# Patient Record
Sex: FEMALE | Race: WHITE | NOT HISPANIC OR LATINO | Employment: UNEMPLOYED | URBAN - METROPOLITAN AREA
[De-identification: names, ages, dates, MRNs, and addresses within clinical notes are randomized per-mention and may not be internally consistent; named-entity substitution may affect disease eponyms.]

---

## 2017-01-10 ENCOUNTER — TELEPHONE (OUTPATIENT)
Dept: INTERNAL MEDICINE | Facility: CLINIC | Age: 57
End: 2017-01-10

## 2017-01-10 NOTE — TELEPHONE ENCOUNTER
Spoke with pt who called in and stated she has been experiencing D X 5 times yesterday, and some slight N. Denies V. Denies F.   Advised likely mild gastro. [place] diet, plenty of fluids. If it gets worse or continues for more than 5 days to call us back.

## 2017-01-17 ENCOUNTER — OFFICE VISIT (OUTPATIENT)
Dept: NUTRITION | Facility: HOSPITAL | Age: 57
End: 2017-01-17

## 2017-01-17 VITALS — WEIGHT: 246 LBS | BODY MASS INDEX: 39.53 KG/M2 | HEIGHT: 66 IN

## 2017-01-17 PROCEDURE — 97803 MED NUTRITION INDIV SUBSEQ: CPT | Performed by: DIETITIAN, REGISTERED

## 2017-01-17 NOTE — MR AVS SNAPSHOT
Sandra WINSTON Molina   1/17/2017 2:00 PM   Office Visit    Dept Phone:  [phone]   Encounter #:  [id number]    Provider:  Emilee Canada RD   Department:  [facility]                Your Full Care Plan              Your Updated Medication List          This list is accurate as of: 1/17/17  2:44 PM.  Always use your most recent med list.                albuterol 108 (90 BASE) MCG/ACT inhaler   Commonly known as:  PROVENTIL HFA;VENTOLIN HFA       ALPRAZolam 0.25 MG tablet   Commonly known as:  XANAX   Take 1 tablet by mouth daily as needed for anxiety.       buPROPion  MG 24 hr tablet   Commonly known as:  WELLBUTRIN XL   Take 1 tablet by mouth Every Morning.       FLUoxetine 20 MG capsule   Commonly known as:  PROzac   Take 1 capsule by mouth Daily.       naproxen 500 MG tablet   Commonly known as:  NAPROSYN       NASAL DECONGESTANT 30 MG tablet   Generic drug:  pseudoephedrine       ondansetron 8 MG tablet   Commonly known as:  ZOFRAN       phentermine 30 MG capsule   Take 1 capsule by mouth Every Morning.       PRILOSEC OTC 20 MG EC tablet   Generic drug:  omeprazole OTC       tiZANidine 4 MG tablet   Commonly known as:  ZANAFLEX       topiramate 25 MG tablet   Commonly known as:  TOPAMAX   Take 0.5 tablets by mouth Every Night.               Instructions     None    Patient Instructions History      Upcoming Appointments     Visit Type Date Time Department    FOLLOW UP RD VISIT, 30 MIN 1/17/2017  2:00 PM [facility]    FOLLOW UP 1/19/2017  2:30 PM [facility]    FOLLOW UP 1/20/2017  8:30 AM [facility] SLEEP MEDICINE [facility]    NEW GYNECOLOGICAL 1/23/2017  2:30 PM [facility]OMENS CRE [facility]    FOLLOW UP RD VISIT, 30 MIN 2/15/2017  2:00 PM [facility]      [facility] Signup     Our records indicate that you have an active [facility] [employer]OTRONIX account.    You can view your After Visit Summary by going to [email or web address] and logging in with your ENDOTRONIX  "username and password.  If you don't have a Biztag username and password but a parent or guardian has access to your record, the parent or guardian should login with their own Biztag username and password and access your record to view the After Visit Summary.    If you have questions, you can email [email or web address] or call [phone] to talk to our Biztag staff.  Remember, Biztag is NOT to be used for urgent needs.  For medical emergencies, dial 911.               Other Info from Your Visit           Your Appointments     Jan 19, 2017  2:30 PM EST   Follow Up with Pedro Anaya MD   [facility] INTERNAL MEDICINE AND ENDOCRINOLOGY [place] (--)    [address]  [facility] [id number] [phone]           Arrive 15 minutes prior to appointment.            Jan 20, 2017  8:30 AM EST   Follow Up with Ivan Kirby MD   [facility] SLEEP MEDICINE (--)    [address]  [facility] [id number] [phone]           Please bring completed new patient packets that were mailed to you prior to follow up as well as bringing a copy of insurnace card and photo ID to visit. Please bring downloadable chips/cards out of machines if you are on PAP therapy.            Jan 23, 2017  2:30 PM EST   New Gynecological with Víctor Edwards MD   [facility] WOMEN'S CARE [place] (--)    [address]  [place] [place] [id number] [phone]            Feb 15, 2017  2:00 PM EST   Follow up RD visit with Emilee Canada RD   [facility] NUTRIT [facility]    [address]  [address]  [place] [id number] [phone]              Allergies     Methylprednisolone Unspecified     Bleeding, cramping      Vital Signs     Height Weight Body Mass Index Smoking Status          166.4 cm (65.5\") 112 kg (246 lb) 40.31 kg/m2 Never Smoker          "

## 2017-01-17 NOTE — PROGRESS NOTES
Adult Outpatient Nutrition  Assessment/PES    Patient Name:  Sandra Auguste  YOB: 1960  MRN: [id number]    Assessment Date:  1/17/2017    Comments: Patient present for ongoing weight loss counseling.  Weight measurements taken today: 246 lbs.  Goal completion as follows:   -Use food diary to track kcalories: 0%  -Lose weight: 100%    Patient states that she was sick a couple of weeks ago and did not eat very much.  She has lost 2 lbs due to being ill.  She also states that she was very busy and did not get to track her food intake as discussed at the last follow up appointment.  Reviewed importance of tracking food intake to continue to lose weight.  Worked with patient on practicing tracking food intake using a food diary.  Gave patient several examples.  Patient able to record everything she ate as well as approximate kcalorie amount. Patient added her kcalories for a total of 2010 kcalories yesterday.  Her goal is 1600 kcalories per day to lose ~1 lb per week.  Patient states that she better understands how to track her intake.  Will bring her food diary with her to the next follow up appointment.  Scheduled a continued follow up appointment for 2- at 2:00 p.m.  RD contact information given.  Thank you again for this referral.      Electronically signed by:  Emilee Canada RD  01/17/17 2:28 PM

## 2017-01-18 ENCOUNTER — APPOINTMENT (OUTPATIENT)
Dept: NUTRITION | Facility: HOSPITAL | Age: 57
End: 2017-01-18

## 2017-01-19 ENCOUNTER — OFFICE VISIT (OUTPATIENT)
Dept: INTERNAL MEDICINE | Facility: CLINIC | Age: 57
End: 2017-01-19

## 2017-01-19 VITALS
HEIGHT: 66 IN | HEART RATE: 85 BPM | WEIGHT: 246 LBS | SYSTOLIC BLOOD PRESSURE: 118 MMHG | DIASTOLIC BLOOD PRESSURE: 82 MMHG | OXYGEN SATURATION: 98 % | BODY MASS INDEX: 39.53 KG/M2

## 2017-01-19 DIAGNOSIS — F41.9 ANXIETY: ICD-10-CM

## 2017-01-19 DIAGNOSIS — M25.512 ACUTE PAIN OF LEFT SHOULDER: Primary | ICD-10-CM

## 2017-01-19 DIAGNOSIS — R14.0 ABDOMINAL DISTENSION (GASEOUS): ICD-10-CM

## 2017-01-19 DIAGNOSIS — E66.9 ADIPOSITY: ICD-10-CM

## 2017-01-19 PROCEDURE — 99213 OFFICE O/P EST LOW 20 MIN: CPT | Performed by: HOSPITALIST

## 2017-01-19 RX ORDER — ALPRAZOLAM 0.25 MG/1
0.25 TABLET ORAL DAILY PRN
Qty: 30 TABLET | Refills: 0 | Status: SHIPPED | OUTPATIENT
Start: 2017-01-19 | End: 2018-09-08

## 2017-01-19 NOTE — PROGRESS NOTES
"Subjective   Sandra Auguste is a 56 y.o. female.   Steatosis of liver; Non morbid obesity, unspecified obesity type; and Acute pain of left shoulder      HPI Comments: She has had shots in her shoulder by Dr. Bettencourt for her pain and it helped. She has some tingling in her right arm. She has had the gastrointestinal virus last week. Her weight is unchanged but she has not had adipex  for a while. She is not exercising much except for exercises for her shoulder. She is following a diet.       The following portions of the patient's history were reviewed and updated as appropriate: allergies, current medications, past family history, past medical history, past social history, past surgical history and problem list.    Review of Systems   Constitutional: Positive for appetite change.   HENT: Negative for congestion.    Gastrointestinal: Positive for abdominal distention and nausea. Negative for diarrhea.   Neurological: Negative for dizziness and facial asymmetry.       Objective  Blood pressure 118/82, pulse 85, height 65.5\" (166.4 cm), weight 246 lb (112 kg), SpO2 98 %.  Body mass index is 40.31 kg/(m^2).      Physical Exam   Constitutional: She is oriented to person, place, and time. She appears well-developed and well-nourished.   HENT:   Head: Normocephalic and atraumatic.   Eyes: Conjunctivae and EOM are normal. Pupils are equal, round, and reactive to light.   Neck: Normal range of motion. Neck supple.   Cardiovascular: Normal rate, regular rhythm and normal heart sounds.    Pulmonary/Chest: Effort normal and breath sounds normal.   Abdominal: Soft. Bowel sounds are normal.   Neurological: She is alert and oriented to person, place, and time.   Skin: Skin is warm and dry.   Psychiatric: She has a normal mood and affect. Her behavior is normal.       Assessment/Plan   Sandra was seen today for steatosis of liver, non morbid obesity, unspecified obesity type and acute pain of left shoulder.    Diagnoses and all orders for " this visit:    Acute pain of left shoulder    Abdominal distension (gaseous)    Anxiety  -     ALPRAZolam (XANAX) 0.25 MG tablet; Take 1 tablet by mouth Daily As Needed for anxiety.    Adiposity    She was advise to take a probiotic

## 2017-01-19 NOTE — MR AVS SNAPSHOT
Sandra Auguste   1/19/2017 2:30 PM   Office Visit    Dept Phone:  [phone]   Encounter #:  [id number]    Provider:  Pedro Anaya MD   Department:  [facility] INTERNAL MEDICINE AND ENDOCRINOLOGY [place]                Your Full Care Plan              Where to Get Your Medications      You can get these medications from any pharmacy     Bring a paper prescription for each of these medications     ALPRAZolam 0.25 MG tablet            Your Updated Medication List          This list is accurate as of: 1/19/17  4:20 PM.  Always use your most recent med list.                albuterol 108 (90 BASE) MCG/ACT inhaler   Commonly known as:  PROVENTIL HFA;VENTOLIN HFA       ALPRAZolam 0.25 MG tablet   Commonly known as:  XANAX   Take 1 tablet by mouth Daily As Needed for anxiety.       buPROPion  MG 24 hr tablet   Commonly known as:  WELLBUTRIN XL   Take 1 tablet by mouth Every Morning.       FLUoxetine 20 MG capsule   Commonly known as:  PROzac   Take 1 capsule by mouth Daily.       naproxen 500 MG tablet   Commonly known as:  NAPROSYN       NASAL DECONGESTANT 30 MG tablet   Generic drug:  pseudoephedrine       ondansetron 8 MG tablet   Commonly known as:  ZOFRAN       phentermine 30 MG capsule   Take 1 capsule by mouth Every Morning.       PRILOSEC OTC 20 MG EC tablet   Generic drug:  omeprazole OTC       tiZANidine 4 MG tablet   Commonly known as:  ZANAFLEX       topiramate 25 MG tablet   Commonly known as:  TOPAMAX   Take 0.5 tablets by mouth Every Night.               You Were Diagnosed With        Codes Comments    Acute pain of left shoulder    -  Primary ICD-10-CM: M25.512  ICD-9-CM: 719.41     Abdominal distension (gaseous)     ICD-10-CM: R14.0  ICD-9-CM: 787.3     Anxiety     ICD-10-CM: F41.9  ICD-9-CM: 300.00     Adiposity     ICD-10-CM: E66.9  ICD-9-CM: 278.02       Instructions     None    Patient Instructions History      Upcoming Appointments     Visit Type Date Time Department    FOLLOW UP 1/19/2017  2:30 PM [facility]    FOLLOW UP 1/20/2017  8:30 AM [facility] SLEEP MEDICINE [facility]    NEW GYNECOLOGICAL 1/23/2017  2:30 PM [facility] WOMENS CRE CTR [facility]    FOLLOW UP 2/9/2017 10:30 AM [facility]    FOLLOW UP RD VISIT, 30 MIN 2/15/2017  2:00 PM [facility]      MyChart Signup     Our records indicate that you have an active [employer] account.    You can view your After Visit Summary by going to [employer] and logging in with your MobileMD username and password.  If you don't have a MobileMD username and password but a parent or guardian has access to your record, the parent or guardian should login with their own MobileMD username and password and access your record to view the After Visit Summary.    If you have questions, you can email [email or web address] or call [phone] to talk to our MobileMD staff.  Remember, MobileMD is NOT to be used for urgent needs.  For medical emergencies, dial 911.               Other Info from Your Visit           Your Appointments     Jan 20, 2017  8:30 AM EST   Follow Up with Ivan Kirby MD   [facility] SLEEP MEDICINE (--)    [address]  [facility] [address] [phone]           Please bring completed new patient packets that were mailed to you prior to follow up as well as bringing a copy of insurnace card and photo ID to visit. Please bring downloadable chips/cards out of machines if you are on PAP therapy.            Jan 23, 2017  2:30 PM EST   New Gynecological with Víctor Edwards MD   [facility] WOMEN'S CARE [place] (--)    [address]  [facility] [id number] [phone]            Feb 09, 2017 10:30 AM EST   Follow Up with Pedro Anaya MD   [facility] INTERNAL MEDICINE AND ENDOCRINOLOGY [place] (--)    [address]  [facility] [address] [phone]           Arrive 15 minutes prior to appointment.          "   Feb 15, 2017  2:00 PM EST   Follow up RD visit with Emilee Canada RD   [facility]    [address]  [address]  [place] [id number]   [phone]              Allergies     Methylprednisolone Unspecified     Bleeding, cramping      Reason for Visit     Steatosis of liver     Non morbid obesity, unspecified obesity type     Acute pain of left shoulder           Vital Signs     Blood Pressure Pulse Height Weight Oxygen Saturation Body Mass Index    118/82 85 65.5\" (166.4 cm) 246 lb (112 kg) 98% 40.31 kg/m2    Smoking Status                   Never Smoker           Problems and Diagnoses Noted     Abdominal distension (gaseous)    Acute pain of left shoulder    Adiposity    Anxiety problem        "

## 2017-01-20 ENCOUNTER — OFFICE VISIT (OUTPATIENT)
Dept: SLEEP MEDICINE | Facility: HOSPITAL | Age: 57
End: 2017-01-20

## 2017-01-20 VITALS
DIASTOLIC BLOOD PRESSURE: 86 MMHG | WEIGHT: 248 LBS | HEART RATE: 86 BPM | BODY MASS INDEX: 39.86 KG/M2 | SYSTOLIC BLOOD PRESSURE: 138 MMHG | OXYGEN SATURATION: 98 % | HEIGHT: 66 IN

## 2017-01-20 DIAGNOSIS — G47.33 OBSTRUCTIVE SLEEP APNEA, ADULT: Primary | ICD-10-CM

## 2017-01-20 DIAGNOSIS — E66.9 ADIPOSITY: ICD-10-CM

## 2017-01-20 PROCEDURE — 99213 OFFICE O/P EST LOW 20 MIN: CPT | Performed by: INTERNAL MEDICINE

## 2017-01-20 NOTE — PROGRESS NOTES
"Subjective   Sandra Auguste is a 56 y.o. female is here today for follow-up.  She is followed here with obstructive sleep apnea.  Her primary care physician is Dr. Anaya    History of Present Illness  Patient was last seen May 8.  She has moderate obstructive sleep apnea had an AHI of 22.6.  She also has a history of obesity and anxiety.  She says she's been doing fairly well with her machine she's been having a slight problem adjusting to the mask but is gotten a new mask and is doing better with that.  She says she does sleep better when she uses machine.  She has occasional pain in her shoulders.  She is been working with the dietitian.  The following portions of the patient's history were reviewed and updated as appropriate: allergies, current medications and problem list.    Review of Systems   Constitutional: Positive for diaphoresis.   HENT: Positive for postnasal drip and rhinorrhea.    Eyes: Positive for visual disturbance.   Respiratory: Positive for cough.    Cardiovascular: Positive for palpitations and leg swelling.   Gastrointestinal: Positive for diarrhea.   Endocrine: Negative.    Genitourinary: Negative.    Musculoskeletal: Positive for arthralgias and joint swelling.   Skin: Negative.    Allergic/Immunologic: Positive for environmental allergies.   Neurological: Positive for headaches.   Hematological: Negative.    Psychiatric/Behavioral: Positive for dysphoric mood. The patient is nervous/anxious.        Objective    Visit Vitals   • /86   • Pulse 86   • Ht 65.5\" (166.4 cm)   • Wt 248 lb (112 kg)   • SpO2 98%   • BMI 40.64 kg/m2       Physical Exam   Constitutional: She is oriented to person, place, and time. She appears well-developed and well-nourished.   She is morbidly obese.   HENT:   Head: Normocephalic and atraumatic.   She has nasal airway narrowing and Mallampati class IV anatomy   Eyes: EOM are normal. Pupils are equal, round, and reactive to light.   Neck: Normal range of motion. " Neck supple.   Cardiovascular: Normal rate, regular rhythm and normal heart sounds.    Pulmonary/Chest: Effort normal and breath sounds normal.   Abdominal: Soft. Bowel sounds are normal.   Musculoskeletal: Normal range of motion. She exhibits no edema.   Neurological: She is alert and oriented to person, place, and time.   Skin: Skin is warm and dry.   Psychiatric: She has a normal mood and affect. Her behavior is normal.     Download from her machine for the past 6 months shows she's used it 94% of the days.  She is using it for hours 58 minutes per day.  Her AHI is only 2.2.  Her 90% pressure is 12.2.    Assessment/Plan   Sandra was seen today for follow-up.    Diagnoses and all orders for this visit:    Obstructive sleep apnea, adult  -     CPAP Therapy    Adiposity     patient seems be doing very well with her machine.  She is using it regularly.  She has good control of her respiratory events on her current pressures.  We will continue on her current settings.  We will renew her supplies.  She is encouraged to lose weight.  She is encouraged to avoid alcohol and sedatives close to bedtime.  She is encouraged practice lateral position sleep.  We will see her back in 1 year.  She is contact us earlier symptoms worsen.    Ivan Kirby MD [facility]  Sleep Medicine  Pulmonary and Critical Care Medicine

## 2017-01-20 NOTE — PATIENT INSTRUCTIONS
Sleep Apnea   Sleep apnea is a sleep disorder characterized by abnormal pauses in breathing while you sleep. When your breathing pauses, the level of oxygen in your blood decreases. This causes you to move out of deep sleep and into light sleep. As a result, your quality of sleep is poor, and the system that carries your blood throughout your body (cardiovascular system) experiences stress. If sleep apnea remains untreated, the following conditions can develop:  · High blood pressure (hypertension).  · Coronary artery disease.  · Inability to achieve or maintain an erection (impotence).  · Impairment of your thought process (cognitive dysfunction).  There are three types of sleep apnea:  1. Obstructive sleep apnea--Pauses in breathing during sleep because of a blocked airway.  2. Central sleep apnea--Pauses in breathing during sleep because the area of the brain that controls your breathing does not send the correct signals to the muscles that control breathing.  3. Mixed sleep apnea--A combination of both obstructive and central sleep apnea.  RISK FACTORS  The following risk factors can increase your risk of developing sleep apnea:  · Being overweight.  · Smoking.  · Having narrow passages in your nose and throat.  · Being of older age.  · Being male.  · Alcohol use.  · Sedative and tranquilizer use.  · Ethnicity. Among individuals younger than 35 years,  Americans are at increased risk of sleep apnea.  SYMPTOMS   · Difficulty staying asleep.  · Daytime sleepiness and fatigue.  · Loss of energy.  · Irritability.  · Loud, heavy snoring.  · Morning headaches.  · Trouble concentrating.  · Forgetfulness.  · Decreased interest in sex.  · Unexplained sleepiness.  DIAGNOSIS   In order to diagnose sleep apnea, your caregiver will perform a physical examination. A sleep study done in the comfort of your own home may be appropriate if you are otherwise healthy. Your caregiver may also recommend that you spend the  "night in a sleep lab. In the sleep lab, several monitors record information about your heart, lungs, and brain while you sleep. Your leg and arm movements and blood oxygen level are also recorded.  TREATMENT  The following actions may help to resolve mild sleep apnea:  · Sleeping on your side.    · Using a decongestant if you have nasal congestion.    · Avoiding the use of depressants, including alcohol, sedatives, and narcotics.    · Losing weight and modifying your diet if you are overweight.  There also are devices and treatments to help open your airway:  · Oral appliances. These are custom-made mouthpieces that shift your lower jaw forward and slightly open your bite. This opens your airway.  · Devices that create positive airway pressure. This positive pressure \"splints\" your airway open to help you breathe better during sleep. The following devices create positive airway pressure:    Continuous positive airway pressure (CPAP) device. The CPAP device creates a continuous level of air pressure with an air pump. The air is delivered to your airway through a mask while you sleep. This continuous pressure keeps your airway open.    Nasal expiratory positive airway pressure (EPAP) device. The EPAP device creates positive air pressure as you exhale. The device consists of single-use valves, which are inserted into each nostril and held in place by adhesive. The valves create very little resistance when you inhale but create much more resistance when you exhale. That increased resistance creates the positive airway pressure. This positive pressure while you exhale keeps your airway open, making it easier to breath when you inhale again.    Bilevel positive airway pressure (BPAP) device. The BPAP device is used mainly in patients with central sleep apnea. This device is similar to the CPAP device because it also uses an air pump to deliver continuous air pressure through a mask. However, with the BPAP machine, the " pressure is set at two different levels. The pressure when you exhale is lower than the pressure when you inhale.  · Surgery. Typically, surgery is only done if you cannot comply with less invasive treatments or if the less invasive treatments do not improve your condition. Surgery involves removing excess tissue in your airway to create a wider passage way.     This information is not intended to replace advice given to you by your health care provider. Make sure you discuss any questions you have with your health care provider.     Document Released: 12/08/2003 Document Revised: 01/08/2016 Document Reviewed: 04/25/2013  [employer] Interactive Patient Education ©2016 [employer]

## 2017-01-20 NOTE — LETTER
January 22, 2017     Pedro Anaya MD  [address]  [facility] [address]    Patient: Sandra Auguste   YOB: 1960   Date of Visit: 1/20/2017       Dear Dr. Jaclyn MD:    Thank you for referring Sandra Auguste to me for evaluation. Below are the relevant portions of my assessment and plan of care.      Sandra was seen today for follow-up.    Diagnoses and all orders for this visit:    Obstructive sleep apnea, adult  -     CPAP Therapy    Adiposity     patient seems be doing very well with her machine.  She is using it regularly.  She has good control of her respiratory events on her current pressures.  We will continue on her current settings.  We will renew her supplies.  She is encouraged to lose weight.  She is encouraged to avoid alcohol and sedatives close to bedtime.  She is encouraged practice lateral position sleep.  We will see her back in 1 year.  She is contact us earlier symptoms worsen.    Ivan Kirby MD [facility]  Sleep Medicine  Pulmonary and Critical Care Medicine                      If you have questions, please do not hesitate to call me. I look forward to following Sandra along with you.         Sincerely,        Ivan Kirby MD        CC: No Recipients

## 2017-01-20 NOTE — MR AVS SNAPSHOT
Sandra M Molina   1/20/2017 8:30 AM   Office Visit    Dept Phone:  [phone]   Encounter #:  [id number]    Provider:  Ivan Kirby MD   Department:  [facility] SLEEP MEDICINE                Your Full Care Plan              Your Updated Medication List          This list is accurate as of: 1/20/17  9:39 AM.  Always use your most recent med list.                albuterol 108 (90 BASE) MCG/ACT inhaler   Commonly known as:  PROVENTIL HFA;VENTOLIN HFA       ALPRAZolam 0.25 MG tablet   Commonly known as:  XANAX   Take 1 tablet by mouth Daily As Needed for anxiety.       buPROPion  MG 24 hr tablet   Commonly known as:  WELLBUTRIN XL   Take 1 tablet by mouth Every Morning.       FLUoxetine 20 MG capsule   Commonly known as:  PROzac   Take 1 capsule by mouth Daily.       naproxen 500 MG tablet   Commonly known as:  NAPROSYN       NASAL DECONGESTANT 30 MG tablet   Generic drug:  pseudoephedrine       ondansetron 8 MG tablet   Commonly known as:  ZOFRAN       phentermine 30 MG capsule   Take 1 capsule by mouth Every Morning.       PRILOSEC OTC 20 MG EC tablet   Generic drug:  omeprazole OTC       tiZANidine 4 MG tablet   Commonly known as:  ZANAFLEX       topiramate 25 MG tablet   Commonly known as:  TOPAMAX   Take 0.5 tablets by mouth Every Night.               We Performed the Following     CPAP Therapy       You Were Diagnosed With        Codes Comments    Obstructive sleep apnea, adult    -  Primary ICD-10-CM: G47.33  ICD-9-CM: 327.23     Adiposity     ICD-10-CM: E66.9  ICD-9-CM: 278.02       Instructions    Sleep Apnea   Sleep apnea is a sleep disorder characterized by abnormal pauses in breathing while you sleep. When your breathing pauses, the level of oxygen in your blood decreases. This causes you to move out of deep sleep and into light sleep. As a result, your quality of sleep is poor, and the system that carries your blood throughout your body (cardiovascular system)  experiences stress. If sleep apnea remains untreated, the following conditions can develop:  · High blood pressure (hypertension).  · Coronary artery disease.  · Inability to achieve or maintain an erection (impotence).  · Impairment of your thought process (cognitive dysfunction).  There are three types of sleep apnea:  1. Obstructive sleep apnea--Pauses in breathing during sleep because of a blocked airway.  2. Central sleep apnea--Pauses in breathing during sleep because the area of the brain that controls your breathing does not send the correct signals to the muscles that control breathing.  3. Mixed sleep apnea--A combination of both obstructive and central sleep apnea.  RISK FACTORS  The following risk factors can increase your risk of developing sleep apnea:  · Being overweight.  · Smoking.  · Having narrow passages in your nose and throat.  · Being of older age.  · Being male.  · Alcohol use.  · Sedative and tranquilizer use.  · Ethnicity. Among individuals younger than 35 years,  Americans are at increased risk of sleep apnea.  SYMPTOMS   · Difficulty staying asleep.  · Daytime sleepiness and fatigue.  · Loss of energy.  · Irritability.  · Loud, heavy snoring.  · Morning headaches.  · Trouble concentrating.  · Forgetfulness.  · Decreased interest in sex.  · Unexplained sleepiness.  DIAGNOSIS   In order to diagnose sleep apnea, your caregiver will perform a physical examination. A sleep study done in the comfort of your own home may be appropriate if you are otherwise healthy. Your caregiver may also recommend that you spend the night in a sleep lab. In the sleep lab, several monitors record information about your heart, lungs, and brain while you sleep. Your leg and arm movements and blood oxygen level are also recorded.  TREATMENT  The following actions may help to resolve mild sleep apnea:  · Sleeping on your side.    · Using a decongestant if you have nasal congestion.    · Avoiding the use of  "depressants, including alcohol, sedatives, and narcotics.    · Losing weight and modifying your diet if you are overweight.  There also are devices and treatments to help open your airway:  · Oral appliances. These are custom-made mouthpieces that shift your lower jaw forward and slightly open your bite. This opens your airway.  · Devices that create positive airway pressure. This positive pressure \"splints\" your airway open to help you breathe better during sleep. The following devices create positive airway pressure:    Continuous positive airway pressure (CPAP) device. The CPAP device creates a continuous level of air pressure with an air pump. The air is delivered to your airway through a mask while you sleep. This continuous pressure keeps your airway open.    Nasal expiratory positive airway pressure (EPAP) device. The EPAP device creates positive air pressure as you exhale. The device consists of single-use valves, which are inserted into each nostril and held in place by adhesive. The valves create very little resistance when you inhale but create much more resistance when you exhale. That increased resistance creates the positive airway pressure. This positive pressure while you exhale keeps your airway open, making it easier to breath when you inhale again.    Bilevel positive airway pressure (BPAP) device. The BPAP device is used mainly in patients with central sleep apnea. This device is similar to the CPAP device because it also uses an air pump to deliver continuous air pressure through a mask. However, with the BPAP machine, the pressure is set at two different levels. The pressure when you exhale is lower than the pressure when you inhale.  · Surgery. Typically, surgery is only done if you cannot comply with less invasive treatments or if the less invasive treatments do not improve your condition. Surgery involves removing excess tissue in your airway to create a wider passage way.     This " information is not intended to replace advice given to you by your health care provider. Make sure you discuss any questions you have with your health care provider.     Document Released: 12/08/2003 Document Revised: 01/08/2016 Document Reviewed: 04/25/2013  Elsevier Interactive Patient Education ©2016 [employer]       Patient Instructions History      Upcoming Appointments     Visit Type Date Time Department    FOLLOW UP 1/20/2017  8:30 AM [facility]    NEW GYNECOLOGICAL 1/23/2017  2:30 PM [facility]    FOLLOW UP 2/9/2017 10:30 AM [facility]    FOLLOW UP RD VISIT, 30 MIN 2/15/2017  2:00 PM [facility]      MyChart Signup     Our records indicate that you have an active [facility] account.    You can view your After Visit Summary by going to [employer] and logging in with your [employer] username and password.  If you don't have a [employer] username and password but a parent or guardian has access to your record, the parent or guardian should login with their own [employer] username and password and access your record to view the After Visit Summary.    If you have questions, you can email [email or web address] or call [phone] to talk to our [employer] staff.  Remember, [employer] is NOT to be used for urgent needs.  For medical emergencies, dial 911.               Other Info from Your Visit           Your Appointments     Jan 23, 2017  2:30 PM EST   New Gynecological with Víctor Edwards MD   [facility] [place] (--)    [address]  [place] [place] [facility] [address]   [phone]            Feb 09, 2017 10:30 AM EST   Follow Up with Pedro Anaya MD   [facility] INTERNAL MEDICINE AND ENDOCRINOLOGY [place] (--)    [address]  [place] [place] [facility] [id number] [phone]           Arrive 15 minutes prior to appointment.            Feb 15, 2017  2:00 PM EST   Follow up RD visit with Emilee Canada,  "ZEHRA   [facility]    [address]  [address]  [place] [address] [phone]              Allergies     Methylprednisolone Unspecified     Bleeding, cramping      Reason for Visit     Follow-up           Vital Signs     Blood Pressure Pulse Height Weight Oxygen Saturation Body Mass Index    138/86 86 65.5\" (166.4 cm) 248 lb (112 kg) 98% 40.64 kg/m2    Smoking Status                   Never Smoker           Problems and Diagnoses Noted     Adiposity    Obstructive sleep apnea, adult    -  Primary        "

## 2017-01-23 ENCOUNTER — OFFICE VISIT (OUTPATIENT)
Dept: INTERNAL MEDICINE | Facility: CLINIC | Age: 57
End: 2017-01-23

## 2017-01-23 VITALS
SYSTOLIC BLOOD PRESSURE: 140 MMHG | BODY MASS INDEX: 39.86 KG/M2 | HEIGHT: 66 IN | DIASTOLIC BLOOD PRESSURE: 84 MMHG | OXYGEN SATURATION: 99 % | HEART RATE: 94 BPM | WEIGHT: 248 LBS

## 2017-01-23 DIAGNOSIS — H92.03 EAR PAIN, BILATERAL: Primary | ICD-10-CM

## 2017-01-23 DIAGNOSIS — J02.9 SORE THROAT: ICD-10-CM

## 2017-01-23 LAB
EXPIRATION DATE: NORMAL
INTERNAL CONTROL: NORMAL
Lab: 4309
S PYO AG THROAT QL: NEGATIVE

## 2017-01-23 PROCEDURE — 87880 STREP A ASSAY W/OPTIC: CPT | Performed by: NURSE PRACTITIONER

## 2017-01-23 PROCEDURE — 99213 OFFICE O/P EST LOW 20 MIN: CPT | Performed by: NURSE PRACTITIONER

## 2017-01-23 NOTE — PROGRESS NOTES
"Subjective  Facial Swelling (right side of neck swollen and sore ) and Earache (both ears )      Sandra Auguste is a 56 y.o. female.   Allergies   Allergen Reactions   • Methylprednisolone      Bleeding, cramping     History of Present Illness      Swollen gland on right neck x 5 days, has been using ice and has went down a little, did gargle warm salt water too, temp 98.9, some chills and sweats , took tylenol with some relief , renan ear pain intermittent x 5 days   The following portions of the patient's history were reviewed and updated as appropriate: allergies, current medications, past family history, past medical history, past social history, past surgical history and problem list.    Review of Systems   Constitutional: Positive for chills and fever.   HENT: Positive for ear pain and sore throat.    Hematological: Positive for adenopathy.   All other systems reviewed and are negative.      Objective   Physical Exam   Constitutional: She is oriented to person, place, and time. She appears well-developed and well-nourished.   HENT:   Head: Normocephalic and atraumatic.   Right Ear: External ear normal.   Left Ear: External ear normal.   Mouth/Throat: Oropharynx is clear and moist.   Eyes: Conjunctivae are normal.   Cardiovascular: Normal rate.    Pulmonary/Chest: Effort normal and breath sounds normal.   Lymphadenopathy:     She has cervical adenopathy.   Neurological: She is alert and oriented to person, place, and time.   Skin: Skin is warm and dry.   Psychiatric: She has a normal mood and affect. Her behavior is normal. Judgment and thought content normal.   Nursing note and vitals reviewed.    Visit Vitals   • /84   • Pulse 94   • Ht 65.5\" (166.4 cm)   • Wt 248 lb (112 kg)   • SpO2 99%   • BMI 40.64 kg/m2       Assessment/Plan     Problem List Items Addressed This Visit     None      Visit Diagnoses     Ear pain, bilateral    -  Primary    advil cold and sinus or nasal spray prn    Sore throat        " viral, continue warm compresses to lymph nose, increase fluids     Relevant Orders    POC Rapid Strep A (Completed)          Results for orders placed or performed in visit on 01/23/17   POC Rapid Strep A   Result Value Ref Range    Rapid Strep A Screen Negative Negative, VALID, INVALID, Not Performed    Internal Control Passed Passed    Lot Number [id number]     Expiration Date 07/01/2020

## 2017-01-23 NOTE — MR AVS SNAPSHOT
Sandra Auguste   1/23/2017 12:45 PM   Office Visit    Dept Phone:  [phone]   Encounter #:  [id number]    Provider:  REBECA Johnson   Department:  [facility] INTERNAL MEDICINE AND ENDOCRINOLOGY [place]                Your Full Care Plan              Your Updated Medication List          This list is accurate as of: 1/23/17  1:23 PM.  Always use your most recent med list.                albuterol 108 (90 BASE) MCG/ACT inhaler   Commonly known as:  PROVENTIL HFA;VENTOLIN HFA       ALPRAZolam 0.25 MG tablet   Commonly known as:  XANAX   Take 1 tablet by mouth Daily As Needed for anxiety.       buPROPion  MG 24 hr tablet   Commonly known as:  WELLBUTRIN XL   Take 1 tablet by mouth Every Morning.       FLUoxetine 20 MG capsule   Commonly known as:  PROzac   Take 1 capsule by mouth Daily.       naproxen 500 MG tablet   Commonly known as:  NAPROSYN       NASAL DECONGESTANT 30 MG tablet   Generic drug:  pseudoephedrine       ondansetron 8 MG tablet   Commonly known as:  ZOFRAN       phentermine 30 MG capsule   Take 1 capsule by mouth Every Morning.       PRILOSEC OTC 20 MG EC tablet   Generic drug:  omeprazole OTC       tiZANidine 4 MG tablet   Commonly known as:  ZANAFLEX       topiramate 25 MG tablet   Commonly known as:  TOPAMAX   Take 0.5 tablets by mouth Every Night.               You Were Diagnosed With        Codes Comments    Ear pain, bilateral    -  Primary ICD-10-CM: H92.03  ICD-9-CM: 388.70 advil cold and sinus or nasal spray prn    Sore throat     ICD-10-CM: J02.9  ICD-9-CM: 462 viral, continue warm compresses to lymph nose, increase fluids       Instructions     None    Patient Instructions History      Upcoming Appointments     Visit Type Date Time Department    SAME DAY 1/23/2017 12:45 PM [facility]    NEW GYNECOLOGICAL 2/1/2017  9:30 AM [facility]    FOLLOW UP 2/9/2017 10:30 AM [facility]    FOLLOW UP RD VISIT, 30 MIN 2/15/2017  2:00 PM [facility]  [facility]      MyChart Signup     Our records indicate that you have an active [facility] account.    You can view your After Visit Summary by going to [email or web address] and logging in with your [employer] username and password.  If you don't have a [facility] username and password but a parent or guardian has access to your record, the parent or guardian should login with their own [facility] username and password and access your record to view the After Visit Summary.    If you have questions, you can email [email or web address] or call [phone] to talk to our [employer] staff.  Remember, [employer] is NOT to be used for urgent needs.  For medical emergencies, dial 911.               Other Info from Your Visit           Your Appointments     Feb 01, 2017  9:30 AM EST   New Gynecological with Víctor Edwards MD   [facility] [place] (--)    [address]  [facility] [address]   [phone]            Feb 09, 2017 10:30 AM EST   Follow Up with Pedro Anaya MD   [facility] INTERNAL MEDICINE AND ENDOCRINOLOGY [place] (--)    [address]  [facility] [address] [phone]           Arrive 15 minutes prior to appointment.            Feb 15, 2017  2:00 PM EST   Follow up RD visit with Emilee Canada RD   [facility]    [address]  [address]  [place] [id number]   [phone]              Allergies     Methylprednisolone Unspecified     Bleeding, cramping      Reason for Visit     Facial Swelling right side of neck swollen and sore     Earache both ears       Vital Signs     Blood Pressure Pulse Height Weight Oxygen Saturation Body Mass Index    140/84 94 65.5\" (166.4 cm) 248 lb (112 kg) 99% 40.64 kg/m2    Smoking Status                   Never Smoker           Problems and Diagnoses Noted     Ear pain    -  Primary    Sore throat            "

## 2017-02-01 ENCOUNTER — OFFICE VISIT (OUTPATIENT)
Dept: OBSTETRICS AND GYNECOLOGY | Facility: CLINIC | Age: 57
End: 2017-02-01

## 2017-02-01 VITALS
SYSTOLIC BLOOD PRESSURE: 130 MMHG | DIASTOLIC BLOOD PRESSURE: 88 MMHG | HEIGHT: 66 IN | WEIGHT: 250.6 LBS | BODY MASS INDEX: 40.27 KG/M2

## 2017-02-01 DIAGNOSIS — N95.0 POSTMENOPAUSAL BLEEDING: Primary | ICD-10-CM

## 2017-02-01 DIAGNOSIS — E66.01 MORBID OBESITY DUE TO EXCESS CALORIES (HCC): ICD-10-CM

## 2017-02-01 DIAGNOSIS — N92.0 MENORRHAGIA WITH REGULAR CYCLE: Primary | ICD-10-CM

## 2017-02-01 PROCEDURE — 99204 OFFICE O/P NEW MOD 45 MIN: CPT | Performed by: OBSTETRICS & GYNECOLOGY

## 2017-02-01 NOTE — PROGRESS NOTES
"   Chief Complaint   Patient presents with   • Gynecologic Exam     post-menopausal bleeding after treatment with steroids       Sandra Auguste is a 56 y.o. year old  presenting to be seen in consultation from Dr. Jeancarlos Anaya with a diagnosis of postmenopausal bleeding.  This patient has PTSD, anxiety, and depression.  He is treated with Wellbutrin XL, sertraline, and Xanax.  She has had 2 episodes of light, postmenopausal bleeding which immediately followed steroid injections into her shoulder.  She had a pelvic ultrasound which revealed intramural and subserous leiomyomata.  He states that she has previously had a cervical polypectomy.  The endometrial stripe was 7.4 mm.  She desires evaluation of her bleeding.    History   Sexual Activity   • Sexual activity: No     She would not like to be screened for STD's at today's exam.       SCREENING TESTS    Year 2012   Age                         PAP                         HPV high risk                         Mammogram                         KELLY score                         Breast MRI                         Lipids                         Vitamin D                         Colonoscopy                         DEXA  Frax (hip/any)                         Ovarian Screen                           Enter the month test was performed.  If month not known, enter \"X'  · Black numbers = normal results  · Red numbers = abnormal results  · Black X = patient reported normal  · Red X - patient reported abnormal      Referred by:    Profession:    Other info:        No Additional Complaints Reported    The following portions of the patient's history were reviewed and updated as appropriate:vital signs and   She  does not have any pertinent problems on file.  She  has a past surgical history that includes Cervical polypectomy.  Her family history includes Cancer in her " "maternal aunt, mother, and paternal aunt.  She  reports that she has never smoked. She has never used smokeless tobacco. Alcohol use questions deferred to the physician. She reports that she does not use illicit drugs.  Current Outpatient Prescriptions   Medication Sig Dispense Refill   • albuterol (PROVENTIL HFA;VENTOLIN HFA) 108 (90 BASE) MCG/ACT inhaler INHALE TWO PUFFS BY MOUTH EVERY 6 HOURS AS NEEDED     • ALPRAZolam (XANAX) 0.25 MG tablet Take 1 tablet by mouth Daily As Needed for anxiety. 30 tablet 0   • buPROPion XL (WELLBUTRIN XL) 300 MG 24 hr tablet Take 1 tablet by mouth Every Morning. 30 tablet 2   • FLUoxetine (PROzac) 20 MG capsule Take 1 capsule by mouth Daily. 30 capsule 2   • naproxen (NAPROSYN) 500 MG tablet Take 1 tablet by mouth every 12 (twelve) hours as needed. With food     • omeprazole OTC (PRILOSEC OTC) 20 MG EC tablet Take  by mouth.     • ondansetron (ZOFRAN) 8 MG tablet Take  by mouth.     • phentermine 30 MG capsule Take 1 capsule by mouth Every Morning. 30 capsule 2   • pseudoephedrine (NASAL DECONGESTANT) 30 MG tablet Take  by mouth.     • tiZANidine (ZANAFLEX) 4 MG tablet Take 1 tablet by mouth 3 (three) times a day as needed.     • topiramate (TOPAMAX) 25 MG tablet Take 0.5 tablets by mouth Every Night. 15 tablet 2     No current facility-administered medications for this visit.      She is allergic to methylprednisolone..        Review of Systems  A comprehensive review of systems was negative.  Constitutional: negative for fever, chills, activity change, appetite change, fatigue and unexpected weight change.  Respiratory: positive for cough  Cardiovascular: negative  Gastrointestinal: negative  Genitourinary:negative  Musculoskeletal:positive for right shoulder pain  Behavioral/Psych: positive for anxiety, depression and PTSD            Visit Vitals   • /88   • Ht 65.5\" (166.4 cm)   • Wt 250 lb 9.6 oz (114 kg)   • LMP  (LMP Unknown)   • BMI 41.07 kg/m2       Physical " Exam    General:  alert; cooperative; well developed; well nourished  obese - Body mass index is 41.07 kg/(m^2).   Skin:  No suspicious lesions seen   Thyroid: normal to inspection and palpation   Lungs:  breathing is unlabored  clear to auscultation bilaterally   Heart:  regular rate and rhythm, S1, S2 normal, no murmur, click, rub or gallop   Breasts:  Examined in supine position  Symmetric without masses or skin dimpling  Nipples normal without inversion, lesions or discharge  There are no palpable axillary nodes   Abdomen: soft, non-tender; no masses  no umbilical or inginual hernias are present  no hepato-splenomegaly  obese   Pelvis: Clinical staff was present for exam  External genitalia:  normal appearance of the external genitalia including Bartholin's and [place]'s glands.  Vaginal:  normal pink mucosa without prolapse or lesions.  Cervix:  normal appearance. high in the vault  Uterus:  asymmetrically enlarged, consistent with 10 weeks size;  Adnexa:  non palpable bilaterally.  Rectal:  anus visually normal appearing. recto-vaginal exam unremarkable and confirms findings;     Lab Review   CBC results    Imaging   Pelvic ultrasound reviewed revealing uterine leiomyomata.    Counseling was given to patient for the following topics: diagnostic results, instructions for management, prognosis and risks and benefits of treatment options . Total time of the encounter was > 45 minute(s) and > 30 minute(s)  was spent counseling the patient about the need for further evaluation of her endometrium.  I have discussed endometrial biopsy and hysteroscopy/D&C.  She will be scheduled for the latter procedure due to difficulty with cervical visualization in the office.  The surgical risks of uterine perforation, bleeding, infection, and anesthetic risks were explained to the patient.          ASSESSMENT  Problems Addressed this Visit        Digestive    Obesity       Genitourinary    Postmenopausal bleeding - Primary     Relevant Orders    Liquid-based Pap Smear, Screening            PLAN    · Medications prescribed this encounter:  No orders of the defined types were placed in this encounter.  · Pap test done  · Mammograms ordered  · Follow up:  To be scheduled for hysteroscopy, D&C  · Calcium, 600 mg/ Vit. D, 400 IU daily     *Please note that portions of this documentation may have been completed with a voice recognition program.  Efforts were made to edit this dictation, but occasional words may have been mistranscribed.     This note was electronically signed.    COREY Edwards MD  February 1, 2017  10:20 AM

## 2017-02-03 ENCOUNTER — TELEPHONE (OUTPATIENT)
Dept: INTERNAL MEDICINE | Facility: CLINIC | Age: 57
End: 2017-02-03

## 2017-02-03 NOTE — TELEPHONE ENCOUNTER
----- Message from Tosha Peck sent at 2/2/2017 11:35 AM EST -----  Contact: CHUCKIE WITH DR UGALDE'S OFFICE  CHUCKIE FROM DR UGALDE'S OFFICE CALLED WANTING TO KNOW IF THIS OFFICE CAN DO AN EKG AND LABS FOR MS EVANS TO COUNT TOWARD HER PRE-OP CLEARANCE. SHE HAS AN APPT WITH DR FORMAN ON 2/9/2017. SURGERY IS SCHEDULED ON 2/17 AT [facility] [place].    LABS NEEDED ARE CBC WITHOUT DIFF, CMP AND UA AND ANYTHING ELSE DR FORMAN FEELS WOULD BE PERTINENT.    DR UGALDE'S OFFICE [phone] CHUCKIE

## 2017-02-03 NOTE — TELEPHONE ENCOUNTER
Patient was moved from a 10:30 Slot to a 30 minute open slot at 2:30 for a Pre-op Physical for Dr Melton's office patient was notified of the time change

## 2017-02-06 ENCOUNTER — TELEPHONE (OUTPATIENT)
Dept: OBSTETRICS AND GYNECOLOGY | Facility: CLINIC | Age: 57
End: 2017-02-06

## 2017-02-06 NOTE — TELEPHONE ENCOUNTER
Pt calling with questions about upcoming Hysteroscopy D&C scheduled on 2/17/17 at [facility]. She is asking if it might be possible to schedule a hysterectomy instead of having Hysteroscopy D&C first, since she knows she has fibroids seen on pelvic ultrasound in Sept 2016. Per Dr Edwards:  We will need to evaluate endometrium with a bx before proceeding to schedule hysterectomy.  Pt agreeable - we have scheduled her for Endo BX 2/8/17 at 10:45 AM.

## 2017-02-07 ENCOUNTER — TELEPHONE (OUTPATIENT)
Dept: INTERNAL MEDICINE | Facility: CLINIC | Age: 57
End: 2017-02-07

## 2017-02-07 NOTE — TELEPHONE ENCOUNTER
----- Message from Tosha Peck sent at 2/7/2017  2:31 PM EST -----  Contact: PATIENT   PATIENT WOULD LIKE TO KNOW IF SHE NEEDS TO BE FASTING FOR THE PRE-OP SCHEDULED WITH DR FORMAN ON 2/9    CALL BACK #[phone]

## 2017-02-07 NOTE — TELEPHONE ENCOUNTER
Patient states she needs a New Rx sent to the pharmacy for her Omeprazole 40 mg said she talked to the pharmacy and they said it would need to have a PA done on it with her insurance but we need to sent a new rx so they can send us a PA Back

## 2017-02-08 ENCOUNTER — OFFICE VISIT (OUTPATIENT)
Dept: OBSTETRICS AND GYNECOLOGY | Facility: CLINIC | Age: 57
End: 2017-02-08

## 2017-02-08 VITALS
HEIGHT: 66 IN | WEIGHT: 250 LBS | SYSTOLIC BLOOD PRESSURE: 128 MMHG | DIASTOLIC BLOOD PRESSURE: 80 MMHG | BODY MASS INDEX: 40.18 KG/M2

## 2017-02-08 DIAGNOSIS — N95.0 POSTMENOPAUSAL BLEEDING: Primary | ICD-10-CM

## 2017-02-08 DIAGNOSIS — D25.1 INTRAMURAL LEIOMYOMA OF UTERUS: ICD-10-CM

## 2017-02-08 DIAGNOSIS — D25.2 SUBSEROUS LEIOMYOMA OF UTERUS: ICD-10-CM

## 2017-02-08 PROCEDURE — 58100 BIOPSY OF UTERUS LINING: CPT | Performed by: OBSTETRICS & GYNECOLOGY

## 2017-02-08 RX ORDER — OMEPRAZOLE 20 MG/1
20 TABLET, DELAYED RELEASE ORAL DAILY
Qty: 30 TABLET | Refills: 5 | Status: SHIPPED | OUTPATIENT
Start: 2017-02-08 | End: 2017-09-14

## 2017-02-08 NOTE — PROGRESS NOTES
PROCEDURE DATE: 2017  Chief Complaint   Patient presents with   • Procedure     endometrial biopsy       Sandra Auguste is a 56 y.o. year old  presenting to be seen for an attempt at an endometrial biopsy.  This 56-year-old female has had postmenopausal bleeding.  She was referred by Dr. Jeancarlos Anaya.  She had a pelvic ultrasound 2016 revealing both an intramural, and a pedunculated, subserosal uterine leiomyoma.  Her endometrial stripe was 7.2 mm.  She had a 2.7 cm hemorrhagic cyst of the right ovary.  She has continued to have pelvic pressure and postmenopausal spotting.  She is here for an endometrial biopsy to evaluate her endometrium for pathology.  The procedure has been explained in detail to the patient.  The risks of uterine perforation, increased bleeding and infection were explained to the patient.  She voiced understanding of these risks.  Informed consent was signed.      Risks and benefits discussed? yes  All questions answered? yes  Consents given by the patient  Written consent obtained? yes    Local anesthesia used:  no    Procedure documentation: In the exam room the patient was placed in lithotomy position.  She was examined and her uterus is irregular and 10 weeks size.  A speculum was introduced and the cervix was visualized high and anterior.  A tenaculum was placed on the anterior lip of the cervix.  An endometrial Pipelle was introduced to a depth of 10 cm and endometrial tissue was obtained.  The Pipelle was withdrawn.  The specimen will be sent to pathology.  There were no complications.  A saline vaginal wet mount was negative.    Findings: 1) Postmenopausal bleeding (N95.0),  2) Subserosal uterine leiomyoma (D25.2),  3) Intramural uterine leiomyoma (D25.1)    Plan: Endometrial biopsy  I have had a 20 minute face-to-face discussion with this patient about her desire for removal of her uterus tubes and ovaries.  I have explained that if the endometrial biopsy is benign she would  be a candidate for a robotically-assisted T LH/BSO/VCS.  She desires information about this procedure and will watch a video tape today that explains the surgical indications and risks.  *Please note that portions of this documentation may have been completed with a voice recognition program.  Efforts were made to edit this dictation, but occasional words may have been mistranscribed.  This note was electronically signed.    COREY Edwards MD  February 8, 2017  11:49 AM

## 2017-02-09 ENCOUNTER — OFFICE VISIT (OUTPATIENT)
Dept: INTERNAL MEDICINE | Facility: CLINIC | Age: 57
End: 2017-02-09

## 2017-02-09 VITALS
BODY MASS INDEX: 39.86 KG/M2 | HEART RATE: 93 BPM | WEIGHT: 248 LBS | HEIGHT: 66 IN | OXYGEN SATURATION: 99 % | DIASTOLIC BLOOD PRESSURE: 76 MMHG | SYSTOLIC BLOOD PRESSURE: 130 MMHG

## 2017-02-09 DIAGNOSIS — D50.0 IRON DEFICIENCY ANEMIA DUE TO CHRONIC BLOOD LOSS: ICD-10-CM

## 2017-02-09 DIAGNOSIS — E66.01 MORBID OBESITY DUE TO EXCESS CALORIES (HCC): ICD-10-CM

## 2017-02-09 DIAGNOSIS — Z01.818 PREOP EXAMINATION: ICD-10-CM

## 2017-02-09 DIAGNOSIS — G47.33 OBSTRUCTIVE SLEEP APNEA SYNDROME: Primary | ICD-10-CM

## 2017-02-09 LAB
ALBUMIN SERPL-MCNC: 4.4 G/DL (ref 3.2–4.8)
ALBUMIN/GLOB SERPL: 1.6 G/DL (ref 1.5–2.5)
ALP SERPL-CCNC: 122 U/L (ref 25–100)
ALT SERPL W P-5'-P-CCNC: 24 U/L (ref 7–40)
ANION GAP SERPL CALCULATED.3IONS-SCNC: 3 MMOL/L (ref 3–11)
APTT PPP: 33.6 SECONDS (ref 24–31)
AST SERPL-CCNC: 18 U/L (ref 0–33)
BASOPHILS # BLD AUTO: 0.03 10*3/MM3 (ref 0–0.2)
BASOPHILS NFR BLD AUTO: 0.3 % (ref 0–1)
BILIRUB SERPL-MCNC: 0.2 MG/DL (ref 0.3–1.2)
BUN BLD-MCNC: 12 MG/DL (ref 9–23)
BUN/CREAT SERPL: 15 (ref 7–25)
CALCIUM SPEC-SCNC: 9.9 MG/DL (ref 8.7–10.4)
CHLORIDE SERPL-SCNC: 106 MMOL/L (ref 99–109)
CO2 SERPL-SCNC: 32 MMOL/L (ref 20–31)
CREAT BLD-MCNC: 0.8 MG/DL (ref 0.6–1.3)
DEPRECATED RDW RBC AUTO: 47.2 FL (ref 37–54)
EOSINOPHIL # BLD AUTO: 0.33 10*3/MM3 (ref 0.1–0.3)
EOSINOPHIL NFR BLD AUTO: 3.3 % (ref 0–3)
ERYTHROCYTE [DISTWIDTH] IN BLOOD BY AUTOMATED COUNT: 14.6 % (ref 11.3–14.5)
GFR SERPL CREATININE-BSD FRML MDRD: 75 ML/MIN/1.73
GLOBULIN UR ELPH-MCNC: 2.8 GM/DL
GLUCOSE BLD-MCNC: 93 MG/DL (ref 70–100)
HCT VFR BLD AUTO: 41.6 % (ref 34.5–44)
HGB BLD-MCNC: 13.3 G/DL (ref 11.5–15.5)
IMM GRANULOCYTES # BLD: 0.02 10*3/MM3 (ref 0–0.03)
IMM GRANULOCYTES NFR BLD: 0.2 % (ref 0–0.6)
LYMPHOCYTES # BLD AUTO: 1.93 10*3/MM3 (ref 0.6–4.8)
LYMPHOCYTES NFR BLD AUTO: 19.2 % (ref 24–44)
MCH RBC QN AUTO: 28.1 PG (ref 27–31)
MCHC RBC AUTO-ENTMCNC: 32 G/DL (ref 32–36)
MCV RBC AUTO: 87.9 FL (ref 80–99)
MONOCYTES # BLD AUTO: 0.73 10*3/MM3 (ref 0–1)
MONOCYTES NFR BLD AUTO: 7.3 % (ref 0–12)
NEUTROPHILS # BLD AUTO: 7.01 10*3/MM3 (ref 1.5–8.3)
NEUTROPHILS NFR BLD AUTO: 69.7 % (ref 41–71)
PLATELET # BLD AUTO: 384 10*3/MM3 (ref 150–450)
PMV BLD AUTO: 9.7 FL (ref 6–12)
POTASSIUM BLD-SCNC: 4.5 MMOL/L (ref 3.5–5.5)
PROT SERPL-MCNC: 7.2 G/DL (ref 5.7–8.2)
RBC # BLD AUTO: 4.73 10*6/MM3 (ref 3.89–5.14)
SODIUM BLD-SCNC: 141 MMOL/L (ref 132–146)
WBC NRBC COR # BLD: 10.05 10*3/MM3 (ref 3.5–10.8)

## 2017-02-09 PROCEDURE — 85025 COMPLETE CBC W/AUTO DIFF WBC: CPT | Performed by: HOSPITALIST

## 2017-02-09 PROCEDURE — 85730 THROMBOPLASTIN TIME PARTIAL: CPT | Performed by: HOSPITALIST

## 2017-02-09 PROCEDURE — 99213 OFFICE O/P EST LOW 20 MIN: CPT | Performed by: HOSPITALIST

## 2017-02-09 PROCEDURE — 93000 ELECTROCARDIOGRAM COMPLETE: CPT | Performed by: HOSPITALIST

## 2017-02-09 PROCEDURE — 80053 COMPREHEN METABOLIC PANEL: CPT | Performed by: HOSPITALIST

## 2017-02-09 NOTE — PROGRESS NOTES
"Goran Auguste is a 56 y.o. female who presents to the office today for a preoperative consultation at the request of surgeon Dr. Edwards who plans on performing a robotically assisted total hysterectomy and BSO on February 20. This consultation is requested for the specific conditions prompting preoperative evaluation (i.e. because of potential affect on operative risk):KELLY, stress induced hypertension. Planned anesthesia: general. The patient has the following known anesthesia issues: none. Patients bleeding risk: no recent abnormal bleeding. Patient does not have objections to receiving blood products if needed.    The following portions of the patient's history were reviewed and updated as appropriate: allergies, current medications, past medical history, past social history, past surgical history and problem list.    Review of Systems  Respiratory: positive for cough  Genitourinary:positive for frequency    Objective   Visit Vitals   • /76   • Pulse 93   • Ht 65.5\" (166.4 cm)   • Wt 248 lb (112 kg)   • LMP  (LMP Unknown)   • SpO2 99%   • BMI 40.64 kg/m2       General Appearance:    Alert, cooperative, no distress, appears stated age   Head:    Normocephalic, without obvious abnormality, atraumatic   Eyes:    PERRL, conjunctiva/corneas clear, EOM's intact, fundi     benign, both eyes   Ears:    Normal TM's and external ear canals, both ears   Nose:   Nares normal, septum midline, mucosa normal, no drainage    or sinus tenderness   Throat:   Lips, mucosa, and tongue normal; teeth and gums normal   Neck:   Supple, symmetrical, trachea midline, no adenopathy;     thyroid:  no enlargement/tenderness/nodules; no carotid    bruit or JVD   Back:     Symmetric, no curvature, ROM normal, no CVA tenderness   Lungs:     Clear to auscultation bilaterally, respirations unlabored   Chest Wall:    No tenderness or deformity    Heart:    Regular rate and rhythm, S1 and S2 normal, no murmur, rub   or gallop   Breast " Exam:    No tenderness, masses, or nipple abnormality   Abdomen:     Soft, non-tender, bowel sounds active all four quadrants,     no masses, no organomegaly   Genitalia:    deferred   Rectal:    deferred   Extremities:   Extremities normal, atraumatic, no cyanosis or edema   Pulses:   2+ and symmetric all extremities   Skin:   Skin color, texture, turgor normal, no rashes or lesions   Lymph nodes:   Cervical, supraclavicular, and axillary nodes normal   Neurologic:   CNII-XII intact, normal strength, sensation and reflexes     throughout       ECG 12 Lead  Date/Time: 2/9/2017 4:01 PM  Performed by: MAT FORMAN  Authorized by: MAT FORMAN   Rhythm: sinus rhythm  Rate: normal  BPM: 82  Conduction: conduction normal  ST Segments: ST segments normal  T Waves: T waves normal  QRS axis: normal  Other: no other findings  Clinical impression: normal ECG              Cardiographics  ECG: normal sinus rhythm, no blocks or conduction defects, no ischemic changes    Imaging  Chest x-ray: not done     Lab Review   not applicable    Assessment/Plan   56 y.o. female with planned surgery as above.    Known risk factors for perioperative complications: KELLY,       Current medications which may produce withdrawal symptoms if withheld perioperatively:     1. Preoperative workup as follows none.  2. Change in medication regimen before surgery: can hold am meds until after surgery.  3. Prophylaxis for cardiac events with perioperative beta-blockers: not indicated.  4. Invasive hemodynamic monitoring perioperatively: not indicated.  5. Deep vein thrombosis prophylaxis postoperatively:regimen to be chosen by surgical team.  6. Surveillance for postoperative MI with ECG immediately postoperatively and on postoperative days 1 and 2 AND troponin levels 24 hours postoperatively and on day 4 or hospital discharge (whichever comes first): not indicated.  7. Other measures: Postoperative incentive spirometry to prevent  pneumonia.  Apply CPAP until fully awake.

## 2017-02-15 ENCOUNTER — TELEPHONE (OUTPATIENT)
Dept: INTERNAL MEDICINE | Facility: CLINIC | Age: 57
End: 2017-02-15

## 2017-02-15 ENCOUNTER — APPOINTMENT (OUTPATIENT)
Dept: NUTRITION | Facility: HOSPITAL | Age: 57
End: 2017-02-15

## 2017-02-15 ENCOUNTER — APPOINTMENT (OUTPATIENT)
Dept: PREADMISSION TESTING | Facility: HOSPITAL | Age: 57
End: 2017-02-15

## 2017-02-15 NOTE — TELEPHONE ENCOUNTER
----- Message from Elaine Molina sent at 2/15/2017  9:35 AM EST -----  THE PATIENT WOULD LIKE FOR DR FORMAN TO GIVE HER A CALL BACK WHEN ASKED WHAT THE CALL WAS IN REGARDS TO SHE STATED THAT IT WAS PERSONAL AND URGENT AND SHE WOULD LIKE FOR HER TO GIVE HER A CALL BACK TODAY. PT'S CALL BACK NUMBER [phone]

## 2017-02-16 ENCOUNTER — TELEPHONE (OUTPATIENT)
Dept: INTERNAL MEDICINE | Facility: CLINIC | Age: 57
End: 2017-02-16

## 2017-02-16 NOTE — TELEPHONE ENCOUNTER
----- Message from Elaine Molina sent at 2/16/2017 10:49 AM EST -----  PATIENT STATES THAT SHE THINKS SHE MAY HAVE A BLADDER INFECTION AND WOULD LIKE FOR DR FORMAN TO CALL HER SOMETHING IN TO HER PHARMACY. THE PATIENT ALSO MADE A APPOINTMENT TO SEE DR FORMAN FOR FEB 22 2017. IF THERE IS ANY QUESTIONS OR CONCERNS THE PATIENT CAN BE REACHED [phone]

## 2017-03-07 ENCOUNTER — OFFICE VISIT (OUTPATIENT)
Dept: INTERNAL MEDICINE | Facility: CLINIC | Age: 57
End: 2017-03-07

## 2017-03-07 VITALS
BODY MASS INDEX: 39.98 KG/M2 | WEIGHT: 248.8 LBS | HEIGHT: 66 IN | OXYGEN SATURATION: 99 % | DIASTOLIC BLOOD PRESSURE: 78 MMHG | RESPIRATION RATE: 18 BRPM | SYSTOLIC BLOOD PRESSURE: 134 MMHG

## 2017-03-07 DIAGNOSIS — N32.89 BLADDER SPASM: ICD-10-CM

## 2017-03-07 DIAGNOSIS — N30.00 ACUTE CYSTITIS WITHOUT HEMATURIA: Primary | ICD-10-CM

## 2017-03-07 DIAGNOSIS — N89.8 VAGINAL DISCHARGE: ICD-10-CM

## 2017-03-07 LAB
BILIRUB BLD-MCNC: NEGATIVE MG/DL
CLARITY, POC: CLEAR
COLOR UR: YELLOW
GLUCOSE UR STRIP-MCNC: NEGATIVE MG/DL
KETONES UR QL: NEGATIVE
LEUKOCYTE EST, POC: NEGATIVE
NITRITE UR-MCNC: NEGATIVE MG/ML
PH UR: 6 [PH] (ref 5–8)
PROT UR STRIP-MCNC: NEGATIVE MG/DL
RBC # UR STRIP: NEGATIVE /UL
SP GR UR: 1.02 (ref 1–1.03)
UROBILINOGEN UR QL: NORMAL

## 2017-03-07 PROCEDURE — 87661 TRICHOMONAS VAGINALIS AMPLIF: CPT | Performed by: HOSPITALIST

## 2017-03-07 PROCEDURE — 87798 DETECT AGENT NOS DNA AMP: CPT | Performed by: HOSPITALIST

## 2017-03-07 PROCEDURE — 87481 CANDIDA DNA AMP PROBE: CPT | Performed by: HOSPITALIST

## 2017-03-07 PROCEDURE — 81003 URINALYSIS AUTO W/O SCOPE: CPT | Performed by: HOSPITALIST

## 2017-03-07 PROCEDURE — 99213 OFFICE O/P EST LOW 20 MIN: CPT | Performed by: HOSPITALIST

## 2017-03-07 RX ORDER — FLAVOXATE HYDROCHLORIDE 100 MG/1
100 TABLET ORAL 3 TIMES DAILY PRN
Qty: 30 TABLET | Refills: 3 | Status: SHIPPED | OUTPATIENT
Start: 2017-03-07 | End: 2017-05-23 | Stop reason: SDUPTHER
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
## 2017-03-13 LAB
A VAGINAE DNA VAG QL NAA+PROBE: NORMAL SCORE
BVAB2 DNA VAG QL NAA+PROBE: NORMAL SCORE
C ALBICANS DNA VAG QL NAA+PROBE: NEGATIVE
C GLABRATA DNA VAG QL NAA+PROBE: NEGATIVE
MEGASPHAERA 1: NORMAL SCORE
T VAGINALIS RRNA GENITAL QL PROBE: NEGATIVE

## 2017-03-25 DIAGNOSIS — E66.9 NON MORBID OBESITY, UNSPECIFIED OBESITY TYPE: ICD-10-CM

## 2017-03-27 DIAGNOSIS — E66.9 NON MORBID OBESITY, UNSPECIFIED OBESITY TYPE: ICD-10-CM

## 2017-03-27 RX ORDER — TOPIRAMATE 25 MG/1
TABLET ORAL
Qty: 15 TABLET | Refills: 0 | Status: SHIPPED | OUTPATIENT
Start: 2017-03-27 | End: 2017-05-15 | Stop reason: SDUPTHER

## 2017-03-30 ENCOUNTER — OFFICE VISIT (OUTPATIENT)
Dept: INTERNAL MEDICINE | Facility: CLINIC | Age: 57
End: 2017-03-30

## 2017-03-30 ENCOUNTER — TELEPHONE (OUTPATIENT)
Dept: INTERNAL MEDICINE | Facility: CLINIC | Age: 57
End: 2017-03-30

## 2017-03-30 VITALS
SYSTOLIC BLOOD PRESSURE: 124 MMHG | WEIGHT: 248 LBS | HEIGHT: 66 IN | DIASTOLIC BLOOD PRESSURE: 72 MMHG | BODY MASS INDEX: 39.86 KG/M2

## 2017-03-30 DIAGNOSIS — L29.9 ITCHING: Primary | ICD-10-CM

## 2017-03-30 PROCEDURE — 99213 OFFICE O/P EST LOW 20 MIN: CPT | Performed by: PHYSICIAN ASSISTANT

## 2017-03-30 RX ORDER — BETAMETHASONE DIPROPIONATE 0.5 MG/G
CREAM TOPICAL 2 TIMES DAILY
Qty: 45 G | Refills: 0 | Status: SHIPPED | OUTPATIENT
Start: 2017-03-30 | End: 2017-04-10 | Stop reason: CLARIF

## 2017-03-30 RX ORDER — PHENTERMINE HYDROCHLORIDE 30 MG/1
30 CAPSULE ORAL EVERY MORNING
Qty: 30 CAPSULE | Refills: 2 | Status: SHIPPED | OUTPATIENT
Start: 2017-03-30 | End: 2017-08-10

## 2017-03-30 NOTE — TELEPHONE ENCOUNTER
Received fax from pharmacy requesting PA for betamethasone 0.05% cream, stating that even with the discount card amount to be paid is $60. Submitted PA via phone, reference #[id number], awaiting response.     04/05/17  PA for Betamethasone cream was denied PA denial ppw given to pcp for further review.     04/10/17  Per Michelle changed med to Triamcinolone 0.5% cream apply to affected area bid, #1 tube with no refills, new script sent in to pharmacy, called to inform pt no answer left vm to return my call.     04/11/17  Pt returned call and was informed of the switch in the cream due to insurance formulary, pt voiced verbal understanding.

## 2017-03-30 NOTE — TELEPHONE ENCOUNTER
3/30/17    Can you print a rx refill for this pt?    Last OV was on 3/7/17, next OV is not scheduled yet.    Mark has been checked and printed for review (3/30/17).

## 2017-03-30 NOTE — ASSESSMENT & PLAN NOTE
Possibly related to some underlying nerve dysfunction- pt will try topical steroid, ice, gentle stretching. If no improvement will discuss with ortho. Discussed NCS and pt declined- has had before.

## 2017-03-30 NOTE — TELEPHONE ENCOUNTER
Rx was printed and signed by Dr. Anaya and given to pt at her office visit today with Michelle Mandel.
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
## 2017-03-31 NOTE — TELEPHONE ENCOUNTER
----- Message from Lidia Manuel sent at 3/31/2017 12:33 PM EDT -----  Contact: MED NOT COVERE  PATIENT CALLED BECAUSE HER CREAM THAT WAS SENT IN YESTERDAY IS NOT COVERED BY INSURANCE. PLEASE PROCESS A PA OR SEND IN A DIFFERENT MEDICATION

## 2017-03-31 NOTE — TELEPHONE ENCOUNTER
Called and informed pt that PA was submitted still awaiting response, informed pt that I can send Michelle a message if she can send in a different med for her to use or she can wait for outcome of PA, pt stated she will wait for PA outcome.

## 2017-04-10 RX ORDER — TRIAMCINOLONE ACETONIDE 5 MG/G
CREAM TOPICAL
Qty: 15 G | Refills: 0 | Status: SHIPPED | OUTPATIENT
Start: 2017-04-10 | End: 2018-03-29 | Stop reason: HOSPADM

## 2017-04-13 ENCOUNTER — OFFICE VISIT (OUTPATIENT)
Dept: NEUROLOGY | Facility: CLINIC | Age: 57
End: 2017-04-13

## 2017-04-13 ENCOUNTER — APPOINTMENT (OUTPATIENT)
Dept: LAB | Facility: HOSPITAL | Age: 57
End: 2017-04-13

## 2017-04-13 VITALS
OXYGEN SATURATION: 99 % | HEIGHT: 66 IN | BODY MASS INDEX: 39.86 KG/M2 | HEART RATE: 85 BPM | DIASTOLIC BLOOD PRESSURE: 78 MMHG | SYSTOLIC BLOOD PRESSURE: 124 MMHG | WEIGHT: 248 LBS

## 2017-04-13 DIAGNOSIS — G44.85 STABBING HEADACHE: Primary | ICD-10-CM

## 2017-04-13 DIAGNOSIS — R29.898 TMJ CLICK: ICD-10-CM

## 2017-04-13 DIAGNOSIS — R51.9 TEMPLE TENDERNESS: ICD-10-CM

## 2017-04-13 LAB
CRP SERPL-MCNC: 2.97 MG/DL (ref 0–1)
ERYTHROCYTE [SEDIMENTATION RATE] IN BLOOD: 35 MM/HR (ref 0–30)

## 2017-04-13 PROCEDURE — 99215 OFFICE O/P EST HI 40 MIN: CPT | Performed by: NURSE PRACTITIONER

## 2017-04-13 PROCEDURE — 86235 NUCLEAR ANTIGEN ANTIBODY: CPT | Performed by: NURSE PRACTITIONER

## 2017-04-13 PROCEDURE — 85652 RBC SED RATE AUTOMATED: CPT | Performed by: NURSE PRACTITIONER

## 2017-04-13 PROCEDURE — 86140 C-REACTIVE PROTEIN: CPT | Performed by: NURSE PRACTITIONER

## 2017-04-13 PROCEDURE — 86038 ANTINUCLEAR ANTIBODIES: CPT | Performed by: NURSE PRACTITIONER

## 2017-04-13 PROCEDURE — 86225 DNA ANTIBODY NATIVE: CPT | Performed by: NURSE PRACTITIONER

## 2017-04-13 PROCEDURE — 36415 COLL VENOUS BLD VENIPUNCTURE: CPT | Performed by: NURSE PRACTITIONER

## 2017-04-14 ENCOUNTER — TELEPHONE (OUTPATIENT)
Dept: NEUROLOGY | Facility: CLINIC | Age: 57
End: 2017-04-14

## 2017-04-14 DIAGNOSIS — R76.8 POSITIVE ANA (ANTINUCLEAR ANTIBODY): ICD-10-CM

## 2017-04-14 DIAGNOSIS — R76.8 POSITIVE SM/RNP ANTIBODY: Primary | ICD-10-CM

## 2017-04-14 LAB
ANA SER QL: POSITIVE
CHROMATIN AB SERPL-ACNC: <0.2 AI (ref 0–0.9)
DSDNA AB SER-ACNC: <1 IU/ML (ref 0–9)
ENA RNP AB SER-ACNC: 1.5 AI (ref 0–0.9)
ENA SM AB SER-ACNC: <0.2 AI (ref 0–0.9)
Lab: ABNORMAL
RIBOSOMAL P AB SER-ACNC: <0.2 AI (ref 0–0.9)

## 2017-04-14 NOTE — PROGRESS NOTES
We have placed referral to Rheumatology to further evaluate your abnormal labs which are out of our scope of practice. My office will call to discuss details of referral and appointment. Thanks, REBECA Jones

## 2017-04-14 NOTE — TELEPHONE ENCOUNTER
Patient now has her specialty lab back and this is positive for CALLI which suggest she may have an autoimmune disorder. We do not treat this and would like to refer her to Rheumatology for further evaluation. I will place referral-please complete. thanks

## 2017-04-14 NOTE — TELEPHONE ENCOUNTER
Please let patient know labs I drew for inflammation are mildly elevated which could be normal for her age. Is she able to take prednisone? Would consider giving her a 6 day coarse to see if this helps with her headaches and see if her numbers go down and normalize. It says she has an allergy to methylprednisolone-so I want to see if she can take plain prednisone. Let me know. thanks

## 2017-04-14 NOTE — PROGRESS NOTES
Notified patient my office staff of labs-nonspecific and only mild elevation. Offered 6 day prednisone taper. Awaiting patient returned call to discuss since she has reported allergy to methylprednisolone. Awaiting CALLI results which are more specific. MARCY Park, APRN

## 2017-04-19 NOTE — TELEPHONE ENCOUNTER
Called patient, spoke with the patient, patient is aware of the results. Pateint stated that she agrees for the referral to Rheumatology. I will fax over the referral over to [facility].     Patient is not sure if she can take regular prednisone. Patient was a little afraid to try it.

## 2017-04-24 ENCOUNTER — TELEPHONE (OUTPATIENT)
Dept: NEUROLOGY | Facility: CLINIC | Age: 57
End: 2017-04-24
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
## 2017-04-25 ENCOUNTER — HOSPITAL ENCOUNTER (OUTPATIENT)
Dept: NUTRITION | Facility: HOSPITAL | Age: 57
Setting detail: RECURRING SERIES
Discharge: HOME | End: 2017-04-25

## 2017-04-25 VITALS — BODY MASS INDEX: 39.37 KG/M2 | HEIGHT: 66 IN | WEIGHT: 245 LBS

## 2017-04-25 PROCEDURE — 97803 MED NUTRITION INDIV SUBSEQ: CPT | Performed by: DIETITIAN, REGISTERED

## 2017-04-27 ENCOUNTER — APPOINTMENT (OUTPATIENT)
Dept: MRI IMAGING | Facility: HOSPITAL | Age: 57
End: 2017-04-27

## 2017-04-27 ENCOUNTER — HOSPITAL ENCOUNTER (OUTPATIENT)
Dept: MRI IMAGING | Facility: HOSPITAL | Age: 57
End: 2017-04-27

## 2017-05-01 ENCOUNTER — HOSPITAL ENCOUNTER (OUTPATIENT)
Dept: MRI IMAGING | Facility: HOSPITAL | Age: 57
Discharge: HOME | End: 2017-05-01
Admitting: NURSE PRACTITIONER

## 2017-05-01 ENCOUNTER — HOSPITAL ENCOUNTER (OUTPATIENT)
Dept: MRI IMAGING | Facility: HOSPITAL | Age: 57
Discharge: HOME | End: 2017-05-01

## 2017-05-01 PROCEDURE — 70544 MR ANGIOGRAPHY HEAD W/O DYE: CPT

## 2017-05-01 PROCEDURE — 70551 MRI BRAIN STEM W/O DYE: CPT

## 2017-05-12 ENCOUNTER — TELEPHONE (OUTPATIENT)
Dept: INTERNAL MEDICINE | Facility: CLINIC | Age: 57
End: 2017-05-12

## 2017-05-15 ENCOUNTER — OFFICE VISIT (OUTPATIENT)
Dept: INTERNAL MEDICINE | Facility: CLINIC | Age: 57
End: 2017-05-15

## 2017-05-15 VITALS
HEART RATE: 71 BPM | WEIGHT: 246 LBS | HEIGHT: 67 IN | BODY MASS INDEX: 38.61 KG/M2 | SYSTOLIC BLOOD PRESSURE: 126 MMHG | OXYGEN SATURATION: 98 % | DIASTOLIC BLOOD PRESSURE: 76 MMHG | TEMPERATURE: 98.7 F

## 2017-05-15 DIAGNOSIS — B34.9 VIRAL ILLNESS: Primary | ICD-10-CM

## 2017-05-15 DIAGNOSIS — E66.9 NON MORBID OBESITY, UNSPECIFIED OBESITY TYPE: ICD-10-CM

## 2017-05-15 PROCEDURE — 99214 OFFICE O/P EST MOD 30 MIN: CPT | Performed by: HOSPITALIST

## 2017-05-15 RX ORDER — TOPIRAMATE 25 MG/1
25 TABLET ORAL DAILY
Qty: 30 TABLET | Refills: 2 | Status: SHIPPED | OUTPATIENT
Start: 2017-05-15 | End: 2017-06-15

## 2017-05-17 ENCOUNTER — TELEPHONE (OUTPATIENT)
Dept: SLEEP MEDICINE | Facility: HOSPITAL | Age: 57
End: 2017-05-17

## 2017-05-23 DIAGNOSIS — E66.9 NON MORBID OBESITY, UNSPECIFIED OBESITY TYPE: ICD-10-CM

## 2017-05-23 DIAGNOSIS — N32.89 BLADDER SPASM: ICD-10-CM

## 2017-05-23 RX ORDER — TOPIRAMATE 25 MG/1
TABLET ORAL
Qty: 15 TABLET | Refills: 2 | Status: SHIPPED | OUTPATIENT
Start: 2017-05-23 | End: 2018-07-26

## 2017-05-23 RX ORDER — FLAVOXATE HYDROCHLORIDE 100 MG/1
TABLET ORAL
Qty: 30 TABLET | Refills: 2 | Status: SHIPPED | OUTPATIENT
Start: 2017-05-23 | End: 2017-08-25 | Stop reason: SDUPTHER

## 2017-05-24 DIAGNOSIS — E66.09 OBESITY DUE TO EXCESS CALORIES, UNSPECIFIED OBESITY SEVERITY: Primary | ICD-10-CM

## 2017-05-31 ENCOUNTER — HOSPITAL ENCOUNTER (OUTPATIENT)
Dept: NUTRITION | Facility: HOSPITAL | Age: 57
Setting detail: RECURRING SERIES
Discharge: HOME | End: 2017-05-31

## 2017-05-31 VITALS — BODY MASS INDEX: 39.37 KG/M2 | WEIGHT: 245 LBS | HEIGHT: 66 IN

## 2017-05-31 PROCEDURE — 97803 MED NUTRITION INDIV SUBSEQ: CPT | Performed by: DIETITIAN, REGISTERED

## 2017-06-15 ENCOUNTER — OFFICE VISIT (OUTPATIENT)
Dept: INTERNAL MEDICINE | Facility: CLINIC | Age: 57
End: 2017-06-15

## 2017-06-15 VITALS
TEMPERATURE: 98.1 F | SYSTOLIC BLOOD PRESSURE: 122 MMHG | DIASTOLIC BLOOD PRESSURE: 76 MMHG | BODY MASS INDEX: 39.21 KG/M2 | HEART RATE: 85 BPM | WEIGHT: 244 LBS | HEIGHT: 66 IN | OXYGEN SATURATION: 100 %

## 2017-06-15 DIAGNOSIS — K76.0 STEATOSIS OF LIVER: ICD-10-CM

## 2017-06-15 DIAGNOSIS — F41.9 ANXIETY: ICD-10-CM

## 2017-06-15 DIAGNOSIS — F43.10 POSTTRAUMATIC STRESS DISORDER: ICD-10-CM

## 2017-06-15 DIAGNOSIS — I10 ESSENTIAL HYPERTENSION: ICD-10-CM

## 2017-06-15 DIAGNOSIS — G47.33 OBSTRUCTIVE SLEEP APNEA SYNDROME: ICD-10-CM

## 2017-06-15 DIAGNOSIS — R00.2 PALPITATIONS: Primary | ICD-10-CM

## 2017-06-15 DIAGNOSIS — R35.0 FREQUENCY OF MICTURITION: ICD-10-CM

## 2017-06-15 PROCEDURE — 81003 URINALYSIS AUTO W/O SCOPE: CPT | Performed by: HOSPITALIST

## 2017-06-15 PROCEDURE — 99214 OFFICE O/P EST MOD 30 MIN: CPT | Performed by: HOSPITALIST
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
## 2017-06-29 ENCOUNTER — HOSPITAL ENCOUNTER (OUTPATIENT)
Dept: NUTRITION | Facility: HOSPITAL | Age: 57
Setting detail: RECURRING SERIES
Discharge: HOME | End: 2017-06-29

## 2017-06-29 VITALS — WEIGHT: 242 LBS | HEIGHT: 66 IN | BODY MASS INDEX: 38.89 KG/M2

## 2017-06-29 PROCEDURE — 97803 MED NUTRITION INDIV SUBSEQ: CPT | Performed by: DIETITIAN, REGISTERED

## 2017-07-13 ENCOUNTER — OFFICE VISIT (OUTPATIENT)
Dept: INTERNAL MEDICINE | Facility: CLINIC | Age: 57
End: 2017-07-13

## 2017-07-13 VITALS
WEIGHT: 248.5 LBS | BODY MASS INDEX: 39.94 KG/M2 | HEIGHT: 66 IN | DIASTOLIC BLOOD PRESSURE: 74 MMHG | SYSTOLIC BLOOD PRESSURE: 126 MMHG | HEART RATE: 75 BPM | OXYGEN SATURATION: 97 %

## 2017-07-13 DIAGNOSIS — J30.89 SEASONAL ALLERGIC RHINITIS DUE TO OTHER ALLERGIC TRIGGER: Primary | ICD-10-CM

## 2017-07-13 DIAGNOSIS — S16.1XXD STRAIN OF NECK MUSCLE, SUBSEQUENT ENCOUNTER: ICD-10-CM

## 2017-07-13 DIAGNOSIS — E66.01 MORBID OBESITY DUE TO EXCESS CALORIES (HCC): ICD-10-CM

## 2017-07-13 PROCEDURE — 99213 OFFICE O/P EST LOW 20 MIN: CPT | Performed by: NURSE PRACTITIONER

## 2017-07-13 RX ORDER — CETIRIZINE HYDROCHLORIDE 10 MG/1
10 TABLET ORAL DAILY
Qty: 30 TABLET | Refills: 6 | Status: SHIPPED | OUTPATIENT
Start: 2017-07-13 | End: 2020-04-30 | Stop reason: SDUPTHER

## 2017-07-13 RX ORDER — FLUTICASONE PROPIONATE 50 MCG
1 SPRAY, SUSPENSION (ML) NASAL 2 TIMES DAILY
Qty: 1 EACH | Refills: 0 | Status: SHIPPED | OUTPATIENT
Start: 2017-07-13 | End: 2017-07-20 | Stop reason: SDUPTHER

## 2017-07-13 RX ORDER — TIZANIDINE 4 MG/1
TABLET ORAL
Qty: 30 TABLET | Refills: 1 | Status: SHIPPED | OUTPATIENT
Start: 2017-07-13 | End: 2017-09-12 | Stop reason: SDUPTHER

## 2017-07-13 NOTE — PROGRESS NOTES
"Chief Complaint   Patient presents with   • Allergies     trouble breathing, runny nose, sneezing, headache, trouble sleeping, pressure under eyes.    • Med Refill     seen Dr. coats for Tizanidine and phentermine, would also like to see if she could get voltaren gel for shoulder.        HPI  Sandra Auguste is a 56 y.o. female 56 y.o. presents with complaint of allergy symptoms intermittently for 2 weeks; nasal congestion/runny nose; ears are itchy; pain in face; headaches; mild cough; sneezing, PND; denies sore throat, fever, dental pain    Needs refills for Xanaflex and Phentermine.        [facility]  The following portions of the patient's history were reviewed and updated as appropriate: allergies, current medications, past family history, past medical history, past social history, past surgical history and problem list.    Past Medical History:   Diagnosis Date   • Anxiety    • Arthritis    • Depression    • Diverticulosis    • Hypertension    • Mental disorder     PTSD   • Migraine    • Obesity    • Seasonal allergies    • Seizures     \"convulsions\" at age 5   • Sleep apnea with use of continuous positive airway pressure (CPAP)    • SOB (shortness of breath) on exertion      Past Surgical History:   Procedure Laterality Date   • CERVICAL POLYPECTOMY           Review of Systems   Constitutional: Positive for appetite change and fatigue. Negative for fever.   HENT: Positive for congestion, postnasal drip, sinus pressure and sneezing. Negative for ear pain.    Respiratory: Positive for cough. Negative for shortness of breath and wheezing.    Neurological: Positive for headaches. Negative for dizziness and light-headedness.   All other systems reviewed and are negative.      Objective   Vitals:    07/13/17 0931   BP: 126/74   BP Location: Left arm   Patient Position: Sitting   Cuff Size: Adult   Pulse: 75   SpO2: 97%   Weight: 248 lb 8 oz (113 kg)   Height: 65.5\" (166.4 cm)         Physical Exam   Constitutional: She is " oriented to person, place, and time. She appears well-developed and well-nourished.   HENT:   Head: Normocephalic and atraumatic.   Right Ear: Tympanic membrane is not erythematous and not bulging. A middle ear effusion is present.   Left Ear: Tympanic membrane is not erythematous and not bulging. A middle ear effusion (bilateral, thick opaque, diminished light reflex) is present.   Nose: Mucosal edema present. Right sinus exhibits no maxillary sinus tenderness and no frontal sinus tenderness. Left sinus exhibits no maxillary sinus tenderness and no frontal sinus tenderness.   Mouth/Throat: Uvula is midline and oropharynx is clear and moist.   Eyes: Conjunctivae are normal.   Cardiovascular: Normal rate, regular rhythm and normal heart sounds.    Pulmonary/Chest: Effort normal and breath sounds normal.   Musculoskeletal:        Right shoulder: She exhibits tenderness and spasm.   Lymphadenopathy:     She has cervical adenopathy.        Right cervical: Superficial cervical adenopathy present.        Left cervical: Superficial cervical (mild tenderness) adenopathy present.   Neurological: She is alert and oriented to person, place, and time.   Skin: Skin is warm, dry and intact.   Vitals reviewed.            ASSESSMENT/PLAN  1. Seasonal allergic rhinitis due to other allergic trigger  Start the below medications  - cetirizine (zyrTEC) 10 MG tablet; Take 1 tablet by mouth Daily.  Dispense: 30 tablet; Refill: 6  - fluticasone (FLONASE) 50 MCG/ACT nasal spray; 1 spray into each nostril 2 (Two) Times a Day for 30 days.  Dispense: 1 each; Refill: 0    Sinus rinses (nery pot)    RTC if symptoms do not improve or worsen    2. Strain of neck muscle, subsequent encounter  Start the below medications  - tiZANidine (ZANAFLEX) 4 MG tablet; Take once at bedtime  Dispense: 30 tablet; Refill: 1  - diclofenac (VOLTAREN) 1 % gel gel; Apply 4 g topically 4 (Four) Times a Day As Needed (for pain in shoulders).  Dispense: 100 g; Refill:  1    May need referral to physical therapy if no relief.    3.  Morbid Obesity  -Discussed healthy diet   -Will discuss with REBECA Duncan regarding phentermine refill.    -RTC at next schedule appointment on 8/10/17.    FOLLOW-UP    Plan of care reviewed with patient at the conclusion of today's visit. Education was provided in regards to diagnosis, symptom management and any prescribed or recommended OTC medications.  Patient verbalizes Understanding of and agreement with management plan.    Electronically signed by:  REBECA Joshi  07/13/2017

## 2017-07-17 ENCOUNTER — APPOINTMENT (OUTPATIENT)
Dept: NUTRITION | Facility: HOSPITAL | Age: 57
End: 2017-07-17

## 2017-07-20 DIAGNOSIS — J30.89 SEASONAL ALLERGIC RHINITIS DUE TO OTHER ALLERGIC TRIGGER: ICD-10-CM

## 2017-07-20 RX ORDER — FLUTICASONE PROPIONATE 50 MCG
1 SPRAY, SUSPENSION (ML) NASAL 2 TIMES DAILY
Qty: 16 G | Refills: 0 | Status: SHIPPED | OUTPATIENT
Start: 2017-07-20 | End: 2017-08-19

## 2017-07-20 NOTE — TELEPHONE ENCOUNTER
Had to re-send this rx because the dispense qty only showed 1 bottle and it needed to show the size of the bottle.    Did a prior auth for this medication. Waiting on response from [employer].    Your prior authorization has been faxed to the plan. The plan will receive your PA as a paper copy.    The plan will respond with the PA determination directly to your office, usually via fax.    Plan response time varies, but one to five business days is typical. If you haven't heard from the plan after that timeframe, or if you have any questions about your submission, please contact the plan directly at the number listed on the top of the PA request.

## 2017-08-10 ENCOUNTER — TRANSCRIBE ORDERS (OUTPATIENT)
Dept: LAB | Facility: HOSPITAL | Age: 57
End: 2017-08-10

## 2017-08-10 ENCOUNTER — LAB (OUTPATIENT)
Dept: LAB | Facility: HOSPITAL | Age: 57
End: 2017-08-10

## 2017-08-10 ENCOUNTER — HOSPITAL ENCOUNTER (OUTPATIENT)
Dept: NUTRITION | Facility: HOSPITAL | Age: 57
Setting detail: RECURRING SERIES
Discharge: HOME | End: 2017-08-10

## 2017-08-10 ENCOUNTER — OFFICE VISIT (OUTPATIENT)
Dept: INTERNAL MEDICINE | Facility: CLINIC | Age: 57
End: 2017-08-10

## 2017-08-10 VITALS
HEIGHT: 66 IN | WEIGHT: 248 LBS | RESPIRATION RATE: 16 BRPM | BODY MASS INDEX: 39.86 KG/M2 | HEART RATE: 88 BPM | OXYGEN SATURATION: 99 % | SYSTOLIC BLOOD PRESSURE: 120 MMHG | DIASTOLIC BLOOD PRESSURE: 70 MMHG

## 2017-08-10 VITALS — HEIGHT: 66 IN | WEIGHT: 248 LBS | BODY MASS INDEX: 39.86 KG/M2

## 2017-08-10 DIAGNOSIS — R76.8 FALSE POSITIVE SEROLOGICAL TEST FOR SYPHILIS: ICD-10-CM

## 2017-08-10 DIAGNOSIS — E66.01 MORBID OBESITY DUE TO EXCESS CALORIES (HCC): Primary | ICD-10-CM

## 2017-08-10 DIAGNOSIS — R76.8 FALSE POSITIVE SEROLOGICAL TEST FOR SYPHILIS: Primary | ICD-10-CM

## 2017-08-10 DIAGNOSIS — D25.9 UTERINE LEIOMYOMA, UNSPECIFIED LOCATION: ICD-10-CM

## 2017-08-10 LAB
25(OH)D3 SERPL-MCNC: 12.7 NG/ML
ALBUMIN SERPL-MCNC: 4.2 G/DL (ref 3.2–4.8)
ALBUMIN/GLOB SERPL: 1.5 G/DL (ref 1.5–2.5)
ALP SERPL-CCNC: 126 U/L (ref 25–100)
ALT SERPL W P-5'-P-CCNC: 32 U/L (ref 7–40)
ANION GAP SERPL CALCULATED.3IONS-SCNC: 7 MMOL/L (ref 3–11)
AST SERPL-CCNC: 23 U/L (ref 0–33)
BACTERIA UR QL AUTO: ABNORMAL /HPF
BASOPHILS # BLD AUTO: 0.04 10*3/MM3 (ref 0–0.2)
BASOPHILS NFR BLD AUTO: 0.5 % (ref 0–1)
BILIRUB SERPL-MCNC: 0.2 MG/DL (ref 0.3–1.2)
BILIRUB UR QL STRIP: NEGATIVE
BUN BLD-MCNC: 9 MG/DL (ref 9–23)
BUN/CREAT SERPL: 11.3 (ref 7–25)
CALCIUM SPEC-SCNC: 9.7 MG/DL (ref 8.7–10.4)
CHLORIDE SERPL-SCNC: 105 MMOL/L (ref 99–109)
CK SERPL-CCNC: 328 U/L (ref 26–174)
CLARITY UR: CLEAR
CO2 SERPL-SCNC: 28 MMOL/L (ref 20–31)
COLOR UR: YELLOW
CREAT BLD-MCNC: 0.8 MG/DL (ref 0.6–1.3)
CREAT UR-MCNC: 75.8 MG/DL
CRP SERPL-MCNC: 3.37 MG/DL (ref 0–1)
DEPRECATED RDW RBC AUTO: 46.4 FL (ref 37–54)
EOSINOPHIL # BLD AUTO: 0.28 10*3/MM3 (ref 0–0.3)
EOSINOPHIL NFR BLD AUTO: 3.2 % (ref 0–3)
ERYTHROCYTE [DISTWIDTH] IN BLOOD BY AUTOMATED COUNT: 14.6 % (ref 11.3–14.5)
ERYTHROCYTE [SEDIMENTATION RATE] IN BLOOD: 29 MM/HR (ref 0–30)
GFR SERPL CREATININE-BSD FRML MDRD: 74 ML/MIN/1.73
GLOBULIN UR ELPH-MCNC: 2.8 GM/DL
GLUCOSE BLD-MCNC: 123 MG/DL (ref 70–100)
GLUCOSE UR STRIP-MCNC: NEGATIVE MG/DL
HBV CORE IGM SERPL QL IA: NORMAL
HBV SURFACE AG SERPL QL IA: NORMAL
HCT VFR BLD AUTO: 41.1 % (ref 34.5–44)
HCV AB SER DONR QL: NORMAL
HGB BLD-MCNC: 13.4 G/DL (ref 11.5–15.5)
HGB UR QL STRIP.AUTO: NEGATIVE
HYALINE CASTS UR QL AUTO: ABNORMAL /LPF
IMM GRANULOCYTES # BLD: 0.01 10*3/MM3 (ref 0–0.03)
IMM GRANULOCYTES NFR BLD: 0.1 % (ref 0–0.6)
KETONES UR QL STRIP: NEGATIVE
LEUKOCYTE ESTERASE UR QL STRIP.AUTO: NEGATIVE
LYMPHOCYTES # BLD AUTO: 1.88 10*3/MM3 (ref 0.6–4.8)
LYMPHOCYTES NFR BLD AUTO: 21.8 % (ref 24–44)
MCH RBC QN AUTO: 28 PG (ref 27–31)
MCHC RBC AUTO-ENTMCNC: 32.6 G/DL (ref 32–36)
MCV RBC AUTO: 85.8 FL (ref 80–99)
MONOCYTES # BLD AUTO: 0.44 10*3/MM3 (ref 0–1)
MONOCYTES NFR BLD AUTO: 5.1 % (ref 0–12)
NEUTROPHILS # BLD AUTO: 5.99 10*3/MM3 (ref 1.5–8.3)
NEUTROPHILS NFR BLD AUTO: 69.3 % (ref 41–71)
NITRITE UR QL STRIP: NEGATIVE
PH UR STRIP.AUTO: 6 [PH] (ref 5–8)
PLATELET # BLD AUTO: 349 10*3/MM3 (ref 150–450)
PMV BLD AUTO: 9.8 FL (ref 6–12)
POTASSIUM BLD-SCNC: 4.2 MMOL/L (ref 3.5–5.5)
PROT SERPL-MCNC: 7 G/DL (ref 5.7–8.2)
PROT UR QL STRIP: NEGATIVE
PROT UR-MCNC: 3 MG/DL (ref 1–14)
RBC # BLD AUTO: 4.79 10*6/MM3 (ref 3.89–5.14)
RBC # UR: ABNORMAL /HPF
REF LAB TEST METHOD: ABNORMAL
SODIUM BLD-SCNC: 140 MMOL/L (ref 132–146)
SP GR UR STRIP: 1.01 (ref 1–1.03)
SQUAMOUS #/AREA URNS HPF: ABNORMAL /HPF
TSH SERPL DL<=0.05 MIU/L-ACNC: 1.75 MIU/ML (ref 0.35–5.35)
UROBILINOGEN UR QL STRIP: NORMAL
WBC NRBC COR # BLD: 8.64 10*3/MM3 (ref 3.5–10.8)
WBC UR QL AUTO: ABNORMAL /HPF

## 2017-08-10 PROCEDURE — 86235 NUCLEAR ANTIGEN ANTIBODY: CPT

## 2017-08-10 PROCEDURE — 86235 NUCLEAR ANTIGEN ANTIBODY: CPT | Performed by: NURSE PRACTITIONER

## 2017-08-10 PROCEDURE — 86431 RHEUMATOID FACTOR QUANT: CPT | Performed by: NURSE PRACTITIONER

## 2017-08-10 PROCEDURE — 82306 VITAMIN D 25 HYDROXY: CPT | Performed by: NURSE PRACTITIONER

## 2017-08-10 PROCEDURE — 84156 ASSAY OF PROTEIN URINE: CPT | Performed by: NURSE PRACTITIONER

## 2017-08-10 PROCEDURE — 82570 ASSAY OF URINE CREATININE: CPT | Performed by: NURSE PRACTITIONER

## 2017-08-10 PROCEDURE — 36415 COLL VENOUS BLD VENIPUNCTURE: CPT | Performed by: NURSE PRACTITIONER

## 2017-08-10 PROCEDURE — 86705 HEP B CORE ANTIBODY IGM: CPT | Performed by: NURSE PRACTITIONER

## 2017-08-10 PROCEDURE — 86225 DNA ANTIBODY NATIVE: CPT | Performed by: NURSE PRACTITIONER

## 2017-08-10 PROCEDURE — 86160 COMPLEMENT ANTIGEN: CPT | Performed by: NURSE PRACTITIONER

## 2017-08-10 PROCEDURE — 85652 RBC SED RATE AUTOMATED: CPT | Performed by: NURSE PRACTITIONER

## 2017-08-10 PROCEDURE — 36415 COLL VENOUS BLD VENIPUNCTURE: CPT

## 2017-08-10 PROCEDURE — 82550 ASSAY OF CK (CPK): CPT | Performed by: NURSE PRACTITIONER

## 2017-08-10 PROCEDURE — 86140 C-REACTIVE PROTEIN: CPT | Performed by: NURSE PRACTITIONER

## 2017-08-10 PROCEDURE — 86803 HEPATITIS C AB TEST: CPT | Performed by: NURSE PRACTITIONER

## 2017-08-10 PROCEDURE — 81003 URINALYSIS AUTO W/O SCOPE: CPT | Performed by: NURSE PRACTITIONER

## 2017-08-10 PROCEDURE — 86038 ANTINUCLEAR ANTIBODIES: CPT | Performed by: NURSE PRACTITIONER

## 2017-08-10 PROCEDURE — 82085 ASSAY OF ALDOLASE: CPT | Performed by: NURSE PRACTITIONER

## 2017-08-10 PROCEDURE — 85025 COMPLETE CBC W/AUTO DIFF WBC: CPT | Performed by: NURSE PRACTITIONER

## 2017-08-10 PROCEDURE — 87340 HEPATITIS B SURFACE AG IA: CPT | Performed by: NURSE PRACTITIONER

## 2017-08-10 PROCEDURE — 83516 IMMUNOASSAY NONANTIBODY: CPT | Performed by: NURSE PRACTITIONER

## 2017-08-10 PROCEDURE — 81001 URINALYSIS AUTO W/SCOPE: CPT | Performed by: NURSE PRACTITIONER

## 2017-08-10 PROCEDURE — 84443 ASSAY THYROID STIM HORMONE: CPT | Performed by: NURSE PRACTITIONER

## 2017-08-10 PROCEDURE — 86200 CCP ANTIBODY: CPT | Performed by: NURSE PRACTITIONER

## 2017-08-10 PROCEDURE — 97803 MED NUTRITION INDIV SUBSEQ: CPT | Performed by: DIETITIAN, REGISTERED

## 2017-08-10 PROCEDURE — 99213 OFFICE O/P EST LOW 20 MIN: CPT | Performed by: NURSE PRACTITIONER

## 2017-08-10 PROCEDURE — 80053 COMPREHEN METABOLIC PANEL: CPT | Performed by: NURSE PRACTITIONER

## 2017-08-10 PROCEDURE — 86225 DNA ANTIBODY NATIVE: CPT

## 2017-08-10 NOTE — PROGRESS NOTES
"Subjective  Depression      Sandra Auguste is a 57 y.o. female.   Allergies   Allergen Reactions   • Methylprednisolone      Bleeding, cramping   • Mold Extract [Trichophyton]    • Pollen Extract Other (See Comments)     History of Present Illness      Went of phentermine because not working, would like to try something different, is exercising, is walking , has been eating more ice cream due to depression   Needs referral for gyn for hysterectomy  The following portions of the patient's history were reviewed and updated as appropriate: allergies, current medications, past family history, past medical history, past social history, past surgical history and problem list.    Review of Systems   Constitutional:        Morbid obesity   Psychiatric/Behavioral: Positive for dysphoric mood.   All other systems reviewed and are negative.      Objective   Physical Exam   Constitutional: She is oriented to person, place, and time. She appears well-developed.   HENT:   Head: Normocephalic.   Eyes: Conjunctivae are normal.   Cardiovascular: Normal rate.    Pulmonary/Chest: Effort normal.   Neurological: She is alert and oriented to person, place, and time.   Skin: Skin is warm and dry.   Psychiatric: She has a normal mood and affect. Her behavior is normal. Judgment and thought content normal.     /70  Pulse 88  Resp 16  Ht 65.5\" (166.4 cm)  Wt 248 lb (112 kg)  LMP  (LMP Unknown)  SpO2 99%  BMI 40.64 kg/m2    Assessment/Plan     Problem List Items Addressed This Visit        Digestive    Obesity - Primary      Other Visit Diagnoses     Uterine leiomyoma, unspecified location        Relevant Orders    Ambulatory Referral to Gynecology               "

## 2017-08-11 LAB — RHEUMATOID FACT SERPL-ACNC: NEGATIVE [IU]/ML

## 2017-08-14 LAB
ACTIN IGG SERPL-ACNC: 23 UNITS (ref 0–19)
ALDOLASE SERPL-CCNC: 8 U/L (ref 3.3–10.3)

## 2017-08-15 LAB
ANA SER QL: POSITIVE
C3 SERPL-MCNC: 170 MG/DL (ref 82–167)
C4 SERPL-MCNC: 31 MG/DL (ref 14–44)
CCP IGA+IGG SERPL IA-ACNC: 4 UNITS (ref 0–19)
CHROMATIN AB SERPL-ACNC: <0.2 AI (ref 0–0.9)
DSDNA AB SER-ACNC: 1 IU/ML (ref 0–9)
DSDNA AB SER-ACNC: 1 IU/ML (ref 0–9)
ENA RNP AB SER-ACNC: 2.1 AI (ref 0–0.9)
ENA RNP AB SER-ACNC: 2.5 AI (ref 0–0.9)
ENA SM AB SER-ACNC: <0.2 AI (ref 0–0.9)
ENA SS-A AB SER-ACNC: <0.2 AI (ref 0–0.9)
ENA SS-B AB SER-ACNC: <0.2 AI (ref 0–0.9)
Lab: ABNORMAL
RIBOSOMAL P AB SER-ACNC: <0.2 AI (ref 0–0.9)

## 2017-08-16 LAB — SPECIMEN STATUS: NORMAL

## 2017-08-17 LAB
ANA SER QL IA: NEGATIVE
SPECIMEN STATUS: NORMAL

## 2017-08-25 DIAGNOSIS — N32.89 BLADDER SPASM: ICD-10-CM

## 2017-08-25 RX ORDER — FLAVOXATE HYDROCHLORIDE 100 MG/1
100 TABLET ORAL 3 TIMES DAILY PRN
Qty: 30 TABLET | Refills: 2 | Status: SHIPPED | OUTPATIENT
Start: 2017-08-25 | End: 2017-09-12 | Stop reason: SDUPTHER

## 2017-09-11 ENCOUNTER — HOSPITAL ENCOUNTER (OUTPATIENT)
Dept: NUTRITION | Facility: HOSPITAL | Age: 57
Setting detail: RECURRING SERIES
End: 2017-09-11

## 2017-09-11 ENCOUNTER — APPOINTMENT (OUTPATIENT)
Dept: NUTRITION | Facility: HOSPITAL | Age: 57
End: 2017-09-11

## 2017-09-12 DIAGNOSIS — S16.1XXD STRAIN OF NECK MUSCLE, SUBSEQUENT ENCOUNTER: ICD-10-CM

## 2017-09-12 DIAGNOSIS — N32.89 BLADDER SPASM: ICD-10-CM

## 2017-09-12 RX ORDER — TIZANIDINE 4 MG/1
TABLET ORAL
Qty: 30 TABLET | Refills: 1 | Status: SHIPPED | OUTPATIENT
Start: 2017-09-12 | End: 2017-11-14 | Stop reason: SDUPTHER

## 2017-09-12 RX ORDER — FLAVOXATE HYDROCHLORIDE 100 MG/1
100 TABLET ORAL 3 TIMES DAILY PRN
Qty: 30 TABLET | Refills: 2 | Status: SHIPPED | OUTPATIENT
Start: 2017-09-12 | End: 2018-03-29 | Stop reason: HOSPADM

## 2017-09-12 RX ORDER — TIZANIDINE 4 MG/1
TABLET ORAL
Qty: 30 TABLET | Refills: 1 | Status: CANCELLED | OUTPATIENT
Start: 2017-09-12

## 2017-09-12 RX ORDER — FLAVOXATE HYDROCHLORIDE 100 MG/1
100 TABLET ORAL 3 TIMES DAILY PRN
Qty: 30 TABLET | Refills: 2 | Status: CANCELLED | OUTPATIENT
Start: 2017-09-12

## 2017-09-14 ENCOUNTER — APPOINTMENT (OUTPATIENT)
Dept: PREADMISSION TESTING | Facility: HOSPITAL | Age: 57
End: 2017-09-14

## 2017-09-14 ENCOUNTER — HOSPITAL ENCOUNTER (OUTPATIENT)
Dept: GENERAL RADIOLOGY | Facility: HOSPITAL | Age: 57
Discharge: HOME | End: 2017-09-14
Admitting: OBSTETRICS & GYNECOLOGY

## 2017-09-14 VITALS — BODY MASS INDEX: 40.78 KG/M2 | WEIGHT: 253.75 LBS | HEIGHT: 66 IN

## 2017-09-14 LAB
B-HCG UR QL: NEGATIVE
BILIRUB UR QL STRIP: NEGATIVE
CLARITY UR: CLEAR
COLOR UR: YELLOW
DEPRECATED RDW RBC AUTO: 47.2 FL (ref 37–54)
ERYTHROCYTE [DISTWIDTH] IN BLOOD BY AUTOMATED COUNT: 14.5 % (ref 11.3–14.5)
GLUCOSE UR STRIP-MCNC: NEGATIVE MG/DL
HCT VFR BLD AUTO: 41.4 % (ref 34.5–44)
HGB BLD-MCNC: 13.3 G/DL (ref 11.5–15.5)
HGB UR QL STRIP.AUTO: NEGATIVE
KETONES UR QL STRIP: NEGATIVE
LEUKOCYTE ESTERASE UR QL STRIP.AUTO: NEGATIVE
MCH RBC QN AUTO: 28.2 PG (ref 27–31)
MCHC RBC AUTO-ENTMCNC: 32.1 G/DL (ref 32–36)
MCV RBC AUTO: 87.7 FL (ref 80–99)
NITRITE UR QL STRIP: NEGATIVE
PH UR STRIP.AUTO: 6.5 [PH] (ref 5–8)
PLATELET # BLD AUTO: 346 10*3/MM3 (ref 150–450)
PMV BLD AUTO: 9.8 FL (ref 6–12)
PROT UR QL STRIP: NEGATIVE
RBC # BLD AUTO: 4.72 10*6/MM3 (ref 3.89–5.14)
SP GR UR STRIP: 1.01 (ref 1–1.03)
UROBILINOGEN UR QL STRIP: NORMAL
WBC NRBC COR # BLD: 9.79 10*3/MM3 (ref 3.5–10.8)

## 2017-09-14 PROCEDURE — 81025 URINE PREGNANCY TEST: CPT | Performed by: OBSTETRICS & GYNECOLOGY

## 2017-09-14 PROCEDURE — 85027 COMPLETE CBC AUTOMATED: CPT | Performed by: OBSTETRICS & GYNECOLOGY

## 2017-09-14 PROCEDURE — 81003 URINALYSIS AUTO W/O SCOPE: CPT | Performed by: OBSTETRICS & GYNECOLOGY

## 2017-09-14 PROCEDURE — 36415 COLL VENOUS BLD VENIPUNCTURE: CPT

## 2017-09-14 PROCEDURE — 71020 HC CHEST PA AND LATERAL: CPT

## 2017-09-14 RX ORDER — CHOLECALCIFEROL (VITAMIN D3) 125 MCG
10 CAPSULE ORAL NIGHTLY
COMMUNITY
End: 2018-07-06 | Stop reason: DRUGHIGH

## 2017-09-14 RX ORDER — EPINEPHRINE 0.3 MG/.3ML
INJECTION SUBCUTANEOUS AS NEEDED
COMMUNITY
End: 2020-10-16 | Stop reason: SDUPTHER

## 2017-09-14 RX ORDER — ACETAMINOPHEN 500 MG
500 TABLET ORAL EVERY 6 HOURS PRN
COMMUNITY

## 2017-09-18 ENCOUNTER — ANESTHESIA EVENT (OUTPATIENT)
Dept: PERIOP | Facility: HOSPITAL | Age: 57
End: 2017-09-18

## 2017-09-18 ENCOUNTER — ANESTHESIA (OUTPATIENT)
Dept: PERIOP | Facility: HOSPITAL | Age: 57
End: 2017-09-18

## 2017-09-18 ENCOUNTER — HOSPITAL ENCOUNTER (OUTPATIENT)
Facility: HOSPITAL | Age: 57
Setting detail: OBSERVATION
Discharge: HOME | End: 2017-09-19
Attending: OBSTETRICS & GYNECOLOGY | Admitting: OBSTETRICS & GYNECOLOGY

## 2017-09-18 DIAGNOSIS — D21.9 FIBROIDS: ICD-10-CM

## 2017-09-18 DIAGNOSIS — N95.0 POST-MENOPAUSE BLEEDING: ICD-10-CM

## 2017-09-18 LAB
B-HCG UR QL: NEGATIVE
INTERNAL NEGATIVE CONTROL: NORMAL
INTERNAL POSITIVE CONTROL: REACTIVE
Lab: NORMAL

## 2017-09-18 PROCEDURE — 25010000002 PROPOFOL 10 MG/ML EMULSION: Performed by: NURSE ANESTHETIST, CERTIFIED REGISTERED

## 2017-09-18 PROCEDURE — 25010000003 CEFAZOLIN IN DEXTROSE 2-4 GM/100ML-% SOLUTION: Performed by: OBSTETRICS & GYNECOLOGY

## 2017-09-18 PROCEDURE — 25010000002 DEXAMETHASONE PER 1 MG: Performed by: NURSE ANESTHETIST, CERTIFIED REGISTERED

## 2017-09-18 PROCEDURE — 25010000002 HEPARIN (PORCINE) PER 1000 UNITS: Performed by: OBSTETRICS & GYNECOLOGY

## 2017-09-18 PROCEDURE — G0378 HOSPITAL OBSERVATION PER HR: HCPCS

## 2017-09-18 PROCEDURE — 25010000002 NEOSTIGMINE 10 MG/10ML SOLUTION: Performed by: NURSE ANESTHETIST, CERTIFIED REGISTERED

## 2017-09-18 PROCEDURE — 25010000002 HYDROMORPHONE PER 4 MG: Performed by: OBSTETRICS & GYNECOLOGY

## 2017-09-18 PROCEDURE — 25010000002 FENTANYL CITRATE (PF) 100 MCG/2ML SOLUTION: Performed by: NURSE ANESTHETIST, CERTIFIED REGISTERED

## 2017-09-18 PROCEDURE — 25010000002 KETOROLAC TROMETHAMINE PER 15 MG: Performed by: NURSE ANESTHETIST, CERTIFIED REGISTERED

## 2017-09-18 PROCEDURE — 94660 CPAP INITIATION&MGMT: CPT

## 2017-09-18 PROCEDURE — 94799 UNLISTED PULMONARY SVC/PX: CPT

## 2017-09-18 PROCEDURE — 88307 TISSUE EXAM BY PATHOLOGIST: CPT | Performed by: OBSTETRICS & GYNECOLOGY

## 2017-09-18 PROCEDURE — 25010000002 ONDANSETRON PER 1 MG: Performed by: OBSTETRICS & GYNECOLOGY

## 2017-09-18 PROCEDURE — 25010000002 KETOROLAC TROMETHAMINE PER 15 MG: Performed by: OBSTETRICS & GYNECOLOGY

## 2017-09-18 PROCEDURE — 25010000002 ONDANSETRON PER 1 MG: Performed by: NURSE ANESTHETIST, CERTIFIED REGISTERED

## 2017-09-18 RX ORDER — PROPOFOL 10 MG/ML
VIAL (ML) INTRAVENOUS AS NEEDED
Status: DISCONTINUED | OUTPATIENT
Start: 2017-09-18 | End: 2017-09-18 | Stop reason: SURG

## 2017-09-18 RX ORDER — PROMETHAZINE HYDROCHLORIDE 25 MG/1
25 SUPPOSITORY RECTAL ONCE AS NEEDED
Status: DISCONTINUED | OUTPATIENT
Start: 2017-09-18 | End: 2017-09-18 | Stop reason: HOSPADM

## 2017-09-18 RX ORDER — GLYCOPYRROLATE 0.2 MG/ML
INJECTION INTRAMUSCULAR; INTRAVENOUS AS NEEDED
Status: DISCONTINUED | OUTPATIENT
Start: 2017-09-18 | End: 2017-09-18 | Stop reason: SURG

## 2017-09-18 RX ORDER — KETOROLAC TROMETHAMINE 30 MG/ML
30 INJECTION, SOLUTION INTRAMUSCULAR; INTRAVENOUS EVERY 6 HOURS PRN
Status: DISPENSED | OUTPATIENT
Start: 2017-09-18 | End: 2017-09-18

## 2017-09-18 RX ORDER — LIDOCAINE HYDROCHLORIDE 10 MG/ML
0.5 INJECTION, SOLUTION EPIDURAL; INFILTRATION; INTRACAUDAL; PERINEURAL ONCE AS NEEDED
Status: COMPLETED | OUTPATIENT
Start: 2017-09-18 | End: 2017-09-18

## 2017-09-18 RX ORDER — NALOXONE HCL 0.4 MG/ML
0.1 VIAL (ML) INJECTION
Status: DISCONTINUED | OUTPATIENT
Start: 2017-09-18 | End: 2017-09-19 | Stop reason: HOSPADM

## 2017-09-18 RX ORDER — PROMETHAZINE HYDROCHLORIDE 25 MG/ML
6.25 INJECTION, SOLUTION INTRAMUSCULAR; INTRAVENOUS ONCE AS NEEDED
Status: DISCONTINUED | OUTPATIENT
Start: 2017-09-18 | End: 2017-09-18 | Stop reason: HOSPADM

## 2017-09-18 RX ORDER — MEPERIDINE HYDROCHLORIDE 25 MG/ML
12.5 INJECTION INTRAMUSCULAR; INTRAVENOUS; SUBCUTANEOUS
Status: DISCONTINUED | OUTPATIENT
Start: 2017-09-18 | End: 2017-09-18 | Stop reason: HOSPADM

## 2017-09-18 RX ORDER — SODIUM CHLORIDE 0.9 % (FLUSH) 0.9 %
1-10 SYRINGE (ML) INJECTION AS NEEDED
Status: DISCONTINUED | OUTPATIENT
Start: 2017-09-18 | End: 2017-09-18 | Stop reason: HOSPADM

## 2017-09-18 RX ORDER — FAMOTIDINE 20 MG/1
20 TABLET, FILM COATED ORAL ONCE
Status: COMPLETED | OUTPATIENT
Start: 2017-09-18 | End: 2017-09-18

## 2017-09-18 RX ORDER — FENTANYL CITRATE 50 UG/ML
INJECTION, SOLUTION INTRAMUSCULAR; INTRAVENOUS AS NEEDED
Status: DISCONTINUED | OUTPATIENT
Start: 2017-09-18 | End: 2017-09-18 | Stop reason: SURG

## 2017-09-18 RX ORDER — ACETAMINOPHEN 325 MG/1
650 TABLET ORAL EVERY 4 HOURS PRN
Status: DISCONTINUED | OUTPATIENT
Start: 2017-09-18 | End: 2017-09-19 | Stop reason: HOSPADM

## 2017-09-18 RX ORDER — ONDANSETRON 4 MG/1
4 TABLET, FILM COATED ORAL EVERY 6 HOURS PRN
Status: DISCONTINUED | OUTPATIENT
Start: 2017-09-18 | End: 2017-09-19 | Stop reason: HOSPADM

## 2017-09-18 RX ORDER — SCOLOPAMINE TRANSDERMAL SYSTEM 1 MG/1
1 PATCH, EXTENDED RELEASE TRANSDERMAL ONCE
Status: DISCONTINUED | OUTPATIENT
Start: 2017-09-18 | End: 2017-09-18

## 2017-09-18 RX ORDER — FAMOTIDINE 10 MG/ML
20 INJECTION, SOLUTION INTRAVENOUS ONCE
Status: CANCELLED | OUTPATIENT
Start: 2017-09-18 | End: 2017-09-18

## 2017-09-18 RX ORDER — BUPIVACAINE HYDROCHLORIDE AND EPINEPHRINE 5; 5 MG/ML; UG/ML
INJECTION, SOLUTION PERINEURAL AS NEEDED
Status: DISCONTINUED | OUTPATIENT
Start: 2017-09-18 | End: 2017-09-18 | Stop reason: HOSPADM

## 2017-09-18 RX ORDER — PROMETHAZINE HYDROCHLORIDE 25 MG/1
25 TABLET ORAL ONCE AS NEEDED
Status: DISCONTINUED | OUTPATIENT
Start: 2017-09-18 | End: 2017-09-18 | Stop reason: HOSPADM

## 2017-09-18 RX ORDER — CEFAZOLIN SODIUM 2 G/100ML
2 INJECTION, SOLUTION INTRAVENOUS ONCE
Status: DISCONTINUED | OUTPATIENT
Start: 2017-09-18 | End: 2017-09-18

## 2017-09-18 RX ORDER — HEPARIN SODIUM 5000 [USP'U]/ML
5000 INJECTION, SOLUTION INTRAVENOUS; SUBCUTANEOUS ONCE
Status: DISCONTINUED | OUTPATIENT
Start: 2017-09-18 | End: 2017-09-18 | Stop reason: HOSPADM

## 2017-09-18 RX ORDER — ALUMINA, MAGNESIA, AND SIMETHICONE 2400; 2400; 240 MG/30ML; MG/30ML; MG/30ML
15 SUSPENSION ORAL EVERY 6 HOURS PRN
Status: DISCONTINUED | OUTPATIENT
Start: 2017-09-18 | End: 2017-09-19 | Stop reason: HOSPADM

## 2017-09-18 RX ORDER — HYDROMORPHONE HYDROCHLORIDE 1 MG/ML
0.5 INJECTION, SOLUTION INTRAMUSCULAR; INTRAVENOUS; SUBCUTANEOUS
Status: DISCONTINUED | OUTPATIENT
Start: 2017-09-18 | End: 2017-09-19 | Stop reason: HOSPADM

## 2017-09-18 RX ORDER — ONDANSETRON 2 MG/ML
4 INJECTION INTRAMUSCULAR; INTRAVENOUS EVERY 6 HOURS PRN
Status: DISCONTINUED | OUTPATIENT
Start: 2017-09-18 | End: 2017-09-19 | Stop reason: HOSPADM

## 2017-09-18 RX ORDER — PROPOFOL 10 MG/ML
VIAL (ML) INTRAVENOUS CONTINUOUS PRN
Status: DISCONTINUED | OUTPATIENT
Start: 2017-09-18 | End: 2017-09-18 | Stop reason: SURG

## 2017-09-18 RX ORDER — SODIUM CHLORIDE 9 MG/ML
INJECTION, SOLUTION INTRAVENOUS AS NEEDED
Status: DISCONTINUED | OUTPATIENT
Start: 2017-09-18 | End: 2017-09-18 | Stop reason: HOSPADM

## 2017-09-18 RX ORDER — SODIUM CHLORIDE, SODIUM LACTATE, POTASSIUM CHLORIDE, CALCIUM CHLORIDE 600; 310; 30; 20 MG/100ML; MG/100ML; MG/100ML; MG/100ML
9 INJECTION, SOLUTION INTRAVENOUS CONTINUOUS
Status: DISCONTINUED | OUTPATIENT
Start: 2017-09-18 | End: 2017-09-19 | Stop reason: HOSPADM

## 2017-09-18 RX ORDER — IBUPROFEN 400 MG/1
400 TABLET ORAL EVERY 6 HOURS PRN
Status: DISCONTINUED | OUTPATIENT
Start: 2017-09-18 | End: 2017-09-19 | Stop reason: HOSPADM

## 2017-09-18 RX ORDER — MAGNESIUM HYDROXIDE 1200 MG/15ML
LIQUID ORAL AS NEEDED
Status: DISCONTINUED | OUTPATIENT
Start: 2017-09-18 | End: 2017-09-18 | Stop reason: HOSPADM

## 2017-09-18 RX ORDER — ONDANSETRON 2 MG/ML
INJECTION INTRAMUSCULAR; INTRAVENOUS AS NEEDED
Status: DISCONTINUED | OUTPATIENT
Start: 2017-09-18 | End: 2017-09-18 | Stop reason: SURG

## 2017-09-18 RX ORDER — ATRACURIUM BESYLATE 10 MG/ML
INJECTION, SOLUTION INTRAVENOUS AS NEEDED
Status: DISCONTINUED | OUTPATIENT
Start: 2017-09-18 | End: 2017-09-18 | Stop reason: SURG

## 2017-09-18 RX ORDER — HYDROMORPHONE HYDROCHLORIDE 1 MG/ML
0.5 INJECTION, SOLUTION INTRAMUSCULAR; INTRAVENOUS; SUBCUTANEOUS
Status: DISCONTINUED | OUTPATIENT
Start: 2017-09-18 | End: 2017-09-18 | Stop reason: HOSPADM

## 2017-09-18 RX ORDER — HEPARIN SODIUM 5000 [USP'U]/ML
INJECTION, SOLUTION INTRAVENOUS; SUBCUTANEOUS AS NEEDED
Status: DISCONTINUED | OUTPATIENT
Start: 2017-09-18 | End: 2017-09-18 | Stop reason: HOSPADM

## 2017-09-18 RX ORDER — KETOROLAC TROMETHAMINE 30 MG/ML
INJECTION, SOLUTION INTRAMUSCULAR; INTRAVENOUS AS NEEDED
Status: DISCONTINUED | OUTPATIENT
Start: 2017-09-18 | End: 2017-09-18 | Stop reason: SURG

## 2017-09-18 RX ORDER — DEXAMETHASONE SODIUM PHOSPHATE 4 MG/ML
INJECTION, SOLUTION INTRA-ARTICULAR; INTRALESIONAL; INTRAMUSCULAR; INTRAVENOUS; SOFT TISSUE AS NEEDED
Status: DISCONTINUED | OUTPATIENT
Start: 2017-09-18 | End: 2017-09-18 | Stop reason: SURG

## 2017-09-18 RX ORDER — FENTANYL CITRATE 50 UG/ML
50 INJECTION, SOLUTION INTRAMUSCULAR; INTRAVENOUS
Status: DISCONTINUED | OUTPATIENT
Start: 2017-09-18 | End: 2017-09-18 | Stop reason: HOSPADM

## 2017-09-18 RX ORDER — HEPARIN SODIUM 5000 [USP'U]/ML
5000 INJECTION, SOLUTION INTRAVENOUS; SUBCUTANEOUS ONCE
Status: DISCONTINUED | OUTPATIENT
Start: 2017-09-18 | End: 2017-09-18

## 2017-09-18 RX ORDER — HYDROCODONE BITARTRATE AND ACETAMINOPHEN 5; 325 MG/1; MG/1
1 TABLET ORAL EVERY 4 HOURS PRN
Status: DISCONTINUED | OUTPATIENT
Start: 2017-09-18 | End: 2017-09-18

## 2017-09-18 RX ORDER — LIDOCAINE HYDROCHLORIDE 10 MG/ML
INJECTION, SOLUTION EPIDURAL; INFILTRATION; INTRACAUDAL; PERINEURAL AS NEEDED
Status: DISCONTINUED | OUTPATIENT
Start: 2017-09-18 | End: 2017-09-18 | Stop reason: SURG

## 2017-09-18 RX ORDER — SIMETHICONE 80 MG
80 TABLET,CHEWABLE ORAL 4 TIMES DAILY PRN
Status: DISCONTINUED | OUTPATIENT
Start: 2017-09-18 | End: 2017-09-19 | Stop reason: HOSPADM

## 2017-09-18 RX ORDER — CEFAZOLIN SODIUM 2 G/100ML
2 INJECTION, SOLUTION INTRAVENOUS ONCE
Status: COMPLETED | OUTPATIENT
Start: 2017-09-18 | End: 2017-09-18

## 2017-09-18 RX ORDER — NEOSTIGMINE METHYLSULFATE 1 MG/ML
INJECTION, SOLUTION INTRAVENOUS AS NEEDED
Status: DISCONTINUED | OUTPATIENT
Start: 2017-09-18 | End: 2017-09-18 | Stop reason: SURG

## 2017-09-18 RX ORDER — ONDANSETRON 2 MG/ML
4 INJECTION INTRAMUSCULAR; INTRAVENOUS ONCE AS NEEDED
Status: DISCONTINUED | OUTPATIENT
Start: 2017-09-18 | End: 2017-09-18 | Stop reason: HOSPADM

## 2017-09-18 RX ADMIN — SCOPALAMINE 1 PATCH: 1 PATCH, EXTENDED RELEASE TRANSDERMAL at 08:23

## 2017-09-18 RX ADMIN — ATRACURIUM BESYLATE 10 MG: 10 INJECTION, SOLUTION INTRAVENOUS at 11:16

## 2017-09-18 RX ADMIN — LIDOCAINE HYDROCHLORIDE 50 MG: 10 INJECTION, SOLUTION EPIDURAL; INFILTRATION; INTRACAUDAL; PERINEURAL at 10:16

## 2017-09-18 RX ADMIN — LIDOCAINE HYDROCHLORIDE 0.5 ML: 10 INJECTION, SOLUTION EPIDURAL; INFILTRATION; INTRACAUDAL; PERINEURAL at 08:24

## 2017-09-18 RX ADMIN — NEOSTIGMINE METHYLSULFATE 2.5 MG: 1 INJECTION, SOLUTION INTRAVENOUS at 12:30

## 2017-09-18 RX ADMIN — KETOROLAC TROMETHAMINE 30 MG: 30 INJECTION, SOLUTION INTRAMUSCULAR at 12:31

## 2017-09-18 RX ADMIN — ONDANSETRON 4 MG: 2 INJECTION INTRAMUSCULAR; INTRAVENOUS at 19:35

## 2017-09-18 RX ADMIN — DEXAMETHASONE SODIUM PHOSPHATE 8 MG: 4 INJECTION, SOLUTION INTRAMUSCULAR; INTRAVENOUS at 10:21

## 2017-09-18 RX ADMIN — SODIUM CHLORIDE, POTASSIUM CHLORIDE, SODIUM LACTATE AND CALCIUM CHLORIDE 9 ML/HR: 600; 310; 30; 20 INJECTION, SOLUTION INTRAVENOUS at 08:24

## 2017-09-18 RX ADMIN — PROPOFOL 200 MG: 10 INJECTION, EMULSION INTRAVENOUS at 10:16

## 2017-09-18 RX ADMIN — HYDROMORPHONE HYDROCHLORIDE 0.5 MG: 1 INJECTION, SOLUTION INTRAMUSCULAR; INTRAVENOUS; SUBCUTANEOUS at 15:34

## 2017-09-18 RX ADMIN — ATRACURIUM BESYLATE 10 MG: 10 INJECTION, SOLUTION INTRAVENOUS at 10:41

## 2017-09-18 RX ADMIN — PROPOFOL 25 MCG/KG/MIN: 10 INJECTION, EMULSION INTRAVENOUS at 10:16

## 2017-09-18 RX ADMIN — KETOROLAC TROMETHAMINE 30 MG: 30 INJECTION, SOLUTION INTRAMUSCULAR at 19:35

## 2017-09-18 RX ADMIN — FAMOTIDINE 20 MG: 20 TABLET ORAL at 08:24

## 2017-09-18 RX ADMIN — ATRACURIUM BESYLATE 50 MG: 10 INJECTION, SOLUTION INTRAVENOUS at 10:16

## 2017-09-18 RX ADMIN — CEFAZOLIN SODIUM 2 G: 2 INJECTION, SOLUTION INTRAVENOUS at 10:12

## 2017-09-18 RX ADMIN — FENTANYL CITRATE 50 MCG: 50 INJECTION, SOLUTION INTRAMUSCULAR; INTRAVENOUS at 11:38

## 2017-09-18 RX ADMIN — FENTANYL CITRATE 50 MCG: 50 INJECTION, SOLUTION INTRAMUSCULAR; INTRAVENOUS at 13:40

## 2017-09-18 RX ADMIN — GLYCOPYRROLATE 0.4 MG: 0.2 INJECTION, SOLUTION INTRAMUSCULAR; INTRAVENOUS at 12:30

## 2017-09-18 RX ADMIN — FENTANYL CITRATE 50 MCG: 50 INJECTION, SOLUTION INTRAMUSCULAR; INTRAVENOUS at 12:13

## 2017-09-18 RX ADMIN — ONDANSETRON 4 MG: 2 INJECTION INTRAMUSCULAR; INTRAVENOUS at 12:30

## 2017-09-18 RX ADMIN — SODIUM CHLORIDE, POTASSIUM CHLORIDE, SODIUM LACTATE AND CALCIUM CHLORIDE 1000 ML: 600; 310; 30; 20 INJECTION, SOLUTION INTRAVENOUS at 15:22

## 2017-09-18 RX ADMIN — ACETAMINOPHEN 650 MG: 325 TABLET, FILM COATED ORAL at 21:59

## 2017-09-18 RX ADMIN — ATRACURIUM BESYLATE 10 MG: 10 INJECTION, SOLUTION INTRAVENOUS at 11:37

## 2017-09-18 RX ADMIN — IBUPROFEN 400 MG: 400 TABLET ORAL at 21:59

## 2017-09-18 RX ADMIN — FENTANYL CITRATE 50 MCG: 50 INJECTION, SOLUTION INTRAMUSCULAR; INTRAVENOUS at 13:50

## 2017-09-18 RX ADMIN — FENTANYL CITRATE 100 MCG: 50 INJECTION, SOLUTION INTRAMUSCULAR; INTRAVENOUS at 10:16

## 2017-09-18 NOTE — H&P
Pre-Op H&P  Sandra Auguste  [id number]  1960    Chief complaint: Fibroid, PMB    HPI:    Patient is a 57 y.o.female presents with Uterine fibroids and post menopausal bleeding and here today for total robotic hysterectomy with bilateral salpingo oophorectomy.      Review of Systems:  General ROS: negative for chills, fever or skin lesions;  No changes since last office visit  Cardiovascular ROS: no chest pain or dyspnea on exertion  Respiratory ROS: no cough,+baseline shortness of breath, or wheezing    Allergies:   Allergies   Allergen Reactions   • Mold Extract [Trichophyton] Shortness Of Breath     SOA   • Lortab [Hydrocodone-Acetaminophen] Nausea And Vomiting     SEVERE NAUSEA AND SLIGHT VOMITING   • Methylprednisolone Other (See Comments)     Bleeding, cramping   • Pollen Extract Other (See Comments)     SNEEZING AND CONGESTION        Home Meds:    Prescriptions Prior to Admission   Medication Sig Dispense Refill Last Dose   • ALPRAZolam (XANAX) 0.25 MG tablet Take 1 tablet by mouth Daily As Needed for anxiety. 30 tablet 0 9/17/2017 at Unknown time   • buPROPion XL (WELLBUTRIN XL) 300 MG 24 hr tablet Take 1 tablet by mouth Every Morning. 30 tablet 2 9/17/2017 at Unknown time   • cetirizine (zyrTEC) 10 MG tablet Take 1 tablet by mouth Daily. 30 tablet 6 9/17/2017 at Unknown time   • diclofenac (VOLTAREN) 1 % gel gel Apply 4 g topically 4 (Four) Times a Day As Needed (for pain in shoulders). 100 g 1 Past Week at Unknown time   • flavoxATE (URISPAS) 100 MG tablet Take 1 tablet by mouth 3 (Three) Times a Day As Needed for bladder spasms. 30 tablet 2 9/17/2017 at Unknown time   • FLUoxetine (PROzac) 20 MG capsule Take 1 capsule by mouth Daily. (Patient taking differently: Take 40 mg by mouth Daily.) 30 capsule 2 9/17/2017 at Unknown time   • Fluticasone Furoate (ARNUITY ELLIPTA) 100 MCG/ACT aerosol powder  Inhale 1 puff As Needed. JUST RECEIVED FROM ALLERGIST FOR SEASONAL ALLERGIES AS NEEDED   9/17/2017 at Unknown  "time   • melatonin 5 MG tablet tablet Take 10 mg by mouth Every Night.   9/17/2017 at Unknown time   • tiZANidine (ZANAFLEX) 4 MG tablet Take once at bedtime (Patient taking differently: Take 4 mg by mouth Every Night. Take once at bedtime) 30 tablet 1 9/17/2017 at Unknown time   • topiramate (TOPAMAX) 25 MG tablet TAKE ONE-HALF TABLET BY MOUTH IN THE EVENING 15 tablet 2 9/17/2017 at Unknown time   • triamcinolone (KENALOG) 0.5 % cream Apply to affected area twice daily. (Patient taking differently: Apply 1 application topically 2 (Two) Times a Day. Apply to affected area twice daily.) 15 g 0 Past Week at Unknown time   • acetaminophen (TYLENOL) 500 MG tablet Take 500 mg by mouth Every 6 (Six) Hours As Needed for Mild Pain .   9/16/2017   • albuterol (PROVENTIL HFA;VENTOLIN HFA) 108 (90 BASE) MCG/ACT inhaler Inhale 1 puff Every 6 (Six) Hours As Needed. INHALE TWO PUFFS BY MOUTH EVERY 6 HOURS AS NEEDED   9/15/2017   • EPINEPHrine (EPIPEN) 0.3 MG/0.3ML solution auto-injector injection As Needed (FOR SEVERE ALLERGIC REACTION).          PMH:   Past Medical History:   Diagnosis Date   • Anesthesia complication     HAS TROUBLE GETTING \"NUMB\"    • Anxiety    • Arthritis    • Bladder spasms    • Depression    • Diverticulosis    • Memory loss     FROM SKULL FRACTURE AND FALL-SHORT TERM MEMORY LOSS   • Mental disorder     PTSD   • Migraine    • Obesity    • Seasonal allergies    • Seizures     \"convulsions\" at age 5   • Skull fracture 2012   • Sleep apnea with use of continuous positive airway pressure (CPAP)     INSTRUCTED TO BRING OWN MASK AND TUBING    • SOB (shortness of breath) on exertion    • Wears contact lenses    • Wears partial dentures     TOP ONLY    • Wears prescription eyeglasses    • Wears prescription eyeglasses      PSH:    Past Surgical History:   Procedure Laterality Date   • CERVICAL POLYPECTOMY     • WISDOM TOOTH EXTRACTION         Immunization History:  Influenza: no  Pneumococcal: no  Tetanus: " "unknown    Social History:   Tobacco:   History   Smoking Status   • Never Smoker   Smokeless Tobacco   • Never Used      Alcohol:     History   Alcohol Use No     Comment: ONCE A YEAR ON XMAS, IF THAT       Vitals:           /75 (BP Location: Right arm, Patient Position: Lying)  Pulse 81  Temp 97.4 °F (36.3 °C) (Tympanic)   Resp 16  Ht 65\" (165.1 cm)  Wt 253 lb (115 kg)  LMP  (LMP Unknown)  SpO2 95%  BMI 42.1 kg/m2    Physical Exam:  General Appearance:    Alert, cooperative, no distress, appears stated age   Head:    Normocephalic, without obvious abnormality, atraumatic   Lungs:     Clear to auscultation bilaterally, respirations unlabored    Heart:   Regular rate and rhythm, S1 and S2 normal, no murmur, rub    or gallop    Abdomen:    Soft, non-tender.  +bowel sounds   Breast Exam:    deferred   Genitalia:    deferred   Extremities:   Extremities normal, atraumatic, no cyanosis or edema   Skin:   Skin color, texture, turgor normal, no rashes or lesions   Neurologic:   Grossly intact   Results Review  I reviewed the patient's new clinical results.    Cancer Staging (if applicable)  Cancer Patient: __ yes _x_no __unknown; If yes, clinical stage T:__ N:__M:__, stage group or __N/A    Impression/Plan:  Uterine fibroids and post menopausal bleeding - Total robotic hysterectomy with bilateral salpingo oophorectomy.      Maureen Leyva, APRN   9/18/2017   8:40 AM  "

## 2017-09-18 NOTE — ANESTHESIA POSTPROCEDURE EVALUATION
Patient: Sandra Auguste    Procedure Summary     Date Anesthesia Start Anesthesia Stop Room / Location    09/18/17 1012   [facility] / [facility]       Procedure Diagnosis Surgeon Provider    TOTAL LAPAROSCOPIC HYSTERECTOMY, BILATERAL SALPINGO OOPHORECTOMY WITH DAVINCI ROBOT  (N/A Abdomen) No diagnosis on file. DO Trace Jewell MD          Anesthesia Type: general  Last vitals  /81       Temp     98.5   Pulse    92   Resp 20       SpO2     96%     Post Anesthesia Care and Evaluation    Patient location during evaluation: PACU  Patient participation: complete - patient participated  Level of consciousness: responsive to verbal stimuli  Pain score: 0  Pain management: adequate  Airway patency: patent  Anesthetic complications: No anesthetic complications  PONV Status: none  Cardiovascular status: hemodynamically stable and acceptable  Respiratory status: nonlabored ventilation, acceptable and nasal cannula  Hydration status: acceptable

## 2017-09-18 NOTE — ANESTHESIA PREPROCEDURE EVALUATION
Anesthesia Evaluation     NPO Solid Status: > 8 hours  NPO Liquid Status: > 8 hours     Airway   Mallampati: II  TM distance: >3 FB  Neck ROM: full  possible difficult intubation  Dental    (+) poor dentition        Pulmonary    (+) decreased breath sounds,   (-) not a smoker  Cardiovascular     Rate: normal    (+) hypertension,   (-) pacemaker, angina, murmur, cardiac stents      Neuro/Psych  GI/Hepatic/Renal/Endo    (+) obesity,    Liver disease: non alcoholic fatty liver.    Musculoskeletal     Abdominal    Substance History      OB/GYN          Other                                    Anesthesia Plan    ASA 3     general     intravenous induction     Plan discussed with CRNA.

## 2017-09-18 NOTE — ANESTHESIA PROCEDURE NOTES
Airway  Urgency: elective    Date/Time: 9/18/2017 10:16 AM  End Time:9/18/2017 10:19 AM  Airway not difficult    General Information and Staff    Patient location during procedure: OR  Anesthesiologist: JEFFRY TOURE  CRNA: DREW SEARS    Indications and Patient Condition  Indications for airway management: airway protection    Preoxygenated: yes  MILS not maintained throughout  Mask difficulty assessment: 1 - vent by mask    Final Airway Details  Final airway type: endotracheal airway      Successful airway: ETT  Cuffed: yes   Successful intubation technique: direct laryngoscopy  Endotracheal tube insertion site: oral  Blade: Olga  Blade size: #3  ETT size: 7.0 mm  Cormack-Lehane Classification: grade I - full view of glottis  Placement verified by: chest auscultation and capnometry   Inital cuff pressure (cm H2O): 22  Cuff volume (mL): 6  Measured from: lips  ETT to lips (cm): 20  Number of attempts at approach: 1    Additional Comments  Negative epigastric sounds, Breath sound equal bilaterally with symmetric chest rise and fall NOTE TEETH REMAIN UNCHANGED

## 2017-09-18 NOTE — OP NOTE
Total [facility] Operative Dictation Note    Date of Service:  09/18/17 12:50 PM    Pre-operative diagnosis(es): Post-Menopausal Bleeding  Fibroid Uterus         Post-operative diagnosis(es): Same plus  Endometriosis with a large chocolate cyst       Procedure(s): Total Robotic Assisted Vaginal Hysterectomy     Surgeon:  Assistant: Dr. Re Cheema     Anesthesia: General     IV Fluid: 1100 mL       Output: Est. Blood Loss 100mL  Urine Output 150 mL         Specimens removed:   ID Type Source Tests Collected by Time Destination   A :  Tissue Uterus with Cervix, Bilateral Tubes and Ovaries TISSUE EXAM Shannon A DO Re 9/18/2017 1150         Complications: None       Intraoperative consult(s):  None    Condition: stable       Disposition: To PACU and then to home       Operative Findings: Very large fibroid uterus with large ovarian cyst on the left.  Uterus was bivalved in half to remove from pelvis. Large ovary was placed in an endo-GI bag and removed intact.  Ureters were seen and visualized throughout procedure.     Description of Procedure:    After informed consent was obtained, the patient was placed in the dorsal lithotomy position and prepared and draped in the normal  sterile fashion.  Bimanual exam revealed a enlarged, c/w 15 week size, with left adnexal fullness appreciated.  A weighted speculum was placed in the patient's vagina. The cervix was grasped with a single-tooth tenaculum. The manipulator was then inserted into the cervix.  The tenaculum and the weighted vaginal speculum were than removed.    Attention was then turned to the abdomen where the skin under the umbilicus was anesthetized with 0.25% marcaine.  This was approximately 1 cm below the umbilicus. A 12 mm incision was than made with the knife.  Blunt dissection was carried to the fascia with the hemostat.  The Veress needle was then inserted into the abdomen. Placement was confirmed with the water drop and air drawback tests. The abdomen was than  insufflated with CO2 gas. Upon achievement of adequate pneumoperitoneum, the Versus needle was withdrawn and a 12 mm disposal and sheath were then advanced in the abdomen.  The trocar was removed and the camera was advanced down the sheath. Two more trocars were placed on the right hand side and one more trocar was placed on the left hand side. These were all 8 mm ports. These were all placed under direct visualization.    The patient was placed in steep Trendelenburg. The robot was docked.  I then sat at the console.  There was a large cyst or fibroid attached to the anterior side of uterus. This was removed with cautery and bipolar. Once that was removed visualization was much better.  Both ovaries were stuck underneath the uterus with filmy adhesions and a ruptured chocolate cyst. Once the ovaries were free from adhesions and the old blood suctioned we than proceeded with the hysterectomy.  Using a [place] bipolar, I cauterized the infundibulopelvic ligament on the right-hand side. It was then incised. I continued down the round ligament on that side with the cautery. The broad ligament was then opened up.  The anterior leaf was formed and a bladder flap was created on that side to the midline. The posterior leaf was then incised down to the uterosacral ligament on that side. The uterine artery was seen, grasped and was cauterized in multiple places and dissected off the uterus.    Attention was then turned to the left-hand side where the same technique was utilized. The infundibulopelvic and round ligaments were cauterized and incised. The broad ligament was then opened.  The anterior leaf was then brought to meet the previous incisions. The posterior leaf was dissected to the uterosacral ligament.  The uterine artery was than grasped on that side, cauterized in multiple places and dissected off the uterus.  The bladder was then dissected further off the cervix.  Monopolar twyla were then used to enter the  posterior aspect of the vagina.  This was brought around laterally and superiorly.  We than bivalved the uterus and Using a tenaculum, the uterus, cervix, tubes and ovaries were removed.  Fibroids and large cyst removed.    The vaginal cuff was then closed with two figure of eight stitches of 2-0 PDS on each side of the vaginal cuff. The remainder of the cuff was closed with a V-Lock 2-0 Polygluconate  Suture.  There was no bleeding noted from the vaginal cuff. Floseal was placed across the vaginal cuff. Ureters were seen with good urine flow. The robot was then undocked. The trocars were removed. The skin incisions were closed with 4-0 vicryl.    All instruments were then removed from the patients abdomen and vagina, Sponge, needle and lap counts were correct X 2.  The patient was taken to recovery room in stable condition. The patient tolerated the procedure well.              Shannon Grimaldo DO  09/18/17  12:49 PM

## 2017-09-19 VITALS
RESPIRATION RATE: 18 BRPM | BODY MASS INDEX: 42.15 KG/M2 | HEIGHT: 65 IN | DIASTOLIC BLOOD PRESSURE: 75 MMHG | SYSTOLIC BLOOD PRESSURE: 106 MMHG | OXYGEN SATURATION: 98 % | HEART RATE: 89 BPM | TEMPERATURE: 97.3 F | WEIGHT: 253 LBS

## 2017-09-19 PROBLEM — D25.1 INTRAMURAL LEIOMYOMA OF UTERUS: Status: RESOLVED | Noted: 2017-02-08 | Resolved: 2017-09-19

## 2017-09-19 PROBLEM — D25.2 SUBSEROUS LEIOMYOMA OF UTERUS: Status: RESOLVED | Noted: 2017-02-08 | Resolved: 2017-09-19

## 2017-09-19 PROBLEM — D21.9 FIBROIDS: Status: RESOLVED | Noted: 2017-09-18 | Resolved: 2017-09-19

## 2017-09-19 PROCEDURE — G0378 HOSPITAL OBSERVATION PER HR: HCPCS

## 2017-09-19 PROCEDURE — 25010000002 HYDROMORPHONE PER 4 MG: Performed by: OBSTETRICS & GYNECOLOGY

## 2017-09-19 RX ORDER — IBUPROFEN 800 MG/1
800 TABLET ORAL EVERY 6 HOURS PRN
Qty: 30 TABLET | Refills: 1 | Status: SHIPPED | OUTPATIENT
Start: 2017-09-19 | End: 2017-10-19

## 2017-09-19 RX ORDER — CIPROFLOXACIN 500 MG/1
500 TABLET, FILM COATED ORAL 2 TIMES DAILY
Qty: 10 TABLET | Refills: 0 | Status: SHIPPED | OUTPATIENT
Start: 2017-09-19 | End: 2017-09-24

## 2017-09-19 RX ORDER — ONDANSETRON 4 MG/1
4 TABLET, FILM COATED ORAL EVERY 6 HOURS PRN
Qty: 20 TABLET | Refills: 1 | Status: SHIPPED | OUTPATIENT
Start: 2017-09-19 | End: 2018-03-29 | Stop reason: HOSPADM

## 2017-09-19 RX ORDER — HYDROCODONE BITARTRATE AND ACETAMINOPHEN 5; 325 MG/1; MG/1
1 TABLET ORAL EVERY 6 HOURS PRN
Qty: 30 TABLET | Refills: 0 | Status: SHIPPED | OUTPATIENT
Start: 2017-09-19 | End: 2017-09-29

## 2017-09-19 RX ADMIN — IBUPROFEN 400 MG: 400 TABLET ORAL at 10:43

## 2017-09-19 RX ADMIN — HYDROMORPHONE HYDROCHLORIDE 0.5 MG: 1 INJECTION, SOLUTION INTRAMUSCULAR; INTRAVENOUS; SUBCUTANEOUS at 02:59

## 2017-09-19 NOTE — DISCHARGE SUMMARY
[facility]  Post Operative Discharge Summary      Patient: Sandra Auguste      MR#:[id number]  Admission  Diagnosis: Post-Menopausal Bleeding                                          Fibroid Uterus     Discharge Diagnosis:   Patient Active Problem List   Diagnosis   • Abnormal computed tomography scan   • Anemia   • Posttraumatic stress disorder   • Anxiety   • Strain of neck muscle   • Cough   • Diarrhea   • Diverticulitis of colon   • Dog bite   • Fatigue   • Steatosis of liver   • Lateral epicondylitis   • Knee pain   • Spasm   • Adiposity   • Obstructive sleep apnea syndrome   • Osteoarthritis   • Palpitations   • Postmenopausal bleeding   • Nausea   • Shortness of breath   • Skin tag   • Candidiasis of vagina   • Viral upper respiratory tract infection   • Gastroesophageal reflux disease   • Acute pain of left shoulder   • Abdominal distension (gaseous)   • SOB (shortness of breath) on exertion   • Sleep apnea with use of continuous positive airway pressure (CPAP)   • Seasonal allergies   • Obesity   • Migraine   • Mental disorder   • Diverticulosis   • Depression   • Arthritis   • Hypertension   • Subserous leiomyoma of uterus   • Intramural leiomyoma of uterus   • Itching   • Stabbing headache   • Temple tenderness   • TMJ click   • Fibroids       Date of Admission: 9/18/2017  Date of Discharge:  9/19/2017    Procedures:               Service:  Gynecology    Hospital Course:  Patient underwent  and remained in the hospital for 0 days.  During that time she remained afebrile and hemodynamically stable.  On the day of discharge, she was eating, ambulating and voiding without difficulty.      Labs:    Lab Results (last 24 hours)     Procedure Component Value Units Date/Time    Tissue Pathology Exam [[id number]] Collected:  09/18/17 1150    Specimen:  Tissue from Uterus with Cervix, Bilateral Tubes and Ovaries Updated:  09/18/17 1311          Discharge Medications   Sandra Auguste   Home Medication Instructions  LOREE:[id number]    Printed on:09/19/17 0577   Medication Information                      acetaminophen (TYLENOL) 500 MG tablet  Take 500 mg by mouth Every 6 (Six) Hours As Needed for Mild Pain .             albuterol (PROVENTIL HFA;VENTOLIN HFA) 108 (90 BASE) MCG/ACT inhaler  Inhale 1 puff Every 6 (Six) Hours As Needed. INHALE TWO PUFFS BY MOUTH EVERY 6 HOURS AS NEEDED             ALPRAZolam (XANAX) 0.25 MG tablet  Take 1 tablet by mouth Daily As Needed for anxiety.             buPROPion XL (WELLBUTRIN XL) 300 MG 24 hr tablet  Take 1 tablet by mouth Every Morning.             cetirizine (zyrTEC) 10 MG tablet  Take 1 tablet by mouth Daily.             diclofenac (VOLTAREN) 1 % gel gel  Apply 4 g topically 4 (Four) Times a Day As Needed (for pain in shoulders).             EPINEPHrine (EPIPEN) 0.3 MG/0.3ML solution auto-injector injection  As Needed (FOR SEVERE ALLERGIC REACTION).             flavoxATE (URISPAS) 100 MG tablet  Take 1 tablet by mouth 3 (Three) Times a Day As Needed for bladder spasms.             FLUoxetine (PROzac) 20 MG capsule  Take 1 capsule by mouth Daily.             Fluticasone Furoate (ARNUITY ELLIPTA) 100 MCG/ACT aerosol powder   Inhale 1 puff As Needed. JUST RECEIVED FROM ALLERGIST FOR SEASONAL ALLERGIES AS NEEDED             melatonin 5 MG tablet tablet  Take 10 mg by mouth Every Night.             tiZANidine (ZANAFLEX) 4 MG tablet  Take once at bedtime             topiramate (TOPAMAX) 25 MG tablet  TAKE ONE-HALF TABLET BY MOUTH IN THE EVENING             triamcinolone (KENALOG) 0.5 % cream  Apply to affected area twice daily.                 Discharge Disposition:  To Home    Discharge Condition:  Stable    Discharge Diet: regular    Activity at Discharge: pelvic rest    Follow-up Appointments  2 Weeks Post-op incision check with Dr. Re Grimaldo DO  09/19/17  8:23 AM

## 2017-09-19 NOTE — PROGRESS NOTES
Discharge Planning Assessment  [facility]     Patient Name: Sandra Auguste  MRN: [id number]  Today's Date: 9/19/2017    Admit Date: 9/18/2017          Discharge Needs Assessment       09/19/17 1028    Living Environment    Lives With parent(s)   lives with her mother    Living Arrangements house   3 level home    Home Accessibility no concerns;stairs to enter home;bed and bath are not on the first floor    Number of Stairs to Enter Home 8    Stair Railings at Home inside, present at both sides    Type of Financial/Environmental Concern none    Transportation Available car   Brother to drive    Living Environment    Provides Primary Care For parent(s)    Primary Care Provided By --   Tells me she provides care for her mother but does not have to lift or tug on her.     Quality Of Family Relationships supportive    Able to Return to Prior Living Arrangements yes    Discharge Needs Assessment    Concerns To Be Addressed no discharge needs identified;basic needs concerns    Readmission Within The Last 30 Days no previous admission in last 30 days    Anticipated Changes Related to Illness other (see comments)   Post surgical recuperation    Equipment Currently Used at Home bipap/ cpap   CPAP through [facility]    Equipment Needed After Discharge none    Discharge Disposition home or self-care    Discharge Contact Information if Applicable [phone]            Discharge Plan       09/19/17 1034    Case Management/Social Work Plan    Plan Home    Patient/Family In Agreement With Plan yes    Additional Comments Anticipates discharge to home with no discharge planning needs identified.         Discharge Placement     No information found        Expected Discharge Date and Time     Expected Discharge Date Expected Discharge Time    Sep 19, 2017               Demographic Summary       09/19/17 1023    Referral Information    Admission Type observation    Referral Source admission list    Record Reviewed medical record    Contact  Information    Permission Granted to Share Information With     Primary Care Physician Information    Name Giana Rodriguez            Functional Status       09/19/17 1023    Functional Status Current    Ambulation 0-->independent    Transferring 0-->independent    Toileting 0-->independent    Bathing 0-->independent    Dressing 0-->independent    Eating 0-->independent    Communication 0-->understands/communicates without difficulty    Change in Functional Status Since Onset of Current Illness/Injury no    Functional Status Prior    Ambulation 0-->independent    Transferring 0-->independent    Toileting 0-->independent    Bathing 0-->independent    Dressing 0-->independent    Eating 0-->independent    Communication 0-->understands/communicates without difficulty    IADL    Medications independent    Meal Preparation independent    Housekeeping independent    Laundry independent    Shopping independent    Oral Care independent    Activity Tolerance    Current Activity Limitations other (see comments)   Post surgical restrictions    Usual Activity Tolerance good    Current Activity Tolerance good    Employment/Financial    Employment/Finance Comments Has [place] [employer]. Has no concerns with obtaining medications            Psychosocial     None            Abuse/Neglect     None            Legal     None            Substance Abuse     None            Patient Forms     None          Michelle Le RN

## 2017-09-19 NOTE — PROGRESS NOTES
"Hysterectomy Progress Note      Status post  hysterectomy: Doing well postoperatively.     Continue current care.    Postpartum Day 1: RAVH-BSO    The patient feels well. . Pain is well controlled with current medications.   The patient is ambulating well. The patient is tolerating a normal diet. Patient denies  flatus.    Labs:  Lab Results (last 24 hours)     Procedure Component Value Units Date/Time    POC Pregnancy, Urine [[id number]]  (Normal) Collected:  09/18/17 0822    Specimen:  Urine Updated:  09/18/17 0823     HCG, Urine, QL Negative      03/19        Lot Number [id number]     Internal Positive Control Reactive     Internal Negative Control Test not performed    Tissue Pathology Exam [[id number]] Collected:  09/18/17 1150    Specimen:  Tissue from Uterus with Cervix, Bilateral Tubes and Ovaries Updated:  09/18/17 1311          Vital signs in last 24 hours:  Blood pressure 117/71, pulse 84, temperature 98.4 °F (36.9 °C), temperature source Oral, resp. rate 18, height 65\" (165.1 cm), weight 253 lb (115 kg), SpO2 93 %.      General:    alert, appears stated age and cooperative   Bowel Sounds:  active         7}   Incision:  healing well   DVT Evaluation:  No evidence of DVT seen on physical exam.     Assessment: POD #1 RAVH-BSO    Plan:  Discharge to home      Shannon Grimaldo DO  09/19/17  8:21 AM    "

## 2017-09-19 NOTE — PLAN OF CARE
Problem: Hysterectomy (Adult)  Goal: Signs and Symptoms of Listed Potential Problems Will be Absent or Manageable (Hysterectomy)  Outcome: Ongoing (interventions implemented as appropriate)    09/19/17 0515   Hysterectomy   Problems Assessed (Hysterectomy) all   Problems Present (Hysterectomy) none

## 2017-09-21 LAB
CYTO UR: NORMAL
LAB AP CASE REPORT: NORMAL
LAB AP CLINICAL INFORMATION: NORMAL
LAB AP DIAGNOSIS COMMENT: NORMAL
Lab: NORMAL
PATH REPORT.FINAL DX SPEC: NORMAL
PATH REPORT.GROSS SPEC: NORMAL

## 2017-10-12 ENCOUNTER — TELEPHONE (OUTPATIENT)
Dept: INTERNAL MEDICINE | Facility: CLINIC | Age: 57
End: 2017-10-12

## 2017-11-14 DIAGNOSIS — S16.1XXD STRAIN OF NECK MUSCLE, SUBSEQUENT ENCOUNTER: ICD-10-CM

## 2017-11-14 RX ORDER — TIZANIDINE 4 MG/1
TABLET ORAL
Qty: 30 TABLET | Refills: 1 | Status: SHIPPED | OUTPATIENT
Start: 2017-11-14 | End: 2018-04-09 | Stop reason: SDUPTHER

## 2017-11-29 RX ORDER — BUPROPION HYDROCHLORIDE 300 MG/1
300 TABLET ORAL EVERY MORNING
Qty: 30 TABLET | Refills: 2 | Status: SHIPPED | OUTPATIENT
Start: 2017-11-29 | End: 2018-06-14 | Stop reason: DRUGHIGH

## 2017-11-30 ENCOUNTER — OFFICE VISIT (OUTPATIENT)
Dept: INTERNAL MEDICINE | Facility: CLINIC | Age: 57
End: 2017-11-30

## 2017-11-30 VITALS
DIASTOLIC BLOOD PRESSURE: 78 MMHG | BODY MASS INDEX: 42.22 KG/M2 | HEIGHT: 65 IN | HEART RATE: 75 BPM | SYSTOLIC BLOOD PRESSURE: 102 MMHG | WEIGHT: 253.4 LBS | OXYGEN SATURATION: 99 %

## 2017-11-30 DIAGNOSIS — J40 BRONCHITIS: Primary | ICD-10-CM

## 2017-11-30 DIAGNOSIS — J01.00 ACUTE NON-RECURRENT MAXILLARY SINUSITIS: ICD-10-CM

## 2017-11-30 PROCEDURE — 99213 OFFICE O/P EST LOW 20 MIN: CPT | Performed by: NURSE PRACTITIONER

## 2017-11-30 RX ORDER — CEFDINIR 300 MG/1
300 CAPSULE ORAL 2 TIMES DAILY
Qty: 20 CAPSULE | Refills: 0 | Status: SHIPPED | OUTPATIENT
Start: 2017-11-30 | End: 2017-12-20

## 2017-11-30 RX ORDER — FLUCONAZOLE 150 MG/1
150 TABLET ORAL ONCE
Qty: 1 TABLET | Refills: 0 | Status: SHIPPED | OUTPATIENT
Start: 2017-11-30 | End: 2017-11-30

## 2017-11-30 NOTE — PROGRESS NOTES
Subjective  Nasal Congestion and Cough      Sandra Auguste is a 57 y.o. female.   Allergies   Allergen Reactions   • Mold Extract [Trichophyton] Shortness Of Breath     SOA   • Lortab [Hydrocodone-Acetaminophen] Nausea And Vomiting     SEVERE NAUSEA AND SLIGHT VOMITING   • Methylprednisolone Other (See Comments)     Bleeding, cramping   • Pollen Extract Other (See Comments)     SNEEZING AND CONGESTION      History of Present Illness      Cough, can't sleep, trying mask but coughing too hard , getting h/a she coughs so hard, using cough drops with minimal relief and mario seltzer cold w/o relief , has had cough x 3 weeks , has reported chills , has not taken temp, renan ear pain , right cheek is tender to touch   The following portions of the patient's history were reviewed and updated as appropriate: current medications, past medical history and problem list.    Review of Systems   Constitutional: Positive for chills.   HENT: Positive for ear pain, postnasal drip, rhinorrhea and sore throat.    Respiratory: Positive for cough and wheezing.    Neurological: Positive for headaches.   All other systems reviewed and are negative.      Objective   Physical Exam   Constitutional: She is oriented to person, place, and time. She appears well-developed and well-nourished.  Non-toxic appearance. No distress.   HENT:   Head: Normocephalic and atraumatic. Hair is normal.   Right Ear: External ear normal. No drainage, swelling or tenderness. Tympanic membrane is retracted.   Left Ear: External ear normal. No drainage, swelling or tenderness. Tympanic membrane is retracted.   Nose: Mucosal edema present. No epistaxis. Right sinus exhibits maxillary sinus tenderness. Left sinus exhibits maxillary sinus tenderness.   Mouth/Throat: Uvula is midline and mucous membranes are normal. No oral lesions. No uvula swelling. Posterior oropharyngeal erythema present. No oropharyngeal exudate.   Eyes: Conjunctivae are normal. Right eye exhibits no  "discharge. Left eye exhibits no discharge. No scleral icterus.   Neck: Normal range of motion. Neck supple.   Cardiovascular: Normal rate, regular rhythm and normal heart sounds.  Exam reveals no gallop.    No murmur heard.  Pulmonary/Chest: No stridor. No respiratory distress. She has no wheezes. She has rhonchi in the right lower field and the left lower field. She has no rales. She exhibits no tenderness.   Abdominal: Soft. There is no tenderness.   Lymphadenopathy:     She has cervical adenopathy.   Neurological: She is alert and oriented to person, place, and time. She exhibits normal muscle tone.   Skin: Skin is warm and dry. No rash noted. She is not diaphoretic.   Psychiatric: She has a normal mood and affect. Her behavior is normal. Judgment and thought content normal.   Nursing note and vitals reviewed.    /78  Pulse 75  Ht 65\" (165.1 cm)  Wt 253 lb 6.4 oz (115 kg)  LMP  (LMP Unknown)  SpO2 99%  BMI 42.17 kg/m2    Assessment/Plan     Problem List Items Addressed This Visit     None      Visit Diagnoses     Bronchitis    -  Primary    Relevant Medications    cefdinir (OMNICEF) 300 MG capsule    Acute non-recurrent maxillary sinusitis        Relevant Medications    cefdinir (OMNICEF) 300 MG capsule               "

## 2017-12-08 ENCOUNTER — TELEPHONE (OUTPATIENT)
Dept: INTERNAL MEDICINE | Facility: CLINIC | Age: 57
End: 2017-12-08

## 2017-12-08 NOTE — TELEPHONE ENCOUNTER
YOSSI GAVE PATIENT A SCRIPT FOR ZEFVINIR. PATIENT LOST 3 OF THE PILLS. PATIENT WAS TAKING 2 A DAY. SHE IS BE SHORT ON PILLS. PATIENT NEEDS TO KNOW IF YOSSI WANTS TO CALL MORE ANTIBIOTIC IN AS SHE IS BEING TREATED FOR BRONCHITIS AND SINUS INFECTION. YOU CAN REACH PATIENT BACK [phone]

## 2017-12-13 NOTE — TELEPHONE ENCOUNTER
PT WOULD LIKE FOR YOU TO GIVE HER A CALL... SHE DOESN'T HAVE AN APPETITE AND IS VERY TIRED ALL THE TIME    PLEASE GIVE PT A CALL.     THANKS.

## 2017-12-14 NOTE — TELEPHONE ENCOUNTER
"SPOKE WITH PT. SAYS SHE 'FEELS FATIGUED\". SAYS STARTED SINCE THE BRONCHITIS. TOLD PT TO GET REST AND DRINK PLENTY OF FLUIDS, MAY TAKE A LITTLE WHILE TO FEEL BETTER. PT VERBALIZED UNDERSTANDING.  "

## 2017-12-20 ENCOUNTER — OFFICE VISIT (OUTPATIENT)
Dept: INTERNAL MEDICINE | Facility: CLINIC | Age: 57
End: 2017-12-20

## 2017-12-20 VITALS
HEIGHT: 65 IN | WEIGHT: 250 LBS | TEMPERATURE: 99 F | DIASTOLIC BLOOD PRESSURE: 78 MMHG | HEART RATE: 99 BPM | SYSTOLIC BLOOD PRESSURE: 138 MMHG | OXYGEN SATURATION: 90 % | BODY MASS INDEX: 41.65 KG/M2

## 2017-12-20 DIAGNOSIS — J40 BRONCHITIS: Primary | ICD-10-CM

## 2017-12-20 PROCEDURE — 99213 OFFICE O/P EST LOW 20 MIN: CPT | Performed by: NURSE PRACTITIONER

## 2017-12-20 RX ORDER — MOXIFLOXACIN HYDROCHLORIDE 400 MG/1
400 TABLET ORAL DAILY
Qty: 7 TABLET | Refills: 0 | Status: SHIPPED | OUTPATIENT
Start: 2017-12-20 | End: 2017-12-21

## 2017-12-20 RX ORDER — DEXTROMETHORPHAN HYDROBROMIDE AND PROMETHAZINE HYDROCHLORIDE 15; 6.25 MG/5ML; MG/5ML
5 SYRUP ORAL 4 TIMES DAILY PRN
Qty: 120 ML | Refills: 0 | Status: SHIPPED | OUTPATIENT
Start: 2017-12-20 | End: 2018-03-29 | Stop reason: HOSPADM

## 2017-12-20 NOTE — PROGRESS NOTES
"  Chief Complaint   Patient presents with   • Cough     Pt states has cough, fever and chills and sweats states she was in here 2 weeks ago and just has not gotten any better        HPI  Sandra Auguste is a 57 y.o. female who presents has been sick for 6 weeks. Was seen 3 weeks ago & treated with omnicef. Got a little better with taking it, but never cleared up & got worse again. Now trying mario seltzer plu.  Answers for HPI/ROS submitted by the patient on 12/20/2017   Cough  Chronicity: recurrent  Onset: 1 to 4 weeks ago  Progression since onset: gradually worsening  Frequency: every few minutes  Cough characteristics: productive of sputum  ear congestion: Yes  nasal congestion: Yes  sweats: Yes  Aggravated by: cold air, lying down  Risk factors for lung disease: animal exposure, occupational exposure, smoking/tobacco exposure      [facility]  The following portions of the patient's history were reviewed and updated as appropriate: allergies, current medications, past family history, past medical history, past social history, past surgical history and problem list.    Past Medical History:   Diagnosis Date   • Anesthesia complication     HAS TROUBLE GETTING \"NUMB\"    • Anxiety    • Arthritis    • Bladder spasms    • Depression    • Diverticulosis    • Memory loss     FROM SKULL FRACTURE AND FALL-SHORT TERM MEMORY LOSS   • Mental disorder     PTSD   • Migraine    • Obesity    • Seasonal allergies    • Seizures     \"convulsions\" at age 5   • Skull fracture 2012   • Sleep apnea with use of continuous positive airway pressure (CPAP)     INSTRUCTED TO BRING OWN MASK AND TUBING    • SOB (shortness of breath) on exertion    • Wears contact lenses    • Wears partial dentures     TOP ONLY    • Wears prescription eyeglasses    • Wears prescription eyeglasses      Past Surgical History:   Procedure Laterality Date   • CERVICAL POLYPECTOMY     • TOTAL LAPAROSCOPIC HYSTERECTOMY N/A 9/18/2017    Procedure: TOTAL LAPAROSCOPIC " HYSTERECTOMY, BILATERAL SALPINGO OOPHORECTOMY WITH DAVINCI ROBOT ;  Surgeon: Shannon Grimaldo DO;  Location: [facility] OR;  Service:    • WISDOM TOOTH EXTRACTION       Patient Active Problem List    Diagnosis   • Stabbing headache [G44.85]   • Temple tenderness [R51]   • TMJ click [R29.898]   • Itching [L29.9]   • Hypertension [I10]   • SOB (shortness of breath) on exertion [R06.02]   • Sleep apnea with use of continuous positive airway pressure (CPAP) [G47.30]   • Seasonal allergies [J30.2]   • Obesity [E66.9]   • Migraine [G43.909]   • Mental disorder [F99]     Overview Note:     PTSD     • Diverticulosis [K57.90]   • Depression [F32.9]   • Arthritis [M19.90]   • Abdominal distension (gaseous) [R14.0]   • Acute pain of left shoulder [M25.512]   • Abnormal computed tomography scan [R93.8]   • Anemia [D64.9]   • Posttraumatic stress disorder [F43.10]   • Anxiety [F41.9]   • Strain of neck muscle [S16.1XXA]   • Cough [R05]   • Diarrhea [R19.7]   • Diverticulitis of colon [K57.32]   • Dog bite [W54.0XXA]     Overview Note:     Impression: 05/28/2015 - Increase augmentin to BID and continue bactroban ointment until completely resolved.  Impression: 05/18/2015 - Take augmentin as directed. Keep wound clean and dry. Apply bactroban as directed. F/u on Thursday or Friday for recheck, sooner if worsening.;      • Fatigue [R53.83]   • Steatosis of liver [K76.0]   • Lateral epicondylitis [M77.10]   • Knee pain [M25.569]     Overview Note:     Impression: 07/08/2015 - Take naproxen as directed to help with intermittent swelling and pain. Gave pt order for PT, she will call to schedule eval and treatment. Check xray. Pt will let us know if symptoms are not improving with PT over the next 4-6 weeks.;      • Spasm [R25.2]   • Adiposity [E66.9]   • Obstructive sleep apnea syndrome [G47.33]   • Osteoarthritis [M19.90]   • Palpitations [R00.2]   • Nausea [R11.0]   • Shortness of breath [R06.02]   • Skin tag [L91.8]   • Candidiasis of  vagina [B37.3]     Overview Note:     Impression: 05/28/2015 - Take diflucan as directed.;      • Viral upper respiratory tract infection [J06.9, B97.89]   • Gastroesophageal reflux disease [K21.9]     Social History   Substance Use Topics   • Smoking status: Never Smoker   • Smokeless tobacco: Never Used   • Alcohol use No      Comment: ONCE A YEAR ON XMAS, IF THAT     Current Outpatient Prescriptions on File Prior to Visit   Medication Sig Dispense Refill   • acetaminophen (TYLENOL) 500 MG tablet Take 500 mg by mouth Every 6 (Six) Hours As Needed for Mild Pain .     • albuterol (PROVENTIL HFA;VENTOLIN HFA) 108 (90 BASE) MCG/ACT inhaler Inhale 1 puff Every 6 (Six) Hours As Needed. INHALE TWO PUFFS BY MOUTH EVERY 6 HOURS AS NEEDED     • ALPRAZolam (XANAX) 0.25 MG tablet Take 1 tablet by mouth Daily As Needed for anxiety. 30 tablet 0   • buPROPion XL (WELLBUTRIN XL) 300 MG 24 hr tablet Take 1 tablet by mouth Every Morning. 30 tablet 2   • cetirizine (zyrTEC) 10 MG tablet Take 1 tablet by mouth Daily. 30 tablet 6   • diclofenac (VOLTAREN) 1 % gel gel Apply 4 g topically 4 (Four) Times a Day As Needed (for pain in shoulders). 100 g 1   • EPINEPHrine (EPIPEN) 0.3 MG/0.3ML solution auto-injector injection As Needed (FOR SEVERE ALLERGIC REACTION).     • flavoxATE (URISPAS) 100 MG tablet Take 1 tablet by mouth 3 (Three) Times a Day As Needed for bladder spasms. 30 tablet 2   • FLUoxetine (PROzac) 20 MG capsule Take 1 capsule by mouth Daily. (Patient taking differently: Take 40 mg by mouth Daily.) 30 capsule 2   • Fluticasone Furoate (ARNUITY ELLIPTA) 100 MCG/ACT aerosol powder  Inhale 1 puff As Needed. JUST RECEIVED FROM ALLERGIST FOR SEASONAL ALLERGIES AS NEEDED     • melatonin 5 MG tablet tablet Take 10 mg by mouth Every Night.     • ondansetron (ZOFRAN) 4 MG tablet Take 1 tablet by mouth Every 6 (Six) Hours As Needed for Nausea or Vomiting. 20 tablet 1   • tiZANidine (ZANAFLEX) 4 MG tablet TAKE ONE TABLET BY MOUTH AT  "BEDTIME 30 tablet 1   • topiramate (TOPAMAX) 25 MG tablet TAKE ONE-HALF TABLET BY MOUTH IN THE EVENING 15 tablet 2   • triamcinolone (KENALOG) 0.5 % cream Apply to affected area twice daily. (Patient taking differently: Apply 1 application topically 2 (Two) Times a Day. Apply to affected area twice daily.) 15 g 0     No current facility-administered medications on file prior to visit.          Review of Systems   Constitutional: Positive for chills, fatigue and fever.   HENT: Positive for congestion, ear pain, facial swelling, postnasal drip, rhinorrhea, sinus pressure and sore throat.    Respiratory: Positive for cough and wheezing. Negative for shortness of breath.    Cardiovascular: Negative.    Gastrointestinal: Negative.    Skin: Negative.    Neurological: Positive for headaches.   All other systems reviewed and are negative.          Objective   Blood pressure 138/78, pulse 99, temperature 99 °F (37.2 °C), height 165.1 cm (65\"), weight 113 kg (250 lb), SpO2 90 %.  Body mass index is 41.6 kg/(m^2).      Physical Exam   Constitutional: She is oriented to person, place, and time. She appears well-developed and well-nourished. No distress.   HENT:   Right Ear: Tympanic membrane and ear canal normal.   Left Ear: Tympanic membrane and ear canal normal.   Mouth/Throat: Oropharynx is clear and moist.   Cardiovascular: Normal rate, regular rhythm and normal heart sounds.    No murmur heard.  Pulmonary/Chest: No respiratory distress. She has no decreased breath sounds. She has wheezes in the right upper field, the right middle field and the left upper field. She has rhonchi in the right upper field and the left upper field.   Musculoskeletal: She exhibits no edema.   Neurological: She is alert and oriented to person, place, and time.   Skin: Skin is warm and dry.   Psychiatric: She has a normal mood and affect. Her behavior is normal.   Vitals reviewed.            ASSESSMENT/PLAN  1. Bronchitis      - " promethazine-dextromethorphan (PROMETHAZINE-DM) 6.25-15 MG/5ML syrup; Take 5 mL by mouth 4 (Four) Times a Day As Needed for Cough.  Dispense: 120 mL; Refill: 0  - levoFLOXacin (LEVAQUIN) 500 MG tablet; Take 1 tablet by mouth Daily for 7 days.  Dispense: 7 tablet; Refill: 0        Plan of care reviewed with patient at the conclusion of today's visit. Education was provided in regards to diagnosis, management and any prescribed or recommended OTC medications.  Patient verbalizes Understanding of and agreement with management plan.      FOLLOW-UP  Return if symptoms worsen or fail to improve.      No future appointments.      Electronically signed by:  REBECA Church  12/20/2017Answers for HPI/ROS submitted by the patient on 12/20/2017

## 2017-12-21 ENCOUNTER — TELEPHONE (OUTPATIENT)
Dept: INTERNAL MEDICINE | Facility: CLINIC | Age: 57
End: 2017-12-21

## 2017-12-21 RX ORDER — LEVOFLOXACIN 500 MG/1
500 TABLET, FILM COATED ORAL DAILY
Qty: 7 TABLET | Refills: 0 | Status: SHIPPED | OUTPATIENT
Start: 2017-12-21 | End: 2017-12-28

## 2018-01-18 ENCOUNTER — TELEPHONE (OUTPATIENT)
Dept: INTERNAL MEDICINE | Facility: CLINIC | Age: 58
End: 2018-01-18

## 2018-01-18 NOTE — TELEPHONE ENCOUNTER
PATIENT WANTS TO START ON NUTRASYSTEM. SHE WANTS TO KNOW WHAT OUR OPINION ABOUT NUTRASYSTEM. SHE WANTS TO BE ON A DIET WHERE SHE CAN EAT GRAPE FRUIT AND WANTS TO MAKE SURE GRAPE FRUIT AND NUTRASYSTEM WILL NOT AFFECT ANY MEDICATIONS SHE IS TAKING. YOU CAN REACH HER BACK -[phone]

## 2018-01-22 NOTE — TELEPHONE ENCOUNTER
I do not know specifically what is in the nutrisystem products so I cannot say whether or not it is okay. She should make sure the packaged food does not have a lot of sodium- could increase her blood pressure.    Also, pharmacist could better determine if grapefruit will interact with any of her medications.

## 2018-03-27 DIAGNOSIS — F41.9 ANXIETY: ICD-10-CM

## 2018-03-27 NOTE — TELEPHONE ENCOUNTER
PT WOULD LIKE HER XANAX FILLED. SHE HAS HAD A DEATH IN THE FAMILY AND SAYS SHE DOES NOT HANDLE DEATH VERY WELL. I DID HOWEVER INFORM HER THAT [facility] MAY WANT THERE TO COME IN SINCE SHE HASN'T SEEN PT SINCE 11/2017

## 2018-03-27 NOTE — TELEPHONE ENCOUNTER
PATIENT NEEDS A LETTER TO BE SENT TO  SO THAT THEY WILL NOT SHUT OFF HER LIGHTS. THE PATIENT STATES THAT DR FORMAN HAS WROTE A LETTER FOR HER IN THE PASS. THE PATIENT'S ACCOUNT NUMBER FOR HER  IS #[phone]     PHONE NUMBER [phone] IF THERE IS ANY QUESTIONS OR CONCERNS THE PATIENT CAN BE REACHED [phone]

## 2018-03-28 RX ORDER — ALPRAZOLAM 0.25 MG/1
0.25 TABLET ORAL DAILY PRN
Qty: 30 TABLET | Refills: 0 | OUTPATIENT
Start: 2018-03-28

## 2018-03-29 ENCOUNTER — OFFICE VISIT (OUTPATIENT)
Dept: INTERNAL MEDICINE | Facility: CLINIC | Age: 58
End: 2018-03-29

## 2018-03-29 VITALS
HEIGHT: 66 IN | BODY MASS INDEX: 40.02 KG/M2 | OXYGEN SATURATION: 99 % | HEART RATE: 83 BPM | DIASTOLIC BLOOD PRESSURE: 76 MMHG | SYSTOLIC BLOOD PRESSURE: 128 MMHG | WEIGHT: 249 LBS

## 2018-03-29 DIAGNOSIS — F41.9 ANXIETY: ICD-10-CM

## 2018-03-29 DIAGNOSIS — Z79.899 HIGH RISK MEDICATION USE: Primary | ICD-10-CM

## 2018-03-29 PROCEDURE — 99213 OFFICE O/P EST LOW 20 MIN: CPT | Performed by: NURSE PRACTITIONER

## 2018-03-29 RX ORDER — PHENOL 1.4 %
AEROSOL, SPRAY (ML) MUCOUS MEMBRANE
COMMUNITY
Start: 2017-07-01 | End: 2020-07-20

## 2018-03-29 NOTE — PROGRESS NOTES
"Subjective  Anxiety (Follow up)      Sandra Auguste is a 57 y.o. female.   Allergies   Allergen Reactions   • Mold Extract [Trichophyton] Shortness Of Breath, Anxiety, Irritability, Other (See Comments) and Palpitations     SOA   • Lortab [Hydrocodone-Acetaminophen] Nausea And Vomiting     SEVERE NAUSEA AND SLIGHT VOMITING   • Methylprednisolone Other (See Comments)     Bleeding, cramping   • Pollen Extract Other (See Comments)     SNEEZING AND CONGESTION      History of Present Illness      Close family friend and family member dies this past week , is very stressed, everything hectic , does not use xanax a lot but it is gone now, ( has 3 pills left) knows she is going to need something during this time  The following portions of the patient's history were reviewed and updated as appropriate: allergies, current medications, past surgical history and problem list.    Review of Systems   Psychiatric/Behavioral: The patient is nervous/anxious.    All other systems reviewed and are negative.      Objective   Physical Exam   Constitutional: She is oriented to person, place, and time. She appears well-developed and well-nourished.   HENT:   Head: Normocephalic and atraumatic.   Eyes: Conjunctivae are normal.   Cardiovascular: Normal rate and regular rhythm.    Pulmonary/Chest: Effort normal and breath sounds normal.   Neurological: She is alert and oriented to person, place, and time.   Skin: Skin is warm and dry.   Psychiatric: She has a normal mood and affect. Her behavior is normal. Judgment and thought content normal.   Nursing note and vitals reviewed.    /76   Pulse 83   Ht 167 cm (65.75\")   Wt 113 kg (249 lb)   LMP  (LMP Unknown)   SpO2 99%   BMI 40.50 kg/m²     Assessment/Plan     Problem List Items Addressed This Visit        Other    Anxiety     Will get uds today  The patient has read and signed the [facility] Controlled Substance Contract.  I will continue to see patient for regular follow up " appointments.  They are well controlled on their medication.  SHANTEL has been reviewed by me and is updated every 3 months. The patient is aware of the potential for addiction and dependence.  , paper rx given, printer broken  Xanax 0.25 mg one po qd pen #30, nr           Other Visit Diagnoses     High risk medication use    -  Primary    Relevant Orders    Urine Drug Screen - Urine, Clean Catch

## 2018-03-29 NOTE — ASSESSMENT & PLAN NOTE
Will get uds today  The patient has read and signed the [facility] Controlled Substance Contract.  I will continue to see patient for regular follow up appointments.  They are well controlled on their medication.  SHANTEL has been reviewed by me and is updated every 3 months. The patient is aware of the potential for addiction and dependence.  , paper rx given, printer broken  Xanax 0.25 mg one po qd pen #30, nr

## 2018-04-05 ENCOUNTER — TELEPHONE (OUTPATIENT)
Dept: INTERNAL MEDICINE | Facility: CLINIC | Age: 58
End: 2018-04-05

## 2018-04-05 NOTE — TELEPHONE ENCOUNTER
PT WOULD LIKE TO KNOW IF SHE CAN EITHER HAVE HER MEDICATION ADJUSTED OR IF SHE CAN HAVE SOMETHING TO HELP WITH MAJOR ANXIETY ATTACKS.   SHE HAS RECENTLY HAD AN  INCREASE IN THESE ATTACKS . IT HAS BEEN TAKING HER ABOUT A WEEK TO GET OVER THEM.  LAST June SHE WAS DIAGNOSED WITH CHEST WALL PAIN.

## 2018-04-05 NOTE — TELEPHONE ENCOUNTER
Spoke with pt and advised we did have UDS. She asked if everything was ok. I advised her of results and asked if she stopped taking her medication. She verbalized that yes it doesn't really help. She stated she only takes it when she has small anxiety attacks, when she gets the attack that hurts her chest wall, the xanax doesn't do any good. I advised her we needed to get her in to see Michelle tomorrow per Giana and she verbalized understanding. Appt was created for 1:15pm 4/6/18 so pt can discuss what's going on. UDS was scanned into chart and is now in the media file. She thanked our office and we ended the call.

## 2018-04-05 NOTE — TELEPHONE ENCOUNTER
Pt states she left a sample on 3/29/18. Pt also wanted me to let you know the medication she is requesting is for the chest wall pain she is having because of the attacks. I will check with lab and see if sample was sent off, please advise regarding medication?

## 2018-04-06 ENCOUNTER — OFFICE VISIT (OUTPATIENT)
Dept: INTERNAL MEDICINE | Facility: CLINIC | Age: 58
End: 2018-04-06

## 2018-04-06 VITALS
SYSTOLIC BLOOD PRESSURE: 112 MMHG | WEIGHT: 248 LBS | DIASTOLIC BLOOD PRESSURE: 76 MMHG | OXYGEN SATURATION: 99 % | HEART RATE: 89 BPM | BODY MASS INDEX: 40.33 KG/M2

## 2018-04-06 DIAGNOSIS — R07.89 CHEST WALL PAIN: Primary | ICD-10-CM

## 2018-04-06 DIAGNOSIS — F41.9 ANXIETY: ICD-10-CM

## 2018-04-06 PROCEDURE — 99213 OFFICE O/P EST LOW 20 MIN: CPT | Performed by: PHYSICIAN ASSISTANT

## 2018-04-06 RX ORDER — NAPROXEN 500 MG/1
500 TABLET ORAL 2 TIMES DAILY WITH MEALS
Qty: 30 TABLET | Refills: 1 | Status: SHIPPED | OUTPATIENT
Start: 2018-04-06 | End: 2018-06-14 | Stop reason: SDUPTHER

## 2018-04-06 NOTE — PROGRESS NOTES
Chief Complaint   Patient presents with   • Anxiety, chest wall pain episode twice in past year       Subjective   Sandra Auguste is a 57 y.o. female.       History of Present Illness     Pt has anxiety and PTSD. Had a severe anxiety attack that caused chest wall pain last year. Was very scary for her and resulted in a visit to [facility] with negative cardiac work up. She was treated with 4 aspirin.    She had a similar experience this past weekend with anxiety attack, shortness of breath and chest wall pain. Spoke with 24 hr nurse on the phone and she suggested pt return to clinic to request a stronger dose of something for severe anxiety attacks. Feels like it takes her almost a week to get over one of her severe attacks. She has had some recent stress that is likely the cause of her increased anxiety. Had to go to a  the day prior and has stress with family members. She did have a good counselor who was helping with her PTSD but counselor has left.    She continues to have some residual chest wall pain and is not sure what she can take to help it resolve. Has muscle relaxer      Current Outpatient Prescriptions:   •  acetaminophen (TYLENOL) 500 MG tablet, Take 500 mg by mouth Every 6 (Six) Hours As Needed for Mild Pain ., Disp: , Rfl:   •  albuterol (PROVENTIL HFA;VENTOLIN HFA) 108 (90 BASE) MCG/ACT inhaler, Inhale 1 puff Every 6 (Six) Hours As Needed. INHALE TWO PUFFS BY MOUTH EVERY 6 HOURS AS NEEDED, Disp: , Rfl:   •  ALPRAZolam (XANAX) 0.25 MG tablet, Take 1 tablet by mouth Daily As Needed for anxiety., Disp: 30 tablet, Rfl: 0  •  AZELASTINE-FLUTICASONE NA, , Disp: , Rfl:   •  buPROPion XL (WELLBUTRIN XL) 300 MG 24 hr tablet, Take 1 tablet by mouth Every Morning., Disp: 30 tablet, Rfl: 2  •  cetirizine (zyrTEC) 10 MG tablet, Take 1 tablet by mouth Daily., Disp: 30 tablet, Rfl: 6  •  diclofenac (VOLTAREN) 1 % gel gel, Apply 4 g topically 4 (Four) Times a Day As Needed (for pain in shoulders)., Disp: 100  g, Rfl: 1  •  EPINEPHrine (EPIPEN) 0.3 MG/0.3ML solution auto-injector injection, As Needed (FOR SEVERE ALLERGIC REACTION)., Disp: , Rfl:   •  FLUoxetine (PROzac) 20 MG capsule, Take 1 capsule by mouth Daily. (Patient taking differently: Take 40 mg by mouth Daily.), Disp: 30 capsule, Rfl: 2  •  Fluticasone Furoate (ARNUITY ELLIPTA) 100 MCG/ACT aerosol powder , Inhale 1 puff As Needed. JUST RECEIVED FROM ALLERGIST FOR SEASONAL ALLERGIES AS NEEDED, Disp: , Rfl:   •  Melatonin 10 MG tablet, , Disp: , Rfl:   •  melatonin 5 MG tablet tablet, Take 10 mg by mouth Every Night., Disp: , Rfl:   •  Multiple Vitamins-Calcium (ONE-A-DAY WOMENS PO), , Disp: , Rfl:   •  tiZANidine (ZANAFLEX) 4 MG tablet, TAKE ONE TABLET BY MOUTH AT BEDTIME, Disp: 30 tablet, Rfl: 1  •  topiramate (TOPAMAX) 25 MG tablet, TAKE ONE-HALF TABLET BY MOUTH IN THE EVENING, Disp: 15 tablet, Rfl: 2  •  naproxen (NAPROSYN) 500 MG tablet, Take 1 tablet by mouth 2 (Two) Times a Day With Meals., Disp: 30 tablet, Rfl: 1     PMFSH  The following portions of the patient's history were reviewed and updated as appropriate: allergies, current medications, past family history, past medical history, past social history, past surgical history and problem list.    Review of Systems   Constitutional: Negative for chills, fever and unexpected weight change.   HENT: Negative.    Eyes: Negative for pain and visual disturbance.   Respiratory: Negative for chest tightness and shortness of breath.    Cardiovascular: Negative for chest pain.   Gastrointestinal: Negative for abdominal pain and blood in stool.   Endocrine: Negative.    Genitourinary: Negative.    Musculoskeletal: Negative for joint swelling.   Skin: Negative for color change, rash and wound.   Allergic/Immunologic: Negative.    Neurological: Negative for syncope and speech difficulty.   Hematological: Negative for adenopathy.   Psychiatric/Behavioral: Positive for decreased concentration. Negative for confusion,  hallucinations and suicidal ideas. The patient is nervous/anxious.        Objective   /76   Pulse 89   Wt 112 kg (248 lb)   LMP  (LMP Unknown)   SpO2 99%   BMI 40.33 kg/m²     Physical Exam   Constitutional: She is oriented to person, place, and time. She appears well-developed and well-nourished. No distress.   HENT:   Head: Normocephalic.   Eyes: Conjunctivae and EOM are normal. Pupils are equal, round, and reactive to light.   Neck: Normal range of motion. Neck supple.   Cardiovascular: Normal rate, regular rhythm and normal heart sounds.    No murmur heard.  Pulmonary/Chest: Effort normal and breath sounds normal.   Musculoskeletal: Normal range of motion.   Neurological: She is alert and oriented to person, place, and time.   Skin: Skin is warm and dry. No rash noted. She is not diaphoretic.   Psychiatric: Her behavior is normal. Judgment and thought content normal. Her affect is not inappropriate. She is not actively hallucinating. Cognition and memory are normal.   Nursing note and vitals reviewed.      Results for orders placed or performed during the hospital encounter of 02/14/18   POCT Rapid Strep A   Result Value Ref Range    Rapid Strep A Screen Negative Negative, VALID, INVALID, Not Performed    Internal Control Passed Passed    Lot Number [id number]     Expiration Date 2019-05-31         ASSESSMENT/PLAN    Problem List Items Addressed This Visit        Other    Anxiety     Follow up with therapist and psychiatrist. Discussed that she could use 2 of 0.25 mg xanax or rare occasions that she has one of her severe panic attacks.            Other Visit Diagnoses     Chest wall pain    -  Primary    Start naproxen BID for residual chest wall pain.    Relevant Medications    naproxen (NAPROSYN) 500 MG tablet               Return for Next scheduled follow up.

## 2018-04-09 DIAGNOSIS — S16.1XXD STRAIN OF NECK MUSCLE, SUBSEQUENT ENCOUNTER: ICD-10-CM

## 2018-04-09 NOTE — ASSESSMENT & PLAN NOTE
Follow up with therapist and psychiatrist. Discussed that she could use 2 of 0.25 mg xanax or rare occasions that she has one of her severe panic attacks.

## 2018-04-10 RX ORDER — TIZANIDINE 4 MG/1
TABLET ORAL
Qty: 30 TABLET | Refills: 1 | Status: SHIPPED | OUTPATIENT
Start: 2018-04-10 | End: 2018-07-26 | Stop reason: SDUPTHER

## 2018-05-15 ENCOUNTER — TELEPHONE (OUTPATIENT)
Dept: INTERNAL MEDICINE | Facility: CLINIC | Age: 58
End: 2018-05-15

## 2018-05-15 NOTE — TELEPHONE ENCOUNTER
PT CALLING TO LEAVE MESSAGE FOR AVAILABLE MA OF [facility]. THE PT RECENTLY HAD CT SCANS OF THE CHEST & ECHOCARDIOGRAM PERFORMED. THE PT WAS WANTING TO DISCUSS THE RESULTS AND HAD A FEW QUESTIONS. BEST CALL BACK # [phone]

## 2018-05-21 NOTE — TELEPHONE ENCOUNTER
Pt was given results of CT but she said on her echo she noticed that it said that she had an irregular heartbeat. Pt wants to know if she should be concerned and if there are any restrictions in her daily activities?

## 2018-06-14 ENCOUNTER — OFFICE VISIT (OUTPATIENT)
Dept: INTERNAL MEDICINE | Facility: CLINIC | Age: 58
End: 2018-06-14

## 2018-06-14 VITALS
WEIGHT: 250 LBS | OXYGEN SATURATION: 98 % | BODY MASS INDEX: 40.66 KG/M2 | DIASTOLIC BLOOD PRESSURE: 76 MMHG | SYSTOLIC BLOOD PRESSURE: 120 MMHG | HEART RATE: 94 BPM

## 2018-06-14 DIAGNOSIS — S16.1XXD STRAIN OF NECK MUSCLE, SUBSEQUENT ENCOUNTER: ICD-10-CM

## 2018-06-14 DIAGNOSIS — F41.9 ANXIETY: Primary | ICD-10-CM

## 2018-06-14 DIAGNOSIS — R07.89 CHEST WALL PAIN: ICD-10-CM

## 2018-06-14 PROCEDURE — 99214 OFFICE O/P EST MOD 30 MIN: CPT | Performed by: NURSE PRACTITIONER

## 2018-06-14 RX ORDER — BUPROPION HYDROCHLORIDE 150 MG/1
150 TABLET ORAL DAILY
Qty: 30 TABLET | Refills: 1 | Status: SHIPPED | OUTPATIENT
Start: 2018-06-14 | End: 2018-08-23

## 2018-06-14 RX ORDER — NAPROXEN 500 MG/1
500 TABLET ORAL 2 TIMES DAILY WITH MEALS
Qty: 30 TABLET | Refills: 1 | Status: SHIPPED | OUTPATIENT
Start: 2018-06-14 | End: 2020-07-31

## 2018-06-14 NOTE — PROGRESS NOTES
"CHIEF COMPLAINT  Anxiety (Follow Up) and Chest Pain      HPI  Sandra Auguste is a 57 y.o. female is here today for follow-up regarding anxiety, neck pain, and tenderness in chest;     Anxiety  c/o been feeling foggy-brained; she thinks it may be wellbutrin; also states she does not feel it is working very well for her PTSD; she would like to wean off this medication; she also concurrently takes Prozac 40 mg daily which works well.    Pain in muscles of chest  C/o pain in muscles of chest; hurts to take deep breath; has had similar sx in the past and was treated with NSAIDS; REBECA Duncan ordered CT of chest in May--result was normal w/o abnormality    Neck pain  C/o pain in back of neck; has strained her neck muscles before; denies decreased ROM, but feels tight and sore to touch; denies injury; no h/a, or related sx        Past Medical History:   Diagnosis Date   • Anesthesia complication     HAS TROUBLE GETTING \"NUMB\"    • Anxiety    • Arthritis    • Bladder spasms    • Depression    • Diverticulosis    • Memory loss     FROM SKULL FRACTURE AND FALL-SHORT TERM MEMORY LOSS   • Mental disorder     PTSD   • Migraine    • Obesity    • Seasonal allergies    • Seizures (CMS/HCC)     \"convulsions\" at age 5   • Skull fracture (CMS/HCC) 2012   • Sleep apnea with use of continuous positive airway pressure (CPAP)     INSTRUCTED TO BRING OWN MASK AND TUBING    • SOB (shortness of breath) on exertion    • Wears contact lenses    • Wears partial dentures     TOP ONLY    • Wears prescription eyeglasses    • Wears prescription eyeglasses        Past Surgical History:   Procedure Laterality Date   • CERVICAL POLYPECTOMY     • TOTAL LAPAROSCOPIC HYSTERECTOMY N/A 9/18/2017    Procedure: TOTAL LAPAROSCOPIC HYSTERECTOMY, BILATERAL SALPINGO OOPHORECTOMY WITH DAVINCI ROBOT ;  Surgeon: Shannon Grimaldo DO;  Location: [facility] OR;  Service:    • WISDOM TOOTH EXTRACTION         Family History   Problem Relation Age of Onset   • Cancer Mother "    • Cancer Maternal Aunt    • Cancer Paternal Aunt        Social History     Social History   • Marital status: Single     Spouse name: N/A   • Number of children: N/A   • Years of education: N/A     Occupational History   • Not on file.     Social History Main Topics   • Smoking status: Never Smoker   • Smokeless tobacco: Never Used   • Alcohol use No      Comment: ONCE A YEAR ON XMAS, IF THAT   • Drug use: No   • Sexual activity: No     Other Topics Concern   • Not on file     Social History Narrative           The following portions of the patient's history were reviewed and updated as appropriate: allergies, current medications, past family history, past medical history, past social history, past surgical history and problem list.    ROS  Review of Systems   Constitutional: Negative for activity change, appetite change, fatigue and fever.   Respiratory: Negative for chest tightness, shortness of breath and wheezing.    Cardiovascular: Negative for chest pain, palpitations and leg swelling.   Endocrine: Negative for cold intolerance, heat intolerance, polydipsia, polyphagia and polyuria.   Musculoskeletal: Positive for myalgias and neck pain.   Neurological: Negative for dizziness and headaches.   Psychiatric/Behavioral: The patient is nervous/anxious.    All other systems reviewed and are negative.      /76   Pulse 94   Wt 113 kg (250 lb)   LMP  (LMP Unknown)   SpO2 98%   BMI 40.66 kg/m²     PHYSICAL EXAM  Physical Exam   Constitutional: She is oriented to person, place, and time. She appears well-developed and well-nourished.   HENT:   Head: Normocephalic and atraumatic.   Eyes: Conjunctivae are normal.   Neck: Trachea normal and normal range of motion. Neck supple. No JVD present. No thyromegaly present.   Cardiovascular: Normal rate, regular rhythm and normal heart sounds.    No murmur heard.  No swelling in BLE   Pulmonary/Chest: Effort normal and breath sounds normal. She exhibits  tenderness. She exhibits no mass and no swelling.   Musculoskeletal:        Cervical back: She exhibits spasm. She exhibits normal range of motion, no tenderness, no swelling and no pain.   Neurological: She is alert and oriented to person, place, and time.   Skin: Skin is warm, dry and intact.   Vitals reviewed.        ASSESSMENT/PLAN    1. Strain of neck muscle, subsequent encounter  -use gel for muscle spasm  - diclofenac (VOLTAREN) 1 % gel gel; Apply 4 g topically 4 (Four) Times a Day As Needed (for pain in shoulders).  Dispense: 100 g; Refill: 1  -may also continue tizanidine for spasm    2. Anxiety  -decrease dose from 300 mg daily to 150 mg daily x 1 month, then discontinue  - buPROPion XL (WELLBUTRIN XL) 150 MG 24 hr tablet; Take 1 tablet by mouth Daily.  Dispense: 30 tablet; Refill: 1  -if withdrawal sx occur, notify office    3. Chest wall pain  -NSAIDS for tenderness in chest muscles  - naproxen (NAPROSYN) 500 MG tablet; Take 1 tablet by mouth 2 (Two) Times a Day With Meals.  Dispense: 30 tablet; Refill: 1  -if no improvement f/u sooner than 3 months      Plan of care reviewed with patient at the conclusion of today's visit. Education was provided in regards to diagnosis, management and any prescribed or recommended OTC medications.  Patient verbalizes Understanding of and agreement with management plan.    FOLLOW-UP  3 month(s) annual physical    RTC as needed      Barb Diaz, REBECA  06/14/2018

## 2018-06-25 ENCOUNTER — TELEPHONE (OUTPATIENT)
Dept: INTERNAL MEDICINE | Facility: CLINIC | Age: 58
End: 2018-06-25

## 2018-06-25 NOTE — TELEPHONE ENCOUNTER
Pt is having issues paying her bill to [employer].  [employer] sent us a waiver for the bill by fax. She needs this done as quickly as possible.   Please call [phone]

## 2018-07-05 ENCOUNTER — PRIOR AUTHORIZATION (OUTPATIENT)
Dept: INTERNAL MEDICINE | Facility: CLINIC | Age: 58
End: 2018-07-05

## 2018-07-06 ENCOUNTER — OFFICE VISIT (OUTPATIENT)
Dept: INTERNAL MEDICINE | Facility: CLINIC | Age: 58
End: 2018-07-06

## 2018-07-06 VITALS
WEIGHT: 251 LBS | OXYGEN SATURATION: 97 % | DIASTOLIC BLOOD PRESSURE: 74 MMHG | HEART RATE: 84 BPM | HEIGHT: 66 IN | SYSTOLIC BLOOD PRESSURE: 116 MMHG | BODY MASS INDEX: 40.34 KG/M2

## 2018-07-06 DIAGNOSIS — F33.41 RECURRENT MAJOR DEPRESSIVE DISORDER, IN PARTIAL REMISSION (HCC): ICD-10-CM

## 2018-07-06 DIAGNOSIS — G43.909 MIGRAINE WITHOUT STATUS MIGRAINOSUS, NOT INTRACTABLE, UNSPECIFIED MIGRAINE TYPE: Primary | ICD-10-CM

## 2018-07-06 PROCEDURE — 99213 OFFICE O/P EST LOW 20 MIN: CPT | Performed by: NURSE PRACTITIONER

## 2018-07-06 RX ORDER — AMITRIPTYLINE HYDROCHLORIDE 25 MG/1
25 TABLET, FILM COATED ORAL NIGHTLY
Qty: 30 TABLET | Refills: 0 | Status: SHIPPED | OUTPATIENT
Start: 2018-07-06 | End: 2018-07-26 | Stop reason: DRUGHIGH

## 2018-07-06 NOTE — PROGRESS NOTES
"CHIEF COMPLAINT  Anxiety (Follow Up, Pt needs refill on Prozac)      HPI  Sandra Auguste is a 57 y.o. female is here today for follow-up for feeling foggy headed; did decrease fluoxetine from 40 mg to 20 mg; did see her MHNP,       Past Medical History:   Diagnosis Date   • Anesthesia complication     HAS TROUBLE GETTING \"NUMB\"    • Anxiety    • Arthritis    • Bladder spasms    • Depression    • Diverticulosis    • Memory loss     FROM SKULL FRACTURE AND FALL-SHORT TERM MEMORY LOSS   • Mental disorder     PTSD   • Migraine    • Obesity    • Seasonal allergies    • Seizures (CMS/HCC)     \"convulsions\" at age 5   • Skull fracture (CMS/HCC) 2012   • Sleep apnea with use of continuous positive airway pressure (CPAP)     INSTRUCTED TO BRING OWN MASK AND TUBING    • SOB (shortness of breath) on exertion    • Wears contact lenses    • Wears partial dentures     TOP ONLY    • Wears prescription eyeglasses    • Wears prescription eyeglasses        Past Surgical History:   Procedure Laterality Date   • CERVICAL POLYPECTOMY     • TOTAL LAPAROSCOPIC HYSTERECTOMY N/A 9/18/2017    Procedure: TOTAL LAPAROSCOPIC HYSTERECTOMY, BILATERAL SALPINGO OOPHORECTOMY WITH DAVINCI ROBOT ;  Surgeon: Shannon Grimaldo DO;  Location: [facility] OR;  Service:    • WISDOM TOOTH EXTRACTION         Family History   Problem Relation Age of Onset   • Cancer Mother    • Cancer Maternal Aunt    • Cancer Paternal Aunt        Social History     Social History   • Marital status: Single     Spouse name: N/A   • Number of children: N/A   • Years of education: N/A     Occupational History   • Not on file.     Social History Main Topics   • Smoking status: Never Smoker   • Smokeless tobacco: Never Used   • Alcohol use No      Comment: ONCE A YEAR ON XMAS, IF THAT   • Drug use: No   • Sexual activity: No     Other Topics Concern   • Not on file     Social History Narrative           The following portions of the patient's history were reviewed and updated as " "appropriate: allergies, current medications, past family history, past medical history, past social history, past surgical history and problem list.    ROS  Review of Systems   Constitutional: Positive for fatigue.   Psychiatric/Behavioral: The patient is nervous/anxious.    All other systems reviewed and are negative.      /74   Pulse 84   Ht 167 cm (65.75\")   Wt 114 kg (251 lb)   LMP  (LMP Unknown)   SpO2 97%   Breastfeeding? No   BMI 40.82 kg/m²     PHYSICAL EXAM  Physical Exam   Constitutional: She appears well-developed and well-nourished.   HENT:   Head: Normocephalic and atraumatic.   Psychiatric: Her speech is normal and behavior is normal. Her mood appears anxious. She exhibits a depressed mood.   Vitals reviewed.      No results found for: HGBA1C    Lab Results   Component Value Date    TSH 1.748 08/10/2017       Results for orders placed or performed during the hospital encounter of 02/14/18   POCT Rapid Strep A   Result Value Ref Range    Rapid Strep A Screen Negative Negative, VALID, INVALID, Not Performed    Internal Control Passed Passed    Lot Number [id number]     Expiration Date 2019-05-31        ASSESSMENT/PLAN    1. Recurrent major depressive disorder, in partial remission (CMS/HCC)      2. Migraine without status migrainosus, not intractable, unspecified migraine type    - amitriptyline (ELAVIL) 25 MG tablet; Take 1 tablet by mouth Every Night.  Dispense: 30 tablet; Refill: 0      Plan of care reviewed with patient at the conclusion of today's visit. Education was provided in regards to diagnosis, management and any prescribed or recommended OTC medications.  Patient verbalizes Understanding of and agreement with management plan.    FOLLOW-UP  2 week(s)    RTC as needed      REBECA Joshi  07/06/2018      "

## 2018-07-18 ENCOUNTER — LAB (OUTPATIENT)
Dept: LAB | Facility: HOSPITAL | Age: 58
End: 2018-07-18

## 2018-07-18 ENCOUNTER — TRANSCRIBE ORDERS (OUTPATIENT)
Dept: LAB | Facility: HOSPITAL | Age: 58
End: 2018-07-18

## 2018-07-18 DIAGNOSIS — J30.89 NON-SEASONAL ALLERGIC RHINITIS, UNSPECIFIED CHRONICITY, UNSPECIFIED TRIGGER: ICD-10-CM

## 2018-07-18 DIAGNOSIS — J45.30 MILD PERSISTENT ASTHMA, UNSPECIFIED WHETHER COMPLICATED: ICD-10-CM

## 2018-07-18 DIAGNOSIS — J45.30 MILD PERSISTENT ASTHMA, UNSPECIFIED WHETHER COMPLICATED: Primary | ICD-10-CM

## 2018-07-18 LAB
BASOPHILS # BLD AUTO: 0.02 10*3/MM3 (ref 0–0.2)
BASOPHILS NFR BLD AUTO: 0.2 % (ref 0–1)
DEPRECATED RDW RBC AUTO: 48.3 FL (ref 37–54)
EOSINOPHIL # BLD AUTO: 0.28 10*3/MM3 (ref 0–0.3)
EOSINOPHIL NFR BLD AUTO: 2.9 % (ref 0–3)
ERYTHROCYTE [DISTWIDTH] IN BLOOD BY AUTOMATED COUNT: 14.3 % (ref 11.3–14.5)
HCT VFR BLD AUTO: 42.6 % (ref 34.5–44)
HGB BLD-MCNC: 13.4 G/DL (ref 11.5–15.5)
IMM GRANULOCYTES # BLD: 0.04 10*3/MM3 (ref 0–0.03)
IMM GRANULOCYTES NFR BLD: 0.4 % (ref 0–0.6)
LYMPHOCYTES # BLD AUTO: 1.86 10*3/MM3 (ref 0.6–4.8)
LYMPHOCYTES NFR BLD AUTO: 19.4 % (ref 24–44)
MCH RBC QN AUTO: 28.9 PG (ref 27–31)
MCHC RBC AUTO-ENTMCNC: 31.5 G/DL (ref 32–36)
MCV RBC AUTO: 91.8 FL (ref 80–99)
MONOCYTES # BLD AUTO: 0.67 10*3/MM3 (ref 0–1)
MONOCYTES NFR BLD AUTO: 7 % (ref 0–12)
NEUTROPHILS # BLD AUTO: 6.71 10*3/MM3 (ref 1.5–8.3)
NEUTROPHILS NFR BLD AUTO: 70.1 % (ref 41–71)
PLATELET # BLD AUTO: 352 10*3/MM3 (ref 150–450)
PMV BLD AUTO: 10 FL (ref 6–12)
RBC # BLD AUTO: 4.64 10*6/MM3 (ref 3.89–5.14)
WBC NRBC COR # BLD: 9.58 10*3/MM3 (ref 3.5–10.8)

## 2018-07-18 PROCEDURE — 86003 ALLG SPEC IGE CRUDE XTRC EA: CPT

## 2018-07-18 PROCEDURE — 82785 ASSAY OF IGE: CPT

## 2018-07-18 PROCEDURE — 86008 ALLG SPEC IGE RECOMB EA: CPT

## 2018-07-18 PROCEDURE — 36415 COLL VENOUS BLD VENIPUNCTURE: CPT

## 2018-07-18 PROCEDURE — 85025 COMPLETE CBC W/AUTO DIFF WBC: CPT

## 2018-07-21 LAB
A-LACTALB IGE QN: <0.1 KU/L
CASEIN IGE QN: 0.13 KU/L
CHEDDAR IGE QN: <0.1 KU/L
CHEESE MOLD IGE QN: <0.1 KU/L
CONV CLASS DESCRIPTION: ABNORMAL
COW MILK IGE QN: 0.33 KU/L
LACTOGLOB IGE QN: 0.16 KU/L
TOTAL IGE SMQN RAST: 35 IU/ML (ref 0–100)
WHEAT IGE QN: <0.1 KU/L

## 2018-07-26 ENCOUNTER — OFFICE VISIT (OUTPATIENT)
Dept: INTERNAL MEDICINE | Facility: CLINIC | Age: 58
End: 2018-07-26

## 2018-07-26 VITALS
HEIGHT: 68 IN | SYSTOLIC BLOOD PRESSURE: 118 MMHG | DIASTOLIC BLOOD PRESSURE: 76 MMHG | WEIGHT: 253 LBS | HEART RATE: 98 BPM | OXYGEN SATURATION: 97 % | BODY MASS INDEX: 38.34 KG/M2

## 2018-07-26 DIAGNOSIS — F41.9 ANXIETY: Primary | ICD-10-CM

## 2018-07-26 DIAGNOSIS — G43.009 MIGRAINE WITHOUT AURA AND WITHOUT STATUS MIGRAINOSUS, NOT INTRACTABLE: ICD-10-CM

## 2018-07-26 DIAGNOSIS — S16.1XXD STRAIN OF NECK MUSCLE, SUBSEQUENT ENCOUNTER: ICD-10-CM

## 2018-07-26 PROCEDURE — 99214 OFFICE O/P EST MOD 30 MIN: CPT | Performed by: NURSE PRACTITIONER

## 2018-07-26 RX ORDER — TIZANIDINE 4 MG/1
4 TABLET ORAL
Qty: 30 TABLET | Refills: 1 | Status: SHIPPED | OUTPATIENT
Start: 2018-07-26 | End: 2018-10-18 | Stop reason: SDUPTHER

## 2018-07-26 RX ORDER — AMITRIPTYLINE HYDROCHLORIDE 75 MG/1
75 TABLET, FILM COATED ORAL NIGHTLY
Qty: 30 TABLET | Refills: 5 | Status: SHIPPED | OUTPATIENT
Start: 2018-07-26 | End: 2019-02-07 | Stop reason: SDUPTHER

## 2018-08-06 DIAGNOSIS — G43.909 MIGRAINE WITHOUT STATUS MIGRAINOSUS, NOT INTRACTABLE, UNSPECIFIED MIGRAINE TYPE: ICD-10-CM

## 2018-08-06 RX ORDER — AMITRIPTYLINE HYDROCHLORIDE 25 MG/1
TABLET, FILM COATED ORAL
Qty: 30 TABLET | Refills: 0 | OUTPATIENT
Start: 2018-08-06

## 2018-08-23 ENCOUNTER — OFFICE VISIT (OUTPATIENT)
Dept: INTERNAL MEDICINE | Facility: CLINIC | Age: 58
End: 2018-08-23

## 2018-08-23 VITALS
SYSTOLIC BLOOD PRESSURE: 118 MMHG | WEIGHT: 258 LBS | BODY MASS INDEX: 39.1 KG/M2 | OXYGEN SATURATION: 97 % | DIASTOLIC BLOOD PRESSURE: 72 MMHG | HEART RATE: 92 BPM | HEIGHT: 68 IN

## 2018-08-23 DIAGNOSIS — F41.9 ANXIETY: ICD-10-CM

## 2018-08-23 DIAGNOSIS — F32.A DEPRESSION, UNSPECIFIED DEPRESSION TYPE: Primary | ICD-10-CM

## 2018-08-23 PROCEDURE — 99213 OFFICE O/P EST LOW 20 MIN: CPT | Performed by: NURSE PRACTITIONER

## 2018-08-23 RX ORDER — FLUOXETINE 20 MG/1
20 TABLET, FILM COATED ORAL DAILY
Qty: 30 TABLET | Refills: 2 | Status: SHIPPED | OUTPATIENT
Start: 2018-08-23 | End: 2018-11-09 | Stop reason: SDUPTHER

## 2018-08-23 RX ORDER — BUPROPION HYDROCHLORIDE 300 MG/1
300 TABLET ORAL DAILY
Qty: 30 TABLET | Refills: 2 | Status: SHIPPED | OUTPATIENT
Start: 2018-08-23 | End: 2019-03-15

## 2018-09-08 PROBLEM — J45.20 MILD INTERMITTENT ASTHMA WITHOUT COMPLICATION: Status: ACTIVE | Noted: 2018-01-01

## 2018-10-09 ENCOUNTER — OFFICE VISIT (OUTPATIENT)
Dept: INTERNAL MEDICINE | Facility: CLINIC | Age: 58
End: 2018-10-09

## 2018-10-09 VITALS
WEIGHT: 261 LBS | OXYGEN SATURATION: 97 % | HEART RATE: 94 BPM | SYSTOLIC BLOOD PRESSURE: 116 MMHG | DIASTOLIC BLOOD PRESSURE: 78 MMHG | HEIGHT: 68 IN | BODY MASS INDEX: 39.56 KG/M2 | TEMPERATURE: 98.6 F | RESPIRATION RATE: 18 BRPM

## 2018-10-09 DIAGNOSIS — Z83.42 FAMILY HISTORY OF HIGH CHOLESTEROL: ICD-10-CM

## 2018-10-09 DIAGNOSIS — K76.0 HEPATIC STEATOSIS: ICD-10-CM

## 2018-10-09 DIAGNOSIS — Z83.3 FAMILY HISTORY OF DIABETES MELLITUS (DM): ICD-10-CM

## 2018-10-09 DIAGNOSIS — J06.9 UPPER RESPIRATORY TRACT INFECTION, UNSPECIFIED TYPE: ICD-10-CM

## 2018-10-09 DIAGNOSIS — K21.9 GASTROESOPHAGEAL REFLUX DISEASE WITHOUT ESOPHAGITIS: ICD-10-CM

## 2018-10-09 DIAGNOSIS — G43.909 MIGRAINE WITHOUT STATUS MIGRAINOSUS, NOT INTRACTABLE, UNSPECIFIED MIGRAINE TYPE: ICD-10-CM

## 2018-10-09 DIAGNOSIS — I10 ESSENTIAL HYPERTENSION: Primary | ICD-10-CM

## 2018-10-09 DIAGNOSIS — E55.9 VITAMIN D DEFICIENCY: ICD-10-CM

## 2018-10-09 LAB
25(OH)D3 SERPL-MCNC: 19.5 NG/ML
ALBUMIN SERPL-MCNC: 4.4 G/DL (ref 3.2–4.8)
ALBUMIN/GLOB SERPL: 2.1 G/DL (ref 1.5–2.5)
ALP SERPL-CCNC: 113 U/L (ref 25–100)
ALT SERPL W P-5'-P-CCNC: 27 U/L (ref 7–40)
ANION GAP SERPL CALCULATED.3IONS-SCNC: 7 MMOL/L (ref 3–11)
ARTICHOKE IGE QN: 122 MG/DL (ref 0–130)
AST SERPL-CCNC: 18 U/L (ref 0–33)
BASOPHILS # BLD AUTO: 0.03 10*3/MM3 (ref 0–0.2)
BASOPHILS NFR BLD AUTO: 0.3 % (ref 0–1)
BILIRUB SERPL-MCNC: 0.2 MG/DL (ref 0.3–1.2)
BUN BLD-MCNC: 11 MG/DL (ref 9–23)
BUN/CREAT SERPL: 15.3 (ref 7–25)
CALCIUM SPEC-SCNC: 9.6 MG/DL (ref 8.7–10.4)
CHLORIDE SERPL-SCNC: 103 MMOL/L (ref 99–109)
CHOLEST SERPL-MCNC: 170 MG/DL (ref 0–200)
CO2 SERPL-SCNC: 29 MMOL/L (ref 20–31)
CREAT BLD-MCNC: 0.72 MG/DL (ref 0.6–1.3)
DEPRECATED RDW RBC AUTO: 44.8 FL (ref 37–54)
EOSINOPHIL # BLD AUTO: 0.45 10*3/MM3 (ref 0–0.3)
EOSINOPHIL NFR BLD AUTO: 4.2 % (ref 0–3)
ERYTHROCYTE [DISTWIDTH] IN BLOOD BY AUTOMATED COUNT: 13.6 % (ref 11.3–14.5)
GFR SERPL CREATININE-BSD FRML MDRD: 84 ML/MIN/1.73
GLOBULIN UR ELPH-MCNC: 2.1 GM/DL
GLUCOSE BLD-MCNC: 69 MG/DL (ref 70–100)
HBA1C MFR BLD: 6.6 % (ref 4.8–5.6)
HCT VFR BLD AUTO: 42.6 % (ref 34.5–44)
HDLC SERPL-MCNC: 45 MG/DL (ref 40–60)
HGB BLD-MCNC: 13.5 G/DL (ref 11.5–15.5)
IMM GRANULOCYTES # BLD: 0.03 10*3/MM3 (ref 0–0.03)
IMM GRANULOCYTES NFR BLD: 0.3 % (ref 0–0.6)
LYMPHOCYTES # BLD AUTO: 1.95 10*3/MM3 (ref 0.6–4.8)
LYMPHOCYTES NFR BLD AUTO: 18.2 % (ref 24–44)
MCH RBC QN AUTO: 28.4 PG (ref 27–31)
MCHC RBC AUTO-ENTMCNC: 31.7 G/DL (ref 32–36)
MCV RBC AUTO: 89.7 FL (ref 80–99)
MONOCYTES # BLD AUTO: 0.9 10*3/MM3 (ref 0–1)
MONOCYTES NFR BLD AUTO: 8.4 % (ref 0–12)
NEUTROPHILS # BLD AUTO: 7.39 10*3/MM3 (ref 1.5–8.3)
NEUTROPHILS NFR BLD AUTO: 68.9 % (ref 41–71)
PLATELET # BLD AUTO: 342 10*3/MM3 (ref 150–450)
PMV BLD AUTO: 10.4 FL (ref 6–12)
POTASSIUM BLD-SCNC: 5 MMOL/L (ref 3.5–5.5)
PROT SERPL-MCNC: 6.5 G/DL (ref 5.7–8.2)
RBC # BLD AUTO: 4.75 10*6/MM3 (ref 3.89–5.14)
SODIUM BLD-SCNC: 139 MMOL/L (ref 132–146)
TRIGL SERPL-MCNC: 158 MG/DL (ref 0–150)
TSH SERPL DL<=0.05 MIU/L-ACNC: 1.63 MIU/ML (ref 0.35–5.35)
WBC NRBC COR # BLD: 10.72 10*3/MM3 (ref 3.5–10.8)

## 2018-10-09 PROCEDURE — 80061 LIPID PANEL: CPT | Performed by: NURSE PRACTITIONER

## 2018-10-09 PROCEDURE — 83036 HEMOGLOBIN GLYCOSYLATED A1C: CPT | Performed by: NURSE PRACTITIONER

## 2018-10-09 PROCEDURE — 80053 COMPREHEN METABOLIC PANEL: CPT | Performed by: NURSE PRACTITIONER

## 2018-10-09 PROCEDURE — 82306 VITAMIN D 25 HYDROXY: CPT | Performed by: NURSE PRACTITIONER

## 2018-10-09 PROCEDURE — 99396 PREV VISIT EST AGE 40-64: CPT | Performed by: NURSE PRACTITIONER

## 2018-10-09 PROCEDURE — 85025 COMPLETE CBC W/AUTO DIFF WBC: CPT | Performed by: NURSE PRACTITIONER

## 2018-10-09 PROCEDURE — 84443 ASSAY THYROID STIM HORMONE: CPT | Performed by: NURSE PRACTITIONER

## 2018-10-09 RX ORDER — AZITHROMYCIN 250 MG/1
TABLET, FILM COATED ORAL
Qty: 6 TABLET | Refills: 0 | Status: SHIPPED | OUTPATIENT
Start: 2018-10-09 | End: 2019-01-01

## 2018-10-09 NOTE — PROGRESS NOTES
"Chief complaint: annual exam, allergy symptoms, stuttering      HPI  Sandra Auguste is a 58 y.o. female who presents for updated physical examination.    Has had h/a for past 2-3 days, feels like it could be allergy related--missed an appt for allergy; also missed a couple of days worth of prozac, amitriptyline; has difficulty remembering to take meds; also c/o electric shock type/numbness feeling in tongue, occurring several times daily, makes her stutter    Review of Systems   Denies visual changes, CP, palpitations, SOB, cough, abd pain, n/v/d, difficulty with urination, vaginal discharge, abnl vaginal bleeding, numbness/tingling, falls, mood changes, lightheadedness, rashes, joint sxs, hearing changes, loss of consciousness    Pertinent items are noted in HPI      Vital Signs  /78   Pulse 94   Temp 98.6 °F (37 °C)   Resp 18   Ht 172.1 cm (67.75\")   Wt 118 kg (261 lb)   LMP  (LMP Unknown)   SpO2 97%   Breastfeeding? No   BMI 39.98 kg/m²     Physical Exam:    General Appearance:    Alert, NAD   Head:    Normocephalic, without obvious abnormality, atraumatic   Eyes:    PERRL, EOMI   Ears:    bilat TMs are flat, cloudy, dull and external ear canals bilaterally   Nose:   Nares normal, septum midline, mucosa is erythematous and swollen, no drainage, or sinus tenderness   Throat:   Lips, mucosa, and tongue normal; oropharynx slightly erythematous with large amt of PND   Neck:   Supple, symmetrical FROM, no cervical lymphadenopathy   Thyroid:     No masses palpated, no thyromegaly, nontender   Lungs:     Clear to auscultation bilaterally, no respiratory distress        Heart:    Regular rate and rhythm, S1 and S2 normal, no murmurs, rubs, or gallops   Breast Exam:  Breast/pelvic exams deferred to GYN   Abdomen:     Soft, bowel sounds present, non-tender, nondistended,  no masses, no hepatospenlomegaly   Genitalia:  Breast/pelvic exams deferred to GYN   Extremities: Extremities normal, no LE edema, no clubbing " or cyanosis   Musculoskeletal:  All 4 extremities FROM; strength and sensation grossly intact   Gait:  Normal   Skin:   Skin color, texture, turgor normal, no rashes or lesions   Neurologic:   CNII-XII intact, normal strength, sensation and reflexes     Throughout; no evidence of fasciculation of tongue   Psychological: Mood stable         Assessment/Plan     Problem List Items Addressed This Visit        Cardiovascular and Mediastinum    Migraine    Hypertension - Primary       Digestive    Gastroesophageal reflux disease    Relevant Orders    CBC & Differential (Completed)    CBC Auto Differential (Completed)      Other Visit Diagnoses     Family history of high cholesterol        Relevant Orders    Lipid Panel (Completed)    TSH (Completed)    Family history of diabetes mellitus (DM)        Relevant Orders    Hemoglobin A1c (Completed)    Hepatic steatosis        Relevant Orders    Comprehensive metabolic panel (Completed)    Vitamin D deficiency        Relevant Orders    Vitamin D 25 Hydroxy (Completed)    Upper respiratory tract infection, unspecified type        Relevant Medications    azithromycin (ZITHROMAX) 250 MG tablet          Return in about 6 months (around 4/9/2019) for Recheck.    Barb Diaz, REBECA  10/09/2018

## 2018-10-11 DIAGNOSIS — E11.9 TYPE 2 DIABETES MELLITUS WITHOUT COMPLICATION, WITHOUT LONG-TERM CURRENT USE OF INSULIN (HCC): Primary | ICD-10-CM

## 2018-10-12 ENCOUNTER — TELEPHONE (OUTPATIENT)
Dept: INTERNAL MEDICINE | Facility: CLINIC | Age: 58
End: 2018-10-12

## 2018-10-16 ENCOUNTER — TELEPHONE (OUTPATIENT)
Dept: INTERNAL MEDICINE | Facility: CLINIC | Age: 58
End: 2018-10-16

## 2018-10-17 ENCOUNTER — TELEPHONE (OUTPATIENT)
Dept: INTERNAL MEDICINE | Facility: CLINIC | Age: 58
End: 2018-10-17

## 2018-10-18 DIAGNOSIS — S16.1XXD STRAIN OF NECK MUSCLE, SUBSEQUENT ENCOUNTER: ICD-10-CM

## 2018-10-18 RX ORDER — TIZANIDINE 4 MG/1
4 TABLET ORAL
Qty: 30 TABLET | Refills: 1 | Status: SHIPPED | OUTPATIENT
Start: 2018-10-18 | End: 2020-07-20

## 2018-10-18 RX ORDER — TIZANIDINE 4 MG/1
TABLET ORAL
Qty: 30 TABLET | Refills: 1 | OUTPATIENT
Start: 2018-10-18

## 2018-10-18 NOTE — TELEPHONE ENCOUNTER
She wanted to discuss A1C result. I let her know of results and she needs to start taking Metformin and labs would be checked at next visit.

## 2018-10-22 ENCOUNTER — TELEPHONE (OUTPATIENT)
Dept: INTERNAL MEDICINE | Facility: CLINIC | Age: 58
End: 2018-10-22

## 2018-10-22 NOTE — TELEPHONE ENCOUNTER
PATIENT FELL OVER THE WEEKEND AND WENT TO [facility]; THEY TOLD HER TO TAKE NAPROXEN FOR PAIN; PT ALSO TAKES FLUOXETINE AND SHE HAD HEARD THAT YOU SHOULDN'T TAKE NAPROXEN AND FLUOXETINE AT THE SAME TIME; PLEASE ADVISE PT ON WHAT TO DO; CALL HER ON HER CELL [phone]

## 2018-10-23 NOTE — TELEPHONE ENCOUNTER
You can take them together, you just have to monitor for increased bleeding--but there is nothing that states they cannot be taken at the same time.  If she notices increased bruising, or anything that could be a sign of bleeding, be careful not to fall again or accidentally cut herself, she should be all right with this for a limited time.

## 2018-11-09 DIAGNOSIS — F41.9 ANXIETY: ICD-10-CM

## 2018-11-09 RX ORDER — FLUOXETINE 20 MG/1
20 TABLET, FILM COATED ORAL DAILY
Qty: 30 TABLET | Refills: 2 | Status: SHIPPED | OUTPATIENT
Start: 2018-11-09 | End: 2019-01-09

## 2018-11-30 ENCOUNTER — TELEPHONE (OUTPATIENT)
Dept: INTERNAL MEDICINE | Facility: CLINIC | Age: 58
End: 2018-11-30

## 2018-11-30 NOTE — TELEPHONE ENCOUNTER
PATIENT CALLED AND SAID SHE WAS OFF OF METFORMIN, AND SHES BACK ON IT NOW.  SHE SAYS SHE FEELS OFF, IF SHE SITS DOWN SHE GOES TO SLEEP. SHE FEELS LIKE SOMETHING IS WRONG.  SHES NOT SURE IF ITS THE MEDICATION.  SHE DOESN'T KNOW IF ITS A REACTION SINCE SHES BEEN OFF IT FOR 4 DAYS.  SHES REQUESTING A CALL [phone]

## 2018-11-30 NOTE — TELEPHONE ENCOUNTER
She needs to be seen in the event she is having a reaction or other side effect; most likely needs to have blood drawn to check glucose and electrolytes; see if she can come in to see Karen this weekend--call her and find out specifics on her symptoms and make her an appt.

## 2018-12-17 ENCOUNTER — TELEPHONE (OUTPATIENT)
Dept: INTERNAL MEDICINE | Facility: CLINIC | Age: 58
End: 2018-12-17

## 2018-12-17 NOTE — TELEPHONE ENCOUNTER
PATIENT IS TAKING FLUOXETINE 20 MG BUT SHE DOES NOT FEEL THAT IT IS HELPING. SHE IS WANTING TO UP THE DOSAGE TO 40 MG. PT SAID SHE HAS DISCUSSED MED CHANGE WITH KANDY IN PREVIOUS VISITS. PLEASE ADVISE.

## 2018-12-18 NOTE — TELEPHONE ENCOUNTER
Yes, this is fine to increase Prozac to 40 mg daily--please send another rx if she needs it so she has enough medication

## 2018-12-31 ENCOUNTER — TELEPHONE (OUTPATIENT)
Dept: INTERNAL MEDICINE | Facility: CLINIC | Age: 58
End: 2018-12-31

## 2018-12-31 NOTE — TELEPHONE ENCOUNTER
On call note - pt called at 6:30pm 12/30 with complaints of nasal congetion making it hard to use CPAP at night. She is on zyrtec and Flonase. No h/o HTN. I told her she could add a decongestant like sudafed and/or use afrin short-term. She feels like she may be starting a sinus infection, and I advised her to be seen n necxt few days if no better

## 2019-01-01 ENCOUNTER — OFFICE VISIT (OUTPATIENT)
Dept: INTERNAL MEDICINE | Facility: CLINIC | Age: 59
End: 2019-01-01

## 2019-01-01 VITALS
HEIGHT: 68 IN | BODY MASS INDEX: 38.8 KG/M2 | DIASTOLIC BLOOD PRESSURE: 80 MMHG | TEMPERATURE: 97.9 F | HEART RATE: 102 BPM | OXYGEN SATURATION: 96 % | WEIGHT: 256 LBS | SYSTOLIC BLOOD PRESSURE: 118 MMHG

## 2019-01-01 DIAGNOSIS — J01.00 ACUTE MAXILLARY SINUSITIS, RECURRENCE NOT SPECIFIED: ICD-10-CM

## 2019-01-01 DIAGNOSIS — F41.9 ANXIETY: ICD-10-CM

## 2019-01-01 DIAGNOSIS — H66.002 ACUTE SUPPURATIVE OTITIS MEDIA OF LEFT EAR WITHOUT SPONTANEOUS RUPTURE OF TYMPANIC MEMBRANE, RECURRENCE NOT SPECIFIED: Primary | ICD-10-CM

## 2019-01-01 DIAGNOSIS — Z79.899 HIGH RISK MEDICATION USE: ICD-10-CM

## 2019-01-01 LAB
AMPHET+METHAMPHET UR QL: NEGATIVE
AMPHETAMINES UR QL: NEGATIVE
BARBITURATES UR QL SCN: NEGATIVE
BENZODIAZ UR QL SCN: NEGATIVE
BUPRENORPHINE SERPL-MCNC: NEGATIVE NG/ML
CANNABINOIDS SERPL QL: NEGATIVE
COCAINE UR QL: NEGATIVE
METHADONE UR QL SCN: NEGATIVE
OPIATES UR QL: NEGATIVE
OXYCODONE UR QL SCN: NEGATIVE
PCP UR QL SCN: NEGATIVE
PROPOXYPH UR QL: NEGATIVE
TRICYCLICS UR QL SCN: POSITIVE

## 2019-01-01 PROCEDURE — 99214 OFFICE O/P EST MOD 30 MIN: CPT | Performed by: NURSE PRACTITIONER

## 2019-01-01 PROCEDURE — 80306 DRUG TEST PRSMV INSTRMNT: CPT | Performed by: NURSE PRACTITIONER

## 2019-01-01 RX ORDER — FLUTICASONE PROPIONATE 50 MCG
2 SPRAY, SUSPENSION (ML) NASAL DAILY
Qty: 1 BOTTLE | Refills: 2 | Status: SHIPPED | OUTPATIENT
Start: 2019-01-01 | End: 2019-03-15

## 2019-01-01 RX ORDER — FLUTICASONE PROPIONATE 50 MCG
2 SPRAY, SUSPENSION (ML) NASAL DAILY
COMMUNITY
End: 2019-01-01

## 2019-01-01 RX ORDER — ALPRAZOLAM 0.25 MG/1
0.25 TABLET ORAL 2 TIMES DAILY PRN
Qty: 30 TABLET | Refills: 0 | Status: SHIPPED | OUTPATIENT
Start: 2019-01-01 | End: 2019-12-20 | Stop reason: SDUPTHER

## 2019-01-01 RX ORDER — ALPRAZOLAM 0.25 MG/1
0.25 TABLET ORAL 2 TIMES DAILY PRN
COMMUNITY
End: 2019-01-01 | Stop reason: SDUPTHER

## 2019-01-01 RX ORDER — FLUCONAZOLE 150 MG/1
TABLET ORAL
Qty: 2 TABLET | Refills: 0 | Status: SHIPPED | OUTPATIENT
Start: 2019-01-01 | End: 2019-01-09

## 2019-01-01 RX ORDER — AMOXICILLIN AND CLAVULANATE POTASSIUM 875; 125 MG/1; MG/1
1 TABLET, FILM COATED ORAL EVERY 12 HOURS SCHEDULED
Qty: 20 TABLET | Refills: 0 | Status: SHIPPED | OUTPATIENT
Start: 2019-01-01 | End: 2019-01-11

## 2019-01-01 NOTE — PROGRESS NOTES
"CHIEF COMPLAINT  Chief Complaint   Patient presents with   • Sinusitis     x5 days       HPI  Sandra Auguste is a 58 y.o. female  presents with complaint of URI sx    C/o 5 day history of severe headache, pressure/pain in face, nasal congestion with clear d/c, dry cough, she has been unable to use her CPAP at night because of her congestion, fatigue, ear pressure/pain, sore throat, PND; she has been taking Jen Hubbard plus for her symptoms with little relief    She suffers from severe situational anxiety and PTSD; she will occasionally take 0.25 mg of alprazolam BID prn; she rarely take this medication and only if she is unable to control her anxiety; her last prescription was for 30 tablets on 7/12/2018 by REBECA Duncan; she states she will usually get an rx for this twice a year    Review of Systems   Denies fever, chills, decreased appetite, chest tightness, wheezing, SOA, body  aches, n/v/d, abdominal pain, urinary symptoms  Pertinent ROS noted in HPI    Past Medical History:   Diagnosis Date   • Anesthesia complication     HAS TROUBLE GETTING \"NUMB\"    • Anxiety    • Arthritis    • Bladder spasms    • Depression    • Diverticulosis    • Memory loss     FROM SKULL FRACTURE AND FALL-SHORT TERM MEMORY LOSS   • Mental disorder     PTSD   • Migraine    • Obesity    • Seasonal allergies    • Seizures (CMS/[facility])     \"convulsions\" at age 5   • Skull fracture (CMS/[facility]) 2012   • Sleep apnea with use of continuous positive airway pressure (CPAP)     INSTRUCTED TO BRING OWN MASK AND TUBING    • SOB (shortness of breath) on exertion    • Type 2 diabetes mellitus without complication, without long-term current use of insulin (CMS/[facility]) 10/11/2018   • Wears contact lenses    • Wears partial dentures     TOP ONLY    • Wears prescription eyeglasses    • Wears prescription eyeglasses        Family History   Problem Relation Age of Onset   • Cancer Mother    • Cancer Maternal Aunt    • Cancer Paternal Aunt        Social History " "    Socioeconomic History   • Marital status: Single     Spouse name: Not on file   • Number of children: Not on file   • Years of education: Not on file   • Highest education level: Not on file   Social Needs   • Financial resource strain: Not on file   • Food insecurity - worry: Not on file   • Food insecurity - inability: Not on file   • Transportation needs - medical: Not on file   • Transportation needs - non-medical: Not on file   Occupational History   • Not on file   Tobacco Use   • Smoking status: Never Smoker   • Smokeless tobacco: Never Used   Substance and Sexual Activity   • Alcohol use: No     Comment: ONCE A YEAR ON XMAS, IF THAT   • Drug use: No   • Sexual activity: No     Birth control/protection: Post-menopausal   Other Topics Concern   • Not on file   Social History Narrative             /80   Pulse 102   Temp 97.9 °F (36.6 °C)   Ht 172.1 cm (67.75\")   Wt 116 kg (256 lb)   LMP  (LMP Unknown)   SpO2 96%   BMI 39.21 kg/m²     PHYSICAL EXAM  Physical Exam   Constitutional: She is oriented to person, place, and time. She appears well-developed and well-nourished.   HENT:   Head: Normocephalic and atraumatic.   -right TM cloudy, effusion; left TM large purulent effusion, erythema  -nasal mucosa is erythematous and swollen  -left maxillary and ethmoidal sinus tenderness   -oropharynx is erythematous with PND, cobblestoning; no exudate     Eyes: Conjunctivae are normal.   Neck: Trachea normal and normal range of motion. Neck supple. No JVD present. No thyromegaly present.   Cardiovascular: Normal rate, regular rhythm and normal heart sounds.   No murmur heard.  No swelling in BLE   Pulmonary/Chest:   NAD, CTA in all lung fields, good air movement w/o rales, rhonchi, wheezes     Lymphadenopathy:   Bilateral ant cervical adenopathy with tenderness   Neurological: She is alert and oriented to person, place, and time.   Skin: Skin is warm, dry and intact.   Psychiatric: She has a normal " mood and affect. Her speech is normal and behavior is normal.   Vitals reviewed.      Results for orders placed or performed in visit on 10/09/18   Comprehensive metabolic panel   Result Value Ref Range    Glucose 69 (L) 70 - 100 mg/dL    BUN 11 9 - 23 mg/dL    Creatinine 0.72 0.60 - 1.30 mg/dL    Sodium 139 132 - 146 mmol/L    Potassium 5.0 3.5 - 5.5 mmol/L    Chloride 103 99 - 109 mmol/L    CO2 29.0 20.0 - 31.0 mmol/L    Calcium 9.6 8.7 - 10.4 mg/dL    Total Protein 6.5 5.7 - 8.2 g/dL    Albumin 4.40 3.20 - 4.80 g/dL    ALT (SGPT) 27 7 - 40 U/L    AST (SGOT) 18 0 - 33 U/L    Alkaline Phosphatase 113 (H) 25 - 100 U/L    Total Bilirubin 0.2 (L) 0.3 - 1.2 mg/dL    eGFR Non African Amer 84 >60 mL/min/1.73    Globulin 2.1 gm/dL    A/G Ratio 2.1 1.5 - 2.5 g/dL    BUN/Creatinine Ratio 15.3 7.0 - 25.0    Anion Gap 7.0 3.0 - 11.0 mmol/L   Lipid Panel   Result Value Ref Range    Total Cholesterol 170 0 - 200 mg/dL    Triglycerides 158 (H) 0 - 150 mg/dL    HDL Cholesterol 45 40 - 60 mg/dL    LDL Cholesterol  122 0 - 130 mg/dL   TSH   Result Value Ref Range    TSH 1.632 0.350 - 5.350 mIU/mL   Vitamin D 25 Hydroxy   Result Value Ref Range    25 Hydroxy, Vitamin D 19.5 ng/ml   Hemoglobin A1c   Result Value Ref Range    Hemoglobin A1C 6.60 (H) 4.80 - 5.60 %   CBC Auto Differential   Result Value Ref Range    WBC 10.72 3.50 - 10.80 10*3/mm3    RBC 4.75 3.89 - 5.14 10*6/mm3    Hemoglobin 13.5 11.5 - 15.5 g/dL    Hematocrit 42.6 34.5 - 44.0 %    MCV 89.7 80.0 - 99.0 fL    MCH 28.4 27.0 - 31.0 pg    MCHC 31.7 (L) 32.0 - 36.0 g/dL    RDW 13.6 11.3 - 14.5 %    RDW-SD 44.8 37.0 - 54.0 fl    MPV 10.4 6.0 - 12.0 fL    Platelets 342 150 - 450 10*3/mm3    Neutrophil % 68.9 41.0 - 71.0 %    Lymphocyte % 18.2 (L) 24.0 - 44.0 %    Monocyte % 8.4 0.0 - 12.0 %    Eosinophil % 4.2 (H) 0.0 - 3.0 %    Basophil % 0.3 0.0 - 1.0 %    Immature Grans % 0.3 0.0 - 0.6 %    Neutrophils, Absolute 7.39 1.50 - 8.30 10*3/mm3    Lymphocytes, Absolute 1.95 0.60  - 4.80 10*3/mm3    Monocytes, Absolute 0.90 0.00 - 1.00 10*3/mm3    Eosinophils, Absolute 0.45 (H) 0.00 - 0.30 10*3/mm3    Basophils, Absolute 0.03 0.00 - 0.20 10*3/mm3    Immature Grans, Absolute 0.03 0.00 - 0.03 10*3/mm3       ASSESSMENT/PLAN  1. Acute suppurative otitis media of left ear without spontaneous rupture of tympanic membrane, recurrence not specified  - amoxicillin-clavulanate (AUGMENTIN) 875-125 MG per tablet; Take 1 tablet by mouth Every 12 (Twelve) Hours for 10 days.  Dispense: 20 tablet; Refill: 0  - fluticasone (FLONASE) 50 MCG/ACT nasal spray; 2 sprays into the nostril(s) as directed by provider Daily.  Dispense: 1 bottle; Refill: 2    2. Acute maxillary sinusitis, recurrence not specified  - amoxicillin-clavulanate (AUGMENTIN) 875-125 MG per tablet; Take 1 tablet by mouth Every 12 (Twelve) Hours for 10 days.  Dispense: 20 tablet; Refill: 0  - fluconazole (DIFLUCAN) 150 MG tablet; Take 1 tablet now, 1 tablet in 5 days  Dispense: 2 tablet; Refill: 0    3. Anxiety  - ALPRAZolam (XANAX) 0.25 MG tablet; Take 1 tablet by mouth 2 (Two) Times a Day As Needed for Anxiety.  Dispense: 30 tablet; Refill: 0    4. High risk medication use  - Urine Drug Screen - Urine, Clean Catch; Future  - Urine Drug Screen - Urine, Clean Catch    Judicious and appropriate use of alprazolam 0.25 mg tabs, 1 tab BID prn anxiety, #30, 0 refills.  SHANTEL was reviewed and appropriate-last rx on 7/12/18.  The patient has read and signed the [facility] Controlled Substance Contract today, 1/1/2019. Patient has been counseled and is aware of side effects, risks, potential for addiction/tolerance, interactions, and how to take medication correctly.  Urine drug screen obtained today  Patient verbalizes understanding of contract and the importance of adhering to the contract signed today.    Plan: start Augmentin for ear infection and sinusitis; flonase daily; may continue mario seltzer plus for symptoms relief, increase water  intake to maintain hydration, rest prn; f/u if no improvement in 5-7 days; will give rx for limited quantity of alprazolam, discussed the importance of the CSC she has signed today, UDS collected as well; also discussed this would not be a monthly ongoing prescription, she did verbalize understanding and is in agreement with these parameters      FOLLOW-UP  Next scheduled appt on 1/9/18 for recheck of diabetes; sooner as needed if no improvement or worsening of symptoms    Patient verbalizes understanding of medication dosage, comfort measures, instructions for treatment and follow-up.    Barb Diaz, REBECA  01/01/2019  11:47 AM

## 2019-01-09 ENCOUNTER — OFFICE VISIT (OUTPATIENT)
Dept: INTERNAL MEDICINE | Facility: CLINIC | Age: 59
End: 2019-01-09

## 2019-01-09 VITALS
BODY MASS INDEX: 38.43 KG/M2 | HEIGHT: 68 IN | HEART RATE: 95 BPM | OXYGEN SATURATION: 99 % | SYSTOLIC BLOOD PRESSURE: 134 MMHG | WEIGHT: 253.6 LBS | DIASTOLIC BLOOD PRESSURE: 86 MMHG | TEMPERATURE: 98.1 F

## 2019-01-09 DIAGNOSIS — E66.09 OBESITY DUE TO EXCESS CALORIES WITHOUT SERIOUS COMORBIDITY, UNSPECIFIED CLASSIFICATION: Primary | ICD-10-CM

## 2019-01-09 LAB — HBA1C MFR BLD: 6.1 %

## 2019-01-09 PROCEDURE — 83036 HEMOGLOBIN GLYCOSYLATED A1C: CPT | Performed by: NURSE PRACTITIONER

## 2019-01-09 PROCEDURE — 99213 OFFICE O/P EST LOW 20 MIN: CPT | Performed by: NURSE PRACTITIONER

## 2019-01-09 RX ORDER — BUPROPION HYDROCHLORIDE 150 MG/1
TABLET ORAL
COMMUNITY
Start: 2019-01-05 | End: 2019-12-20

## 2019-01-09 RX ORDER — FLUOXETINE HYDROCHLORIDE 20 MG/1
40 CAPSULE ORAL DAILY
COMMUNITY
End: 2019-12-20 | Stop reason: SDUPTHER

## 2019-01-09 NOTE — PROGRESS NOTES
Subjective   Sandra Auguste is a 58 y.o. female.   Chief Complaint   Patient presents with   • Follow-up      History of Present Illness FU acute sinusitis and ear infection.  Cont to have left ear pain.   Denies headache, fever chills, sore throat, shortness of air, chest pain, abdominal pain, nausea vomiting or diarrhea.  Has been on Augmentin.        The following portions of the patient's history were reviewed and updated as appropriate: allergies, current medications, past family history, past medical history, past social history, past surgical history and problem list.    Current Outpatient Medications:   •  acetaminophen (TYLENOL) 500 MG tablet, Take 500 mg by mouth Every 6 (Six) Hours As Needed for Mild Pain ., Disp: , Rfl:   •  albuterol (PROVENTIL HFA;VENTOLIN HFA) 108 (90 BASE) MCG/ACT inhaler, Inhale 1 puff Every 6 (Six) Hours As Needed. INHALE TWO PUFFS BY MOUTH EVERY 6 HOURS AS NEEDED, Disp: , Rfl:   •  ALPRAZolam (XANAX) 0.25 MG tablet, Take 1 tablet by mouth 2 (Two) Times a Day As Needed for Anxiety., Disp: 30 tablet, Rfl: 0  •  amitriptyline (ELAVIL) 75 MG tablet, Take 1 tablet by mouth Every Night., Disp: 30 tablet, Rfl: 5  •  amoxicillin-clavulanate (AUGMENTIN) 875-125 MG per tablet, Take 1 tablet by mouth Every 12 (Twelve) Hours for 10 days., Disp: 20 tablet, Rfl: 0  •  buPROPion XL (WELLBUTRIN XL) 150 MG 24 hr tablet, , Disp: , Rfl:   •  buPROPion XL (WELLBUTRIN XL) 300 MG 24 hr tablet, Take 1 tablet by mouth Daily., Disp: 30 tablet, Rfl: 2  •  cetirizine (zyrTEC) 10 MG tablet, Take 1 tablet by mouth Daily., Disp: 30 tablet, Rfl: 6  •  diclofenac (VOLTAREN) 1 % gel gel, Apply 4 g topically 4 (Four) Times a Day As Needed (for pain in shoulders)., Disp: 100 g, Rfl: 1  •  EPINEPHrine (EPIPEN) 0.3 MG/0.3ML solution auto-injector injection, As Needed (FOR SEVERE ALLERGIC REACTION)., Disp: , Rfl:   •  FLUoxetine (PROzac) 20 MG capsule, Take 40 mg by mouth Daily., Disp: , Rfl:   •  fluticasone (FLONASE) 50  "MCG/ACT nasal spray, 2 sprays into the nostril(s) as directed by provider Daily., Disp: 1 bottle, Rfl: 2  •  Melatonin 10 MG tablet, , Disp: , Rfl:   •  metFORMIN (GLUCOPHAGE) 500 MG tablet, Take 1 tablet by mouth 2 (Two) Times a Day With Meals., Disp: 60 tablet, Rfl: 3  •  Multiple Vitamins-Calcium (ONE-A-DAY WOMENS PO), , Disp: , Rfl:   •  naproxen (NAPROSYN) 500 MG tablet, Take 1 tablet by mouth 2 (Two) Times a Day With Meals., Disp: 30 tablet, Rfl: 1  •  tiZANidine (ZANAFLEX) 4 MG tablet, Take 1 tablet by mouth every night at bedtime., Disp: 30 tablet, Rfl: 1    Review of Systems Consitutional, HEENT, Respiratory, CV, GI, , Skin, Musculoskeletal, Neuro-mental, Endocrinological, Hematological were reviewed.  Positives were discussed in the HPI, otherwise ROS was negative   /86   Pulse 95   Temp 98.1 °F (36.7 °C)   Ht 172.7 cm (68\")   Wt 115 kg (253 lb 9.6 oz)   LMP  (LMP Unknown)   SpO2 99%   BMI 38.56 kg/m²     Objective   Allergies   Allergen Reactions   • Mold Extract [Trichophyton] Shortness Of Breath, Anxiety, Irritability, Other (See Comments) and Palpitations     SOA   • Lortab [Hydrocodone-Acetaminophen] Nausea And Vomiting     SEVERE NAUSEA AND SLIGHT VOMITING   • Methylprednisolone Other (See Comments)     Bleeding, cramping   • Pollen Extract Other (See Comments)     SNEEZING AND CONGESTION        Physical Exam   Constitutional: She is oriented to person, place, and time. She appears well-developed and well-nourished. No distress.   HENT:   Head: Normocephalic.   Right Ear: External ear normal.   Left Ear: External ear normal.   Mouth/Throat: Oropharynx is clear and moist.   TMs are dull bilaterally.  Nontender over sinuses.  Throat with PND   Eyes: Right eye exhibits no discharge. Left eye exhibits no discharge. No scleral icterus.   Neck: Neck supple.   Cardiovascular: Normal rate, regular rhythm, normal heart sounds and intact distal pulses. Exam reveals no gallop and no friction rub. "   No murmur heard.  Pulmonary/Chest: Effort normal and breath sounds normal. No stridor. No respiratory distress. She has no wheezes. She has no rales.   Lymphadenopathy:     She has no cervical adenopathy.   Neurological: She is alert and oriented to person, place, and time.   Skin: Skin is warm and dry. Capillary refill takes less than 2 seconds.   Is pink, no rash    Nursing note and vitals reviewed.      Procedures    LABS  Results for orders placed or performed in visit on 01/09/19   POC Glycosylated Hemoglobin (Hb A1C)   Result Value Ref Range    Hemoglobin A1C 6.1 %       Assessment/Plan   Sandra was seen today for follow-up.    Diagnoses and all orders for this visit:    Obesity due to excess calories without serious comorbidity, unspecified classification  -     POC Glycosylated Hemoglobin (Hb A1C)          Patient Instructions   Meds per MAR.  Discussed hemoglobin A1c with patient.  Avoid concentrated sweets.  Use Sudafed PE and antihistamine of choice.  Return to the clinic in 3 months sooner if needed.Pt verbalizes understanding and agreement with plan of care.       REBECA Shane

## 2019-01-10 NOTE — PATIENT INSTRUCTIONS
Meds per MAR.  Discussed hemoglobin A1c with patient.  Avoid concentrated sweets.  Use Sudafed PE and antihistamine of choice.  Return to the clinic in 3 months sooner if needed.Pt verbalizes understanding and agreement with plan of care.

## 2019-01-28 ENCOUNTER — OFFICE VISIT (OUTPATIENT)
Dept: INTERNAL MEDICINE | Facility: CLINIC | Age: 59
End: 2019-01-28

## 2019-01-28 VITALS
TEMPERATURE: 98.6 F | HEART RATE: 96 BPM | DIASTOLIC BLOOD PRESSURE: 80 MMHG | WEIGHT: 255 LBS | BODY MASS INDEX: 38.65 KG/M2 | HEIGHT: 68 IN | SYSTOLIC BLOOD PRESSURE: 140 MMHG | OXYGEN SATURATION: 98 %

## 2019-01-28 DIAGNOSIS — J45.909 BRONCHITIS WITH ASTHMA, ACUTE: Primary | ICD-10-CM

## 2019-01-28 DIAGNOSIS — M25.561 ACUTE PAIN OF RIGHT KNEE: ICD-10-CM

## 2019-01-28 DIAGNOSIS — J20.9 BRONCHITIS WITH ASTHMA, ACUTE: Primary | ICD-10-CM

## 2019-01-28 DIAGNOSIS — J06.9 UPPER RESPIRATORY TRACT INFECTION, UNSPECIFIED TYPE: ICD-10-CM

## 2019-01-28 LAB
EXPIRATION DATE: NORMAL
FLUAV AG NPH QL: NEGATIVE
FLUBV AG NPH QL: NEGATIVE
INTERNAL CONTROL: NORMAL
Lab: NORMAL

## 2019-01-28 PROCEDURE — 87804 INFLUENZA ASSAY W/OPTIC: CPT | Performed by: NURSE PRACTITIONER

## 2019-01-28 PROCEDURE — 96372 THER/PROPH/DIAG INJ SC/IM: CPT | Performed by: NURSE PRACTITIONER

## 2019-01-28 PROCEDURE — 99214 OFFICE O/P EST MOD 30 MIN: CPT | Performed by: NURSE PRACTITIONER

## 2019-01-28 RX ORDER — AMOXICILLIN AND CLAVULANATE POTASSIUM 875; 125 MG/1; MG/1
1 TABLET, FILM COATED ORAL 2 TIMES DAILY
Qty: 20 TABLET | Refills: 0 | Status: SHIPPED | OUTPATIENT
Start: 2019-01-28 | End: 2019-03-15

## 2019-01-28 RX ORDER — DEXTROMETHORPHAN HYDROBROMIDE AND PROMETHAZINE HYDROCHLORIDE 15; 6.25 MG/5ML; MG/5ML
5 SYRUP ORAL 4 TIMES DAILY PRN
Qty: 240 ML | Refills: 0 | Status: SHIPPED | OUTPATIENT
Start: 2019-01-28 | End: 2019-03-15

## 2019-01-28 RX ORDER — DEXAMETHASONE SODIUM PHOSPHATE 4 MG/ML
8 INJECTION, SOLUTION INTRA-ARTICULAR; INTRALESIONAL; INTRAMUSCULAR; INTRAVENOUS; SOFT TISSUE ONCE
Status: COMPLETED | OUTPATIENT
Start: 2019-01-28 | End: 2019-01-28

## 2019-01-28 RX ADMIN — DEXAMETHASONE SODIUM PHOSPHATE 8 MG: 4 INJECTION, SOLUTION INTRA-ARTICULAR; INTRALESIONAL; INTRAMUSCULAR; INTRAVENOUS; SOFT TISSUE at 17:30
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
## 2019-02-03 ENCOUNTER — OFFICE VISIT (OUTPATIENT)
Dept: INTERNAL MEDICINE | Facility: CLINIC | Age: 59
End: 2019-02-03

## 2019-02-03 VITALS
DIASTOLIC BLOOD PRESSURE: 82 MMHG | SYSTOLIC BLOOD PRESSURE: 128 MMHG | BODY MASS INDEX: 38.65 KG/M2 | HEART RATE: 90 BPM | OXYGEN SATURATION: 95 % | HEIGHT: 68 IN | WEIGHT: 255 LBS

## 2019-02-03 DIAGNOSIS — J45.31 MILD PERSISTENT ASTHMATIC BRONCHITIS WITH ACUTE EXACERBATION: Primary | ICD-10-CM

## 2019-02-03 DIAGNOSIS — E11.9 TYPE 2 DIABETES MELLITUS WITHOUT COMPLICATION, WITHOUT LONG-TERM CURRENT USE OF INSULIN (HCC): ICD-10-CM

## 2019-02-03 LAB — GLUCOSE BLDC GLUCOMTR-MCNC: 81 MG/DL (ref 70–130)

## 2019-02-03 PROCEDURE — 99213 OFFICE O/P EST LOW 20 MIN: CPT | Performed by: NURSE PRACTITIONER

## 2019-02-03 PROCEDURE — 82962 GLUCOSE BLOOD TEST: CPT | Performed by: NURSE PRACTITIONER

## 2019-02-03 RX ORDER — METHYLPREDNISOLONE 4 MG/1
TABLET ORAL
Qty: 21 EACH | Refills: 0 | Status: SHIPPED | OUTPATIENT
Start: 2019-02-03 | End: 2019-03-15

## 2019-02-03 NOTE — PROGRESS NOTES
Subjective   Sandra Auguste is a 58 y.o. female.     History of Present Illness Ms Auguste was seen on 1/29/19 and diagnosed with acute bronchitis with asthma. Treated with Augmentin, and IM decadron. Her Flu test was negative. She was also recently diagnosed with diabetes in October and is on Metformin. She is not checking her glucose at home.  Today she is worried because she has felt more lethargic and she worried about her breathing and blood sugar. Has sleep apnea and wears a c-pap at night, however did not wear it for 5 days while sick with bronchitis. She has been able to wear it the last 2 nights.  She continues to have wheezing. No fever or chest pain. Using Breo but not using Albuterol.  States she is not allergic to prednisone. In the past she had vaginal bleeding with prednisone, but since then she has had a hysterectomy and had steroids without any adverse effects.  Denies fever.  She is trying to lose weight but admits she is not following any sort of diet and is not exercising.    The following portions of the patient's history were reviewed and updated as appropriate: allergies, current medications, past family history, past medical history, past social history, past surgical history and problem list.    Review of Systems   Constitutional: Positive for fatigue. Negative for appetite change, fever, unexpected weight gain and unexpected weight loss.   HENT: Negative for congestion, nosebleeds, sore throat and trouble swallowing.    Eyes: Negative for visual disturbance.   Respiratory: Positive for wheezing. Negative for cough and shortness of breath.    Cardiovascular: Negative for chest pain, palpitations and leg swelling.   Gastrointestinal: Negative for abdominal pain, blood in stool, constipation, diarrhea, nausea and vomiting.   Endocrine: Negative for polydipsia, polyphagia and polyuria.   Genitourinary: Negative for dysuria, frequency and hematuria.   Musculoskeletal: Negative for arthralgias, joint  swelling and myalgias.   Skin: Negative for rash.   Neurological: Negative for dizziness, seizures, syncope and numbness.   Hematological: Negative for adenopathy. Does not bruise/bleed easily.   Psychiatric/Behavioral: Negative for behavioral problems, sleep disturbance and depressed mood. The patient is not nervous/anxious.        Objective   Physical Exam   Constitutional: She is oriented to person, place, and time. She appears well-developed and well-nourished. No distress.   HENT:   Head: Normocephalic and atraumatic.   Right Ear: Tympanic membrane and external ear normal.   Left Ear: Tympanic membrane and external ear normal.   Nose: Nose normal.   Mouth/Throat: Oropharynx is clear and moist. No oropharyngeal exudate.   Eyes: Conjunctivae are normal. Pupils are equal, round, and reactive to light. Right eye exhibits no discharge. Left eye exhibits no discharge. No scleral icterus.   Neck: Neck supple. No tracheal deviation present. No thyromegaly present.   Cardiovascular: Normal rate, regular rhythm and normal heart sounds. Exam reveals no gallop and no friction rub.   No murmur heard.  Pulmonary/Chest: Effort normal. No respiratory distress. She has wheezes.   Abdominal: Soft. Bowel sounds are normal. She exhibits no distension and no mass. There is no tenderness.   Musculoskeletal: She exhibits no edema or deformity.   Lymphadenopathy:     She has no cervical adenopathy.   Neurological: She is alert and oriented to person, place, and time. Coordination normal.   Skin: Skin is warm and dry. Capillary refill takes less than 2 seconds. No rash noted. No erythema.   Psychiatric: She has a normal mood and affect. Her speech is normal and behavior is normal. Judgment and thought content normal.   Nursing note and vitals reviewed.        Assessment/Plan   Sandra was seen today for fatigue and headache.    Diagnoses and all orders for this visit:    Mild persistent asthmatic bronchitis with acute exacerbation  -      POCT Glucose  -     MethylPREDNISolone (MEDROL, DADA,) 4 MG tablet; Take as directed on package instructions.  -     XR Chest PA & Lateral; Future    Type 2 diabetes mellitus without complication, without long-term current use of insulin (CMS/[facility])    Her glucose in the clinic today is 81.   Her pulse ox is 95% which is slightly lower than her last visit of 98%. She is not in resp distress. Given her history will obtain cxr.  Will have her use her albuterol inhaler 2 puffs qid for the next week. Demonstrated proper use of inhaler.  Instructed patient to go to ER for any increase in sob, wheezing, chest pain, dizziness. Patient verbalized understanding.  Discussed that she will want to follow up with her PCP to discuss monitoring her glucose at home, ADA diet and management of diabetes with A1c q 3 months.

## 2019-02-07 DIAGNOSIS — F41.9 ANXIETY: ICD-10-CM

## 2019-02-07 DIAGNOSIS — G43.009 MIGRAINE WITHOUT AURA AND WITHOUT STATUS MIGRAINOSUS, NOT INTRACTABLE: ICD-10-CM

## 2019-02-07 RX ORDER — AMITRIPTYLINE HYDROCHLORIDE 75 MG/1
TABLET, FILM COATED ORAL
Qty: 30 TABLET | Refills: 4 | Status: SHIPPED | OUTPATIENT
Start: 2019-02-07 | End: 2019-07-09 | Stop reason: SDUPTHER

## 2019-02-23 DIAGNOSIS — E11.9 TYPE 2 DIABETES MELLITUS WITHOUT COMPLICATION, WITHOUT LONG-TERM CURRENT USE OF INSULIN (HCC): ICD-10-CM

## 2019-03-07 ENCOUNTER — HOSPITAL ENCOUNTER (OUTPATIENT)
Dept: GENERAL RADIOLOGY | Facility: HOSPITAL | Age: 59
Discharge: HOME | End: 2019-03-07
Admitting: ALLERGY & IMMUNOLOGY

## 2019-03-07 ENCOUNTER — APPOINTMENT (OUTPATIENT)
Dept: LAB | Facility: HOSPITAL | Age: 59
End: 2019-03-07

## 2019-03-07 ENCOUNTER — TRANSCRIBE ORDERS (OUTPATIENT)
Dept: LAB | Facility: HOSPITAL | Age: 59
End: 2019-03-07

## 2019-03-07 DIAGNOSIS — J30.89 NON-SEASONAL ALLERGIC RHINITIS, UNSPECIFIED TRIGGER: ICD-10-CM

## 2019-03-07 DIAGNOSIS — J30.1 ALLERGIC RHINITIS DUE TO POLLEN, UNSPECIFIED SEASONALITY: ICD-10-CM

## 2019-03-07 DIAGNOSIS — J45.40 MODERATE PERSISTENT ASTHMA, UNSPECIFIED WHETHER COMPLICATED: Primary | ICD-10-CM

## 2019-03-07 DIAGNOSIS — J98.8 UPPER RESPIRATORY TRACT OBSTRUCTION: ICD-10-CM

## 2019-03-07 LAB
BASOPHILS # BLD AUTO: 0.05 10*3/MM3 (ref 0–0.2)
BASOPHILS NFR BLD AUTO: 0.5 % (ref 0–1)
DEPRECATED RDW RBC AUTO: 48 FL (ref 37–54)
EOSINOPHIL # BLD AUTO: 0.39 10*3/MM3 (ref 0–0.3)
EOSINOPHIL NFR BLD AUTO: 3.7 % (ref 0–3)
ERYTHROCYTE [DISTWIDTH] IN BLOOD BY AUTOMATED COUNT: 14.7 % (ref 11.3–14.5)
HCT VFR BLD AUTO: 41.1 % (ref 34.5–44)
HGB BLD-MCNC: 13.3 G/DL (ref 11.5–15.5)
IMM GRANULOCYTES # BLD AUTO: 0.02 10*3/MM3 (ref 0–0.05)
IMM GRANULOCYTES NFR BLD AUTO: 0.2 % (ref 0–0.6)
LYMPHOCYTES # BLD AUTO: 2.28 10*3/MM3 (ref 0.6–4.8)
LYMPHOCYTES NFR BLD AUTO: 21.4 % (ref 24–44)
MCH RBC QN AUTO: 29.1 PG (ref 27–31)
MCHC RBC AUTO-ENTMCNC: 32.4 G/DL (ref 32–36)
MCV RBC AUTO: 89.9 FL (ref 80–99)
MONOCYTES # BLD AUTO: 0.82 10*3/MM3 (ref 0–1)
MONOCYTES NFR BLD AUTO: 7.7 % (ref 0–12)
NEUTROPHILS # BLD AUTO: 7.13 10*3/MM3 (ref 1.5–8.3)
NEUTROPHILS NFR BLD AUTO: 66.7 % (ref 41–71)
PLATELET # BLD AUTO: 363 10*3/MM3 (ref 150–450)
PMV BLD AUTO: 10.4 FL (ref 6–12)
RBC # BLD AUTO: 4.57 10*6/MM3 (ref 3.89–5.14)
WBC NRBC COR # BLD: 10.67 10*3/MM3 (ref 3.5–10.8)

## 2019-03-07 PROCEDURE — 82787 IGG 1 2 3 OR 4 EACH: CPT | Performed by: ALLERGY & IMMUNOLOGY

## 2019-03-07 PROCEDURE — 82784 ASSAY IGA/IGD/IGG/IGM EACH: CPT | Performed by: ALLERGY & IMMUNOLOGY

## 2019-03-07 PROCEDURE — 36415 COLL VENOUS BLD VENIPUNCTURE: CPT | Performed by: ALLERGY & IMMUNOLOGY

## 2019-03-07 PROCEDURE — 82785 ASSAY OF IGE: CPT | Performed by: ALLERGY & IMMUNOLOGY

## 2019-03-07 PROCEDURE — 71046 X-RAY EXAM CHEST 2 VIEWS: CPT

## 2019-03-07 PROCEDURE — 85025 COMPLETE CBC W/AUTO DIFF WBC: CPT | Performed by: ALLERGY & IMMUNOLOGY

## 2019-03-09 LAB
IGA SERPL-MCNC: 219 MG/DL (ref 87–352)
IGM SERPL-MCNC: 29 MG/DL (ref 26–217)

## 2019-03-10 LAB
IGG SERPL-MCNC: 1033 MG/DL (ref 700–1600)
IGG1 SER-MCNC: 697 MG/DL (ref 248–810)
IGG2 SER-MCNC: 320 MG/DL (ref 130–555)
IGG3 SER-MCNC: 17 MG/DL (ref 15–102)
IGG4 SER-MCNC: 62 MG/DL (ref 2–96)

## 2019-03-11 ENCOUNTER — HOSPITAL ENCOUNTER (OUTPATIENT)
Dept: GENERAL RADIOLOGY | Facility: HOSPITAL | Age: 59
Discharge: HOME | End: 2019-03-11

## 2019-03-11 DIAGNOSIS — J45.31 MILD PERSISTENT ASTHMATIC BRONCHITIS WITH ACUTE EXACERBATION: ICD-10-CM

## 2019-03-11 LAB — TOTAL IGE SMQN RAST: 25 IU/ML (ref 6–495)

## 2019-03-15 ENCOUNTER — OFFICE VISIT (OUTPATIENT)
Dept: INTERNAL MEDICINE | Facility: CLINIC | Age: 59
End: 2019-03-15

## 2019-03-15 VITALS
SYSTOLIC BLOOD PRESSURE: 126 MMHG | DIASTOLIC BLOOD PRESSURE: 80 MMHG | BODY MASS INDEX: 38.34 KG/M2 | OXYGEN SATURATION: 98 % | WEIGHT: 253 LBS | HEART RATE: 99 BPM | HEIGHT: 68 IN

## 2019-03-15 DIAGNOSIS — K21.9 GASTROESOPHAGEAL REFLUX DISEASE WITHOUT ESOPHAGITIS: Primary | ICD-10-CM

## 2019-03-15 DIAGNOSIS — F41.9 ANXIETY: ICD-10-CM

## 2019-03-15 PROCEDURE — 99213 OFFICE O/P EST LOW 20 MIN: CPT | Performed by: PHYSICIAN ASSISTANT

## 2019-03-15 RX ORDER — RANITIDINE 300 MG/1
300 TABLET ORAL NIGHTLY
Qty: 30 TABLET | Refills: 3 | Status: SHIPPED | OUTPATIENT
Start: 2019-03-15 | End: 2019-05-23 | Stop reason: SDUPTHER

## 2019-03-15 NOTE — PROGRESS NOTES
Chief Complaint   Patient presents with   • Discuss medication       Subjective   Sandra Auguste is a 58 y.o. female.       History of Present Illness     Pt is feeling like she is struggling to lose weight because of her medications- concerned about amitriptyline and prozac in particular. She takes the meds for PTSD. She has been seen by [facility] but they did not prescribe her xanax so it was prescribed at our clinic. Gets panic attacks due situational stress with her family. Had a head injury in 2012 and since then she has had migraines and nightmares. Takes amitriptyline to help with these things.    She is concerned about risk of taking nexium daily. Has tried stopping it before without success.        Current Outpatient Medications:   •  acetaminophen (TYLENOL) 500 MG tablet, Take 500 mg by mouth Every 6 (Six) Hours As Needed for Mild Pain ., Disp: , Rfl:   •  albuterol (PROVENTIL HFA;VENTOLIN HFA) 108 (90 BASE) MCG/ACT inhaler, Inhale 1 puff Every 6 (Six) Hours As Needed. INHALE TWO PUFFS BY MOUTH EVERY 6 HOURS AS NEEDED, Disp: , Rfl:   •  ALPRAZolam (XANAX) 0.25 MG tablet, Take 1 tablet by mouth 2 (Two) Times a Day As Needed for Anxiety., Disp: 30 tablet, Rfl: 0  •  amitriptyline (ELAVIL) 75 MG tablet, TAKE ONE TABLET BY MOUTH EVERY NIGHT, Disp: 30 tablet, Rfl: 4  •  BREO ELLIPTA 100-25 MCG/INH inhaler, , Disp: , Rfl:   •  buPROPion XL (WELLBUTRIN XL) 150 MG 24 hr tablet, , Disp: , Rfl:   •  cetirizine (zyrTEC) 10 MG tablet, Take 1 tablet by mouth Daily., Disp: 30 tablet, Rfl: 6  •  diclofenac (VOLTAREN) 1 % gel gel, Apply 4 g topically 4 (Four) Times a Day As Needed (for pain in shoulders)., Disp: 100 g, Rfl: 1  •  EPINEPHrine (EPIPEN) 0.3 MG/0.3ML solution auto-injector injection, As Needed (FOR SEVERE ALLERGIC REACTION)., Disp: , Rfl:   •  FLUoxetine (PROzac) 20 MG capsule, Take 40 mg by mouth Daily., Disp: , Rfl:   •  Melatonin 10 MG tablet, , Disp: , Rfl:   •  metFORMIN (GLUCOPHAGE) 500 MG  "tablet, TAKE ONE TABLET BY MOUTH TWICE A DAY WITH MEALS, Disp: 60 tablet, Rfl: 2  •  Multiple Vitamins-Calcium (ONE-A-DAY WOMENS PO), , Disp: , Rfl:   •  naproxen (NAPROSYN) 500 MG tablet, Take 1 tablet by mouth 2 (Two) Times a Day With Meals., Disp: 30 tablet, Rfl: 1  •  tiZANidine (ZANAFLEX) 4 MG tablet, Take 1 tablet by mouth every night at bedtime., Disp: 30 tablet, Rfl: 1  •  raNITIdine (ZANTAC) 300 MG tablet, Take 1 tablet by mouth Every Night., Disp: 30 tablet, Rfl: 3     PMFSH  The following portions of the patient's history were reviewed and updated as appropriate: allergies, current medications, past family history, past medical history, past social history, past surgical history and problem list.    Review of Systems   Constitutional: Negative for chills, fever and unexpected weight change.   HENT: Negative.    Eyes: Negative for pain and visual disturbance.   Respiratory: Negative for chest tightness and shortness of breath.    Cardiovascular: Negative for chest pain.   Gastrointestinal: Negative for abdominal pain and blood in stool.   Endocrine: Negative.    Genitourinary: Negative.    Musculoskeletal: Negative for joint swelling.   Skin: Negative for color change, rash and wound.   Allergic/Immunologic: Negative.    Neurological: Negative for syncope and speech difficulty.   Hematological: Negative for adenopathy.   Psychiatric/Behavioral: Positive for decreased concentration. Negative for confusion, hallucinations and suicidal ideas. The patient is nervous/anxious.        Objective   /80   Pulse 99   Ht 172.7 cm (68\")   Wt 115 kg (253 lb)   LMP  (LMP Unknown)   SpO2 98%   BMI 38.47 kg/m²     Physical Exam   Constitutional: She appears well-developed and well-nourished.   HENT:   Head: Normocephalic.   Right Ear: Hearing, tympanic membrane, external ear and ear canal normal.   Left Ear: Hearing, tympanic membrane, external ear and ear canal normal.   Nose: Nose normal.   Mouth/Throat: " Oropharynx is clear and moist.   Eyes: Conjunctivae are normal. Pupils are equal, round, and reactive to light.   Neck: Normal range of motion.   Cardiovascular: Normal rate, regular rhythm and normal heart sounds.   Pulmonary/Chest: Effort normal and breath sounds normal. She has no decreased breath sounds. She has no wheezes. She has no rhonchi. She has no rales.   Musculoskeletal: Normal range of motion.   Neurological: She is alert.   Skin: Skin is warm and dry.   Psychiatric: She has a normal mood and affect. Her behavior is normal.   Nursing note and vitals reviewed.      ASSESSMENT/PLAN    Problem List Items Addressed This Visit        Digestive    Gastroesophageal reflux disease - Primary     Trial of switching from nexium to ranitidine.         Relevant Medications    raNITIdine (ZANTAC) 300 MG tablet       Other    Anxiety     Pt will continues counseling with new therapist- will find out if they have prescribers who can adjust her medications.                    Return in about 4 weeks (around 4/12/2019) for Recheck.

## 2019-03-17 NOTE — ASSESSMENT & PLAN NOTE
Pt will continues counseling with new therapist- will find out if they have prescribers who can adjust her medications.

## 2019-04-04 ENCOUNTER — OFFICE VISIT (OUTPATIENT)
Dept: INTERNAL MEDICINE | Facility: CLINIC | Age: 59
End: 2019-04-04

## 2019-04-04 VITALS
WEIGHT: 251.4 LBS | DIASTOLIC BLOOD PRESSURE: 70 MMHG | HEART RATE: 89 BPM | HEIGHT: 68 IN | OXYGEN SATURATION: 97 % | BODY MASS INDEX: 38.1 KG/M2 | SYSTOLIC BLOOD PRESSURE: 142 MMHG

## 2019-04-04 DIAGNOSIS — S16.1XXD STRAIN OF NECK MUSCLE, SUBSEQUENT ENCOUNTER: ICD-10-CM

## 2019-04-04 DIAGNOSIS — E11.9 TYPE 2 DIABETES MELLITUS WITHOUT COMPLICATION, WITHOUT LONG-TERM CURRENT USE OF INSULIN (HCC): Primary | ICD-10-CM

## 2019-04-04 DIAGNOSIS — F43.10 POSTTRAUMATIC STRESS DISORDER: ICD-10-CM

## 2019-04-04 LAB
GLUCOSE BLDC GLUCOMTR-MCNC: 177 MG/DL (ref 70–130)
HBA1C MFR BLD: 5.8 %

## 2019-04-04 PROCEDURE — 83036 HEMOGLOBIN GLYCOSYLATED A1C: CPT | Performed by: PHYSICIAN ASSISTANT

## 2019-04-04 PROCEDURE — 99214 OFFICE O/P EST MOD 30 MIN: CPT | Performed by: PHYSICIAN ASSISTANT

## 2019-04-04 PROCEDURE — 82962 GLUCOSE BLOOD TEST: CPT | Performed by: PHYSICIAN ASSISTANT

## 2019-04-04 NOTE — PROGRESS NOTES
Chief Complaint   Patient presents with   • Diabetes       Subjective   Sandra Auguste is a 58 y.o. female.       History of Present Illness     Pt has been taking metformin as directed and is working on weight loss with adjusting her diet.    Notes that yesterday she had a day of feeling fatigued with some diarrhea. Having diarrhea 2-3 times a day. Has episodes like this when she gets stressed. BP is up today and feels like it is related to stress. Yesterday was stressful. She ended up taking a xanax and felt better. Does not think it was related to anything she ate. She has not discussed with her therapist yet, had not put it together. She feels somewhat better today after sleeping and resting.          Current Outpatient Medications:   •  acetaminophen (TYLENOL) 500 MG tablet, Take 500 mg by mouth Every 6 (Six) Hours As Needed for Mild Pain ., Disp: , Rfl:   •  albuterol (PROVENTIL HFA;VENTOLIN HFA) 108 (90 BASE) MCG/ACT inhaler, Inhale 1 puff Every 6 (Six) Hours As Needed. INHALE TWO PUFFS BY MOUTH EVERY 6 HOURS AS NEEDED, Disp: , Rfl:   •  ALPRAZolam (XANAX) 0.25 MG tablet, Take 1 tablet by mouth 2 (Two) Times a Day As Needed for Anxiety., Disp: 30 tablet, Rfl: 0  •  amitriptyline (ELAVIL) 75 MG tablet, TAKE ONE TABLET BY MOUTH EVERY NIGHT, Disp: 30 tablet, Rfl: 4  •  BREO ELLIPTA 100-25 MCG/INH inhaler, , Disp: , Rfl:   •  buPROPion XL (WELLBUTRIN XL) 150 MG 24 hr tablet, , Disp: , Rfl:   •  cetirizine (zyrTEC) 10 MG tablet, Take 1 tablet by mouth Daily., Disp: 30 tablet, Rfl: 6  •  EPINEPHrine (EPIPEN) 0.3 MG/0.3ML solution auto-injector injection, As Needed (FOR SEVERE ALLERGIC REACTION)., Disp: , Rfl:   •  FLUoxetine (PROzac) 20 MG capsule, Take 40 mg by mouth Daily., Disp: , Rfl:   •  Melatonin 10 MG tablet, , Disp: , Rfl:   •  metFORMIN (GLUCOPHAGE) 500 MG tablet, TAKE ONE TABLET BY MOUTH TWICE A DAY WITH MEALS, Disp: 60 tablet, Rfl: 2  •  Multiple Vitamins-Calcium (ONE-A-DAY WOMENS PO), , Disp: , Rfl:   •   "naproxen (NAPROSYN) 500 MG tablet, Take 1 tablet by mouth 2 (Two) Times a Day With Meals., Disp: 30 tablet, Rfl: 1  •  raNITIdine (ZANTAC) 300 MG tablet, Take 1 tablet by mouth Every Night., Disp: 30 tablet, Rfl: 3  •  tiZANidine (ZANAFLEX) 4 MG tablet, Take 1 tablet by mouth every night at bedtime., Disp: 30 tablet, Rfl: 1  •  diclofenac (VOLTAREN) 1 % gel gel, Apply 4 g topically to the appropriate area as directed 4 (Four) Times a Day As Needed (for pain in shoulders)., Disp: 100 g, Rfl: 1     PMFSH  The following portions of the patient's history were reviewed and updated as appropriate: allergies, current medications, past family history, past medical history, past social history, past surgical history and problem list.    Review of Systems   Constitutional: Negative for chills, fever and unexpected weight change.   HENT: Negative.    Eyes: Negative for pain and visual disturbance.   Respiratory: Negative for chest tightness and shortness of breath.    Cardiovascular: Negative for chest pain.   Gastrointestinal: Negative for abdominal pain and blood in stool.   Endocrine: Negative.    Genitourinary: Negative.    Musculoskeletal: Negative for joint swelling.   Skin: Negative for color change, rash and wound.   Allergic/Immunologic: Negative.    Neurological: Negative for syncope and speech difficulty.   Hematological: Negative for adenopathy.   Psychiatric/Behavioral: Positive for decreased concentration. Negative for confusion, hallucinations and suicidal ideas. The patient is nervous/anxious.        Objective   /70   Pulse 89   Ht 172.7 cm (68\")   Wt 114 kg (251 lb 6.4 oz)   LMP  (LMP Unknown)   SpO2 97% Comment: ra  BMI 38.23 kg/m²     Physical Exam   Constitutional: She appears well-developed and well-nourished.   HENT:   Head: Normocephalic.   Right Ear: Hearing, tympanic membrane, external ear and ear canal normal.   Left Ear: Hearing, tympanic membrane, external ear and ear canal normal. "   Nose: Nose normal.   Mouth/Throat: Oropharynx is clear and moist.   Eyes: Conjunctivae are normal. Pupils are equal, round, and reactive to light.   Neck: Normal range of motion.   Cardiovascular: Normal rate, regular rhythm and normal heart sounds.   Pulmonary/Chest: Effort normal and breath sounds normal. She has no decreased breath sounds. She has no wheezes. She has no rhonchi. She has no rales.   Musculoskeletal: Normal range of motion.   Neurological: She is alert.   Skin: Skin is warm and dry.   Psychiatric: She has a normal mood and affect. Her behavior is normal.   Nursing note and vitals reviewed.      Results for orders placed or performed in visit on 04/04/19   POCT Glucose   Result Value Ref Range    Glucose 177 (A) 70 - 130 mg/dL   POC Glycosylated Hemoglobin (Hb A1C)   Result Value Ref Range    Hemoglobin A1C 5.8 %        ASSESSMENT/PLAN    Problem List Items Addressed This Visit        Endocrine    Type 2 diabetes mellitus without complication, without long-term current use of insulin (CMS/[facility], located [facility]) - Primary     Diabetes is improving with treatment.   Continue current treatment regimen.  Reminded to bring in blood sugar diary at next visit.  Dietary recommendations for ADA diet.  Regular aerobic exercise.  Diabetes will be reassessed in 3 months.         Relevant Orders    POCT Glucose (Completed)    POC Glycosylated Hemoglobin (Hb A1C) (Completed)       Musculoskeletal and Integument    Strain of neck muscle    Relevant Medications    diclofenac (VOLTAREN) 1 % gel gel       Other    Posttraumatic stress disorder     Psychological condition is unchanged.  Continue current treatment regimen.  Regular aerobic exercise. Gave pt contact info for therapist who does ART.  Psychological condition  will be reassessed at the next regular appointment.                    Return in about 3 months (around 7/4/2019) for Recheck.

## 2019-04-06 NOTE — ASSESSMENT & PLAN NOTE
Psychological condition is unchanged.  Continue current treatment regimen.  Regular aerobic exercise. Gave pt contact info for therapist who does ART.  Psychological condition  will be reassessed at the next regular appointment.

## 2019-05-02 ENCOUNTER — OFFICE VISIT (OUTPATIENT)
Dept: SLEEP MEDICINE | Facility: HOSPITAL | Age: 59
End: 2019-05-02

## 2019-05-02 VITALS
HEART RATE: 84 BPM | OXYGEN SATURATION: 95 % | WEIGHT: 255 LBS | HEIGHT: 66 IN | SYSTOLIC BLOOD PRESSURE: 127 MMHG | DIASTOLIC BLOOD PRESSURE: 66 MMHG | BODY MASS INDEX: 40.98 KG/M2

## 2019-05-02 DIAGNOSIS — G47.33 OSA (OBSTRUCTIVE SLEEP APNEA): Primary | ICD-10-CM

## 2019-05-02 PROCEDURE — 99213 OFFICE O/P EST LOW 20 MIN: CPT | Performed by: NURSE PRACTITIONER

## 2019-05-02 RX ORDER — PROPRANOLOL HYDROCHLORIDE 10 MG/1
TABLET ORAL
COMMUNITY
Start: 2019-04-10 | End: 2020-07-20

## 2019-05-02 NOTE — PROGRESS NOTES
Subjective: Follow-up        Chief Complaint:   Chief Complaint   Patient presents with   • Follow-up       HPI:    Sandra Auguste is a 58 y.o. female here for follow-up of moderate obstructive sleep apnea.  Patient was last seen 1/20/2017.  Patient is here today needing supply refill.  Her current mask is leaking but she feels this is due to her mask being anywhere from 6 to 8 months old patient did feel she was doing well with this which she was able to get supplies on a regular basis.  Patient sleeping 6 hours nightly and has an [place] score of 12/24.  Patient does not have good use of CPAP but states this is due to 4 teeth being extracted in February when she was unable to use due to pain.  Patient did do okay with current settings and wishes to continue CPAP therapy.      Current medications are:   Current Outpatient Medications:   •  acetaminophen (TYLENOL) 500 MG tablet, Take 500 mg by mouth Every 6 (Six) Hours As Needed for Mild Pain ., Disp: , Rfl:   •  albuterol (PROVENTIL HFA;VENTOLIN HFA) 108 (90 BASE) MCG/ACT inhaler, Inhale 1 puff Every 6 (Six) Hours As Needed. INHALE TWO PUFFS BY MOUTH EVERY 6 HOURS AS NEEDED, Disp: , Rfl:   •  ALPRAZolam (XANAX) 0.25 MG tablet, Take 1 tablet by mouth 2 (Two) Times a Day As Needed for Anxiety., Disp: 30 tablet, Rfl: 0  •  amitriptyline (ELAVIL) 75 MG tablet, TAKE ONE TABLET BY MOUTH EVERY NIGHT, Disp: 30 tablet, Rfl: 4  •  BREO ELLIPTA 100-25 MCG/INH inhaler, , Disp: , Rfl:   •  buPROPion XL (WELLBUTRIN XL) 150 MG 24 hr tablet, , Disp: , Rfl:   •  cetirizine (zyrTEC) 10 MG tablet, Take 1 tablet by mouth Daily., Disp: 30 tablet, Rfl: 6  •  diclofenac (VOLTAREN) 1 % gel gel, Apply 4 g topically to the appropriate area as directed 4 (Four) Times a Day As Needed (for pain in shoulders)., Disp: 100 g, Rfl: 1  •  EPINEPHrine (EPIPEN) 0.3 MG/0.3ML solution auto-injector injection, As Needed (FOR SEVERE ALLERGIC REACTION)., Disp: , Rfl:   •  FLUoxetine (PROzac) 20 MG capsule,  Take 40 mg by mouth Daily., Disp: , Rfl:   •  Melatonin 10 MG tablet, , Disp: , Rfl:   •  metFORMIN (GLUCOPHAGE) 500 MG tablet, TAKE ONE TABLET BY MOUTH TWICE A DAY WITH MEALS, Disp: 60 tablet, Rfl: 2  •  Multiple Vitamins-Calcium (ONE-A-DAY WOMENS PO), , Disp: , Rfl:   •  naproxen (NAPROSYN) 500 MG tablet, Take 1 tablet by mouth 2 (Two) Times a Day With Meals., Disp: 30 tablet, Rfl: 1  •  NEXIUM 24HR 20 MG capsule, , Disp: , Rfl:   •  raNITIdine (ZANTAC) 300 MG tablet, Take 1 tablet by mouth Every Night., Disp: 30 tablet, Rfl: 3  •  tiZANidine (ZANAFLEX) 4 MG tablet, Take 1 tablet by mouth every night at bedtime., Disp: 30 tablet, Rfl: 1.      The patient's relevant past medical, surgical, family and social history were reviewed and updated in Epic as appropriate.       Review of Systems   Constitutional: Positive for diaphoresis.   Eyes: Positive for visual disturbance.   Respiratory: Positive for apnea and cough.    Cardiovascular: Positive for palpitations and leg swelling.   Musculoskeletal: Positive for arthralgias and joint swelling.   Allergic/Immunologic: Positive for environmental allergies.   Psychiatric/Behavioral: Positive for dysphoric mood and sleep disturbance. The patient is nervous/anxious.    All other systems reviewed and are negative.        Objective:    Physical Exam   Constitutional: She is oriented to person, place, and time. She appears well-developed and well-nourished.   HENT:   Head: Normocephalic and atraumatic.   Mouth/Throat: Oropharynx is clear and moist.   Mallampati 4 anatomy   Eyes: Conjunctivae are normal.   Neck: Neck supple. No thyromegaly present.   Cardiovascular: Normal rate and regular rhythm.   Pulmonary/Chest: Effort normal and breath sounds normal.   Lymphadenopathy:     She has no cervical adenopathy.   Neurological: She is alert and oriented to person, place, and time.   Skin: Skin is warm and dry.   Psychiatric: She has a normal mood and affect. Her behavior is  normal. Judgment and thought content normal.   Nursing note and vitals reviewed.    67/100 90 days of use.  Greater than 4-hour use 51.1%.  90% pressure 11.3.  AHI 1.4.  Download reviewed with patient.    ASSESSMENT/PLAN    Sandra was seen today for follow-up.    Diagnoses and all orders for this visit:    KELLY (obstructive sleep apnea)  -     CPAP Therapy            1. Counseled patient regarding multimodal approach with healthy nutrition, healthy sleep, regular physical activity, social activities, counseling, and medications. Encouraged to practice lateral sleep position. Avoid alcohol and sedatives close to bedtime.  2. Refill supplies x1 year.  Patient does understand she needs to increase use of CPAP to meet her compliance.  Patient may follow-up in 1 year or sooner if symptoms warrant.  I have reviewed the results of my evaluation and impression and discussed my recommendations in detail with the patient.      Signed by  Giana Rodriguez, REBECA    May 2, 2019      CC: Michelle Mandel PA          No ref. provider found

## 2019-05-23 ENCOUNTER — OFFICE VISIT (OUTPATIENT)
Dept: INTERNAL MEDICINE | Facility: CLINIC | Age: 59
End: 2019-05-23

## 2019-05-23 ENCOUNTER — TELEPHONE (OUTPATIENT)
Dept: INTERNAL MEDICINE | Facility: CLINIC | Age: 59
End: 2019-05-23

## 2019-05-23 VITALS
HEIGHT: 66 IN | SYSTOLIC BLOOD PRESSURE: 130 MMHG | DIASTOLIC BLOOD PRESSURE: 60 MMHG | WEIGHT: 255 LBS | TEMPERATURE: 99.4 F | HEART RATE: 107 BPM | BODY MASS INDEX: 40.98 KG/M2 | OXYGEN SATURATION: 99 %

## 2019-05-23 DIAGNOSIS — R82.90 FOUL SMELLING URINE: ICD-10-CM

## 2019-05-23 DIAGNOSIS — K21.9 GASTROESOPHAGEAL REFLUX DISEASE WITHOUT ESOPHAGITIS: ICD-10-CM

## 2019-05-23 DIAGNOSIS — R10.10 UPPER ABDOMINAL PAIN: Primary | ICD-10-CM

## 2019-05-23 DIAGNOSIS — K59.00 CONSTIPATION, UNSPECIFIED CONSTIPATION TYPE: ICD-10-CM

## 2019-05-23 LAB
ALBUMIN SERPL-MCNC: 4.1 G/DL (ref 3.5–5.2)
ALBUMIN/GLOB SERPL: 1.4 G/DL
ALP SERPL-CCNC: 135 U/L (ref 39–117)
ALT SERPL W P-5'-P-CCNC: 24 U/L (ref 1–33)
ANION GAP SERPL CALCULATED.3IONS-SCNC: 13.8 MMOL/L
AST SERPL-CCNC: 16 U/L (ref 1–32)
BASOPHILS # BLD AUTO: 0.04 10*3/MM3 (ref 0–0.2)
BASOPHILS NFR BLD AUTO: 0.4 % (ref 0–1.5)
BILIRUB BLD-MCNC: NEGATIVE MG/DL
BILIRUB SERPL-MCNC: <0.2 MG/DL (ref 0.2–1.2)
BUN BLD-MCNC: 9 MG/DL (ref 6–20)
BUN/CREAT SERPL: 10.2 (ref 7–25)
CALCIUM SPEC-SCNC: 9.8 MG/DL (ref 8.6–10.5)
CHLORIDE SERPL-SCNC: 101 MMOL/L (ref 98–107)
CLARITY, POC: CLEAR
CO2 SERPL-SCNC: 26.2 MMOL/L (ref 22–29)
COLOR UR: YELLOW
CREAT BLD-MCNC: 0.88 MG/DL (ref 0.57–1)
DEPRECATED RDW RBC AUTO: 45.9 FL (ref 37–54)
EOSINOPHIL # BLD AUTO: 0.36 10*3/MM3 (ref 0–0.4)
EOSINOPHIL NFR BLD AUTO: 4 % (ref 0.3–6.2)
ERYTHROCYTE [DISTWIDTH] IN BLOOD BY AUTOMATED COUNT: 13.6 % (ref 12.3–15.4)
GFR SERPL CREATININE-BSD FRML MDRD: 66 ML/MIN/1.73
GLOBULIN UR ELPH-MCNC: 2.9 GM/DL
GLUCOSE BLD-MCNC: 122 MG/DL (ref 65–99)
GLUCOSE UR STRIP-MCNC: NEGATIVE MG/DL
HCT VFR BLD AUTO: 45.8 % (ref 34–46.6)
HGB BLD-MCNC: 14 G/DL (ref 12–15.9)
IMM GRANULOCYTES # BLD AUTO: 0.03 10*3/MM3 (ref 0–0.05)
IMM GRANULOCYTES NFR BLD AUTO: 0.3 % (ref 0–0.5)
KETONES UR QL: NEGATIVE
LEUKOCYTE EST, POC: NEGATIVE
LIPASE SERPL-CCNC: 21 U/L (ref 13–60)
LYMPHOCYTES # BLD AUTO: 1.53 10*3/MM3 (ref 0.7–3.1)
LYMPHOCYTES NFR BLD AUTO: 17 % (ref 19.6–45.3)
MCH RBC QN AUTO: 28.5 PG (ref 26.6–33)
MCHC RBC AUTO-ENTMCNC: 30.6 G/DL (ref 31.5–35.7)
MCV RBC AUTO: 93.3 FL (ref 79–97)
MONOCYTES # BLD AUTO: 0.51 10*3/MM3 (ref 0.1–0.9)
MONOCYTES NFR BLD AUTO: 5.7 % (ref 5–12)
NEUTROPHILS # BLD AUTO: 6.55 10*3/MM3 (ref 1.7–7)
NEUTROPHILS NFR BLD AUTO: 72.6 % (ref 42.7–76)
NITRITE UR-MCNC: NEGATIVE MG/ML
NRBC BLD AUTO-RTO: 0 /100 WBC (ref 0–0.2)
PH UR: 7 [PH] (ref 5–8)
PLATELET # BLD AUTO: 388 10*3/MM3 (ref 140–450)
PMV BLD AUTO: 10.7 FL (ref 6–12)
POTASSIUM BLD-SCNC: 4.3 MMOL/L (ref 3.5–5.2)
PROT SERPL-MCNC: 7 G/DL (ref 6–8.5)
PROT UR STRIP-MCNC: NEGATIVE MG/DL
RBC # BLD AUTO: 4.91 10*6/MM3 (ref 3.77–5.28)
RBC # UR STRIP: NEGATIVE /UL
SODIUM BLD-SCNC: 141 MMOL/L (ref 136–145)
SP GR UR: 1 (ref 1–1.03)
UROBILINOGEN UR QL: NORMAL
WBC NRBC COR # BLD: 9.02 10*3/MM3 (ref 3.4–10.8)

## 2019-05-23 PROCEDURE — 85025 COMPLETE CBC W/AUTO DIFF WBC: CPT | Performed by: NURSE PRACTITIONER

## 2019-05-23 PROCEDURE — 83690 ASSAY OF LIPASE: CPT | Performed by: NURSE PRACTITIONER

## 2019-05-23 PROCEDURE — 87086 URINE CULTURE/COLONY COUNT: CPT | Performed by: NURSE PRACTITIONER

## 2019-05-23 PROCEDURE — 99214 OFFICE O/P EST MOD 30 MIN: CPT | Performed by: NURSE PRACTITIONER

## 2019-05-23 PROCEDURE — 80053 COMPREHEN METABOLIC PANEL: CPT | Performed by: NURSE PRACTITIONER

## 2019-05-23 RX ORDER — RANITIDINE 300 MG/1
300 TABLET ORAL NIGHTLY
Qty: 30 TABLET | Refills: 3 | Status: SHIPPED | OUTPATIENT
Start: 2019-05-23 | End: 2019-12-20 | Stop reason: SDUPTHER

## 2019-05-23 NOTE — PROGRESS NOTES
"Chief Complaint   Patient presents with   • Abdominal Pain   • Difficulty Urinating       History of Present Illness  58 y.o.female presents for upper abd pain and dysuria.  C/o bilateral upper abd and epigastric pain upset stomach cramping onset few days intermittent, mid nausea no vomiting. Not much appetite. Some constipation takes fiber supplement but doesn't help much.  Some gerd belching burping.  No melena, hematochezia or hematemesis.  Urine has been green and cloudy foul smell; no dysuria just doesn't seem normal.  No fever or chills.  No dizziness.    Review of Systems   Constitutional: Negative for appetite change, chills, fatigue, fever, unexpected weight gain and unexpected weight loss.   Respiratory: Negative for shortness of breath.    Cardiovascular: Negative for chest pain.   Gastrointestinal: Positive for abdominal pain, constipation, nausea, GERD and indigestion. Negative for blood in stool, diarrhea and vomiting.   Genitourinary: Positive for difficulty urinating. Negative for dysuria, flank pain, frequency, hematuria and urgency.        Green urine; foul smelling   Musculoskeletal: Negative for myalgias.   Neurological: Negative for dizziness and light-headedness.         [facility]  The following portions of the patient's history were reviewed and updated as appropriate: allergies, current medications, past family history, past medical history, past social history, past surgical history and problem list.       Past Medical History:   Diagnosis Date   • Anesthesia complication     HAS TROUBLE GETTING \"NUMB\"    • Anxiety    • Arthritis    • Bladder spasms    • Depression    • Diverticulosis    • GERD (gastroesophageal reflux disease)    • Memory loss     FROM SKULL FRACTURE AND FALL-SHORT TERM MEMORY LOSS   • Mental disorder     PTSD   • Migraine    • Obesity    • Seasonal allergies    • Seizures (CMS/[facility])     \"convulsions\" at age 5   • Skull fracture (CMS/[facility]) 2012   • Sleep apnea with use of " continuous positive airway pressure (CPAP)     INSTRUCTED TO BRING OWN MASK AND TUBING    • SOB (shortness of breath) on exertion    • Type 2 diabetes mellitus without complication, without long-term current use of insulin (CMS/[facility]) 10/11/2018   • Wears contact lenses    • Wears partial dentures     TOP ONLY    • Wears prescription eyeglasses    • Wears prescription eyeglasses       Past Surgical History:   Procedure Laterality Date   • CERVICAL POLYPECTOMY     • TOTAL LAPAROSCOPIC HYSTERECTOMY N/A 9/18/2017    Procedure: TOTAL LAPAROSCOPIC HYSTERECTOMY, BILATERAL SALPINGO OOPHORECTOMY WITH DAVINCI ROBOT ;  Surgeon: Shannon Grimaldo DO;  Location: [facility];  Service:    • WISDOM TOOTH EXTRACTION        Allergies   Allergen Reactions   • Mold Extract [Trichophyton] Shortness Of Breath, Anxiety, Irritability, Other (See Comments) and Palpitations     SOA   • Lortab [Hydrocodone-Acetaminophen] Nausea And Vomiting     SEVERE NAUSEA AND SLIGHT VOMITING   • Methylprednisolone Other (See Comments)     Bleeding, cramping   • Pollen Extract Other (See Comments)     SNEEZING AND CONGESTION       Family History   Problem Relation Age of Onset   • Cancer Mother    • Cancer Maternal Aunt    • Cancer Paternal Aunt             Current Outpatient Medications:   •  acetaminophen (TYLENOL) 500 MG tablet, Take 500 mg by mouth Every 6 (Six) Hours As Needed for Mild Pain ., Disp: , Rfl:   •  albuterol (PROVENTIL HFA;VENTOLIN HFA) 108 (90 BASE) MCG/ACT inhaler, Inhale 1 puff Every 6 (Six) Hours As Needed. INHALE TWO PUFFS BY MOUTH EVERY 6 HOURS AS NEEDED, Disp: , Rfl:   •  ALPRAZolam (XANAX) 0.25 MG tablet, Take 1 tablet by mouth 2 (Two) Times a Day As Needed for Anxiety., Disp: 30 tablet, Rfl: 0  •  amitriptyline (ELAVIL) 75 MG tablet, TAKE ONE TABLET BY MOUTH EVERY NIGHT, Disp: 30 tablet, Rfl: 4  •  BREO ELLIPTA 100-25 MCG/INH inhaler, , Disp: , Rfl:   •  buPROPion XL (WELLBUTRIN XL) 150 MG 24 hr tablet, , Disp: , Rfl:   •  cetirizine  "(zyrTEC) 10 MG tablet, Take 1 tablet by mouth Daily., Disp: 30 tablet, Rfl: 6  •  diclofenac (VOLTAREN) 1 % gel gel, Apply 4 g topically to the appropriate area as directed 4 (Four) Times a Day As Needed (for pain in shoulders)., Disp: 100 g, Rfl: 1  •  EPINEPHrine (EPIPEN) 0.3 MG/0.3ML solution auto-injector injection, As Needed (FOR SEVERE ALLERGIC REACTION)., Disp: , Rfl:   •  FLUoxetine (PROzac) 20 MG capsule, Take 40 mg by mouth Daily., Disp: , Rfl:   •  Melatonin 10 MG tablet, , Disp: , Rfl:   •  metFORMIN (GLUCOPHAGE) 500 MG tablet, TAKE ONE TABLET BY MOUTH TWICE A DAY WITH MEALS, Disp: 60 tablet, Rfl: 2  •  Multiple Vitamins-Calcium (ONE-A-DAY WOMENS PO), , Disp: , Rfl:   •  naproxen (NAPROSYN) 500 MG tablet, Take 1 tablet by mouth 2 (Two) Times a Day With Meals., Disp: 30 tablet, Rfl: 1  •  NEXIUM 24HR 20 MG capsule, , Disp: , Rfl:   •  raNITIdine (ZANTAC) 300 MG tablet, Take 1 tablet by mouth Every Night., Disp: 30 tablet, Rfl: 3  •  tiZANidine (ZANAFLEX) 4 MG tablet, Take 1 tablet by mouth every night at bedtime., Disp: 30 tablet, Rfl: 1    VITALS:  /60   Pulse 107   Temp 99.4 °F (37.4 °C)   Ht 167.6 cm (66\")   Wt 116 kg (255 lb)   LMP  (LMP Unknown)   SpO2 99%   BMI 41.16 kg/m²     Physical Exam   Constitutional: She is oriented to person, place, and time. She appears well-developed and well-nourished. No distress.   HENT:   Head: Normocephalic.   Mouth/Throat: Oropharynx is clear and moist.   Eyes: Conjunctivae are normal. No scleral icterus.   Neck: Normal range of motion. Neck supple.   Cardiovascular: Normal rate, regular rhythm and normal heart sounds.   Pulmonary/Chest: Effort normal and breath sounds normal.   Abdominal: Soft. Normal appearance and bowel sounds are normal. She exhibits no distension and no mass. There is no hepatosplenomegaly. There is tenderness in the right upper quadrant, epigastric area and left upper quadrant. There is no rigidity, no rebound, no guarding, no CVA " tenderness, no tenderness at McBurney's point and negative Borden's sign. No hernia.   Neurological: She is alert and oriented to person, place, and time.   Skin: Skin is warm and dry. No rash noted. She is not diaphoretic. No erythema. No pallor.   Nursing note and vitals reviewed.      LABS  Results for orders placed or performed in visit on 05/23/19   Urine Culture - Urine, Urine, Clean Catch   Result Value Ref Range    Urine Culture Culture in progress    Comprehensive Metabolic Panel   Result Value Ref Range    Glucose 122 (H) 65 - 99 mg/dL    BUN 9 6 - 20 mg/dL    Creatinine 0.88 0.57 - 1.00 mg/dL    Sodium 141 136 - 145 mmol/L    Potassium 4.3 3.5 - 5.2 mmol/L    Chloride 101 98 - 107 mmol/L    CO2 26.2 22.0 - 29.0 mmol/L    Calcium 9.8 8.6 - 10.5 mg/dL    Total Protein 7.0 6.0 - 8.5 g/dL    Albumin 4.10 3.50 - 5.20 g/dL    ALT (SGPT) 24 1 - 33 U/L    AST (SGOT) 16 1 - 32 U/L    Alkaline Phosphatase 135 (H) 39 - 117 U/L    Total Bilirubin <0.2 (L) 0.2 - 1.2 mg/dL    eGFR Non African Amer 66 >60 mL/min/1.73    Globulin 2.9 gm/dL    A/G Ratio 1.4 g/dL    BUN/Creatinine Ratio 10.2 7.0 - 25.0    Anion Gap 13.8 mmol/L   Lipase   Result Value Ref Range    Lipase 21 13 - 60 U/L   CBC Auto Differential   Result Value Ref Range    WBC 9.02 3.40 - 10.80 10*3/mm3    RBC 4.91 3.77 - 5.28 10*6/mm3    Hemoglobin 14.0 12.0 - 15.9 g/dL    Hematocrit 45.8 34.0 - 46.6 %    MCV 93.3 79.0 - 97.0 fL    MCH 28.5 26.6 - 33.0 pg    MCHC 30.6 (L) 31.5 - 35.7 g/dL    RDW 13.6 12.3 - 15.4 %    RDW-SD 45.9 37.0 - 54.0 fl    MPV 10.7 6.0 - 12.0 fL    Platelets 388 140 - 450 10*3/mm3    Neutrophil % 72.6 42.7 - 76.0 %    Lymphocyte % 17.0 (L) 19.6 - 45.3 %    Monocyte % 5.7 5.0 - 12.0 %    Eosinophil % 4.0 0.3 - 6.2 %    Basophil % 0.4 0.0 - 1.5 %    Immature Grans % 0.3 0.0 - 0.5 %    Neutrophils, Absolute 6.55 1.70 - 7.00 10*3/mm3    Lymphocytes, Absolute 1.53 0.70 - 3.10 10*3/mm3    Monocytes, Absolute 0.51 0.10 - 0.90 10*3/mm3     Eosinophils, Absolute 0.36 0.00 - 0.40 10*3/mm3    Basophils, Absolute 0.04 0.00 - 0.20 10*3/mm3    Immature Grans, Absolute 0.03 0.00 - 0.05 10*3/mm3    nRBC 0.0 0.0 - 0.2 /100 WBC   POCT urinalysis dipstick, automated   Result Value Ref Range    Color Yellow Yellow, Straw, Dark Yellow, Agnes    Clarity, UA Clear Clear    Specific Gravity  1.005 1.005 - 1.030    pH, Urine 7.0 5.0 - 8.0    Leukocytes Negative Negative    Nitrite, UA Negative Negative    Protein, POC Negative Negative mg/dL    Glucose, UA Negative Negative, 1000 mg/dL (3+) mg/dL    Ketones, UA Negative Negative    Urobilinogen, UA Normal Normal    Bilirubin Negative Negative    Blood, UA Negative Negative       ASSESSMENT/PLAN  Sandra was seen today for abdominal pain and difficulty urinating.    Diagnoses and all orders for this visit:    Upper abdominal pain  Comments:  provided her with sample of nexium to try; need bland diet avoid spicy fatty foods.  Orders:  -     Comprehensive Metabolic Panel  -     CBC & Differential  -     Lipase  -     raNITIdine (ZANTAC) 300 MG tablet; Take 1 tablet by mouth Every Night.  -     CBC Auto Differential    Gastroesophageal reflux disease without esophagitis  Comments:  try nexium; take zantac at night. diet changes.   Orders:  -     raNITIdine (ZANTAC) 300 MG tablet; Take 1 tablet by mouth Every Night.    Constipation, unspecified constipation type  Comments:  miralax sample provided; 1 packet 17g per day mix with water. increase fiber in diet    Foul smelling urine  Comments:  ua ok check culture; could be diet related; drink more water.  Orders:  -     POCT urinalysis dipstick, automated  -     Urine Culture - Urine, Urine, Clean Catch    pt instructions:  Miralax: mix one pack with 8 oz water or other drink; disolve.  Drink a full glass of water afterwards.  Do this once daily as needed for constipation.    Take ranitidine or zantac once nightly every night  You can also use nexium once daily in the mornings;  take on an empty stomach and eat in 30 minutes.    I discussed the patients findings and my recommendations with patient.  Patient was encouraged to keep me informed of any acute changes, lack of improvement, or any new concerning symptoms.    Patient voiced understanding of all instructions and denied further questions.      FOLLOW-UP  Return if symptoms worsen or fail to improve.    Electronically signed by:    REBECA Greco  05/23/2019

## 2019-05-23 NOTE — PATIENT INSTRUCTIONS
Miralax: mix one pack with 8 oz water or other drink; disolve.  Drink a full glass of water afterwards.  Do this once daily as needed for constipation.    Take ranitidine or zantac once nightly every night  You can also use nexium once daily in the mornings; take on an empty stomach and eat in 30 minutes.

## 2019-05-24 LAB — BACTERIA SPEC AEROBE CULT: NO GROWTH

## 2019-05-26 ENCOUNTER — TELEPHONE (OUTPATIENT)
Dept: INTERNAL MEDICINE | Facility: CLINIC | Age: 59
End: 2019-05-26

## 2019-05-26 NOTE — TELEPHONE ENCOUNTER
M for pt to return call, office number given      ----- Message from REBECA Coates sent at 5/26/2019 11:13 AM EDT -----  Call pt with results. Liver function ok; electrolytes ok. Pancrease lab ok. No anemia.

## 2019-06-10 DIAGNOSIS — E11.9 TYPE 2 DIABETES MELLITUS WITHOUT COMPLICATION, WITHOUT LONG-TERM CURRENT USE OF INSULIN (HCC): ICD-10-CM

## 2019-07-09 DIAGNOSIS — F41.9 ANXIETY: ICD-10-CM

## 2019-07-09 DIAGNOSIS — G43.009 MIGRAINE WITHOUT AURA AND WITHOUT STATUS MIGRAINOSUS, NOT INTRACTABLE: ICD-10-CM

## 2019-07-09 RX ORDER — AMITRIPTYLINE HYDROCHLORIDE 75 MG/1
TABLET, FILM COATED ORAL
Qty: 30 TABLET | Refills: 3 | Status: SHIPPED | OUTPATIENT
Start: 2019-07-09 | End: 2019-11-08 | Stop reason: SDUPTHER

## 2019-11-08 DIAGNOSIS — G43.009 MIGRAINE WITHOUT AURA AND WITHOUT STATUS MIGRAINOSUS, NOT INTRACTABLE: ICD-10-CM

## 2019-11-08 DIAGNOSIS — F41.9 ANXIETY: ICD-10-CM

## 2019-11-08 RX ORDER — AMITRIPTYLINE HYDROCHLORIDE 75 MG/1
75 TABLET, FILM COATED ORAL NIGHTLY
Qty: 30 TABLET | Refills: 3 | Status: SHIPPED | OUTPATIENT
Start: 2019-11-08 | End: 2020-02-18 | Stop reason: SDUPTHER

## 2019-11-10 RX ORDER — ALPRAZOLAM 0.25 MG/1
0.25 TABLET ORAL 2 TIMES DAILY PRN
Qty: 30 TABLET | Refills: 0 | OUTPATIENT
Start: 2019-11-10

## 2019-11-11 NOTE — TELEPHONE ENCOUNTER
I have not prescribed this for her. She was going to establish with a psychiatrist to get further refills.

## 2019-12-04 ENCOUNTER — APPOINTMENT (OUTPATIENT)
Dept: GENERAL RADIOLOGY | Facility: HOSPITAL | Age: 59
End: 2019-12-04

## 2019-12-04 ENCOUNTER — HOSPITAL ENCOUNTER (EMERGENCY)
Facility: HOSPITAL | Age: 59
Discharge: HOME | End: 2019-12-04
Attending: EMERGENCY MEDICINE | Admitting: EMERGENCY MEDICINE

## 2019-12-04 VITALS
WEIGHT: 240 LBS | RESPIRATION RATE: 16 BRPM | BODY MASS INDEX: 38.57 KG/M2 | OXYGEN SATURATION: 98 % | DIASTOLIC BLOOD PRESSURE: 80 MMHG | TEMPERATURE: 98.8 F | HEIGHT: 66 IN | HEART RATE: 79 BPM | SYSTOLIC BLOOD PRESSURE: 142 MMHG

## 2019-12-04 DIAGNOSIS — H10.31 ACUTE CONJUNCTIVITIS OF RIGHT EYE, UNSPECIFIED ACUTE CONJUNCTIVITIS TYPE: ICD-10-CM

## 2019-12-04 DIAGNOSIS — R53.1 WEAKNESS GENERALIZED: Primary | ICD-10-CM

## 2019-12-04 DIAGNOSIS — R09.82 POST-NASAL DRIP: ICD-10-CM

## 2019-12-04 DIAGNOSIS — R09.81 NASAL CONGESTION WITH RHINORRHEA: ICD-10-CM

## 2019-12-04 DIAGNOSIS — J30.89 ALLERGIC RHINITIS DUE TO OTHER ALLERGIC TRIGGER, UNSPECIFIED SEASONALITY: ICD-10-CM

## 2019-12-04 DIAGNOSIS — J34.89 NASAL CONGESTION WITH RHINORRHEA: ICD-10-CM

## 2019-12-04 LAB
ALBUMIN SERPL-MCNC: 4 G/DL (ref 3.5–5.2)
ALBUMIN/GLOB SERPL: 1.5 G/DL
ALP SERPL-CCNC: 102 U/L (ref 39–117)
ALT SERPL W P-5'-P-CCNC: 20 U/L (ref 1–33)
ANION GAP SERPL CALCULATED.3IONS-SCNC: 8 MMOL/L (ref 5–15)
AST SERPL-CCNC: 18 U/L (ref 1–32)
BASOPHILS # BLD AUTO: 0.05 10*3/MM3 (ref 0–0.2)
BASOPHILS NFR BLD AUTO: 0.7 % (ref 0–1.5)
BILIRUB SERPL-MCNC: 0.2 MG/DL (ref 0.2–1.2)
BILIRUB UR QL STRIP: NEGATIVE
BUN BLD-MCNC: 12 MG/DL (ref 6–20)
BUN/CREAT SERPL: 17.4 (ref 7–25)
CALCIUM SPEC-SCNC: 9.6 MG/DL (ref 8.6–10.5)
CHLORIDE SERPL-SCNC: 106 MMOL/L (ref 98–107)
CLARITY UR: CLEAR
CO2 SERPL-SCNC: 26 MMOL/L (ref 22–29)
COLOR UR: YELLOW
CREAT BLD-MCNC: 0.69 MG/DL (ref 0.57–1)
DEPRECATED RDW RBC AUTO: 43.1 FL (ref 37–54)
EOSINOPHIL # BLD AUTO: 0.42 10*3/MM3 (ref 0–0.4)
EOSINOPHIL NFR BLD AUTO: 5.8 % (ref 0.3–6.2)
ERYTHROCYTE [DISTWIDTH] IN BLOOD BY AUTOMATED COUNT: 13 % (ref 12.3–15.4)
GFR SERPL CREATININE-BSD FRML MDRD: 88 ML/MIN/1.73
GLOBULIN UR ELPH-MCNC: 2.7 GM/DL
GLUCOSE BLD-MCNC: 112 MG/DL (ref 65–99)
GLUCOSE UR STRIP-MCNC: NEGATIVE MG/DL
HCT VFR BLD AUTO: 43.1 % (ref 34–46.6)
HGB BLD-MCNC: 13.8 G/DL (ref 12–15.9)
HGB UR QL STRIP.AUTO: NEGATIVE
HOLD SPECIMEN: NORMAL
HOLD SPECIMEN: NORMAL
IMM GRANULOCYTES # BLD AUTO: 0.04 10*3/MM3 (ref 0–0.05)
IMM GRANULOCYTES NFR BLD AUTO: 0.5 % (ref 0–0.5)
KETONES UR QL STRIP: NEGATIVE
LEUKOCYTE ESTERASE UR QL STRIP.AUTO: NEGATIVE
LYMPHOCYTES # BLD AUTO: 1.87 10*3/MM3 (ref 0.7–3.1)
LYMPHOCYTES NFR BLD AUTO: 25.6 % (ref 19.6–45.3)
MAGNESIUM SERPL-MCNC: 2.1 MG/DL (ref 1.6–2.6)
MCH RBC QN AUTO: 28.9 PG (ref 26.6–33)
MCHC RBC AUTO-ENTMCNC: 32 G/DL (ref 31.5–35.7)
MCV RBC AUTO: 90.4 FL (ref 79–97)
MONOCYTES # BLD AUTO: 0.49 10*3/MM3 (ref 0.1–0.9)
MONOCYTES NFR BLD AUTO: 6.7 % (ref 5–12)
NEUTROPHILS # BLD AUTO: 4.43 10*3/MM3 (ref 1.7–7)
NEUTROPHILS NFR BLD AUTO: 60.7 % (ref 42.7–76)
NITRITE UR QL STRIP: NEGATIVE
NRBC BLD AUTO-RTO: 0 /100 WBC (ref 0–0.2)
PH UR STRIP.AUTO: 7.5 [PH] (ref 5–8)
PLATELET # BLD AUTO: 272 10*3/MM3 (ref 140–450)
PMV BLD AUTO: 10.1 FL (ref 6–12)
POTASSIUM BLD-SCNC: 4.4 MMOL/L (ref 3.5–5.2)
PROT SERPL-MCNC: 6.7 G/DL (ref 6–8.5)
PROT UR QL STRIP: NEGATIVE
RBC # BLD AUTO: 4.77 10*6/MM3 (ref 3.77–5.28)
SODIUM BLD-SCNC: 140 MMOL/L (ref 136–145)
SP GR UR STRIP: 1.01 (ref 1–1.03)
TROPONIN T SERPL-MCNC: <0.01 NG/ML (ref 0–0.03)
UROBILINOGEN UR QL STRIP: NORMAL
WBC NRBC COR # BLD: 7.3 10*3/MM3 (ref 3.4–10.8)
WHOLE BLOOD HOLD SPECIMEN: NORMAL
WHOLE BLOOD HOLD SPECIMEN: NORMAL

## 2019-12-04 PROCEDURE — 85025 COMPLETE CBC W/AUTO DIFF WBC: CPT

## 2019-12-04 PROCEDURE — 80053 COMPREHEN METABOLIC PANEL: CPT

## 2019-12-04 PROCEDURE — 99283 EMERGENCY DEPT VISIT LOW MDM: CPT

## 2019-12-04 PROCEDURE — 84484 ASSAY OF TROPONIN QUANT: CPT

## 2019-12-04 PROCEDURE — 93005 ELECTROCARDIOGRAM TRACING: CPT

## 2019-12-04 PROCEDURE — 83735 ASSAY OF MAGNESIUM: CPT

## 2019-12-04 PROCEDURE — 93005 ELECTROCARDIOGRAM TRACING: CPT | Performed by: EMERGENCY MEDICINE

## 2019-12-04 PROCEDURE — 81003 URINALYSIS AUTO W/O SCOPE: CPT | Performed by: EMERGENCY MEDICINE

## 2019-12-04 PROCEDURE — 71046 X-RAY EXAM CHEST 2 VIEWS: CPT

## 2019-12-04 RX ORDER — ERYTHROMYCIN 5 MG/G
OINTMENT OPHTHALMIC
Qty: 1 G | Refills: 0 | Status: SHIPPED | OUTPATIENT
Start: 2019-12-04 | End: 2019-12-11

## 2019-12-04 RX ORDER — SODIUM CHLORIDE 0.9 % (FLUSH) 0.9 %
10 SYRINGE (ML) INJECTION AS NEEDED
Status: DISCONTINUED | OUTPATIENT
Start: 2019-12-04 | End: 2019-12-04 | Stop reason: HOSPADM

## 2019-12-04 RX ORDER — FLUTICASONE PROPIONATE 50 MCG
2 SPRAY, SUSPENSION (ML) NASAL DAILY
Qty: 1 BOTTLE | Refills: 0 | Status: SHIPPED | OUTPATIENT
Start: 2019-12-04 | End: 2019-12-20 | Stop reason: SDUPTHER

## 2019-12-04 NOTE — ED PROVIDER NOTES
Goran Auguste is a 59 year old female complaining of generalized weakness. The weakness has been occurring for the past ten days. The patient notes that she occasionally experiences dizziness as if she might lose consciousness. She also describes an incident at the grocery store a few days ago where she experienced a tingling sensation in her nose. She is also experiencing a cough, congestion, fever, shortness of breath, post-nasal drip, fatigue, ear ache, fluctuations in her appetite, and mild sore throat. She is also experiencing increased urinary frequency, though she notes that she has been drinking more fluids than her baseline. She denies constipation, diarrhea, or swelling. The patient has a medical history of type II diabetes mellitus, sleep apnea, and seasonal allergies. She has a surgical history of cervical polypectomy and total laparoscopic hysterectomy. She is currently taking Amitriptyline as well as various over-the-counter medications for her cold-like symptoms. She usually takes medication for her allergies, though she has recently stopped due to concerns over interactions with her cold medications. She has also been unable to use her CPAP machine at night due to her congestion and has had trouble sleeping as a result. There are no other acute complaints at this time.         History provided by:  Patient  Weakness - Generalized   Severity:  Moderate  Onset quality:  Sudden  Duration:  10 days  Chronicity:  New  Context: decreased sleep    Associated symptoms: cough, dizziness, fever, frequency, near-syncope and shortness of breath    Associated symptoms: no diarrhea    Risk factors: diabetes        Review of Systems   Constitutional: Positive for appetite change (Patient notes that her appetite fluctuates. She tends to eat too much at night and too little at other times.), fatigue and fever.   HENT: Positive for congestion, ear pain, postnasal drip and sore throat.    Respiratory: Positive  Problem: Adult Inpatient Plan of Care  Goal: Plan of Care Review  Outcome: Ongoing (interventions implemented as appropriate)  - Pressure dressing applied to old thoracentesis insertion site.  No oozing noted from site, [id number] hematoma right upper back  - 2 units PRBC infused with improvement noted in H/H, holding home Lovenox  - Family at bedside assisting with care  - VS stable, holding home dose Lopressor & Norvasc  - CBG ACHS, SSI if indicated       "for cough and shortness of breath.    Cardiovascular: Positive for near-syncope. Negative for leg swelling.   Gastrointestinal: Negative for constipation and diarrhea.   Genitourinary: Positive for frequency.   Neurological: Positive for dizziness and weakness.   All other systems reviewed and are negative.      Past Medical History:   Diagnosis Date   • Anesthesia complication     HAS TROUBLE GETTING \"NUMB\"    • Anxiety    • Arthritis    • Bladder spasms    • Depression    • Diverticulosis    • GERD (gastroesophageal reflux disease)    • Memory loss     FROM SKULL FRACTURE AND FALL-SHORT TERM MEMORY LOSS   • Mental disorder     PTSD   • Migraine    • Obesity    • Seasonal allergies    • Seizures (CMS/[facility])     \"convulsions\" at age 5   • Skull fracture (CMS/[facility]) 2012   • Sleep apnea with use of continuous positive airway pressure (CPAP)     INSTRUCTED TO BRING OWN MASK AND TUBING    • SOB (shortness of breath) on exertion    • Type 2 diabetes mellitus without complication, without long-term current use of insulin (CMS/[facility]) 10/11/2018   • Wears contact lenses    • Wears partial dentures     TOP ONLY    • Wears prescription eyeglasses    • Wears prescription eyeglasses        Allergies   Allergen Reactions   • Mold Extract [Trichophyton] Shortness Of Breath, Anxiety, Irritability, Other (See Comments) and Palpitations     SOA   • Lortab [Hydrocodone-Acetaminophen] Nausea And Vomiting     SEVERE NAUSEA AND SLIGHT VOMITING   • Methylprednisolone Other (See Comments)     Bleeding, cramping   • Pollen Extract Other (See Comments)     SNEEZING AND CONGESTION        Past Surgical History:   Procedure Laterality Date   • CERVICAL POLYPECTOMY     • TOTAL LAPAROSCOPIC HYSTERECTOMY N/A 9/18/2017    Procedure: TOTAL LAPAROSCOPIC HYSTERECTOMY, BILATERAL SALPINGO OOPHORECTOMY WITH DAVINCI ROBOT ;  Surgeon: Shannon Grimaldo DO;  Location: [facility] include [facility] beds at the [facility];  Service:    • WISDOM TOOTH EXTRACTION         Family History   Problem Relation Age " of Onset   • Cancer Mother    • Cancer Maternal Aunt    • Cancer Paternal Aunt        Social History     Socioeconomic History   • Marital status: Single     Spouse name: Not on file   • Number of children: Not on file   • Years of education: Not on file   • Highest education level: Not on file   Tobacco Use   • Smoking status: Never Smoker   • Smokeless tobacco: Never Used   Substance and Sexual Activity   • Alcohol use: No     Comment: ONCE A YEAR ON XMAS, IF THAT   • Drug use: No   • Sexual activity: No     Birth control/protection: Post-menopausal   Social History Narrative             Objective   Physical Exam   Constitutional: She is oriented to person, place, and time. She appears well-developed and well-nourished.   HENT:   Head: Normocephalic and atraumatic.   Right Ear: Tympanic membrane normal.   Left Ear: Tympanic membrane normal.   Nose: Nose normal.   Tympanic membranes clear. Nares with mild nasal congestion.   Eyes: EOM are normal. No scleral icterus.   Mild right conjunctivitis.   Neck: Normal range of motion. Neck supple.   Cardiovascular: Normal rate and regular rhythm.   No murmur heard.  Pulmonary/Chest: Effort normal and breath sounds normal. No respiratory distress.   Lungs clear to auscultation.   Abdominal: Soft. There is no tenderness.   Musculoskeletal: Normal range of motion. She exhibits no edema.   Neurological: She is alert and oriented to person, place, and time.   Skin: Skin is warm and dry.   Psychiatric: She has a normal mood and affect. Her behavior is normal.   Nursing note and vitals reviewed.      Procedures         ED Course  ED Course as of Dec 04 1943   Wed Dec 04, 2019   1729 ECG 12 Lead [RF]   1816 Patient presents to ED with complaints of fatigue. Reports inability to wear her CPAP at night secondary to increased nasal congestion. Symptoms and presentation consistent with allergic rhinitis and patient reports having discontinued her allergy medication. Patient is  afebrile, nontoxic appearing, vital signs stable and maintains O2 sats of 97% on room air. Patient will be discharged home and instructed to start taking her antihistamine again and prescribed additional Rx for symptomatic care. Patient is agreeable to plan of care of outpatient follow up, provided clear return precautions and demonstrated understanding.   [JG]      ED Course User Index  [JG] Hollis Michaels PA  [RF] Jocelin Kaufman       Recent Results (from the past 24 hour(s))   Comprehensive Metabolic Panel    Collection Time: 12/04/19  1:56 PM   Result Value Ref Range    Glucose 112 (H) 65 - 99 mg/dL    BUN 12 6 - 20 mg/dL    Creatinine 0.69 0.57 - 1.00 mg/dL    Sodium 140 136 - 145 mmol/L    Potassium 4.4 3.5 - 5.2 mmol/L    Chloride 106 98 - 107 mmol/L    CO2 26.0 22.0 - 29.0 mmol/L    Calcium 9.6 8.6 - 10.5 mg/dL    Total Protein 6.7 6.0 - 8.5 g/dL    Albumin 4.00 3.50 - 5.20 g/dL    ALT (SGPT) 20 1 - 33 U/L    AST (SGOT) 18 1 - 32 U/L    Alkaline Phosphatase 102 39 - 117 U/L    Total Bilirubin 0.2 0.2 - 1.2 mg/dL    eGFR Non African Amer 88 >60 mL/min/1.73    Globulin 2.7 gm/dL    A/G Ratio 1.5 g/dL    BUN/Creatinine Ratio 17.4 7.0 - 25.0    Anion Gap 8.0 5.0 - 15.0 mmol/L   Troponin    Collection Time: 12/04/19  1:56 PM   Result Value Ref Range    Troponin T <0.010 0.000 - 0.030 ng/mL   Magnesium    Collection Time: 12/04/19  1:56 PM   Result Value Ref Range    Magnesium 2.1 1.6 - 2.6 mg/dL   Light Blue Top    Collection Time: 12/04/19  1:56 PM   Result Value Ref Range    Extra Tube hold for add-on    Green Top (Gel)    Collection Time: 12/04/19  1:56 PM   Result Value Ref Range    Extra Tube Hold for add-ons.    Lavender Top    Collection Time: 12/04/19  1:56 PM   Result Value Ref Range    Extra Tube hold for add-on    Gold Top - SST    Collection Time: 12/04/19  1:56 PM   Result Value Ref Range    Extra Tube Hold for add-ons.    CBC Auto Differential    Collection Time: 12/04/19  1:56 PM   Result Value  Ref Range    WBC 7.30 3.40 - 10.80 10*3/mm3    RBC 4.77 3.77 - 5.28 10*6/mm3    Hemoglobin 13.8 12.0 - 15.9 g/dL    Hematocrit 43.1 34.0 - 46.6 %    MCV 90.4 79.0 - 97.0 fL    MCH 28.9 26.6 - 33.0 pg    MCHC 32.0 31.5 - 35.7 g/dL    RDW 13.0 12.3 - 15.4 %    RDW-SD 43.1 37.0 - 54.0 fl    MPV 10.1 6.0 - 12.0 fL    Platelets 272 140 - 450 10*3/mm3    Neutrophil % 60.7 42.7 - 76.0 %    Lymphocyte % 25.6 19.6 - 45.3 %    Monocyte % 6.7 5.0 - 12.0 %    Eosinophil % 5.8 0.3 - 6.2 %    Basophil % 0.7 0.0 - 1.5 %    Immature Grans % 0.5 0.0 - 0.5 %    Neutrophils, Absolute 4.43 1.70 - 7.00 10*3/mm3    Lymphocytes, Absolute 1.87 0.70 - 3.10 10*3/mm3    Monocytes, Absolute 0.49 0.10 - 0.90 10*3/mm3    Eosinophils, Absolute 0.42 (H) 0.00 - 0.40 10*3/mm3    Basophils, Absolute 0.05 0.00 - 0.20 10*3/mm3    Immature Grans, Absolute 0.04 0.00 - 0.05 10*3/mm3    nRBC 0.0 0.0 - 0.2 /100 WBC   Urinalysis With Microscopic If Indicated (No Culture) - Urine, Clean Catch    Collection Time: 12/04/19  5:01 PM   Result Value Ref Range    Color, UA Yellow Yellow, Straw    Appearance, UA Clear Clear    pH, UA 7.5 5.0 - 8.0    Specific Gravity, UA 1.007 1.001 - 1.030    Glucose, UA Negative Negative    Ketones, UA Negative Negative    Bilirubin, UA Negative Negative    Blood, UA Negative Negative    Protein, UA Negative Negative    Leuk Esterase, UA Negative Negative    Nitrite, UA Negative Negative    Urobilinogen, UA 0.2 E.U./dL 0.2 - 1.0 E.U./dL     Note: In addition to lab results from this visit, the labs listed above may include labs taken at another facility or during a different encounter within the last 24 hours. Please correlate lab times with ED admission and discharge times for further clarification of the services performed during this visit.    XR Chest PA & Lateral   Preliminary Result   No acute cardiopulmonary process.       D:  12/04/2019   E:  12/04/2019                Vitals:    12/04/19 1351 12/04/19 1828   BP: 143/82  "142/80   BP Location: Right arm Right arm   Patient Position: Sitting Sitting   Pulse: 80 79   Resp: 16 16   Temp: 99 °F (37.2 °C) 98.8 °F (37.1 °C)   TempSrc: Oral Oral   SpO2: 97% 98%   Weight: 109 kg (240 lb)    Height: 167.6 cm (66\")      Medications   sodium chloride 0.9 % flush 10 mL (not administered)     ECG/EMG Results (last 24 hours)     Procedure Component Value Units Date/Time    ECG 12 Lead [[id number]] Collected:  12/04/19 1355     Updated:  12/04/19 1705    Narrative:       Test Reason : SOB  Blood Pressure : **/** mmHG  Vent. Rate : 077 BPM     Atrial Rate : 077 BPM     P-R Int : 144 ms          QRS Dur : 100 ms      QT Int : 386 ms       P-R-T Axes : 024 -40 019 degrees     QTc Int : 436 ms    Sinus rhythm with premature atrial complexes  Left axis deviation  Low voltage QRS  Cannot rule out Anterior infarct , age undetermined  Abnormal ECG  No previous ECGs available  Confirmed by HIMANSHU FELICIANO MD ([id number]) on 12/4/2019 5:04:58 PM    Referred By: MD ER           Confirmed By:HIMANSHU FELICIANO MD        ECG 12 Lead   Final Result   Test Reason : SOB   Blood Pressure : **/** mmHG   Vent. Rate : 077 BPM     Atrial Rate : 077 BPM      P-R Int : 144 ms          QRS Dur : 100 ms       QT Int : 386 ms       P-R-T Axes : 024 -40 019 degrees      QTc Int : 436 ms      Sinus rhythm with premature atrial complexes   Left axis deviation   Low voltage QRS   Cannot rule out Anterior infarct , age undetermined   Abnormal ECG   No previous ECGs available   Confirmed by HIMANSHU FELICIANO MD ([id number]) on 12/4/2019 5:04:58 PM      Referred By: MD ER           Confirmed By:HIMANSHU FELICIANO MD                      MDM  Number of Diagnoses or Management Options     Amount and/or Complexity of Data Reviewed  Clinical lab tests: reviewed  Tests in the radiology section of CPT®: reviewed  Tests in the medicine section of CPT®: reviewed  Discussion of test results with the performing providers: yes  Discuss the patient with other providers: " yes    Risk of Complications, Morbidity, and/or Mortality  Presenting problems: moderate        Final diagnoses:   Weakness generalized   Nasal congestion with rhinorrhea   Post-nasal drip   Acute conjunctivitis of right eye, unspecified acute conjunctivitis type   Allergic rhinitis due to other allergic trigger, unspecified seasonality       Documentation assistance provided by ana Kaufman.  Information recorded by the scribe was done at my direction and has been verified and validated by me.     Jocelin Kaufman  12/04/19 1714       Hollis Michaels PA  12/04/19 1823       Hollis Michaels PA  12/04/19 1942       Hollis Michaels PA  12/04/19 1943       Hollis Michaels PA  12/04/19 1947       Hollis Michaels PA  12/04/19 2021

## 2019-12-18 ENCOUNTER — TELEPHONE (OUTPATIENT)
Dept: INTERNAL MEDICINE | Facility: CLINIC | Age: 59
End: 2019-12-18

## 2019-12-18 DIAGNOSIS — F41.9 ANXIETY: ICD-10-CM

## 2019-12-18 RX ORDER — FLUOXETINE HYDROCHLORIDE 20 MG/1
40 CAPSULE ORAL DAILY
Status: CANCELLED | OUTPATIENT
Start: 2019-12-18

## 2019-12-18 RX ORDER — ALPRAZOLAM 0.25 MG/1
0.25 TABLET ORAL 2 TIMES DAILY PRN
Qty: 30 TABLET | Refills: 0 | Status: CANCELLED | OUTPATIENT
Start: 2019-12-18

## 2019-12-18 NOTE — TELEPHONE ENCOUNTER
Patient called to request refills for the FLUoxetine (PROzac) 20 MG capsule, the the ALPRAZolam (XANAX) 0.25 MG tablet, and the BREO ELLIPTA 100-25 MCG/INH inhaler. The patient said that she is out all of these medications. The patient has scheduled Michelle Mandel's next available office visit on Monday (12/23/19) at 11:30 AM. I confirmed the correct pharmacy with the patient to be [facility]. If there are any questions or concerns please call the patient back at [phone] or the pharmacy at [facility] at [phone].

## 2019-12-20 ENCOUNTER — OFFICE VISIT (OUTPATIENT)
Dept: INTERNAL MEDICINE | Facility: CLINIC | Age: 59
End: 2019-12-20

## 2019-12-20 VITALS
SYSTOLIC BLOOD PRESSURE: 130 MMHG | DIASTOLIC BLOOD PRESSURE: 80 MMHG | WEIGHT: 248 LBS | OXYGEN SATURATION: 98 % | BODY MASS INDEX: 39.86 KG/M2 | HEART RATE: 85 BPM | HEIGHT: 66 IN

## 2019-12-20 DIAGNOSIS — Z23 NEED FOR VACCINATION: ICD-10-CM

## 2019-12-20 DIAGNOSIS — F41.9 ANXIETY: ICD-10-CM

## 2019-12-20 DIAGNOSIS — K21.9 GASTROESOPHAGEAL REFLUX DISEASE WITHOUT ESOPHAGITIS: ICD-10-CM

## 2019-12-20 DIAGNOSIS — R10.10 UPPER ABDOMINAL PAIN: ICD-10-CM

## 2019-12-20 DIAGNOSIS — J30.2 SEASONAL ALLERGIES: ICD-10-CM

## 2019-12-20 DIAGNOSIS — R06.02 SHORTNESS OF BREATH: Primary | ICD-10-CM

## 2019-12-20 DIAGNOSIS — E11.9 TYPE 2 DIABETES MELLITUS WITHOUT COMPLICATION, WITHOUT LONG-TERM CURRENT USE OF INSULIN (HCC): ICD-10-CM

## 2019-12-20 LAB — HBA1C MFR BLD: 5.9 %

## 2019-12-20 PROCEDURE — 83036 HEMOGLOBIN GLYCOSYLATED A1C: CPT | Performed by: NURSE PRACTITIONER

## 2019-12-20 PROCEDURE — 90674 CCIIV4 VAC NO PRSV 0.5 ML IM: CPT | Performed by: NURSE PRACTITIONER

## 2019-12-20 PROCEDURE — 99214 OFFICE O/P EST MOD 30 MIN: CPT | Performed by: NURSE PRACTITIONER

## 2019-12-20 PROCEDURE — 90471 IMMUNIZATION ADMIN: CPT | Performed by: NURSE PRACTITIONER

## 2019-12-20 RX ORDER — ALPRAZOLAM 0.25 MG/1
0.25 TABLET ORAL 2 TIMES DAILY PRN
Qty: 30 TABLET | Refills: 0 | Status: SHIPPED | OUTPATIENT
Start: 2019-12-20 | End: 2020-08-13 | Stop reason: SDUPTHER

## 2019-12-20 RX ORDER — ALBUTEROL SULFATE 90 UG/1
1 AEROSOL, METERED RESPIRATORY (INHALATION) EVERY 6 HOURS PRN
Qty: 1 INHALER | Refills: 2 | Status: SHIPPED | OUTPATIENT
Start: 2019-12-20 | End: 2022-12-08

## 2019-12-20 RX ORDER — FLUOXETINE HYDROCHLORIDE 20 MG/1
40 CAPSULE ORAL DAILY
Qty: 30 CAPSULE | Refills: 2 | Status: SHIPPED | OUTPATIENT
Start: 2019-12-20 | End: 2020-01-30

## 2019-12-20 RX ORDER — FLUTICASONE PROPIONATE 50 MCG
2 SPRAY, SUSPENSION (ML) NASAL DAILY
Qty: 1 BOTTLE | Refills: 0 | Status: SHIPPED | OUTPATIENT
Start: 2019-12-20 | End: 2020-07-27 | Stop reason: SDUPTHER

## 2019-12-20 RX ORDER — RANITIDINE 300 MG/1
300 TABLET ORAL NIGHTLY
Qty: 30 TABLET | Refills: 3 | Status: SHIPPED | OUTPATIENT
Start: 2019-12-20 | End: 2020-07-20

## 2019-12-20 NOTE — PATIENT INSTRUCTIONS
Food Choices for Gastroesophageal Reflux Disease, Adult  When you have gastroesophageal reflux disease (GERD), the foods you eat and your eating habits are very important. Choosing the right foods can help ease your discomfort. Think about working with a nutrition specialist (dietitian) to help you make good choices.  What are tips for following this plan?    Meals  · Choose healthy foods that are low in fat, such as fruits, vegetables, whole grains, low-fat dairy products, and lean meat, fish, and poultry.  · Eat small meals often instead of 3 large meals a day. Eat your meals slowly, and in a place where you are relaxed. Avoid bending over or lying down until 2-3 hours after eating.  · Avoid eating meals 2-3 hours before bed.  · Avoid drinking a lot of liquid with meals.  · Cook foods using methods other than frying. Bake, grill, or broil food instead.  · Avoid or limit:  ? Chocolate.  ? Peppermint or spearmint.  ? Alcohol.  ? Pepper.  ? Black and decaffeinated coffee.  ? Black and decaffeinated tea.  ? Bubbly (carbonated) soft drinks.  ? Caffeinated energy drinks and soft drinks.  · Limit high-fat foods such as:  ? Fatty meat or fried foods.  ? Whole milk, cream, butter, or ice cream.  ? Nuts and nut butters.  ? Pastries, donuts, and sweets made with butter or shortening.  · Avoid foods that cause symptoms. These foods may be different for everyone. Common foods that cause symptoms include:  ? Tomatoes.  ? Oranges, fabrice, and limes.  ? Peppers.  ? Spicy food.  ? Onions and garlic.  ? Vinegar.  Lifestyle  · Maintain a healthy weight. Ask your doctor what weight is healthy for you. If you need to lose weight, work with your doctor to do so safely.  · Exercise for at least 30 minutes for 5 or more days each week, or as told by your doctor.  · Wear loose-fitting clothes.  · Do not smoke. If you need help quitting, ask your doctor.  · Sleep with the head of your bed higher than your feet. Use a wedge under the  mattress or blocks under the bed frame to raise the head of the bed.  Summary  · When you have gastroesophageal reflux disease (GERD), food and lifestyle choices are very important in easing your symptoms.  · Eat small meals often instead of 3 large meals a day. Eat your meals slowly, and in a place where you are relaxed.  · Limit high-fat foods such as fatty meat or fried foods.  · Avoid bending over or lying down until 2-3 hours after eating.  · Avoid peppermint and spearmint, caffeine, alcohol, and chocolate.  This information is not intended to replace advice given to you by your health care provider. Make sure you discuss any questions you have with your health care provider.  Document Released: 06/18/2013 Document Revised: 01/23/2018 Document Reviewed: 01/23/2018  [employer] Interactive Patient Education © 2019 [employer]    Type 2 Diabetes Mellitus, Diagnosis, Adult  Type 2 diabetes (type 2 diabetes mellitus) is a long-term (chronic) disease. It may be caused by one or both of these problems:  · Your pancreas does not make enough of a hormone called insulin.  · Your body does not react in a normal way to insulin that it makes.  Insulin lets sugars (glucose) go into cells in your body. This gives you energy. If you have type 2 diabetes, sugars cannot get into cells. This causes high blood sugar (hyperglycemia).  Your doctor will set treatment goals for you. Generally, you should have these blood sugar levels:  · Before meals (preprandial):  mg/dL (4.4-7.2 mmol/L).  · After meals (postprandial): below 180 mg/dL (10 mmol/L).  · A1c (hemoglobin A1c) level: less than 7%.  Follow these instructions at home:  Questions to ask your doctor  · You may want to ask these questions:  ? Do I need to meet with a diabetes educator?  ? Where can I find a support group for people with diabetes?  ? What equipment will I need to care for myself at home?  ? What diabetes medicines do I need? When should I take them?  ? How  often do I need to check my blood sugar?  ? What number can I call if I have questions?  ? When is my next doctor's visit?  General instructions  · Take over-the-counter and prescription medicines only as told by your doctor.  · Keep all follow-up visits as told by your doctor. This is important.  Contact a doctor if:  · Your blood sugar is at or above 240 mg/dL (13.3 mmol/L) for 2 days in a row.  · You have been sick for 2 days or more, and you are not getting better.  · You have had a fever for 2 days or more, and you are not getting better.  · You have any of these problems for more than 6 hours:  ? You cannot eat or drink.  ? You feel sick to your stomach (nauseous).  ? You throw up (vomit).  ? You have watery poop (diarrhea).  Get help right away if:  · Your blood sugar is lower than 54 mg/dL (3 mmol/L).  · You get confused.  · You have trouble:  ? Thinking clearly.  ? Breathing.  · You have moderate or large ketone levels in your pee (urine).  Summary  · Type 2 diabetes is a long-term (chronic) disease. Your pancreas may not make enough of a hormone called insulin, or your body may not react normally to insulin that it makes.  · Take over-the-counter and prescription medicines only as told by your doctor.  · Keep all follow-up visits as told by your doctor. This is important.  This information is not intended to replace advice given to you by your health care provider. Make sure you discuss any questions you have with your health care provider.  Document Released: 09/26/2009 Document Revised: 07/19/2018 Document Reviewed: 01/20/2017  [employer] Interactive Patient Education © 2019 [employer]

## 2019-12-20 NOTE — PROGRESS NOTES
Subjective     Sandra Auguste is a 59 y.o. female here today for Medication refills (Pt in today for medication refills)        I have reviewed the following portions of the patient's history and confirmed they are accurate: allergies, current medications, past family history, past medical history, past social history, past surgical history and problem list    I have personally completed the patient's review of systems.  Anxiety   Presents for follow-up visit. Symptoms include excessive worry, nervous/anxious behavior and shortness of breath. Patient reports no chest pain, dizziness, hyperventilation, nausea, palpitations, panic or suicidal ideas. Symptoms occur most days. The severity of symptoms is moderate. The quality of sleep is fair. Nighttime awakenings: occasional.     Compliance with medications is 26-50%. Side effects of treatment include GI discomfort.   Diabetes   She presents for her follow-up diabetic visit. She has type 2 diabetes mellitus. No MedicAlert identification noted. Her disease course has been stable. Hypoglycemia symptoms include nervousness/anxiousness. Pertinent negatives for hypoglycemia include no dizziness. There are no diabetic associated symptoms. Pertinent negatives for diabetes include no blurred vision, no chest pain, no fatigue, no polydipsia, no polyphagia, no polyuria, no weakness and no weight loss. There are no hypoglycemic complications. Symptoms are stable. Risk factors for coronary artery disease include diabetes mellitus, sedentary lifestyle and obesity. Current diabetic treatment includes oral agent (monotherapy). She is compliant with treatment some of the time. She is following a generally unhealthy diet. When asked about meal planning, she reported none. She never participates in exercise.   GI Problem   The primary symptoms include abdominal pain. Primary symptoms do not include fever, weight loss, fatigue, nausea, vomiting, diarrhea, dysuria, myalgias or rash. The  illness began more than 7 days ago. The onset was gradual. The problem has not changed since onset.  The illness does not include chills, bloating, constipation, back pain or itching. Significant associated medical issues include GERD.       Review of Systems   Constitutional: Negative for chills, fatigue, fever, unexpected weight gain and unexpected weight loss.   HENT: Negative for sore throat and swollen glands.    Eyes: Negative for blurred vision and visual disturbance.   Respiratory: Positive for shortness of breath and wheezing. Negative for cough.    Cardiovascular: Negative for chest pain and palpitations.   Gastrointestinal: Positive for abdominal pain and GERD. Negative for bloating, constipation, diarrhea, nausea and vomiting.   Endocrine: Negative for polydipsia, polyphagia and polyuria.   Genitourinary: Negative for dysuria.   Musculoskeletal: Negative for back pain and myalgias.   Skin: Negative for itching and rash.   Allergic/Immunologic: Negative for environmental allergies and immunocompromised state.   Neurological: Negative for dizziness, weakness and numbness.   Hematological: Negative for adenopathy.   Psychiatric/Behavioral: Positive for stress. Negative for behavioral problems and suicidal ideas. The patient is nervous/anxious.        Current Outpatient Medications on File Prior to Visit   Medication Sig   • acetaminophen (TYLENOL) 500 MG tablet Take 500 mg by mouth Every 6 (Six) Hours As Needed for Mild Pain .   • amitriptyline (ELAVIL) 75 MG tablet Take 1 tablet by mouth Every Night.   • cetirizine (zyrTEC) 10 MG tablet Take 1 tablet by mouth Daily.   • diclofenac (VOLTAREN) 1 % gel gel Apply 4 g topically to the appropriate area as directed 4 (Four) Times a Day As Needed (for pain in shoulders).   • EPINEPHrine (EPIPEN) 0.3 MG/0.3ML solution auto-injector injection As Needed (FOR SEVERE ALLERGIC REACTION).   • Melatonin 10 MG tablet    • Multiple Vitamins-Calcium (ONE-A-DAY WOMENS PO)   "  • naproxen (NAPROSYN) 500 MG tablet Take 1 tablet by mouth 2 (Two) Times a Day With Meals.   • NEXIUM 24HR 20 MG capsule    • tiZANidine (ZANAFLEX) 4 MG tablet Take 1 tablet by mouth every night at bedtime.   • [DISCONTINUED] albuterol (PROVENTIL HFA;VENTOLIN HFA) 108 (90 BASE) MCG/ACT inhaler Inhale 1 puff Every 6 (Six) Hours As Needed. INHALE TWO PUFFS BY MOUTH EVERY 6 HOURS AS NEEDED   • [DISCONTINUED] ALPRAZolam (XANAX) 0.25 MG tablet Take 1 tablet by mouth 2 (Two) Times a Day As Needed for Anxiety.   • [DISCONTINUED] BREO ELLIPTA 100-25 MCG/INH inhaler    • [DISCONTINUED] buPROPion XL (WELLBUTRIN XL) 150 MG 24 hr tablet    • [DISCONTINUED] FLUoxetine (PROzac) 20 MG capsule Take 40 mg by mouth Daily.   • [DISCONTINUED] fluticasone (FLONASE) 50 MCG/ACT nasal spray 2 sprays into the nostril(s) as directed by provider Daily for 30 days.   • [DISCONTINUED] metFORMIN (GLUCOPHAGE) 500 MG tablet TAKE ONE TABLET BY MOUTH TWICE A DAY WITH MEALS   • [DISCONTINUED] raNITIdine (ZANTAC) 300 MG tablet Take 1 tablet by mouth Every Night.     No current facility-administered medications on file prior to visit.        Objective   Vitals:    12/20/19 1355   BP: 130/80   Pulse: 85   SpO2: 98%   Weight: 112 kg (248 lb)   Height: 167.6 cm (66\")   PainSc: 0-No pain     Body mass index is 40.03 kg/m².    Physical Exam   Constitutional: She is oriented to person, place, and time. She appears well-developed and well-nourished. No distress.   HENT:   Head: Normocephalic and atraumatic.   Eyes: Pupils are equal, round, and reactive to light. EOM are normal.   Neck: Normal range of motion. Neck supple.   Cardiovascular: Normal rate, regular rhythm and normal heart sounds. Exam reveals no gallop and no friction rub.   No murmur heard.  Pulmonary/Chest: Effort normal. She has wheezes.   cough   Abdominal: Normal appearance.   Musculoskeletal: Normal range of motion. She exhibits no tenderness.   Lymphadenopathy:     She has no cervical " adenopathy.   Neurological: She is alert and oriented to person, place, and time.   Skin: Skin is warm and dry. Capillary refill takes less than 2 seconds. Turgor is normal. No rash noted. She is not diaphoretic.   Psychiatric: She has a normal mood and affect. Her behavior is normal. Judgment and thought content normal.   Nursing note and vitals reviewed.      Assessment/Plan   Sandra was seen today for medication refills.    Diagnoses and all orders for this visit:    Shortness of breath  Comments:  Start using inhalers as prescribed to prevent breathing problems.  Orders:  -     albuterol sulfate  (90 Base) MCG/ACT inhaler; Inhale 1 puff Every 6 (Six) Hours As Needed for Shortness of Air. INHALE TWO PUFFS BY MOUTH EVERY 6 HOURS AS NEEDED  -     BREO ELLIPTA 100-25 MCG/INH inhaler; Inhale 1 puff Daily.    Anxiety  Comments:  Patient is going to look back into starting therapy. She had a lapse in insurance for 6 months.    Orders:  -     ALPRAZolam (XANAX) 0.25 MG tablet; Take 1 tablet by mouth 2 (Two) Times a Day As Needed for Anxiety.  -     FLUoxetine (PROzac) 20 MG capsule; Take 2 capsules by mouth Daily.    Gastroesophageal reflux disease without esophagitis  Comments:  try nexium; take zantac at night. diet changes.   Orders:  -     raNITIdine (ZANTAC) 300 MG tablet; Take 1 tablet by mouth Every Night.    Upper abdominal pain  Comments:  provided her with sample of nexium to try; need bland diet avoid spicy fatty foods.  Orders:  -     raNITIdine (ZANTAC) 300 MG tablet; Take 1 tablet by mouth Every Night.    Type 2 diabetes mellitus without complication, without long-term current use of insulin (CMS/[facility])  Comments:  Continue to monitor diet and increase physical activity.  Orders:  -     POC Glycosylated Hemoglobin (Hb A1C)    Seasonal allergies  Comments:  Continue on current regimen.   Orders:  -     fluticasone (FLONASE) 50 MCG/ACT nasal spray; 2 sprays into the nostril(s) as directed by provider  Daily for 30 days.    Need for vaccination  Comments:  Flu vaccine given today.    Other orders  -     Flucelvax Quad=>4Years (3806-8131)           Current Outpatient Medications:   •  acetaminophen (TYLENOL) 500 MG tablet, Take 500 mg by mouth Every 6 (Six) Hours As Needed for Mild Pain ., Disp: , Rfl:   •  albuterol sulfate  (90 Base) MCG/ACT inhaler, Inhale 1 puff Every 6 (Six) Hours As Needed for Shortness of Air. INHALE TWO PUFFS BY MOUTH EVERY 6 HOURS AS NEEDED, Disp: 1 inhaler, Rfl: 2  •  ALPRAZolam (XANAX) 0.25 MG tablet, Take 1 tablet by mouth 2 (Two) Times a Day As Needed for Anxiety., Disp: 30 tablet, Rfl: 0  •  amitriptyline (ELAVIL) 75 MG tablet, Take 1 tablet by mouth Every Night., Disp: 30 tablet, Rfl: 3  •  BREO ELLIPTA 100-25 MCG/INH inhaler, Inhale 1 puff Daily., Disp: 1 inhaler, Rfl: 2  •  cetirizine (zyrTEC) 10 MG tablet, Take 1 tablet by mouth Daily., Disp: 30 tablet, Rfl: 6  •  diclofenac (VOLTAREN) 1 % gel gel, Apply 4 g topically to the appropriate area as directed 4 (Four) Times a Day As Needed (for pain in shoulders)., Disp: 100 g, Rfl: 1  •  EPINEPHrine (EPIPEN) 0.3 MG/0.3ML solution auto-injector injection, As Needed (FOR SEVERE ALLERGIC REACTION)., Disp: , Rfl:   •  FLUoxetine (PROzac) 20 MG capsule, Take 2 capsules by mouth Daily., Disp: 30 capsule, Rfl: 2  •  fluticasone (FLONASE) 50 MCG/ACT nasal spray, 2 sprays into the nostril(s) as directed by provider Daily for 30 days., Disp: 1 bottle, Rfl: 0  •  Melatonin 10 MG tablet, , Disp: , Rfl:   •  Multiple Vitamins-Calcium (ONE-A-DAY WOMENS PO), , Disp: , Rfl:   •  naproxen (NAPROSYN) 500 MG tablet, Take 1 tablet by mouth 2 (Two) Times a Day With Meals., Disp: 30 tablet, Rfl: 1  •  NEXIUM 24HR 20 MG capsule, , Disp: , Rfl:   •  raNITIdine (ZANTAC) 300 MG tablet, Take 1 tablet by mouth Every Night., Disp: 30 tablet, Rfl: 3  •  tiZANidine (ZANAFLEX) 4 MG tablet, Take 1 tablet by mouth every night at bedtime., Disp: 30 tablet, Rfl:  1       Plan of care reviewed with the patient at the conclusion of today's visit.  Education was provided regarding diagnosis, management, and any prescribed or recommended OTC medications.  Patient verbalized understanding of and agreement with management plan.     Return for Next scheduled follow up.      REBECA Pedrzoa

## 2020-01-24 ENCOUNTER — TELEPHONE (OUTPATIENT)
Dept: INTERNAL MEDICINE | Facility: CLINIC | Age: 60
End: 2020-01-24

## 2020-01-24 NOTE — TELEPHONE ENCOUNTER
Pt called and stated that she thinks that she might have gastritis again. Pt stated that she can her a gurgling sound coming from her stomach, pt has had diarrhea for a couple of days now. Pt wanted a clinical person to call her at [phone].

## 2020-01-25 ENCOUNTER — OFFICE VISIT (OUTPATIENT)
Dept: INTERNAL MEDICINE | Facility: CLINIC | Age: 60
End: 2020-01-25

## 2020-01-25 VITALS
OXYGEN SATURATION: 99 % | HEART RATE: 63 BPM | BODY MASS INDEX: 39.86 KG/M2 | HEIGHT: 66 IN | SYSTOLIC BLOOD PRESSURE: 128 MMHG | WEIGHT: 248 LBS | DIASTOLIC BLOOD PRESSURE: 80 MMHG | TEMPERATURE: 98.4 F

## 2020-01-25 DIAGNOSIS — R10.84 GENERALIZED ABDOMINAL PAIN: ICD-10-CM

## 2020-01-25 DIAGNOSIS — R19.7 DIARRHEA, UNSPECIFIED TYPE: Primary | ICD-10-CM

## 2020-01-25 PROCEDURE — 99213 OFFICE O/P EST LOW 20 MIN: CPT | Performed by: NURSE PRACTITIONER

## 2020-01-25 RX ORDER — DICYCLOMINE HYDROCHLORIDE 10 MG/1
10 CAPSULE ORAL
Qty: 30 CAPSULE | Refills: 0 | Status: SHIPPED | OUTPATIENT
Start: 2020-01-25 | End: 2020-01-28

## 2020-01-25 RX ORDER — ONDANSETRON 4 MG/1
4 TABLET, FILM COATED ORAL EVERY 8 HOURS PRN
Qty: 30 TABLET | Refills: 0 | Status: SHIPPED | OUTPATIENT
Start: 2020-01-25 | End: 2020-07-20

## 2020-01-25 NOTE — PROGRESS NOTES
"Chief Complaint   Patient presents with   • Abdominal Pain   • Diarrhea       History of Present Illness  59 y.o.female presents for abd pain and diarrhea  Onset sx abour 5 days. Loose stools all week anytime after eating. [place]. No blood or mucus in the stool. Past two days worse. No travel. No antibiotic use. No new food. Some nausea. Sore abdomen generalized pain nonradiating. No vomiting. Stomach rumbling. Trying not to eat heavy meals. Not a lot of water intake. hasnt tried anything OTC. No fever. + Fatigued. Daughter with similar symptoms. Both ate at [employer] last week      Review of Systems   Constitutional: Positive for diaphoresis and fatigue. Negative for chills, fever and unexpected weight loss.   Gastrointestinal: Positive for abdominal pain, diarrhea, nausea and indigestion. Negative for blood in stool, constipation and vomiting.   Genitourinary: Negative for difficulty urinating.         [facility]  The following portions of the patient's history were reviewed and updated as appropriate: allergies, current medications, past family history, past medical history, past social history, past surgical history and problem list.     Past Medical History:   Diagnosis Date   • Anesthesia complication     HAS TROUBLE GETTING \"NUMB\"    • Anxiety    • Arthritis    • Bladder spasms    • Depression    • Diverticulosis    • GERD (gastroesophageal reflux disease)    • Memory loss     FROM SKULL FRACTURE AND FALL-SHORT TERM MEMORY LOSS   • Mental disorder     PTSD   • Migraine    • Obesity    • Seasonal allergies    • Seizures (CMS/[facility])     \"convulsions\" at age 5   • Skull fracture (CMS/[facility]) 2012   • Sleep apnea with use of continuous positive airway pressure (CPAP)     INSTRUCTED TO BRING OWN MASK AND TUBING    • SOB (shortness of breath) on exertion    • Type 2 diabetes mellitus without complication, without long-term current use of insulin (CMS/[facility]) 10/11/2018   • Wears contact lenses    • Wears partial dentures     " TOP ONLY    • Wears prescription eyeglasses    • Wears prescription eyeglasses       Past Surgical History:   Procedure Laterality Date   • CERVICAL POLYPECTOMY     • TOTAL LAPAROSCOPIC HYSTERECTOMY N/A 9/18/2017    Procedure: TOTAL LAPAROSCOPIC HYSTERECTOMY, BILATERAL SALPINGO OOPHORECTOMY WITH DAVINCI ROBOT ;  Surgeon: Shannon Grimaldo DO;  Location[facility];  Service:    • WISDOM TOOTH EXTRACTION        Allergies   Allergen Reactions   • Mold Extract [Trichophyton] Shortness Of Breath, Anxiety, Irritability, Other (See Comments) and Palpitations     SOA   • Lortab [Hydrocodone-Acetaminophen] Nausea And Vomiting     SEVERE NAUSEA AND SLIGHT VOMITING   • Methylprednisolone Other (See Comments)     Bleeding, cramping   • Pollen Extract Other (See Comments)     SNEEZING AND CONGESTION       Social History     Socioeconomic History   • Marital status: Single     Spouse name: Not on file   • Number of children: Not on file   • Years of education: Not on file   • Highest education level: Not on file   Tobacco Use   • Smoking status: Never Smoker   • Smokeless tobacco: Never Used   Substance and Sexual Activity   • Alcohol use: No     Comment: ONCE A YEAR ON XMAS, IF THAT   • Drug use: No   • Sexual activity: Never     Birth control/protection: Post-menopausal   Social History Narrative         Family History   Problem Relation Age of Onset   • Cancer Mother    • Cancer Maternal Aunt    • Cancer Paternal Aunt             Current Outpatient Medications:   •  acetaminophen (TYLENOL) 500 MG tablet, Take 500 mg by mouth Every 6 (Six) Hours As Needed for Mild Pain ., Disp: , Rfl:   •  albuterol sulfate  (90 Base) MCG/ACT inhaler, Inhale 1 puff Every 6 (Six) Hours As Needed for Shortness of Air. INHALE TWO PUFFS BY MOUTH EVERY 6 HOURS AS NEEDED, Disp: 1 inhaler, Rfl: 2  •  ALPRAZolam (XANAX) 0.25 MG tablet, Take 1 tablet by mouth 2 (Two) Times a Day As Needed for Anxiety., Disp: 30 tablet, Rfl: 0  •   "amitriptyline (ELAVIL) 75 MG tablet, Take 1 tablet by mouth Every Night., Disp: 30 tablet, Rfl: 3  •  BREO ELLIPTA 100-25 MCG/INH inhaler, Inhale 1 puff Daily., Disp: 1 inhaler, Rfl: 2  •  cetirizine (zyrTEC) 10 MG tablet, Take 1 tablet by mouth Daily., Disp: 30 tablet, Rfl: 6  •  diclofenac (VOLTAREN) 1 % gel gel, Apply 4 g topically to the appropriate area as directed 4 (Four) Times a Day As Needed (for pain in shoulders)., Disp: 100 g, Rfl: 1  •  EPINEPHrine (EPIPEN) 0.3 MG/0.3ML solution auto-injector injection, As Needed (FOR SEVERE ALLERGIC REACTION)., Disp: , Rfl:   •  FLUoxetine (PROzac) 20 MG capsule, Take 2 capsules by mouth Daily., Disp: 30 capsule, Rfl: 2  •  Melatonin 10 MG tablet, , Disp: , Rfl:   •  Multiple Vitamins-Calcium (ONE-A-DAY WOMENS PO), , Disp: , Rfl:   •  naproxen (NAPROSYN) 500 MG tablet, Take 1 tablet by mouth 2 (Two) Times a Day With Meals., Disp: 30 tablet, Rfl: 1  •  NEXIUM 24HR 20 MG capsule, , Disp: , Rfl:   •  raNITIdine (ZANTAC) 300 MG tablet, Take 1 tablet by mouth Every Night., Disp: 30 tablet, Rfl: 3  •  tiZANidine (ZANAFLEX) 4 MG tablet, Take 1 tablet by mouth every night at bedtime., Disp: 30 tablet, Rfl: 1    VITALS:  /80   Pulse 63   Temp 98.4 °F (36.9 °C)   Ht 167.6 cm (66\")   Wt 112 kg (248 lb)   LMP  (LMP Unknown)   SpO2 99%   BMI 40.03 kg/m²     Physical Exam   Constitutional: She is oriented to person, place, and time. She appears well-developed and well-nourished. No distress.   HENT:   Head: Normocephalic.   Mouth/Throat: Oropharynx is clear and moist.   Eyes: Pupils are equal, round, and reactive to light. Conjunctivae and EOM are normal.   Neck: Normal range of motion. Neck supple.   Cardiovascular: Normal rate, regular rhythm, normal heart sounds and intact distal pulses.   No edema   Pulmonary/Chest: Effort normal and breath sounds normal. No respiratory distress.   Abdominal: Soft. Bowel sounds are normal. She exhibits no distension and no mass. " There is no tenderness. There is no rebound and no guarding.   Lymphadenopathy:     She has no cervical adenopathy.   Neurological: She is alert and oriented to person, place, and time.   Skin: Skin is warm and dry. Capillary refill takes less than 2 seconds. No rash noted.   Psychiatric: She has a normal mood and affect. Her behavior is normal.   Vitals reviewed.      LABS  Results for orders placed or performed in visit on 12/20/19   POC Glycosylated Hemoglobin (Hb A1C)   Result Value Ref Range    Hemoglobin A1C 5.9 %       ASSESSMENT/PLAN  Sandra was seen today for abdominal pain and diarrhea.    Diagnoses and all orders for this visit:    Diarrhea, unspecified type  -     CBC & Differential; Future  -     Comprehensive Metabolic Panel; Future  -     Clostridium Difficile Toxin - Stool, Per Rectum; Future  -     Fecal Leukocytes - Stool, Per Rectum; Future  -     Ova & Parasite Examination - Stool, Per Rectum; Future  -     Stool Culture (Reference Lab) - Stool, Per Rectum; Future  -     Sedimentation Rate; Future    Generalized abdominal pain  -     dicyclomine (BENTYL) 10 MG capsule; Take 1 capsule by mouth 4 (Four) Times a Day Before Meals & at Bedtime.  -     ondansetron (ZOFRAN) 4 MG tablet; Take 1 tablet by mouth Every 8 (Eight) Hours As Needed for Nausea or Vomiting.    reviewed food choices to help with sx. Encourage increase water intake.  Bentyl and zofran for sx control.  Will try these changes over the weekend; if no improvement in next 2-3 days she will get labs and stool studies.   If fever > 101 or worsening return for earlier appt.    I discussed the patients findings and my recommendations with patient.  Patient was encouraged to keep me informed of any acute changes, lack of improvement, or any new concerning symptoms.  Patient voiced understanding of all instructions and denied further questions.      FOLLOW-UP  Return if symptoms worsen or fail to improve.    Electronically signed by:     Yecenia Rico, APRN  01/25/2020    EMR Dragon/Transcription Disclaimer:  Much of this encounter note is an electronic transcription/translation of spoken language to printed text.  The electronic translation of spoken language may permit erroneous, or at times, nonsensical words or phrases to be inadvertently transcribed.  Although I have reviewed the note for such errors, some may still exist

## 2020-01-27 ENCOUNTER — LAB (OUTPATIENT)
Dept: LAB | Facility: HOSPITAL | Age: 60
End: 2020-01-27

## 2020-01-27 DIAGNOSIS — R19.7 DIARRHEA, UNSPECIFIED TYPE: ICD-10-CM

## 2020-01-27 PROCEDURE — 85025 COMPLETE CBC W/AUTO DIFF WBC: CPT | Performed by: NURSE PRACTITIONER

## 2020-01-27 PROCEDURE — 85652 RBC SED RATE AUTOMATED: CPT | Performed by: NURSE PRACTITIONER

## 2020-01-27 PROCEDURE — 80053 COMPREHEN METABOLIC PANEL: CPT | Performed by: NURSE PRACTITIONER

## 2020-01-28 DIAGNOSIS — R10.84 GENERALIZED ABDOMINAL PAIN: ICD-10-CM

## 2020-01-28 LAB
ALBUMIN SERPL-MCNC: 4 G/DL (ref 3.5–5.2)
ALBUMIN/GLOB SERPL: 1.3 G/DL
ALP SERPL-CCNC: 107 U/L (ref 39–117)
ALT SERPL W P-5'-P-CCNC: 24 U/L (ref 1–33)
ANION GAP SERPL CALCULATED.3IONS-SCNC: 12.5 MMOL/L (ref 5–15)
AST SERPL-CCNC: 18 U/L (ref 1–32)
BASOPHILS # BLD AUTO: 0.07 10*3/MM3 (ref 0–0.2)
BASOPHILS NFR BLD AUTO: 0.7 % (ref 0–1.5)
BILIRUB SERPL-MCNC: <0.2 MG/DL (ref 0.2–1.2)
BUN BLD-MCNC: 7 MG/DL (ref 6–20)
BUN/CREAT SERPL: 9.3 (ref 7–25)
CALCIUM SPEC-SCNC: 9.4 MG/DL (ref 8.6–10.5)
CHLORIDE SERPL-SCNC: 103 MMOL/L (ref 98–107)
CO2 SERPL-SCNC: 26.5 MMOL/L (ref 22–29)
CREAT BLD-MCNC: 0.75 MG/DL (ref 0.57–1)
DEPRECATED RDW RBC AUTO: 40.4 FL (ref 37–54)
EOSINOPHIL # BLD AUTO: 0.39 10*3/MM3 (ref 0–0.4)
EOSINOPHIL NFR BLD AUTO: 4.2 % (ref 0.3–6.2)
ERYTHROCYTE [DISTWIDTH] IN BLOOD BY AUTOMATED COUNT: 12.9 % (ref 12.3–15.4)
ERYTHROCYTE [SEDIMENTATION RATE] IN BLOOD: 20 MM/HR (ref 0–30)
GFR SERPL CREATININE-BSD FRML MDRD: 80 ML/MIN/1.73
GLOBULIN UR ELPH-MCNC: 3.2 GM/DL
GLUCOSE BLD-MCNC: 70 MG/DL (ref 65–99)
HCT VFR BLD AUTO: 41.7 % (ref 34–46.6)
HGB BLD-MCNC: 14.1 G/DL (ref 12–15.9)
IMM GRANULOCYTES # BLD AUTO: 0.04 10*3/MM3 (ref 0–0.05)
IMM GRANULOCYTES NFR BLD AUTO: 0.4 % (ref 0–0.5)
LYMPHOCYTES # BLD AUTO: 2.02 10*3/MM3 (ref 0.7–3.1)
LYMPHOCYTES NFR BLD AUTO: 21.6 % (ref 19.6–45.3)
MCH RBC QN AUTO: 29.4 PG (ref 26.6–33)
MCHC RBC AUTO-ENTMCNC: 33.8 G/DL (ref 31.5–35.7)
MCV RBC AUTO: 87.1 FL (ref 79–97)
MONOCYTES # BLD AUTO: 0.83 10*3/MM3 (ref 0.1–0.9)
MONOCYTES NFR BLD AUTO: 8.9 % (ref 5–12)
NEUTROPHILS # BLD AUTO: 6 10*3/MM3 (ref 1.7–7)
NEUTROPHILS NFR BLD AUTO: 64.2 % (ref 42.7–76)
NRBC BLD AUTO-RTO: 0 /100 WBC (ref 0–0.2)
PLATELET # BLD AUTO: 352 10*3/MM3 (ref 140–450)
PMV BLD AUTO: 10.4 FL (ref 6–12)
POTASSIUM BLD-SCNC: 4.5 MMOL/L (ref 3.5–5.2)
PROT SERPL-MCNC: 7.2 G/DL (ref 6–8.5)
RBC # BLD AUTO: 4.79 10*6/MM3 (ref 3.77–5.28)
SODIUM BLD-SCNC: 142 MMOL/L (ref 136–145)
WBC NRBC COR # BLD: 9.35 10*3/MM3 (ref 3.4–10.8)

## 2020-01-28 RX ORDER — DICYCLOMINE HYDROCHLORIDE 10 MG/1
CAPSULE ORAL
Qty: 30 CAPSULE | Refills: 0 | Status: SHIPPED | OUTPATIENT
Start: 2020-01-28 | End: 2020-07-20

## 2020-01-29 PROCEDURE — 87045 FECES CULTURE AEROBIC BACT: CPT | Performed by: NURSE PRACTITIONER

## 2020-01-29 PROCEDURE — 87046 STOOL CULTR AEROBIC BACT EA: CPT | Performed by: NURSE PRACTITIONER

## 2020-01-29 PROCEDURE — 87205 SMEAR GRAM STAIN: CPT | Performed by: NURSE PRACTITIONER

## 2020-01-29 PROCEDURE — 87209 SMEAR COMPLEX STAIN: CPT | Performed by: NURSE PRACTITIONER

## 2020-01-29 PROCEDURE — 87427 SHIGA-LIKE TOXIN AG IA: CPT | Performed by: NURSE PRACTITIONER

## 2020-01-29 PROCEDURE — 87177 OVA AND PARASITES SMEARS: CPT | Performed by: NURSE PRACTITIONER

## 2020-01-30 ENCOUNTER — LAB REQUISITION (OUTPATIENT)
Dept: LAB | Facility: HOSPITAL | Age: 60
End: 2020-01-30

## 2020-01-30 ENCOUNTER — OFFICE VISIT (OUTPATIENT)
Dept: INTERNAL MEDICINE | Facility: CLINIC | Age: 60
End: 2020-01-30

## 2020-01-30 VITALS
WEIGHT: 251.6 LBS | HEART RATE: 101 BPM | BODY MASS INDEX: 40.61 KG/M2 | SYSTOLIC BLOOD PRESSURE: 122 MMHG | DIASTOLIC BLOOD PRESSURE: 80 MMHG | OXYGEN SATURATION: 97 %

## 2020-01-30 DIAGNOSIS — F32.A DEPRESSION, UNSPECIFIED DEPRESSION TYPE: Primary | ICD-10-CM

## 2020-01-30 DIAGNOSIS — R19.7 DIARRHEA, UNSPECIFIED: ICD-10-CM

## 2020-01-30 LAB — WBC STL QL MICRO: NORMAL

## 2020-01-30 PROCEDURE — 99213 OFFICE O/P EST LOW 20 MIN: CPT | Performed by: PHYSICIAN ASSISTANT

## 2020-01-30 RX ORDER — FLUOXETINE 10 MG/1
10 CAPSULE ORAL DAILY
Qty: 30 CAPSULE | Refills: 0 | Status: SHIPPED | OUTPATIENT
Start: 2020-01-30 | End: 2020-07-20

## 2020-01-30 RX ORDER — DESVENLAFAXINE SUCCINATE 50 MG/1
50 TABLET, EXTENDED RELEASE ORAL DAILY
Qty: 30 TABLET | Refills: 2 | Status: SHIPPED | OUTPATIENT
Start: 2020-01-30 | End: 2020-05-12

## 2020-01-31 ENCOUNTER — TELEPHONE (OUTPATIENT)
Dept: INTERNAL MEDICINE | Facility: CLINIC | Age: 60
End: 2020-01-31

## 2020-01-31 NOTE — TELEPHONE ENCOUNTER
PT called, states she knows she'll need prior authorizations for the following medications:   -fluticasone (FLONASE) 50 MCG/ACT nasal spray [31238] (Order [id number]).    -desvenlafaxine (PRISTIQ) 50 MG 24 hr tablet [33221] (Order [id number]).    PT states she needs the Flonase because she gets stopped up and cannot use her CPAP mask for sleep apnea without the Flonase. PT states her insurance company never wants to pick it up/cover it.     Please call Sandra back: [phone]    Confirmed Pharmacy: [facility] - [phone] [facility] [phone] FX

## 2020-02-02 NOTE — ASSESSMENT & PLAN NOTE
Psychological condition is worsening.  Medication changes per orders.  Referral to psychological counseling. Decrease prozac to 10 mg daily, start pristiq 50 mg daily. After a week, stop prozac and continue pristiq.  Psychological condition  will be reassessed in 4 weeks.

## 2020-02-03 ENCOUNTER — TELEPHONE (OUTPATIENT)
Dept: INTERNAL MEDICINE | Facility: CLINIC | Age: 60
End: 2020-02-03

## 2020-02-03 DIAGNOSIS — F32.A DEPRESSION, UNSPECIFIED DEPRESSION TYPE: ICD-10-CM

## 2020-02-03 RX ORDER — DESVENLAFAXINE SUCCINATE 50 MG/1
50 TABLET, EXTENDED RELEASE ORAL DAILY
Qty: 30 TABLET | Refills: 2 | OUTPATIENT
Start: 2020-02-03

## 2020-02-03 NOTE — TELEPHONE ENCOUNTER
PT CALLED REQUESTING PA ON desvenlafaxine (PRISTIQ) 50 MG 24 hr tablet, [facility].     PT CALL BACK [phone]

## 2020-02-04 LAB
CAMPYLOBACTER STL CULT: NORMAL
E COLI SXT STL QL IA: NEGATIVE
Lab: NORMAL
Lab: NORMAL
O+P SPEC MICRO: NORMAL
O+P STL TRI STN: NORMAL
SALM + SHIG STL CULT: NORMAL

## 2020-02-05 DIAGNOSIS — F41.9 ANXIETY: ICD-10-CM

## 2020-02-05 RX ORDER — FLUOXETINE HYDROCHLORIDE 20 MG/1
CAPSULE ORAL
Qty: 30 CAPSULE | Refills: 1 | OUTPATIENT
Start: 2020-02-05

## 2020-02-17 ENCOUNTER — TELEPHONE (OUTPATIENT)
Dept: INTERNAL MEDICINE | Facility: CLINIC | Age: 60
End: 2020-02-17

## 2020-02-17 DIAGNOSIS — G43.009 MIGRAINE WITHOUT AURA AND WITHOUT STATUS MIGRAINOSUS, NOT INTRACTABLE: ICD-10-CM

## 2020-02-17 DIAGNOSIS — F41.9 ANXIETY: ICD-10-CM

## 2020-02-17 NOTE — TELEPHONE ENCOUNTER
Pt called she is taking a new medication Pristiq, wondering if she need to cut back on medication amitriptyline (ELAVIL) 75 MG tablet. Please advise    Pt can be reached at [phone]

## 2020-02-17 NOTE — TELEPHONE ENCOUNTER
She can continue the elavil and monitor for signs of too much serotonin- flushing, palpitations, etc.

## 2020-02-18 RX ORDER — AMITRIPTYLINE HYDROCHLORIDE 25 MG/1
25 TABLET, FILM COATED ORAL NIGHTLY
Qty: 30 TABLET | Refills: 5 | Status: SHIPPED | OUTPATIENT
Start: 2020-02-18 | End: 2020-08-25

## 2020-02-18 NOTE — TELEPHONE ENCOUNTER
After one dose pt said her forehead started itching, states the pharmacist was recommending that she should take 25 mg

## 2020-02-24 ENCOUNTER — RESULTS ENCOUNTER (OUTPATIENT)
Dept: INTERNAL MEDICINE | Facility: CLINIC | Age: 60
End: 2020-02-24

## 2020-02-24 ENCOUNTER — OFFICE VISIT (OUTPATIENT)
Dept: INTERNAL MEDICINE | Facility: CLINIC | Age: 60
End: 2020-02-24

## 2020-02-24 VITALS
HEART RATE: 88 BPM | SYSTOLIC BLOOD PRESSURE: 110 MMHG | DIASTOLIC BLOOD PRESSURE: 78 MMHG | OXYGEN SATURATION: 96 % | BODY MASS INDEX: 40.87 KG/M2 | WEIGHT: 253.2 LBS

## 2020-02-24 DIAGNOSIS — E11.9 TYPE 2 DIABETES MELLITUS WITHOUT COMPLICATION, WITHOUT LONG-TERM CURRENT USE OF INSULIN (HCC): ICD-10-CM

## 2020-02-24 DIAGNOSIS — Z23 NEED FOR 23-POLYVALENT PNEUMOCOCCAL POLYSACCHARIDE VACCINE: ICD-10-CM

## 2020-02-24 DIAGNOSIS — Z12.11 COLON CANCER SCREENING: ICD-10-CM

## 2020-02-24 DIAGNOSIS — Z12.39 BREAST CANCER SCREENING: ICD-10-CM

## 2020-02-24 DIAGNOSIS — Z00.00 HEALTH CARE MAINTENANCE: Primary | ICD-10-CM

## 2020-02-24 LAB
POC CREATININE URINE: 50
POC MICROALBUMIN URINE: 10

## 2020-02-24 PROCEDURE — 82044 UR ALBUMIN SEMIQUANTITATIVE: CPT | Performed by: PHYSICIAN ASSISTANT

## 2020-02-24 PROCEDURE — 99396 PREV VISIT EST AGE 40-64: CPT | Performed by: PHYSICIAN ASSISTANT

## 2020-02-24 PROCEDURE — 90732 PPSV23 VACC 2 YRS+ SUBQ/IM: CPT | Performed by: PHYSICIAN ASSISTANT

## 2020-02-24 PROCEDURE — 90471 IMMUNIZATION ADMIN: CPT | Performed by: PHYSICIAN ASSISTANT

## 2020-02-24 NOTE — PROGRESS NOTES
"Chief Complaint   Patient presents with   • Annual Exam       Subjective   Sandra Auguste is a 59 y.o. female.       History of Present Illness     The patient is being seen for a health maintenance evaluation.  The last health maintenance was 2 year(s) ago.    Social History  Sandra  does not smoke cigarettes.   She drinks rare alcohol.  She does not use illicit drugs.    General History  Sandra  does not have regular dental visits.  She does complain of vision problems. Wears glasses. Last eye exam was 2018. She will schedule exam.  Immunizations are not up to date. The patient needs the following immunizations: shingrix recommended; pneumovax needed    Lifestyle  Sandra  consumes in general, a \"healthy\" diet  .  She exercises never.    Reproductive Health  Sandra  is postmenopausal.  She reports periods are nonexistent since hysterectomy in 2017 by Dr Grimaldo.  She is not sexually active. Her contraceptive plan is no method.    Screening  Last pap was 2017 by Dr Grimaldo. History of abnormal pap smear or family history of gyn cancer: none  Last mammogram was  2018. Personal or family history of abnormal mammograms or breast cancer: paternal aunt  Last colonoscopy was never, had cologuard in 2016. Family history of colon cancer: none  Last DEXA was none.       Pt is doing better with switching from prozac to pristiq. She has not had any side effects.     Pt has chronic back and shoulder pain related to her large breasts. Have gotten bigger since having children- never went back down to previous size. She            Current Outpatient Medications:   •  acetaminophen (TYLENOL) 500 MG tablet, Take 500 mg by mouth Every 6 (Six) Hours As Needed for Mild Pain ., Disp: , Rfl:   •  albuterol sulfate  (90 Base) MCG/ACT inhaler, Inhale 1 puff Every 6 (Six) Hours As Needed for Shortness of Air. INHALE TWO PUFFS BY MOUTH EVERY 6 HOURS AS NEEDED, Disp: 1 inhaler, Rfl: 2  •  ALPRAZolam (XANAX) 0.25 MG tablet, Take 1 tablet by mouth 2 " (Two) Times a Day As Needed for Anxiety., Disp: 30 tablet, Rfl: 0  •  amitriptyline (ELAVIL) 25 MG tablet, Take 1 tablet by mouth Every Night., Disp: 30 tablet, Rfl: 5  •  BREO ELLIPTA 100-25 MCG/INH inhaler, Inhale 1 puff Daily., Disp: 1 inhaler, Rfl: 2  •  cetirizine (zyrTEC) 10 MG tablet, Take 1 tablet by mouth Daily., Disp: 30 tablet, Rfl: 6  •  desvenlafaxine (PRISTIQ) 50 MG 24 hr tablet, Take 1 tablet by mouth Daily., Disp: 30 tablet, Rfl: 2  •  diclofenac (VOLTAREN) 1 % gel gel, Apply 4 g topically to the appropriate area as directed 4 (Four) Times a Day As Needed (for pain in shoulders)., Disp: 100 g, Rfl: 1  •  dicyclomine (BENTYL) 10 MG capsule, TAKE ONE CAPSULE BY MOUTH FOUR TIMES A DAY BEFORE MEALS AND AT BEDTIME, Disp: 30 capsule, Rfl: 0  •  EPINEPHrine (EPIPEN) 0.3 MG/0.3ML solution auto-injector injection, As Needed (FOR SEVERE ALLERGIC REACTION)., Disp: , Rfl:   •  FLUoxetine (PROZAC) 10 MG capsule, Take 1 capsule by mouth Daily., Disp: 30 capsule, Rfl: 0  •  Melatonin 10 MG tablet, , Disp: , Rfl:   •  Multiple Vitamins-Calcium (ONE-A-DAY WOMENS PO), , Disp: , Rfl:   •  naproxen (NAPROSYN) 500 MG tablet, Take 1 tablet by mouth 2 (Two) Times a Day With Meals., Disp: 30 tablet, Rfl: 1  •  NEXIUM 24HR 20 MG capsule, , Disp: , Rfl:   •  ondansetron (ZOFRAN) 4 MG tablet, Take 1 tablet by mouth Every 8 (Eight) Hours As Needed for Nausea or Vomiting., Disp: 30 tablet, Rfl: 0  •  raNITIdine (ZANTAC) 300 MG tablet, Take 1 tablet by mouth Every Night., Disp: 30 tablet, Rfl: 3  •  tiZANidine (ZANAFLEX) 4 MG tablet, Take 1 tablet by mouth every night at bedtime., Disp: 30 tablet, Rfl: 1     PMF  The following portions of the patient's history were reviewed and updated as appropriate: allergies, current medications, past family history, past medical history, past social history, past surgical history and problem list.    Review of Systems   Constitutional: Negative for appetite change, fever and unexpected  weight change.   HENT: Negative for ear pain, facial swelling and sore throat.    Eyes: Negative for pain and visual disturbance.   Respiratory: Negative for chest tightness, shortness of breath and wheezing.    Cardiovascular: Negative for chest pain and palpitations.   Gastrointestinal: Negative for abdominal pain and blood in stool.   Endocrine: Negative.    Genitourinary: Negative for difficulty urinating and hematuria.   Musculoskeletal: Negative for joint swelling.   Neurological: Negative for tremors, seizures and syncope.   Hematological: Negative for adenopathy.   Psychiatric/Behavioral: Negative.        Objective   /78   Pulse 88   Wt 115 kg (253 lb 3.2 oz)   LMP  (LMP Unknown)   SpO2 96%   BMI 40.87 kg/m²     Physical Exam   Constitutional: She is oriented to person, place, and time. She appears well-developed and well-nourished. No distress.   HENT:   Head: Normocephalic and atraumatic. Hair is normal.   Right Ear: Hearing, tympanic membrane, external ear and ear canal normal. No drainage. No decreased hearing is noted.   Left Ear: Hearing, tympanic membrane, external ear and ear canal normal. No decreased hearing is noted.   Nose: No nasal deformity.   Mouth/Throat: Oropharynx is clear and moist.   Eyes: Pupils are equal, round, and reactive to light. Conjunctivae, EOM and lids are normal. Lids are everted and swept, no foreign bodies found. Right eye exhibits no discharge. Left eye exhibits no discharge.   Fundoscopic exam:       The right eye shows red reflex.        The left eye shows red reflex.   Neck: Normal range of motion. Neck supple. No JVD present. No tracheal deviation present. No thyromegaly present.   Cardiovascular: Normal rate, regular rhythm, normal heart sounds, intact distal pulses and normal pulses. Exam reveals no gallop and no friction rub.   No murmur heard.  Pulmonary/Chest: Effort normal and breath sounds normal. No respiratory distress. She has no wheezes. She has no  rales. She exhibits no tenderness.   Abdominal: Soft. Bowel sounds are normal. She exhibits no distension and no mass. There is no tenderness. There is no rebound and no guarding. No hernia.   Musculoskeletal: Normal range of motion. She exhibits no edema, tenderness or deformity.   Lymphadenopathy:     She has no cervical adenopathy.        Right: No inguinal adenopathy present.        Left: No inguinal adenopathy present.   Neurological: She is alert and oriented to person, place, and time. She has normal reflexes. She displays normal reflexes. No cranial nerve deficit. She exhibits normal muscle tone. Coordination normal.   Skin: Skin is warm and dry. No rash noted. She is not diaphoretic. No erythema.   Psychiatric: She has a normal mood and affect. Her behavior is normal. Judgment and thought content normal.   Nursing note and vitals reviewed.           ASSESSMENT/PLAN    Problem List Items Addressed This Visit        Endocrine    Type 2 diabetes mellitus without complication, without long-term current use of insulin (CMS/[facility])    Relevant Orders    POCT microalbumin (Completed)       Other    Health care maintenance - Primary     Immunizations: pneumovax done today; shingrix recommended  Eye exam: done 2018, pt will schedule  Pap Smear: done 2017 per Dr Grimaldo; pt will schedule f/u  Mammogram: ordered  Dexa: due age 62  Colonoscopy: cologuard ordered  Labs: fasting labs ordered    Counseled patient regarding multimodal approach with healthy nutrition, healthy sleep, regular physical activity, social activities, counseling, safety measures and medications.          Relevant Orders    Lipid Panel    TSH      Other Visit Diagnoses     Breast cancer screening        Relevant Orders    Mammo Screening Digital Tomosynthesis Bilateral With CAD    Colon cancer screening        Relevant Orders    Cologuard - Stool, Per Rectum    Need for 23-polyvalent pneumococcal polysaccharide vaccine        Relevant Orders     Pneumococcal Polysaccharide Vaccine 23-Valent (PPSV23) Greater Than or Equal To 1yo Subcutaneous / IM (Completed)               Return in about 3 months (around 5/24/2020) for Follow up, Annual with fasting labs in 1 year.

## 2020-02-25 PROBLEM — Z00.00 HEALTH CARE MAINTENANCE: Status: ACTIVE | Noted: 2020-02-25

## 2020-02-25 NOTE — ASSESSMENT & PLAN NOTE
Immunizations: pneumovax done today; shingrix recommended  Eye exam: done 2018, pt will schedule  Pap Smear: done 2017 per Dr Grimaldo; pt will schedule f/u  Mammogram: ordered  Dexa: due age 62  Colonoscopy: cologuard ordered  Labs: fasting labs ordered    Counseled patient regarding multimodal approach with healthy nutrition, healthy sleep, regular physical activity, social activities, counseling, safety measures and medications.

## 2020-02-26 ENCOUNTER — LAB (OUTPATIENT)
Dept: LAB | Facility: HOSPITAL | Age: 60
End: 2020-02-26

## 2020-02-26 DIAGNOSIS — Z00.00 HEALTH CARE MAINTENANCE: ICD-10-CM

## 2020-02-26 LAB
CHOLEST SERPL-MCNC: 174 MG/DL (ref 0–200)
HDLC SERPL-MCNC: 47 MG/DL (ref 40–60)
LDLC SERPL CALC-MCNC: 106 MG/DL (ref 0–100)
LDLC/HDLC SERPL: 2.26 {RATIO}
TRIGL SERPL-MCNC: 104 MG/DL (ref 0–150)
TSH SERPL DL<=0.05 MIU/L-ACNC: 3.52 UIU/ML (ref 0.27–4.2)
VLDLC SERPL-MCNC: 20.8 MG/DL (ref 5–40)

## 2020-02-26 PROCEDURE — 80061 LIPID PANEL: CPT | Performed by: PHYSICIAN ASSISTANT

## 2020-02-26 PROCEDURE — 84443 ASSAY THYROID STIM HORMONE: CPT | Performed by: PHYSICIAN ASSISTANT

## 2020-03-16 RX ORDER — FLUOXETINE 10 MG/1
CAPSULE ORAL
Qty: 30 CAPSULE | Refills: 0 | OUTPATIENT
Start: 2020-03-16

## 2020-03-31 DIAGNOSIS — R06.02 SHORTNESS OF BREATH: ICD-10-CM

## 2020-04-01 ENCOUNTER — TELEPHONE (OUTPATIENT)
Dept: INTERNAL MEDICINE | Facility: CLINIC | Age: 60
End: 2020-04-01

## 2020-04-01 NOTE — TELEPHONE ENCOUNTER
PT CALLED STATING SHE IS CURRENTLY TAKING     PRISTIQ     AND WAS WONDERING IF SHE CAN TAKE COLD MEDICINE WITH THE PRESCRIBED MEDICATION      PT STATES THAT SHE HAS ASTHMA AND IS CONCERNED ABOUT COVID-19  PT STATES SHE DOES HAVE SHORTNESS OFF BREATH BUT NOT MORE THAN USUAL WITH ASTHMA    PLEASE CALL AND ADVISE    PT CALL BACK  [phone]

## 2020-04-02 ENCOUNTER — TELEPHONE (OUTPATIENT)
Dept: INTERNAL MEDICINE | Facility: CLINIC | Age: 60
End: 2020-04-02

## 2020-04-02 NOTE — TELEPHONE ENCOUNTER
Yes, she can take OTC mucinex and can use cold meds that include pseudoephedrine infrequently to avoid raising her blood pressure.

## 2020-04-02 NOTE — TELEPHONE ENCOUNTER
PATIENT CALLED AND STATED SHE HAD A QUESTION REGARDING MUSINEX. SHE STATES SHE WAS TOLD SHE COULD TAKE SOME BUT SHE WASN'T SURE WHICH ONE. HER BOTHER PICKED UP A GENERIC VERSION, DEANNE NASCIMENTO. SHE IS NOT SURE IF THIS IS THE RIGHT KIND. SHE WOULD LIKE TO KNOW WHAT SHE CAN TAKE TO HELP HER SYMPTOMS. SHE WOULD RATHER BE SURE THE MEDICATION SHE TAKES DRIES UP HER SYMPTOMS RATHER THAN BREAK LOOSE ANY CONGESTION. PLEASE ADVISE.     PATIENT CALL BACK [phone]

## 2020-04-03 NOTE — TELEPHONE ENCOUNTER
Let pt know she can use store brand of musinex dm, or the tussin dm at [facility]. Verbalized understanding

## 2020-04-12 DIAGNOSIS — R06.02 SHORTNESS OF BREATH: ICD-10-CM

## 2020-04-30 ENCOUNTER — OFFICE VISIT (OUTPATIENT)
Dept: INTERNAL MEDICINE | Facility: CLINIC | Age: 60
End: 2020-04-30

## 2020-04-30 VITALS
BODY MASS INDEX: 41.45 KG/M2 | WEIGHT: 256.8 LBS | HEART RATE: 82 BPM | OXYGEN SATURATION: 98 % | SYSTOLIC BLOOD PRESSURE: 112 MMHG | DIASTOLIC BLOOD PRESSURE: 82 MMHG

## 2020-04-30 DIAGNOSIS — J30.89 SEASONAL ALLERGIC RHINITIS DUE TO OTHER ALLERGIC TRIGGER: ICD-10-CM

## 2020-04-30 DIAGNOSIS — H69.83 DYSFUNCTION OF BOTH EUSTACHIAN TUBES: Primary | ICD-10-CM

## 2020-04-30 DIAGNOSIS — F41.9 ANXIETY: ICD-10-CM

## 2020-04-30 PROCEDURE — 99213 OFFICE O/P EST LOW 20 MIN: CPT | Performed by: PHYSICIAN ASSISTANT

## 2020-04-30 RX ORDER — CETIRIZINE HYDROCHLORIDE 10 MG/1
10 TABLET ORAL DAILY
Qty: 30 TABLET | Refills: 6 | Status: SHIPPED | OUTPATIENT
Start: 2020-04-30 | End: 2020-07-24

## 2020-04-30 RX ORDER — METHYLPREDNISOLONE 4 MG/1
TABLET ORAL
Qty: 21 TABLET | Refills: 0 | Status: SHIPPED | OUTPATIENT
Start: 2020-04-30 | End: 2020-07-20

## 2020-05-02 NOTE — ASSESSMENT & PLAN NOTE
Encouraged pt to contact her therapist to talk about coping strategies to manage anxiety exacerbation.

## 2020-05-07 ENCOUNTER — HOSPITAL ENCOUNTER (OUTPATIENT)
Dept: MAMMOGRAPHY | Facility: HOSPITAL | Age: 60
End: 2020-05-07

## 2020-05-12 DIAGNOSIS — F32.A DEPRESSION, UNSPECIFIED DEPRESSION TYPE: ICD-10-CM

## 2020-05-12 RX ORDER — DESVENLAFAXINE 50 MG/1
TABLET, EXTENDED RELEASE ORAL
Qty: 30 TABLET | Refills: 1 | Status: SHIPPED | OUTPATIENT
Start: 2020-05-12 | End: 2020-07-15

## 2020-05-14 ENCOUNTER — APPOINTMENT (OUTPATIENT)
Dept: OTHER | Facility: HOSPITAL | Age: 60
End: 2020-05-14

## 2020-05-14 ENCOUNTER — HOSPITAL ENCOUNTER (OUTPATIENT)
Dept: MAMMOGRAPHY | Facility: HOSPITAL | Age: 60
Discharge: HOME | End: 2020-05-14
Admitting: PHYSICIAN ASSISTANT

## 2020-05-14 DIAGNOSIS — Z12.39 BREAST CANCER SCREENING: ICD-10-CM

## 2020-05-14 DIAGNOSIS — Z92.89 H/O MAMMOGRAM: ICD-10-CM

## 2020-05-14 PROCEDURE — 77067 SCR MAMMO BI INCL CAD: CPT | Performed by: RADIOLOGY

## 2020-05-14 PROCEDURE — 77063 BREAST TOMOSYNTHESIS BI: CPT

## 2020-05-14 PROCEDURE — 77063 BREAST TOMOSYNTHESIS BI: CPT | Performed by: RADIOLOGY

## 2020-05-14 PROCEDURE — 77067 SCR MAMMO BI INCL CAD: CPT

## 2020-06-12 ENCOUNTER — OFFICE VISIT (OUTPATIENT)
Dept: ORTHOPEDIC SURGERY | Facility: CLINIC | Age: 60
End: 2020-06-12

## 2020-06-12 VITALS — BODY MASS INDEX: 40.75 KG/M2 | HEART RATE: 97 BPM | OXYGEN SATURATION: 98 % | HEIGHT: 66 IN | WEIGHT: 253.53 LBS

## 2020-06-12 DIAGNOSIS — M79.604 RIGHT LEG PAIN: Primary | ICD-10-CM

## 2020-06-12 DIAGNOSIS — M76.61 TENDONITIS, ACHILLES, RIGHT: ICD-10-CM

## 2020-06-12 DIAGNOSIS — S90.112A CONTUSION OF LEFT GREAT TOE WITHOUT DAMAGE TO NAIL, INITIAL ENCOUNTER: ICD-10-CM

## 2020-06-12 DIAGNOSIS — S80.11XA CONTUSION OF RIGHT TIBIA: ICD-10-CM

## 2020-06-12 DIAGNOSIS — I87.8 VENOUS STASIS OF BOTH LOWER EXTREMITIES: ICD-10-CM

## 2020-06-12 DIAGNOSIS — S93.401A SPRAIN OF RIGHT ANKLE, UNSPECIFIED LIGAMENT, INITIAL ENCOUNTER: ICD-10-CM

## 2020-06-12 PROCEDURE — 99213 OFFICE O/P EST LOW 20 MIN: CPT | Performed by: PHYSICIAN ASSISTANT

## 2020-06-12 NOTE — PROGRESS NOTES
[facility] [place]    Subjective     Patient: Sandra Auguste  : 1960    Primary Care Provider: Michelle Mandel PA    Requesting Provider: As above    Pain of the Right Ankle      History    Chief Complaint: Right ankle and tibia pain    History of Present Illness: This is a very pleasant 59-year-old female presenting today to discuss her right ankle pain as well as anterior tibia pain since a fall on 2020.  She complains of ankle pain generalized as well as pain up the back of her leg.  She is also complaining of anterior tibia pain.  As well as a left MTP first MTP joint pain.  She was seen at urgent treatment on 2020 with x-rays of the ankle that showed no acute bony findings.  She reports she has not had any improvement of her pain.  She is using an Aircast and crutch for ambulation.  She reports pain to be moderate and sharp and aching.  She reports it swells as the day progresses.  She has also used Tylenol and ice and keeping the foot elevated without any improve pain.  She is here for further evaluation and treatment recommendations.    Current Outpatient Medications on File Prior to Visit   Medication Sig Dispense Refill   • acetaminophen (TYLENOL) 500 MG tablet Take 500 mg by mouth Every 6 (Six) Hours As Needed for Mild Pain .     • albuterol sulfate  (90 Base) MCG/ACT inhaler Inhale 1 puff Every 6 (Six) Hours As Needed for Shortness of Air. INHALE TWO PUFFS BY MOUTH EVERY 6 HOURS AS NEEDED 1 inhaler 2   • ALPRAZolam (XANAX) 0.25 MG tablet Take 1 tablet by mouth 2 (Two) Times a Day As Needed for Anxiety. 30 tablet 0   • amitriptyline (ELAVIL) 25 MG tablet Take 1 tablet by mouth Every Night. 30 tablet 5   • BREO ELLIPTA 100-25 MCG/INH inhaler INHALE 1 PUFF BY MOUTH DAILY 60 each 1   • cetirizine (zyrTEC) 10 MG tablet Take 1 tablet by mouth Daily. 30 tablet 6   • desvenlafaxine ER (KHEDEZLA) 50 MG tablet sustained-release 24 hour 24 hr tablet TAKE ONE TABLET BY  "MOUTH DAILY 30 tablet 1   • diclofenac (VOLTAREN) 1 % gel gel Apply 4 g topically to the appropriate area as directed 4 (Four) Times a Day As Needed (for pain in shoulders). 100 g 1   • dicyclomine (BENTYL) 10 MG capsule TAKE ONE CAPSULE BY MOUTH FOUR TIMES A DAY BEFORE MEALS AND AT BEDTIME 30 capsule 0   • EPINEPHrine (EPIPEN) 0.3 MG/0.3ML solution auto-injector injection As Needed (FOR SEVERE ALLERGIC REACTION).     • FLUoxetine (PROZAC) 10 MG capsule Take 1 capsule by mouth Daily. 30 capsule 0   • Melatonin 10 MG tablet      • methylPREDNISolone (Medrol) 4 MG tablet follow package directions 21 tablet 0   • Multiple Vitamins-Calcium (ONE-A-DAY WOMENS PO)      • naproxen (NAPROSYN) 500 MG tablet Take 1 tablet by mouth 2 (Two) Times a Day With Meals. 30 tablet 1   • NEXIUM 24HR 20 MG capsule      • ondansetron (ZOFRAN) 4 MG tablet Take 1 tablet by mouth Every 8 (Eight) Hours As Needed for Nausea or Vomiting. 30 tablet 0   • raNITIdine (ZANTAC) 300 MG tablet Take 1 tablet by mouth Every Night. 30 tablet 3   • tiZANidine (ZANAFLEX) 4 MG tablet Take 1 tablet by mouth every night at bedtime. 30 tablet 1     No current facility-administered medications on file prior to visit.       Allergies   Allergen Reactions   • Mold Extract [Trichophyton] Shortness Of Breath, Anxiety, Irritability, Other (See Comments) and Palpitations     SOA   • Lortab [Hydrocodone-Acetaminophen] Nausea And Vomiting     SEVERE NAUSEA AND SLIGHT VOMITING   • Methylprednisolone Other (See Comments)     Bleeding, cramping   • Pollen Extract Other (See Comments)     SNEEZING AND CONGESTION       Past Medical History:   Diagnosis Date   • Anesthesia complication     HAS TROUBLE GETTING \"NUMB\"    • Anxiety    • Arthritis    • Bladder spasms    • Depression    • Diverticulosis    • GERD (gastroesophageal reflux disease)    • Memory loss     FROM SKULL FRACTURE AND FALL-SHORT TERM MEMORY LOSS   • Mental disorder     PTSD   • Migraine    • Obesity    • " "Seasonal allergies    • Seizures (CMS/[facility])     \"convulsions\" at age 5   • Skull fracture (CMS/[facility]) 2012   • Sleep apnea with use of continuous positive airway pressure (CPAP)     INSTRUCTED TO BRING OWN MASK AND TUBING    • SOB (shortness of breath) on exertion    • Type 2 diabetes mellitus without complication, without long-term current use of insulin (CMS/[facility]) 10/11/2018   • Wears contact lenses    • Wears partial dentures     TOP ONLY    • Wears prescription eyeglasses    • Wears prescription eyeglasses      Past Surgical History:   Procedure Laterality Date   • CERVICAL POLYPECTOMY     • TOTAL LAPAROSCOPIC HYSTERECTOMY N/A 9/18/2017    Procedure: TOTAL LAPAROSCOPIC HYSTERECTOMY, BILATERAL SALPINGO OOPHORECTOMY WITH DAVINCI ROBOT ;  Surgeon: Shannon Grimaldo DO;  Location: [facility];  Service:    • WISDOM TOOTH EXTRACTION       Family History   Problem Relation Age of Onset   • Cancer Mother    • Cancer Maternal Aunt    • Cancer Paternal Aunt    • Breast cancer Paternal Aunt 70      Social History     Socioeconomic History   • Marital status: Single     Spouse name: Not on file   • Number of children: Not on file   • Years of education: Not on file   • Highest education level: Not on file   Tobacco Use   • Smoking status: Never Smoker   • Smokeless tobacco: Never Used   Substance and Sexual Activity   • Alcohol use: No     Comment: ONCE A YEAR ON XMAS, IF THAT   • Drug use: No   • Sexual activity: Never     Birth control/protection: Post-menopausal   Social History Narrative            Review of Systems   Constitutional: Positive for fatigue.   HENT: Positive for dental problem, ear discharge, ear pain, postnasal drip, sinus pressure and tinnitus.    Eyes: Positive for itching.   Respiratory: Positive for apnea and shortness of breath.    Cardiovascular: Negative.    Gastrointestinal: Negative.    Endocrine: Positive for cold intolerance and heat intolerance.   Genitourinary: Negative.    Musculoskeletal: " "Positive for arthralgias and joint swelling.   Skin: Negative.    Allergic/Immunologic: Positive for environmental allergies.   Neurological: Positive for dizziness, weakness, light-headedness, numbness and headaches.   Hematological: Negative.    Psychiatric/Behavioral: Positive for decreased concentration. The patient is nervous/anxious.        The following portions of the patient's history were reviewed and updated as appropriate: allergies, current medications, past family history, past medical history, past social history, past surgical history and problem list.      Objective      Physical Exam  Pulse 97   Ht 167.6 cm (65.98\")   Wt 115 kg (253 lb 8.5 oz)   LMP  (LMP Unknown)   SpO2 98%   BMI 40.94 kg/m²     Body mass index is 40.94 kg/m².    GENERAL: Body habitus: morbidly obese    Lower extremity edema: Left: 1+ pitting; Right: 1+ pitting    Varicose veins:  Left: mild; Right: mild    Gait: antalgic and using crutches     Mental Status:  awake and alert; oriented to person, place, and time    Voice:  clear  SKIN:  Normal    Hair Growth:  Right:normal; Left:  normal  HEENT: Head: Normocephalic, atraumatic,  without obvious abnormality.  eye: normal external eye, no icterus   PULM:  Repiratory effort normal    Ortho Exam  V:  Dorsalis Pedis:  Right: 2+; Left:2+    Posterior Tibial: Right:2+; Left:2+    Capillary Refill:  Brisk  MSK:      Tibia:  Right:  tender over subcutaneous border; Left:  tender over subcutaneous border      Ankle:  Right: tender over the ATFL with swelling, no ecchymosis.  Tender over the Achilles tendon., ROM  normal and motor function  normal; Left:  non tender, ROM  normal and motor function  normal      Foot:  Right:  non tender; Left:  tender at the 1st MTPJ with resolving ecchymosis      NEURO: Heel Walking:  Right:  unable to test; Left:  unable to test    Toe Walking:  Right:  unable to test; Left:  unable to test     [place] 5.07 monofilament test: not " evaluated    Lower extremity sensation: intact     Calf Atrophy:none    Motor Function: all 5/5          Medical Decision Making    Data Review:   ordered and reviewed x-rays today and reviewed radiology images    Assessment:  1. Right leg pain    2. Sprain of right ankle, unspecified ligament, initial encounter    3. Tendonitis, Achilles, right    4. Contusion of right tibia    5. Venous stasis of both lower extremities    6. Contusion of left great toe without damage to nail, initial encounter        Plan:  1. Right ankle sprain with tibia contusion.  I reviewed x-rays from today and 5/25/2020 and clinical findings with the patient.  X-rays today of the tib/fib show no evidence of acute or chronic bony findings.  I think her tibia pain is a contusion.  Ankle x-rays from 5/25 show asymmetric soft tissue edema about the lateral ankle with well-corticated osseous density adjacent to the lateral malleolus may represent prior healed injury without acute displaced fracture and ankle mortise remaining grossly symmetric despite these findings.  I think her pain is a sprain.  I explained her the importance after an ankle sprain is physical therapy.  She will also do therapy for the Achilles.  I given her prescription.  We will get her a boot today for comfort.  She may wean herself out of the boot.  We will see her back in 6 to 8 weeks or sooner if needed.    2. I explained edema and venous stasis to the patient, and the often hereditary nature of the problem, and the contribution of weight, trauma, etc. I explained how they cause edema (due to gravity, etc) I explained how they can lead to ulceration.  I explained how they can cause pain, stiffness, aching, cramping, etc. I explained that it is a very very common problem, so common that even [employer] compression stockings.  I recommend wearing support stockings (compression stockings) daily, we discussed what type and where to find them    3.  She has resolving  ecchymosis at the first MTP joint.  I suspect this is just a contusion.  I reassured her there is no fracture and no significant arthritis on x-ray.  This should resolve.    Patient history, diagnosis and treatment plan discussed with Dr. Russo.            2.  Wendy Chen PA-C  06/16/20  09:38

## 2020-07-15 DIAGNOSIS — F32.A DEPRESSION, UNSPECIFIED DEPRESSION TYPE: ICD-10-CM

## 2020-07-15 RX ORDER — DESVENLAFAXINE 50 MG/1
TABLET, EXTENDED RELEASE ORAL
Qty: 30 TABLET | Refills: 0 | Status: SHIPPED | OUTPATIENT
Start: 2020-07-15 | End: 2020-08-18 | Stop reason: SDUPTHER

## 2020-07-16 ENCOUNTER — E-VISIT (OUTPATIENT)
Dept: INTERNAL MEDICINE | Facility: CLINIC | Age: 60
End: 2020-07-16

## 2020-07-16 ENCOUNTER — TREATMENT (OUTPATIENT)
Dept: PHYSICAL THERAPY | Facility: CLINIC | Age: 60
End: 2020-07-16

## 2020-07-16 DIAGNOSIS — M25.571 ACUTE RIGHT ANKLE PAIN: Primary | ICD-10-CM

## 2020-07-16 DIAGNOSIS — J01.91 ACUTE RECURRENT SINUSITIS, UNSPECIFIED LOCATION: Primary | ICD-10-CM

## 2020-07-16 DIAGNOSIS — M79.672 LEFT FOOT PAIN: ICD-10-CM

## 2020-07-16 PROCEDURE — 97162 PT EVAL MOD COMPLEX 30 MIN: CPT | Performed by: PHYSICAL THERAPIST

## 2020-07-16 PROCEDURE — 97140 MANUAL THERAPY 1/> REGIONS: CPT | Performed by: PHYSICAL THERAPIST

## 2020-07-16 PROCEDURE — 99421 OL DIG E/M SVC 5-10 MIN: CPT | Performed by: NURSE PRACTITIONER

## 2020-07-16 PROCEDURE — 97110 THERAPEUTIC EXERCISES: CPT | Performed by: PHYSICAL THERAPIST

## 2020-07-16 NOTE — PROGRESS NOTES
Physical Therapy Initial Evaluation and Plan of Care  Subjective Evaluation    History of Present Illness  Mechanism of injury: Pt sprained her right ankle on  walking down stairs of her front porch. She hit a raised rock and fell to the ground. She also hit part of her left foot in the process. She had immediate swelling. She was put in a boot. She really only wears it when she is on the tile in her kitchen for long periods because that causes her pain. She has a lot of difficulty on the right with stairs, squatting, and getting out of the car. She reports she has some numbness in her right ankle and in her left foot. She states that walking is the most bothersome activity for her left foot.     Quality of life: excellent    Pain  Current pain ratin  At worst pain ratin  Quality: cramping  Relieving factors: relaxation and ice  Aggravating factors: stairs, standing, squatting and ambulation  Progression: improved    Social Support  Lives in: multiple-level home    Treatments  Previous treatment: immobilization  Patient Goals  Patient goals for therapy: decreased pain, decreased edema, improved balance, increased motion, increased strength and independence with ADLs/IADLs             Objective          Palpation     Additional Palpation Details  Pt is tender on the right at ATFL, achilles, gastroc, and soleus.  Pt is tender on the top and bottom on the 1st metatarsal.     Active Range of Motion   Left Ankle/Foot   Dorsiflexion (ke): 4 degrees with pain  Plantar flexion: 38 degrees   Inversion: 28 degrees   Eversion: 16 degrees with pain    Right Ankle/Foot   Dorsiflexion (ke): 6 degrees   Plantar flexion: 46 degrees   Inversion: 30 degrees   Eversion: 14 degrees with pain    Strength/Myotome Testing     Left Ankle/Foot   Dorsiflexion: 5  Plantar flexion: 3+  Inversion: 3+  Eversion: 4+    Right Ankle/Foot   Dorsiflexion: 5  Plantar flexion: 5  Inversion: 5  Eversion: 5    Tests   Left Ankle/Foot    Negative for anterior drawer.     Right Ankle/Foot   Negative for anterior drawer.     Swelling   Left Ankle/Foot   Figure 8: 50.1 cm    Right Ankle/Foot   Figure 8: 52.3 cm    Ambulation     Comments   Pt ambulates with antalgic gait, decreased stance time on the left, and decreased plantarflexion on the right.          Assessment & Plan     Assessment  Impairments: abnormal gait, abnormal muscle tone, abnormal or restricted ROM, activity intolerance, impaired physical strength, lacks appropriate home exercise program, pain with function and weight-bearing intolerance  Assessment details: Pt is a 59 year old female presenting to the clinic with right ankle pain and left foot pain. She has decreased dorsiflexion bilaterally, poor plantarflexion and inversion strength on the left, and swelling on the right. Her impairments are limiting her ability to walk, go up and down stairs, and get in and out of the car. Pt would benefit from skilled PT services to address these impairments in order to reach her long term goals.  Prognosis: good  Functional Limitations: walking, uncomfortable because of pain, standing and unable to perform repetitive tasks  Goals  Plan Goals: SHORT TERM GOALS:  2 weeks       1. Pt independent with HEP  2. Pt to demonstrate renan ankle inversion strength 4/5 or greater to improve stability with ambulation  3. Pt to report being able to walk for 10 minutes without increasing pain in the bilateral ankles    LONG TERM GOALS:   6 weeks  1. Pt to demonstrate ability to perform full functional squat with good form and without increasing pain  2. Pt to demonstrate renan ankle strength to 5/5 or greater to improve safety with ambulation on uneven surfaces  4. Pt to demonstrate ability to perform step up/down 8 inch step x10 safely and without pain in the bilateral ankles     Plan  Therapy options: will be seen for skilled physical therapy services  Planned modality interventions: electrical  stimulation/Panamanian stimulation, TENS, high voltage pulsed current (pain management) and cryotherapy  Planned therapy interventions: balance/weight-bearing training, body mechanics training, fine motor coordination training, flexibility, functional ROM exercises, home exercise program, joint mobilization, manual therapy, neuromuscular re-education, soft tissue mobilization, strengthening, stretching and therapeutic activities  Duration in visits: 2  Duration in weeks: 6  Treatment plan discussed with: patient        Manual Therapy:    15     mins  94516;  Therapeutic Exercise:    10     mins  76908;     Neuromuscular Juaquin:        mins  02914;    Therapeutic Activity:          mins  01867;     Gait Training:           mins  00259;     Ultrasound:          mins  64820;    Electrical Stimulation:         mins  18840 ( );  Dry Needling          mins self-pay    Timed Treatment:   20   mins   Total Treatment:     60   mins    PT SIGNATURE: Kateryna Thornton PT   DATE TREATMENT INITIATED: 7/16/2020    Initial Certification  Certification Period: 10/14/2020  I certify that the therapy services are furnished while this patient is under my care.  The services outlined above are required by this patient, and will be reviewed every 90 days.     PHYSICIAN: Wendy Chen PA-C      DATE:     Please sign and return via fax to [phone].. Thank you, [facility].

## 2020-07-20 ENCOUNTER — TREATMENT (OUTPATIENT)
Dept: PHYSICAL THERAPY | Facility: CLINIC | Age: 60
End: 2020-07-20

## 2020-07-20 DIAGNOSIS — M25.571 ACUTE RIGHT ANKLE PAIN: Primary | ICD-10-CM

## 2020-07-20 DIAGNOSIS — M79.672 LEFT FOOT PAIN: ICD-10-CM

## 2020-07-20 PROCEDURE — 97110 THERAPEUTIC EXERCISES: CPT | Performed by: PHYSICAL THERAPIST

## 2020-07-20 PROCEDURE — 97140 MANUAL THERAPY 1/> REGIONS: CPT | Performed by: PHYSICAL THERAPIST

## 2020-07-20 RX ORDER — AMOXICILLIN AND CLAVULANATE POTASSIUM 875; 125 MG/1; MG/1
1 TABLET, FILM COATED ORAL 2 TIMES DAILY
Qty: 20 TABLET | Refills: 0 | Status: SHIPPED | OUTPATIENT
Start: 2020-07-20 | End: 2020-07-27

## 2020-07-20 NOTE — PROGRESS NOTES
Sandra WINSTON Molina    1960  [id number]    I have reviewed the e-Visit questionnaire and patient's answers, my assessment and plan are as follows:    HPI    59-year-old female presents via email visit with complaints of shortness of breath and nasal congestion.  She says the symptoms are similar to her yearly allergic rhinitis and she has been using her nasal spray and Claritin with no relief.    Review of Systems - History obtained from chart review      Diagnoses and all orders for this visit:    Acute recurrent sinusitis, unspecified location  -     amoxicillin-clavulanate (Augmentin) 875-125 MG per tablet; Take 1 tablet by mouth 2 (Two) Times a Day for 10 days.        Any medications prescribed have been sent electronically to   [facility] [address] [place] [address] - [phone]  - [phone]   [address] [place] [address]  Phone: [phone] Fax: [phone]    [facility] [address] [place] [address] [address] - [phone]  - [phone]  [address] [facility]. [place] [id number]  Phone: [phone] Fax: [phone]    Time spent 5 min.    Christi Talbot, REBECA  07/20/20  1:25 PM

## 2020-07-20 NOTE — PROGRESS NOTES
Physical Therapy Daily Progress Note    Subjective   Sandra Auguste reports: that she got really sore in her feet from doing the towel scrunches. She did not do any other exercises on her HEP.      Objective   See Exercise, Manual, and Modality Logs for complete treatment.       Assessment/Plan  Pt tolerated treatment well. She got fatigued easily and had pain with inversion bilaterally. Pt was instructed on the importance of completing her HEP.    Progress per Plan of Care           Manual Therapy:    13     mins  02507;  Therapeutic Exercise:    15     mins  10400;     Neuromuscular Juaquin:        mins  78002;    Therapeutic Activity:          mins  27168;     Gait Training:           mins  09198;     Ultrasound:          mins  16792;    Electrical Stimulation:         mins  42287 ( );  E-Stim Attended:         mins  52429  Iontophoresis          mins 18773   Traction          mins  15057  Fluidotherapy          mins  29290  Dry Needling          mins self-pay - No Charge  Paraffin          mins  85440    Timed Treatment:   28   mins   Total Treatment:     55   mins    Kateryna Thornton, PT, DPT  Physical Therapist

## 2020-07-20 NOTE — PATIENT INSTRUCTIONS
**if no improvement in 5 days, will need to follow-up with PCP    **if symptoms worsen and you need immediate care, please follow-up sooner at urgent care or emergency department

## 2020-07-24 ENCOUNTER — TELEMEDICINE (OUTPATIENT)
Dept: SLEEP MEDICINE | Facility: HOSPITAL | Age: 60
End: 2020-07-24

## 2020-07-24 VITALS — HEIGHT: 66 IN | WEIGHT: 254 LBS | BODY MASS INDEX: 40.82 KG/M2

## 2020-07-24 DIAGNOSIS — G47.33 OBSTRUCTIVE SLEEP APNEA, ADULT: Primary | ICD-10-CM

## 2020-07-24 PROCEDURE — 99212 OFFICE O/P EST SF 10 MIN: CPT | Performed by: NURSE PRACTITIONER

## 2020-07-24 NOTE — PROGRESS NOTES
Chief Complaint:   Chief Complaint   Patient presents with   • Follow-up       HPI:    Sandra Auguste is a 59 y.o. female here for follow-up of sleep apnea.  Patient was last seen 5/2/2019.  Patient states she is doing well with CPAP therapy.  Patient is sleeping 7 to 8 hours nightly and does feel refreshed upon awakening.  Will take patient less than 30 minutes to go to sleep and she will get up x1 in the night to use the restroom.  Patient has an [place] score of 5/24.  Patient states she does better when she has new supplies and has been out for a while due to COVID.  We will get those refilled for her today.  Patient has no other concerns or complaints and wishes to continue with therapy.        Current medications are:   Current Outpatient Medications:   •  acetaminophen (TYLENOL) 500 MG tablet, Take 500 mg by mouth Every 6 (Six) Hours As Needed for Mild Pain ., Disp: , Rfl:   •  albuterol sulfate  (90 Base) MCG/ACT inhaler, Inhale 1 puff Every 6 (Six) Hours As Needed for Shortness of Air. INHALE TWO PUFFS BY MOUTH EVERY 6 HOURS AS NEEDED, Disp: 1 inhaler, Rfl: 2  •  ALPRAZolam (XANAX) 0.25 MG tablet, Take 1 tablet by mouth 2 (Two) Times a Day As Needed for Anxiety., Disp: 30 tablet, Rfl: 0  •  amitriptyline (ELAVIL) 25 MG tablet, Take 1 tablet by mouth Every Night., Disp: 30 tablet, Rfl: 5  •  amoxicillin-clavulanate (Augmentin) 875-125 MG per tablet, Take 1 tablet by mouth 2 (Two) Times a Day for 10 days., Disp: 20 tablet, Rfl: 0  •  BREO ELLIPTA 100-25 MCG/INH inhaler, INHALE 1 PUFF BY MOUTH DAILY, Disp: 60 each, Rfl: 1  •  desvenlafaxine ER (KHEDEZLA) 50 MG tablet sustained-release 24 hour 24 hr tablet, TAKE ONE TABLET BY MOUTH DAILY, Disp: 30 tablet, Rfl: 0  •  diclofenac (VOLTAREN) 1 % gel gel, Apply 4 g topically to the appropriate area as directed 4 (Four) Times a Day As Needed (for pain in shoulders)., Disp: 100 g, Rfl: 1  •  EPINEPHrine (EPIPEN) 0.3 MG/0.3ML solution auto-injector injection, As  Needed (FOR SEVERE ALLERGIC REACTION)., Disp: , Rfl:   •  naproxen (NAPROSYN) 500 MG tablet, Take 1 tablet by mouth 2 (Two) Times a Day With Meals., Disp: 30 tablet, Rfl: 1.      The patient's relevant past medical, surgical, family and social history were reviewed and updated in Epic as appropriate.       Review of Systems   Eyes: Positive for visual disturbance.   Respiratory: Positive for apnea.    Cardiovascular: Positive for palpitations and leg swelling.   Musculoskeletal: Positive for arthralgias and joint swelling.   Allergic/Immunologic: Positive for environmental allergies.   Psychiatric/Behavioral: Positive for dysphoric mood and sleep disturbance. The patient is nervous/anxious.    All other systems reviewed and are negative.        Objective:    Physical Exam   Constitutional: She is oriented to person, place, and time. She appears well-developed and well-nourished.   HENT:   Head: Normocephalic and atraumatic.   Class 4 airway   Neck: No tracheal deviation present.   Pulmonary/Chest: Effort normal.   Neurological: She is alert and oriented to person, place, and time.   Skin: No erythema. No pallor.   Psychiatric: She has a normal mood and affect. Her behavior is normal. Judgment and thought content normal.   79/90 days of use, > 4 use 61.1 90% pressure 11.0 AHI of 2.2 download reviewed with patient.      ASSESSMENT/PLAN    Sandra was seen today for follow-up.    Diagnoses and all orders for this visit:    Obstructive sleep apnea, adult  -     CPAP Therapy            1. Counseled patient regarding multimodal approach with healthy nutrition, healthy sleep, regular physical activity, social activities, counseling, and medications. Encouraged to practice lateral  sleep position. Avoid alcohol and sedatives close to bedtime.  2.   Refill supplies x1 year.  Return to clinic 1 year or sooner symptoms warrant.  Patient gave verbal consent for video visit.  I have reviewed the results of my evaluation and  impression and discussed my recommendations in detail with the patient.      Signed by  Giana Rodriguez, APRN    July 24, 2020      CC: Michelle Mandel PA          No ref. provider found

## 2020-07-27 ENCOUNTER — OFFICE VISIT (OUTPATIENT)
Dept: INTERNAL MEDICINE | Facility: CLINIC | Age: 60
End: 2020-07-27

## 2020-07-27 ENCOUNTER — TREATMENT (OUTPATIENT)
Dept: PHYSICAL THERAPY | Facility: CLINIC | Age: 60
End: 2020-07-27

## 2020-07-27 VITALS
HEIGHT: 66 IN | RESPIRATION RATE: 16 BRPM | OXYGEN SATURATION: 97 % | WEIGHT: 257.6 LBS | TEMPERATURE: 96.9 F | SYSTOLIC BLOOD PRESSURE: 146 MMHG | BODY MASS INDEX: 41.4 KG/M2 | DIASTOLIC BLOOD PRESSURE: 84 MMHG | HEART RATE: 95 BPM

## 2020-07-27 DIAGNOSIS — M79.672 LEFT FOOT PAIN: ICD-10-CM

## 2020-07-27 DIAGNOSIS — J30.2 SEASONAL ALLERGIES: ICD-10-CM

## 2020-07-27 DIAGNOSIS — E66.01 CLASS 3 SEVERE OBESITY DUE TO EXCESS CALORIES WITHOUT SERIOUS COMORBIDITY WITH BODY MASS INDEX (BMI) OF 40.0 TO 44.9 IN ADULT (HCC): ICD-10-CM

## 2020-07-27 DIAGNOSIS — M25.571 ACUTE RIGHT ANKLE PAIN: Primary | ICD-10-CM

## 2020-07-27 DIAGNOSIS — E11.9 TYPE 2 DIABETES MELLITUS WITHOUT COMPLICATION, WITHOUT LONG-TERM CURRENT USE OF INSULIN (HCC): Primary | ICD-10-CM

## 2020-07-27 LAB — HBA1C MFR BLD: 6.3 %

## 2020-07-27 PROCEDURE — 97112 NEUROMUSCULAR REEDUCATION: CPT | Performed by: PHYSICAL THERAPIST

## 2020-07-27 PROCEDURE — 83036 HEMOGLOBIN GLYCOSYLATED A1C: CPT | Performed by: PHYSICIAN ASSISTANT

## 2020-07-27 PROCEDURE — 97110 THERAPEUTIC EXERCISES: CPT | Performed by: PHYSICAL THERAPIST

## 2020-07-27 PROCEDURE — 97140 MANUAL THERAPY 1/> REGIONS: CPT | Performed by: PHYSICAL THERAPIST

## 2020-07-27 PROCEDURE — 99214 OFFICE O/P EST MOD 30 MIN: CPT | Performed by: PHYSICIAN ASSISTANT

## 2020-07-27 RX ORDER — FLUTICASONE PROPIONATE 50 MCG
2 SPRAY, SUSPENSION (ML) NASAL DAILY
Qty: 1 BOTTLE | Refills: 5 | Status: SHIPPED | OUTPATIENT
Start: 2020-07-27 | End: 2020-08-13 | Stop reason: SDUPTHER

## 2020-07-27 RX ORDER — LORATADINE 10 MG/1
10 TABLET ORAL DAILY
Qty: 90 TABLET | Refills: 1 | Status: SHIPPED | OUTPATIENT
Start: 2020-07-27 | End: 2021-02-04 | Stop reason: SDUPTHER

## 2020-07-27 NOTE — PROGRESS NOTES
Physical Therapy Daily Progress Note    Subjective   Sandra Auguste reports: that she is feeling pretty sore. Her left foot still really hurts.      Objective   See Exercise, Manual, and Modality Logs for complete treatment.       Assessment/Plan  Pt tolerated treatment well. She got fatigued towards the end of the visit. PT performed IASTM today with minimal complaints of pain from the pt. Pt would benefit from continued skilled PT.    Progress per Plan of Care           Manual Therapy:    23     mins  87683;  Therapeutic Exercise:    18     mins  75336;     Neuromuscular Juaquin:    15    mins  78083;    Therapeutic Activity:          mins  23849;     Gait Training:           mins  32604;     Ultrasound:          mins  08908;    Electrical Stimulation:         mins  88879 ( );  E-Stim Attended:         mins  54758  Iontophoresis          mins 56124   Traction          mins  73007  Fluidotherapy          mins  28129  Dry Needling          mins self-pay - No Charge  Paraffin          mins  39934    Timed Treatment:   56   mins   Total Treatment:     56   mins    Kateryna Thornton, PT, DPT  Physical Therapist

## 2020-07-27 NOTE — PROGRESS NOTES
Chief Complaint   Patient presents with   • Follow-up       Subjective   Sandra Auguste is a 60 y.o. female.       History of Present Illness     Pt has been having allergy symptoms. She ran out of her nasal spray. She previously has taken claritin without any problem but when she switched to zyrtec, developed low back pain. She remembered that she has had low back pain with allegra.     Pt has been struggling to lose weight. She has tried several methods of losing weight, saw nutritionist for a little while. Has not been able to keep weight off. Would like to look into bariatric surgery.      Current Outpatient Medications:   •  acetaminophen (TYLENOL) 500 MG tablet, Take 500 mg by mouth Every 6 (Six) Hours As Needed for Mild Pain ., Disp: , Rfl:   •  albuterol sulfate  (90 Base) MCG/ACT inhaler, Inhale 1 puff Every 6 (Six) Hours As Needed for Shortness of Air. INHALE TWO PUFFS BY MOUTH EVERY 6 HOURS AS NEEDED, Disp: 1 inhaler, Rfl: 2  •  ALPRAZolam (XANAX) 0.25 MG tablet, Take 1 tablet by mouth 2 (Two) Times a Day As Needed for Anxiety., Disp: 30 tablet, Rfl: 0  •  amitriptyline (ELAVIL) 25 MG tablet, Take 1 tablet by mouth Every Night., Disp: 30 tablet, Rfl: 5  •  BREO ELLIPTA 100-25 MCG/INH inhaler, INHALE 1 PUFF BY MOUTH DAILY, Disp: 60 each, Rfl: 1  •  desvenlafaxine ER (KHEDEZLA) 50 MG tablet sustained-release 24 hour 24 hr tablet, TAKE ONE TABLET BY MOUTH DAILY, Disp: 30 tablet, Rfl: 0  •  diclofenac (VOLTAREN) 1 % gel gel, Apply 4 g topically to the appropriate area as directed 4 (Four) Times a Day As Needed (for pain in shoulders)., Disp: 100 g, Rfl: 1  •  EPINEPHrine (EPIPEN) 0.3 MG/0.3ML solution auto-injector injection, As Needed (FOR SEVERE ALLERGIC REACTION)., Disp: , Rfl:   •  naproxen (NAPROSYN) 500 MG tablet, Take 1 tablet by mouth 2 (Two) Times a Day With Meals., Disp: 30 tablet, Rfl: 1  •  fluticasone (FLONASE) 50 MCG/ACT nasal spray, 2 sprays into the nostril(s) as directed by provider Daily  "for 30 days., Disp: 1 bottle, Rfl: 5  •  loratadine (CLARITIN) 10 MG tablet, Take 1 tablet by mouth Daily., Disp: 90 tablet, Rfl: 1     PMFSH  The following portions of the patient's history were reviewed and updated as appropriate: allergies, current medications, past family history, past medical history, past social history, past surgical history and problem list.    Review of Systems   Constitutional: Positive for fatigue. Negative for activity change and unexpected weight change.   HENT: Positive for congestion, postnasal drip and sore throat. Negative for ear pain.    Eyes: Negative for pain and discharge.   Respiratory: Negative for cough, chest tightness, shortness of breath and wheezing.    Cardiovascular: Negative for chest pain and palpitations.   Gastrointestinal: Negative for abdominal pain, diarrhea and vomiting.   Endocrine: Negative.    Genitourinary: Negative.    Musculoskeletal: Negative for joint swelling.   Skin: Negative for color change, rash and wound.   Allergic/Immunologic: Negative.    Neurological: Negative for seizures and syncope.   Psychiatric/Behavioral: Negative.        Objective   /84   Pulse 95   Temp 96.9 °F (36.1 °C)   Resp 16   Ht 167.6 cm (65.98\")   Wt 117 kg (257 lb 9.6 oz)   LMP  (LMP Unknown)   SpO2 97%   BMI 41.60 kg/m²     Physical Exam   Constitutional: She appears well-developed and well-nourished.   HENT:   Head: Normocephalic.   Right Ear: Hearing, tympanic membrane, external ear and ear canal normal.   Left Ear: Hearing, tympanic membrane, external ear and ear canal normal.   Nose: Nose normal.   Mouth/Throat: Oropharynx is clear and moist.   Eyes: Pupils are equal, round, and reactive to light. Conjunctivae are normal.   Neck: Normal range of motion.   Cardiovascular: Normal rate, regular rhythm and normal heart sounds.   Pulmonary/Chest: Effort normal and breath sounds normal. She has no decreased breath sounds. She has no wheezes. She has no rhonchi. " She has no rales.   Musculoskeletal: Normal range of motion.   Neurological: She is alert.   Skin: Skin is warm and dry.   Psychiatric: She has a normal mood and affect. Her behavior is normal.   Nursing note and vitals reviewed.      Results for orders placed or performed in visit on 07/27/20   POC Glycosylated Hemoglobin (Hb A1C)   Result Value Ref Range    Hemoglobin A1C 6.3 %        ASSESSMENT/PLAN    Problem List Items Addressed This Visit        Digestive    Obesity     Obesity is worsening.  Discussed the patient's BMI.  The BMI is above average; BMI management plan is completed.  General weight loss/lifestyle modification strategies discussed (elicit support from others; identify saboteurs; non-food rewards, etc).  Diet interventions: low calorie (1000 kCal/d) deficit diet.  Informal exercise measures discussed, e.g. taking stairs instead of elevator.  Regular aerobic exercise program discussed.  Refer to Bariatric Clinic for eval         Relevant Orders    Ambulatory Referral to Bariatric Surgery       Endocrine    Type 2 diabetes mellitus without complication, without long-term current use of insulin (CMS/[facility]) - Primary     Diabetes is worsening.   Continue current treatment regimen.  Reminded to bring in blood sugar diary at next visit.  Dietary recommendations for ADA diet.  Regular aerobic exercise.  Diabetes will be reassessed in 3 months.         Relevant Orders    POC Glycosylated Hemoglobin (Hb A1C) (Completed)       Other    Seasonal allergies     Restart flonase and claritin as ordered.         Relevant Medications    fluticasone (FLONASE) 50 MCG/ACT nasal spray    loratadine (CLARITIN) 10 MG tablet               Return in about 3 months (around 10/27/2020) for Follow up.

## 2020-07-28 ENCOUNTER — TELEPHONE (OUTPATIENT)
Dept: INTERNAL MEDICINE | Facility: CLINIC | Age: 60
End: 2020-07-28

## 2020-07-28 DIAGNOSIS — F41.9 ANXIETY: ICD-10-CM

## 2020-07-28 NOTE — TELEPHONE ENCOUNTER
Caller: Sandra Auguste    Relationship: Self    Best call back number: [phone]   Medication needed:   Requested Prescriptions     Pending Prescriptions Disp Refills   • ALPRAZolam (XANAX) 0.25 MG tablet 30 tablet 0     Sig: Take 1 tablet by mouth 2 (Two) Times a Day As Needed for Anxiety.       When do you need the refill by: 07/28/2020  What details did the patient provide when requesting the medication:   FORGOT TO TELL ALESIA MONTERO IN THE APPOINTMENT YESTERDAY THAT SHE WAS OUT OF THIS MEDICATION  Does the patient have less than a 3 day supply:  [x] Yes  [] No    What is the patient's preferred pharmacy: [address] [phone] [facility] [phone] FX       PATIENT SAW ALESIA MONTERO YESTERDAY, 7/27/2020, AND HER BLOOD PRESSURE WAS SLIGHTLY ELEVATED.  TODAY THE BLOOD PRESSURE READING WAS   131/98 12:00PM 7/28/2020  125/85  1:50PM  7/28/2020 (AFTER TAKING LAST ANXIETY PILL IT WENT BACK DOWN)    PLEASE CALL IF ANY QUESTIONS OR CONCERNS [phone]

## 2020-07-29 NOTE — ASSESSMENT & PLAN NOTE
Obesity is worsening.  Discussed the patient's BMI.  The BMI is above average; BMI management plan is completed.  General weight loss/lifestyle modification strategies discussed (elicit support from others; identify saboteurs; non-food rewards, etc).  Diet interventions: low calorie (1000 kCal/d) deficit diet.  Informal exercise measures discussed, e.g. taking stairs instead of elevator.  Regular aerobic exercise program discussed.  Refer to Bariatric Clinic for eval

## 2020-07-30 ENCOUNTER — TREATMENT (OUTPATIENT)
Dept: PHYSICAL THERAPY | Facility: CLINIC | Age: 60
End: 2020-07-30

## 2020-07-30 DIAGNOSIS — M25.571 ACUTE RIGHT ANKLE PAIN: Primary | ICD-10-CM

## 2020-07-30 DIAGNOSIS — M79.672 LEFT FOOT PAIN: ICD-10-CM

## 2020-07-30 PROCEDURE — 97140 MANUAL THERAPY 1/> REGIONS: CPT | Performed by: PHYSICAL THERAPIST

## 2020-07-30 PROCEDURE — 97110 THERAPEUTIC EXERCISES: CPT | Performed by: PHYSICAL THERAPIST

## 2020-07-30 PROCEDURE — 97112 NEUROMUSCULAR REEDUCATION: CPT | Performed by: PHYSICAL THERAPIST

## 2020-07-30 RX ORDER — ALPRAZOLAM 0.25 MG/1
0.25 TABLET ORAL 2 TIMES DAILY PRN
Qty: 30 TABLET | Refills: 0 | OUTPATIENT
Start: 2020-07-30

## 2020-07-30 NOTE — TELEPHONE ENCOUNTER
We will need to discuss this at an appointment- can be a video visit. I have never prescribed for her, the last prescription was from [facility] when she could not get in to see me.

## 2020-07-30 NOTE — PROGRESS NOTES
Physical Therapy Daily Progress Note    Subjective   Sandra Auguste reports: that her BP went up a lot after her last visit, but she isn't sure why. She was in a lot of pain after the instrument assisted STM.      Objective   BP: 115/72 mmHg  See Exercise, Manual, and Modality Logs for complete treatment.       Assessment/Plan  Pt tolerated treatment with minimal complaints of pain. She cannot tolerate a lot of exercises in standing. Pt would benefit from continued skilled PT.    Progress per Plan of Care           Manual Therapy:    23     mins  59373;  Therapeutic Exercise:    15     mins  21541;     Neuromuscular Juaquin:    15    mins  26888;    Therapeutic Activity:          mins  42137;     Gait Training:           mins  75346;     Ultrasound:          mins  63619;    Electrical Stimulation:         mins  67727 ( );  E-Stim Attended:         mins  99806  Iontophoresis          mins 33095   Traction          mins  70423  Fluidotherapy          mins  87857  Dry Needling          mins self-pay - No Charge  Paraffin          mins  31169    Timed Treatment:   53   mins   Total Treatment:     53   mins    Kateryna Thornton, PT, DPT  Physical Therapist

## 2020-07-31 ENCOUNTER — OFFICE VISIT (OUTPATIENT)
Dept: ORTHOPEDIC SURGERY | Facility: CLINIC | Age: 60
End: 2020-07-31

## 2020-07-31 VITALS — OXYGEN SATURATION: 98 % | HEART RATE: 104 BPM | HEIGHT: 66 IN | WEIGHT: 257.94 LBS | BODY MASS INDEX: 41.45 KG/M2

## 2020-07-31 DIAGNOSIS — M76.61 RIGHT ACHILLES TENDINITIS: ICD-10-CM

## 2020-07-31 DIAGNOSIS — M79.672 LEFT FOOT PAIN: Primary | ICD-10-CM

## 2020-07-31 DIAGNOSIS — S99.922D FOOT INJURY, LEFT, SUBSEQUENT ENCOUNTER: ICD-10-CM

## 2020-07-31 PROCEDURE — 99212 OFFICE O/P EST SF 10 MIN: CPT | Performed by: PHYSICIAN ASSISTANT

## 2020-07-31 NOTE — PROGRESS NOTES
[facility] [place] [facility]    Subjective     Patient: Sandra Auguste  : 1960    Primary Care Provider: Michelle Mandel PA    Requesting Provider: As above    Follow-up (7 week follow up - Right leg pain )      History    Chief Complaint: Follow-up right Achilles and left great toe    History of Present Illness: Patient returns today for follow-up of her right ankle injury with Achilles tendinitis and ankle sprain and left great toe pain.  She reports she is been doing physical therapy with improvement of the Achilles and ankle pain.  She has been treated with PT, compression stockings and has weaned herself out of the walking boot.  She reports that ankle and Achilles are more painful with stairs.  The great toe is bothering her more with pain 5-9/10.  She has not been night splinting.    Current Outpatient Medications on File Prior to Visit   Medication Sig Dispense Refill   • acetaminophen (TYLENOL) 500 MG tablet Take 500 mg by mouth Every 6 (Six) Hours As Needed for Mild Pain .     • albuterol sulfate  (90 Base) MCG/ACT inhaler Inhale 1 puff Every 6 (Six) Hours As Needed for Shortness of Air. INHALE TWO PUFFS BY MOUTH EVERY 6 HOURS AS NEEDED 1 inhaler 2   • ALPRAZolam (XANAX) 0.25 MG tablet Take 1 tablet by mouth 2 (Two) Times a Day As Needed for Anxiety. 30 tablet 0   • amitriptyline (ELAVIL) 25 MG tablet Take 1 tablet by mouth Every Night. 30 tablet 5   • BREO ELLIPTA 100-25 MCG/INH inhaler INHALE 1 PUFF BY MOUTH DAILY 60 each 1   • desvenlafaxine ER (KHEDEZLA) 50 MG tablet sustained-release 24 hour 24 hr tablet TAKE ONE TABLET BY MOUTH DAILY 30 tablet 0   • diclofenac (VOLTAREN) 1 % gel gel Apply 4 g topically to the appropriate area as directed 4 (Four) Times a Day As Needed (for pain in shoulders). 100 g 1   • EPINEPHrine (EPIPEN) 0.3 MG/0.3ML solution auto-injector injection As Needed (FOR SEVERE ALLERGIC REACTION).     • fluticasone (FLONASE) 50 MCG/ACT nasal spray 2 sprays  "into the nostril(s) as directed by provider Daily for 30 days. 1 bottle 5   • loratadine (CLARITIN) 10 MG tablet Take 1 tablet by mouth Daily. 90 tablet 1   • [DISCONTINUED] naproxen (NAPROSYN) 500 MG tablet Take 1 tablet by mouth 2 (Two) Times a Day With Meals. 30 tablet 1     No current facility-administered medications on file prior to visit.       Allergies   Allergen Reactions   • Mold Extract [Trichophyton] Shortness Of Breath, Anxiety, Irritability, Other (See Comments) and Palpitations     SOA   • Lortab [Hydrocodone-Acetaminophen] Nausea And Vomiting     SEVERE NAUSEA AND SLIGHT VOMITING   • Methylprednisolone Other (See Comments)     Bleeding, cramping   • Pollen Extract Other (See Comments)     SNEEZING AND CONGESTION       Past Medical History:   Diagnosis Date   • Anesthesia complication     HAS TROUBLE GETTING \"NUMB\"    • Anxiety    • Arthritis    • Bladder spasms    • Depression    • Diverticulosis    • GERD (gastroesophageal reflux disease)    • Memory loss     FROM SKULL FRACTURE AND FALL-SHORT TERM MEMORY LOSS   • Mental disorder     PTSD   • Migraine    • Obesity    • Seasonal allergies    • Seizures (CMS/[facility])     \"convulsions\" at age 5   • Skull fracture (CMS/[facility]) 2012   • Sleep apnea with use of continuous positive airway pressure (CPAP)     INSTRUCTED TO BRING OWN MASK AND TUBING    • SOB (shortness of breath) on exertion    • Type 2 diabetes mellitus without complication, without long-term current use of insulin (CMS/[facility]) 10/11/2018   • Wears contact lenses    • Wears partial dentures     TOP ONLY    • Wears prescription eyeglasses    • Wears prescription eyeglasses      Past Surgical History:   Procedure Laterality Date   • CERVICAL POLYPECTOMY     • TOTAL LAPAROSCOPIC HYSTERECTOMY N/A 9/18/2017    Procedure: TOTAL LAPAROSCOPIC HYSTERECTOMY, BILATERAL SALPINGO OOPHORECTOMY WITH DAVINCI ROBOT ;  Surgeon: Shannon Grimaldo DO;  Location: [facility];  Service:    • WISDOM TOOTH EXTRACTION   " "    Family History   Problem Relation Age of Onset   • Cancer Mother    • Cancer Maternal Aunt    • Cancer Paternal Aunt    • Breast cancer Paternal Aunt 70      Social History     Socioeconomic History   • Marital status: Single     Spouse name: Not on file   • Number of children: Not on file   • Years of education: Not on file   • Highest education level: Not on file   Tobacco Use   • Smoking status: Never Smoker   • Smokeless tobacco: Never Used   Substance and Sexual Activity   • Alcohol use: No     Comment: ONCE A YEAR ON XMAS, IF THAT   • Drug use: No   • Sexual activity: Never     Birth control/protection: Post-menopausal   Social History Narrative            Review of Systems   Constitutional: Negative.    HENT: Negative.    Eyes: Negative.    Respiratory: Negative.    Cardiovascular: Negative.    Gastrointestinal: Negative.    Endocrine: Negative.    Genitourinary: Negative.    Musculoskeletal: Positive for arthralgias and joint swelling.   Skin: Negative.    Allergic/Immunologic: Negative.    Neurological: Negative.    Hematological: Negative.    Psychiatric/Behavioral: Negative.        The following portions of the patient's history were reviewed and updated as appropriate: allergies, current medications, past family history, past medical history, past social history, past surgical history and problem list.      Objective      Physical Exam  Pulse 104   Ht 167.6 cm (65.98\")   Wt 117 kg (257 lb 15 oz)   LMP  (LMP Unknown)   SpO2 98%   BMI 41.65 kg/m²     Body mass index is 41.65 kg/m².    Patient is well developed, well nourished and in no acute distress.  Alert and oriented x 3.    Ortho Exam  Right ankle exam: Tender palpation over the Achilles tendon.  5/5 gastrocsoleus, posterior tib, peroneal, anterior tib strength.  Neurovascular intact distally with pulses 2+.    Left foot exam: Very tender to palpation at the first MTP joint with mild swelling.  Patient has pain with passive range of " motion and grind test of that joint.  Remainder of the foot is nontender to palpation.  No swelling.  Neurovascular intact pulses 2+.    Medical Decision Making    Data Review:   ordered and reviewed x-rays today    Assessment:  1. Left foot pain    2. Foot injury, left, subsequent encounter    3. Right Achilles tendinitis        Plan:  1.  Left foot injury.  Patient is tender on exam at the first MTP joint.  She has had no improvement of her pain since injury on 5/25/2020.  X-rays show no evidence of acute or chronic bony findings.  Recommendation today is MRI of the left foot for further evaluation.  I explained to her that I suspect that she has a turf toe injury.  I explained turf toe and that in the long run she may benefit from some custom orthotics with a turf toe plate.  I will see her back following the MRI for further treatment recommendations.    2.  Right Achilles tendinitis.  She has had improved pain with physical therapy.  She has been doing stretching on her own as well.  Plan today she continue PT I will get her night splint to sleep in each night.  She will return to see us as needed for the Achilles tendinitis.    Patient history, diagnosis and treatment plan discussed with Dr. Russo.        Wendy Chen PA-C  07/31/20  13:51

## 2020-08-06 ENCOUNTER — TREATMENT (OUTPATIENT)
Dept: PHYSICAL THERAPY | Facility: CLINIC | Age: 60
End: 2020-08-06

## 2020-08-06 DIAGNOSIS — M25.571 ACUTE RIGHT ANKLE PAIN: Primary | ICD-10-CM

## 2020-08-06 DIAGNOSIS — M79.672 LEFT FOOT PAIN: ICD-10-CM

## 2020-08-06 PROCEDURE — 97140 MANUAL THERAPY 1/> REGIONS: CPT | Performed by: PHYSICAL THERAPIST

## 2020-08-06 PROCEDURE — 97112 NEUROMUSCULAR REEDUCATION: CPT | Performed by: PHYSICAL THERAPIST

## 2020-08-06 PROCEDURE — 97110 THERAPEUTIC EXERCISES: CPT | Performed by: PHYSICAL THERAPIST

## 2020-08-06 NOTE — PROGRESS NOTES
Physical Therapy Daily Progress Note    Subjective   Sandra Auguste reports: that she went back to her ortho. She suspects Sandra has turf toe on the left. She is scheduled to get and MRI. Her right ankle is starting to feel better.      Objective   See Exercise, Manual, and Modality Logs for complete treatment.       Assessment/Plan  Pt tolerated treatment well. She was instructed on an updated HEP to include foot intrinsic exercises for her left foot. Pt was encouraged to passively move her left great toe at home. Pt would benefit from continued skilled PT.    Progress per Plan of Care           Manual Therapy:    23     mins  75250;  Therapeutic Exercise:    10     mins  38999;     Neuromuscular Juaquin:    20    mins  42120;    Therapeutic Activity:          mins  35856;     Gait Training:           mins  86770;     Ultrasound:          mins  39216;    Electrical Stimulation:         mins  98535 ( );  E-Stim Attended:         mins  78381  Iontophoresis          mins 19690   Traction          mins  43981  Fluidotherapy          mins  90176  Dry Needling          mins self-pay - No Charge  Paraffin          mins  80990    Timed Treatment:   53   mins   Total Treatment:     53   mins    Kateryna Thornton PT, DPT  Physical Therapist

## 2020-08-11 RX ORDER — DESVENLAFAXINE SUCCINATE 50 MG/1
TABLET, EXTENDED RELEASE ORAL
Qty: 30 TABLET | Refills: 0 | OUTPATIENT
Start: 2020-08-11

## 2020-08-13 ENCOUNTER — OFFICE VISIT (OUTPATIENT)
Dept: INTERNAL MEDICINE | Facility: CLINIC | Age: 60
End: 2020-08-13

## 2020-08-13 VITALS
DIASTOLIC BLOOD PRESSURE: 88 MMHG | SYSTOLIC BLOOD PRESSURE: 126 MMHG | HEART RATE: 92 BPM | OXYGEN SATURATION: 98 % | WEIGHT: 259.2 LBS | BODY MASS INDEX: 41.86 KG/M2

## 2020-08-13 DIAGNOSIS — J30.2 SEASONAL ALLERGIES: ICD-10-CM

## 2020-08-13 DIAGNOSIS — F41.9 ANXIETY: ICD-10-CM

## 2020-08-13 DIAGNOSIS — Z79.899 HIGH RISK MEDICATION USE: Primary | ICD-10-CM

## 2020-08-13 PROCEDURE — 99213 OFFICE O/P EST LOW 20 MIN: CPT | Performed by: PHYSICIAN ASSISTANT

## 2020-08-13 RX ORDER — ALPRAZOLAM 0.25 MG/1
0.25 TABLET ORAL 2 TIMES DAILY PRN
Qty: 30 TABLET | Refills: 0 | Status: SHIPPED | OUTPATIENT
Start: 2020-08-13 | End: 2021-02-26 | Stop reason: SDUPTHER

## 2020-08-13 RX ORDER — FLUTICASONE PROPIONATE 50 MCG
2 SPRAY, SUSPENSION (ML) NASAL DAILY
Qty: 1 BOTTLE | Refills: 5 | Status: SHIPPED | OUTPATIENT
Start: 2020-08-13 | End: 2021-02-04 | Stop reason: SDUPTHER

## 2020-08-13 NOTE — PROGRESS NOTES
Chief Complaint   Patient presents with   • Follow-up     Medication Management    • Diabetes     Weight Management        Subjective     Sandra Auguste is a 60 y.o. female.        History of Present Illness     Pt has been prescribed alprazolam to take prn for panic attacks. She takes desvenlafaxine ER daily to control her anxiety and rarely uses the alprazolam. She was give #30 of 0.25 mg tablets 12/2019 and that amount lasted until last week. Normally she takes 1 if she feels a panic attack starting or if she is going into an extremely stressful situation with her family. She has PTSD and has seen a therapist in the past, has called to get back in to see that person. She was having some financial issues with their office.      Current Outpatient Medications:   •  acetaminophen (TYLENOL) 500 MG tablet, Take 500 mg by mouth Every 6 (Six) Hours As Needed for Mild Pain ., Disp: , Rfl:   •  albuterol sulfate  (90 Base) MCG/ACT inhaler, Inhale 1 puff Every 6 (Six) Hours As Needed for Shortness of Air. INHALE TWO PUFFS BY MOUTH EVERY 6 HOURS AS NEEDED, Disp: 1 inhaler, Rfl: 2  •  ALPRAZolam (XANAX) 0.25 MG tablet, Take 1 tablet by mouth 2 (Two) Times a Day As Needed for Anxiety., Disp: 30 tablet, Rfl: 0  •  diclofenac (VOLTAREN) 1 % gel gel, Apply 4 g topically to the appropriate area as directed 4 (Four) Times a Day As Needed (for pain in shoulders)., Disp: 100 g, Rfl: 1  •  EPINEPHrine (EPIPEN) 0.3 MG/0.3ML solution auto-injector injection, As Needed (FOR SEVERE ALLERGIC REACTION)., Disp: , Rfl:   •  fluticasone (FLONASE) 50 MCG/ACT nasal spray, 2 sprays into the nostril(s) as directed by provider Daily for 30 days., Disp: 1 bottle, Rfl: 5  •  loratadine (CLARITIN) 10 MG tablet, Take 1 tablet by mouth Daily., Disp: 90 tablet, Rfl: 1  •  amitriptyline (ELAVIL) 25 MG tablet, TAKE ONE TABLET BY MOUTH EVERY NIGHT AT BEDTIME, Disp: 30 tablet, Rfl: 4  •  Breo Ellipta 100-25 MCG/INH inhaler, INHALE ONE DOSE BY MOUTH  DAILY, Disp: 60 each, Rfl: 4  •  desvenlafaxine (PRISTIQ) 50 MG 24 hr tablet, , Disp: , Rfl:   •  desvenlafaxine ER (KHEDEZLA) 50 MG tablet sustained-release 24 hour 24 hr tablet, Take 1 tablet by mouth Daily., Disp: 30 tablet, Rfl: 1     PMFSH  The following portions of the patient's history were reviewed and updated as appropriate: allergies, current medications, past family history, past medical history, past social history, past surgical history and problem list.    Review of Systems   Constitutional: Negative for chills, fever and unexpected weight change.   HENT: Negative.    Eyes: Negative for pain and visual disturbance.   Respiratory: Negative for chest tightness and shortness of breath.    Cardiovascular: Negative for chest pain.   Gastrointestinal: Negative for abdominal pain and blood in stool.   Endocrine: Negative.    Genitourinary: Negative.    Musculoskeletal: Negative for joint swelling.   Skin: Negative for color change, rash and wound.   Allergic/Immunologic: Negative.    Neurological: Negative for syncope and speech difficulty.   Hematological: Negative for adenopathy.   Psychiatric/Behavioral: Positive for decreased concentration. Negative for confusion, hallucinations and suicidal ideas. The patient is nervous/anxious.        Objective   /88   Pulse 92   Wt 118 kg (259 lb 3.2 oz)   LMP  (LMP Unknown)   SpO2 98%   Breastfeeding No   BMI 41.86 kg/m²     Physical Exam   Constitutional: She appears well-developed and well-nourished.   HENT:   Head: Normocephalic and atraumatic.   Right Ear: External ear normal.   Left Ear: External ear normal.   Eyes: Conjunctivae are normal.   Neck: Normal range of motion.   Cardiovascular: Normal rate and regular rhythm.   Pulmonary/Chest: Effort normal and breath sounds normal.   Musculoskeletal: Normal range of motion.   Skin: Skin is warm and dry.   Psychiatric: She has a normal mood and affect. Her behavior is normal.   Nursing note and vitals  reviewed.      Results for orders placed or performed in visit on 07/27/20   POC Glycosylated Hemoglobin (Hb A1C)    Specimen: Blood   Result Value Ref Range    Hemoglobin A1C 6.3 %        ASSESSMENT/PLAN    Problem List Items Addressed This Visit        Other    Anxiety     Continue desvenlafaxine ER, restart therapy. Refilled alprazolam to use prn. Prescribed by on-call provider #30, 0RF.    The patient has read and signed the [facility] Controlled Substance Contract.  I will continue to see patient for regular follow up appointments.  They are well controlled on their medication.  SHANTEL is updated every 3 months. The patient is aware of the potential for addiction and dependence.         Relevant Medications    ALPRAZolam (XANAX) 0.25 MG tablet    Seasonal allergies      Other Visit Diagnoses     High risk medication use    -  Primary    Relevant Orders    Urine Drug Screen - Urine, Clean Catch               Return for Next scheduled follow up.

## 2020-08-14 DIAGNOSIS — R06.02 SHORTNESS OF BREATH: ICD-10-CM

## 2020-08-14 NOTE — ASSESSMENT & PLAN NOTE
Continue desvenlafaxine ER, restart therapy. Refilled alprazolam to use prn. Prescribed by on-call provider #30, 0RF.    The patient has read and signed the [facility] Controlled Substance Contract.  I will continue to see patient for regular follow up appointments.  They are well controlled on their medication.  SHANTEL is updated every 3 months. The patient is aware of the potential for addiction and dependence.

## 2020-08-18 DIAGNOSIS — F32.A DEPRESSION, UNSPECIFIED DEPRESSION TYPE: ICD-10-CM

## 2020-08-18 RX ORDER — DESVENLAFAXINE 50 MG/1
50 TABLET, EXTENDED RELEASE ORAL DAILY
Qty: 30 TABLET | Refills: 1 | Status: SHIPPED | OUTPATIENT
Start: 2020-08-18 | End: 2020-10-16

## 2020-08-21 DIAGNOSIS — Z79.899 HIGH RISK MEDICATION USE: ICD-10-CM

## 2020-08-24 ENCOUNTER — HOSPITAL ENCOUNTER (OUTPATIENT)
Dept: MRI IMAGING | Facility: HOSPITAL | Age: 60
Discharge: HOME | End: 2020-08-24
Admitting: PHYSICIAN ASSISTANT

## 2020-08-24 DIAGNOSIS — M79.672 LEFT FOOT PAIN: ICD-10-CM

## 2020-08-24 DIAGNOSIS — S99.922D FOOT INJURY, LEFT, SUBSEQUENT ENCOUNTER: ICD-10-CM

## 2020-08-24 PROCEDURE — 73718 MRI LOWER EXTREMITY W/O DYE: CPT

## 2020-08-25 DIAGNOSIS — G43.009 MIGRAINE WITHOUT AURA AND WITHOUT STATUS MIGRAINOSUS, NOT INTRACTABLE: ICD-10-CM

## 2020-08-25 DIAGNOSIS — F41.9 ANXIETY: ICD-10-CM

## 2020-08-25 RX ORDER — AMITRIPTYLINE HYDROCHLORIDE 25 MG/1
TABLET, FILM COATED ORAL
Qty: 30 TABLET | Refills: 4 | Status: SHIPPED | OUTPATIENT
Start: 2020-08-25 | End: 2020-10-16

## 2020-08-28 ENCOUNTER — TELEPHONE (OUTPATIENT)
Dept: ORTHOPEDIC SURGERY | Facility: CLINIC | Age: 60
End: 2020-08-28

## 2020-08-28 NOTE — TELEPHONE ENCOUNTER
Called pt to discuss previous message; She is complaining of pain at the bottom of her calf after having the MRI; States she feels like it is bruised; Denies any swelling, hard knot, or pain in the top of the calf; She is wondering if it could be a burn; I explained that I don't think that would be the cause of her pain, rather maybe the positioning in which they had her leg for the MRI caused the muscle soreness. I advised pt use OTC tylenol or ibuprofen for discomfort and heat/ice whichever works better for pt; Also advised pt that if she noticed increased pain or swelling in that leg to present to ER for evaluation of DVT. Pt verbalized understanding and will call back with any further questions.    Deepak

## 2020-08-31 ENCOUNTER — APPOINTMENT (OUTPATIENT)
Dept: MRI IMAGING | Facility: HOSPITAL | Age: 60
End: 2020-08-31

## 2020-09-02 ENCOUNTER — OFFICE VISIT (OUTPATIENT)
Dept: ORTHOPEDIC SURGERY | Facility: CLINIC | Age: 60
End: 2020-09-02

## 2020-09-02 VITALS — HEART RATE: 89 BPM | BODY MASS INDEX: 40.75 KG/M2 | WEIGHT: 253.53 LBS | HEIGHT: 66 IN | OXYGEN SATURATION: 98 %

## 2020-09-02 DIAGNOSIS — S99.922D FOOT INJURY, LEFT, SUBSEQUENT ENCOUNTER: Primary | ICD-10-CM

## 2020-09-02 PROCEDURE — 99213 OFFICE O/P EST LOW 20 MIN: CPT | Performed by: PHYSICIAN ASSISTANT

## 2020-09-02 RX ORDER — DESVENLAFAXINE SUCCINATE 50 MG/1
TABLET, EXTENDED RELEASE ORAL
COMMUNITY
Start: 2020-08-18 | End: 2020-10-16

## 2020-09-02 NOTE — PROGRESS NOTES
[facility] Note    Subjective     Patient: Sandra Auguste  : 1960    Primary Care Provider: Michelle Mandel PA    Requesting Provider: As above    Follow-up (5 week MRI f/u)      History    Chief Complaint: left foot MRI f/up    History of Present Illness: Patient returns for f/up of her left foot MRI.  She reports persisting pain at the 1st MTPJ and the forefoot.  No new injury.    Current Outpatient Medications on File Prior to Visit   Medication Sig Dispense Refill   • acetaminophen (TYLENOL) 500 MG tablet Take 500 mg by mouth Every 6 (Six) Hours As Needed for Mild Pain .     • albuterol sulfate  (90 Base) MCG/ACT inhaler Inhale 1 puff Every 6 (Six) Hours As Needed for Shortness of Air. INHALE TWO PUFFS BY MOUTH EVERY 6 HOURS AS NEEDED 1 inhaler 2   • ALPRAZolam (XANAX) 0.25 MG tablet Take 1 tablet by mouth 2 (Two) Times a Day As Needed for Anxiety. 30 tablet 0   • amitriptyline (ELAVIL) 25 MG tablet TAKE ONE TABLET BY MOUTH EVERY NIGHT AT BEDTIME 30 tablet 4   • BREO ELLIPTA 100-25 MCG/INH inhaler INHALE 1 PUFF BY MOUTH DAILY 60 each 0   • desvenlafaxine (PRISTIQ) 50 MG 24 hr tablet      • desvenlafaxine ER (KHEDEZLA) 50 MG tablet sustained-release 24 hour 24 hr tablet Take 1 tablet by mouth Daily. 30 tablet 1   • diclofenac (VOLTAREN) 1 % gel gel Apply 4 g topically to the appropriate area as directed 4 (Four) Times a Day As Needed (for pain in shoulders). 100 g 1   • EPINEPHrine (EPIPEN) 0.3 MG/0.3ML solution auto-injector injection As Needed (FOR SEVERE ALLERGIC REACTION).     • fluticasone (FLONASE) 50 MCG/ACT nasal spray 2 sprays into the nostril(s) as directed by provider Daily for 30 days. 1 bottle 5   • loratadine (CLARITIN) 10 MG tablet Take 1 tablet by mouth Daily. 90 tablet 1     No current facility-administered medications on file prior to visit.       Allergies   Allergen Reactions   • Mold Extract [Trichophyton] Shortness Of Breath, Anxiety, Irritability,  "Other (See Comments) and Palpitations     SOA   • Lortab [Hydrocodone-Acetaminophen] Nausea And Vomiting     SEVERE NAUSEA AND SLIGHT VOMITING   • Methylprednisolone Other (See Comments)     Bleeding, cramping   • Pollen Extract Other (See Comments)     SNEEZING AND CONGESTION       Past Medical History:   Diagnosis Date   • Anesthesia complication     HAS TROUBLE GETTING \"NUMB\"    • Anxiety    • Arthritis    • Bladder spasms    • Depression    • Diverticulosis    • GERD (gastroesophageal reflux disease)    • Memory loss     FROM SKULL FRACTURE AND FALL-SHORT TERM MEMORY LOSS   • Mental disorder     PTSD   • Migraine    • Obesity    • Seasonal allergies    • Seizures (CMS/[facility])     \"convulsions\" at age 5   • Skull fracture (CMS/[facility]) 2012   • Sleep apnea with use of continuous positive airway pressure (CPAP)     INSTRUCTED TO BRING OWN MASK AND TUBING    • SOB (shortness of breath) on exertion    • Type 2 diabetes mellitus without complication, without long-term current use of insulin (CMS/[facility]) 10/11/2018   • Wears contact lenses    • Wears partial dentures     TOP ONLY    • Wears prescription eyeglasses    • Wears prescription eyeglasses      Past Surgical History:   Procedure Laterality Date   • CERVICAL POLYPECTOMY     • TOTAL LAPAROSCOPIC HYSTERECTOMY N/A 9/18/2017    Procedure: TOTAL LAPAROSCOPIC HYSTERECTOMY, BILATERAL SALPINGO OOPHORECTOMY WITH DAVINCI ROBOT ;  Surgeon: Shannon Grimaldo DO;  Location: [facility];  Service:    • WISDOM TOOTH EXTRACTION       Family History   Problem Relation Age of Onset   • Cancer Mother    • Cancer Maternal Aunt    • Cancer Paternal Aunt    • Breast cancer Paternal Aunt 70      Social History     Socioeconomic History   • Marital status: Single     Spouse name: Not on file   • Number of children: Not on file   • Years of education: Not on file   • Highest education level: Not on file   Tobacco Use   • Smoking status: Never Smoker   • Smokeless tobacco: Never Used   Substance and " "Sexual Activity   • Alcohol use: No     Comment: ONCE A YEAR ON XMAS, IF THAT   • Drug use: No   • Sexual activity: Never     Birth control/protection: Post-menopausal   Social History Narrative            Review of Systems   Constitutional: Positive for activity change and fatigue.   HENT: Positive for ear discharge, sinus pressure, sneezing and tinnitus.    Eyes: Positive for discharge and itching.   Respiratory: Positive for apnea and shortness of breath.    Cardiovascular: Negative.    Gastrointestinal: Negative.    Endocrine: Negative.    Genitourinary: Negative.    Musculoskeletal: Positive for back pain, gait problem and neck stiffness.   Skin: Negative.    Allergic/Immunologic: Positive for environmental allergies.   Neurological: Positive for dizziness, weakness, light-headedness and headaches.   Hematological: Negative.    Psychiatric/Behavioral: Positive for agitation. The patient is nervous/anxious.        The following portions of the patient's history were reviewed and updated as appropriate: allergies, current medications, past family history, past medical history, past social history, past surgical history and problem list.      Objective      Physical Exam  Pulse 89   Ht 167 cm (65.75\")   Wt 115 kg (253 lb 8.5 oz)   LMP  (LMP Unknown)   SpO2 98%   BMI 41.23 kg/m²     Body mass index is 41.23 kg/m².    Patient is well developed, well nourished and in no acute distress.  Alert and oriented x 3.    Ortho Exam  Left foot exam: tender at the 1st MTPJ and under the 2nd MT head.  NVI with pulses 2+    Medical Decision Making    Data Review:   reviewed radiology images    Assessment:  1. Foot injury, left, subsequent encounter        Plan:  Left foot injury.  MRI from 8/24/2020 show DJD in the midfoot with no evidence of anything worrisome at the 1st MTPJ.  I again discussed with her turf toe injury.  I reminded her that ankle/foot injuries swell for up to a year or longer.  Recommendation " today is she get custom orthotics with turf toe plate.  We will see her as needed.    Patient history, diagnosis and treatment plan discussed with Dr. Russo.        Wendy Chen PA-C  09/02/20  13:04

## 2020-09-03 ENCOUNTER — TELEPHONE (OUTPATIENT)
Dept: ORTHOPEDIC SURGERY | Facility: CLINIC | Age: 60
End: 2020-09-03

## 2020-09-03 NOTE — TELEPHONE ENCOUNTER
----- Message from Sandra Auguste sent at 9/2/2020  8:24 PM EDT -----  Regarding: Test Results Question  Contact: [phone]  Hi, can someone please translate this report into layman's terms? or tell me how I can get it done?   thank you !   Sandra Auguste  Impression  Moderate DJD at the TMT joints third and fourth rays  greatest involvement with minimal osteophyte and subchondral cyst  formations, however no aggressive bone marrow signal findings of acute  fracture or reactive edema to suggest stress injury or aggressive  osseous findings otherwise noted. Interphalangeal fluid second and third  and third and fourth interspace levels as detailed above along with mild  adjacent dorsal edema without focal fluid collection.

## 2020-09-03 NOTE — TELEPHONE ENCOUNTER
Arthritis in the midfoot with degenerative changes in the bone secondary to the arthritis and mild arthritis in the toes.

## 2020-09-17 DIAGNOSIS — R06.02 SHORTNESS OF BREATH: ICD-10-CM

## 2020-09-19 ENCOUNTER — TELEMEDICINE (OUTPATIENT)
Dept: INTERNAL MEDICINE | Facility: CLINIC | Age: 60
End: 2020-09-19

## 2020-09-19 DIAGNOSIS — R50.9 FEBRILE ILLNESS: Primary | ICD-10-CM

## 2020-09-19 DIAGNOSIS — R05.9 COUGH: ICD-10-CM

## 2020-09-19 PROCEDURE — 99442 PR PHYS/QHP TELEPHONE EVALUATION 11-20 MIN: CPT | Performed by: NURSE PRACTITIONER

## 2020-09-19 PROCEDURE — U0003 INFECTIOUS AGENT DETECTION BY NUCLEIC ACID (DNA OR RNA); SEVERE ACUTE RESPIRATORY SYNDROME CORONAVIRUS 2 (SARS-COV-2) (CORONAVIRUS DISEASE [COVID-19]), AMPLIFIED PROBE TECHNIQUE, MAKING USE OF HIGH THROUGHPUT TECHNOLOGIES AS DESCRIBED BY CMS-2020-01-R: HCPCS | Performed by: NURSE PRACTITIONER

## 2020-09-19 NOTE — PROGRESS NOTES
"Chief Complaint   Patient presents with   • Cough     fever shortness of air       History of Present Illness  60 y.o.female presents for cough, fever concern for covid infection.  You have chosen to receive care through a telephone visit. Do you consent to use a telephone visit for your medical care today? Yes    Onset of sx 3 days. Low grade fever 99.4, fatigue, tightness in chest, cough nonproductive, decreased appetite, loss of taste, a little short of air.  Tried tylenol and albuterol. No sick contacts that she is aware.    Review of Systems   Constitutional: Positive for appetite change, chills, fatigue and fever.   HENT:        Loss of taste   Respiratory: Positive for cough, chest tightness and shortness of breath.    Cardiovascular: Negative for chest pain.         [facility]  The following portions of the patient's history were reviewed and updated as appropriate: allergies, current medications, past family history, past medical history, past social history, past surgical history and problem list.     Past Medical History:   Diagnosis Date   • Anesthesia complication     HAS TROUBLE GETTING \"NUMB\"    • Anxiety    • Arthritis    • Bladder spasms    • Depression    • Diverticulosis    • GERD (gastroesophageal reflux disease)    • Memory loss     FROM SKULL FRACTURE AND FALL-SHORT TERM MEMORY LOSS   • Mental disorder     PTSD   • Migraine    • Obesity    • Seasonal allergies    • Seizures (CMS/[facility])     \"convulsions\" at age 5   • Skull fracture (CMS/[facility]) 2012   • Sleep apnea with use of continuous positive airway pressure (CPAP)     INSTRUCTED TO BRING OWN MASK AND TUBING    • SOB (shortness of breath) on exertion    • Type 2 diabetes mellitus without complication, without long-term current use of insulin (CMS/[facility]) 10/11/2018   • Wears contact lenses    • Wears partial dentures     TOP ONLY    • Wears prescription eyeglasses    • Wears prescription eyeglasses       Past Surgical History:   Procedure Laterality Date "   • CERVICAL POLYPECTOMY     • TOTAL LAPAROSCOPIC HYSTERECTOMY N/A 9/18/2017    Procedure: TOTAL LAPAROSCOPIC HYSTERECTOMY, BILATERAL SALPINGO OOPHORECTOMY WITH DAVINCI ROBOT ;  Surgeon: Shannon Grimaldo DO;  Location: [facility] OR;  Service:    • WISDOM TOOTH EXTRACTION        Allergies   Allergen Reactions   • Mold Extract [Trichophyton] Shortness Of Breath, Anxiety, Irritability, Other (See Comments) and Palpitations     SOA   • Lortab [Hydrocodone-Acetaminophen] Nausea And Vomiting     SEVERE NAUSEA AND SLIGHT VOMITING   • Methylprednisolone Other (See Comments)     Bleeding, cramping   • Pollen Extract Other (See Comments)     SNEEZING AND CONGESTION       Social History     Socioeconomic History   • Marital status: Single   Tobacco Use   • Smoking status: Never Smoker   • Smokeless tobacco: Never Used   Substance and Sexual Activity   • Alcohol use: No     Comment: ONCE A YEAR ON XMAS, IF THAT   • Drug use: No   • Sexual activity: Never     Birth control/protection: Post-menopausal   Social History Narrative         Family History   Problem Relation Age of Onset   • Cancer Mother    • Cancer Maternal Aunt    • Cancer Paternal Aunt    • Breast cancer Paternal Aunt 70            Current Outpatient Medications:   •  acetaminophen (TYLENOL) 500 MG tablet, Take 500 mg by mouth Every 6 (Six) Hours As Needed for Mild Pain ., Disp: , Rfl:   •  albuterol sulfate  (90 Base) MCG/ACT inhaler, Inhale 1 puff Every 6 (Six) Hours As Needed for Shortness of Air. INHALE TWO PUFFS BY MOUTH EVERY 6 HOURS AS NEEDED, Disp: 1 inhaler, Rfl: 2  •  ALPRAZolam (XANAX) 0.25 MG tablet, Take 1 tablet by mouth 2 (Two) Times a Day As Needed for Anxiety., Disp: 30 tablet, Rfl: 0  •  amitriptyline (ELAVIL) 25 MG tablet, TAKE ONE TABLET BY MOUTH EVERY NIGHT AT BEDTIME, Disp: 30 tablet, Rfl: 4  •  Breo Ellipta 100-25 MCG/INH inhaler, INHALE ONE DOSE BY MOUTH DAILY, Disp: 60 each, Rfl: 4  •  desvenlafaxine (PRISTIQ) 50 MG 24 hr tablet,  , Disp: , Rfl:   •  desvenlafaxine ER (KHEDEZLA) 50 MG tablet sustained-release 24 hour 24 hr tablet, Take 1 tablet by mouth Daily., Disp: 30 tablet, Rfl: 1  •  diclofenac (VOLTAREN) 1 % gel gel, Apply 4 g topically to the appropriate area as directed 4 (Four) Times a Day As Needed (for pain in shoulders)., Disp: 100 g, Rfl: 1  •  EPINEPHrine (EPIPEN) 0.3 MG/0.3ML solution auto-injector injection, As Needed (FOR SEVERE ALLERGIC REACTION)., Disp: , Rfl:   •  fluticasone (FLONASE) 50 MCG/ACT nasal spray, 2 sprays into the nostril(s) as directed by provider Daily for 30 days., Disp: 1 bottle, Rfl: 5  •  loratadine (CLARITIN) 10 MG tablet, Take 1 tablet by mouth Daily., Disp: 90 tablet, Rfl: 1    VITALS:  Physical Exam  telehealth    LABS  pending    ASSESSMENT/PLAN  Sandra was seen today for cough.    Diagnoses and all orders for this visit:    Febrile illness  -     COVID-19,[facility] ROUTINE, NP/OP SWAB IN TRANSPORT MEDIA OR ESWAB 72 HR TAT - Swab, Oropharynx; Future    Cough  -     COVID-19,[facility] ROUTINE, NP/OP SWAB IN TRANSPORT MEDIA OR ESWAB 72 HR TAT - Swab, Oropharynx; Future    concern for covid infection.  advised pt to go to [facility] to have test completed. Order is only active for today. If goes different day would need to be seen again.   selfquarentine at home.  Stay hydrated.  Cont allergy med, nasal steroid spray, tylenol, breo, albuterol. Can add plain mucinex.  If worsening of symptoms or no improvement in symptoms or fever > 101.5 patient should contact our office for further evaluation treatment or seek urgent care.    I discussed the patients findings and my recommendations with patient.  Patient was encouraged to keep me informed of any acute changes, lack of improvement, or any new concerning symptoms.  Patient voiced understanding of all instructions and denied further questions.    This visit has been rescheduled as a phone visit to comply with patient safety concerns in  accordance with CDC recommendations. Total time of discussion was 12 minutes.        FOLLOW-UP  Return if symptoms worsen or fail to improve.    Electronically signed by:    REBECA Greco  09/19/2020    EMR Dragon/Transcription Disclaimer:  Much of this encounter note is an electronic transcription/translation of spoken language to printed text.  The electronic translation of spoken language may permit erroneous, or at times, nonsensical words or phrases to be inadvertently transcribed.  Although I have reviewed the note for such errors, some may still exist

## 2020-09-21 ENCOUNTER — TELEPHONE (OUTPATIENT)
Dept: URGENT CARE | Facility: CLINIC | Age: 60
End: 2020-09-21

## 2020-09-21 NOTE — TELEPHONE ENCOUNTER
Result reviewed with Bruce Devi MD. Pt called and given negative covid result. Pt states she is feeling about the same. Advised pt to f/u with PCP. Also advised pt that CDC recommends a 10 day home quarantine from onset of symptoms. ZAY

## 2020-09-28 ENCOUNTER — TELEMEDICINE (OUTPATIENT)
Dept: INTERNAL MEDICINE | Facility: CLINIC | Age: 60
End: 2020-09-28

## 2020-09-28 DIAGNOSIS — R11.0 NAUSEA: Primary | ICD-10-CM

## 2020-09-28 DIAGNOSIS — G44.52 NEW DAILY PERSISTENT HEADACHE: ICD-10-CM

## 2020-09-28 DIAGNOSIS — F32.A DEPRESSION, UNSPECIFIED DEPRESSION TYPE: ICD-10-CM

## 2020-09-28 PROCEDURE — 99213 OFFICE O/P EST LOW 20 MIN: CPT | Performed by: PHYSICIAN ASSISTANT

## 2020-09-28 NOTE — PROGRESS NOTES
Chief Complaint   Patient presents with   • Nausea   • Headache       Subjective   Sandrachris Auguste is a 60 y.o. female.       History of Present Illness     This was an audio and video enabled telemedicine encounter.    Pt is having problems with recurrent headaches and nausea. She thinks she has a vagus nerve problem based on her research. She found a product that is similar to a TENs unit except it is strapped to her head- meant to treat depression related to vagus nerve. She has found a place to order it but the company needs a signature from a medical provider. The company is [employer]. Plans to schedule with psychiatrist. Has pending appt with Neuro to discuss headaches.    Pt notes that she has a constant nauseated stomach. Wakes up with it at times. Does not seem to be related to the food she eats.       Current Outpatient Medications:   •  acetaminophen (TYLENOL) 500 MG tablet, Take 500 mg by mouth Every 6 (Six) Hours As Needed for Mild Pain ., Disp: , Rfl:   •  albuterol sulfate  (90 Base) MCG/ACT inhaler, Inhale 1 puff Every 6 (Six) Hours As Needed for Shortness of Air. INHALE TWO PUFFS BY MOUTH EVERY 6 HOURS AS NEEDED, Disp: 1 inhaler, Rfl: 2  •  ALPRAZolam (XANAX) 0.25 MG tablet, Take 1 tablet by mouth 2 (Two) Times a Day As Needed for Anxiety., Disp: 30 tablet, Rfl: 0  •  amitriptyline (ELAVIL) 25 MG tablet, TAKE ONE TABLET BY MOUTH EVERY NIGHT AT BEDTIME, Disp: 30 tablet, Rfl: 4  •  Breo Ellipta 100-25 MCG/INH inhaler, INHALE ONE DOSE BY MOUTH DAILY, Disp: 60 each, Rfl: 4  •  desvenlafaxine (PRISTIQ) 50 MG 24 hr tablet, , Disp: , Rfl:   •  desvenlafaxine ER (KHEDEZLA) 50 MG tablet sustained-release 24 hour 24 hr tablet, Take 1 tablet by mouth Daily., Disp: 30 tablet, Rfl: 1  •  diclofenac (VOLTAREN) 1 % gel gel, Apply 4 g topically to the appropriate area as directed 4 (Four) Times a Day As Needed (for pain in shoulders)., Disp: 100 g, Rfl: 1  •  EPINEPHrine (EPIPEN) 0.3 MG/0.3ML solution  auto-injector injection, As Needed (FOR SEVERE ALLERGIC REACTION)., Disp: , Rfl:   •  famotidine (PEPCID) 40 MG tablet, Take 1 tablet by mouth Daily., Disp: 30 tablet, Rfl: 5  •  fluticasone (FLONASE) 50 MCG/ACT nasal spray, 2 sprays into the nostril(s) as directed by provider Daily for 30 days., Disp: 1 bottle, Rfl: 5  •  loratadine (CLARITIN) 10 MG tablet, Take 1 tablet by mouth Daily., Disp: 90 tablet, Rfl: 1     PMFSH  The following portions of the patient's history were reviewed and updated as appropriate: allergies, current medications, past family history, past medical history, past social history, past surgical history and problem list.    Review of Systems   Constitutional: Negative for activity change, appetite change and fatigue.   HENT: Negative for congestion and rhinorrhea.    Respiratory: Negative for chest tightness and shortness of breath.    Cardiovascular: Negative for chest pain and palpitations.   Gastrointestinal: Positive for nausea. Negative for abdominal pain and vomiting.   Genitourinary: Negative for dysuria.   Musculoskeletal: Negative for arthralgias and myalgias.   Neurological: Positive for headaches. Negative for dizziness, weakness and light-headedness.   Hematological: Negative for adenopathy.   Psychiatric/Behavioral: Negative for dysphoric mood. The patient is not nervous/anxious.        Objective   LMP  (LMP Unknown)     Physical Exam  Constitutional:       Appearance: She is well-developed.   HENT:      Head: Normocephalic and atraumatic.      Right Ear: External ear normal.      Left Ear: External ear normal.      Nose: Nose normal.   Eyes:      Conjunctiva/sclera: Conjunctivae normal.   Neck:      Musculoskeletal: Normal range of motion.   Cardiovascular:      Rate and Rhythm: Normal rate.   Pulmonary:      Effort: Pulmonary effort is normal.   Musculoskeletal: Normal range of motion.   Neurological:      Mental Status: She is alert and oriented to person, place, and time.    Psychiatric:         Behavior: Behavior normal.         Thought Content: Thought content normal.         Judgment: Judgment normal.              ASSESSMENT/PLAN    Problem List Items Addressed This Visit        Digestive    Nausea - Primary     Start famotidine in place of ranitidine that pt was previously taking. Refer for EGD due to persistent nausea.         Relevant Medications    famotidine (PEPCID) 40 MG tablet    Other Relevant Orders    Ambulatory referral for Screening EGD       Other    Depression     Recommended pt discuss vagus nerve stimulator with psychiatrist since it is not a product I am familiar with.           Other Visit Diagnoses     New daily persistent headache        Pending appointment with Neurology to discuss headache.               Return for Next scheduled follow up.   rash

## 2020-09-30 RX ORDER — FAMOTIDINE 40 MG/1
40 TABLET, FILM COATED ORAL DAILY
Qty: 30 TABLET | Refills: 5 | Status: SHIPPED | OUTPATIENT
Start: 2020-09-30 | End: 2021-06-01 | Stop reason: SDUPTHER

## 2020-10-11 ENCOUNTER — APPOINTMENT (OUTPATIENT)
Dept: PREADMISSION TESTING | Facility: HOSPITAL | Age: 60
End: 2020-10-11

## 2020-10-11 PROCEDURE — C9803 HOPD COVID-19 SPEC COLLECT: HCPCS

## 2020-10-11 PROCEDURE — U0004 COV-19 TEST NON-CDC HGH THRU: HCPCS

## 2020-10-12 LAB — SARS-COV-2 RNA RESP QL NAA+PROBE: NOT DETECTED

## 2020-10-14 ENCOUNTER — OUTSIDE FACILITY SERVICE (OUTPATIENT)
Dept: GASTROENTEROLOGY | Facility: CLINIC | Age: 60
End: 2020-10-14

## 2020-10-14 PROCEDURE — 43239 EGD BIOPSY SINGLE/MULTIPLE: CPT | Performed by: INTERNAL MEDICINE

## 2020-10-14 PROCEDURE — 88305 TISSUE EXAM BY PATHOLOGIST: CPT | Performed by: INTERNAL MEDICINE

## 2020-10-15 ENCOUNTER — LAB REQUISITION (OUTPATIENT)
Dept: LAB | Facility: HOSPITAL | Age: 60
End: 2020-10-15

## 2020-10-15 DIAGNOSIS — R11.0 NAUSEA: ICD-10-CM

## 2020-10-15 DIAGNOSIS — K21.9 GASTRO-ESOPHAGEAL REFLUX DISEASE WITHOUT ESOPHAGITIS: ICD-10-CM

## 2020-10-16 ENCOUNTER — OFFICE VISIT (OUTPATIENT)
Dept: INTERNAL MEDICINE | Facility: CLINIC | Age: 60
End: 2020-10-16

## 2020-10-16 VITALS
WEIGHT: 265.2 LBS | HEART RATE: 93 BPM | SYSTOLIC BLOOD PRESSURE: 122 MMHG | BODY MASS INDEX: 43.13 KG/M2 | DIASTOLIC BLOOD PRESSURE: 90 MMHG | OXYGEN SATURATION: 98 %

## 2020-10-16 DIAGNOSIS — J01.00 ACUTE NON-RECURRENT MAXILLARY SINUSITIS: Primary | ICD-10-CM

## 2020-10-16 DIAGNOSIS — K11.20 PAROTITIS: ICD-10-CM

## 2020-10-16 LAB
CYTO UR: NORMAL
LAB AP CASE REPORT: NORMAL
LAB AP CLINICAL INFORMATION: NORMAL
PATH REPORT.FINAL DX SPEC: NORMAL
PATH REPORT.GROSS SPEC: NORMAL

## 2020-10-16 PROCEDURE — 99214 OFFICE O/P EST MOD 30 MIN: CPT | Performed by: PHYSICIAN ASSISTANT

## 2020-10-16 RX ORDER — BENZONATATE 200 MG/1
200 CAPSULE ORAL 3 TIMES DAILY PRN
Qty: 30 CAPSULE | Refills: 0 | Status: SHIPPED | OUTPATIENT
Start: 2020-10-16 | End: 2020-10-26

## 2020-10-16 RX ORDER — EPINEPHRINE 0.3 MG/.3ML
0.3 INJECTION SUBCUTANEOUS AS NEEDED
Qty: 1 EACH | Refills: 1 | Status: SHIPPED | OUTPATIENT
Start: 2020-10-16 | End: 2022-02-10 | Stop reason: SDUPTHER

## 2020-10-16 RX ORDER — METHYLPREDNISOLONE 4 MG/1
TABLET ORAL
Qty: 21 TABLET | Refills: 0 | Status: SHIPPED | OUTPATIENT
Start: 2020-10-16 | End: 2020-11-30

## 2020-10-16 RX ORDER — AMOXICILLIN AND CLAVULANATE POTASSIUM 875; 125 MG/1; MG/1
1 TABLET, FILM COATED ORAL 2 TIMES DAILY
Qty: 20 TABLET | Refills: 0 | Status: SHIPPED | OUTPATIENT
Start: 2020-10-16 | End: 2020-10-23

## 2020-10-16 NOTE — PROGRESS NOTES
Chief Complaint   Patient presents with   • Knot under ear running along jawline     Acute        Subjective     Sandra CATRACHITO Auguste is a 60 y.o. female.        History of Present Illness     Pt noticed a bump under her right chin yesterday, had her EGD the day prior. Had never noticed it before. Has had ongoing congestion and mild cough. Has had negative covid testing twice. Has felt tender to touch.     Stopped her Pristiq because she felt like it was causing SE. Felt like she could think clearly. Feels better since she stopped it. Also found a rash on her right side and wondered if it was medication related.        Current Outpatient Medications:   •  acetaminophen (TYLENOL) 500 MG tablet, Take 500 mg by mouth Every 6 (Six) Hours As Needed for Mild Pain ., Disp: , Rfl:   •  albuterol sulfate  (90 Base) MCG/ACT inhaler, Inhale 1 puff Every 6 (Six) Hours As Needed for Shortness of Air. INHALE TWO PUFFS BY MOUTH EVERY 6 HOURS AS NEEDED, Disp: 1 inhaler, Rfl: 2  •  ALPRAZolam (XANAX) 0.25 MG tablet, Take 1 tablet by mouth 2 (Two) Times a Day As Needed for Anxiety., Disp: 30 tablet, Rfl: 0  •  Breo Ellipta 100-25 MCG/INH inhaler, INHALE ONE DOSE BY MOUTH DAILY, Disp: 60 each, Rfl: 4  •  diclofenac (VOLTAREN) 1 % gel gel, Apply 4 g topically to the appropriate area as directed 4 (Four) Times a Day As Needed (for pain in shoulders)., Disp: 100 g, Rfl: 1  •  famotidine (PEPCID) 40 MG tablet, Take 1 tablet by mouth Daily., Disp: 30 tablet, Rfl: 5  •  loratadine (CLARITIN) 10 MG tablet, Take 1 tablet by mouth Daily., Disp: 90 tablet, Rfl: 1  •  amoxicillin-clavulanate (Augmentin) 875-125 MG per tablet, Take 1 tablet by mouth 2 (Two) Times a Day., Disp: 20 tablet, Rfl: 0  •  benzonatate (TESSALON) 200 MG capsule, Take 1 capsule by mouth 3 (Three) Times a Day As Needed for Cough for up to 10 days., Disp: 30 capsule, Rfl: 0  •  EPINEPHrine (EPIPEN) 0.3 MG/0.3ML solution auto-injector injection, Inject 0.3 mL into the  appropriate muscle as directed by prescriber As Needed (FOR SEVERE ALLERGIC REACTION)., Disp: 1 each, Rfl: 1  •  fluticasone (FLONASE) 50 MCG/ACT nasal spray, 2 sprays into the nostril(s) as directed by provider Daily for 30 days., Disp: 1 bottle, Rfl: 5  •  methylPREDNISolone (Medrol) 4 MG dose pack, follow package directions, Disp: 21 tablet, Rfl: 0     PMFSH  The following portions of the patient's history were reviewed and updated as appropriate: allergies, current medications, past family history, past medical history, past social history, past surgical history and problem list.    Review of Systems   Constitutional: Positive for fatigue. Negative for activity change and unexpected weight change.   HENT: Positive for congestion, facial swelling, postnasal drip and sore throat. Negative for ear pain.    Eyes: Negative for pain and discharge.   Respiratory: Positive for cough. Negative for chest tightness, shortness of breath and wheezing.    Cardiovascular: Negative for chest pain and palpitations.   Gastrointestinal: Negative for abdominal pain, diarrhea and vomiting.   Endocrine: Negative.    Genitourinary: Negative.    Musculoskeletal: Negative for joint swelling.   Skin: Negative for color change, rash and wound.   Allergic/Immunologic: Negative.    Neurological: Negative for seizures and syncope.   Psychiatric/Behavioral: Negative.        Objective   /90   Pulse 93   Wt 120 kg (265 lb 3.2 oz)   LMP  (LMP Unknown)   SpO2 98%   BMI 43.13 kg/m²     Physical Exam  Vitals signs and nursing note reviewed.   Constitutional:       Appearance: She is well-developed.   HENT:      Head: Normocephalic.      Jaw: Tenderness and swelling (right lateral jaw) present.      Salivary Glands: Right salivary gland is diffusely enlarged (parotid) and tender.      Right Ear: Hearing, tympanic membrane, ear canal and external ear normal.      Left Ear: Hearing, tympanic membrane, ear canal and external ear normal.       Nose: Nose normal.   Eyes:      Conjunctiva/sclera: Conjunctivae normal.      Pupils: Pupils are equal, round, and reactive to light.   Neck:      Musculoskeletal: Normal range of motion.   Cardiovascular:      Rate and Rhythm: Normal rate and regular rhythm.      Heart sounds: Normal heart sounds.   Pulmonary:      Effort: Pulmonary effort is normal.      Breath sounds: Normal breath sounds. No decreased breath sounds, wheezing, rhonchi or rales.   Musculoskeletal: Normal range of motion.   Skin:     General: Skin is warm and dry.   Neurological:      Mental Status: She is alert.   Psychiatric:         Behavior: Behavior normal.         ASSESSMENT/PLAN    Diagnoses and all orders for this visit:    1. Acute non-recurrent maxillary sinusitis (Primary)  Comments:  Start augmentin as directed. Use otc symptomatic care.  Orders:  -     methylPREDNISolone (Medrol) 4 MG dose pack; follow package directions  Dispense: 21 tablet; Refill: 0  -     amoxicillin-clavulanate (Augmentin) 875-125 MG per tablet; Take 1 tablet by mouth 2 (Two) Times a Day.  Dispense: 20 tablet; Refill: 0  -     benzonatate (TESSALON) 200 MG capsule; Take 1 capsule by mouth 3 (Three) Times a Day As Needed for Cough for up to 10 days.  Dispense: 30 capsule; Refill: 0    2. Parotitis  Comments:  Start Augmentin as directed for any bacterial component and accompanying sinusitis. If continued swelling and tenderness, start medrol dose pack.  Orders:  -     methylPREDNISolone (Medrol) 4 MG dose pack; follow package directions  Dispense: 21 tablet; Refill: 0  -     amoxicillin-clavulanate (Augmentin) 875-125 MG per tablet; Take 1 tablet by mouth 2 (Two) Times a Day.  Dispense: 20 tablet; Refill: 0    Other orders  -     EPINEPHrine (EPIPEN) 0.3 MG/0.3ML solution auto-injector injection; Inject 0.3 mL into the appropriate muscle as directed by prescriber As Needed (FOR SEVERE ALLERGIC REACTION).  Dispense: 1 each; Refill: 1           Return if symptoms  worsen or fail to improve, for Next scheduled follow up.

## 2020-10-18 DIAGNOSIS — F32.A DEPRESSION, UNSPECIFIED DEPRESSION TYPE: ICD-10-CM

## 2020-10-19 RX ORDER — DESVENLAFAXINE SUCCINATE 50 MG/1
TABLET, EXTENDED RELEASE ORAL
Qty: 30 TABLET | Refills: 0 | OUTPATIENT
Start: 2020-10-19

## 2020-10-20 ENCOUNTER — PATIENT MESSAGE (OUTPATIENT)
Dept: INTERNAL MEDICINE | Facility: CLINIC | Age: 60
End: 2020-10-20

## 2020-10-22 ENCOUNTER — TELEPHONE (OUTPATIENT)
Dept: INTERNAL MEDICINE | Facility: CLINIC | Age: 60
End: 2020-10-22

## 2020-10-22 NOTE — TELEPHONE ENCOUNTER
PATIENT STATES SHE IS ON amoxicillin-clavulanate (Augmentin) 875-125 MG per tablet FOR SWOLLEN GLANDS UNDER HER EARS. PATIENT STATES SHE THINKS THIS IS TOO STRONG AND UPSETTING HER STOMACH. PATIENT IS REQUESTING A NEW ANTIBIOTIC THAT WILL NOT UPSET HER STOMACH.     PHARMACY: [facility] [address] [place] [address] [id number] [facility] - [phone] [facility] [phone]   [phone]    PATIENT CALL BACK [phone]

## 2020-10-23 RX ORDER — DOXYCYCLINE 100 MG/1
100 CAPSULE ORAL 2 TIMES DAILY
Qty: 20 CAPSULE | Refills: 0 | Status: SHIPPED | OUTPATIENT
Start: 2020-10-23 | End: 2020-11-30

## 2020-10-29 ENCOUNTER — LAB (OUTPATIENT)
Dept: LAB | Facility: HOSPITAL | Age: 60
End: 2020-10-29

## 2020-10-29 ENCOUNTER — OFFICE VISIT (OUTPATIENT)
Dept: INTERNAL MEDICINE | Facility: CLINIC | Age: 60
End: 2020-10-29

## 2020-10-29 ENCOUNTER — TELEPHONE (OUTPATIENT)
Dept: INTERNAL MEDICINE | Facility: CLINIC | Age: 60
End: 2020-10-29

## 2020-10-29 VITALS
WEIGHT: 261.6 LBS | DIASTOLIC BLOOD PRESSURE: 80 MMHG | OXYGEN SATURATION: 97 % | HEART RATE: 89 BPM | SYSTOLIC BLOOD PRESSURE: 118 MMHG | BODY MASS INDEX: 42.55 KG/M2

## 2020-10-29 DIAGNOSIS — K21.9 GASTROESOPHAGEAL REFLUX DISEASE WITHOUT ESOPHAGITIS: ICD-10-CM

## 2020-10-29 DIAGNOSIS — R80.9 PROTEINURIA, UNSPECIFIED TYPE: ICD-10-CM

## 2020-10-29 DIAGNOSIS — E11.9 TYPE 2 DIABETES MELLITUS WITHOUT COMPLICATION, WITHOUT LONG-TERM CURRENT USE OF INSULIN (HCC): ICD-10-CM

## 2020-10-29 DIAGNOSIS — E11.9 TYPE 2 DIABETES MELLITUS WITHOUT COMPLICATION, WITHOUT LONG-TERM CURRENT USE OF INSULIN (HCC): Primary | ICD-10-CM

## 2020-10-29 DIAGNOSIS — R39.89 URINE DISCOLORATION: ICD-10-CM

## 2020-10-29 LAB
BILIRUB BLD-MCNC: NEGATIVE MG/DL
CLARITY, POC: CLEAR
COLOR UR: YELLOW
GLUCOSE BLDC GLUCOMTR-MCNC: 107 MG/DL (ref 70–130)
GLUCOSE UR STRIP-MCNC: NEGATIVE MG/DL
HBA1C MFR BLD: 6.6 %
KETONES UR QL: ABNORMAL
LEUKOCYTE EST, POC: NEGATIVE
NITRITE UR-MCNC: NEGATIVE MG/ML
PH UR: 6.5 [PH] (ref 5–8)
PROT UR STRIP-MCNC: NEGATIVE MG/DL
RBC # UR STRIP: NEGATIVE /UL
SP GR UR: 1.01 (ref 1–1.03)
UROBILINOGEN UR QL: NORMAL

## 2020-10-29 PROCEDURE — 82947 ASSAY GLUCOSE BLOOD QUANT: CPT | Performed by: PHYSICIAN ASSISTANT

## 2020-10-29 PROCEDURE — 82043 UR ALBUMIN QUANTITATIVE: CPT | Performed by: PHYSICIAN ASSISTANT

## 2020-10-29 PROCEDURE — 99213 OFFICE O/P EST LOW 20 MIN: CPT | Performed by: PHYSICIAN ASSISTANT

## 2020-10-29 PROCEDURE — 82570 ASSAY OF URINE CREATININE: CPT | Performed by: PHYSICIAN ASSISTANT

## 2020-10-29 PROCEDURE — 83036 HEMOGLOBIN GLYCOSYLATED A1C: CPT | Performed by: PHYSICIAN ASSISTANT

## 2020-10-29 RX ORDER — BLOOD-GLUCOSE METER
KIT MISCELLANEOUS
Qty: 1 EACH | Refills: 0 | Status: SHIPPED | OUTPATIENT
Start: 2020-10-29

## 2020-10-29 RX ORDER — LANCETS
EACH MISCELLANEOUS
Qty: 100 EACH | Refills: 12 | Status: SHIPPED | OUTPATIENT
Start: 2020-10-29 | End: 2020-10-29

## 2020-10-29 RX ORDER — LANCETS
EACH MISCELLANEOUS
Qty: 100 EACH | Refills: 12 | Status: SHIPPED | OUTPATIENT
Start: 2020-10-29 | End: 2021-12-09 | Stop reason: ALTCHOICE

## 2020-10-29 NOTE — TELEPHONE ENCOUNTER
Patient states that she went to  the prescription that was just called in for her by Michelle Mandel and patient was told that One Touch is not covered under her insurance. Patient was advised to ask for something that was covered under her insurance's formulary and the pharmacy was not sure what that is.     [phone]

## 2020-10-29 NOTE — TELEPHONE ENCOUNTER
I sent in a generic glucometer so they could choose the one that is covered by her insurance. Looks like the [employer] lancets are covered. (?) Sent those in. Can they help us with the brand of meter and strips that are covered?

## 2020-10-29 NOTE — PROGRESS NOTES
Chief Complaint   Patient presents with   • Diabetes     LAST A1C 6.3 FOLLOW UP       Subjective     Sandra Auguste is a 60 y.o. female.        History of Present Illness     Pt has been eating hard candy recently due to swollen salivary gland- probably has made her blood sugars go up. She has not been watching her diet as she should.     Notes that she was told recently that she needs to adjust several things in her diet to help with reflux and gastritis. She has been told that she should not drink carbonated beverages. She is not sure what she is supposed to be eating.         Current Outpatient Medications:   •  acetaminophen (TYLENOL) 500 MG tablet, Take 500 mg by mouth Every 6 (Six) Hours As Needed for Mild Pain ., Disp: , Rfl:   •  albuterol sulfate  (90 Base) MCG/ACT inhaler, Inhale 1 puff Every 6 (Six) Hours As Needed for Shortness of Air. INHALE TWO PUFFS BY MOUTH EVERY 6 HOURS AS NEEDED, Disp: 1 inhaler, Rfl: 2  •  ALPRAZolam (XANAX) 0.25 MG tablet, Take 1 tablet by mouth 2 (Two) Times a Day As Needed for Anxiety., Disp: 30 tablet, Rfl: 0  •  Breo Ellipta 100-25 MCG/INH inhaler, INHALE ONE DOSE BY MOUTH DAILY, Disp: 60 each, Rfl: 4  •  diclofenac (VOLTAREN) 1 % gel gel, Apply 4 g topically to the appropriate area as directed 4 (Four) Times a Day As Needed (for pain in shoulders)., Disp: 100 g, Rfl: 1  •  doxycycline (MONODOX) 100 MG capsule, Take 1 capsule by mouth 2 (Two) Times a Day., Disp: 20 capsule, Rfl: 0  •  EPINEPHrine (EPIPEN) 0.3 MG/0.3ML solution auto-injector injection, Inject 0.3 mL into the appropriate muscle as directed by prescriber As Needed (FOR SEVERE ALLERGIC REACTION)., Disp: 1 each, Rfl: 1  •  famotidine (PEPCID) 40 MG tablet, Take 1 tablet by mouth Daily., Disp: 30 tablet, Rfl: 5  •  loratadine (CLARITIN) 10 MG tablet, Take 1 tablet by mouth Daily., Disp: 90 tablet, Rfl: 1  •  methylPREDNISolone (Medrol) 4 MG dose pack, follow package directions, Disp: 21 tablet, Rfl: 0  •   fluticasone (FLONASE) 50 MCG/ACT nasal spray, 2 sprays into the nostril(s) as directed by provider Daily for 30 days., Disp: 1 bottle, Rfl: 5  •  glucose blood test strip, Use as instructed to check blood glucose once daily., Disp: 100 each, Rfl: 12  •  glucose monitor monitoring kit, Use as directed to check blood glucose once daily., Disp: 1 each, Rfl: 0  •  [employer] Lancets UltraThin 30G misc, Use as instructed check blood glucose once daily., Disp: 100 each, Rfl: 12     PMFSH  The following portions of the patient's history were reviewed and updated as appropriate: allergies, current medications, past family history, past medical history, past social history, past surgical history and problem list.    Review of Systems   Constitutional: Negative for activity change, appetite change and fatigue.   HENT: Negative for congestion and rhinorrhea.    Respiratory: Negative for chest tightness and shortness of breath.    Cardiovascular: Negative for chest pain and palpitations.   Gastrointestinal: Negative for abdominal pain.   Endocrine: Positive for polyuria.   Genitourinary: Negative for dysuria.   Musculoskeletal: Negative for arthralgias and myalgias.   Neurological: Positive for dizziness and headaches. Negative for weakness and light-headedness.   Psychiatric/Behavioral: Negative for dysphoric mood. The patient is not nervous/anxious.        Objective   /80   Pulse 89   Wt 119 kg (261 lb 9.6 oz)   LMP  (LMP Unknown)   SpO2 97%   BMI 42.55 kg/m²     Physical Exam  Vitals signs and nursing note reviewed.   Constitutional:       Appearance: She is well-developed.   HENT:      Head: Normocephalic.      Right Ear: Hearing, tympanic membrane, ear canal and external ear normal.      Left Ear: Hearing, tympanic membrane, ear canal and external ear normal.      Nose: Nose normal.   Eyes:      Conjunctiva/sclera: Conjunctivae normal.      Pupils: Pupils are equal, round, and reactive to light.   Neck:       Musculoskeletal: Normal range of motion.   Cardiovascular:      Rate and Rhythm: Normal rate and regular rhythm.      Heart sounds: Normal heart sounds.   Pulmonary:      Effort: Pulmonary effort is normal.      Breath sounds: Normal breath sounds. No decreased breath sounds, wheezing, rhonchi or rales.   Musculoskeletal: Normal range of motion.   Skin:     General: Skin is warm and dry.   Neurological:      Mental Status: She is alert.   Psychiatric:         Behavior: Behavior normal.         Results for orders placed or performed in visit on 10/29/20   Microalbumin / Creatinine Urine Ratio - Urine, Clean Catch    Specimen: Urine, Clean Catch   Result Value Ref Range    Microalbumin/Creatinine Ratio      Creatinine, Urine 48.6 mg/dL    Microalbumin, Urine <1.2 mg/dL   POC Glucose    Specimen: Blood   Result Value Ref Range    Glucose 107 70 - 130 mg/dL   POC Glycosylated Hemoglobin (Hb A1C)    Specimen: Blood   Result Value Ref Range    Hemoglobin A1C 6.6 %   POC Urinalysis Dipstick, Automated    Specimen: Urine   Result Value Ref Range    Color Yellow Yellow, Straw, Dark Yellow, Agnes    Clarity, UA Clear Clear    Specific Gravity  1.015 1.005 - 1.030    pH, Urine 6.5 5.0 - 8.0    Leukocytes Negative Negative    Nitrite, UA Negative Negative    Protein, POC Negative Negative mg/dL    Glucose, UA Negative Negative, 1000 mg/dL (3+) mg/dL    Ketones, UA 1+ (A) Negative    Urobilinogen, UA Normal Normal    Bilirubin Negative Negative    Blood, UA Negative Negative        ASSESSMENT/PLAN    Diagnoses and all orders for this visit:    1. Type 2 diabetes mellitus without complication, without long-term current use of insulin (CMS/[facility]) (Primary)  Assessment & Plan:  Diabetes is worsening.   Continue current treatment regimen.  Reminded to bring in blood sugar diary at next visit.  Dietary recommendations for ADA diet.  Regular aerobic exercise.  Diabetes educator referral.  Nutritionist referral.  Diabetes will be  reassessed in 3 months.    Orders:  -     POC Glucose  -     POC Glycosylated Hemoglobin (Hb A1C)  -     Ambulatory Referral to Diabetic Education  -     Ambulatory Referral to Nutrition Services  -     Discontinue: glucose blood test strip; Use as instructed  Dispense: 100 each; Refill: 12  -     glucose monitor monitoring kit; Use as directed to check blood glucose once daily.  Dispense: 1 each; Refill: 0  -     Discontinue: Lancets (onetouch ultrasoft) lancets; Use as directed to check blood glucose once daily  Dispense: 100 each; Refill: 12    2. Gastroesophageal reflux disease without esophagitis  -     Ambulatory Referral to Nutrition Services    3. Urine discoloration  -     POC Urinalysis Dipstick, Automated    4. Proteinuria, unspecified type  -     Microalbumin / Creatinine Urine Ratio - Urine, Clean Catch           Return in about 3 months (around 1/29/2021) for Follow up.  Answers for HPI/ROS submitted by the patient on 10/27/2020   Diabetes problem  What is the primary reason for your visit?: Diabetes

## 2020-10-29 NOTE — TELEPHONE ENCOUNTER
Spoke with pharmacist and he said they are still trying to figure out what her insurance will cover, he will call back if they need anything

## 2020-10-29 NOTE — TELEPHONE ENCOUNTER
PHARMACY CALLED AND WAS ASKING ABOUT GLUCOSE STRIPS AND NEEDING SPECIFIC DIRECTIONS..    LANCETS WILL NEED A PRIOR AUTHORIZATION    SHE SAID ALL OF DIABETIC MEDS WERE GOING TO NEED A PA. AS THEY ARE NOT ON HER FORMULARY    SHRUTI : [facility] [phone]

## 2020-10-30 LAB
ALBUMIN UR-MCNC: <1.2 MG/DL
CREAT UR-MCNC: 48.6 MG/DL
MICROALBUMIN/CREAT UR: NORMAL MG/G{CREAT}

## 2020-10-31 NOTE — ASSESSMENT & PLAN NOTE
Diabetes is worsening.   Continue current treatment regimen.  Reminded to bring in blood sugar diary at next visit.  Dietary recommendations for ADA diet.  Regular aerobic exercise.  Diabetes educator referral.  Nutritionist referral.  Diabetes will be reassessed in 3 months.

## 2020-11-16 ENCOUNTER — PRIOR AUTHORIZATION (OUTPATIENT)
Dept: INTERNAL MEDICINE | Facility: CLINIC | Age: 60
End: 2020-11-16

## 2020-11-18 ENCOUNTER — HOSPITAL ENCOUNTER (OUTPATIENT)
Dept: DIABETES SERVICES | Facility: HOSPITAL | Age: 60
Setting detail: RECURRING SERIES
Discharge: HOME | End: 2020-11-18

## 2020-11-18 NOTE — CONSULTS
Diabetes Education    Patient Name:  Sandra Auguste  YOB: 1960  MRN: [id number]  Admit Date:  11/18/2020        DIABETES EDUCATION CONSULT, 90 minutes-Ms. Auguste completed initial diabetes education visit via Zoom telehealth video visit. This medical referred consult was provided as a telephone call, tele-health or e-visit, as patient is unable to attend an in-office appointment due to the COVID-19 crisis. Consent for treatment was given verbally.Please see media tab for assessment and notes if you use EPIC. If you are not an EPIC user a copy of patient's assessment and notes will be sent per routine. Thank you.       Electronically signed by:  Mally Shaikh RN  11/18/20 14:10 EST

## 2020-11-30 ENCOUNTER — OFFICE VISIT (OUTPATIENT)
Dept: INTERNAL MEDICINE | Facility: CLINIC | Age: 60
End: 2020-11-30

## 2020-11-30 ENCOUNTER — LAB (OUTPATIENT)
Dept: LAB | Facility: HOSPITAL | Age: 60
End: 2020-11-30

## 2020-11-30 VITALS
DIASTOLIC BLOOD PRESSURE: 80 MMHG | HEART RATE: 85 BPM | WEIGHT: 255.8 LBS | SYSTOLIC BLOOD PRESSURE: 110 MMHG | OXYGEN SATURATION: 97 % | BODY MASS INDEX: 41.6 KG/M2

## 2020-11-30 DIAGNOSIS — R10.11 RIGHT UPPER QUADRANT PAIN: Primary | ICD-10-CM

## 2020-11-30 DIAGNOSIS — R10.30 LOWER ABDOMINAL PAIN: ICD-10-CM

## 2020-11-30 DIAGNOSIS — R10.11 RIGHT UPPER QUADRANT PAIN: ICD-10-CM

## 2020-11-30 DIAGNOSIS — R10.13 EPIGASTRIC PAIN: ICD-10-CM

## 2020-11-30 LAB
BASOPHILS # BLD AUTO: 0.03 10*3/MM3 (ref 0–0.2)
BASOPHILS NFR BLD AUTO: 0.3 % (ref 0–1.5)
BILIRUB BLD-MCNC: NEGATIVE MG/DL
CLARITY, POC: CLEAR
COLOR UR: YELLOW
DEPRECATED RDW RBC AUTO: 40.4 FL (ref 37–54)
EOSINOPHIL # BLD AUTO: 0.47 10*3/MM3 (ref 0–0.4)
EOSINOPHIL NFR BLD AUTO: 4.8 % (ref 0.3–6.2)
ERYTHROCYTE [DISTWIDTH] IN BLOOD BY AUTOMATED COUNT: 12.8 % (ref 12.3–15.4)
GLUCOSE UR STRIP-MCNC: NEGATIVE MG/DL
HCT VFR BLD AUTO: 40.7 % (ref 34–46.6)
HGB BLD-MCNC: 13.5 G/DL (ref 12–15.9)
IMM GRANULOCYTES # BLD AUTO: 0.02 10*3/MM3 (ref 0–0.05)
IMM GRANULOCYTES NFR BLD AUTO: 0.2 % (ref 0–0.5)
KETONES UR QL: NEGATIVE
LEUKOCYTE EST, POC: NEGATIVE
LYMPHOCYTES # BLD AUTO: 1.87 10*3/MM3 (ref 0.7–3.1)
LYMPHOCYTES NFR BLD AUTO: 19.3 % (ref 19.6–45.3)
MCH RBC QN AUTO: 29 PG (ref 26.6–33)
MCHC RBC AUTO-ENTMCNC: 33.2 G/DL (ref 31.5–35.7)
MCV RBC AUTO: 87.3 FL (ref 79–97)
MONOCYTES # BLD AUTO: 0.71 10*3/MM3 (ref 0.1–0.9)
MONOCYTES NFR BLD AUTO: 7.3 % (ref 5–12)
NEUTROPHILS NFR BLD AUTO: 6.6 10*3/MM3 (ref 1.7–7)
NEUTROPHILS NFR BLD AUTO: 68.1 % (ref 42.7–76)
NITRITE UR-MCNC: NEGATIVE MG/ML
NRBC BLD AUTO-RTO: 0 /100 WBC (ref 0–0.2)
PH UR: 7 [PH] (ref 5–8)
PLATELET # BLD AUTO: 372 10*3/MM3 (ref 140–450)
PMV BLD AUTO: 10.9 FL (ref 6–12)
PROT UR STRIP-MCNC: NEGATIVE MG/DL
RBC # BLD AUTO: 4.66 10*6/MM3 (ref 3.77–5.28)
RBC # UR STRIP: NEGATIVE /UL
SP GR UR: 1.01 (ref 1–1.03)
UROBILINOGEN UR QL: NORMAL
WBC # BLD AUTO: 9.7 10*3/MM3 (ref 3.4–10.8)

## 2020-11-30 PROCEDURE — 87086 URINE CULTURE/COLONY COUNT: CPT

## 2020-11-30 PROCEDURE — 83690 ASSAY OF LIPASE: CPT

## 2020-11-30 PROCEDURE — 80053 COMPREHEN METABOLIC PANEL: CPT

## 2020-11-30 PROCEDURE — 82150 ASSAY OF AMYLASE: CPT

## 2020-11-30 PROCEDURE — 99214 OFFICE O/P EST MOD 30 MIN: CPT | Performed by: PHYSICIAN ASSISTANT

## 2020-11-30 PROCEDURE — 85025 COMPLETE CBC W/AUTO DIFF WBC: CPT

## 2020-11-30 RX ORDER — OMEPRAZOLE 20 MG/1
20 CAPSULE, DELAYED RELEASE ORAL DAILY
Qty: 30 CAPSULE | Refills: 3 | Status: SHIPPED | OUTPATIENT
Start: 2020-11-30 | End: 2021-06-01

## 2020-12-01 ENCOUNTER — PRIOR AUTHORIZATION (OUTPATIENT)
Dept: INTERNAL MEDICINE | Facility: CLINIC | Age: 60
End: 2020-12-01

## 2020-12-01 LAB
ALBUMIN SERPL-MCNC: 4.2 G/DL (ref 3.5–5.2)
ALBUMIN/GLOB SERPL: 1.3 G/DL
ALP SERPL-CCNC: 99 U/L (ref 39–117)
ALT SERPL W P-5'-P-CCNC: 21 U/L (ref 1–33)
AMYLASE SERPL-CCNC: 21 U/L (ref 28–100)
ANION GAP SERPL CALCULATED.3IONS-SCNC: 8.1 MMOL/L (ref 5–15)
AST SERPL-CCNC: 19 U/L (ref 1–32)
BACTERIA SPEC AEROBE CULT: NO GROWTH
BILIRUB SERPL-MCNC: 0.3 MG/DL (ref 0–1.2)
BUN SERPL-MCNC: 11 MG/DL (ref 6–20)
BUN/CREAT SERPL: 15.3 (ref 7–25)
CALCIUM SPEC-SCNC: 10.1 MG/DL (ref 8.6–10.5)
CHLORIDE SERPL-SCNC: 103 MMOL/L (ref 98–107)
CO2 SERPL-SCNC: 30.9 MMOL/L (ref 22–29)
CREAT SERPL-MCNC: 0.72 MG/DL (ref 0.57–1)
GFR SERPL CREATININE-BSD FRML MDRD: 83 ML/MIN/1.73
GLOBULIN UR ELPH-MCNC: 3.3 GM/DL
GLUCOSE SERPL-MCNC: 95 MG/DL (ref 65–99)
LIPASE SERPL-CCNC: 19 U/L (ref 13–60)
POTASSIUM SERPL-SCNC: 4.9 MMOL/L (ref 3.5–5.2)
PROT SERPL-MCNC: 7.5 G/DL (ref 6–8.5)
SODIUM SERPL-SCNC: 142 MMOL/L (ref 136–145)

## 2020-12-01 NOTE — PROGRESS NOTES
Overall, your labs look fine! The preliminary report of your urine shows no indication of bacteria growth.

## 2020-12-09 ENCOUNTER — HOSPITAL ENCOUNTER (OUTPATIENT)
Dept: ULTRASOUND IMAGING | Facility: HOSPITAL | Age: 60
Discharge: HOME | End: 2020-12-09
Admitting: PHYSICIAN ASSISTANT

## 2020-12-09 ENCOUNTER — HOSPITAL ENCOUNTER (OUTPATIENT)
Dept: DIABETES SERVICES | Facility: HOSPITAL | Age: 60
Setting detail: RECURRING SERIES
Discharge: HOME | End: 2020-12-09

## 2020-12-09 DIAGNOSIS — R10.11 RIGHT UPPER QUADRANT PAIN: ICD-10-CM

## 2020-12-09 PROCEDURE — 76705 ECHO EXAM OF ABDOMEN: CPT

## 2020-12-09 NOTE — CONSULTS
DIABETES EDUCATION CONSULT, 60 minute follow up visit This medical referred consult was provided as a telephone call, tele-health or e-visit, as patient is unable to attend an in-office appointment due to the COVID-19 crisis. Consent for treatment was given verbally.Please see media tab for assessment and notes if you use EPIC. If you are not an EPIC user a copy of patient's assessment and notes will be sent per routine. Thank you.

## 2020-12-14 NOTE — PROGRESS NOTES
Please let her know that her ultrasound showed that she does have some fatty liver but otherwise everything was normal. How is she feeling? If still having discomfort, will go ahead with the second test of gallbladder- the HIDA scan. If feeling better, she should continue her current meds.

## 2021-02-04 ENCOUNTER — TELEPHONE (OUTPATIENT)
Dept: INTERNAL MEDICINE | Facility: CLINIC | Age: 61
End: 2021-02-04

## 2021-02-04 ENCOUNTER — OFFICE VISIT (OUTPATIENT)
Dept: INTERNAL MEDICINE | Facility: CLINIC | Age: 61
End: 2021-02-04

## 2021-02-04 VITALS
HEART RATE: 66 BPM | SYSTOLIC BLOOD PRESSURE: 126 MMHG | OXYGEN SATURATION: 97 % | WEIGHT: 253 LBS | DIASTOLIC BLOOD PRESSURE: 80 MMHG | BODY MASS INDEX: 41.15 KG/M2 | TEMPERATURE: 96.6 F

## 2021-02-04 DIAGNOSIS — R00.2 PALPITATIONS: ICD-10-CM

## 2021-02-04 DIAGNOSIS — F41.9 ANXIETY: ICD-10-CM

## 2021-02-04 DIAGNOSIS — J30.2 SEASONAL ALLERGIES: ICD-10-CM

## 2021-02-04 DIAGNOSIS — E11.9 TYPE 2 DIABETES MELLITUS WITHOUT COMPLICATION, WITHOUT LONG-TERM CURRENT USE OF INSULIN (HCC): Primary | ICD-10-CM

## 2021-02-04 LAB
GLUCOSE BLDC GLUCOMTR-MCNC: 102 MG/DL (ref 70–130)
HBA1C MFR BLD: 6.1 %

## 2021-02-04 PROCEDURE — 99214 OFFICE O/P EST MOD 30 MIN: CPT | Performed by: PHYSICIAN ASSISTANT

## 2021-02-04 PROCEDURE — 82962 GLUCOSE BLOOD TEST: CPT | Performed by: PHYSICIAN ASSISTANT

## 2021-02-04 PROCEDURE — 83036 HEMOGLOBIN GLYCOSYLATED A1C: CPT | Performed by: PHYSICIAN ASSISTANT

## 2021-02-04 RX ORDER — FLUTICASONE PROPIONATE 50 MCG
2 SPRAY, SUSPENSION (ML) NASAL DAILY
Qty: 16 G | Refills: 5 | Status: SHIPPED | OUTPATIENT
Start: 2021-02-04 | End: 2021-07-15 | Stop reason: SDUPTHER

## 2021-02-04 RX ORDER — LORATADINE 10 MG/1
10 TABLET ORAL DAILY
Qty: 90 TABLET | Refills: 1 | Status: SHIPPED | OUTPATIENT
Start: 2021-02-04 | End: 2021-12-22

## 2021-02-04 NOTE — TELEPHONE ENCOUNTER
Patient requested a call back. Patient is following up to make sure that VERENA Mallorym received her medical records from [facility]'s Maria M Santillan.    Please call and advise. Patient call back [phone]

## 2021-02-04 NOTE — PROGRESS NOTES
Chief Complaint   Patient presents with   • Diabetes     LAST A1C 6.6   • Heartburn   • Allergies     FOLLOW UP       Subjective     Sandra Auguste is a 60 y.o. female.        History of Present Illness     Pt has been to see the nutritionist and has continued to work on making diet changes. She has lost a few more pounds.    She has been checking her blood sugar twice a week, down from checking it daily.    She has run out of claritin and nasal spray, has not been taking her BREO inhaler regularly.    Notes some feelings of her heart racing when she lays down to go to sleep. Continues to have stress with her family situation. She has filled out paperwork with her counselor at [facility]. She was previously on prozac, stopped it because she felt like it impacted her motivation. Has tried wellbutrin but did not feel like it worked for her. Took Pristiq. Had DNA test and identified 3 possible treatments.         Current Outpatient Medications:   •  acetaminophen (TYLENOL) 500 MG tablet, Take 500 mg by mouth Every 6 (Six) Hours As Needed for Mild Pain ., Disp: , Rfl:   •  albuterol sulfate  (90 Base) MCG/ACT inhaler, Inhale 1 puff Every 6 (Six) Hours As Needed for Shortness of Air. INHALE TWO PUFFS BY MOUTH EVERY 6 HOURS AS NEEDED, Disp: 1 inhaler, Rfl: 2  •  ALPRAZolam (XANAX) 0.25 MG tablet, Take 1 tablet by mouth 2 (Two) Times a Day As Needed for Anxiety., Disp: 30 tablet, Rfl: 0  •  Breo Ellipta 100-25 MCG/INH inhaler, INHALE ONE DOSE BY MOUTH DAILY, Disp: 60 each, Rfl: 4  •  diclofenac (VOLTAREN) 1 % gel gel, Apply 4 g topically to the appropriate area as directed 4 (Four) Times a Day As Needed (for pain in shoulders)., Disp: 100 g, Rfl: 1  •  EPINEPHrine (EPIPEN) 0.3 MG/0.3ML solution auto-injector injection, Inject 0.3 mL into the appropriate muscle as directed by prescriber As Needed (FOR SEVERE ALLERGIC REACTION)., Disp: 1 each, Rfl: 1  •  famotidine (PEPCID) 40 MG tablet, Take 1 tablet by mouth  Daily., Disp: 30 tablet, Rfl: 5  •  glucose blood test strip, Use as instructed to check blood glucose once daily., Disp: 100 each, Rfl: 12  •  glucose monitor monitoring kit, Use as directed to check blood glucose once daily., Disp: 1 each, Rfl: 0  •  [employer] Lancets UltraThin 30G misc, Use as instructed check blood glucose once daily., Disp: 100 each, Rfl: 12  •  loratadine (CLARITIN) 10 MG tablet, Take 1 tablet by mouth Daily., Disp: 90 tablet, Rfl: 1  •  omeprazole (priLOSEC) 20 MG capsule, Take 1 capsule by mouth Daily., Disp: 30 capsule, Rfl: 3  •  fluticasone (FLONASE) 50 MCG/ACT nasal spray, 2 sprays into the nostril(s) as directed by provider Daily for 30 days., Disp: 16 g, Rfl: 5     PMFSH  The following portions of the patient's history were reviewed and updated as appropriate: allergies, current medications, past family history, past medical history, past social history, past surgical history and problem list.    Review of Systems   Constitutional: Negative for activity change, appetite change and fatigue.   HENT: Negative for congestion and rhinorrhea.    Respiratory: Negative for chest tightness and shortness of breath.    Cardiovascular: Positive for palpitations. Negative for chest pain.   Gastrointestinal: Negative for abdominal pain.   Genitourinary: Negative for dysuria.   Musculoskeletal: Negative for arthralgias and myalgias.   Neurological: Negative for dizziness, weakness, light-headedness and headaches.   Psychiatric/Behavioral: Negative for dysphoric mood. The patient is nervous/anxious.        Objective   /80   Pulse 66   Temp 96.6 °F (35.9 °C)   Wt 115 kg (253 lb)   LMP  (LMP Unknown)   SpO2 97%   BMI 41.15 kg/m²     Physical Exam  Vitals signs and nursing note reviewed.   Constitutional:       Appearance: She is well-developed.   HENT:      Head: Normocephalic.      Right Ear: Hearing, tympanic membrane, ear canal and external ear normal.      Left Ear: Hearing, tympanic  membrane, ear canal and external ear normal.      Nose: Nose normal.   Eyes:      Conjunctiva/sclera: Conjunctivae normal.      Pupils: Pupils are equal, round, and reactive to light.   Neck:      Musculoskeletal: Normal range of motion.   Cardiovascular:      Rate and Rhythm: Normal rate and regular rhythm.      Heart sounds: Normal heart sounds.   Pulmonary:      Effort: Pulmonary effort is normal.      Breath sounds: Normal breath sounds. No decreased breath sounds, wheezing, rhonchi or rales.   Musculoskeletal: Normal range of motion.   Skin:     General: Skin is warm and dry.   Neurological:      Mental Status: She is alert.   Psychiatric:         Behavior: Behavior normal.         Results for orders placed or performed in visit on 02/04/21   POC Glucose    Specimen: Blood   Result Value Ref Range    Glucose 102 70 - 130 mg/dL   POC Glycosylated Hemoglobin (Hb A1C)    Specimen: Blood   Result Value Ref Range    Hemoglobin A1C 6.1 %        ASSESSMENT/PLAN    Diagnoses and all orders for this visit:    1. Type 2 diabetes mellitus without complication, without long-term current use of insulin (CMS/[facility]) (Primary)  Assessment & Plan:  Diabetes is improving with lifestyle modifications.   Continue current treatment regimen.  Reminded to bring in blood sugar diary at next visit.  Dietary recommendations for ADA diet.  Regular aerobic exercise.  Diabetes will be reassessed in 3 months.    Orders:  -     POC Glucose  -     POC Glycosylated Hemoglobin (Hb A1C)    2. Seasonal allergies  Comments:  Continue on current regimen.   Orders:  -     fluticasone (FLONASE) 50 MCG/ACT nasal spray; 2 sprays into the nostril(s) as directed by provider Daily for 30 days.  Dispense: 16 g; Refill: 5  -     loratadine (CLARITIN) 10 MG tablet; Take 1 tablet by mouth Daily.  Dispense: 90 tablet; Refill: 1    3. Anxiety  Assessment & Plan:  Pt is struggling to find a therapist. Ordered referral to [facility].    Orders:  -      Ambulatory Referral to Behavioral Health    4. Palpitations  Assessment & Plan:  Refer for holter monitor. Further recommendations based on results.      Orders:  -     Cancel: Holter Monitor - 72 Hour Up To 15 Days; Future  -     Holter monitor - 48 hour; Future           Return in about 3 months (around 5/4/2021) for Follow up.  Answers for HPI/ROS submitted by the patient on 2/4/2021   What is the primary reason for your visit?: Other  Please describe your symptoms.: Follow up, meds, and referral.  Have you had these symptoms before?: Yes  How long have you been having these symptoms?: Greater than 2 weeks

## 2021-02-05 NOTE — ASSESSMENT & PLAN NOTE
Diabetes is improving with lifestyle modifications.   Continue current treatment regimen.  Reminded to bring in blood sugar diary at next visit.  Dietary recommendations for ADA diet.  Regular aerobic exercise.  Diabetes will be reassessed in 3 months.

## 2021-02-25 DIAGNOSIS — F41.9 ANXIETY: ICD-10-CM

## 2021-02-25 RX ORDER — ALPRAZOLAM 0.25 MG/1
0.25 TABLET ORAL 2 TIMES DAILY PRN
Qty: 30 TABLET | Refills: 0 | OUTPATIENT
Start: 2021-02-25

## 2021-02-25 NOTE — TELEPHONE ENCOUNTER
PT CALLED REQUESTING A REFILL FOR:  ALPRAZolam (XANAX) 0.25 MG tablet    SHRUTI [address] [facility] [address] [phone] [facility] [phone] FX

## 2021-02-26 ENCOUNTER — TELEMEDICINE (OUTPATIENT)
Dept: INTERNAL MEDICINE | Facility: CLINIC | Age: 61
End: 2021-02-26

## 2021-02-26 DIAGNOSIS — F41.9 ANXIETY: Primary | ICD-10-CM

## 2021-02-26 DIAGNOSIS — F41.9 ANXIETY: ICD-10-CM

## 2021-02-26 PROCEDURE — 99213 OFFICE O/P EST LOW 20 MIN: CPT | Performed by: PHYSICIAN ASSISTANT

## 2021-02-26 RX ORDER — ALPRAZOLAM 0.25 MG/1
0.25 TABLET ORAL DAILY PRN
Qty: 30 TABLET | Refills: 0 | Status: SHIPPED | OUTPATIENT
Start: 2021-02-26 | End: 2021-07-09 | Stop reason: SDUPTHER

## 2021-02-26 NOTE — PROGRESS NOTES
Chief Complaint   Patient presents with   • Anxiety       Subjective   Sandra Auguste is a 60 y.o. female.       History of Present Illness     This was an audio and video enabled telemedicine encounter.    Pt is needing a refill of alprazolam that she uses prn. She uses sparingly but sometimes progresses to panic attack if she does not. She has 2 pills left from a prescription written in August. She tries to use calming strategies to avoid using the medication.     She had appointment scheduled with counselor through [facility] but it was cancelled due to weather. She has been trying to get rescheduled. She has started a book about codependency that is good but causes her anxiety as she works through it. Stopped pristiq because she thought she would need to feel emotions as she works through her anxiety. Did DNA testing and there were 2 other meds recommended, wonders about trying one of them.      Current Outpatient Medications:   •  acetaminophen (TYLENOL) 500 MG tablet, Take 500 mg by mouth Every 6 (Six) Hours As Needed for Mild Pain ., Disp: , Rfl:   •  albuterol sulfate  (90 Base) MCG/ACT inhaler, Inhale 1 puff Every 6 (Six) Hours As Needed for Shortness of Air. INHALE TWO PUFFS BY MOUTH EVERY 6 HOURS AS NEEDED, Disp: 1 inhaler, Rfl: 2  •  ALPRAZolam (XANAX) 0.25 MG tablet, Take 1 tablet by mouth Daily As Needed for Anxiety., Disp: 30 tablet, Rfl: 0  •  Breo Ellipta 100-25 MCG/INH inhaler, INHALE ONE DOSE BY MOUTH DAILY, Disp: 60 each, Rfl: 4  •  diclofenac (VOLTAREN) 1 % gel gel, Apply 4 g topically to the appropriate area as directed 4 (Four) Times a Day As Needed (for pain in shoulders)., Disp: 100 g, Rfl: 1  •  EPINEPHrine (EPIPEN) 0.3 MG/0.3ML solution auto-injector injection, Inject 0.3 mL into the appropriate muscle as directed by prescriber As Needed (FOR SEVERE ALLERGIC REACTION)., Disp: 1 each, Rfl: 1  •  famotidine (PEPCID) 40 MG tablet, Take 1 tablet by mouth Daily., Disp: 30 tablet, Rfl: 5  •   fluticasone (FLONASE) 50 MCG/ACT nasal spray, 2 sprays into the nostril(s) as directed by provider Daily for 30 days., Disp: 16 g, Rfl: 5  •  glucose blood test strip, Use as instructed to check blood glucose once daily., Disp: 100 each, Rfl: 12  •  glucose monitor monitoring kit, Use as directed to check blood glucose once daily., Disp: 1 each, Rfl: 0  •  [employer] Lancets UltraThin 30G misc, Use as instructed check blood glucose once daily., Disp: 100 each, Rfl: 12  •  loratadine (CLARITIN) 10 MG tablet, Take 1 tablet by mouth Daily., Disp: 90 tablet, Rfl: 1  •  omeprazole (priLOSEC) 20 MG capsule, Take 1 capsule by mouth Daily., Disp: 30 capsule, Rfl: 3     PMFSH  The following portions of the patient's history were reviewed and updated as appropriate: allergies, current medications, past family history, past medical history, past social history, past surgical history and problem list.    Review of Systems   Constitutional: Negative for chills, fever and unexpected weight change.   HENT: Negative.    Eyes: Negative for pain and visual disturbance.   Respiratory: Negative for chest tightness and shortness of breath.    Cardiovascular: Negative for chest pain.   Gastrointestinal: Negative for abdominal pain and blood in stool.   Endocrine: Negative.    Genitourinary: Negative.    Musculoskeletal: Negative for joint swelling.   Skin: Negative for color change, rash and wound.   Allergic/Immunologic: Negative.    Neurological: Negative for syncope and speech difficulty.   Hematological: Negative for adenopathy.   Psychiatric/Behavioral: Positive for decreased concentration. Negative for confusion, hallucinations and suicidal ideas. The patient is nervous/anxious.        Objective   LMP  (LMP Unknown)     Physical Exam  Constitutional:       Appearance: She is well-developed.   HENT:      Head: Normocephalic and atraumatic.      Right Ear: External ear normal.      Left Ear: External ear normal.      Nose: Nose normal.    Eyes:      Conjunctiva/sclera: Conjunctivae normal.   Neck:      Musculoskeletal: Normal range of motion.   Cardiovascular:      Rate and Rhythm: Normal rate.   Pulmonary:      Effort: Pulmonary effort is normal.   Musculoskeletal: Normal range of motion.   Neurological:      Mental Status: She is alert and oriented to person, place, and time.   Psychiatric:         Behavior: Behavior normal.         Thought Content: Thought content normal.         Judgment: Judgment normal.              ASSESSMENT/PLAN    Diagnoses and all orders for this visit:    1. Anxiety (Primary)  Assessment & Plan:  Discussed restarting daily med, she will let me know which ones are on the DNA test she did. Refilled alprazolam to use sparingly prn. Prescribed by on-call provider #30, 0RF.    The patient has read and signed the [facility] Controlled Substance Contract.  I will continue to see patient for regular follow up appointments.  They are well controlled on their medication.  SHANTEL is updated every 3 months. The patient is aware of the potential for addiction and dependence.             Return for Next scheduled follow up.

## 2021-02-26 NOTE — TELEPHONE ENCOUNTER
Sandra Auguste was seen for follow up today and is due for routine refill of alprazolam 0.25 mg 1 po daily prn. She takes it sparingly, last rx was 8/13/2020 for #30.  Mark, HEAVEN and CS agreement are up to date. If you agree, please send in the attached prescription as ordered. Thank you

## 2021-02-28 ENCOUNTER — PATIENT MESSAGE (OUTPATIENT)
Dept: INTERNAL MEDICINE | Facility: CLINIC | Age: 61
End: 2021-02-28

## 2021-03-01 ENCOUNTER — LAB (OUTPATIENT)
Dept: LAB | Facility: HOSPITAL | Age: 61
End: 2021-03-01

## 2021-03-01 ENCOUNTER — OFFICE VISIT (OUTPATIENT)
Dept: NEUROLOGY | Facility: CLINIC | Age: 61
End: 2021-03-01

## 2021-03-01 VITALS
BODY MASS INDEX: 40.66 KG/M2 | HEART RATE: 73 BPM | SYSTOLIC BLOOD PRESSURE: 130 MMHG | HEIGHT: 66 IN | TEMPERATURE: 96.9 F | DIASTOLIC BLOOD PRESSURE: 80 MMHG | OXYGEN SATURATION: 98 % | WEIGHT: 253 LBS

## 2021-03-01 DIAGNOSIS — R41.3 MEMORY LOSS: Primary | ICD-10-CM

## 2021-03-01 LAB
ALBUMIN SERPL-MCNC: 4 G/DL (ref 3.5–5.2)
ALBUMIN/GLOB SERPL: 1.4 G/DL
ALP SERPL-CCNC: 107 U/L (ref 39–117)
ALT SERPL W P-5'-P-CCNC: 29 U/L (ref 1–33)
AMMONIA BLD-SCNC: 30 UMOL/L (ref 11–51)
ANION GAP SERPL CALCULATED.3IONS-SCNC: 9.3 MMOL/L (ref 5–15)
AST SERPL-CCNC: 20 U/L (ref 1–32)
BASOPHILS # BLD AUTO: 0.05 10*3/MM3 (ref 0–0.2)
BASOPHILS NFR BLD AUTO: 0.6 % (ref 0–1.5)
BILIRUB SERPL-MCNC: 0.2 MG/DL (ref 0–1.2)
BUN SERPL-MCNC: 10 MG/DL (ref 6–20)
BUN/CREAT SERPL: 14.9 (ref 7–25)
CALCIUM SPEC-SCNC: 9.6 MG/DL (ref 8.6–10.5)
CHLORIDE SERPL-SCNC: 103 MMOL/L (ref 98–107)
CO2 SERPL-SCNC: 27.7 MMOL/L (ref 22–29)
CREAT SERPL-MCNC: 0.67 MG/DL (ref 0.57–1)
DEPRECATED RDW RBC AUTO: 40.4 FL (ref 37–54)
EOSINOPHIL # BLD AUTO: 0.31 10*3/MM3 (ref 0–0.4)
EOSINOPHIL NFR BLD AUTO: 3.5 % (ref 0.3–6.2)
ERYTHROCYTE [DISTWIDTH] IN BLOOD BY AUTOMATED COUNT: 12.5 % (ref 12.3–15.4)
GFR SERPL CREATININE-BSD FRML MDRD: 90 ML/MIN/1.73
GLOBULIN UR ELPH-MCNC: 2.8 GM/DL
GLUCOSE SERPL-MCNC: 164 MG/DL (ref 65–99)
HCT VFR BLD AUTO: 43.3 % (ref 34–46.6)
HGB BLD-MCNC: 14.2 G/DL (ref 12–15.9)
IMM GRANULOCYTES # BLD AUTO: 0.04 10*3/MM3 (ref 0–0.05)
IMM GRANULOCYTES NFR BLD AUTO: 0.5 % (ref 0–0.5)
LYMPHOCYTES # BLD AUTO: 1.99 10*3/MM3 (ref 0.7–3.1)
LYMPHOCYTES NFR BLD AUTO: 22.5 % (ref 19.6–45.3)
MCH RBC QN AUTO: 29.1 PG (ref 26.6–33)
MCHC RBC AUTO-ENTMCNC: 32.8 G/DL (ref 31.5–35.7)
MCV RBC AUTO: 88.7 FL (ref 79–97)
MONOCYTES # BLD AUTO: 0.43 10*3/MM3 (ref 0.1–0.9)
MONOCYTES NFR BLD AUTO: 4.9 % (ref 5–12)
NEUTROPHILS NFR BLD AUTO: 6.02 10*3/MM3 (ref 1.7–7)
NEUTROPHILS NFR BLD AUTO: 68 % (ref 42.7–76)
NRBC BLD AUTO-RTO: 0 /100 WBC (ref 0–0.2)
PLATELET # BLD AUTO: 338 10*3/MM3 (ref 140–450)
PMV BLD AUTO: 10.7 FL (ref 6–12)
POTASSIUM SERPL-SCNC: 4 MMOL/L (ref 3.5–5.2)
PROT SERPL-MCNC: 6.8 G/DL (ref 6–8.5)
RBC # BLD AUTO: 4.88 10*6/MM3 (ref 3.77–5.28)
SODIUM SERPL-SCNC: 140 MMOL/L (ref 136–145)
WBC # BLD AUTO: 8.84 10*3/MM3 (ref 3.4–10.8)

## 2021-03-01 PROCEDURE — 99214 OFFICE O/P EST MOD 30 MIN: CPT | Performed by: PHYSICIAN ASSISTANT

## 2021-03-01 PROCEDURE — 84443 ASSAY THYROID STIM HORMONE: CPT | Performed by: PHYSICIAN ASSISTANT

## 2021-03-01 PROCEDURE — 86592 SYPHILIS TEST NON-TREP QUAL: CPT | Performed by: PHYSICIAN ASSISTANT

## 2021-03-01 PROCEDURE — 85025 COMPLETE CBC W/AUTO DIFF WBC: CPT | Performed by: PHYSICIAN ASSISTANT

## 2021-03-01 PROCEDURE — 82746 ASSAY OF FOLIC ACID SERUM: CPT | Performed by: PHYSICIAN ASSISTANT

## 2021-03-01 PROCEDURE — 82140 ASSAY OF AMMONIA: CPT | Performed by: PHYSICIAN ASSISTANT

## 2021-03-01 PROCEDURE — 82607 VITAMIN B-12: CPT | Performed by: PHYSICIAN ASSISTANT

## 2021-03-01 PROCEDURE — 80053 COMPREHEN METABOLIC PANEL: CPT | Performed by: PHYSICIAN ASSISTANT

## 2021-03-01 PROCEDURE — 87798 DETECT AGENT NOS DNA AMP: CPT | Performed by: PHYSICIAN ASSISTANT

## 2021-03-01 PROCEDURE — 36415 COLL VENOUS BLD VENIPUNCTURE: CPT | Performed by: PHYSICIAN ASSISTANT

## 2021-03-01 RX ORDER — VILAZODONE HYDROCHLORIDE 20 MG/1
20 TABLET ORAL DAILY
Qty: 30 TABLET | Refills: 2 | Status: SHIPPED | OUTPATIENT
Start: 2021-03-01 | End: 2021-03-15

## 2021-03-01 RX ORDER — VILAZODONE HYDROCHLORIDE 10 MG-20MG
20 KIT ORAL DAILY
Qty: 30 EACH | Refills: 0 | Status: SHIPPED | OUTPATIENT
Start: 2021-03-01 | End: 2021-03-15

## 2021-03-01 NOTE — PROGRESS NOTES
"Subjective       Chief Complaint: memory loss      History of Present Illness   Sandra Auguste is a 60 y.o. female who comes to clinic today for evaluation of memory loss . She has noted symptoms since at least 2012 following a concussion, marked initially by forgetfulness, repetitiveness, and word-finding difficulties. This has remained static over time. Additional symptoms have included impairments in both short and long term memory as well as executive function. There have been associated symptoms of significant anxiety and depression. She denies impairments in ADL's. She manages her medications and finances, though is having more difficulty doing so. She continues to drive.  She is currently residing with her mother in [place], for whom she is the primary caregiver.     She has also noted headaches for several years, though the history is a bit unclear. She describes intermittent sharp shooting unilateral headachs with associated nausea as well as light and sound sensitivity. These typically last for several seconds. She also notes a history of long migraines. These headaches vary in location and character and typically occur 1-2 times a month, lasting up to several days. Her headaches are worse with increased stress.     Prior evaluation has included an MRI of the brain in 2017 which was unremarkable .        I have reviewed and confirmed the past family, social and medical history as accurate on 3/1/20.     Review of Systems   Constitutional: Negative.    HENT: Negative.    Eyes: Negative.    Respiratory: Negative.    Cardiovascular: Negative.    Gastrointestinal: Negative.    Endocrine: Negative.    Genitourinary: Negative.    Musculoskeletal: Negative.    Skin: Negative.    Allergic/Immunologic: Negative.    Neurological:        Memory loss    Hematological: Negative.        Objective     /80   Pulse 73   Temp 96.9 °F (36.1 °C)   Ht 167.6 cm (66\")   Wt 115 kg (253 lb)   LMP  (LMP Unknown)   SpO2 98%   " BMI 40.84 kg/m²     General appearance today is normal.   Peripheral pulses were present and symmetric.  The ophthalmoscopic exam today is unremarkable. The discs and posterior elements are unremarkable.      Physical Exam  Neurological:      Mental Status: She is oriented to person, place, and time.      Coordination: Finger-Nose-Finger Test and Heel to Shin Test normal.      Gait: Gait is intact.      Deep Tendon Reflexes: Strength normal.      Reflex Scores:       Bicep reflexes are 2+ on the right side and 2+ on the left side.       Brachioradialis reflexes are 2+ on the right side and 2+ on the left side.       Patellar reflexes are 2+ on the right side and 2+ on the left side.  Psychiatric:         Speech: Speech normal.          Neurologic Exam     Mental Status   Oriented to person, place, and time.   Registration: recalls 3 of 3 objects. Recall at 5 minutes: recalls 2 of 3 objects. Follows 3 step commands.   Attention: normal.   Speech: speech is normal   Level of consciousness: alert  Able to perform simple calculations.   Able to name object. Able to read. Able to repeat. Able to write. Normal comprehension.     Cranial Nerves   Cranial nerves II through XII intact.     Motor Exam   Muscle bulk: normal  Overall muscle tone: normal    Strength   Strength 5/5 throughout.     Sensory Exam   Light touch normal.     Gait, Coordination, and Reflexes     Gait  Gait: normal    Coordination   Finger to nose coordination: normal  Heel to shin coordination: normal    Tremor   Resting tremor: absent    Reflexes   Right brachioradialis: 2+  Left brachioradialis: 2+  Right biceps: 2+  Left biceps: 2+  Right patellar: 2+  Left patellar: 2+        Results  MMSE= 29      Assessment/Plan   Diagnoses and all orders for this visit:    1. Memory loss (Primary)  -     CBC & Differential  -     Comprehensive Metabolic Panel  -     Folate  -     MRI Brain Without Contrast  -     TSH  -     Vitamin B12  -     Ammonia  -      Tropheryma Whipplei PCR  -     RPR  -     Ambulatory Referral to Speech Therapy  -     Ambulatory Referral to Neuropsychology  -     CBC Auto Differential          Discussion/Summary   Sandra Auguste comes to clinic today for evaluation of memory loss . Her evaluation today, including cognitive bedside testing is reassuring. While Mild Cognitive Impairment is possible, I am concerned that some of her medications (primarily Xanax) and her history of anxiety and depression as well as caregiver stress are negatively impacting her cognition. This was discussed at length. It was elected to obtain screening blood work  and an MRI of the brain as well as neuropsychological testing. I have also made a referral to SLP for cognitive rehabilitation. She will then follow up in 6 months , or sooner if needed.   I spent 60 minutes face to face with the patient with 45 minutes spent on discussing diagnosis, prognosis, diagnostic testing, evaluation, current status, treatment options and management as discussed above.       As part of this visit I reviewed radiology results and reviewed radiology images.      Zeny Kuhn PA-C

## 2021-03-02 LAB
FOLATE SERPL-MCNC: 14.2 NG/ML (ref 4.78–24.2)
RPR SER QL: NORMAL
TSH SERPL DL<=0.05 MIU/L-ACNC: 1.9 UIU/ML (ref 0.27–4.2)
VIT B12 BLD-MCNC: 516 PG/ML (ref 211–946)

## 2021-03-03 ENCOUNTER — PRIOR AUTHORIZATION (OUTPATIENT)
Dept: INTERNAL MEDICINE | Facility: CLINIC | Age: 61
End: 2021-03-03

## 2021-03-03 ENCOUNTER — TELEPHONE (OUTPATIENT)
Dept: INTERNAL MEDICINE | Facility: CLINIC | Age: 61
End: 2021-03-03

## 2021-03-05 ENCOUNTER — PATIENT MESSAGE (OUTPATIENT)
Dept: INTERNAL MEDICINE | Facility: CLINIC | Age: 61
End: 2021-03-05

## 2021-03-07 LAB
T WHIPPLEI BY PCR, SOURCE: NORMAL
TROPHERYMA WHIPPLEI PCR: NOT DETECTED

## 2021-03-09 ENCOUNTER — TELEPHONE (OUTPATIENT)
Dept: INTERNAL MEDICINE | Facility: CLINIC | Age: 61
End: 2021-03-09

## 2021-03-09 ENCOUNTER — HOSPITAL ENCOUNTER (OUTPATIENT)
Dept: SPEECH THERAPY | Facility: HOSPITAL | Age: 61
Setting detail: THERAPIES SERIES
Discharge: HOME | End: 2021-03-09

## 2021-03-09 DIAGNOSIS — G44.85 STABBING HEADACHE: ICD-10-CM

## 2021-03-09 DIAGNOSIS — R41.3 MEMORY LOSS: Primary | ICD-10-CM

## 2021-03-09 PROCEDURE — 92523 SPEECH SOUND LANG COMPREHEN: CPT

## 2021-03-09 NOTE — TELEPHONE ENCOUNTER
PATIENT IS ASKING IF SHE IS HEALTHY ENOUGH TO GET THE COVID VACCINE AND IF SO, SHE NEEDS PROOF STATING THAT SHE IS IN THE 1C CATEGORY. PATIENT IS ALSO ASKING IF SHE SHOULD WAIT SO SHE CAN SCHEDULE THE J & J VACCINE OR TRY TO GET THE OTHER ONE. PLEASE RETURN CALL TO  [phone] TO ADVISE.

## 2021-03-09 NOTE — TELEPHONE ENCOUNTER
She is healthy enough to get the vaccine and should qualify under the 1C phase due to her Diabetes diagnosis. She should not need proof of that- no other patients have needed it. She should go ahead and get whichever vaccine is available. Sounds like  has had the most availability.

## 2021-03-09 NOTE — THERAPY EVALUATION
"Outpatient Speech Language Pathology   Adult Speech Language Cognitive Initial Evaluation  [facility]     Patient Name: Sandra Auguste  : 1960  MRN: [id number]  Today's Date: 3/9/2021        Visit Date: 2021   Patient Active Problem List   Diagnosis   • Abnormal computed tomography scan   • Anemia   • Posttraumatic stress disorder   • Anxiety   • Strain of neck muscle   • Cough   • Diverticulitis of colon   • Fatigue   • Steatosis of liver   • Lateral epicondylitis   • Knee pain   • Spasm   • Adiposity   • Obstructive sleep apnea syndrome   • Osteoarthritis   • Palpitations   • Nausea   • Shortness of breath   • Skin tag   • Candidiasis of vagina   • Viral upper respiratory tract infection   • Gastroesophageal reflux disease   • Acute pain of left shoulder   • Abdominal distension (gaseous)   • SOB (shortness of breath) on exertion   • Sleep apnea with use of continuous positive airway pressure (CPAP)   • Seasonal allergies   • Obesity   • Migraine   • Mental disorder   • Diverticulosis   • Depression   • Arthritis   • Hypertension   • Itching   • Stabbing headache   • Temple tenderness   • TMJ click   • Mild intermittent asthma without complication   • Skull fracture (CMS/[facility])   • Type 2 diabetes mellitus without complication, without long-term current use of insulin (CMS/[facility])   • Health care maintenance        Past Medical History:   Diagnosis Date   • Anesthesia complication     HAS TROUBLE GETTING \"NUMB\"    • Anxiety    • Arthritis    • Bladder spasms    • Depression    • Diverticulosis    • GERD (gastroesophageal reflux disease)    • Memory loss     FROM SKULL FRACTURE AND FALL-SHORT TERM MEMORY LOSS   • Mental disorder     PTSD   • Migraine    • Obesity    • Seasonal allergies    • Seizures (CMS/[facility])     \"convulsions\" at age 5   • Skull fracture (CMS/[facility])    • Sleep apnea with use of continuous positive airway pressure (CPAP)     INSTRUCTED TO BRING OWN MASK AND TUBING    • SOB (shortness of " breath) on exertion    • Type 2 diabetes mellitus without complication, without long-term current use of insulin (CMS/[facility]) 10/11/2018   • Wears contact lenses    • Wears partial dentures     TOP ONLY    • Wears prescription eyeglasses    • Wears prescription eyeglasses         Past Surgical History:   Procedure Laterality Date   • CERVICAL POLYPECTOMY     • TOTAL LAPAROSCOPIC HYSTERECTOMY N/A 9/18/2017    Procedure: TOTAL LAPAROSCOPIC HYSTERECTOMY, BILATERAL SALPINGO OOPHORECTOMY WITH DAVINCI ROBOT ;  Surgeon: Shannon Grimaldo DO;  Location: [facility] OR;  Service:    • WISDOM TOOTH EXTRACTION           Visit Dx:    ICD-10-CM ICD-9-CM   1. Memory loss  R41.3 780.93   2. Stabbing headache  G44.85 339.85           SLP [facility] Evaluation - 03/09/21 1300        Communication Assessment/Intervention    Document Type  evaluation   -    Subjective Information  no complaints   -HG    Patient Observations  alert;cooperative;agree to therapy   -HG    Patient/Family/Caregiver Comments/Observations  No family present. Pt aware of her memory deficits.   -HG    Care Plan Review  evaluation/treatment results reviewed   -HG    Patient Effort  excellent   -HG    Symptoms Noted During/After Treatment  none   -HG       General Information    Patient Profile Reviewed  yes   -HG    Pertinent History Of Current Problem  Pt is a 60 year old female with hx of falls and concussions- reported in 1997 and in 2012. Hx of headaches.   -HG    Precautions/Limitations, Vision  WFL with corrective lenses   -HG    Precautions/Limitations, Hearing  WFL;for purposes of eval   -HG    Prior Level of Function-Communication  WFL   -HG    Barriers to Rehab  none identified   -HG    Patient's Goals for Discharge  return to all previous roles/activities;functional cognition   -HG    Standardized Assessment Used  RBANS   -       Auditory Comprehension Assessment/Intervention    Auditory Comprehension (Communication)  WFL   -HG       Verbal Expression  Assessment/Intervention    Verbal Expression  WFL   -HG       Oral Motor Structure and Function    Oral Motor Structure and Function  WFL   -HG       Oral Musculature and Cranial Nerve Assessment    Oral Motor General Assessment  WFL   -HG       Motor Speech Assessment/Intervention    Motor Speech Function  WFL   -HG       Cursory Voice Assessment/Intervention    Quality and Resonance (Voice)  WFL   -HG       Cognitive Assessment Intervention- SLP    Cognitive Function (Cognition)  WFL;mild impairment   -HG    Orientation Status (Cognition)  WFL   -HG    Memory (Cognitive)  simple;immediate;delayed;WFL;short-term;mild impairment   -HG    Attention (Cognitive)  selective;sustained;mild impairment   -HG    Thought Organization (Cognitive)  concrete divergent;WFL;mild impairment   -HG    Cognition, Comment  Cog dx to follow for reasoning, problem solving.    -HG       RBANS- Repeatable Battery for the Assessment of Neuropsychological Status    Immediate Memory Index Score  94   -HG    Immediate Memory Percentile  34 %   -HG    Immediate Memory Qualitative Description  average   -HG    Visuospatial Index Score  116   -HG    Visuospatial Percentile  86 %   -HG    Visuospatial Qualitative Description  high average   -HG    Language Index Score  87   -HG    Language Percentile  19 %   -HG    Language Qualitative Description  low average   -HG    Attention Index Score  79   -HG    Attention Percentile  8 %   -HG    Attention Qualitative Description  borderline   -HG    Delayed Memory Index Score  111   -HG    Delayed Memory Percentile  77 %   -HG    Delayed Memory Qualitative Description  high average   -HG    Total Index Score  487   -HG    Total Percentile  37 %   -HG    Total Qualitative Description  average   -HG      User Key  (r) = Recorded By, (t) = Taken By, (c) = Cosigned By    Initials Name Provider Type    Sarita Bliss MS CCC-SLP Speech and Language Pathologist                         OP SLP Education      Row Name 03/09/21 1300       Education    Barriers to Learning  No barriers identified  -    Education Provided  Described results of evaluation;Patient expressed understanding of evaluation;Patient participated in establishing goals and treatment plan;Patient demonstrated recommended strategies;Patient requires further education on strategies, risks  -    Assessed  Learning needs;Learning motivation;Learning preferences;Learning readiness  -    Learning Motivation  Strong  -    Learning Method  Explanation;Demonstration;Teach back;Written materials  -    Teaching Response  Verbalized understanding;Demonstrated understanding;Reinforcement needed  -    Education Comments  Education pt on internal and external memory strategies- provided a handout and a journal. Provided handout on Attention Strategies as well as for Remembering What you Read.   -      User Key  (r) = Recorded By, (t) = Taken By, (c) = Cosigned By    Initials Name Effective Dates    HG Sarita Preston MS [facility]-SLP 08/09/20 -           SLP OP Goals     Row Name 03/09/21 1300          Goal Type Needed    Goal Type Needed  Memory;Attention/Orientation;Other Adult Goals  -        Subjective Comments    Subjective Comments  Pt alert, cooperative, eager for strategies to assist with memory as she is responsible for the care of her mother.   -HG        Memory Goals    Patient will be able to remember information needed to return to work and function on work-related tasks  100%:;with cues  -HG     Status: Patient will be able to remember information needed to return to work and function on work-related tasks  New  -HG     Patient will demonstrate improved ability to recall information by immediately recalling a series of words  90%:;unrelated;after delay  -HG     Status: Patient will demonstrate improved ability to recall information by immediately recalling a series of words  New  -     Patient’s memory skills will be enhanced as  reported by patient by utilizing internal memory strategies to recall up to 3 pieces of information after a 5- minute delay  90%:;with cues  -HG     Status: Patient’s memory skills will be enhanced as reported by patient by utilizing internal memory strategies to recall up to 3 pieces of information after a 5- minute delay  New  -HG     Patient’s memory skills will be enhanced as reported by patient by using external memory aides  90%:;with cues  -HG     Status: Patient’s memory skills will be enhanced as reported by patient by using external memory aides  New  -HG        Attention/Orientation Goals    Patient will be able to execute all activities necessary to manage a home  Independently  -HG     Status: Patient will be able to execute all activities necessary to manage a home  New  -HG     Patient will improve attention skills by sustaining consistent behavioral response during continuous and repetitive activity (e.g., listening for target words, auditory/reading comprehension tasks)  without cues;10 minute task  -HG     Status: Patient will improve attention skills by sustaining consistent behavioral response during continuous and repetitive activity (e.g., listening for target words, auditory/reading comprehension tasks)  New  -HG     Patient will improve attention skills by sustaining focus in order to actively hold and manipulate information provided (e.g., sequencing auditorily presented number series in ascending or descending order)  90%:;with cues  -HG     Status: Patient will improve attention skills by sustaining focus in order to actively hold and manipulate information provided (e.g., sequencing auditorily presented number series in ascending or descending order)  New  -HG     Patient will improve attention skills by focusing to selective target/task when presented with competing stimuli or in a distracting environment in order to complete task  90%:;with cues  -HG     Status: Patient will improve  attention skills by focusing to selective target/task when presented with competing stimuli or in a distracting environment in order to complete task  New  -HG     Patient will improve attention skills by alternating or shifting focus between two different tasks in order to complete both tasks  90%:;with cues  -HG     Status: Patient will improve attention skills by alternating or shifting focus between two different tasks in order to complete both tasks  New  -HG        Other Goals    Other Adult Goal- 1  Pt will complete thought organization tasks with at least 90% accurate with cues.  -HG     Status: Other Adult Goal- 1  New  -HG     Other Adult Goal- 2  Pt will participate in further reasoning and problem solving evaluation with goals to follow as indicated.  -HG     Status: Other Adult Goal- 2  New  -HG     Other Adult Goal- 3  Pt will improve RBANS Score to at least a 111 placing pt in the high average range.   -        SLP Time Calculation    SLP Goal Re-Cert Due Date  04/08/21  -       User Key  (r) = Recorded By, (t) = Taken By, (c) = Cosigned By    Initials Name Provider Type     Sarita Preston MS CCC-SLP Speech and Language Pathologist          OP SLP Assessment/Plan - 03/09/21 1300        SLP Assessment    Functional Problems  Speech Language- Adult/Cognition   -HG    Impact on Function: Adult Speech Language/Cognition  Trouble learning or remembering new information;Poor attention to task   -HG    Clinical Impression: Speech Language-Adult/Congnition  Mild:;Cognitive Communication Impairment   -HG    Functional Problems Comment  Pt reports difficulty with short term memory and word finding.   -HG    Clinical Impression Comments  RBANS Total score of 95 places pt in the Average range however pt has sub-average scores in Attention and Language.   -HG    Please refer to paper survey for additional self-reported information  Yes   -HG    Please refer to items scanned into chart for additional  diagnostic informaiton and handouts as provided by clinician  Yes   -HG    SLP Diagnosis  Mild Cog-comm deficits.   -HG    Prognosis  Excellent (comment)   -HG    Patient/caregiver participated in establishment of treatment plan and goals  Yes   -HG    Patient would benefit from skilled therapy intervention  Yes   -HG       SLP Plan    Frequency  1-2x/week   -HG    Duration  12 weeks   -HG    Planned CPT's?  SLP SPEECH & LANGUAGE EVAL: 66071;SLP INDIVIDUAL SPEECH THERAPY: 05176   -HG    Expected Duration of Therapy Session (SLP Eval)  60   -HG    Plan Comments  Initiate Cog tx.    -HG      User Key  (r) = Recorded By, (t) = Taken By, (c) = Cosigned By    Initials Name Provider Type     Sarita Preston, MS CCC-SLP Speech and Language Pathologist                 Time Calculation:   SLP Start Time: 1300    Therapy Charges for Today     Code Description Service Date Service Provider Modifiers Qty    [id number] [facility] EVAL SPEECH AND PROD W LANG  6 3/9/2021 Sarita Preston, MS CCC-SLP GN 1                   Sarita Preston MS CCC-SLP  3/9/2021

## 2021-03-11 ENCOUNTER — TELEMEDICINE (OUTPATIENT)
Dept: PSYCHIATRY | Facility: CLINIC | Age: 61
End: 2021-03-11

## 2021-03-11 DIAGNOSIS — F43.10 POST TRAUMATIC STRESS DISORDER (PTSD): Primary | ICD-10-CM

## 2021-03-11 PROCEDURE — 90791 PSYCH DIAGNOSTIC EVALUATION: CPT | Performed by: COUNSELOR
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
## 2021-03-12 ENCOUNTER — HOSPITAL ENCOUNTER (OUTPATIENT)
Dept: SPEECH THERAPY | Facility: HOSPITAL | Age: 61
Setting detail: THERAPIES SERIES
Discharge: HOME | End: 2021-03-12

## 2021-03-12 DIAGNOSIS — R41.3 MEMORY LOSS: Primary | ICD-10-CM

## 2021-03-12 DIAGNOSIS — G44.85 STABBING HEADACHE: ICD-10-CM

## 2021-03-12 PROBLEM — F33.1 MAJOR DEPRESSIVE DISORDER, RECURRENT EPISODE, MODERATE: Status: ACTIVE | Noted: 2021-03-12

## 2021-03-12 PROCEDURE — 92507 TX SP LANG VOICE COMM INDIV: CPT

## 2021-03-12 NOTE — THERAPY TREATMENT NOTE
Outpatient Speech Language Pathology   Adult Speech Language Cognitive Treatment Note  [facility]     Patient Name: Sandra Auguste  : 1960  MRN: [id number]  Today's Date: 3/12/2021         Visit Date: 2021   Patient Active Problem List   Diagnosis   • Abnormal computed tomography scan   • Anemia   • Post traumatic stress disorder (PTSD)   • Anxiety   • Strain of neck muscle   • Cough   • Diverticulitis of colon   • Fatigue   • Steatosis of liver   • Lateral epicondylitis   • Knee pain   • Spasm   • Adiposity   • Obstructive sleep apnea syndrome   • Osteoarthritis   • Palpitations   • Nausea   • Shortness of breath   • Skin tag   • Candidiasis of vagina   • Viral upper respiratory tract infection   • Gastroesophageal reflux disease   • Acute pain of left shoulder   • Abdominal distension (gaseous)   • SOB (shortness of breath) on exertion   • Sleep apnea with use of continuous positive airway pressure (CPAP)   • Seasonal allergies   • Obesity   • Migraine   • Mental disorder   • Diverticulosis   • Depression   • Arthritis   • Hypertension   • Itching   • Stabbing headache   • Temple tenderness   • TMJ click   • Mild intermittent asthma without complication   • Skull fracture (CMS/HCC)   • Type 2 diabetes mellitus without complication, without long-term current use of insulin (CMS/HCC)   • Health care maintenance   • Major depressive disorder, recurrent episode, moderate (CMS/HCC)          Visit Dx:    ICD-10-CM ICD-9-CM   1. Memory loss  R41.3 780.93   2. Stabbing headache  G44.85 339.85                         SLP OP Goals     Row Name 21 1300          Goal Type Needed    Goal Type Needed  Memory;Attention/Orientation;Other Adult Goals  -HG        Subjective Comments    Subjective Comments  Pt alert, cooperative, returned with folder. Pt concerned about remembering what she has read.   -HG        Memory Goals    Patient will be able to remember information needed to return to work and function on  work-related tasks  100%:;with cues  -HG     Status: Patient will be able to remember information needed to return to work and function on work-related tasks  New  -HG     Patient will demonstrate improved ability to recall information by immediately recalling a series of words  90%:;unrelated;after delay  -HG     Status: Patient will demonstrate improved ability to recall information by immediately recalling a series of words  Progressing as expected  -HG     Comments: Patient will demonstrate improved ability to recall information by immediately recalling a series of words  3/12/21: Word Placement: Inclusion for 4 words and pt was 100% accurate. 5 words: pt was 80% accurate. One Pile Card game: pt was required mod cues in order to recall rules of the game.   -HG     Patient’s memory skills will be enhanced as reported by patient by utilizing internal memory strategies to recall up to 3 pieces of information after a 5- minute delay  90%:;with cues  -HG     Status: Patient’s memory skills will be enhanced as reported by patient by utilizing internal memory strategies to recall up to 3 pieces of information after a 5- minute delay  Progressing as expected  -HG     Comments: Patient’s memory skills will be enhanced as reported by patient by utilizing internal memory strategies to recall up to 3 pieces of information after a 5- minute delay  3/12/21: Delayed recall of 4 un-related words: pt was 4/4 x 3. Delayed recall of 5 un-related words: pt was 5/5 x 3.   -HG     Patient’s memory skills will be enhanced as reported by patient by using external memory aides  90%:;with cues  -HG     Status: Patient’s memory skills will be enhanced as reported by patient by using external memory aides  Progressing as expected  -HG     Comments: Patient’s memory skills will be enhanced as reported by patient by using external memory aides  3/12/21: Pt using daily journal per her report.   -HG        Attention/Orientation Goals     Patient will be able to execute all activities necessary to manage a home  Independently  -HG     Status: Patient will be able to execute all activities necessary to manage a home  New  -HG     Patient will improve attention skills by sustaining consistent behavioral response during continuous and repetitive activity (e.g., listening for target words, auditory/reading comprehension tasks)  without cues;10 minute task  -HG     Status: Patient will improve attention skills by sustaining consistent behavioral response during continuous and repetitive activity (e.g., listening for target words, auditory/reading comprehension tasks)  Progressing as expected  -HG     Comments: Patient will improve attention skills by sustaining consistent behavioral response during continuous and repetitive activity (e.g., listening for target words, auditory/reading comprehension tasks)  3/12/21: Sustained attention for the game of One Pile and pt was 80% accurate.   -HG     Patient will improve attention skills by sustaining focus in order to actively hold and manipulate information provided (e.g., sequencing auditorily presented number series in ascending or descending order)  90%:;with cues  -HG     Status: Patient will improve attention skills by sustaining focus in order to actively hold and manipulate information provided (e.g., sequencing auditorily presented number series in ascending or descending order)  New  -HG     Patient will improve attention skills by focusing to selective target/task when presented with competing stimuli or in a distracting environment in order to complete task  90%:;with cues  -HG     Status: Patient will improve attention skills by focusing to selective target/task when presented with competing stimuli or in a distracting environment in order to complete task  Progressing as expected  -HG     Comments: Patient will improve attention skills by focusing to selective target/task when presented with  competing stimuli or in a distracting environment in order to complete task  3/12/21: Connectin the Dots- Alphabetical and pt was 90% accurate. Alternating attention task and pt was 95% accurate.   -HG     Patient will improve attention skills by alternating or shifting focus between two different tasks in order to complete both tasks  90%:;with cues  -HG     Status: Patient will improve attention skills by alternating or shifting focus between two different tasks in order to complete both tasks  Progressing as expected  -HG     Comments: Patient will improve attention skills by alternating or shifting focus between two different tasks in order to complete both tasks  3/12/21: Divided attention for card sorting by  and by suit and pt was 85% accurate.   -HG        Other Goals    Other Adult Goal- 1  Pt will complete thought organization tasks with at least 90% accurate with cues.  -HG     Status: Other Adult Goal- 1  Progressing as expected  -HG     Comments: Other Adult Goal- 1  3/12/21: Divergent naming: pt was concrete: 15/15, abstract: 15/15 with mod cues.    -HG     Other Adult Goal- 2  Pt will participate in further reasoning and problem solving evaluation with goals to follow as indicated.  -HG     Status: Other Adult Goal- 2  New  -HG     Other Adult Goal- 3  Pt will improve RBANS Score to at least a 111 placing pt in the high average range.   -HG     Status: Other Adult Goal- 3  New  -HG        SLP Time Calculation    SLP Goal Re-Cert Due Date  21  -HG       User Key  (r) = Recorded By, (t) = Taken By, (c) = Cosigned By    Initials Name Provider Type    Sarita Bliss MS CCC-SLP Speech and Language Pathologist          OP SLP Education     Row Name 21 1300       Education    Education Comments  Hmwk for delayed recall of 6 un-related items.   -HG      User Key  (r) = Recorded By, (t) = Taken By, (c) = Cosigned By    Initials Name Effective Dates    Sarita Bliss MS CCC-SLP  08/09/20 -           OP SLP Assessment/Plan - 03/12/21 1300        SLP Plan    Plan Comments  Cont with Cog tx.    -HG      User Key  (r) = Recorded By, (t) = Taken By, (c) = Cosigned By    Initials Name Provider Type    Sarita Bliss MS CCC-SLP Speech and Language Pathologist                 Time Calculation:   SLP Start Time: 1300    Therapy Charges for Today     Code Description Service Date Service Provider Modifiers Qty    [id number]  ST TREATMENT SPEECH 4 3/12/2021 Sarita Preston MS CCC-SLP GN 1                   Sarita Preston MS CCC-SLP  3/12/2021

## 2021-03-15 ENCOUNTER — TELEPHONE (OUTPATIENT)
Dept: INTERNAL MEDICINE | Facility: CLINIC | Age: 61
End: 2021-03-15

## 2021-03-15 RX ORDER — VILAZODONE HYDROCHLORIDE 10 MG/1
10 TABLET ORAL DAILY
Qty: 30 TABLET | Refills: 2 | Status: SHIPPED | OUTPATIENT
Start: 2021-03-15 | End: 2021-06-01

## 2021-03-15 NOTE — TELEPHONE ENCOUNTER
Sent in the 10 mg dose. If she wants to use up the medication she has by cutting it in half, that is fine.

## 2021-03-15 NOTE — TELEPHONE ENCOUNTER
PATIENT SAID THAT WHEN SHE STARTED TAKING vilazodone (Viibryd) 20 MG tablet tablet SHE SAID THAT SHE HAS BEEN HAVING ISSUES. SHE SAID THAT SHE WOULD RATHER GO BACK TO TAKING THE 10 MG. SHE HAS STARTED CUTTING THE PILL IN HALF AND ONLY TAKING HALF A TABLET DAILY AND SHE WANTS TO KNOW IF THIS IS OK.     CONTACT: [phone]

## 2021-03-16 ENCOUNTER — HOSPITAL ENCOUNTER (OUTPATIENT)
Dept: SPEECH THERAPY | Facility: HOSPITAL | Age: 61
Setting detail: THERAPIES SERIES
Discharge: HOME | End: 2021-03-16

## 2021-03-16 DIAGNOSIS — R41.3 MEMORY LOSS: Primary | ICD-10-CM

## 2021-03-16 PROCEDURE — 92507 TX SP LANG VOICE COMM INDIV: CPT

## 2021-03-16 NOTE — THERAPY TREATMENT NOTE
Outpatient Speech Language Pathology   Adult Speech Language Cognitive Treatment Note  [facility]     Patient Name: Sandra Auguste  : 1960  MRN: [id number]  Today's Date: 3/16/2021         Visit Date: 2021   Patient Active Problem List   Diagnosis   • Abnormal computed tomography scan   • Anemia   • Post traumatic stress disorder (PTSD)   • Anxiety   • Strain of neck muscle   • Cough   • Diverticulitis of colon   • Fatigue   • Steatosis of liver   • Lateral epicondylitis   • Knee pain   • Spasm   • Adiposity   • Obstructive sleep apnea syndrome   • Osteoarthritis   • Palpitations   • Nausea   • Shortness of breath   • Skin tag   • Candidiasis of vagina   • Viral upper respiratory tract infection   • Gastroesophageal reflux disease   • Acute pain of left shoulder   • Abdominal distension (gaseous)   • SOB (shortness of breath) on exertion   • Sleep apnea with use of continuous positive airway pressure (CPAP)   • Seasonal allergies   • Obesity   • Migraine   • Mental disorder   • Diverticulosis   • Depression   • Arthritis   • Hypertension   • Itching   • Stabbing headache   • Temple tenderness   • TMJ click   • Mild intermittent asthma without complication   • Skull fracture (CMS/HCC)   • Type 2 diabetes mellitus without complication, without long-term current use of insulin (CMS/HCC)   • Health care maintenance   • Major depressive disorder, recurrent episode, moderate (CMS/HCC)          Visit Dx:    ICD-10-CM ICD-9-CM   1. Memory loss  R41.3 780.93                         SLP OP Goals     Row Name 21 1300          Goal Type Needed    Goal Type Needed  Memory;Attention/Orientation;Other Adult Goals  -HG        Subjective Comments    Subjective Comments  Pt alert, cooperative, returned with homework.   -HG        Memory Goals    Patient will be able to remember information needed to return to work and function on work-related tasks  100%:;with cues  -HG     Status: Patient will be able to remember  information needed to return to work and function on work-related tasks  New  -HG     Patient will demonstrate improved ability to recall information by immediately recalling a series of words  90%:;unrelated;after delay  -HG     Status: Patient will demonstrate improved ability to recall information by immediately recalling a series of words  Progressing as expected  -HG     Comments: Patient will demonstrate improved ability to recall information by immediately recalling a series of words  3/12/21: Word Placement: Inclusion for 4 words and pt was 100% accurate. 5 words: pt was 80% accurate. One Pile Card game: pt was required mod cues in order to recall rules of the game.   -HG     Patient’s memory skills will be enhanced as reported by patient by utilizing internal memory strategies to recall up to 3 pieces of information after a 5- minute delay  90%:;with cues  -HG     Status: Patient’s memory skills will be enhanced as reported by patient by utilizing internal memory strategies to recall up to 3 pieces of information after a 5- minute delay  Progressing as expected  -HG     Comments: Patient’s memory skills will be enhanced as reported by patient by utilizing internal memory strategies to recall up to 3 pieces of information after a 5- minute delay  3/16/21: Delayed recall of 6 un-related words: pt was 6/6; with increased delay, pt was 6/6 x 2.  3/12/21: Delayed recall of 4 un-related words: pt was 4/4 x 3. Delayed recall of 5 un-related words: pt was 5/5 x 3.   -HG     Patient’s memory skills will be enhanced as reported by patient by using external memory aides  90%:;with cues  -HG     Status: Patient’s memory skills will be enhanced as reported by patient by using external memory aides  Progressing as expected  -HG     Comments: Patient’s memory skills will be enhanced as reported by patient by using external memory aides  3/16/21: Pt states that the past few days have been rough and she hasn't kept up with  it very well. 3/12/21: Pt using daily journal per her report.   -HG        Attention/Orientation Goals    Patient will be able to execute all activities necessary to manage a home  Independently  -HG     Status: Patient will be able to execute all activities necessary to manage a home  Progressing as expected  -HG     Comments: Patient will be able to execute all activities necessary to manage a home  3/16/21: Exec function exercise for attention, memory and thought organization and overall, pt was 95% accurate.   -HG     Patient will improve attention skills by sustaining consistent behavioral response during continuous and repetitive activity (e.g., listening for target words, auditory/reading comprehension tasks)  without cues;10 minute task  -HG     Status: Patient will improve attention skills by sustaining consistent behavioral response during continuous and repetitive activity (e.g., listening for target words, auditory/reading comprehension tasks)  Progressing as expected  -HG     Comments: Patient will improve attention skills by sustaining consistent behavioral response during continuous and repetitive activity (e.g., listening for target words, auditory/reading comprehension tasks)  3/12/21: Sustained attention for the game of One Pile and pt was 80% accurate.   -HG     Patient will improve attention skills by sustaining focus in order to actively hold and manipulate information provided (e.g., sequencing auditorily presented number series in ascending or descending order)  90%:;with cues  -HG     Status: Patient will improve attention skills by sustaining focus in order to actively hold and manipulate information provided (e.g., sequencing auditorily presented number series in ascending or descending order)  Progressing as expected  -HG     Comments: Patient will improve attention skills by sustaining focus in order to actively hold and manipulate information provided (e.g., sequencing auditorily  presented number series in ascending or descending order)  3/16/21: Mental Manipulation for 3 words, alphabetical and reversal and pt was 100% accurate.   -HG     Patient will improve attention skills by focusing to selective target/task when presented with competing stimuli or in a distracting environment in order to complete task  90%:;with cues  -HG     Status: Patient will improve attention skills by focusing to selective target/task when presented with competing stimuli or in a distracting environment in order to complete task  Progressing as expected  -HG     Comments: Patient will improve attention skills by focusing to selective target/task when presented with competing stimuli or in a distracting environment in order to complete task  3/12/21: Connecting the Dots- Alphabetical and pt was 90% accurate. Alternating attention task and pt was 95% accurate.   -HG     Patient will improve attention skills by alternating or shifting focus between two different tasks in order to complete both tasks  90%:;with cues  -HG     Status: Patient will improve attention skills by alternating or shifting focus between two different tasks in order to complete both tasks  Progressing as expected  -HG     Comments: Patient will improve attention skills by alternating or shifting focus between two different tasks in order to complete both tasks  3/12/21: Divided attention for card sorting by  and by suit and pt was 85% accurate.   -HG        Other Goals    Other Adult Goal- 1  Pt will complete thought organization tasks with at least 90% accurate with cues.  -HG     Status: Other Adult Goal- 1  Progressing as expected  -HG     Comments: Other Adult Goal- 1  3/12/21: Divergent naming: pt was concrete: 15/15, abstract: 15/15 with mod cues.    -HG     Other Adult Goal- 2  Pt will participate in further reasoning and problem solving evaluation with goals to follow as indicated.  -HG     Status: Other Adult Goal- 2  New  -HG      Other Adult Goal- 3  Pt will improve RBANS Score to at least a 111 placing pt in the high average range.   -HG     Status: Other Adult Goal- 3  New  -HG        SLP Time Calculation    SLP Goal Re-Cert Due Date  04/08/21  -HG       User Key  (r) = Recorded By, (t) = Taken By, (c) = Cosigned By    Initials Name Provider Type    Sarita Bliss MS CCC-SLP Speech and Language Pathologist          OP SLP Education     Row Name 03/16/21 1300       Education    Education Comments  Hmwk for delayed recall of 7 un-related words.   -HG      User Key  (r) = Recorded By, (t) = Taken By, (c) = Cosigned By    Initials Name Effective Dates    Sarita Bliss MS CCC-SLP 08/09/20 -           OP SLP Assessment/Plan - 03/16/21 1300        SLP Plan    Plan Comments  Cont with Cog tx.    -HG      User Key  (r) = Recorded By, (t) = Taken By, (c) = Cosigned By    Initials Name Provider Type    Sarita Bliss MS CCC-SLP Speech and Language Pathologist                 Time Calculation:   SLP Start Time: 1300    Therapy Charges for Today     Code Description Service Date Service Provider Modifiers Qty    [id number]  ST TREATMENT SPEECH 4 3/16/2021 Sarita Preston MS CCC-SLP GN 1                   Sarita Preston MS CCC-SLP  3/16/2021

## 2021-03-17 ENCOUNTER — IMMUNIZATION (OUTPATIENT)
Dept: VACCINE CLINIC | Facility: HOSPITAL | Age: 61
End: 2021-03-17

## 2021-03-17 ENCOUNTER — HOSPITAL ENCOUNTER (OUTPATIENT)
Dept: MRI IMAGING | Facility: HOSPITAL | Age: 61
Discharge: HOME | End: 2021-03-17
Admitting: PHYSICIAN ASSISTANT

## 2021-03-17 PROCEDURE — 91300 HC SARSCOV02 VAC 30MCG/0.3ML IM: CPT | Performed by: INTERNAL MEDICINE

## 2021-03-17 PROCEDURE — 70551 MRI BRAIN STEM W/O DYE: CPT

## 2021-03-17 PROCEDURE — 0001A: CPT | Performed by: INTERNAL MEDICINE

## 2021-03-19 ENCOUNTER — HOSPITAL ENCOUNTER (OUTPATIENT)
Dept: SPEECH THERAPY | Facility: HOSPITAL | Age: 61
Setting detail: THERAPIES SERIES
Discharge: HOME | End: 2021-03-19

## 2021-03-19 DIAGNOSIS — R41.3 MEMORY LOSS: Primary | ICD-10-CM

## 2021-03-19 PROCEDURE — 92507 TX SP LANG VOICE COMM INDIV: CPT

## 2021-03-19 NOTE — THERAPY TREATMENT NOTE
Outpatient Speech Language Pathology   Adult Speech Language Cognitive Treatment Note  [facility]     Patient Name: Sandra Auguste  : 1960  MRN: [id number]  Today's Date: 3/19/2021         Visit Date: 2021   Patient Active Problem List   Diagnosis   • Abnormal computed tomography scan   • Anemia   • Post traumatic stress disorder (PTSD)   • Anxiety   • Strain of neck muscle   • Cough   • Diverticulitis of colon   • Fatigue   • Steatosis of liver   • Lateral epicondylitis   • Knee pain   • Spasm   • Adiposity   • Obstructive sleep apnea syndrome   • Osteoarthritis   • Palpitations   • Nausea   • Shortness of breath   • Skin tag   • Candidiasis of vagina   • Viral upper respiratory tract infection   • Gastroesophageal reflux disease   • Acute pain of left shoulder   • Abdominal distension (gaseous)   • SOB (shortness of breath) on exertion   • Sleep apnea with use of continuous positive airway pressure (CPAP)   • Seasonal allergies   • Obesity   • Migraine   • Mental disorder   • Diverticulosis   • Depression   • Arthritis   • Hypertension   • Itching   • Stabbing headache   • Temple tenderness   • TMJ click   • Mild intermittent asthma without complication   • Skull fracture (CMS/HCC)   • Type 2 diabetes mellitus without complication, without long-term current use of insulin (CMS/HCC)   • Health care maintenance   • Major depressive disorder, recurrent episode, moderate (CMS/HCC)          Visit Dx:    ICD-10-CM ICD-9-CM   1. Memory loss  R41.3 780.93                         SLP OP Goals     Row Name 21 1500          Goal Type Needed    Goal Type Needed  Memory;Attention/Orientation;Other Adult Goals  -HG        Subjective Comments    Subjective Comments  Pt alert, cooperative, returned with folder and tablet and admitted to terrible anxiety for setting up new electronics.   -HG        Memory Goals    Patient will be able to remember information needed to return to work and function on work-related  tasks  100%:;with cues  -HG     Status: Patient will be able to remember information needed to return to work and function on work-related tasks  New  -HG     Patient will demonstrate improved ability to recall information by immediately recalling a series of words  90%:;unrelated;after delay  -HG     Status: Patient will demonstrate improved ability to recall information by immediately recalling a series of words  Progressing as expected  -HG     Comments: Patient will demonstrate improved ability to recall information by immediately recalling a series of words  3/12/21: Word Placement: Inclusion for 4 words and pt was 100% accurate. 5 words: pt was 80% accurate. One Pile Card game: pt was required mod cues in order to recall rules of the game.   -HG     Patient’s memory skills will be enhanced as reported by patient by utilizing internal memory strategies to recall up to 3 pieces of information after a 5- minute delay  90%:;with cues  -HG     Status: Patient’s memory skills will be enhanced as reported by patient by utilizing internal memory strategies to recall up to 3 pieces of information after a 5- minute delay  Progressing as expected  -HG     Comments: Patient’s memory skills will be enhanced as reported by patient by utilizing internal memory strategies to recall up to 3 pieces of information after a 5- minute delay  3/19/21: Delayed recall of 7 un-related words: pt was % accurate. 3/16/21: Delayed recall of 6 un-related words: pt was 6/6; with increased delay, pt was 6/6 x 2.  3/12/21: Delayed recall of 4 un-related words: pt was 4/4 x 3. Delayed recall of 5 un-related words: pt was 5/5 x 3.   -HG     Patient’s memory skills will be enhanced as reported by patient by using external memory aides  90%:;with cues  -HG     Status: Patient’s memory skills will be enhanced as reported by patient by using external memory aides  Progressing as expected  -HG     Comments: Patient’s memory skills will be  enhanced as reported by patient by using external memory aides  3/16/21: Pt states that the past few days have been rough and she hasn't kept up with it very well. 3/12/21: Pt using daily journal per her report.   -HG        Attention/Orientation Goals    Patient will be able to execute all activities necessary to manage a home  Independently  -HG     Status: Patient will be able to execute all activities necessary to manage a home  Progressing as expected  -HG     Comments: Patient will be able to execute all activities necessary to manage a home  3/16/21: Exec function exercise for attention, memory and thought organization and overall, pt was 95% accurate.   -HG     Patient will improve attention skills by sustaining consistent behavioral response during continuous and repetitive activity (e.g., listening for target words, auditory/reading comprehension tasks)  without cues;10 minute task  -HG     Status: Patient will improve attention skills by sustaining consistent behavioral response during continuous and repetitive activity (e.g., listening for target words, auditory/reading comprehension tasks)  Progressing as expected  -HG     Comments: Patient will improve attention skills by sustaining consistent behavioral response during continuous and repetitive activity (e.g., listening for target words, auditory/reading comprehension tasks)  3/19/21: One Pile Card Game: pt was 80% accurate.  3/12/21: Sustained attention for the game of One Pile and pt was 80% accurate.   -HG     Patient will improve attention skills by sustaining focus in order to actively hold and manipulate information provided (e.g., sequencing auditorily presented number series in ascending or descending order)  90%:;with cues  -HG     Status: Patient will improve attention skills by sustaining focus in order to actively hold and manipulate information provided (e.g., sequencing auditorily presented number series in ascending or descending order)   Progressing as expected  -HG     Comments: Patient will improve attention skills by sustaining focus in order to actively hold and manipulate information provided (e.g., sequencing auditorily presented number series in ascending or descending order)  3/16/21: Mental Manipulation for 3 words, alphabetical and reversal and pt was 100% accurate.   -HG     Patient will improve attention skills by focusing to selective target/task when presented with competing stimuli or in a distracting environment in order to complete task  90%:;with cues  -HG     Status: Patient will improve attention skills by focusing to selective target/task when presented with competing stimuli or in a distracting environment in order to complete task  Progressing as expected  -HG     Comments: Patient will improve attention skills by focusing to selective target/task when presented with competing stimuli or in a distracting environment in order to complete task  3/12/21: Connecting the Dots- Alphabetical and pt was 90% accurate. Alternating attention task and pt was 95% accurate.   -HG     Patient will improve attention skills by alternating or shifting focus between two different tasks in order to complete both tasks  90%:;with cues  -HG     Status: Patient will improve attention skills by alternating or shifting focus between two different tasks in order to complete both tasks  Progressing as expected  -HG     Comments: Patient will improve attention skills by alternating or shifting focus between two different tasks in order to complete both tasks  3/19/21: Memory Matches: pt was 80% accurate with min cues. 3/12/21: Divided attention for card sorting by  and by suit and pt was 85% accurate.   -HG        Other Goals    Other Adult Goal- 1  Pt will complete thought organization tasks with at least 90% accurate with cues.  -HG     Status: Other Adult Goal- 1  Progressing as expected  -HG     Comments: Other Adult Goal- 1  3/12/21: Divergent  naming: pt was concrete: 15/15, abstract: 15/15 with mod cues.    -HG     Other Adult Goal- 2  Pt will participate in further reasoning and problem solving evaluation with goals to follow as indicated.  -HG     Status: Other Adult Goal- 2  New  -HG     Other Adult Goal- 3  Pt will improve RBANS Score to at least a 111 placing pt in the high average range.   -HG     Status: Other Adult Goal- 3  New  -HG        SLP Time Calculation    SLP Goal Re-Cert Due Date  04/08/21  -HG       User Key  (r) = Recorded By, (t) = Taken By, (c) = Cosigned By    Initials Name Provider Type    Sarita Bliss MS CCC-SLP Speech and Language Pathologist          OP SLP Education     Row Name 03/19/21 1500       Education    Education Comments  Education for use of brain games on tablet and 8 new un-related words.   -HG      User Key  (r) = Recorded By, (t) = Taken By, (c) = Cosigned By    Initials Name Effective Dates    Sarita Bliss MS CCC-SLP 08/09/20 -           OP SLP Assessment/Plan - 03/19/21 1500        SLP Plan    Plan Comments  Cont with Cog tx.    -HG      User Key  (r) = Recorded By, (t) = Taken By, (c) = Cosigned By    Initials Name Provider Type    Sarita Bliss MS CCC-SLP Speech and Language Pathologist                 Time Calculation:   SLP Start Time: 1500    Therapy Charges for Today     Code Description Service Date Service Provider Modifiers Qty    [id number]  ST TREATMENT SPEECH 4 3/19/2021 Sarita Preston MS CCC-SLP GN 1                   Sarita Preston MS CCC-SLP  3/19/2021

## 2021-03-22 ENCOUNTER — TELEPHONE (OUTPATIENT)
Dept: INTERNAL MEDICINE | Facility: CLINIC | Age: 61
End: 2021-03-22

## 2021-03-22 NOTE — TELEPHONE ENCOUNTER
LVM that I did not need to speak with her now, that I had called the pharmacy and verified she picked up the vibryd and it did not need a pa, cover my meds were calling saying it needed a prior authorization but it was for the 10/20 starter sofia

## 2021-03-24 ENCOUNTER — TELEPHONE (OUTPATIENT)
Dept: NEUROLOGY | Facility: CLINIC | Age: 61
End: 2021-03-24

## 2021-03-24 NOTE — TELEPHONE ENCOUNTER
I spoke with patient. Informed her that there are significant abnormalities. She seen report on [employer] and said the report does not sound normal. She requested an appointment but does not want to wait until your first available in May. She requested I asked if you were be agreeable with my double blocking. Please advise.

## 2021-03-24 NOTE — TELEPHONE ENCOUNTER
Provider: MARIA M PERDOMO    Caller: PT    Relationship to Patient: SELF    Pharmacy: NA    Phone Number: [phone]    Reason for Call: PATIENT IS CALLING TO ASK IF SOMEONE WOULD CALL HER BACK TO GO OVER MRI RESULTS. PLEASE ADVISE.     When was the patient last seen: 3-1-21

## 2021-03-25 NOTE — TELEPHONE ENCOUNTER
I called pt and explained to her, she stated she would still like to be added to the cancellation list to ask more questions.

## 2021-03-25 NOTE — TELEPHONE ENCOUNTER
We can add her to the cancellation list if she would like, but I have written an explanation of the MRI below.     To explain, it showed evidence of chronic small vessel ischemic changes. It looked mild on my review. This is very common as we all age and represents plaque build up in the small vessels of the brain. It is nonspecific and likely not contributing to her symptoms. Otherwise, everything looked good. Hope that helps. Thanks.

## 2021-03-26 ENCOUNTER — HOSPITAL ENCOUNTER (OUTPATIENT)
Dept: SPEECH THERAPY | Facility: HOSPITAL | Age: 61
Setting detail: THERAPIES SERIES
Discharge: HOME | End: 2021-03-26

## 2021-03-26 DIAGNOSIS — R41.3 MEMORY LOSS: Primary | ICD-10-CM

## 2021-03-26 PROCEDURE — 92507 TX SP LANG VOICE COMM INDIV: CPT

## 2021-03-26 NOTE — THERAPY TREATMENT NOTE
Outpatient Speech Language Pathology   Adult Speech Language Cognitive Treatment Note   [facility]     Patient Name: Sandra Auguste  : 1960  MRN: [id number]  Today's Date: 3/26/2021         Visit Date: 2021   Patient Active Problem List   Diagnosis   • Abnormal computed tomography scan   • Anemia   • Post traumatic stress disorder (PTSD)   • Anxiety   • Strain of neck muscle   • Cough   • Diverticulitis of colon   • Fatigue   • Steatosis of liver   • Lateral epicondylitis   • Knee pain   • Spasm   • Adiposity   • Obstructive sleep apnea syndrome   • Osteoarthritis   • Palpitations   • Nausea   • Shortness of breath   • Skin tag   • Candidiasis of vagina   • Viral upper respiratory tract infection   • Gastroesophageal reflux disease   • Acute pain of left shoulder   • Abdominal distension (gaseous)   • SOB (shortness of breath) on exertion   • Sleep apnea with use of continuous positive airway pressure (CPAP)   • Seasonal allergies   • Obesity   • Migraine   • Mental disorder   • Diverticulosis   • Depression   • Arthritis   • Hypertension   • Itching   • Stabbing headache   • Temple tenderness   • TMJ click   • Mild intermittent asthma without complication   • Skull fracture (CMS/HCC)   • Type 2 diabetes mellitus without complication, without long-term current use of insulin (CMS/HCC)   • Health care maintenance   • Major depressive disorder, recurrent episode, moderate (CMS/HCC)          Visit Dx:    ICD-10-CM ICD-9-CM   1. Memory loss  R41.3 780.93                         SLP OP Goals     Row Name 21 1153          Goal Type Needed    Goal Type Needed  Memory;Attention/Orientation;Other Adult Goals  -HG        Subjective Comments    Subjective Comments  Pt alert, cooperative, returned with homework but said she wasn't a good student and hadn't worked on it. Pt did note that can't skim reading material like she used to. Pt due to take rental exam   -HG        Memory Goals    Patient will be able to  remember information needed to return to work and function on work-related tasks  100%:;with cues  -HG     Status: Patient will be able to remember information needed to return to work and function on work-related tasks  New  -HG     Patient will demonstrate improved ability to recall information by immediately recalling a series of words  90%:;unrelated;after delay  -HG     Status: Patient will demonstrate improved ability to recall information by immediately recalling a series of words  Progressing as expected  -HG     Comments: Patient will demonstrate improved ability to recall information by immediately recalling a series of words  3/12/21: Word Placement: Inclusion for 4 words and pt was 100% accurate. 5 words: pt was 80% accurate. One Pile Card game: pt was required mod cues in order to recall rules of the game.   -HG     Patient’s memory skills will be enhanced as reported by patient by utilizing internal memory strategies to recall up to 3 pieces of information after a 5- minute delay  90%:;with cues  -HG     Status: Patient’s memory skills will be enhanced as reported by patient by utilizing internal memory strategies to recall up to 3 pieces of information after a 5- minute delay  Progressing as expected  -HG     Comments: Patient’s memory skills will be enhanced as reported by patient by utilizing internal memory strategies to recall up to 3 pieces of information after a 5- minute delay  3/26/21: Delayed recall of 8 un-related words and pt was 8/8 x 3 with use of chaining. 3/19/21: Delayed recall of 7 un-related words: pt was % accurate. 3/16/21: Delayed recall of 6 un-related words: pt was 6/6; with increased delay, pt was 6/6 x 2.  3/12/21: Delayed recall of 4 un-related words: pt was 4/4 x 3. Delayed recall of 5 un-related words: pt was 5/5 x 3.   -HG     Patient’s memory skills will be enhanced as reported by patient by using external memory aides  90%:;with cues  -HG     Status: Patient’s  memory skills will be enhanced as reported by patient by using external memory aides  Progressing as expected  -HG     Comments: Patient’s memory skills will be enhanced as reported by patient by using external memory aides  3/16/21: Pt states that the past few days have been rough and she hasn't kept up with it very well. 3/12/21: Pt using daily journal per her report.   -HG        Attention/Orientation Goals    Patient will be able to execute all activities necessary to manage a home  Independently  -HG     Status: Patient will be able to execute all activities necessary to manage a home  Progressing as expected  -HG     Comments: Patient will be able to execute all activities necessary to manage a home  3/16/21: Exec function exercise for attention, memory and thought organization and overall, pt was 95% accurate.   -HG     Patient will improve attention skills by sustaining consistent behavioral response during continuous and repetitive activity (e.g., listening for target words, auditory/reading comprehension tasks)  without cues;10 minute task  -HG     Status: Patient will improve attention skills by sustaining consistent behavioral response during continuous and repetitive activity (e.g., listening for target words, auditory/reading comprehension tasks)  Progressing as expected  -HG     Comments: Patient will improve attention skills by sustaining consistent behavioral response during continuous and repetitive activity (e.g., listening for target words, auditory/reading comprehension tasks)  3/19/21: One Pile Card Game: pt was 80% accurate.  3/12/21: Sustained attention for the game of One Pile and pt was 80% accurate.   -HG     Patient will improve attention skills by sustaining focus in order to actively hold and manipulate information provided (e.g., sequencing auditorily presented number series in ascending or descending order)  90%:;with cues  -HG     Status: Patient will improve attention skills by  sustaining focus in order to actively hold and manipulate information provided (e.g., sequencing auditorily presented number series in ascending or descending order)  Progressing as expected  -HG     Comments: Patient will improve attention skills by sustaining focus in order to actively hold and manipulate information provided (e.g., sequencing auditorily presented number series in ascending or descending order)  3/16/21: Mental Manipulation for 3 words, alphabetical and reversal and pt was 100% accurate.   -HG     Patient will improve attention skills by focusing to selective target/task when presented with competing stimuli or in a distracting environment in order to complete task  90%:;with cues  -HG     Status: Patient will improve attention skills by focusing to selective target/task when presented with competing stimuli or in a distracting environment in order to complete task  Progressing as expected  -HG     Comments: Patient will improve attention skills by focusing to selective target/task when presented with competing stimuli or in a distracting environment in order to complete task  3/26/21: The game of Butter[employer]u and pt was 80% accurate. 3/12/21: Connecting the Dots- Alphabetical and pt was 90% accurate. Alternating attention task and pt was 95% accurate.   -HG     Patient will improve attention skills by alternating or shifting focus between two different tasks in order to complete both tasks  90%:;with cues  -HG     Status: Patient will improve attention skills by alternating or shifting focus between two different tasks in order to complete both tasks  Progressing as expected  -HG     Comments: Patient will improve attention skills by alternating or shifting focus between two different tasks in order to complete both tasks  3/26/21: Memory Matches and pt was 70-90% accurate with use of attention strategies. 3/19/21: Memory Matches: pt was 80% accurate with min cues. 3/12/21: Divided attention for card  sorting by  and by suit and pt was 85% accurate.   -HG        Other Goals    Other Adult Goal- 1  Pt will complete thought organization tasks with at least 90% accurate with cues.  -HG     Status: Other Adult Goal- 1  Progressing as expected  -HG     Comments: Other Adult Goal- 1  3/12/21: Divergent naming: pt was concrete: 15/15, abstract: 15/15 with mod cues.    -HG     Other Adult Goal- 2  Pt will participate in further reasoning and problem solving evaluation with goals to follow as indicated.  -HG     Status: Other Adult Goal- 2  Progressing as expected  -HG     Comments: Other Adult Goal- 2  3/26/21: Reasoning puzzle: Pt was 90% accurate with min cues.   -HG     Other Adult Goal- 3  Pt will improve RBANS Score to at least a 111 placing pt in the high average range.   -HG     Status: Other Adult Goal- 3  New  -HG        SLP Time Calculation    SLP Goal Re-Cert Due Date  21  -HG       User Key  (r) = Recorded By, (t) = Taken By, (c) = Cosigned By    Initials Name Provider Type    Sarita Bliss MS CCC-SLP Speech and Language Pathologist          OP SLP Education     Row Name 21 1315       Education    Education Comments  Hmwk for reasoning and 10 paired pictures.   -HG      User Key  (r) = Recorded By, (t) = Taken By, (c) = Cosigned By    Initials Name Effective Dates    Sarita Bliss MS CCC-SLP 20 -           OP SLP Assessment/Plan - 21 1315        SLP Plan    Plan Comments  Cont with Cog tx.    -HG      User Key  (r) = Recorded By, (t) = Taken By, (c) = Cosigned By    Initials Name Provider Type    Sarita Bliss MS CCC-SLP Speech and Language Pathologist                 Time Calculation:   SLP Start Time: 1315    Therapy Charges for Today     Code Description Service Date Service Provider Modifiers Qty    [id number]  ST TREATMENT SPEECH 4 3/26/2021 Sarita Preston MS CCC-SLP GN 1                   Sarita Preston MS CCC-SLP  3/26/2021

## 2021-03-29 ENCOUNTER — TELEMEDICINE (OUTPATIENT)
Dept: PSYCHIATRY | Facility: CLINIC | Age: 61
End: 2021-03-29

## 2021-03-29 DIAGNOSIS — F33.1 MODERATE EPISODE OF RECURRENT MAJOR DEPRESSIVE DISORDER (HCC): Primary | ICD-10-CM

## 2021-03-29 PROCEDURE — 90837 PSYTX W PT 60 MINUTES: CPT | Performed by: COUNSELOR

## 2021-04-02 ENCOUNTER — HOSPITAL ENCOUNTER (OUTPATIENT)
Dept: SPEECH THERAPY | Facility: HOSPITAL | Age: 61
Setting detail: THERAPIES SERIES
Discharge: HOME | End: 2021-04-02

## 2021-04-02 DIAGNOSIS — R41.3 MEMORY LOSS: Primary | ICD-10-CM

## 2021-04-02 PROCEDURE — 92507 TX SP LANG VOICE COMM INDIV: CPT

## 2021-04-02 NOTE — THERAPY TREATMENT NOTE
Outpatient Speech Language Pathology   Adult Speech Language Cognitive Treatment Note  [facility]     Patient Name: Sandra Auguste  : 1960  MRN: [id number]  Today's Date: 2021         Visit Date: 2021   Patient Active Problem List   Diagnosis   • Abnormal computed tomography scan   • Anemia   • Post traumatic stress disorder (PTSD)   • Anxiety   • Strain of neck muscle   • Cough   • Diverticulitis of colon   • Fatigue   • Steatosis of liver   • Lateral epicondylitis   • Knee pain   • Spasm   • Adiposity   • Obstructive sleep apnea syndrome   • Osteoarthritis   • Palpitations   • Nausea   • Shortness of breath   • Skin tag   • Candidiasis of vagina   • Viral upper respiratory tract infection   • Gastroesophageal reflux disease   • Acute pain of left shoulder   • Abdominal distension (gaseous)   • SOB (shortness of breath) on exertion   • Sleep apnea with use of continuous positive airway pressure (CPAP)   • Seasonal allergies   • Obesity   • Migraine   • Mental disorder   • Diverticulosis   • Depression   • Arthritis   • Hypertension   • Itching   • Stabbing headache   • Temple tenderness   • TMJ click   • Mild intermittent asthma without complication   • Skull fracture (CMS/HCC)   • Type 2 diabetes mellitus without complication, without long-term current use of insulin (CMS/HCC)   • Health care maintenance   • Moderate episode of recurrent major depressive disorder (CMS/HCC)          Visit Dx:    ICD-10-CM ICD-9-CM   1. Memory loss  R41.3 780.93                         SLP OP Goals     Row Name 21 1300          Goal Type Needed    Goal Type Needed  Memory;Attention/Orientation;Other Adult Goals  -HG        Subjective Comments    Subjective Comments  Pt alert, cooperative, returned with folder and tablet. 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   -HG        Memory Goals    Patient will be able to remember information needed to return to work and function on work-related  tasks  100%:;with cues  -HG     Status: Patient will be able to remember information needed to return to work and function on work-related tasks  New  -HG     Patient will demonstrate improved ability to recall information by immediately recalling a series of words  90%:;unrelated;after delay  -HG     Status: Patient will demonstrate improved ability to recall information by immediately recalling a series of words  Progressing as expected  -HG     Comments: Patient will demonstrate improved ability to recall information by immediately recalling a series of words  4/2/21: Picture recall for 75% accurate.  3/12/21: Word Placement: Inclusion for 4 words and pt was 100% accurate. 5 words: pt was 80% accurate. One Pile Card game: pt was required mod cues in order to recall rules of the game.   -HG     Patient’s memory skills will be enhanced as reported by patient by utilizing internal memory strategies to recall up to 3 pieces of information after a 5- minute delay  90%:;with cues  -HG     Status: Patient’s memory skills will be enhanced as reported by patient by utilizing internal memory strategies to recall up to 3 pieces of information after a 5- minute delay  Progressing as expected  -HG     Comments: Patient’s memory skills will be enhanced as reported by patient by utilizing internal memory strategies to recall up to 3 pieces of information after a 5- minute delay  4/2/21: Paired pictures: pt was 9/10. 3/26/21: Delayed recall of 8 un-related words and pt was 8/8 x 3 with use of chaining. 3/19/21: Delayed recall of 7 un-related words: pt was % accurate. 3/16/21: Delayed recall of 6 un-related words: pt was 6/6; with increased delay, pt was 6/6 x 2.  3/12/21: Delayed recall of 4 un-related words: pt was 4/4 x 3. Delayed recall of 5 un-related words: pt was 5/5 x 3.   -HG     Patient’s memory skills will be enhanced as reported by patient by using external memory aides  90%:;with cues  -HG     Status: Patient’s  memory skills will be enhanced as reported by patient by using external memory aides  Progressing as expected  -HG     Comments: Patient’s memory skills will be enhanced as reported by patient by using external memory aides  3/16/21: Pt states that the past few days have been rough and she hasn't kept up with it very well. 3/12/21: Pt using daily journal per her report.   -HG        Attention/Orientation Goals    Patient will be able to execute all activities necessary to manage a home  Independently  -HG     Status: Patient will be able to execute all activities necessary to manage a home  Progressing as expected  -HG     Comments: Patient will be able to execute all activities necessary to manage a home  4/2/21: Had pt download the [employer] maría elena for FREE trials at this time. 3/16/21: Exec function exercise for attention, memory and thought organization and overall, pt was 95% accurate.   -HG     Patient will improve attention skills by sustaining consistent behavioral response during continuous and repetitive activity (e.g., listening for target words, auditory/reading comprehension tasks)  without cues;10 minute task  -HG     Status: Patient will improve attention skills by sustaining consistent behavioral response during continuous and repetitive activity (e.g., listening for target words, auditory/reading comprehension tasks)  Progressing as expected  -HG     Comments: Patient will improve attention skills by sustaining consistent behavioral response during continuous and repetitive activity (e.g., listening for target words, auditory/reading comprehension tasks)  3/19/21: One Pile Card Game: pt was 80% accurate.  3/12/21: Sustained attention for the game of One Pile and pt was 80% accurate.   -HG     Patient will improve attention skills by sustaining focus in order to actively hold and manipulate information provided (e.g., sequencing auditorily presented number series in ascending or descending order)   90%:;with cues  -HG     Status: Patient will improve attention skills by sustaining focus in order to actively hold and manipulate information provided (e.g., sequencing auditorily presented number series in ascending or descending order)  Progressing as expected  -HG     Comments: Patient will improve attention skills by sustaining focus in order to actively hold and manipulate information provided (e.g., sequencing auditorily presented number series in ascending or descending order)  3/16/21: Mental Manipulation for 3 words, alphabetical and reversal and pt was 100% accurate.   -HG     Patient will improve attention skills by focusing to selective target/task when presented with competing stimuli or in a distracting environment in order to complete task  90%:;with cues  -HG     Status: Patient will improve attention skills by focusing to selective target/task when presented with competing stimuli or in a distracting environment in order to complete task  Progressing as expected  -HG     Comments: Patient will improve attention skills by focusing to selective target/task when presented with competing stimuli or in a distracting environment in order to complete task  3/26/21: The game of [employer] and pt was 80% accurate. 3/12/21: Connecting the Dots- Alphabetical and pt was 90% accurate. Alternating attention task and pt was 95% accurate.   -HG     Patient will improve attention skills by alternating or shifting focus between two different tasks in order to complete both tasks  90%:;with cues  -HG     Status: Patient will improve attention skills by alternating or shifting focus between two different tasks in order to complete both tasks  Progressing as expected  -HG     Comments: Patient will improve attention skills by alternating or shifting focus between two different tasks in order to complete both tasks  3/26/21: Memory Matches and pt was 70-90% accurate with use of attention strategies. 3/19/21: Memory Matches:  pt was 80% accurate with min cues. 3/12/21: Divided attention for card sorting by  and by suit and pt was 85% accurate.   -HG        Other Goals    Other Adult Goal- 1  Pt will complete thought organization tasks with at least 90% accurate with cues.  -HG     Status: Other Adult Goal- 1  Progressing as expected  -HG     Comments: Other Adult Goal- 1  3/12/21: Divergent naming: pt was concrete: 15/15, abstract: 15/15 with mod cues.    -HG     Other Adult Goal- 2  Pt will participate in further reasoning and problem solving evaluation with goals to follow as indicated.  -HG     Status: Other Adult Goal- 2  Progressing as expected  -HG     Comments: Other Adult Goal- 2  21: Deductive Reasoning puzzle and pt was 100% accurate. 3/26/21: Reasoning puzzle: Pt was 90% accurate with min cues.   -HG     Other Adult Goal- 3  Pt will improve RBANS Score to at least a 111 placing pt in the high average range.   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   -HG      User Key  (r) = Recorded By, (t) = Taken By, (c) = Cosigned By    Initials Name Effective Dates    Sarita Bliss MS CCC-SLP 20 -           OP SLP Assessment/Plan - 21 1300        SLP Plan    Plan Comments  Cont with Cog tx.    -HG      User Key  (r) = Recorded By, (t) = Taken By, (c) = Cosigned By    Initials Name Provider Type    Sarita Bliss MS CCC-SLP Speech and Language Pathologist                 Time Calculation:   SLP Start Time: 1300    Therapy Charges for Today     Code Description Service Date Service Provider Modifiers Qty    [id number]  ST TREATMENT SPEECH 3  4/2/2021 Sarita Preston, MS CCC-SLP GN 1                   Sarita Preston, MS CCC-SLP  4/2/2021

## 2021-04-06 ENCOUNTER — HOSPITAL ENCOUNTER (OUTPATIENT)
Dept: SPEECH THERAPY | Facility: HOSPITAL | Age: 61
Setting detail: THERAPIES SERIES
Discharge: HOME | End: 2021-04-06

## 2021-04-06 DIAGNOSIS — R41.3 MEMORY LOSS: Primary | ICD-10-CM

## 2021-04-06 PROCEDURE — 92507 TX SP LANG VOICE COMM INDIV: CPT

## 2021-04-06 NOTE — THERAPY PROGRESS REPORT/RE-CERT
Outpatient Speech Language Pathology   Adult Speech Language Cognitive Progress Note  [facility]     Patient Name: Sandra Auguste  : 1960  MRN: [id number]  Today's Date: 2021         Visit Date: 2021   Patient Active Problem List   Diagnosis   • Abnormal computed tomography scan   • Anemia   • Post traumatic stress disorder (PTSD)   • Anxiety   • Strain of neck muscle   • Cough   • Diverticulitis of colon   • Fatigue   • Steatosis of liver   • Lateral epicondylitis   • Knee pain   • Spasm   • Adiposity   • Obstructive sleep apnea syndrome   • Osteoarthritis   • Palpitations   • Nausea   • Shortness of breath   • Skin tag   • Candidiasis of vagina   • Viral upper respiratory tract infection   • Gastroesophageal reflux disease   • Acute pain of left shoulder   • Abdominal distension (gaseous)   • SOB (shortness of breath) on exertion   • Sleep apnea with use of continuous positive airway pressure (CPAP)   • Seasonal allergies   • Obesity   • Migraine   • Mental disorder   • Diverticulosis   • Depression   • Arthritis   • Hypertension   • Itching   • Stabbing headache   • Temple tenderness   • TMJ click   • Mild intermittent asthma without complication   • Skull fracture (CMS/HCC)   • Type 2 diabetes mellitus without complication, without long-term current use of insulin (CMS/HCC)   • Health care maintenance   • Moderate episode of recurrent major depressive disorder (CMS/HCC)          Visit Dx:    ICD-10-CM ICD-9-CM   1. Memory loss  R41.3 780.93                         SLP OP Goals     Row Name 21 1300          Goal Type Needed    Goal Type Needed  Memory;Attention/Orientation;Other Adult Goals  -HG        Subjective Comments    Subjective Comments  Pt alert, cooperative, returned with tablet.   -HG        Memory Goals    Patient will be able to remember information needed to return to work and function on work-related tasks  100%:;with cues  -HG     Status: Patient will be able to remember  information needed to return to work and function on work-related tasks  New  -HG     Patient will demonstrate improved ability to recall information by immediately recalling a series of words  90%:;unrelated;after delay  -HG     Status: Patient will demonstrate improved ability to recall information by immediately recalling a series of words  Progressing as expected  -HG     Comments: Patient will demonstrate improved ability to recall information by immediately recalling a series of words  4/6/21: Immediate recall for ABC game pt was 90% accurate. 4/2/21: Picture recall for 75% accurate.  3/12/21: Word Placement: Inclusion for 4 words and pt was 100% accurate. 5 words: pt was 80% accurate. One Pile Card game: pt was required mod cues in order to recall rules of the game.   -HG     Patient’s memory skills will be enhanced as reported by patient by utilizing internal memory strategies to recall up to 3 pieces of information after a 5- minute delay  90%:;with cues  -HG     Status: Patient’s memory skills will be enhanced as reported by patient by utilizing internal memory strategies to recall up to 3 pieces of information after a 5- minute delay  Progressing as expected  -HG     Comments: Patient’s memory skills will be enhanced as reported by patient by utilizing internal memory strategies to recall up to 3 pieces of information after a 5- minute delay  4/2/21: Paired pictures: pt was 9/10. 3/26/21: Delayed recall of 8 un-related words and pt was 8/8 x 3 with use of chaining. 3/19/21: Delayed recall of 7 un-related words: pt was % accurate. 3/16/21: Delayed recall of 6 un-related words: pt was 6/6; with increased delay, pt was 6/6 x 2.  3/12/21: Delayed recall of 4 un-related words: pt was 4/4 x 3. Delayed recall of 5 un-related words: pt was 5/5 x 3.   -HG     Patient’s memory skills will be enhanced as reported by patient by using external memory aides  90%:;with cues  -HG     Status: Patient’s memory skills  will be enhanced as reported by patient by using external memory aides  Progressing as expected  -HG     Comments: Patient’s memory skills will be enhanced as reported by patient by using external memory aides  3/16/21: Pt states that the past few days have been rough and she hasn't kept up with it very well. 3/12/21: Pt using daily journal per her report.   -HG        Attention/Orientation Goals    Patient will be able to execute all activities necessary to manage a home  Independently  -HG     Status: Patient will be able to execute all activities necessary to manage a home  Progressing as expected  -HG     Comments: Patient will be able to execute all activities necessary to manage a home  4/2/21: Had pt download the [email or web address] maría elena for FREE trials at this time. 3/16/21: Exec function exercise for attention, memory and thought organization and overall, pt was 95% accurate.   -HG     Patient will improve attention skills by sustaining consistent behavioral response during continuous and repetitive activity (e.g., listening for target words, auditory/reading comprehension tasks)  without cues;10 minute task  -HG     Status: Patient will improve attention skills by sustaining consistent behavioral response during continuous and repetitive activity (e.g., listening for target words, auditory/reading comprehension tasks)  Progressing as expected  -HG     Comments: Patient will improve attention skills by sustaining consistent behavioral response during continuous and repetitive activity (e.g., listening for target words, auditory/reading comprehension tasks)  3/19/21: One Pile Card Game: pt was 80% accurate.  3/12/21: Sustained attention for the game of One Pile and pt was 80% accurate.   -HG     Patient will improve attention skills by sustaining focus in order to actively hold and manipulate information provided (e.g., sequencing auditorily presented number series in ascending or descending order)  90%:;with cues  -HG      Status: Patient will improve attention skills by sustaining focus in order to actively hold and manipulate information provided (e.g., sequencing auditorily presented number series in ascending or descending order)  Progressing as expected  -HG     Comments: Patient will improve attention skills by sustaining focus in order to actively hold and manipulate information provided (e.g., sequencing auditorily presented number series in ascending or descending order)  4/6/21: sustained attention for new card game and pt was 95% accurate.  3/16/21: Mental Manipulation for 3 words, alphabetical and reversal and pt was 100% accurate.   -HG     Patient will improve attention skills by focusing to selective target/task when presented with competing stimuli or in a distracting environment in order to complete task  90%:;with cues  -HG     Status: Patient will improve attention skills by focusing to selective target/task when presented with competing stimuli or in a distracting environment in order to complete task  Progressing as expected  -HG     Comments: Patient will improve attention skills by focusing to selective target/task when presented with competing stimuli or in a distracting environment in order to complete task  3/26/21: The game of [employer] and pt was 80% accurate. 3/12/21: Connecting the Dots- Alphabetical and pt was 90% accurate. Alternating attention task and pt was 95% accurate.   -HG     Patient will improve attention skills by alternating or shifting focus between two different tasks in order to complete both tasks  90%:;with cues  -HG     Status: Patient will improve attention skills by alternating or shifting focus between two different tasks in order to complete both tasks  Progressing as expected  -HG     Comments: Patient will improve attention skills by alternating or shifting focus between two different tasks in order to complete both tasks  3/26/21: Memory Matches and pt was 70-90% accurate with  use of attention strategies. 3/19/21: Memory Matches: pt was 80% accurate with min cues. 3/12/21: Divided attention for card sorting by  and by suit and pt was 85% accurate.   -HG        Other Goals    Other Adult Goal- 1  Pt will complete thought organization tasks with at least 90% accurate with cues.  -HG     Status: Other Adult Goal- 1  Progressing as expected  -HG     Comments: Other Adult Goal- 1  21: Abstract category naming: pt was 90% accurate.  3/12/21: Divergent naming: pt was concrete: 1515, abstract: 1515 with mod cues.    -HG     Other Adult Goal- 2  Pt will participate in further reasoning and problem solving evaluation with goals to follow as indicated.  -HG     Status: Other Adult Goal- 2  Progressing as expected  -HG     Comments: Other Adult Goal- 2  21: Deductive Reasoning puzzle and pt was 100% accurate. 3/26/21: Reasoning puzzle: Pt was 90% accurate with min cues.   -HG     Other Adult Goal- 3  Pt will improve RBANS Score to at least a 111 placing pt in the high average range.   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   -HG      User Key  (r) = Recorded By, (t) = Taken By, (c) = Cosigned By    Initials Name Effective Dates    Sarita Bliss MS CCC-SLP 20 -           OP SLP Assessment/Plan - 21 1300        SLP Assessment    Functional Problems  Speech Language- Adult/Cognition   -HG    Impact on Function: Adult Speech Language/Cognition  Trouble learning or remembering new information;Poor attention to task   -HG    Clinical Impression: Speech Language-Adult/Congnition  Minimal:;Mild:;Cognitive  Communication Impairment   -HG    Clinical Impression Comments  RBANS re-assessment not given this date.   -HG    Please refer to paper survey for additional self-reported information  No   -HG    Please refer to items scanned into chart for additional diagnostic informaiton and handouts as provided by clinician  No   -HG       SLP Plan    Frequency  1-2x/week   -HG    Duration  8 weeks   -HG    Plan Comments  Cont with Cog tx.    -HG      User Key  (r) = Recorded By, (t) = Taken By, (c) = Cosigned By    Initials Name Provider Type     Sarita Preston MS CCC-SLP Speech and Language Pathologist                 Time Calculation:   SLP Start Time: 1300    Therapy Charges for Today     Code Description Service Date Service Provider Modifiers Qty    [id number] HC ST TREATMENT SPEECH 4 4/6/2021 Sarita Preston MS CCC-SLP GN 1                   Sarita Preston MS CCC-SLP  4/6/2021

## 2021-04-07 ENCOUNTER — IMMUNIZATION (OUTPATIENT)
Dept: VACCINE CLINIC | Facility: HOSPITAL | Age: 61
End: 2021-04-07

## 2021-04-07 PROCEDURE — 0002A: CPT | Performed by: INTERNAL MEDICINE

## 2021-04-07 PROCEDURE — 91300 HC SARSCOV02 VAC 30MCG/0.3ML IM: CPT | Performed by: INTERNAL MEDICINE

## 2021-04-09 ENCOUNTER — APPOINTMENT (OUTPATIENT)
Dept: SPEECH THERAPY | Facility: HOSPITAL | Age: 61
End: 2021-04-09

## 2021-04-13 ENCOUNTER — HOSPITAL ENCOUNTER (OUTPATIENT)
Dept: SPEECH THERAPY | Facility: HOSPITAL | Age: 61
Setting detail: THERAPIES SERIES
Discharge: HOME | End: 2021-04-13

## 2021-04-13 ENCOUNTER — TELEPHONE (OUTPATIENT)
Dept: NEUROLOGY | Facility: CLINIC | Age: 61
End: 2021-04-13

## 2021-04-13 DIAGNOSIS — R41.3 MEMORY LOSS: Primary | ICD-10-CM

## 2021-04-13 PROCEDURE — 92507 TX SP LANG VOICE COMM INDIV: CPT

## 2021-04-13 NOTE — TELEPHONE ENCOUNTER
Patient had speech therapy relay a message to us wanting a call back. Said she wants explanation on her MRI because it doesn't seem normal and would like appt before her august appt. Looks like this discussion was had 3/24 but relaying message for additionall follow up.

## 2021-04-13 NOTE — THERAPY TREATMENT NOTE
Outpatient Speech Language Pathology   Adult Speech Language Cognitive Treatment Note  [facility]     Patient Name: Sandra Auguste  : 1960  MRN: [id number]  Today's Date: 2021         Visit Date: 2021   Patient Active Problem List   Diagnosis   • Abnormal computed tomography scan   • Anemia   • Post traumatic stress disorder (PTSD)   • Anxiety   • Strain of neck muscle   • Cough   • Diverticulitis of colon   • Fatigue   • Steatosis of liver   • Lateral epicondylitis   • Knee pain   • Spasm   • Adiposity   • Obstructive sleep apnea syndrome   • Osteoarthritis   • Palpitations   • Nausea   • Shortness of breath   • Skin tag   • Candidiasis of vagina   • Viral upper respiratory tract infection   • Gastroesophageal reflux disease   • Acute pain of left shoulder   • Abdominal distension (gaseous)   • SOB (shortness of breath) on exertion   • Sleep apnea with use of continuous positive airway pressure (CPAP)   • Seasonal allergies   • Obesity   • Migraine   • Mental disorder   • Diverticulosis   • Depression   • Arthritis   • Hypertension   • Itching   • Stabbing headache   • Temple tenderness   • TMJ click   • Mild intermittent asthma without complication   • Skull fracture (CMS/HCC)   • Type 2 diabetes mellitus without complication, without long-term current use of insulin (CMS/HCC)   • Health care maintenance   • Moderate episode of recurrent major depressive disorder (CMS/HCC)          Visit Dx:    ICD-10-CM ICD-9-CM   1. Memory loss  R41.3 780.93                         SLP OP Goals     Row Name 21 1300          Goal Type Needed    Goal Type Needed  Memory;Attention/Orientation;Other Adult Goals  (Pended)   -LB        Subjective Comments    Subjective Comments  Pt alert and cooperative.  (Pended)   -LB        Memory Goals    Patient will be able to remember information needed to return to work and function on work-related tasks  100%:;with cues  (Pended)   -LB     Status: Patient will be  able to remember information needed to return to work and function on work-related tasks  New  (Pended)   -LB     Patient will demonstrate improved ability to recall information by immediately recalling a series of words  90%:;unrelated;after delay  (Pended)   -LB     Status: Patient will demonstrate improved ability to recall information by immediately recalling a series of words  Progressing as expected  (Pended)   -LB     Comments: Patient will demonstrate improved ability to recall information by immediately recalling a series of words  4/6/21: Immediate recall for ABC game pt was 90% accurate. 4/2/21: Picture recall for 75% accurate.  3/12/21: Word Placement: Inclusion for 4 words and pt was 100% accurate. 5 words: pt was 80% accurate. One Pile Card game: pt was required mod cues in order to recall rules of the game.   (Pended)   -LB     Patient’s memory skills will be enhanced as reported by patient by utilizing internal memory strategies to recall up to 3 pieces of information after a 5- minute delay  90%:;with cues  (Pended)   -LB     Status: Patient’s memory skills will be enhanced as reported by patient by utilizing internal memory strategies to recall up to 3 pieces of information after a 5- minute delay  Progressing as expected  (Pended)   -LB     Comments: Patient’s memory skills will be enhanced as reported by patient by utilizing internal memory strategies to recall up to 3 pieces of information after a 5- minute delay  4/13/21: 4/2/21: Paired pictures: pt was 9/10. 3/26/21: Delayed recall of 8 un-related words and pt was 8/8 x 3 with use of chaining. 3/19/21: Delayed recall of 7 un-related words: pt was % accurate. 3/16/21: Delayed recall of 6 un-related words: pt was 6/6; with increased delay, pt was 6/6 x 2.  3/12/21: Delayed recall of 4 un-related words: pt was 4/4 x 3. Delayed recall of 5 un-related words: pt was 5/5 x 3.   (Pended)   -LB     Patient’s memory skills will be enhanced as  reported by patient by using external memory aides  90%:;with cues  (Pended)   -LB     Status: Patient’s memory skills will be enhanced as reported by patient by using external memory aides  Progressing as expected  (Pended)   -LB     Comments: Patient’s memory skills will be enhanced as reported by patient by using external memory aides  4/13/21: Pt report that journaling has been hard to keep up with due to mothers declining health status. 3/16/21: Pt states that the past few days have been rough and she hasn't kept up with it very well. 3/12/21: Pt using daily journal per her report.   (Pended)   -LB        Attention/Orientation Goals    Patient will be able to execute all activities necessary to manage a home  Independently  (Pended)   -LB     Status: Patient will be able to execute all activities necessary to manage a home  Progressing as expected  (Pended)   -LB     Comments: Patient will be able to execute all activities necessary to manage a home  4/13/21: Pt reports she has been using the Take the Interview maría elena but has slowed down how often she has been doing it. 4/2/21: Had pt download the Take the Interview maría elena for FREE trials at this time. 3/16/21: Exec function exercise for attention, memory and thought organization and overall, pt was 95% accurate.   (Pended)   -LB     Patient will improve attention skills by sustaining consistent behavioral response during continuous and repetitive activity (e.g., listening for target words, auditory/reading comprehension tasks)  without cues;10 minute task  (Pended)   -LB     Status: Patient will improve attention skills by sustaining consistent behavioral response during continuous and repetitive activity (e.g., listening for target words, auditory/reading comprehension tasks)  Progressing as expected  (Pended)   -LB     Comments: Patient will improve attention skills by sustaining consistent behavioral response during continuous and repetitive activity (e.g., listening for target  words, auditory/reading comprehension tasks)  4/13/21: Pt played ERS card game. Pt played cards and recalled rules of the game with 90% accuracy. 3/19/21: One Pile Card Game: pt was 80% accurate.  3/12/21: Sustained attention for the game of One Pile and pt was 80% accurate.   (Pended)   -LB     Patient will improve attention skills by sustaining focus in order to actively hold and manipulate information provided (e.g., sequencing auditorily presented number series in ascending or descending order)  90%:;with cues  (Pended)   -LB     Status: Patient will improve attention skills by sustaining focus in order to actively hold and manipulate information provided (e.g., sequencing auditorily presented number series in ascending or descending order)  Progressing as expected  (Pended)   -LB     Comments: Patient will improve attention skills by sustaining focus in order to actively hold and manipulate information provided (e.g., sequencing auditorily presented number series in ascending or descending order)  4/6/21: sustained attention for new card game and pt was 95% accurate.  3/16/21: Mental Manipulation for 3 words, alphabetical and reversal and pt was 100% accurate.   (Pended)   -LB     Patient will improve attention skills by focusing to selective target/task when presented with competing stimuli or in a distracting environment in order to complete task  90%:;with cues  (Pended)   -LB     Status: Patient will improve attention skills by focusing to selective target/task when presented with competing stimuli or in a distracting environment in order to complete task  Progressing as expected  (Pended)   -LB     Comments: Patient will improve attention skills by focusing to selective target/task when presented with competing stimuli or in a distracting environment in order to complete task  4/13/21: Pt played ERS which required she play cards while changing what she played based on what the SLP played. 3/26/21: The game  of Sudoku and pt was 80% accurate. 3/12/21: Connecting the Dots- Alphabetical and pt was 90% accurate. Alternating attention task and pt was 95% accurate.   (Pended)   -LB     Patient will improve attention skills by alternating or shifting focus between two different tasks in order to complete both tasks  90%:;with cues  (Pended)   -LB     Status: Patient will improve attention skills by alternating or shifting focus between two different tasks in order to complete both tasks  Progressing as expected  (Pended)   -LB     Comments: Patient will improve attention skills by alternating or shifting focus between two different tasks in order to complete both tasks  21: Pt played ERS while also maintaining conversation and answering questions about the game. 3/26/21: Memory Matches and pt was 70-90% accurate with use of attention strategies. 3/19/21: Memory Matches: pt was 80% accurate with min cues. 3/12/21: Divided attention for card sorting by  and by suit and pt was 85% accurate.   (Pended)   -LB        Other Goals    Other Adult Goal- 1  Pt will complete thought organization tasks with at least 90% accurate with cues.  (Pended)   -LB     Status: Other Adult Goal- 1  Progressing as expected  (Pended)   -LB     Comments: Other Adult Goal- 1  21: Abstract category naming: pt was 90% accurate.  3/12/21: Divergent naming: pt was concrete: 15/15, abstract: 15/15 with mod cues.    (Pended)   -LB     Other Adult Goal- 2  Pt will participate in further reasoning and problem solving evaluation with goals to follow as indicated.  (Pended)   -LB     Status: Other Adult Goal- 2  Progressing as expected  (Pended)   -LB     Comments: Other Adult Goal- 2  21: Deductive Reasoning puzzle and pt was 100% accurate. 3/26/21: Reasoning puzzle: Pt was 90% accurate with min cues.   (Pended)   -LB     Other Adult Goal- 3  Pt will improve RBANS Score to at least a 111 placing pt in the high average range.   (Pended)   -LB      Status: Other Adult Goal- 3  New  (Pended)   -LB        SLP Time Calculation    SLP Goal Re-Cert Due Date  05/06/21  (Pended)   -LB       User Key  (r) = Recorded By, (t) = Taken By, (c) = Cosigned By    Initials Name Provider Type    Martin Noland Speech Therapy Student Speech Therapy Student          OP SLP Education     Row Name 04/13/21 1300       Education    Education Comments  Hmwk for cont'd work with [employer] and journaling.  (Pended)   -LB      User Key  (r) = Recorded By, (t) = Taken By, (c) = Cosigned By    Initials Name Effective Dates    Martin Noland Speech Therapy Student 03/09/21 -           OP SLP Assessment/Plan - 04/13/21 1300        SLP Plan    Plan Comments  Cont with cog tx  (Pended)    -LB      User Key  (r) = Recorded By, (t) = Taken By, (c) = Cosigned By    Initials Name Provider Type    Martin Noland Speech Therapy Student Speech Therapy Student                 Time Calculation:   SLP Start Time: (P) 1300    Therapy Charges for Today     Code Description Service Date Service Provider Modifiers Qty    [id number]  ST TREATMENT SPEECH 4 4/13/2021 Martin Alamo, Speech Therapy Student GN 1                   Martin Alamo Speech Therapy Student  4/13/2021

## 2021-04-14 NOTE — TELEPHONE ENCOUNTER
It showed evidence of chronic small vessel ischemic changes. It looked mild on my review. This is very common as we all age and represents plaque build up in the small vessels of the brain. Conditions like diabetes, high blood pressure and high cholesterol can contribute as well. It is nonspecific and likely not contributing to her symptoms. Otherwise, everything looked good. Hope that helps. Thanks.

## 2021-04-15 ENCOUNTER — TELEMEDICINE (OUTPATIENT)
Dept: PSYCHIATRY | Facility: CLINIC | Age: 61
End: 2021-04-15

## 2021-04-15 DIAGNOSIS — F33.1 MODERATE EPISODE OF RECURRENT MAJOR DEPRESSIVE DISORDER (HCC): Primary | ICD-10-CM

## 2021-04-15 PROCEDURE — 90834 PSYTX W PT 45 MINUTES: CPT | Performed by: COUNSELOR

## 2021-04-15 NOTE — PROGRESS NOTES
Date: April 16, 2021  Time In: 4:12 PM  Time Out: 5:05 PM  This provider is located at the [facility] [address], [place] [address] using a secure [employer] Video Visit through [employer]. Patient is being seen remotely via telehealth at home address in [place] and stated they are in a secure environment for this session. The patient's condition being diagnosed/treated is appropriate for telemedicine. The provider identified herself as well as her credentials. The patient, and/or patients guardian, consent to be seen remotely, and when consent is given they understand that the consent allows for patient identifiable information to be sent to a third party as needed. They may refuse to be seen remotely at any time. The electronic data is encrypted and password protected, and the patient and/or guardian has been advised of the potential risks to privacy not withstanding such measures.     You have chosen to receive care through a telehealth visit.  Do you consent to use a video/audio connection for your medical care today? Yes    PROGRESS NOTE  Data:  Sandra Auguste is a 60 y.o. female who presents today for follow up. Patient is feeling increased stress with caring for her mother and is finding it hard to keep her mother calm and safe which is increasing the patient's feelings of anxiety and depression. Patient is having stomach trouble that she believes is caused by her feelings of anxiety. Patient states that she is ignored by her siblings when she tries to tell them that their mother is getting worse. Patient is also concerned about what will happen to her when her mother has to be placed in a long term care facility or passes away as the patient lives with her mother and doesn't have a car of her own. Patient reports having a breakdown in the middle of the road while on a walk with her mother due to her mother not wanting to return home and the  patient not being able to change her mother's mind.     Chief Complaint: Worry about mother, increased anxiety, feelings of panic.    History of Present Illness: patient has struggled with feelings of anxiety and depression since childhood      Clinical Maneuvering/Intervention: CBT to address increased anxiety and feelings of panic in relation to caring for her elderly mother who suffers from dementia.    (Scales based on 0 - 10 with 10 being the worst)  Depression: 8 Anxiety: 10       Assisted patient in processing above session content; acknowledged and normalized patient’s thoughts, feelings, and concerns in regard to her mother's declining health status and not feeling heard by her siblings in regard to the idea that their mother needs more care than the patient can provide. Client is struggling with the thought of what will happen to her as she resides with her mother and doesn't have a home or vehicle of her own. Rationalized patient thought process regarding getting assistance from other family members to help her discuss her mother's care needs with her family.  Discussed triggers associated with patient's anxiety and the physical symptoms of stomach pain that the patient is experiencing.  Also discussed coping skills for patient to implement such as relaxation, deep breathing and scheduling more time for herself if a caregiver can be found for her mother.    Allowed patient to freely discuss issues without interruption or judgment. Provided safe, confidential environment to facilitate the development of positive therapeutic relationship and encourage open, honest communication. Assisted patient in identifying risk factors which would indicate the need for higher level of care including thoughts to harm self or others and/or self-harming behavior and encouraged patient to contact this office, call 911, or present to the nearest emergency room should any of these events occur. Discussed crisis intervention  services and means to access. Patient adamantly and convincingly denies current suicidal or homicidal ideation or perceptual disturbance.    Assessment:   Assessment   Patient verbalizes more feelings of anxiety at this time due to her mother's declining health.  Patient struggles with feelings of anxiety and depression and feeling unheard by her family. Patient is worried about her mother falling and being personally blamed if anything should happen to her mother despite the need for the patient to have more help that she says that she has asked her family for. These issues continue to cause impairment in important areas of functioning.  A result, they can be reasonably expected to continue to benefit from treatment and would likely be at increased risk for decompensation otherwise.    Mental Status Exam:   Hygiene:   good  Cooperation:  Cooperative  Eye Contact:  Good  Psychomotor Behavior:  Appropriate  Affect:  Blunted  Mood: depressed  Speech:  Normal  Thought Process:  Circum  Thought Content:  Mood congruent  Suicidal:  None  Homicidal:  None  Hallucinations:  None  Delusion:  None  Memory:  Deficits  Orientation:  Person, Place, Time and Situation  Reliability:  fair  Insight:  Fair  Judgement:  Fair  Impulse Control:  Fair  Physical/Medical Issues:  Yes stomach aches from anxiety       Patient's Support Network Includes:  children    Functional Status: Moderate impairment     Progress toward goal: Not at goal    Prognosis: Guarded with Ongoing Treatment         Plan:    Patient will continue in individual outpatient therapy with focus on improved functioning and coping skills, maintaining stability, and avoiding decompensation and the need for higher level of care.    Patient will adhere to medication regimen as prescribed and report any side effects. Patient will contact this office, call 911 or present to the nearest emergency room should suicidal or homicidal ideations occur. Provide Cognitive Behavioral  Therapy and Solution Focused Therapy to improve functioning, maintain stability, and avoid decompensation and the need for higher level of care.     Return in about 4 weeks, or earlier if symptoms worsen or fail to improve.           VISIT DIAGNOSIS:     ICD-10-CM ICD-9-CM   1. Moderate episode of recurrent major depressive disorder (CMS/[facility])  F33.1 296.32             This document has been electronically signed by ASIA Rich  April 16, 2021 11:51 EDT      Part of this note may be an electronic transcription/translation of spoken language to printed text using the Dragon Dictation System.

## 2021-04-16 ENCOUNTER — HOSPITAL ENCOUNTER (OUTPATIENT)
Dept: SPEECH THERAPY | Facility: HOSPITAL | Age: 61
Setting detail: THERAPIES SERIES
Discharge: HOME | End: 2021-04-16

## 2021-04-16 DIAGNOSIS — R41.3 MEMORY LOSS: Primary | ICD-10-CM

## 2021-04-16 PROCEDURE — 92507 TX SP LANG VOICE COMM INDIV: CPT

## 2021-04-16 NOTE — THERAPY TREATMENT NOTE
Outpatient Speech Language Pathology   Adult Speech Language Cognitive Treatment Note  [facility]     Patient Name: Sandra Auguste  : 1960  MRN: [id number]  Today's Date: 2021         Visit Date: 2021   Patient Active Problem List   Diagnosis   • Abnormal computed tomography scan   • Anemia   • Post traumatic stress disorder (PTSD)   • Anxiety   • Strain of neck muscle   • Cough   • Diverticulitis of colon   • Fatigue   • Steatosis of liver   • Lateral epicondylitis   • Knee pain   • Spasm   • Adiposity   • Obstructive sleep apnea syndrome   • Osteoarthritis   • Palpitations   • Nausea   • Shortness of breath   • Skin tag   • Candidiasis of vagina   • Viral upper respiratory tract infection   • Gastroesophageal reflux disease   • Acute pain of left shoulder   • Abdominal distension (gaseous)   • SOB (shortness of breath) on exertion   • Sleep apnea with use of continuous positive airway pressure (CPAP)   • Seasonal allergies   • Obesity   • Migraine   • Mental disorder   • Diverticulosis   • Depression   • Arthritis   • Hypertension   • Itching   • Stabbing headache   • Temple tenderness   • TMJ click   • Mild intermittent asthma without complication   • Skull fracture (CMS/HCC)   • Type 2 diabetes mellitus without complication, without long-term current use of insulin (CMS/HCC)   • Health care maintenance   • Moderate episode of recurrent major depressive disorder (CMS/HCC)          Visit Dx:    ICD-10-CM ICD-9-CM   1. Memory loss  R41.3 780.93                         SLP OP Goals     Row Name 21 1300          Goal Type Needed    Goal Type Needed  Memory;Attention/Orientation;Other Adult Goals  (Pended)   -LB        Subjective Comments    Subjective Comments  Pt alert and cooperative. Pt worried about taking care of mother with dementia.  (Pended)   -LB        Memory Goals    Patient will be able to remember information needed to return to work and function on work-related tasks   100%:;with cues  (Pended)   -LB     Status: Patient will be able to remember information needed to return to work and function on work-related tasks  New  (Pended)   -LB     Patient will demonstrate improved ability to recall information by immediately recalling a series of words  90%:;unrelated;after delay  (Pended)   -LB     Status: Patient will demonstrate improved ability to recall information by immediately recalling a series of words  Progressing as expected  (Pended)   -LB     Comments: Patient will demonstrate improved ability to recall information by immediately recalling a series of words  4/16/21: Immediate memory of 3 4/6/21: Immediate recall for ABC game pt was 90% accurate. 4/2/21: Picture recall for 75% accurate.  3/12/21: Word Placement: Inclusion for 4 words and pt was 100% accurate. 5 words: pt was 80% accurate. One Pile Card game: pt was required mod cues in order to recall rules of the game.   (Pended)   -LB     Patient’s memory skills will be enhanced as reported by patient by utilizing internal memory strategies to recall up to 3 pieces of information after a 5- minute delay  90%:;with cues  (Pended)   -LB     Status: Patient’s memory skills will be enhanced as reported by patient by utilizing internal memory strategies to recall up to 3 pieces of information after a 5- minute delay  Progressing as expected  (Pended)   -LB     Comments: Patient’s memory skills will be enhanced as reported by patient by utilizing internal memory strategies to recall up to 3 pieces of information after a 5- minute delay  4/2/21: Paired pictures: pt was 9/10. 3/26/21: Delayed recall of 8 un-related words and pt was 8/8 x 3 with use of chaining. 3/19/21: Delayed recall of 7 un-related words: pt was % accurate. 3/16/21: Delayed recall of 6 un-related words: pt was 6/6; with increased delay, pt was 6/6 x 2.  3/12/21: Delayed recall of 4 un-related words: pt was 4/4 x 3. Delayed recall of 5 un-related words: pt  was 5/5 x 3.   (Pended)   -LB     Patient’s memory skills will be enhanced as reported by patient by using external memory aides  90%:;with cues  (Pended)   -LB     Status: Patient’s memory skills will be enhanced as reported by patient by using external memory aides  Progressing as expected  (Pended)   -LB     Comments: Patient’s memory skills will be enhanced as reported by patient by using external memory aides  4/16/21: Pt noted she is using her phone often to make sure she is not forgetting daily tasks. 3/16/21: Pt states that the past few days have been rough and she hasn't kept up with it very well. 3/12/21: Pt using daily journal per her report.   (Pended)   -LB        Attention/Orientation Goals    Patient will be able to execute all activities necessary to manage a home  Independently  (Pended)   -LB     Status: Patient will be able to execute all activities necessary to manage a home  Progressing as expected  (Pended)   -LB     Comments: Patient will be able to execute all activities necessary to manage a home  4/16/21: Pt has noted she is having difficulties with taking care of mother but is completing ADL's. Pt notes she is not doing as much as she would like to do. 4/2/21: Had pt download the [employer] maría elena for FREE trials at this time. 3/16/21: Exec function exercise for attention, memory and thought organization and overall, pt was 95% accurate.   (Pended)   -LB     Patient will improve attention skills by sustaining consistent behavioral response during continuous and repetitive activity (e.g., listening for target words, auditory/reading comprehension tasks)  without cues;10 minute task  (Pended)   -LB     Status: Patient will improve attention skills by sustaining consistent behavioral response during continuous and repetitive activity (e.g., listening for target words, auditory/reading comprehension tasks)  Progressing as expected  (Pended)   -LB     Comments: Patient will improve attention skills  by sustaining consistent behavioral response during continuous and repetitive activity (e.g., listening for target words, auditory/reading comprehension tasks)  4/16/21: Scoreboard card game and pt was 100% accurate. 3/19/21: One Pile Card Game: pt was 80% accurate.  3/12/21: Sustained attention for the game of One Pile and pt was 80% accurate.   (Pended)   -LB     Patient will improve attention skills by sustaining focus in order to actively hold and manipulate information provided (e.g., sequencing auditorily presented number series in ascending or descending order)  90%:;with cues  (Pended)   -LB     Status: Patient will improve attention skills by sustaining focus in order to actively hold and manipulate information provided (e.g., sequencing auditorily presented number series in ascending or descending order)  Progressing as expected  (Pended)   -LB     Comments: Patient will improve attention skills by sustaining focus in order to actively hold and manipulate information provided (e.g., sequencing auditorily presented number series in ascending or descending order)  4/6/21: sustained attention for new card game and pt was 95% accurate.  3/16/21: Mental Manipulation for 3 words, alphabetical and reversal and pt was 100% accurate.   (Pended)   -LB     Patient will improve attention skills by focusing to selective target/task when presented with competing stimuli or in a distracting environment in order to complete task  90%:;with cues  (Pended)   -LB     Status: Patient will improve attention skills by focusing to selective target/task when presented with competing stimuli or in a distracting environment in order to complete task  Progressing as expected  (Pended)   -LB     Comments: Patient will improve attention skills by focusing to selective target/task when presented with competing stimuli or in a distracting environment in order to complete task  3/26/21: The game of [employer] and pt was 80% accurate.  3/12/21: Connecting the Dots- Alphabetical and pt was 90% accurate. Alternating attention task and pt was 95% accurate.   (Pended)   -LB     Patient will improve attention skills by alternating or shifting focus between two different tasks in order to complete both tasks  90%:;with cues  (Pended)   -LB     Status: Patient will improve attention skills by alternating or shifting focus between two different tasks in order to complete both tasks  Progressing as expected  (Pended)   -LB     Comments: Patient will improve attention skills by alternating or shifting focus between two different tasks in order to complete both tasks  21: Alternating attention card game where pt had to add 2 cards then switch between adding 2 cards and naming the first letter of each numbered card. Pt was 100% accurate. 3/26/21: Memory Matches and pt was 70-90% accurate with use of attention strategies. 3/19/21: Memory Matches: pt was 80% accurate with min cues. 3/12/21: Divided attention for card sorting by  and by suit and pt was 85% accurate.   (Pended)   -LB        Other Goals    Other Adult Goal- 1  Pt will complete thought organization tasks with at least 90% accurate with cues.  (Pended)   -LB     Status: Other Adult Goal- 1  Progressing as expected  (Pended)   -LB     Comments: Other Adult Goal- 1  21: Abstract category naming: pt was 90% accurate.  3/12/21: Divergent naming: pt was concrete: 15/15, abstract: 15/15 with mod cues.    (Pended)   -LB     Other Adult Goal- 2  Pt will participate in further reasoning and problem solving evaluation with goals to follow as indicated.  (Pended)   -LB     Status: Other Adult Goal- 2  Progressing as expected  (Pended)   -LB     Comments: Other Adult Goal- 2  21: Deductive reasoning paragraph and pt was 80% accurate with minimal cues. 21: Deductive Reasoning puzzle and pt was 100% accurate. 3/26/21: Reasoning puzzle: Pt was 90% accurate with min cues.   (Pended)   -LB      Other Adult Goal- 3  Pt will improve RBANS Score to at least a 111 placing pt in the high average range.   (Pended)   -LB     Status: Other Adult Goal- 3  New  (Pended)   -LB        SLP Time Calculation    SLP Goal Re-Cert Due Date  05/06/21  (Pended)   -LB       User Key  (r) = Recorded By, (t) = Taken By, (c) = Cosigned By    Initials Name Provider Type    LB Martin Alamo, Speech Therapy Student Speech Therapy Student                         Time Calculation:   SLP Start Time: (P) 1300    Therapy Charges for Today     Code Description Service Date Service Provider Modifiers Qty    [id number]  ST TREATMENT SPEECH 3 4/16/2021 Martin Alamo, Speech Therapy Student GN 1                   Martin Alamo Speech Therapy Student  4/16/2021

## 2021-04-19 ENCOUNTER — HOSPITAL ENCOUNTER (OUTPATIENT)
Dept: SPEECH THERAPY | Facility: HOSPITAL | Age: 61
Setting detail: THERAPIES SERIES
Discharge: HOME | End: 2021-04-19

## 2021-04-19 DIAGNOSIS — R41.3 MEMORY LOSS: Primary | ICD-10-CM

## 2021-04-19 PROCEDURE — 92507 TX SP LANG VOICE COMM INDIV: CPT

## 2021-04-19 NOTE — THERAPY TREATMENT NOTE
Outpatient Speech Language Pathology   Adult Speech Language Cognitive Treatment Note  [facility]     Patient Name: Sandra Auguste  : 1960  MRN: [id number]  Today's Date: 2021         Visit Date: 2021   Patient Active Problem List   Diagnosis   • Abnormal computed tomography scan   • Anemia   • Post traumatic stress disorder (PTSD)   • Anxiety   • Strain of neck muscle   • Cough   • Diverticulitis of colon   • Fatigue   • Steatosis of liver   • Lateral epicondylitis   • Knee pain   • Spasm   • Adiposity   • Obstructive sleep apnea syndrome   • Osteoarthritis   • Palpitations   • Nausea   • Shortness of breath   • Skin tag   • Candidiasis of vagina   • Viral upper respiratory tract infection   • Gastroesophageal reflux disease   • Acute pain of left shoulder   • Abdominal distension (gaseous)   • SOB (shortness of breath) on exertion   • Sleep apnea with use of continuous positive airway pressure (CPAP)   • Seasonal allergies   • Obesity   • Migraine   • Mental disorder   • Diverticulosis   • Depression   • Arthritis   • Hypertension   • Itching   • Stabbing headache   • Temple tenderness   • TMJ click   • Mild intermittent asthma without complication   • Skull fracture (CMS/HCC)   • Type 2 diabetes mellitus without complication, without long-term current use of insulin (CMS/HCC)   • Health care maintenance   • Moderate episode of recurrent major depressive disorder (CMS/HCC)          Visit Dx:    ICD-10-CM ICD-9-CM   1. Memory loss  R41.3 780.93                         SLP OP Goals     Row Name 21 1300          Goal Type Needed    Goal Type Needed  Memory;Attention/Orientation;Other Adult Goals  (Pended)   -LB        Subjective Comments    Subjective Comments  Pt alert, cooperative, and accompanied by her mother.  (Pended)   -LB        Memory Goals    Patient will be able to remember information needed to return to work and function on work-related tasks  100%:;with cues  (Pended)   -LB   "   Status: Patient will be able to remember information needed to return to work and function on work-related tasks  New  (Pended)   -LB     Comments: Patient will be able to remember information needed to return to work and function on work-related tasks  4/16/21: Pt noted she has started a financial journal with bills, budgeting, etc. She states \"I know I will have to do it weekly.\"  (Pended)   -LB     Patient will demonstrate improved ability to recall information by immediately recalling a series of words  90%:;unrelated;after delay  (Pended)   -LB     Status: Patient will demonstrate improved ability to recall information by immediately recalling a series of words  Progressing as expected  (Pended)   -LB     Comments: Patient will demonstrate improved ability to recall information by immediately recalling a series of words  4/16/21: Immediate memory of 3 4/6/21: Immediate recall for ABC game pt was 90% accurate. 4/2/21: Picture recall for 75% accurate.  3/12/21: Word Placement: Inclusion for 4 words and pt was 100% accurate. 5 words: pt was 80% accurate. One Pile Card game: pt was required mod cues in order to recall rules of the game.   (Pended)   -LB     Patient’s memory skills will be enhanced as reported by patient by utilizing internal memory strategies to recall up to 3 pieces of information after a 5- minute delay  90%:;with cues  (Pended)   -LB     Status: Patient’s memory skills will be enhanced as reported by patient by utilizing internal memory strategies to recall up to 3 pieces of information after a 5- minute delay  Progressing as expected  (Pended)   -LB     Comments: Patient’s memory skills will be enhanced as reported by patient by utilizing internal memory strategies to recall up to 3 pieces of information after a 5- minute delay  4/19/21: Client recalled 13/17 words independently from the ABC game from 4/6/21. 4/2/21: Paired pictures: pt was 9/10. 3/26/21: Delayed recall of 8 un-related words " and pt was 8/8 x 3 with use of chaining. 3/19/21: Delayed recall of 7 un-related words: pt was % accurate. 3/16/21: Delayed recall of 6 un-related words: pt was 6/6; with increased delay, pt was 6/6 x 2.  3/12/21: Delayed recall of 4 un-related words: pt was 4/4 x 3. Delayed recall of 5 un-related words: pt was 5/5 x 3.   (Pended)   -LB     Patient’s memory skills will be enhanced as reported by patient by using external memory aides  90%:;with cues  (Pended)   -LB     Status: Patient’s memory skills will be enhanced as reported by patient by using external memory aides  Progressing as expected  (Pended)   -LB     Comments: Patient’s memory skills will be enhanced as reported by patient by using external memory aides  4/19/21: Reports that she is not journaling very often but has just started a financial journal. 4/16/21: Pt noted she is using her phone often to make sure she is not forgetting daily tasks. 3/16/21: Pt states that the past few days have been rough and she hasn't kept up with it very well. 3/12/21: Pt using daily journal per her report.   (Pended)   -LB        Attention/Orientation Goals    Patient will be able to execute all activities necessary to manage a home  Independently  (Pended)   -LB     Status: Patient will be able to execute all activities necessary to manage a home  Progressing as expected  (Pended)   -LB     Comments: Patient will be able to execute all activities necessary to manage a home  4/16/21: Pt has noted she is having difficulties with taking care of mother but is completing ADL's. Pt notes she is not doing as much as she would like to do. 4/2/21: Had pt download the [employer] maría elena for FREE trials at this time. 3/16/21: Exec function exercise for attention, memory and thought organization and overall, pt was 95% accurate.   (Pended)   -LB     Patient will improve attention skills by sustaining consistent behavioral response during continuous and repetitive activity (e.g.,  listening for target words, auditory/reading comprehension tasks)  without cues;10 minute task  (Pended)   -LB     Status: Patient will improve attention skills by sustaining consistent behavioral response during continuous and repetitive activity (e.g., listening for target words, auditory/reading comprehension tasks)  Progressing as expected  (Pended)   -LB     Comments: Patient will improve attention skills by sustaining consistent behavioral response during continuous and repetitive activity (e.g., listening for target words, auditory/reading comprehension tasks)  4/16/21: Scoreboard card game and pt was 100% accurate. 3/19/21: One Pile Card Game: pt was 80% accurate.  3/12/21: Sustained attention for the game of One Pile and pt was 80% accurate.   (Pended)   -LB     Patient will improve attention skills by sustaining focus in order to actively hold and manipulate information provided (e.g., sequencing auditorily presented number series in ascending or descending order)  90%:;with cues  (Pended)   -LB     Status: Patient will improve attention skills by sustaining focus in order to actively hold and manipulate information provided (e.g., sequencing auditorily presented number series in ascending or descending order)  Progressing as expected  (Pended)   -LB     Comments: Patient will improve attention skills by sustaining focus in order to actively hold and manipulate information provided (e.g., sequencing auditorily presented number series in ascending or descending order)  4/6/21: Sustained attention for new card game and pt was 95% accurate.  3/16/21: Mental Manipulation for 3 words, alphabetical and reversal and pt was 100% accurate.   (Pended)   -LB     Patient will improve attention skills by focusing to selective target/task when presented with competing stimuli or in a distracting environment in order to complete task  90%:;with cues  (Pended)   -LB     Status: Patient will improve attention skills by  focusing to selective target/task when presented with competing stimuli or in a distracting environment in order to complete task  Progressing as expected  (Pended)   -LB     Comments: Patient will improve attention skills by focusing to selective target/task when presented with competing stimuli or in a distracting environment in order to complete task  3/26/21: The game of [employer] and pt was 80% accurate. 3/12/21: Connecting the Dots- Alphabetical and pt was 90% accurate. Alternating attention task and pt was 95% accurate.   (Pended)   -LB     Patient will improve attention skills by alternating or shifting focus between two different tasks in order to complete both tasks  90%:;with cues  (Pended)   -LB     Status: Patient will improve attention skills by alternating or shifting focus between two different tasks in order to complete both tasks  Progressing as expected  (Pended)   -LB     Comments: Patient will improve attention skills by alternating or shifting focus between two different tasks in order to complete both tasks  21: Alternating attention card game where pt had to add 2 cards then switch between adding 2 cards and naming the first letter of each numbered card. Pt was 100% accurate. 3/26/21: Memory Matches and pt was 70-90% accurate with use of attention strategies. 3/19/21: Memory Matches: pt was 80% accurate with min cues. 3/12/21: Divided attention for card sorting by  and by suit and pt was 85% accurate.   (Pended)   -LB        Other Goals    Other Adult Goal- 1  Pt will complete thought organization tasks with at least 90% accurate with cues.  (Pended)   -LB     Status: Other Adult Goal- 1  Progressing as expected  (Pended)   -LB     Comments: Other Adult Goal- 1  21: Abstract category naming: pt was 90% accurate.  3/12/21: Divergent naming: pt was concrete: 1515, abstract: 1515 with mod cues.    (Pended)   -LB     Other Adult Goal- 2  Pt will participate in further reasoning and  problem solving evaluation with goals to follow as indicated.  (Pended)   -LB     Status: Other Adult Goal- 2  Progressing as expected  (Pended)   -LB     Comments: Other Adult Goal- 2  4/16/21: Deductive reasoning paragraph and pt was 80% accurate with minimal cues. 4/2/21: Deductive Reasoning puzzle and pt was 100% accurate. 3/26/21: Reasoning puzzle: Pt was 90% accurate with min cues.   (Pended)   -LB     Other Adult Goal- 3  Pt will improve RBANS Score to at least a 111 placing pt in the high average range.   (Pended)   -LB     Status: Other Adult Goal- 3  New  (Pended)   -LB     Other Adult Goal- 4  Pt will improve reading comprehension with at least 90% accuracy with cues.  (Pended)   -LB     Status: Other Adult Goal- 4  New  (Pended)   -LB        SLP Time Calculation    SLP Goal Re-Cert Due Date  05/06/21  (Pended)   -LB       User Key  (r) = Recorded By, (t) = Taken By, (c) = Cosigned By    Initials Name Provider Type    Martin Noland Speech Therapy Student Speech Therapy Student          OP SLP Education     Row Name 04/19/21 1300       Education    Education Comments  Extensive education regarding reading and reading comprehension provided. Handout given regarding attention and memory due to concerns with attention.  (Pended)   -LB      User Key  (r) = Recorded By, (t) = Taken By, (c) = Cosigned By    Initials Name Effective Dates    Martin Noland Speech Therapy Student 03/09/21 -           OP SLP Assessment/Plan - 04/19/21 1300        SLP Plan    Plan Comments  Cont with cog tx  (Pended)    -LB      User Key  (r) = Recorded By, (t) = Taken By, (c) = Cosigned By    Initials Name Provider Type    Martin Noland, Speech Therapy Student Speech Therapy Student                 Time Calculation:   SLP Start Time: (P) 1300    Therapy Charges for Today     Code Description Service Date Service Provider Modifiers Qty    [id number]  ST TREATMENT SPEECH 4 4/19/2021 Martin Alamo Speech Therapy  Student GN 1                   Martin Alamo, Speech Therapy Student  4/19/2021

## 2021-04-27 ENCOUNTER — HOSPITAL ENCOUNTER (OUTPATIENT)
Dept: SPEECH THERAPY | Facility: HOSPITAL | Age: 61
Setting detail: THERAPIES SERIES
Discharge: HOME | End: 2021-04-27

## 2021-04-27 DIAGNOSIS — R41.3 MEMORY LOSS: Primary | ICD-10-CM

## 2021-04-27 PROCEDURE — 92507 TX SP LANG VOICE COMM INDIV: CPT

## 2021-04-27 NOTE — THERAPY TREATMENT NOTE
Outpatient Speech Language Pathology   Adult Speech Language Cognitive Treatment Note  [facility]     Patient Name: Sandra Auguste  : 1960  MRN: [id number]  Today's Date: 2021         Visit Date: 2021   Patient Active Problem List   Diagnosis   • Abnormal computed tomography scan   • Anemia   • Post traumatic stress disorder (PTSD)   • Anxiety   • Strain of neck muscle   • Cough   • Diverticulitis of colon   • Fatigue   • Steatosis of liver   • Lateral epicondylitis   • Knee pain   • Spasm   • Adiposity   • Obstructive sleep apnea syndrome   • Osteoarthritis   • Palpitations   • Nausea   • Shortness of breath   • Skin tag   • Candidiasis of vagina   • Viral upper respiratory tract infection   • Gastroesophageal reflux disease   • Acute pain of left shoulder   • Abdominal distension (gaseous)   • SOB (shortness of breath) on exertion   • Sleep apnea with use of continuous positive airway pressure (CPAP)   • Seasonal allergies   • Obesity   • Migraine   • Mental disorder   • Diverticulosis   • Depression   • Arthritis   • Hypertension   • Itching   • Stabbing headache   • Temple tenderness   • TMJ click   • Mild intermittent asthma without complication   • Skull fracture (CMS/HCC)   • Type 2 diabetes mellitus without complication, without long-term current use of insulin (CMS/HCC)   • Health care maintenance   • Moderate episode of recurrent major depressive disorder (CMS/HCC)          Visit Dx:    ICD-10-CM ICD-9-CM   1. Memory loss  R41.3 780.93                         SLP OP Goals     Row Name 21 1300          Goal Type Needed    Goal Type Needed  Memory;Attention/Orientation;Other Adult Goals  (Pended)   -LB        Subjective Comments    Subjective Comments  Pt alert and cooperative.  (Pended)   -LB        Memory Goals    Patient will be able to remember information needed to return to work and function on work-related tasks  100%:;with cues  (Pended)   -LB     Status: Patient will be  able to remember information needed to return to work and function on work-related tasks  New  (Pended)   -LB     Patient will demonstrate improved ability to recall information by immediately recalling a series of words  90%:;unrelated;after delay  (Pended)   -LB     Status: Patient will demonstrate improved ability to recall information by immediately recalling a series of words  Progressing as expected  (Pended)   -LB     Comments: Patient will demonstrate improved ability to recall information by immediately recalling a series of words  4/6/21: Immediate recall for ABC game pt was 90% accurate. 4/2/21: Picture recall for 75% accurate.  3/12/21: Word Placement: Inclusion for 4 words and pt was 100% accurate. 5 words: pt was 80% accurate. One Pile Card game: pt was required mod cues in order to recall rules of the game.   (Pended)   -LB     Patient’s memory skills will be enhanced as reported by patient by utilizing internal memory strategies to recall up to 3 pieces of information after a 5- minute delay  90%:;with cues  (Pended)   -LB     Status: Patient’s memory skills will be enhanced as reported by patient by utilizing internal memory strategies to recall up to 3 pieces of information after a 5- minute delay  Progressing as expected  (Pended)   -LB     Comments: Patient’s memory skills will be enhanced as reported by patient by utilizing internal memory strategies to recall up to 3 pieces of information after a 5- minute delay  4/2/21: Paired pictures: pt was 9/10. 3/26/21: Delayed recall of 8 un-related words and pt was 8/8 x 3 with use of chaining. 3/19/21: Delayed recall of 7 un-related words: pt was % accurate. 3/16/21: Delayed recall of 6 un-related words: pt was 6/6; with increased delay, pt was 6/6 x 2.  3/12/21: Delayed recall of 4 un-related words: pt was 4/4 x 3. Delayed recall of 5 un-related words: pt was 5/5 x 3.   (Pended)   -LB     Patient’s memory skills will be enhanced as reported by  patient by using external memory aides  90%:;with cues  (Pended)   -LB     Status: Patient’s memory skills will be enhanced as reported by patient by using external memory aides  Progressing as expected  (Pended)   -LB     Comments: Patient’s memory skills will be enhanced as reported by patient by using external memory aides  4/23/21: Pt states she is having some difficiulties staying on top of tasks. 4/16/21: Pt noted she is using her phone often to make sure she is not forgetting daily tasks. 3/16/21: Pt states that the past few days have been rough and she hasn't kept up with it very well. 3/12/21: Pt using daily journal per her report.   (Pended)   -LB        Attention/Orientation Goals    Patient will be able to execute all activities necessary to manage a home  Independently  (Pended)   -LB     Status: Patient will be able to execute all activities necessary to manage a home  Progressing as expected  (Pended)   -LB     Comments: Patient will be able to execute all activities necessary to manage a home  4/23/21: Pt notes that she is dealing with a lot due to trying to care for her mother while continuing to do ADL's. Pt states she is having to block off times for herself. 4/16/21: Pt has noted she is having difficulties with taking care of mother but is completing ADL's. Pt notes she is not doing as much as she would like to do. 4/2/21: Had pt download the [employer] mar[employer] elena for FREE trials at this time. 3/16/21: Exec function exercise for attention, memory and thought organization and overall, pt was 95% accurate.   (Pended)   -LB     Patient will improve attention skills by sustaining consistent behavioral response during continuous and repetitive activity (e.g., listening for target words, auditory/reading comprehension tasks)  without cues;10 minute task  (Pended)   -LB     Status: Patient will improve attention skills by sustaining consistent behavioral response during continuous and repetitive activity (e.g.,  listening for target words, auditory/reading comprehension tasks)  Progressing as expected  (Pended)   -LB     Comments: Patient will improve attention skills by sustaining consistent behavioral response during continuous and repetitive activity (e.g., listening for target words, auditory/reading comprehension tasks)  4/16/21: Scoreboard card game and pt was 100% accurate. 3/19/21: One Pile Card Game: pt was 80% accurate.  3/12/21: Sustained attention for the game of One Pile and pt was 80% accurate.   (Pended)   -LB     Patient will improve attention skills by sustaining focus in order to actively hold and manipulate information provided (e.g., sequencing auditorily presented number series in ascending or descending order)  90%:;with cues  (Pended)   -LB     Status: Patient will improve attention skills by sustaining focus in order to actively hold and manipulate information provided (e.g., sequencing auditorily presented number series in ascending or descending order)  Progressing as expected  (Pended)   -LB     Comments: Patient will improve attention skills by sustaining focus in order to actively hold and manipulate information provided (e.g., sequencing auditorily presented number series in ascending or descending order)  4/6/21: Sustained attention for new card game and pt was 95% accurate.  3/16/21: Mental Manipulation for 3 words, alphabetical and reversal and pt was 100% accurate.   (Pended)   -LB     Patient will improve attention skills by focusing to selective target/task when presented with competing stimuli or in a distracting environment in order to complete task  90%:;with cues  (Pended)   -LB     Status: Patient will improve attention skills by focusing to selective target/task when presented with competing stimuli or in a distracting environment in order to complete task  Progressing as expected  (Pended)   -LB     Comments: Patient will improve attention skills by focusing to selective  target/task when presented with competing stimuli or in a distracting environment in order to complete task  21: Last letter game and pt selectively attended to cards while being distracted by other players. Pt 21: Step by step card game and pt was 100% accurate independently. 3/26/21: The game of [place] and pt was 80% accurate. 3/12/21: Connecting the Dots- Alphabetical and pt was 90% accurate. Alternating attention task and pt was 95% accurate.   (Pended)   -LB     Patient will improve attention skills by alternating or shifting focus between two different tasks in order to complete both tasks  90%:;with cues  (Pended)   -LB     Status: Patient will improve attention skills by alternating or shifting focus between two different tasks in order to complete both tasks  Progressing as expected  (Pended)   -LB     Comments: Patient will improve attention skills by alternating or shifting focus between two different tasks in order to complete both tasks  21: Alternating attention card game where pt had to add 2 cards then switch between adding 2 cards and naming the first letter of each numbered card. Pt was 100% accurate. 3/26/21: Memory Matches and pt was 70-90% accurate with use of attention strategies. 3/19/21: Memory Matches: pt was 80% accurate with min cues. 3/12/21: Divided attention for card sorting by  and by suit and pt was 85% accurate.   (Pended)   -LB        Other Goals    Other Adult Goal- 1  Pt will complete thought organization tasks with at least 90% accurate with cues.  (Pended)   -LB     Status: Other Adult Goal- 1  Progressing as expected  (Pended)   -LB     Comments: Other Adult Goal- 1  21: Abstract category naming: pt was 90% accurate.  3/12/21: Divergent naming: pt was concrete: 15/15, abstract: 15/15 with mod cues.    (Pended)   -LB     Other Adult Goal- 2  Pt will participate in further reasoning and problem solving evaluation with goals to follow as indicated.  (Pended)    -LB     Status: Other Adult Goal- 2  Progressing as expected  (Pended)   -LB     Comments: Other Adult Goal- 2  4/16/21: Deductive reasoning paragraph and pt was 80% accurate with minimal cues. 4/2/21: Deductive Reasoning puzzle and pt was 100% accurate. 3/26/21: Reasoning puzzle: Pt was 90% accurate with min cues.   (Pended)   -LB     Other Adult Goal- 3  Pt will improve RBANS Score to at least a 111 placing pt in the high average range.   (Pended)   -LB     Status: Other Adult Goal- 3  New  (Pended)   -LB        SLP Time Calculation    SLP Goal Re-Cert Due Date  05/06/21  (Pended)   -LB       User Key  (r) = Recorded By, (t) = Taken By, (c) = Cosigned By    Initials Name Provider Type    Martin Noland Speech Therapy Student Speech Therapy Student          OP SLP Assessment/Plan - 04/27/21 1300        SLP Plan    Plan Comments  Cont with cog tx  (Pended)    -LB      User Key  (r) = Recorded By, (t) = Taken By, (c) = Cosigned By    Initials Name Provider Type    Martin Noland Speech Therapy Student Speech Therapy Student               Time Calculation:   SLP Start Time: (P) 1300  Untimed Charges  81218-OW Treatment/ST Modification Prosth Aug Alter : (P) 45  Total Minutes  Untimed Charges Total Minutes: (P) 45   Total Minutes: (P) 45    Therapy Charges for Today     Code Description Service Date Service Provider Modifiers Qty    [id number] HC ST TREATMENT SPEECH 3 4/27/2021 Martin Alamo Speech Therapy Student GN 1                   Martin Alamo Speech Therapy Student  4/27/2021

## 2021-04-30 ENCOUNTER — HOSPITAL ENCOUNTER (OUTPATIENT)
Dept: SPEECH THERAPY | Facility: HOSPITAL | Age: 61
Setting detail: THERAPIES SERIES
Discharge: HOME | End: 2021-04-30

## 2021-04-30 DIAGNOSIS — R41.3 MEMORY LOSS: Primary | ICD-10-CM

## 2021-04-30 PROCEDURE — 92507 TX SP LANG VOICE COMM INDIV: CPT

## 2021-05-10 ENCOUNTER — HOSPITAL ENCOUNTER (OUTPATIENT)
Dept: SPEECH THERAPY | Facility: HOSPITAL | Age: 61
Setting detail: THERAPIES SERIES
Discharge: HOME | End: 2021-05-10

## 2021-05-10 DIAGNOSIS — R41.3 MEMORY LOSS: Primary | ICD-10-CM

## 2021-05-10 PROCEDURE — 92507 TX SP LANG VOICE COMM INDIV: CPT

## 2021-05-10 NOTE — THERAPY PROGRESS REPORT/RE-CERT
Outpatient Speech Language Pathology   Adult Speech Language Cognitive Treatment Note  [facility]     Patient Name: Sandra Auguste  : 1960  MRN: [id number]  Today's Date: 5/10/2021         Visit Date: 05/10/2021   Patient Active Problem List   Diagnosis   • Abnormal computed tomography scan   • Anemia   • Post traumatic stress disorder (PTSD)   • Anxiety   • Strain of neck muscle   • Cough   • Diverticulitis of colon   • Fatigue   • Steatosis of liver   • Lateral epicondylitis   • Knee pain   • Spasm   • Adiposity   • Obstructive sleep apnea syndrome   • Osteoarthritis   • Palpitations   • Nausea   • Shortness of breath   • Skin tag   • Candidiasis of vagina   • Viral upper respiratory tract infection   • Gastroesophageal reflux disease   • Acute pain of left shoulder   • Abdominal distension (gaseous)   • SOB (shortness of breath) on exertion   • Sleep apnea with use of continuous positive airway pressure (CPAP)   • Seasonal allergies   • Obesity   • Migraine   • Mental disorder   • Diverticulosis   • Depression   • Arthritis   • Hypertension   • Itching   • Stabbing headache   • Temple tenderness   • TMJ click   • Mild intermittent asthma without complication   • Skull fracture (CMS/HCC)   • Type 2 diabetes mellitus without complication, without long-term current use of insulin (CMS/HCC)   • Health care maintenance   • Moderate episode of recurrent major depressive disorder (CMS/HCC)          Visit Dx:    ICD-10-CM ICD-9-CM   1. Memory loss  R41.3 780.93                         SLP OP Goals     Row Name 05/10/21 1300          Goal Type Needed    Goal Type Needed  Memory;Attention/Orientation;Other Adult Goals  (Pended)   -LB        Subjective Comments    Subjective Comments  Pt alert, cooperative, and stated she is continuing to have difficulties with caring for her mother.  (Pended)   -LB        Memory Goals    Patient will be able to remember information needed to return to work and function on  work-related tasks  100%:;with cues  (Pended)   -LB     Status: Patient will be able to remember information needed to return to work and function on work-related tasks  New  (Pended)   -LB     Patient will demonstrate improved ability to recall information by immediately recalling a series of words  90%:;unrelated;after delay  (Pended)   -LB     Status: Patient will demonstrate improved ability to recall information by immediately recalling a series of words  Progressing as expected  (Pended)   -LB     Comments: Patient will demonstrate improved ability to recall information by immediately recalling a series of words  5/10/21: RBANS immediate recall score of 103 places pt in average range. 4/30/21: Pt immediate recall of 5 word arrays was 80% with minimal cues. 4/6/21: Immediate recall for ABC game pt was 90% accurate. 4/2/21: Picture recall for 75% accurate.  3/12/21: Word Placement: Inclusion for 4 words and pt was 100% accurate. 5 words: pt was 80% accurate. One Pile Card game: pt was required mod cues in order to recall rules of the game.   (Pended)   -LB     Patient’s memory skills will be enhanced as reported by patient by utilizing internal memory strategies to recall up to 3 pieces of information after a 5- minute delay  90%:;with cues  (Pended)   -LB     Status: Patient’s memory skills will be enhanced as reported by patient by utilizing internal memory strategies to recall up to 3 pieces of information after a 5- minute delay  Progressing as expected  (Pended)   -LB     Comments: Patient’s memory skills will be enhanced as reported by patient by utilizing internal memory strategies to recall up to 3 pieces of information after a 5- minute delay  5/10/21: RBANS delated memory of 119 places pt in high average range. 4/2/21: Paired pictures: pt was 9/10. 3/26/21: Delayed recall of 8 un-related words and pt was 8/8 x 3 with use of chaining. 3/19/21: Delayed recall of 7 un-related words: pt was %  accurate. 3/16/21: Delayed recall of 6 un-related words: pt was 6/6; with increased delay, pt was 6/6 x 2.  3/12/21: Delayed recall of 4 un-related words: pt was 4/4 x 3. Delayed recall of 5 un-related words: pt was 5/5 x 3.   (Pended)   -LB     Patient’s memory skills will be enhanced as reported by patient by using external memory aides  90%:;with cues  (Pended)   -LB     Status: Patient’s memory skills will be enhanced as reported by patient by using external memory aides  Progressing as expected  (Pended)   -LB     Comments: Patient’s memory skills will be enhanced as reported by patient by using external memory aides  4/23/21: Pt states she is having some difficulties staying on top of tasks. 4/16/21: Pt noted she is using her phone often to make sure she is not forgetting daily tasks. 3/16/21: Pt states that the past few days have been rough and she hasn't kept up with it very well. 3/12/21: Pt using daily journal per her report.   (Pended)   -LB        Attention/Orientation Goals    Patient will be able to execute all activities necessary to manage a home  Independently  (Pended)   -LB     Status: Patient will be able to execute all activities necessary to manage a home  Progressing as expected  (Pended)   -LB     Comments: Patient will be able to execute all activities necessary to manage a home  4/30/21: Pt states she is having difficulties with caring for her mother. Pt states she continuing to perform ADL's but notes it is taking a large toll. 4/23/21: Pt notes that she is dealing with a lot due to trying to care for her mother while continuing to do ADL's. Pt states she is having to block off times for herself. 4/16/21: Pt has noted she is having difficulties with taking care of mother but is completing ADL's. Pt notes she is not doing as much as she would like to do. 4/2/21: Had pt download the [employer] for FREE trials at this time. 3/16/21: Exec function exercise for attention, memory and thought  organization and overall, pt was 95% accurate.   (Pended)   -LB     Patient will improve attention skills by sustaining consistent behavioral response during continuous and repetitive activity (e.g., listening for target words, auditory/reading comprehension tasks)  without cues;10 minute task  (Pended)   -LB     Status: Patient will improve attention skills by sustaining consistent behavioral response during continuous and repetitive activity (e.g., listening for target words, auditory/reading comprehension tasks)  Progressing as expected  (Pended)   -LB     Comments: Patient will improve attention skills by sustaining consistent behavioral response during continuous and repetitive activity (e.g., listening for target words, auditory/reading comprehension tasks)  4/30/21: Pt completed Moving on Up and Middle Management card games together with 100% acccuracy. 4/16/21: Scoreboard card game and pt was 100% accurate. 3/19/21: One Pile Card Game: pt was 80% accurate.  3/12/21: Sustained attention for the game of One Pile and pt was 80% accurate.   (Pended)   -LB     Patient will improve attention skills by sustaining focus in order to actively hold and manipulate information provided (e.g., sequencing auditorily presented number series in ascending or descending order)  90%:;with cues  (Pended)   -LB     Status: Patient will improve attention skills by sustaining focus in order to actively hold and manipulate information provided (e.g., sequencing auditorily presented number series in ascending or descending order)  Progressing as expected  (Pended)   -LB     Comments: Patient will improve attention skills by sustaining focus in order to actively hold and manipulate information provided (e.g., sequencing auditorily presented number series in ascending or descending order)  4/6/21: Sustained attention for new card game and pt was 95% accurate.  3/16/21: Mental Manipulation for 3 words, alphabetical and reversal and pt  was 100% accurate.   (Pended)   -LB     Patient will improve attention skills by focusing to selective target/task when presented with competing stimuli or in a distracting environment in order to complete task  90%:;with cues  (Pended)   -LB     Status: Patient will improve attention skills by focusing to selective target/task when presented with competing stimuli or in a distracting environment in order to complete task  Progressing as expected  (Pended)   -LB     Comments: Patient will improve attention skills by focusing to selective target/task when presented with competing stimuli or in a distracting environment in order to complete task  5/10/21: RBANS attention score of 88 places pt in low average range. 4/30/21: Pt completed deduction puzzle with 100% accuracy independently. 4/27/21: Last letter game and pt selectively attended to cards while being distracted by other players. Pt 4/23/21: Step by step card game and pt was 100% accurate independently. 3/26/21: The game of [employer] and pt was 80% accurate. 3/12/21: Connecting the Dots- Alphabetical and pt was 90% accurate. Alternating attention task and pt was 95% accurate.   (Pended)   -LB     Patient will improve attention skills by alternating or shifting focus between two different tasks in order to complete both tasks  90%:;with cues  (Pended)   -LB     Status: Patient will improve attention skills by alternating or shifting focus between two different tasks in order to complete both tasks  Progressing as expected  (Pended)   -LB     Comments: Patient will improve attention skills by alternating or shifting focus between two different tasks in order to complete both tasks  4/16/21: Alternating attention card game where pt had to add 2 cards then switch between adding 2 cards and naming the first letter of each numbered card. Pt was 100% accurate. 3/26/21: Memory Matches and pt was 70-90% accurate with use of attention strategies. 3/19/21: Memory Matches:  pt was 80% accurate with min cues. 3/12/21: Divided attention for card sorting by  and by suit and pt was 85% accurate.   (Pended)   -LB        Other Goals    Other Adult Goal- 1  Pt will complete thought organization tasks with at least 90% accurate with cues.  (Pended)   -LB     Status: Other Adult Goal- 1  Progressing as expected  (Pended)   -LB     Comments: Other Adult Goal- 1  21: Abstract and concrete category naming was 100%. Pt was 100% for selecting words that do no tbelong from a field of 5. 21: Abstract category naming: pt was 90% accurate.  3/12/21: Divergent naming: pt was concrete: 15/15, abstract: 15/15 with mod cues.    (Pended)   -LB     Other Adult Goal- 2  Pt will participate in further reasoning and problem solving evaluation with goals to follow as indicated.  (Pended)   -LB     Status: Other Adult Goal- 2  Progressing as expected  (Pended)   -LB     Comments: Other Adult Goal- 2  21: Pt completed deductive puzzle with 100% accuracy independently. 21: Deductive reasoning paragraph and pt was 80% accurate with minimal cues. 21: Deductive Reasoning puzzle and pt was 100% accurate. 3/26/21: Reasoning puzzle: Pt was 90% accurate with min cues.   (Pended)   -LB     Other Adult Goal- 3  Pt will improve RBANS Score to at least a 111 placing pt in the high average range.   (Pended)   -LB     Status: Other Adult Goal- 3  New  (Pended)   -LB     Comments: Other Adult Goal- 3  5/10/21: RBANS score of 103 places pt in the average range.   (Pended)   -LB        SLP Time Calculation    SLP Goal Re-Cert Due Date  21  (Pended)   -LB       User Key  (r) = Recorded By, (t) = Taken By, (c) = Cosigned By    Initials Name Provider Type    Martin Noland, Speech Therapy Student Speech Therapy Student        OP SLP Education     Row Name 05/10/21 1300       Education    Education Comments  Pt education on RBANS results. Hmwk for paragraph inferencing and deduction puzzle.   (Pended)    -LB      User Key  (r) = Recorded By, (t) = Taken By, (c) = Cosigned By    Initials Name Effective Dates    Martin Noland Speech Therapy Student 03/09/21 -         OP SLP Assessment/Plan - 05/10/21 1300        SLP Assessment    Functional Problems  Speech Language- Adult/Cognition  (Pended)    -LB    Impact on Function: Adult Speech Language/Cognition  Trouble learning or remembering new information;Poor attention to task  (Pended)    -LB    Clinical Impression: Speech Language-Adult/Congnition  Mild:;Cognitive Communication Impairment  (Pended)    -LB    Functional Problems Comment  Pt reports difficulty with attention.  (Pended)    -LB    Clinical Impression Comments  RBANS total score of 103 places pt in average range but low average score in attention test.  (Pended)    -LB    Please refer to paper survey for additional self-reported information  Yes  (Pended)    -LB    Please refer to items scanned into chart for additional diagnostic informaiton and handouts as provided by clinician  Yes  (Pended)    -LB    SLP Diagnosis  Mild cog-comm deficits.  (Pended)    -LB    Prognosis  Excellent (comment)  (Pended)    -LB    Patient/caregiver participated in establishment of treatment plan and goals  Yes  (Pended)    -LB    Patient would benefit from skilled therapy intervention  Yes  (Pended)    -LB       SLP Plan    Frequency  1-2x/week  (Pended)    -LB    Duration  8 weeks  (Pended)    -LB    Planned CPT's?  SLP INDIVIDUAL SPEECH THERAPY: 46547  (Pended)    -LB    Expected Duration of Therapy Session (SLP Eval)  60  (Pended)    -LB    Plan Comments  Cont cog tx  (Pended)    -LB      User Key  (r) = Recorded By, (t) = Taken By, (c) = Cosigned By    Initials Name Provider Type    Martin Noland Speech Therapy Student Speech Therapy Student               Time Calculation:   SLP Start Time: (P) 1300  Untimed Charges  11241-VF Treatment/ST Modification Prosth Aug Alter : (P) 80  Total Minutes  Untimed Charges  Total Minutes: (P) 80   Total Minutes: (P) 80    Therapy Charges for Today     Code Description Service Date Service Provider Modifiers Qty    [id number]  ST TREATMENT SPEECH 5 5/10/2021 Martin Alamo, Speech Therapy Student GN 1                   Martin Alamo, Speech Therapy Student  5/10/2021

## 2021-05-11 DIAGNOSIS — R06.02 SHORTNESS OF BREATH: ICD-10-CM

## 2021-05-11 NOTE — TELEPHONE ENCOUNTER
Caller: Sandra Auguste    Relationship: Self    Best call back number: [phone]    Medication needed:   Requested Prescriptions     Pending Prescriptions Disp Refills   • Breo Ellipta 100-25 MCG/INH inhaler       Sig: Daily.       When do you need the refill by: 05/11/2021    What additional details did the patient provide when requesting the medication: PATIENT IS COMPLETELY OUT OF MEDICATION    PATIENT STATED THAT SHE WAS INFORMED BY THE PHARMACY THAT THIS MEDICATION NEEDS A PRIOR AUTHORIZATION     Does the patient have less than a 3 day supply:  [x] Yes  [] No    What is the patient[place] [facility] [address] [phone] [facility] [phone] FX

## 2021-05-11 NOTE — TELEPHONE ENCOUNTER
Last Office Visit: 2/26/21 Telemedicine  Next Office Visit: 7/15/21    Labs completed in past 6 months? yes  Labs completed in past year? yes    Last Refill Date: 9/17/20  Quantity: 60 each  Refills: 4    Pharmacy: On File    Please review pended refill request for any changes needed on refills or quantities. Thank you!

## 2021-05-12 ENCOUNTER — TELEMEDICINE (OUTPATIENT)
Dept: PSYCHIATRY | Facility: CLINIC | Age: 61
End: 2021-05-12

## 2021-05-12 DIAGNOSIS — F33.1 MODERATE EPISODE OF RECURRENT MAJOR DEPRESSIVE DISORDER (HCC): Primary | ICD-10-CM

## 2021-05-12 PROCEDURE — 90832 PSYTX W PT 30 MINUTES: CPT | Performed by: COUNSELOR

## 2021-05-13 ENCOUNTER — PRIOR AUTHORIZATION (OUTPATIENT)
Dept: INTERNAL MEDICINE | Facility: CLINIC | Age: 61
End: 2021-05-13

## 2021-05-14 ENCOUNTER — PRIOR AUTHORIZATION (OUTPATIENT)
Dept: INTERNAL MEDICINE | Facility: CLINIC | Age: 61
End: 2021-05-14

## 2021-05-18 ENCOUNTER — APPOINTMENT (OUTPATIENT)
Dept: SPEECH THERAPY | Facility: HOSPITAL | Age: 61
End: 2021-05-18

## 2021-05-21 ENCOUNTER — HOSPITAL ENCOUNTER (OUTPATIENT)
Dept: SPEECH THERAPY | Facility: HOSPITAL | Age: 61
Setting detail: THERAPIES SERIES
Discharge: HOME | End: 2021-05-21

## 2021-05-21 DIAGNOSIS — R41.3 MEMORY LOSS: Primary | ICD-10-CM

## 2021-05-21 PROCEDURE — 92507 TX SP LANG VOICE COMM INDIV: CPT

## 2021-05-24 ENCOUNTER — APPOINTMENT (OUTPATIENT)
Dept: SPEECH THERAPY | Facility: HOSPITAL | Age: 61
End: 2021-05-24

## 2021-05-24 ENCOUNTER — DOCUMENTATION (OUTPATIENT)
Dept: DIABETES SERVICES | Facility: HOSPITAL | Age: 61
End: 2021-05-24

## 2021-05-24 NOTE — PROGRESS NOTES
Spoke with patient for courtesy 6 month follow up via TELEPHONE due to COVID.  Encouraged self care and annual exams, including eye exam.  See note in Media tab.  Sent some snack ideas to patient as requested. KISHAN Bailey, RD, LD, MSEd, BC-ADM, UNA

## 2021-05-27 ENCOUNTER — HOSPITAL ENCOUNTER (OUTPATIENT)
Dept: SPEECH THERAPY | Facility: HOSPITAL | Age: 61
Setting detail: THERAPIES SERIES
Discharge: HOME | End: 2021-05-27

## 2021-05-27 DIAGNOSIS — R41.3 MEMORY LOSS: Primary | ICD-10-CM

## 2021-05-27 PROCEDURE — 92507 TX SP LANG VOICE COMM INDIV: CPT

## 2021-05-27 NOTE — THERAPY TREATMENT NOTE
Outpatient Speech Language Pathology   Adult Speech Language Cognitive Treatment Note   [facility]     Patient Name: Sandra Auguste  : 1960  MRN: [id number]  Today's Date: 2021         Visit Date: 2021   Patient Active Problem List   Diagnosis   • Abnormal computed tomography scan   • Anemia   • Post traumatic stress disorder (PTSD)   • Anxiety   • Strain of neck muscle   • Cough   • Diverticulitis of colon   • Fatigue   • Steatosis of liver   • Lateral epicondylitis   • Knee pain   • Spasm   • Adiposity   • Obstructive sleep apnea syndrome   • Osteoarthritis   • Palpitations   • Nausea   • Shortness of breath   • Skin tag   • Candidiasis of vagina   • Viral upper respiratory tract infection   • Gastroesophageal reflux disease   • Acute pain of left shoulder   • Abdominal distension (gaseous)   • SOB (shortness of breath) on exertion   • Sleep apnea with use of continuous positive airway pressure (CPAP)   • Seasonal allergies   • Obesity   • Migraine   • Mental disorder   • Diverticulosis   • Depression   • Arthritis   • Hypertension   • Itching   • Stabbing headache   • Temple tenderness   • TMJ click   • Mild intermittent asthma without complication   • Skull fracture (CMS/HCC)   • Type 2 diabetes mellitus without complication, without long-term current use of insulin (CMS/HCC)   • Health care maintenance   • Moderate episode of recurrent major depressive disorder (CMS/HCC)          Visit Dx:    ICD-10-CM ICD-9-CM   1. Memory loss  R41.3 780.93                         SLP OP Goals     Row Name 21 1300          Goal Type Needed    Goal Type Needed  Memory;Attention/Orientation;Other Adult Goals  -HG        Subjective Comments    Subjective Comments  Pt alert, cooperative,   -HG        Memory Goals    Patient will be able to remember information needed to return to work and function on work-related tasks  100%:;with cues  -HG     Status: Patient will be able to remember information needed  to return to work and function on work-related tasks  New  -HG     Patient will demonstrate improved ability to recall information by immediately recalling a series of words  90%:;unrelated;after delay  -HG     Status: Patient will demonstrate improved ability to recall information by immediately recalling a series of words  Progressing as expected  -HG     Comments: Patient will demonstrate improved ability to recall information by immediately recalling a series of words  5/21/21: Pt recall of reverse order of up to 4 words was 80% with minimal cues. 5/10/21: RBANS immediate recall score of 103 places pt in average range. 4/30/21: Pt immediate recall of 5 word arrays was 80% with minimal cues. 4/6/21: Immediate recall for ABC game pt was 90% accurate. 4/2/21: Picture recall for 75% accurate.  3/12/21: Word Placement: Inclusion for 4 words and pt was 100% accurate. 5 words: pt was 80% accurate. One Pile Card game: pt was required mod cues in order to recall rules of the game.   -HG     Patient’s memory skills will be enhanced as reported by patient by utilizing internal memory strategies to recall up to 3 pieces of information after a 5- minute delay  90%:;with cues  -HG     Status: Patient’s memory skills will be enhanced as reported by patient by utilizing internal memory strategies to recall up to 3 pieces of information after a 5- minute delay  Progressing as expected  -HG     Comments: Patient’s memory skills will be enhanced as reported by patient by utilizing internal memory strategies to recall up to 3 pieces of information after a 5- minute delay  5/27/21: 8 un-related words: pt was 8/8 with use of chaining.  5/21/21: Pt was 70% accurate with opposites with a delay and min-mod cues. 5/10/21: RBANS delayed memory of 119 places pt in high average range. 4/2/21: Paired pictures: pt was 9/10. 3/26/21: Delayed recall of 8 un-related words and pt was 8/8 x 3 with use of chaining. 3/19/21: Delayed recall of 7  un-related words: pt was % accurate. 3/16/21: Delayed recall of 6 un-related words: pt was 6/6; with increased delay, pt was 6/6 x 2.  3/12/21: Delayed recall of 4 un-related words: pt was 4/4 x 3. Delayed recall of 5 un-related words: pt was 5/5 x 3.   -HG     Patient’s memory skills will be enhanced as reported by patient by using external memory aides  90%:;with cues  -HG     Status: Patient’s memory skills will be enhanced as reported by patient by using external memory aides  Progressing as expected  -HG     Comments: Patient’s memory skills will be enhanced as reported by patient by using external memory aides  5/21/21: Pt utilized tactile cues to recall words in session. 4/23/21: Pt states she is having some difficulties staying on top of tasks. 4/16/21: Pt noted she is using her phone often to make sure she is not forgetting daily tasks. 3/16/21: Pt states that the past few days have been rough and she hasn't kept up with it very well. 3/12/21: Pt using daily journal per her report.   -HG        Attention/Orientation Goals    Patient will be able to execute all activities necessary to manage a home  Independently  -HG     Status: Patient will be able to execute all activities necessary to manage a home  Progressing as expected  -HG     Comments: Patient will be able to execute all activities necessary to manage a home  4/30/21: Pt states she is having difficulties with caring for her mother. Pt states she continuing to perform ADL's but notes it is taking a large toll. 4/23/21: Pt notes that she is dealing with a lot due to trying to care for her mother while continuing to do ADL's. Pt states she is having to block off times for herself. 4/16/21: Pt has noted she is having difficulties with taking care of mother but is completing ADL's. Pt notes she is not doing as much as she would like to do. 4/2/21: Had pt download the [employer] elena for FREE trials at this time. 3/16/21: Exec function exercise for  attention, memory and thought organization and overall, pt was 95% accurate.   -HG     Patient will improve attention skills by sustaining consistent behavioral response during continuous and repetitive activity (e.g., listening for target words, auditory/reading comprehension tasks)  without cues;10 minute task  -HG     Status: Patient will improve attention skills by sustaining consistent behavioral response during continuous and repetitive activity (e.g., listening for target words, auditory/reading comprehension tasks)  Progressing as expected  -HG     Comments: Patient will improve attention skills by sustaining consistent behavioral response during continuous and repetitive activity (e.g., listening for target words, auditory/reading comprehension tasks)  4/30/21: Pt completed Moving on Up and Middle Management card games together with 100% acccuracy. 4/16/21: Scoreboard card game and pt was 100% accurate. 3/19/21: One Pile Card Game: pt was 80% accurate.  3/12/21: Sustained attention for the game of One Pile and pt was 80% accurate.   -HG     Patient will improve attention skills by sustaining focus in order to actively hold and manipulate information provided (e.g., sequencing auditorily presented number series in ascending or descending order)  90%:;with cues  -HG     Status: Patient will improve attention skills by sustaining focus in order to actively hold and manipulate information provided (e.g., sequencing auditorily presented number series in ascending or descending order)  Progressing as expected  -HG     Comments: Patient will improve attention skills by sustaining focus in order to actively hold and manipulate information provided (e.g., sequencing auditorily presented number series in ascending or descending order)  5/21/21: Pt sustained attention for opposites with a delay was 70% accurate with min-mod cues. 4/6/21: Sustained attention for new card game and pt was 95% accurate.  3/16/21: Mental  Manipulation for 3 words, alphabetical and reversal and pt was 100% accurate.   -HG     Patient will improve attention skills by focusing to selective target/task when presented with competing stimuli or in a distracting environment in order to complete task  90%:;with cues  -HG     Status: Patient will improve attention skills by focusing to selective target/task when presented with competing stimuli or in a distracting environment in order to complete task  Progressing as expected  -HG     Comments: Patient will improve attention skills by focusing to selective target/task when presented with competing stimuli or in a distracting environment in order to complete task  5/21/21: Pt selective attention for opposites with a delay was 70% with min-mod cues. 5/10/21: RBANS attention score of 88 places pt in low average range. 4/30/21: Pt completed deduction puzzle with 100% accuracy independently. 4/27/21: Last letter game and pt selectively attended to cards while being distracted by other players. Pt 4/23/21: Step by step card game and pt was 100% accurate independently. 3/26/21: The game of [employer] and pt was 80% accurate. 3/12/21: Connecting the Dots- Alphabetical and pt was 90% accurate. Alternating attention task and pt was 95% accurate.   -HG     Patient will improve attention skills by alternating or shifting focus between two different tasks in order to complete both tasks  90%:;with cues  -HG     Status: Patient will improve attention skills by alternating or shifting focus between two different tasks in order to complete both tasks  Progressing as expected  -HG     Comments: Patient will improve attention skills by alternating or shifting focus between two different tasks in order to complete both tasks  5/27/21: Sorting cards by the letter within a suit and pt was 100% accurate with min cues. 4/16/21: Alternating attention card game where pt had to add 2 cards then switch between adding 2 cards and naming the  first letter of each numbered card. Pt was 100% accurate. 3/26/21: Memory Matches and pt was 70-90% accurate with use of attention strategies. 3/19/21: Memory Matches: pt was 80% accurate with min cues. 3/12/21: Divided attention for card sorting by  and by suit and pt was 85% accurate.   -HG        Other Goals    Other Adult Goal- 1  Pt will complete thought organization tasks with at least 90% accurate with cues.  -HG     Status: Other Adult Goal- 1  Progressing as expected  -HG     Comments: Other Adult Goal- 1  21: Following Written Directions: pt was 90% accurate.  21: Abstract and concrete category naming was 100%. Pt was 100% for selecting words that do no tbelong from a field of 5. 21: Abstract category naming: pt was 90% accurate.  3/12/21: Divergent naming: pt was concrete: 15/15, abstract: 15/15 with mod cues.    -HG     Other Adult Goal- 2  Pt will participate in further reasoning and problem solving evaluation with goals to follow as indicated.  -HG     Status: Other Adult Goal- 2  Progressing as expected  -HG     Comments: Other Adult Goal- 2  21: Pt completed deductive puzzle with 100% accuracy independently. 21: Deductive reasoning paragraph and pt was 80% accurate with minimal cues. 21: Deductive Reasoning puzzle and pt was 100% accurate. 3/26/21: Reasoning puzzle: Pt was 90% accurate with min cues.   -HG     Other Adult Goal- 3  Pt will improve RBANS Score to at least a 111 placing pt in the high average range.   -HG     Status: Other Adult Goal- 3  New  -HG     Comments: Other Adult Goal- 3  5/10/21: RBANS score of 103 places pt in the average range.   -HG        SLP Time Calculation    SLP Goal Re-Cert Due Date  21  -HG       User Key  (r) = Recorded By, (t) = Taken By, (c) = Cosigned By    Initials Name Provider Type    Sarita Bliss MS CCC-SLP Speech and Language Pathologist        OP SLP Education     Row Name 21 1300       Education     Education Comments  Education for attention as a strategy.   -      User Key  (r) = Recorded By, (t) = Taken By, (c) = Cosigned By    Initials Name Effective Dates    Sarita Bliss MS CCC-SLP 08/09/20 -         OP SLP Assessment/Plan - 05/27/21 1300        SLP Plan    Plan Comments  Cont with Cog tx with plan to further assess Attention.    -      User Key  (r) = Recorded By, (t) = Taken By, (c) = Cosigned By    Initials Name Provider Type    Sarita Bliss MS CCC-SLP Speech and Language Pathologist               Time Calculation:   SLP Start Time: 1400  Untimed Charges  77049-PI Treatment/ST Modification Prosth Aug Alter : 60  Total Minutes  Untimed Charges Total Minutes: 60   Total Minutes: 60    Therapy Charges for Today     Code Description Service Date Service Provider Modifiers Qty    [id number]  ST TREATMENT SPEECH 4 5/27/2021 Sarita Preston MS CCC-SLP GN 1                   Sarita Preston MS CCC-YOHANNES  5/27/2021

## 2021-06-01 ENCOUNTER — TELEPHONE (OUTPATIENT)
Dept: INTERNAL MEDICINE | Facility: CLINIC | Age: 61
End: 2021-06-01

## 2021-06-01 ENCOUNTER — OFFICE VISIT (OUTPATIENT)
Dept: INTERNAL MEDICINE | Facility: CLINIC | Age: 61
End: 2021-06-01

## 2021-06-01 VITALS
BODY MASS INDEX: 42.16 KG/M2 | TEMPERATURE: 99.3 F | SYSTOLIC BLOOD PRESSURE: 142 MMHG | OXYGEN SATURATION: 97 % | HEART RATE: 82 BPM | WEIGHT: 261.2 LBS | DIASTOLIC BLOOD PRESSURE: 78 MMHG

## 2021-06-01 DIAGNOSIS — F41.9 ANXIETY: Primary | ICD-10-CM

## 2021-06-01 DIAGNOSIS — R11.0 NAUSEA: ICD-10-CM

## 2021-06-01 DIAGNOSIS — R10.13 EPIGASTRIC PAIN: ICD-10-CM

## 2021-06-01 PROCEDURE — 99214 OFFICE O/P EST MOD 30 MIN: CPT | Performed by: NURSE PRACTITIONER

## 2021-06-01 RX ORDER — ONDANSETRON HYDROCHLORIDE 8 MG/1
8 TABLET, FILM COATED ORAL EVERY 8 HOURS PRN
Qty: 15 TABLET | Refills: 0 | Status: SHIPPED | OUTPATIENT
Start: 2021-06-01 | End: 2021-08-06 | Stop reason: SDUPTHER

## 2021-06-01 RX ORDER — OMEPRAZOLE 20 MG/1
20 CAPSULE, DELAYED RELEASE ORAL DAILY
Qty: 30 CAPSULE | Refills: 3 | Status: CANCELLED | OUTPATIENT
Start: 2021-06-01

## 2021-06-01 RX ORDER — FAMOTIDINE 40 MG/1
40 TABLET, FILM COATED ORAL DAILY
Qty: 30 TABLET | Refills: 5 | Status: SHIPPED | OUTPATIENT
Start: 2021-06-01 | End: 2022-04-19 | Stop reason: SDUPTHER

## 2021-06-01 RX ORDER — VILAZODONE HYDROCHLORIDE 20 MG/1
20 TABLET ORAL DAILY
Qty: 30 TABLET | Refills: 1 | Status: SHIPPED | OUTPATIENT
Start: 2021-06-01 | End: 2021-11-24

## 2021-06-01 NOTE — PROGRESS NOTES
"Sandra Auguste is a 60 y.o. female who presents for stomach pain and insomnia.    Chief Complaint   Patient presents with   • Abdominal Pain   • Nausea       Patient complaints of abdominal pain and nausea for 3 days. She reports having one episode of black stool today. She reports that she hasn't had an appetite. Prior to this BM, she hadn't had a BM in a few days, and she felt like it was harder to pass. She also reports tossing and turning and not being able to sleep with the abdominal pain. Patient also recently got more news regarding her mother's health condition and that has caused increased anxiety. She is interested in increasing her medicine.    Abdominal Pain  This is a new problem. The current episode started in the past 7 days. The onset quality is sudden. The problem occurs constantly. The problem has been unchanged. The pain is located in the generalized abdominal region. The pain is at a severity of 5/10. The pain is moderate. The quality of the pain is sharp and burning. The abdominal pain does not radiate. Associated symptoms include anorexia, constipation, melena and nausea. Pertinent negatives include no belching, diarrhea, dysuria, fever, hematuria, myalgias or vomiting. The pain is aggravated by eating and movement. The pain is relieved by being still. She has tried acetaminophen for the symptoms. The treatment provided mild relief.   Nausea  This is a new problem. The current episode started in the past 7 days. The problem has been unchanged. Associated symptoms include abdominal pain, anorexia, congestion (nasal congestion ) and nausea. Pertinent negatives include no chest pain, chills, coughing, fever, myalgias, rash, sore throat or vomiting. The symptoms are aggravated by eating. She has tried acetaminophen for the symptoms. The treatment provided mild relief.         Past Medical History:   Diagnosis Date   • Anesthesia complication     HAS TROUBLE GETTING \"NUMB\"    • Anxiety    • " "Arthritis    • Bladder spasms    • Depression    • Diverticulosis    • GERD (gastroesophageal reflux disease)    • Memory loss     FROM SKULL FRACTURE AND FALL-SHORT TERM MEMORY LOSS   • Mental disorder     PTSD   • Migraine    • Obesity    • Seasonal allergies    • Seizures (CMS/[facility])     \"convulsions\" at age 5   • Skull fracture (CMS/[facility]) 2012   • Sleep apnea with use of continuous positive airway pressure (CPAP)     INSTRUCTED TO BRING OWN MASK AND TUBING    • SOB (shortness of breath) on exertion    • Type 2 diabetes mellitus without complication, without long-term current use of insulin (CMS/[facility]) 10/11/2018   • Wears contact lenses    • Wears partial dentures     TOP ONLY    • Wears prescription eyeglasses    • Wears prescription eyeglasses        Past Surgical History:   Procedure Laterality Date   • CERVICAL POLYPECTOMY     • TOTAL LAPAROSCOPIC HYSTERECTOMY N/A 9/18/2017    Procedure: TOTAL LAPAROSCOPIC HYSTERECTOMY, BILATERAL SALPINGO OOPHORECTOMY WITH DAVINCI ROBOT ;  Surgeon: Shannon Grimaldo DO;  Location: [facility];  Service:    • WISDOM TOOTH EXTRACTION         Family History   Problem Relation Age of Onset   • Cancer Mother    • Cancer Maternal Aunt    • Cancer Paternal Aunt    • Breast cancer Paternal Aunt 70       Social History     Socioeconomic History   • Marital status: Single     Spouse name: Not on file   • Number of children: Not on file   • Years of education: Not on file   • Highest education level: Not on file   Tobacco Use   • Smoking status: Never Smoker   • Smokeless tobacco: Never Used   Vaping Use   • Vaping Use: Never used   Substance and Sexual Activity   • Alcohol use: No     Comment: ONCE A YEAR ON XMAS, IF THAT   • Drug use: No   • Sexual activity: Never     Birth control/protection: Post-menopausal       Allergies   Allergen Reactions   • Mold Extract [Trichophyton] Shortness Of Breath, Anxiety, Irritability, Other (See Comments) and Palpitations     SOA   • Lortab " [Hydrocodone-Acetaminophen] Nausea And Vomiting     SEVERE NAUSEA AND SLIGHT VOMITING   • Methylprednisolone Other (See Comments)     Bleeding, cramping   • Pollen Extract Other (See Comments)     SNEEZING AND CONGESTION        ROS    Review of Systems   Constitutional: Negative for chills and fever.   HENT: Positive for congestion (nasal congestion ). Negative for rhinorrhea and sore throat.    Eyes: Positive for double vision. Negative for blurred vision and visual disturbance.   Respiratory: Positive for shortness of breath (asthma). Negative for cough and chest tightness.    Cardiovascular: Negative for chest pain, palpitations and leg swelling.   Gastrointestinal: Positive for abdominal pain, anorexia, constipation, melena and nausea. Negative for diarrhea and vomiting.   Endocrine: Negative for cold intolerance and heat intolerance.   Genitourinary: Negative for dysuria and hematuria.   Musculoskeletal: Negative for back pain and myalgias.   Skin: Negative for rash.   Allergic/Immunologic: Negative for immunocompromised state.   Neurological: Positive for dizziness and light-headedness. Negative for headache.   Hematological: Negative for adenopathy.   Psychiatric/Behavioral: Positive for depressed mood. Negative for suicidal ideas. The patient is nervous/anxious (mother's health issues is causing her to become more anxious).        Vitals:    06/01/21 1542 06/01/21 1615   BP: 142/78    Pulse: 84 82   Temp:  99.3 °F (37.4 °C)   SpO2: 98% 97%   Weight: 118 kg (261 lb 3.2 oz)    PainSc:   6          Current Outpatient Medications:   •  acetaminophen (TYLENOL) 500 MG tablet, Take 500 mg by mouth Every 6 (Six) Hours As Needed for Mild Pain ., Disp: , Rfl:   •  albuterol sulfate  (90 Base) MCG/ACT inhaler, Inhale 1 puff Every 6 (Six) Hours As Needed for Shortness of Air. INHALE TWO PUFFS BY MOUTH EVERY 6 HOURS AS NEEDED, Disp: 1 inhaler, Rfl: 2  •  ALPRAZolam (XANAX) 0.25 MG tablet, Take 1 tablet by mouth  Daily As Needed for Anxiety., Disp: 30 tablet, Rfl: 0  •  Breo Ellipta 100-25 MCG/INH inhaler, Inhale 1 puff Daily., Disp: 60 each, Rfl: 5  •  diclofenac (VOLTAREN) 1 % gel gel, Apply 4 g topically to the appropriate area as directed 4 (Four) Times a Day As Needed (for pain in shoulders)., Disp: 100 g, Rfl: 1  •  EPINEPHrine (EPIPEN) 0.3 MG/0.3ML solution auto-injector injection, Inject 0.3 mL into the appropriate muscle as directed by prescriber As Needed (FOR SEVERE ALLERGIC REACTION)., Disp: 1 each, Rfl: 1  •  famotidine (PEPCID) 40 MG tablet, Take 1 tablet by mouth Daily., Disp: 30 tablet, Rfl: 5  •  fluticasone (FLONASE) 50 MCG/ACT nasal spray, 2 sprays into the nostril(s) as directed by provider Daily for 30 days., Disp: 16 g, Rfl: 5  •  glucose blood test strip, Use as instructed to check blood glucose once daily., Disp: 100 each, Rfl: 12  •  glucose monitor monitoring kit, Use as directed to check blood glucose once daily., Disp: 1 each, Rfl: 0  •  [employer] Lancets UltraThin 30G misc, Use as instructed check blood glucose once daily., Disp: 100 each, Rfl: 12  •  loratadine (CLARITIN) 10 MG tablet, Take 1 tablet by mouth Daily., Disp: 90 tablet, Rfl: 1  •  ondansetron (Zofran) 8 MG tablet, Take 1 tablet by mouth Every 8 (Eight) Hours As Needed for Nausea or Vomiting., Disp: 15 tablet, Rfl: 0  •  vilazodone (VIIBRYD) 20 MG tablet tablet, Take 1 tablet by mouth Daily., Disp: 30 tablet, Rfl: 1    PE    Physical Exam  Vitals and nursing note reviewed.   Constitutional:       General: She is awake. She is not in acute distress.     Appearance: Normal appearance. She is well-developed. She is obese. She is not ill-appearing, toxic-appearing or diaphoretic.   HENT:      Head: Normocephalic and atraumatic.      Right Ear: Hearing normal.      Left Ear: Hearing normal.      Nose: Nose normal.      Mouth/Throat:      Mouth: Mucous membranes are moist.   Eyes:      General: Lids are normal.      Conjunctiva/sclera:  Conjunctivae normal.      Pupils: Pupils are equal, round, and reactive to light.   Cardiovascular:      Rate and Rhythm: Normal rate and regular rhythm.      Pulses: Normal pulses.      Heart sounds: Normal heart sounds. No murmur heard.   No friction rub. No gallop.    Pulmonary:      Effort: Pulmonary effort is normal.      Breath sounds: Normal breath sounds.   Abdominal:      General: Abdomen is flat. Bowel sounds are normal.      Palpations: Abdomen is soft.      Tenderness: There is abdominal tenderness in the left upper quadrant and left lower quadrant. There is no right CVA tenderness, left CVA tenderness, guarding or rebound. Negative signs include Borden's sign, Rovsing's sign, McBurney's sign, psoas sign and obturator sign.   Musculoskeletal:         General: Normal range of motion.      Cervical back: Normal range of motion and neck supple.   Lymphadenopathy:      Cervical: No cervical adenopathy.   Skin:     General: Skin is warm.      Capillary Refill: Capillary refill takes less than 2 seconds.      Findings: No rash.   Neurological:      General: No focal deficit present.      Mental Status: She is alert, oriented to person, place, and time and easily aroused. Mental status is at baseline.      GCS: GCS eye subscore is 4. GCS verbal subscore is 5. GCS motor subscore is 6.      Cranial Nerves: Cranial nerves are intact.      Sensory: Sensation is intact.      Motor: Motor function is intact.      Coordination: Coordination is intact.      Gait: Gait is intact.   Psychiatric:         Attention and Perception: Attention and perception normal.         Mood and Affect: Mood and affect normal.         Speech: Speech normal.         Behavior: Behavior normal. Behavior is cooperative.         Thought Content: Thought content normal.         Cognition and Memory: Cognition and memory normal.         Judgment: Judgment normal.          A/P    Problem List Items Addressed This Visit        Gastrointestinal  Abdominal     Nausea    Current Assessment & Plan     Take zofran and pepcid as prescribed.         Relevant Medications    ondansetron (Zofran) 8 MG tablet    famotidine (PEPCID) 40 MG tablet    Other Relevant Orders    NM Gastric Emptying       Mental Health    Anxiety - Primary    Current Assessment & Plan     Continue taking Viibryd and alprazolam as prescribed.         Relevant Medications    ALPRAZolam (XANAX) 0.25 MG tablet    vilazodone (VIIBRYD) 20 MG tablet tablet      Other Visit Diagnoses     Epigastric pain        Start omeprazole as ordered. Gastric Emptying study ordered.    Relevant Orders    NM Gastric Emptying          Assessment      ICD-10-CM ICD-9-CM   1. Anxiety  F41.9 300.00   2. Epigastric pain  R10.13 789.06   3. Nausea  R11.0 787.02            Problems Addressed this Visit        Gastrointestinal Abdominal     Nausea     Take zofran and pepcid as prescribed.         Relevant Medications    ondansetron (Zofran) 8 MG tablet    famotidine (PEPCID) 40 MG tablet    Other Relevant Orders    NM Gastric Emptying       Mental Health    Anxiety - Primary     Continue taking Viibryd and alprazolam as prescribed.         Relevant Medications    vilazodone (VIIBRYD) 20 MG tablet tablet      Other Visit Diagnoses     Epigastric pain        Start omeprazole as ordered. Gastric Emptying study ordered.    Relevant Orders    NM Gastric Emptying      Diagnoses       Codes Comments    Anxiety    -  Primary ICD-10-CM: F41.9  ICD-9-CM: 300.00     Epigastric pain     ICD-10-CM: R10.13  ICD-9-CM: 789.06 Start omeprazole as ordered. Gastric Emptying study ordered.    Nausea     ICD-10-CM: R11.0  ICD-9-CM: 787.02            Plan    Orders Placed This Encounter   Procedures   • NM Gastric Emptying     New Medications Ordered This Visit   Medications   • vilazodone (VIIBRYD) 20 MG tablet tablet     Sig: Take 1 tablet by mouth Daily.     Dispense:  30 tablet     Refill:  1   • ondansetron (Zofran) 8 MG tablet     Sig:  Take 1 tablet by mouth Every 8 (Eight) Hours As Needed for Nausea or Vomiting.     Dispense:  15 tablet     Refill:  0   • famotidine (PEPCID) 40 MG tablet     Sig: Take 1 tablet by mouth Daily.     Dispense:  30 tablet     Refill:  5               Looked back at the EGD she had 6 months ago. They recommended a 4 hour gastric emptying study if symptoms persist.     Plan of care reviewed with patient at the conclusion of today's visit. Education was provided regarding diagnosis, management and any prescribed or recommended OTC medications.  Patient verbalizes understanding of and agreement with management plan.    Return if symptoms worsen or fail to improve.     REBECA Pedroza

## 2021-06-10 RX ORDER — VILAZODONE HYDROCHLORIDE 10 MG/1
TABLET ORAL
Qty: 30 TABLET | Refills: 1 | OUTPATIENT
Start: 2021-06-10

## 2021-06-15 ENCOUNTER — HOSPITAL ENCOUNTER (OUTPATIENT)
Dept: SPEECH THERAPY | Facility: HOSPITAL | Age: 61
Setting detail: THERAPIES SERIES
Discharge: HOME | End: 2021-06-15

## 2021-06-15 DIAGNOSIS — R41.3 MEMORY LOSS: Primary | ICD-10-CM

## 2021-06-15 PROCEDURE — 92507 TX SP LANG VOICE COMM INDIV: CPT

## 2021-06-15 NOTE — THERAPY PROGRESS REPORT/RE-CERT
Outpatient Speech Language Pathology   Adult Speech Language Cognitive Progress Note  [facility]     Patient Name: Sandra Auguste  : 1960  MRN: [id number]  Today's Date: 6/15/2021         Visit Date: 06/15/2021   Patient Active Problem List   Diagnosis   • Abnormal computed tomography scan   • Anemia   • Post traumatic stress disorder (PTSD)   • Anxiety   • Strain of neck muscle   • Cough   • Diverticulitis of colon   • Fatigue   • Steatosis of liver   • Lateral epicondylitis   • Knee pain   • Spasm   • Adiposity   • Obstructive sleep apnea syndrome   • Osteoarthritis   • Palpitations   • Nausea   • Shortness of breath   • Skin tag   • Candidiasis of vagina   • Viral upper respiratory tract infection   • Gastroesophageal reflux disease   • Acute pain of left shoulder   • Abdominal distension (gaseous)   • SOB (shortness of breath) on exertion   • Sleep apnea with use of continuous positive airway pressure (CPAP)   • Seasonal allergies   • Obesity   • Migraine   • Mental disorder   • Diverticulosis   • Depression   • Arthritis   • Hypertension   • Itching   • Stabbing headache   • Temple tenderness   • TMJ click   • Mild intermittent asthma without complication   • Skull fracture (CMS/HCC)   • Type 2 diabetes mellitus without complication, without long-term current use of insulin (CMS/HCC)   • Health care maintenance   • Moderate episode of recurrent major depressive disorder (CMS/HCC)          Visit Dx:    ICD-10-CM ICD-9-CM   1. Memory loss  R41.3 780.93                         SLP OP Goals     Row Name 06/15/21 1415          Goal Type Needed    Goal Type Needed  Memory;Attention/Orientation;Other Adult Goals  -HG        Subjective Comments    Subjective Comments  Pt alert, cooperative, continues to care for her mother.   -HG        Memory Goals    Patient will be able to remember information needed to return to work and function on work-related tasks  100%:;with cues  -HG     Status: Patient will be able  to remember information needed to return to work and function on work-related tasks  New  -HG     Patient will demonstrate improved ability to recall information by immediately recalling a series of words  90%:;unrelated;after delay  -HG     Status: Patient will demonstrate improved ability to recall information by immediately recalling a series of words  Progressing as expected  -HG     Comments: Patient will demonstrate improved ability to recall information by immediately recalling a series of words  6/15/21: Immediate recall of 10 un-related pictures and with no strategy, pt was 6/10. With use of grouping, pt was 9/10 and improved to 10/10. 5/21/21: Pt recall of reverse order of up to 4 words was 80% with minimal cues. 5/10/21: RBANS immediate recall score of 103 places pt in average range. 4/30/21: Pt immediate recall of 5 word arrays was 80% with minimal cues. 4/6/21: Immediate recall for ABC game pt was 90% accurate. 4/2/21: Picture recall for 75% accurate.  3/12/21: Word Placement: Inclusion for 4 words and pt was 100% accurate. 5 words: pt was 80% accurate. One Pile Card game: pt was required mod cues in order to recall rules of the game.   -HG     Patient’s memory skills will be enhanced as reported by patient by utilizing internal memory strategies to recall up to 3 pieces of information after a 5- minute delay  90%:;with cues  -HG     Status: Patient’s memory skills will be enhanced as reported by patient by utilizing internal memory strategies to recall up to 3 pieces of information after a 5- minute delay  Progressing as expected  -HG     Comments: Patient’s memory skills will be enhanced as reported by patient by utilizing internal memory strategies to recall up to 3 pieces of information after a 5- minute delay  6/15/21: Delayed recall of 10 un-related pictures and pt was 10/10.  5/27/21: 8 un-related words: pt was 8/8 with use of chaining.  5/21/21: Pt was 70% accurate with opposites with a delay  and min-mod cues. 5/10/21: RBANS delayed memory of 119 places pt in high average range. 4/2/21: Paired pictures: pt was 9/10. 3/26/21: Delayed recall of 8 un-related words and pt was 8/8 x 3 with use of chaining. 3/19/21: Delayed recall of 7 un-related words: pt was % accurate. 3/16/21: Delayed recall of 6 un-related words: pt was 6/6; with increased delay, pt was 6/6 x 2.  3/12/21: Delayed recall of 4 un-related words: pt was 4/4 x 3. Delayed recall of 5 un-related words: pt was 5/5 x 3.   -HG     Patient’s memory skills will be enhanced as reported by patient by using external memory aides  90%:;with cues  -HG     Status: Patient’s memory skills will be enhanced as reported by patient by using external memory aides  Progressing as expected  -HG     Comments: Patient’s memory skills will be enhanced as reported by patient by using external memory aides  5/21/21: Pt utilized tactile cues to recall words in session. 4/23/21: Pt states she is having some difficulties staying on top of tasks. 4/16/21: Pt noted she is using her phone often to make sure she is not forgetting daily tasks. 3/16/21: Pt states that the past few days have been rough and she hasn't kept up with it very well. 3/12/21: Pt using daily journal per her report.   -HG        Attention/Orientation Goals    Patient will be able to execute all activities necessary to manage a home  Independently  -HG     Status: Patient will be able to execute all activities necessary to manage a home  Progressing as expected  -HG     Comments: Patient will be able to execute all activities necessary to manage a home  4/30/21: Pt states she is having difficulties with caring for her mother. Pt states she continuing to perform ADL's but notes it is taking a large toll. 4/23/21: Pt notes that she is dealing with a lot due to trying to care for her mother while continuing to do ADL's. Pt states she is having to block off times for herself. 4/16/21: Pt has noted she  is having difficulties with taking care of mother but is completing ADL's. Pt notes she is not doing as much as she would like to do. 4/2/21: Had pt download the [employer] mar[employer] elena for FREE trials at this time. 3/16/21: Exec function exercise for attention, memory and thought organization and overall, pt was 95% accurate.   -HG     Patient will improve attention skills by sustaining consistent behavioral response during continuous and repetitive activity (e.g., listening for target words, auditory/reading comprehension tasks)  without cues;10 minute task  -HG     Status: Patient will improve attention skills by sustaining consistent behavioral response during continuous and repetitive activity (e.g., listening for target words, auditory/reading comprehension tasks)  Progressing as expected  -HG     Comments: Patient will improve attention skills by sustaining consistent behavioral response during continuous and repetitive activity (e.g., listening for target words, auditory/reading comprehension tasks)  4/30/21: Pt completed Moving on Up and Middle Management card games together with 100% acccuracy. 4/16/21: Scoreboard card game and pt was 100% accurate. 3/19/21: One Pile Card Game: pt was 80% accurate.  3/12/21: Sustained attention for the game of One Pile and pt was 80% accurate.   -HG     Patient will improve attention skills by sustaining focus in order to actively hold and manipulate information provided (e.g., sequencing auditorily presented number series in ascending or descending order)  90%:;with cues  -HG     Status: Patient will improve attention skills by sustaining focus in order to actively hold and manipulate information provided (e.g., sequencing auditorily presented number series in ascending or descending order)  Progressing as expected  -HG     Comments: Patient will improve attention skills by sustaining focus in order to actively hold and manipulate information provided (e.g., sequencing auditorily  presented number series in ascending or descending order)  5/21/21: Pt sustained attention for opposites with a delay was 70% accurate with min-mod cues. 4/6/21: Sustained attention for new card game and pt was 95% accurate.  3/16/21: Mental Manipulation for 3 words, alphabetical and reversal and pt was 100% accurate.   -HG     Patient will improve attention skills by focusing to selective target/task when presented with competing stimuli or in a distracting environment in order to complete task  90%:;with cues  -HG     Status: Patient will improve attention skills by focusing to selective target/task when presented with competing stimuli or in a distracting environment in order to complete task  Progressing as expected  -HG     Comments: Patient will improve attention skills by focusing to selective target/task when presented with competing stimuli or in a distracting environment in order to complete task  5/21/21: Pt selective attention for opposites with a delay was 70% with min-mod cues. 5/10/21: RBANS attention score of 88 places pt in low average range. 4/30/21: Pt completed deduction puzzle with 100% accuracy independently. 4/27/21: Last letter game and pt selectively attended to cards while being distracted by other players. Pt 4/23/21: Step by step card game and pt was 100% accurate independently. 3/26/21: The game of Laguo and pt was 80% accurate. 3/12/21: Connecting the Dots- Alphabetical and pt was 90% accurate. Alternating attention task and pt was 95% accurate.   -HG     Patient will improve attention skills by alternating or shifting focus between two different tasks in order to complete both tasks  90%:;with cues  -HG     Status: Patient will improve attention skills by alternating or shifting focus between two different tasks in order to complete both tasks  Progressing as expected  -HG     Comments: Patient will improve attention skills by alternating or shifting focus between two different tasks  in order to complete both tasks  21: Sorting cards by the letter within a suit and pt was 100% accurate with min cues. 21: Alternating attention card game where pt had to add 2 cards then switch between adding 2 cards and naming the first letter of each numbered card. Pt was 100% accurate. 3/26/21: Memory Matches and pt was 70-90% accurate with use of attention strategies. 3/19/21: Memory Matches: pt was 80% accurate with min cues. 3/12/21: Divided attention for card sorting by  and by suit and pt was 85% accurate.   -HG        Other Goals    Other Adult Goal- 1  Pt will complete thought organization tasks with at least 90% accurate with cues.  -HG     Status: Other Adult Goal- 1  Progressing as expected  -HG     Comments: Other Adult Goal- 1  21: Following Written Directions: pt was 90% accurate.  21: Abstract and concrete category naming was 100%. Pt was 100% for selecting words that do no tbelong from a field of 5. 21: Abstract category naming: pt was 90% accurate.  3/12/21: Divergent naming: pt was concrete: 15/15, abstract: 15/15 with mod cues.    -HG     Other Adult Goal- 2  Pt will participate in further reasoning and problem solving evaluation with goals to follow as indicated.  -HG     Status: Other Adult Goal- 2  Progressing as expected  -HG     Comments: Other Adult Goal- 2  6/15/21: Connect the dots alphabetical order: pt was 100%  accurate. 21: Pt completed deductive puzzle with 100% accuracy independently. 21: Deductive reasoning paragraph and pt was 80% accurate with minimal cues. 21: Deductive Reasoning puzzle and pt was 100% accurate. 3/26/21: Reasoning puzzle: Pt was 90% accurate with min cues.   -HG     Other Adult Goal- 3  Pt will improve RBANS Score to at least a 111 placing pt in the high average range.   -HG     Status: Other Adult Goal- 3  Progressing as expected  -HG     Comments: Other Adult Goal- 3  6/15/21: APT Scores were within normal range  except for Divided attention which was below average. 5/10/21: RBANS score of 103 places pt in the average range.   -HG        SLP Time Calculation    SLP Goal Re-Cert Due Date  07/15/21  -HG       User Key  (r) = Recorded By, (t) = Taken By, (c) = Cosigned By    Initials Name Provider Type    Sarita Bliss L, MS CCC-SLP Speech and Language Pathologist        OP SLP Education     Row Name 06/15/21 1415       Education    Barriers to Learning  No barriers identified  -HG    Education Provided  Described results of evaluation;Patient expressed understanding of evaluation;Patient participated in establishing goals and treatment plan;Patient demonstrated recommended strategies;Patient requires further education on strategies, risks  -    Assessed  Learning needs;Learning motivation;Learning preferences  -    Learning Motivation  Strong  -    Learning Method  Explanation;Demonstration;Teach back  -    Teaching Response  Verbalized understanding;Demonstrated understanding;Reinforcement needed  -    Education Comments  Hmwk for attention strategies.   -HG      User Key  (r) = Recorded By, (t) = Taken By, (c) = Cosigned By    Initials Name Effective Dates     Sarita Preston MS CCC-SLP 08/09/20 -         OP SLP Assessment/Plan - 06/15/21 1415        SLP Assessment    Functional Problems  Speech Language- Adult/Cognition   -HG    Impact on Function: Adult Speech Language/Cognition  Poor attention to task   -    Clinical Impression: Speech Language-Adult/Congnition  Minimal:;Mild:;Cognitive Communication Impairment   -HG    Functional Problems Comment  Pt aware of attention deficits   -HG    Clinical Impression Comments  APT Scores were WNL other than divided attention   -HG    Please refer to paper survey for additional self-reported information  Yes   -HG    Please refer to items scanned into chart for additional diagnostic informaiton and handouts as provided by clinician  Yes   -HG    SLP Diagnosis   Minimal to Mild Cog/Comm deficits.   -HG    Prognosis  Excellent (comment)   -HG    Patient/caregiver participated in establishment of treatment plan and goals  Yes   -HG    Patient would benefit from skilled therapy intervention  Yes   -HG       SLP Plan    Frequency  1x/week   -HG    Duration  6 weeks   -HG    Planned CPT's?  SLP INDIVIDUAL SPEECH THERAPY: 12031   -HG    Expected Duration of Therapy Session (SLP Eval)  60   -HG    Plan Comments  Cont with Cog tx and focus on attention   -HG      User Key  (r) = Recorded By, (t) = Taken By, (c) = Cosigned By    Initials Name Provider Type     Sarita Preston, MS CCC-SLP Speech and Language Pathologist               Time Calculation:   SLP Start Time: 1415  Untimed Charges  02570-VP Treatment/ST Modification Prosth Aug Alter : 60  Total Minutes  Untimed Charges Total Minutes: 60   Total Minutes: 60    Therapy Charges for Today     Code Description Service Date Service Provider Modifiers Qty    [id number]  ST TREATMENT SPEECH 4 6/15/2021 Sarita Preston MS CCC-SLP GN 1                   Sarita Preston MS CCC-SLP  6/15/2021

## 2021-06-22 ENCOUNTER — HOSPITAL ENCOUNTER (OUTPATIENT)
Dept: SPEECH THERAPY | Facility: HOSPITAL | Age: 61
Setting detail: THERAPIES SERIES
Discharge: HOME | End: 2021-06-22

## 2021-06-22 DIAGNOSIS — R41.3 MEMORY LOSS: Primary | ICD-10-CM

## 2021-06-22 PROCEDURE — 92507 TX SP LANG VOICE COMM INDIV: CPT

## 2021-06-22 NOTE — THERAPY TREATMENT NOTE
Outpatient Speech Language Pathology   Adult Speech Language Cognitive Treatment Note  [facility]     Patient Name: Sandra Auguste  : 1960  MRN: [id number]  Today's Date: 2021         Visit Date: 2021   Patient Active Problem List   Diagnosis   • Abnormal computed tomography scan   • Anemia   • Post traumatic stress disorder (PTSD)   • Anxiety   • Strain of neck muscle   • Cough   • Diverticulitis of colon   • Fatigue   • Steatosis of liver   • Lateral epicondylitis   • Knee pain   • Spasm   • Adiposity   • Obstructive sleep apnea syndrome   • Osteoarthritis   • Palpitations   • Nausea   • Shortness of breath   • Skin tag   • Candidiasis of vagina   • Viral upper respiratory tract infection   • Gastroesophageal reflux disease   • Acute pain of left shoulder   • Abdominal distension (gaseous)   • SOB (shortness of breath) on exertion   • Sleep apnea with use of continuous positive airway pressure (CPAP)   • Seasonal allergies   • Obesity   • Migraine   • Mental disorder   • Diverticulosis   • Depression   • Arthritis   • Hypertension   • Itching   • Stabbing headache   • Temple tenderness   • TMJ click   • Mild intermittent asthma without complication   • Skull fracture (CMS/HCC)   • Type 2 diabetes mellitus without complication, without long-term current use of insulin (CMS/HCC)   • Health care maintenance   • Moderate episode of recurrent major depressive disorder (CMS/HCC)          Visit Dx:    ICD-10-CM ICD-9-CM   1. Memory loss  R41.3 780.93                         SLP OP Goals     Row Name 21 1313          Goal Type Needed    Goal Type Needed  Memory;Attention/Orientation;Other Adult Goals  -HG        Subjective Comments    Subjective Comments  Pt alert, cooperative, thought her appt was at 14:00. Concerned about her mother whom she is the primary caregiver.   -HG        Memory Goals    Patient will be able to remember information needed to return to work and function on work-related  tasks  100%:;with cues  -HG     Status: Patient will be able to remember information needed to return to work and function on work-related tasks  New  -HG     Patient will demonstrate improved ability to recall information by immediately recalling a series of words  90%:;unrelated;after delay  -HG     Status: Patient will demonstrate improved ability to recall information by immediately recalling a series of words  Progressing as expected  -HG     Comments: Patient will demonstrate improved ability to recall information by immediately recalling a series of words  6/22/21: Immediate recall of names and faces: pt was 4/5. 6/15/21: Immediate recall of 10 un-related pictures and with no strategy, pt was 6/10. With use of grouping, pt was 9/10 and improved to 10/10. 5/21/21: Pt recall of reverse order of up to 4 words was 80% with minimal cues. 5/10/21: RBANS immediate recall score of 103 places pt in average range. 4/30/21: Pt immediate recall of 5 word arrays was 80% with minimal cues. 4/6/21: Immediate recall for ABC game pt was 90% accurate. 4/2/21: Picture recall for 75% accurate.  3/12/21: Word Placement: Inclusion for 4 words and pt was 100% accurate. 5 words: pt was 80% accurate. One Pile Card game: pt was required mod cues in order to recall rules of the game.   -HG     Patient’s memory skills will be enhanced as reported by patient by utilizing internal memory strategies to recall up to 3 pieces of information after a 5- minute delay  90%:;with cues  -HG     Status: Patient’s memory skills will be enhanced as reported by patient by utilizing internal memory strategies to recall up to 3 pieces of information after a 5- minute delay  Progressing as expected  -HG     Comments: Patient’s memory skills will be enhanced as reported by patient by utilizing internal memory strategies to recall up to 3 pieces of information after a 5- minute delay  6/22/21: Delayed recall of names and faces: pt was 5/5. 6/15/21: Delayed  recall of 10 un-related pictures and pt was 10/10.  5/27/21: 8 un-related words: pt was 8/8 with use of chaining.  5/21/21: Pt was 70% accurate with opposites with a delay and min-mod cues. 5/10/21: RBANS delayed memory of 119 places pt in high average range. 4/2/21: Paired pictures: pt was 9/10. 3/26/21: Delayed recall of 8 un-related words and pt was 8/8 x 3 with use of chaining. 3/19/21: Delayed recall of 7 un-related words: pt was % accurate. 3/16/21: Delayed recall of 6 un-related words: pt was 6/6; with increased delay, pt was 6/6 x 2.  3/12/21: Delayed recall of 4 un-related words: pt was 4/4 x 3. Delayed recall of 5 un-related words: pt was 5/5 x 3.   -HG     Patient’s memory skills will be enhanced as reported by patient by using external memory aides  90%:;with cues  -HG     Status: Patient’s memory skills will be enhanced as reported by patient by using external memory aides  Progressing as expected  -HG     Comments: Patient’s memory skills will be enhanced as reported by patient by using external memory aides  5/21/21: Pt utilized tactile cues to recall words in session. 4/23/21: Pt states she is having some difficulties staying on top of tasks. 4/16/21: Pt noted she is using her phone often to make sure she is not forgetting daily tasks. 3/16/21: Pt states that the past few days have been rough and she hasn't kept up with it very well. 3/12/21: Pt using daily journal per her report.   -HG        Attention/Orientation Goals    Patient will be able to execute all activities necessary to manage a home  Independently  -HG     Status: Patient will be able to execute all activities necessary to manage a home  Progressing as expected  -HG     Comments: Patient will be able to execute all activities necessary to manage a home  4/30/21: Pt states she is having difficulties with caring for her mother. Pt states she continuing to perform ADL's but notes it is taking a large toll. 4/23/21: Pt notes that she  is dealing with a lot due to trying to care for her mother while continuing to do ADL's. Pt states she is having to block off times for herself. 4/16/21: Pt has noted she is having difficulties with taking care of mother but is completing ADL's. Pt notes she is not doing as much as she would like to do. 4/2/21: Had pt download the [employer] for FREE trials at this time. 3/16/21: Exec function exercise for attention, memory and thought organization and overall, pt was 95% accurate.   -HG     Patient will improve attention skills by sustaining consistent behavioral response during continuous and repetitive activity (e.g., listening for target words, auditory/reading comprehension tasks)  without cues;10 minute task  -HG     Status: Patient will improve attention skills by sustaining consistent behavioral response during continuous and repetitive activity (e.g., listening for target words, auditory/reading comprehension tasks)  Progressing as expected  -HG     Comments: Patient will improve attention skills by sustaining consistent behavioral response during continuous and repetitive activity (e.g., listening for target words, auditory/reading comprehension tasks)  4/30/21: Pt completed Moving on Up and Middle Management card games together with 100% acccuracy. 4/16/21: Scoreboard card game and pt was 100% accurate. 3/19/21: One Pile Card Game: pt was 80% accurate.  3/12/21: Sustained attention for the game of One Pile and pt was 80% accurate.   -HG     Patient will improve attention skills by sustaining focus in order to actively hold and manipulate information provided (e.g., sequencing auditorily presented number series in ascending or descending order)  90%:;with cues  -HG     Status: Patient will improve attention skills by sustaining focus in order to actively hold and manipulate information provided (e.g., sequencing auditorily presented number series in ascending or descending order)  Progressing as expected   -HG     Comments: Patient will improve attention skills by sustaining focus in order to actively hold and manipulate information provided (e.g., sequencing auditorily presented number series in ascending or descending order)  5/21/21: Pt sustained attention for opposites with a delay was 70% accurate with min-mod cues. 4/6/21: Sustained attention for new card game and pt was 95% accurate.  3/16/21: Mental Manipulation for 3 words, alphabetical and reversal and pt was 100% accurate.   -HG     Patient will improve attention skills by focusing to selective target/task when presented with competing stimuli or in a distracting environment in order to complete task  90%:;with cues  -HG     Status: Patient will improve attention skills by focusing to selective target/task when presented with competing stimuli or in a distracting environment in order to complete task  Progressing as expected  -HG     Comments: Patient will improve attention skills by focusing to selective target/task when presented with competing stimuli or in a distracting environment in order to complete task  5/21/21: Pt selective attention for opposites with a delay was 70% with min-mod cues. 5/10/21: RBANS attention score of 88 places pt in low average range. 4/30/21: Pt completed deduction puzzle with 100% accuracy independently. 4/27/21: Last letter game and pt selectively attended to cards while being distracted by other players. Pt 4/23/21: Step by step card game and pt was 100% accurate independently. 3/26/21: The game of [employer] and pt was 80% accurate. 3/12/21: Connecting the Dots- Alphabetical and pt was 90% accurate. Alternating attention task and pt was 95% accurate.   -HG     Patient will improve attention skills by alternating or shifting focus between two different tasks in order to complete both tasks  90%:;with cues  -HG     Status: Patient will improve attention skills by alternating or shifting focus between two different tasks in  order to complete both tasks  Progressing as expected  -HG     Comments: Patient will improve attention skills by alternating or shifting focus between two different tasks in order to complete both tasks  21: Alternating attention for card sorting by odd and evens and colors and pt was 90% accurate. 21: 21: Sorting cards by the letter within a suit and pt was 100% accurate with min cues. 21: Alternating attention card game where pt had to add 2 cards then switch between adding 2 cards and naming the first letter of each numbered card. Pt was 100% accurate. 3/26/21: Memory Matches and pt was 70-90% accurate with use of attention strategies. 3/19/21: Memory Matches: pt was 80% accurate with min cues. 3/12/21: Divided attention for card sorting by  and by suit and pt was 85% accurate.   -HG        Other Goals    Other Adult Goal- 1  Pt will complete thought organization tasks with at least 90% accurate with cues.  -HG     Status: Other Adult Goal- 1  Progressing as expected  -HG     Comments: Other Adult Goal- 1  21: Following Written Directions: pt was 90% accurate.  21: Abstract and concrete category naming was 100%. Pt was 100% for selecting words that do no tbelong from a field of 5. 21: Abstract category naming: pt was 90% accurate.  3/12/21: Divergent naming: pt was concrete: 15/15, abstract: 15/15 with mod cues.    -HG     Other Adult Goal- 2  Pt will participate in further reasoning and problem solving evaluation with goals to follow as indicated.  -HG     Status: Other Adult Goal- 2  Progressing as expected  -HG     Comments: Other Adult Goal- 2  6/15/21: Connect the dots alphabetical order: pt was 100%  accurate. 21: Pt completed deductive puzzle with 100% accuracy independently. 21: Deductive reasoning paragraph and pt was 80% accurate with minimal cues. 21: Deductive Reasoning puzzle and pt was 100% accurate. 3/26/21: Reasoning puzzle: Pt was 90% accurate with  min cues.   -HG     Other Adult Goal- 3  Pt will improve RBANS Score to at least a 111 placing pt in the high average range.   -HG     Status: Other Adult Goal- 3  Progressing as expected  -HG     Comments: Other Adult Goal- 3  6/15/21: APT Scores were within normal range except for Divided attention which was below average. 5/10/21: RBANS score of 103 places pt in the average range.   -HG        SLP Time Calculation    SLP Goal Re-Cert Due Date  07/15/21  -HG       User Key  (r) = Recorded By, (t) = Taken By, (c) = Cosigned By    Initials Name Provider Type    Sarita Bliss MS CCC-SLP Speech and Language Pathologist        OP SLP Education     Row Name 06/22/21 1313       Education    Education Comments  Hmwk for delayed recall.   -HG      User Key  (r) = Recorded By, (t) = Taken By, (c) = Cosigned By    Initials Name Effective Dates    Sarita Bliss MS CCC-SLP 06/16/21 -         OP SLP Assessment/Plan - 06/22/21 1313        SLP Plan    Plan Comments  Cont with Cog tx.    -HG      User Key  (r) = Recorded By, (t) = Taken By, (c) = Cosigned By    Initials Name Provider Type    Sarita Bliss MS CCC-SLP Speech and Language Pathologist               Time Calculation:   SLP Start Time: 1313  Untimed Charges  33510-HW Treatment/ST Modification Prosth Aug Alter : 45  Total Minutes  Untimed Charges Total Minutes: 45   Total Minutes: 45    Therapy Charges for Today     Code Description Service Date Service Provider Modifiers Qty    [id number] HC ST TREATMENT SPEECH 3 6/22/2021 Sarita Preston MS CCC-SLP GN 1                   Sarita Preston MS CCC-SLP  6/22/2021

## 2021-06-25 ENCOUNTER — HOSPITAL ENCOUNTER (OUTPATIENT)
Dept: SPEECH THERAPY | Facility: HOSPITAL | Age: 61
Setting detail: THERAPIES SERIES
Discharge: HOME | End: 2021-06-25

## 2021-06-25 DIAGNOSIS — R41.3 MEMORY LOSS: Primary | ICD-10-CM

## 2021-06-25 PROCEDURE — 92507 TX SP LANG VOICE COMM INDIV: CPT

## 2021-06-25 NOTE — THERAPY TREATMENT NOTE
Outpatient Speech Language Pathology   Adult Speech Language Cognitive Treatment Note  [facility]     Patient Name: Sandra Auguste  : 1960  MRN: [id number]  Today's Date: 2021         Visit Date: 2021   Patient Active Problem List   Diagnosis   • Abnormal computed tomography scan   • Anemia   • Post traumatic stress disorder (PTSD)   • Anxiety   • Strain of neck muscle   • Cough   • Diverticulitis of colon   • Fatigue   • Steatosis of liver   • Lateral epicondylitis   • Knee pain   • Spasm   • Adiposity   • Obstructive sleep apnea syndrome   • Osteoarthritis   • Palpitations   • Nausea   • Shortness of breath   • Skin tag   • Candidiasis of vagina   • Viral upper respiratory tract infection   • Gastroesophageal reflux disease   • Acute pain of left shoulder   • Abdominal distension (gaseous)   • SOB (shortness of breath) on exertion   • Sleep apnea with use of continuous positive airway pressure (CPAP)   • Seasonal allergies   • Obesity   • Migraine   • Mental disorder   • Diverticulosis   • Depression   • Arthritis   • Hypertension   • Itching   • Stabbing headache   • Temple tenderness   • TMJ click   • Mild intermittent asthma without complication   • Skull fracture (CMS/HCC)   • Type 2 diabetes mellitus without complication, without long-term current use of insulin (CMS/HCC)   • Health care maintenance   • Moderate episode of recurrent major depressive disorder (CMS/HCC)          Visit Dx:    ICD-10-CM ICD-9-CM   1. Memory loss  R41.3 780.93                         SLP OP Goals     Row Name 21 1300          Goal Type Needed    Goal Type Needed  Memory;Attention/Orientation;Other Adult Goals  -HG        Subjective Comments    Subjective Comments  Pt alert, cooperative, continuing to care for her mother full time.   -HG        Memory Goals    Patient will be able to remember information needed to return to work and function on work-related tasks  100%:;with cues  -HG     Status: Patient  will be able to remember information needed to return to work and function on work-related tasks  New  -HG     Patient will demonstrate improved ability to recall information by immediately recalling a series of words  90%:;unrelated;after delay  -HG     Status: Patient will demonstrate improved ability to recall information by immediately recalling a series of words  Progressing as expected  -HG     Comments: Patient will demonstrate improved ability to recall information by immediately recalling a series of words  6/25/21: Immediate recall for shapes and figures and pt was 90% accurate. 6/22/21: Immediate recall of names and faces: pt was 4/5. 6/15/21: Immediate recall of 10 un-related pictures and with no strategy, pt was 6/10. With use of grouping, pt was 9/10 and improved to 10/10. 5/21/21: Pt recall of reverse order of up to 4 words was 80% with minimal cues. 5/10/21: RBANS immediate recall score of 103 places pt in average range. 4/30/21: Pt immediate recall of 5 word arrays was 80% with minimal cues. 4/6/21: Immediate recall for ABC game pt was 90% accurate. 4/2/21: Picture recall for 75% accurate.  3/12/21: Word Placement: Inclusion for 4 words and pt was 100% accurate. 5 words: pt was 80% accurate. One Pile Card game: pt was required mod cues in order to recall rules of the game.   -HG     Patient’s memory skills will be enhanced as reported by patient by utilizing internal memory strategies to recall up to 3 pieces of information after a 5- minute delay  90%:;with cues  -HG     Status: Patient’s memory skills will be enhanced as reported by patient by utilizing internal memory strategies to recall up to 3 pieces of information after a 5- minute delay  Progressing as expected  -HG     Comments: Patient’s memory skills will be enhanced as reported by patient by utilizing internal memory strategies to recall up to 3 pieces of information after a 5- minute delay  6/22/21: Delayed recall of names and faces: pt  was 5/5. 6/15/21: Delayed recall of 10 un-related pictures and pt was 10/10.  5/27/21: 8 un-related words: pt was 8/8 with use of chaining.  5/21/21: Pt was 70% accurate with opposites with a delay and min-mod cues. 5/10/21: RBANS delayed memory of 119 places pt in high average range. 4/2/21: Paired pictures: pt was 9/10. 3/26/21: Delayed recall of 8 un-related words and pt was 8/8 x 3 with use of chaining. 3/19/21: Delayed recall of 7 un-related words: pt was % accurate. 3/16/21: Delayed recall of 6 un-related words: pt was 6/6; with increased delay, pt was 6/6 x 2.  3/12/21: Delayed recall of 4 un-related words: pt was 4/4 x 3. Delayed recall of 5 un-related words: pt was 5/5 x 3.   -HG     Patient’s memory skills will be enhanced as reported by patient by using external memory aides  90%:;with cues  -HG     Status: Patient’s memory skills will be enhanced as reported by patient by using external memory aides  Progressing as expected  -HG     Comments: Patient’s memory skills will be enhanced as reported by patient by using external memory aides  5/21/21: Pt utilized tactile cues to recall words in session. 4/23/21: Pt states she is having some difficulties staying on top of tasks. 4/16/21: Pt noted she is using her phone often to make sure she is not forgetting daily tasks. 3/16/21: Pt states that the past few days have been rough and she hasn't kept up with it very well. 3/12/21: Pt using daily journal per her report.   -HG        Attention/Orientation Goals    Patient will be able to execute all activities necessary to manage a home  Independently  -HG     Status: Patient will be able to execute all activities necessary to manage a home  Progressing as expected  -HG     Comments: Patient will be able to execute all activities necessary to manage a home  4/30/21: Pt states she is having difficulties with caring for her mother. Pt states she continuing to perform ADL's but notes it is taking a large toll.  4/23/21: Pt notes that she is dealing with a lot due to trying to care for her mother while continuing to do ADL's. Pt states she is having to block off times for herself. 4/16/21: Pt has noted she is having difficulties with taking care of mother but is completing ADL's. Pt notes she is not doing as much as she would like to do. 4/2/21: Had pt download the [employer] mar[employer] elena for FREE trials at this time. 3/16/21: Exec function exercise for attention, memory and thought organization and overall, pt was 95% accurate.   -HG     Patient will improve attention skills by sustaining consistent behavioral response during continuous and repetitive activity (e.g., listening for target words, auditory/reading comprehension tasks)  without cues;10 minute task  -HG     Status: Patient will improve attention skills by sustaining consistent behavioral response during continuous and repetitive activity (e.g., listening for target words, auditory/reading comprehension tasks)  Progressing as expected  -HG     Comments: Patient will improve attention skills by sustaining consistent behavioral response during continuous and repetitive activity (e.g., listening for target words, auditory/reading comprehension tasks)  4/30/21: Pt completed Moving on Up and Middle Management card games together with 100% acccuracy. 4/16/21: Scoreboard card game and pt was 100% accurate. 3/19/21: One Pile Card Game: pt was 80% accurate.  3/12/21: Sustained attention for the game of One Pile and pt was 80% accurate.   -HG     Patient will improve attention skills by sustaining focus in order to actively hold and manipulate information provided (e.g., sequencing auditorily presented number series in ascending or descending order)  90%:;with cues  -HG     Status: Patient will improve attention skills by sustaining focus in order to actively hold and manipulate information provided (e.g., sequencing auditorily presented number series in ascending or descending  order)  Progressing as expected  -HG     Comments: Patient will improve attention skills by sustaining focus in order to actively hold and manipulate information provided (e.g., sequencing auditorily presented number series in ascending or descending order)  6/25/21: Mental Manipulation with numbers: pt able to achieve 7 digits with chunking. 5/21/21: Pt sustained attention for opposites with a delay was 70% accurate with min-mod cues. 4/6/21: Sustained attention for new card game and pt was 95% accurate.  3/16/21: Mental Manipulation for 3 words, alphabetical and reversal and pt was 100% accurate.   -HG     Patient will improve attention skills by focusing to selective target/task when presented with competing stimuli or in a distracting environment in order to complete task  90%:;with cues  -HG     Status: Patient will improve attention skills by focusing to selective target/task when presented with competing stimuli or in a distracting environment in order to complete task  Progressing as expected  -HG     Comments: Patient will improve attention skills by focusing to selective target/task when presented with competing stimuli or in a distracting environment in order to complete task  5/21/21: Pt selective attention for opposites with a delay was 70% with min-mod cues. 5/10/21: RBANS attention score of 88 places pt in low average range. 4/30/21: Pt completed deduction puzzle with 100% accuracy independently. 4/27/21: Last letter game and pt selectively attended to cards while being distracted by other players. Pt 4/23/21: Step by step card game and pt was 100% accurate independently. 3/26/21: The game of [employer] and pt was 80% accurate. 3/12/21: Connecting the Dots- Alphabetical and pt was 90% accurate. Alternating attention task and pt was 95% accurate.   -HG     Patient will improve attention skills by alternating or shifting focus between two different tasks in order to complete both tasks  90%:;with cues  -HG      Status: Patient will improve attention skills by alternating or shifting focus between two different tasks in order to complete both tasks  Progressing as expected  -HG     Comments: Patient will improve attention skills by alternating or shifting focus between two different tasks in order to complete both tasks  21: Alternating attention for card sorting by odd and evens and colors and pt was 90% accurate. 21: 21: Sorting cards by the letter within a suit and pt was 100% accurate with min cues. 21: Alternating attention card game where pt had to add 2 cards then switch between adding 2 cards and naming the first letter of each numbered card. Pt was 100% accurate. 3/26/21: Memory Matches and pt was 70-90% accurate with use of attention strategies. 3/19/21: Memory Matches: pt was 80% accurate with min cues. 3/12/21: Divided attention for card sorting by  and by suit and pt was 85% accurate.   -HG        Other Goals    Other Adult Goal- 1  Pt will complete thought organization tasks with at least 90% accurate with cues.  -HG     Status: Other Adult Goal- 1  Progressing as expected  -HG     Comments: Other Adult Goal- 1  21: Word scramble and pt was 80% accurate. 21: Following Written Directions: pt was 90% accurate.  21: Abstract and concrete category naming was 100%. Pt was 100% for selecting words that do no tbelong from a field of 5. 21: Abstract category naming: pt was 90% accurate.  3/12/21: Divergent naming: pt was concrete: 15/15, abstract: 15/15 with mod cues.    -HG     Other Adult Goal- 2  Pt will participate in further reasoning and problem solving evaluation with goals to follow as indicated.  -HG     Status: Other Adult Goal- 2  Progressing as expected  -HG     Comments: Other Adult Goal- 2  21: Analogies: pt was 100% accurate. 6/15/21: Connect the dots alphabetical order: pt was 100%  accurate. 21: Pt completed deductive puzzle with 100% accuracy  independently. 4/16/21: Deductive reasoning paragraph and pt was 80% accurate with minimal cues. 4/2/21: Deductive Reasoning puzzle and pt was 100% accurate. 3/26/21: Reasoning puzzle: Pt was 90% accurate with min cues.   -HG     Other Adult Goal- 3  Pt will improve RBANS Score to at least a 111 placing pt in the high average range.   -HG     Status: Other Adult Goal- 3  Progressing as expected  -HG     Comments: Other Adult Goal- 3  6/15/21: APT Scores were within normal range except for Divided attention which was below average. 5/10/21: RBANS score of 103 places pt in the average range.   -HG        SLP Time Calculation    SLP Goal Re-Cert Due Date  07/15/21  -HG       User Key  (r) = Recorded By, (t) = Taken By, (c) = Cosigned By    Initials Name Provider Type    Sarita Bliss MS CCC-SLP Speech and Language Pathologist        OP SLP Education     Row Name 06/25/21 1300       Education    Education Comments  Hmwk for attention strategies.   -HG      User Key  (r) = Recorded By, (t) = Taken By, (c) = Cosigned By    Initials Name Effective Dates    Sarita Bliss MS CCC-SLP 06/16/21 -         OP SLP Assessment/Plan - 06/25/21 1300        SLP Plan    Plan Comments  Cont with Cog tx.    -HG      User Key  (r) = Recorded By, (t) = Taken By, (c) = Cosigned By    Initials Name Provider Type    Sarita Bliss MS CCC-SLP Speech and Language Pathologist               Time Calculation:   SLP Start Time: 1300  Untimed Charges  39687-WJ Treatment/ST Modification Prosth Aug Alter : 45  Total Minutes  Untimed Charges Total Minutes: 45   Total Minutes: 45    Therapy Charges for Today     Code Description Service Date Service Provider Modifiers Qty    [id number] HC ST TREATMENT SPEECH 3 6/25/2021 Sarita Preston MS CCC-SLP GN 1                   Sarita Preston MS CCC-SLP  6/25/2021

## 2021-06-28 ENCOUNTER — TELEPHONE (OUTPATIENT)
Dept: INTERNAL MEDICINE | Facility: CLINIC | Age: 61
End: 2021-06-28

## 2021-06-28 NOTE — TELEPHONE ENCOUNTER
Pt was rescheduled abhishek/ Michelle for refills      ----- Message from REBECA Hughes sent at 6/25/2021 12:59 PM EDT -----  Please see if patient is able to follow-up with PCP for medication refills as I have not seen her before for chronic illness and would be unable to follow her moving forward; she was also previously advised in 3 month F/U with PCP.

## 2021-07-06 ENCOUNTER — HOSPITAL ENCOUNTER (OUTPATIENT)
Dept: SPEECH THERAPY | Facility: HOSPITAL | Age: 61
Setting detail: THERAPIES SERIES
Discharge: HOME | End: 2021-07-06

## 2021-07-06 DIAGNOSIS — R41.3 MEMORY LOSS: Primary | ICD-10-CM

## 2021-07-06 PROCEDURE — 92507 TX SP LANG VOICE COMM INDIV: CPT

## 2021-07-06 NOTE — THERAPY TREATMENT NOTE
Outpatient Speech Language Pathology   Adult Speech Language Cognitive Treatment Note  [facility]     Patient Name: Sandra Auguste  : 1960  MRN: [id number]  Today's Date: 2021         Visit Date: 2021   Patient Active Problem List   Diagnosis   • Abnormal computed tomography scan   • Anemia   • Post traumatic stress disorder (PTSD)   • Anxiety   • Strain of neck muscle   • Cough   • Diverticulitis of colon   • Fatigue   • Steatosis of liver   • Lateral epicondylitis   • Knee pain   • Spasm   • Adiposity   • Obstructive sleep apnea syndrome   • Osteoarthritis   • Palpitations   • Nausea   • Shortness of breath   • Skin tag   • Candidiasis of vagina   • Viral upper respiratory tract infection   • Gastroesophageal reflux disease   • Acute pain of left shoulder   • Abdominal distension (gaseous)   • SOB (shortness of breath) on exertion   • Sleep apnea with use of continuous positive airway pressure (CPAP)   • Seasonal allergies   • Obesity   • Migraine   • Mental disorder   • Diverticulosis   • Depression   • Arthritis   • Hypertension   • Itching   • Stabbing headache   • Temple tenderness   • TMJ click   • Mild intermittent asthma without complication   • Skull fracture (CMS/HCC)   • Type 2 diabetes mellitus without complication, without long-term current use of insulin (CMS/HCC)   • Health care maintenance   • Moderate episode of recurrent major depressive disorder (CMS/HCC)          Visit Dx:    ICD-10-CM ICD-9-CM   1. Memory loss  R41.3 780.93                         SLP OP Goals     Row Name 21 1315          Goal Type Needed    Goal Type Needed  Memory;Attention/Orientation;Other Adult Goals  -HG        Subjective Comments    Subjective Comments  Pt alert, cooperative, feels she is having a nervous breakthrough.  Had a recent episode of not knowing how to process toileting her mom.   -HG        Memory Goals    Patient will be able to remember information needed to return to work and  function on work-related tasks  100%:;with cues  -HG     Status: Patient will be able to remember information needed to return to work and function on work-related tasks  New  -HG     Patient will demonstrate improved ability to recall information by immediately recalling a series of words  90%:;unrelated;after delay  -HG     Status: Patient will demonstrate improved ability to recall information by immediately recalling a series of words  Progressing as expected  -HG     Comments: Patient will demonstrate improved ability to recall information by immediately recalling a series of words  7/6/21: Immediate recall of 16 groupable words: pt was 16/16.   6/25/21: Immediate recall for shapes and figures and pt was 90% accurate. 6/22/21: Immediate recall of names and faces: pt was 4/5. 6/15/21: Immediate recall of 10 un-related pictures and with no strategy, pt was 6/10. With use of grouping, pt was 9/10 and improved to 10/10. 5/21/21: Pt recall of reverse order of up to 4 words was 80% with minimal cues. 5/10/21: RBANS immediate recall score of 103 places pt in average range. 4/30/21: Pt immediate recall of 5 word arrays was 80% with minimal cues. 4/6/21: Immediate recall for ABC game pt was 90% accurate. 4/2/21: Picture recall for 75% accurate.  3/12/21: Word Placement: Inclusion for 4 words and pt was 100% accurate. 5 words: pt was 80% accurate. One Pile Card game: pt was required mod cues in order to recall rules of the game.   -HG     Patient’s memory skills will be enhanced as reported by patient by utilizing internal memory strategies to recall up to 3 pieces of information after a 5- minute delay  90%:;with cues  -HG     Status: Patient’s memory skills will be enhanced as reported by patient by utilizing internal memory strategies to recall up to 3 pieces of information after a 5- minute delay  Progressing as expected  -HG     Comments: Patient’s memory skills will be enhanced as reported by patient by utilizing  internal memory strategies to recall up to 3 pieces of information after a 5- minute delay  7/6/21: Delayed recall of 16 groupable words and pt was 16/16.   6/22/21: Delayed recall of names and faces: pt was 5/5. 6/15/21: Delayed recall of 10 un-related pictures and pt was 10/10.  5/27/21: 8 un-related words: pt was 8/8 with use of chaining.  5/21/21: Pt was 70% accurate with opposites with a delay and min-mod cues. 5/10/21: RBANS delayed memory of 119 places pt in high average range. 4/2/21: Paired pictures: pt was 9/10. 3/26/21: Delayed recall of 8 un-related words and pt was 8/8 x 3 with use of chaining. 3/19/21: Delayed recall of 7 un-related words: pt was % accurate. 3/16/21: Delayed recall of 6 un-related words: pt was 6/6; with increased delay, pt was 6/6 x 2.  3/12/21: Delayed recall of 4 un-related words: pt was 4/4 x 3. Delayed recall of 5 un-related words: pt was 5/5 x 3.   -HG     Patient’s memory skills will be enhanced as reported by patient by using external memory aides  90%:;with cues  -HG     Status: Patient’s memory skills will be enhanced as reported by patient by using external memory aides  Progressing as expected  -HG     Comments: Patient’s memory skills will be enhanced as reported by patient by using external memory aides  5/21/21: Pt utilized tactile cues to recall words in session. 4/23/21: Pt states she is having some difficulties staying on top of tasks. 4/16/21: Pt noted she is using her phone often to make sure she is not forgetting daily tasks. 3/16/21: Pt states that the past few days have been rough and she hasn't kept up with it very well. 3/12/21: Pt using daily journal per her report.   -HG        Attention/Orientation Goals    Patient will be able to execute all activities necessary to manage a home  Independently  -HG     Status: Patient will be able to execute all activities necessary to manage a home  Progressing as expected  -HG     Comments: Patient will be able to  execute all activities necessary to manage a home  4/30/21: Pt states she is having difficulties with caring for her mother. Pt states she continuing to perform ADL's but notes it is taking a large toll. 4/23/21: Pt notes that she is dealing with a lot due to trying to care for her mother while continuing to do ADL's. Pt states she is having to block off times for herself. 4/16/21: Pt has noted she is having difficulties with taking care of mother but is completing ADL's. Pt notes she is not doing as much as she would like to do. 4/2/21: Had pt download the [employer] mar[employer] elena for FREE trials at this time. 3/16/21: Exec function exercise for attention, memory and thought organization and overall, pt was 95% accurate.   -HG     Patient will improve attention skills by sustaining consistent behavioral response during continuous and repetitive activity (e.g., listening for target words, auditory/reading comprehension tasks)  without cues;10 minute task  -HG     Status: Patient will improve attention skills by sustaining consistent behavioral response during continuous and repetitive activity (e.g., listening for target words, auditory/reading comprehension tasks)  Progressing as expected  -HG     Comments: Patient will improve attention skills by sustaining consistent behavioral response during continuous and repetitive activity (e.g., listening for target words, auditory/reading comprehension tasks)  4/30/21: Pt completed Moving on Up and Middle Management card games together with 100% acccuracy. 4/16/21: Scoreboard card game and pt was 100% accurate. 3/19/21: One Pile Card Game: pt was 80% accurate.  3/12/21: Sustained attention for the game of One Pile and pt was 80% accurate.   -HG     Patient will improve attention skills by sustaining focus in order to actively hold and manipulate information provided (e.g., sequencing auditorily presented number series in ascending or descending order)  90%:;with cues  -HG     Status:  Patient will improve attention skills by sustaining focus in order to actively hold and manipulate information provided (e.g., sequencing auditorily presented number series in ascending or descending order)  Progressing as expected  -HG     Comments: Patient will improve attention skills by sustaining focus in order to actively hold and manipulate information provided (e.g., sequencing auditorily presented number series in ascending or descending order)  6/25/21: Mental Manipulation with numbers: pt able to achieve 7 digits with chunking. 5/21/21: Pt sustained attention for opposites with a delay was 70% accurate with min-mod cues. 4/6/21: Sustained attention for new card game and pt was 95% accurate.  3/16/21: Mental Manipulation for 3 words, alphabetical and reversal and pt was 100% accurate.   -HG     Patient will improve attention skills by focusing to selective target/task when presented with competing stimuli or in a distracting environment in order to complete task  90%:;with cues  -HG     Status: Patient will improve attention skills by focusing to selective target/task when presented with competing stimuli or in a distracting environment in order to complete task  Progressing as expected  -HG     Comments: Patient will improve attention skills by focusing to selective target/task when presented with competing stimuli or in a distracting environment in order to complete task  5/21/21: Pt selective attention for opposites with a delay was 70% with min-mod cues. 5/10/21: RBANS attention score of 88 places pt in low average range. 4/30/21: Pt completed deduction puzzle with 100% accuracy independently. 4/27/21: Last letter game and pt selectively attended to cards while being distracted by other players. Pt 4/23/21: Step by step card game and pt was 100% accurate independently. 3/26/21: The game of 6Sense and pt was 80% accurate. 3/12/21: Connecting the Dots- Alphabetical and pt was 90% accurate. Alternating  attention task and pt was 95% accurate.   -HG     Patient will improve attention skills by alternating or shifting focus between two different tasks in order to complete both tasks  90%:;with cues  -HG     Status: Patient will improve attention skills by alternating or shifting focus between two different tasks in order to complete both tasks  Progressing as expected  -HG     Comments: Patient will improve attention skills by alternating or shifting focus between two different tasks in order to complete both tasks  21: Card sorting by changing opposite color: pt was 70% accurate. 21: Alternating attention for card sorting by odd and evens and colors and pt was 90% accurate. 21: 21: Sorting cards by the letter within a suit and pt was 100% accurate with min cues. 21: Alternating attention card game where pt had to add 2 cards then switch between adding 2 cards and naming the first letter of each numbered card. Pt was 100% accurate. 3/26/21: Memory Matches and pt was 70-90% accurate with use of attention strategies. 3/19/21: Memory Matches: pt was 80% accurate with min cues. 3/12/21: Divided attention for card sorting by  and by suit and pt was 85% accurate.   -HG        Other Goals    Other Adult Goal- 1  Pt will complete thought organization tasks with at least 90% accurate with cues.  -HG     Status: Other Adult Goal- 1  Progressing as expected  -HG     Comments: Other Adult Goal- 1  21: Pt reports that she recently struggled with how to help her mom to the toilet from her wheelchair. Six step sequencing, pt was 95% accurate. 21: Word scramble and pt was 80% accurate. 21: Following Written Directions: pt was 90% accurate.  21: Abstract and concrete category naming was 100%. Pt was 100% for selecting words that do no tbelong from a field of 5. 21: Abstract category naming: pt was 90% accurate.  3/12/21: Divergent naming: pt was concrete: 15/15, abstract: 15/15 with mod  cues.    -HG     Other Adult Goal- 2  Pt will participate in further reasoning and problem solving evaluation with goals to follow as indicated.  -HG     Status: Other Adult Goal- 2  Progressing as expected  -HG     Comments: Other Adult Goal- 2  6/25/21: Analogies: pt was 100% accurate. 6/15/21: Connect the dots alphabetical order: pt was 100%  accurate. 4/30/21: Pt completed deductive puzzle with 100% accuracy independently. 4/16/21: Deductive reasoning paragraph and pt was 80% accurate with minimal cues. 4/2/21: Deductive Reasoning puzzle and pt was 100% accurate. 3/26/21: Reasoning puzzle: Pt was 90% accurate with min cues.   -HG     Other Adult Goal- 3  Pt will improve RBANS Score to at least a 111 placing pt in the high average range.   -HG     Status: Other Adult Goal- 3  Progressing as expected  -HG     Comments: Other Adult Goal- 3  6/15/21: APT Scores were within normal range except for Divided attention which was below average. 5/10/21: RBANS score of 103 places pt in the average range.   -HG        SLP Time Calculation    SLP Goal Re-Cert Due Date  07/15/21  -HG       User Key  (r) = Recorded By, (t) = Taken By, (c) = Cosigned By    Initials Name Provider Type    Sarita Bliss MS CCC-SLP Speech and Language Pathologist        OP SLP Education     Row Name 07/06/21 1315       Education    Education Comments  Hmwk for delayed recall.   -HG      User Key  (r) = Recorded By, (t) = Taken By, (c) = Cosigned By    Initials Name Effective Dates    Sarita Bliss MS CCC-SLP 06/16/21 -         OP SLP Assessment/Plan - 07/06/21 1315        SLP Plan    Plan Comments  Cont with Cog tx.    -HG      User Key  (r) = Recorded By, (t) = Taken By, (c) = Cosigned By    Initials Name Provider Type    Sarita Bliss MS CCC-SLP Speech and Language Pathologist               Time Calculation:   SLP Start Time: 1315  Untimed Charges  00385-HM Treatment/ST Modification Prosth Aug Alter : 60  Total  Minutes  Untimed Charges Total Minutes: 60   Total Minutes: 60    Therapy Charges for Today     Code Description Service Date Service Provider Modifiers Qty    [id number]  ST TREATMENT SPEECH 4 7/6/2021 Sarita Preston, MS CCC-SLP GN 1                   Sarita Preston, MS PATTY-SLP  7/6/2021

## 2021-07-07 ENCOUNTER — TELEMEDICINE (OUTPATIENT)
Dept: PSYCHIATRY | Facility: CLINIC | Age: 61
End: 2021-07-07

## 2021-07-07 DIAGNOSIS — F33.1 MODERATE EPISODE OF RECURRENT MAJOR DEPRESSIVE DISORDER (HCC): Primary | ICD-10-CM

## 2021-07-07 DIAGNOSIS — F41.1 GENERALIZED ANXIETY DISORDER: ICD-10-CM

## 2021-07-07 PROCEDURE — 90837 PSYTX W PT 60 MINUTES: CPT | Performed by: COUNSELOR

## 2021-07-07 NOTE — PROGRESS NOTES
"Date: July 12, 2021  Time In: 10:30 AM   Time Out: 11:29 AM   This provider is located at the [facility]), [address], [place] [address] using a secure [employer] Video Visit through [employer]. Patient is being seen remotely via telehealth at home address in [place] and stated they are in a secure environment for this session. The patient's condition being diagnosed/treated is appropriate for telemedicine. The provider identified herself as well as her credentials. The patient, and/or patients guardian, consent to be seen remotely, and when consent is given they understand that the consent allows for patient identifiable information to be sent to a third party as needed. They may refuse to be seen remotely at any time. The electronic data is encrypted and password protected, and the patient and/or guardian has been advised of the potential risks to privacy not withstanding such measures.     You have chosen to receive care through a telehealth visit.  Do you consent to use a video/audio connection for your medical care today? Yes    PROGRESS NOTE  Data:  Sandra Auguste is a 60 y.o. female who presents today for follow up Patient states that she has been having a hard time dealing with her mother being in the hospital for a couple of weeks and now she is in a rehabilitation center for at least another week. Patient states that she is frustrated with not being able to have access to providers in what she feels is a timely manner for her and her mother. Patient states that she enjoyed being at her son's home and being away from the home that she shares with her mother. Patient states that when she returned to the home she felt as thought she was going \"to go crazy.\" Patient is aware of caregiver burnout and is upset that she is being sent for dementia testing and feels that her memory issues are due to feeling overwhelmed with her responsibilities in " "caring for her mother. Patient is concerned that she will be put out of the home if she tells her siblings that she is no longer able to care for their mother. Discussed getting a game plan together for the patient and encouraged her to speak her mind to her siblings and advocate for her needs. Patient states that she is drinking beer in order to calm her nerves and felt as though she \"draws a blank\" at times and this is an upsetting circumstance for the patient. Patient is not understanding of her medical tests or recommendations and continues to express worry about what will happen to her if she tells her siblings that she can no longer care for their mother. Patient understands that she may have to find other living accommodations if she is no longer able to be her mother's caregiver. Patient states that she knows that she can live with her son for awhile but doesn't want to impose on anyone.    Chief Complaint: feelings of anxiety, depression and frustration, irregular sleep    History of Present Illness: Patient has battled anxiety and depression for several years      Clinical Maneuvering/Intervention: CBT to process feelings of anxiety and depression related to patient's mother's condition and on-going care and the patient's own health concerns    (Scales based on 0 - 10 with 10 being the worst)  Depression: 7 Anxiety: 10       Assisted patient in processing above session content; acknowledged and normalized patient’s thoughts, feelings, and concerns.  Rationalized patient thought process regarding her own health limitations and those of her mother and the care that her mother needs that the patient can no longer provide.  Discussed triggers associated with patient's feelings of anxiety in regard to having to move to another home if she cannot care for her mother anymore, patient's own health concerns and not understanding what doctors are thinking in regard to her status and also concerns about her mother " and the pending actions of her siblings. Also discussed coping skills for patient to implement such as relaxation, deep breathing, and setting boundaries with her siblings    Allowed patient to freely discuss issues without interruption or judgment. Provided safe, confidential environment to facilitate the development of positive therapeutic relationship and encourage open, honest communication. Assisted patient in identifying risk factors which would indicate the need for higher level of care including thoughts to harm self or others and/or self-harming behavior and encouraged patient to contact this office, call 911, or present to the nearest emergency room should any of these events occur. Discussed crisis intervention services and means to access. Patient adamantly and convincingly denies current suicidal or homicidal ideation or perceptual disturbance.    Assessment:   Assessment   Patient appears to maintain relative stability as compared to their baseline.  However, patient continues to struggle with depression and anxiety which continue to cause impairment in important areas of functioning.  A result, they can be reasonably expected to continue to benefit from treatment and would likely be at increased risk for decompensation otherwise.    Mental Status Exam:   Hygiene:   good  Cooperation:  Cooperative  Eye Contact:  Good  Psychomotor Behavior:  Appropriate  Affect:  Appropriate  Mood: fluctates  Speech:  Normal  Thought Process:  Goal directed  Thought Content:  Mood congruent  Suicidal:  None  Homicidal:  None  Hallucinations:  None  Delusion:  None  Memory:  Deficits  Orientation:  Person, Place, Time and Situation  Reliability:  good  Insight:  Fair  Judgement:  Good  Impulse Control:  Good  Physical/Medical Issues:  Yes concerns about cognitive testing       Patient's Support Network Includes:  children and extended family    Functional Status: Moderate impairment     Progress toward goal: Not at  goal    Prognosis: Guarded with Ongoing Treatment         Plan:    Patient will continue in individual outpatient therapy with focus on improved functioning and coping skills, maintaining stability, and avoiding decompensation and the need for higher level of care.    Patient will adhere to medication regimen as prescribed and report any side effects. Patient will contact this office, call 911 or present to the nearest emergency room should suicidal or homicidal ideations occur. Provide Cognitive Behavioral Therapy and Solution Focused Therapy to improve functioning, maintain stability, and avoid decompensation and the need for higher level of care.     Return in about 2 weeks, or earlier if symptoms worsen or fail to improve.           VISIT DIAGNOSIS:     ICD-10-CM ICD-9-CM   1. Moderate episode of recurrent major depressive disorder (CMS/HCC)  F33.1 296.32   2. Generalized anxiety disorder  F41.1 300.02             This document has been electronically signed by ASIA Rich  July 12, 2021 08:56 EDT      Part of this note may be an electronic transcription/translation of spoken language to printed text using the Dragon Dictation System.

## 2021-07-08 ENCOUNTER — TELEPHONE (OUTPATIENT)
Dept: INTERNAL MEDICINE | Facility: CLINIC | Age: 61
End: 2021-07-08

## 2021-07-08 NOTE — TELEPHONE ENCOUNTER
Pt called and states she has been really tired, blurry vision and dizzy. Her glucose was at 259 at 6:30 pm, stated had eat at 5:30. Pt states that she also is having mild headache, wanting to know if she needs to do anything. Scheduled pt w/ Michelle for tomorrow.

## 2021-07-09 ENCOUNTER — HOSPITAL ENCOUNTER (OUTPATIENT)
Dept: SPEECH THERAPY | Facility: HOSPITAL | Age: 61
Setting detail: THERAPIES SERIES
Discharge: HOME | End: 2021-07-09

## 2021-07-09 ENCOUNTER — TELEMEDICINE (OUTPATIENT)
Dept: INTERNAL MEDICINE | Facility: CLINIC | Age: 61
End: 2021-07-09

## 2021-07-09 VITALS
OXYGEN SATURATION: 98 % | WEIGHT: 264 LBS | SYSTOLIC BLOOD PRESSURE: 128 MMHG | HEIGHT: 66 IN | DIASTOLIC BLOOD PRESSURE: 78 MMHG | BODY MASS INDEX: 42.43 KG/M2 | HEART RATE: 82 BPM

## 2021-07-09 DIAGNOSIS — F41.9 ANXIETY: ICD-10-CM

## 2021-07-09 DIAGNOSIS — E11.9 TYPE 2 DIABETES MELLITUS WITHOUT COMPLICATION, WITHOUT LONG-TERM CURRENT USE OF INSULIN (HCC): Primary | ICD-10-CM

## 2021-07-09 DIAGNOSIS — R41.3 MEMORY LOSS: Primary | ICD-10-CM

## 2021-07-09 LAB — HBA1C MFR BLD: 7.1 %

## 2021-07-09 PROCEDURE — 99214 OFFICE O/P EST MOD 30 MIN: CPT | Performed by: PHYSICIAN ASSISTANT

## 2021-07-09 PROCEDURE — 83036 HEMOGLOBIN GLYCOSYLATED A1C: CPT | Performed by: PHYSICIAN ASSISTANT

## 2021-07-09 PROCEDURE — 92507 TX SP LANG VOICE COMM INDIV: CPT

## 2021-07-09 RX ORDER — METFORMIN HYDROCHLORIDE 500 MG/1
1000 TABLET, EXTENDED RELEASE ORAL
Qty: 60 TABLET | Refills: 2 | Status: SHIPPED | OUTPATIENT
Start: 2021-07-09 | End: 2021-10-03

## 2021-07-09 NOTE — TELEPHONE ENCOUNTER
Caller: Sandra Auguste    Relationship: Self    Best call back number: [phone]     Medication needed:   Requested Prescriptions     Pending Prescriptions Disp Refills   • ALPRAZolam (XANAX) 0.25 MG tablet 30 tablet 0     Sig: Take 1 tablet by mouth Daily As Needed for Anxiety.       When do you need the refill by: 7/9/21    What additional details did the patient provide when requesting the medication: OUT OF MEDICATION. PATIENT STATES SHE JUST LEFT THE OFFICE AND ONE OF HER PRESCRIPTIONS WAS RECEIVED BY THE PHARMACY BUT THE ALPRAZolam (XANAX) 0.25 MG tablet WAS NOT AT THE PHARMACY. PATIENT IS REQUESTING A CALLBACK TO DISCUSS.     Does the patient have less than a 3 day supply:  [x] Yes  [] No    What is the patient's preferred pharmacy: [facility] [address] [place] [address] [address] [place] [address] [phone] [facility] [phone] FX

## 2021-07-09 NOTE — PROGRESS NOTES
Chief Complaint   Patient presents with   • Blurred Vision   • Diabetes       Subjective     Sandra Auguste is a 60 y.o. female.        History of Present Illness     Pt has had some increased stress with her mother's health. She had to be admitted and then was transferred to rehab where she continues to be. Pt was staying in the hospital with her and running back and forth to the rehab facility. Feeling fatigued and run down.     She ran out of her alprazolam that she takes as needed because she could not get in for follow up appointment. Takes it sparingly.     Yesterday pt was feeling tired and developed some blurriness to her vision. Checked her blood sugar 1 hr after eating and it was 250. She had eaten a fruit cup with a sandwich and chips. Pt was previously on metformin but stopped it for unknown reason. She has not been eating well and has been drinking more alcohol due to her anxiety. She was taking her alprazolam only when having panic attacks.       Current Outpatient Medications:   •  acetaminophen (TYLENOL) 500 MG tablet, Take 500 mg by mouth Every 6 (Six) Hours As Needed for Mild Pain ., Disp: , Rfl:   •  albuterol sulfate  (90 Base) MCG/ACT inhaler, Inhale 1 puff Every 6 (Six) Hours As Needed for Shortness of Air. INHALE TWO PUFFS BY MOUTH EVERY 6 HOURS AS NEEDED, Disp: 1 inhaler, Rfl: 2  •  Breo Ellipta 100-25 MCG/INH inhaler, Inhale 1 puff Daily., Disp: 60 each, Rfl: 5  •  diclofenac (VOLTAREN) 1 % gel gel, Apply 4 g topically to the appropriate area as directed 4 (Four) Times a Day As Needed (for pain in shoulders)., Disp: 100 g, Rfl: 1  •  EPINEPHrine (EPIPEN) 0.3 MG/0.3ML solution auto-injector injection, Inject 0.3 mL into the appropriate muscle as directed by prescriber As Needed (FOR SEVERE ALLERGIC REACTION)., Disp: 1 each, Rfl: 1  •  famotidine (PEPCID) 40 MG tablet, Take 1 tablet by mouth Daily., Disp: 30 tablet, Rfl: 5  •  glucose blood test strip, Use as instructed to check blood  glucose once daily., Disp: 100 each, Rfl: 12  •  glucose monitor monitoring kit, Use as directed to check blood glucose once daily., Disp: 1 each, Rfl: 0  •  [employer] Lancets UltraThin 30G misc, Use as instructed check blood glucose once daily., Disp: 100 each, Rfl: 12  •  loratadine (CLARITIN) 10 MG tablet, Take 1 tablet by mouth Daily., Disp: 90 tablet, Rfl: 1  •  ondansetron (Zofran) 8 MG tablet, Take 1 tablet by mouth Every 8 (Eight) Hours As Needed for Nausea or Vomiting., Disp: 15 tablet, Rfl: 0  •  vilazodone (VIIBRYD) 20 MG tablet tablet, Take 1 tablet by mouth Daily., Disp: 30 tablet, Rfl: 1  •  ALPRAZolam (XANAX) 0.25 MG tablet, Take 1 tablet by mouth Daily As Needed for Anxiety., Disp: 30 tablet, Rfl: 0  •  fluticasone (FLONASE) 50 MCG/ACT nasal spray, 2 sprays into the nostril(s) as directed by provider Daily for 30 days., Disp: 16 g, Rfl: 5  •  metFORMIN ER (Glucophage XR) 500 MG 24 hr tablet, Take 2 tablets by mouth Daily With Breakfast., Disp: 60 tablet, Rfl: 2     PMFSH  The following portions of the patient's history were reviewed and updated as appropriate: allergies, current medications, past family history, past medical history, past social history, past surgical history and problem list.    Review of Systems   Constitutional: Negative for chills, fever and unexpected weight change.   HENT: Negative.    Eyes: Negative for pain and visual disturbance.   Respiratory: Negative for chest tightness and shortness of breath.    Cardiovascular: Negative for chest pain.   Gastrointestinal: Negative for abdominal pain and blood in stool.   Endocrine: Negative.    Genitourinary: Negative.    Musculoskeletal: Negative for joint swelling.   Skin: Negative for color change, rash and wound.   Allergic/Immunologic: Negative.    Neurological: Negative for syncope and speech difficulty.   Hematological: Negative for adenopathy.   Psychiatric/Behavioral: Positive for decreased concentration. Negative for confusion,  "hallucinations and suicidal ideas. The patient is nervous/anxious.        Objective   /78   Pulse 82   Ht 167.6 cm (66\")   Wt 120 kg (264 lb)   LMP  (LMP Unknown)   SpO2 98%   BMI 42.61 kg/m²     Physical Exam    Results for orders placed or performed in visit on 07/09/21   POC Glycosylated Hemoglobin (Hb A1C)    Specimen: Blood   Result Value Ref Range    Hemoglobin A1C 7.1 %        ASSESSMENT/PLAN    Diagnoses and all orders for this visit:    1. Type 2 diabetes mellitus without complication, without long-term current use of insulin (CMS/[facility]) (Primary)  Assessment & Plan:  Diabetes is worsening.   Reminded to bring in blood sugar diary at next visit.  Dietary recommendations for ADA diet.  Regular aerobic exercise.  Medication changes per orders. Start metformin  mg daily, increase to 2 tablets daily when tolerated.  Diabetes will be reassessed in 3 months.    Orders:  -     POC Glycosylated Hemoglobin (Hb A1C)  -     metFORMIN ER (Glucophage XR) 500 MG 24 hr tablet; Take 2 tablets by mouth Daily With Breakfast.  Dispense: 60 tablet; Refill: 2    2. Anxiety  Assessment & Plan:  Continue Viibryd and therapy. Refilled alprazolam to use sparingly prn. Prescribed by on-call provider #30, 0RF.    The patient has read and signed the [facility] Controlled Substance Contract.  I will continue to see patient for regular follow up appointments.  They are well controlled on their medication.  SHANTEL is updated every 3 months. The patient is aware of the potential for addiction and dependence.             Return in about 3 months (around 10/9/2021) for Follow up.  "

## 2021-07-09 NOTE — TELEPHONE ENCOUNTER
Please see how she is feeling. If doing better, she can keep her appointment today. If any worse, she needs to go to ER for eval because it will be hard to get stat imaging or other evaluation at 3:45 on Friday afternoon.

## 2021-07-09 NOTE — THERAPY PROGRESS REPORT/RE-CERT
Outpatient Speech Language Pathology   Adult Speech Language Cognitive Progress Note  [facility]     Patient Name: Sandra Auguste  : 1960  MRN: [id number]  Today's Date: 2021         Visit Date: 2021   Patient Active Problem List   Diagnosis   • Abnormal computed tomography scan   • Anemia   • Post traumatic stress disorder (PTSD)   • Anxiety   • Strain of neck muscle   • Cough   • Diverticulitis of colon   • Fatigue   • Steatosis of liver   • Lateral epicondylitis   • Knee pain   • Spasm   • Adiposity   • Obstructive sleep apnea syndrome   • Osteoarthritis   • Palpitations   • Nausea   • Shortness of breath   • Skin tag   • Candidiasis of vagina   • Viral upper respiratory tract infection   • Gastroesophageal reflux disease   • Acute pain of left shoulder   • Abdominal distension (gaseous)   • SOB (shortness of breath) on exertion   • Sleep apnea with use of continuous positive airway pressure (CPAP)   • Seasonal allergies   • Obesity   • Migraine   • Mental disorder   • Diverticulosis   • Depression   • Arthritis   • Hypertension   • Itching   • Stabbing headache   • Temple tenderness   • TMJ click   • Mild intermittent asthma without complication   • Skull fracture (CMS/HCC)   • Type 2 diabetes mellitus without complication, without long-term current use of insulin (CMS/HCC)   • Health care maintenance   • Moderate episode of recurrent major depressive disorder (CMS/HCC)          Visit Dx:    ICD-10-CM ICD-9-CM   1. Memory loss  R41.3 780.93                         SLP OP Goals     Row Name 21 1328          Goal Type Needed    Goal Type Needed  Memory;Attention/Orientation;Other Adult Goals  -HG        Subjective Comments    Subjective Comments  Pt alert, cooperative, eager to continue with skilled services since she is seeing progress.   -HG        Memory Goals    Patient will be able to remember information needed to return to work and function on work-related tasks  100%:;with cues   -HG     Status: Patient will be able to remember information needed to return to work and function on work-related tasks  New  -HG     Patient will demonstrate improved ability to recall information by immediately recalling a series of words  90%:;unrelated;after delay  -HG     Status: Patient will demonstrate improved ability to recall information by immediately recalling a series of words  Progressing as expected  -HG     Comments: Patient will demonstrate improved ability to recall information by immediately recalling a series of words  7/9/21: RBANS SLUMS score of 123 places pt in the Superior range. 7/6/21: Immediate recall of 16 groupable words: pt was 16/16.   6/25/21: Immediate recall for shapes and figures and pt was 90% accurate. 6/22/21: Immediate recall of names and faces: pt was 4/5. 6/15/21: Immediate recall of 10 un-related pictures and with no strategy, pt was 6/10. With use of grouping, pt was 9/10 and improved to 10/10. 5/21/21: Pt recall of reverse order of up to 4 words was 80% with minimal cues. 5/10/21: RBANS immediate recall score of 103 places pt in average range. 4/30/21: Pt immediate recall of 5 word arrays was 80% with minimal cues. 4/6/21: Immediate recall for ABC game pt was 90% accurate. 4/2/21: Picture recall for 75% accurate.  3/12/21: Word Placement: Inclusion for 4 words and pt was 100% accurate. 5 words: pt was 80% accurate. One Pile Card game: pt was required mod cues in order to recall rules of the game.   -HG     Patient’s memory skills will be enhanced as reported by patient by utilizing internal memory strategies to recall up to 3 pieces of information after a 5- minute delay  90%:;with cues  -HG     Status: Patient’s memory skills will be enhanced as reported by patient by utilizing internal memory strategies to recall up to 3 pieces of information after a 5- minute delay  Progressing as expected  -HG     Comments: Patient’s memory skills will be enhanced as reported by  patient by utilizing internal memory strategies to recall up to 3 pieces of information after a 5- minute delay  7/9/21: SLUMS Delayed score of 124 places pt in the Superior range. 7/6/21: Delayed recall of 16 groupable words and pt was 16/16.   6/22/21: Delayed recall of names and faces: pt was 5/5. 6/15/21: Delayed recall of 10 un-related pictures and pt was 10/10.  5/27/21: 8 un-related words: pt was 8/8 with use of chaining.  5/21/21: Pt was 70% accurate with opposites with a delay and min-mod cues. 5/10/21: RBANS delayed memory of 119 places pt in high average range. 4/2/21: Paired pictures: pt was 9/10. 3/26/21: Delayed recall of 8 un-related words and pt was 8/8 x 3 with use of chaining. 3/19/21: Delayed recall of 7 un-related words: pt was % accurate. 3/16/21: Delayed recall of 6 un-related words: pt was 6/6; with increased delay, pt was 6/6 x 2.  3/12/21: Delayed recall of 4 un-related words: pt was 4/4 x 3. Delayed recall of 5 un-related words: pt was 5/5 x 3.   -HG     Patient’s memory skills will be enhanced as reported by patient by using external memory aides  90%:;with cues  -HG     Status: Patient’s memory skills will be enhanced as reported by patient by using external memory aides  Progressing as expected  -HG     Comments: Patient’s memory skills will be enhanced as reported by patient by using external memory aides  5/21/21: Pt utilized tactile cues to recall words in session. 4/23/21: Pt states she is having some difficulties staying on top of tasks. 4/16/21: Pt noted she is using her phone often to make sure she is not forgetting daily tasks. 3/16/21: Pt states that the past few days have been rough and she hasn't kept up with it very well. 3/12/21: Pt using daily journal per her report.   -HG        Attention/Orientation Goals    Patient will be able to execute all activities necessary to manage a home  Independently  -HG     Status: Patient will be able to execute all activities  necessary to manage a home  Progressing as expected  -HG     Comments: Patient will be able to execute all activities necessary to manage a home  7/9/21: RBANS Attention score of 88 remains in the Low Average range. 4/30/21: Pt states she is having difficulties with caring for her mother. Pt states she continuing to perform ADL's but notes it is taking a large toll. 4/23/21: Pt notes that she is dealing with a lot due to trying to care for her mother while continuing to do ADL's. Pt states she is having to block off times for herself. 4/16/21: Pt has noted she is having difficulties with taking care of mother but is completing ADL's. Pt notes she is not doing as much as she would like to do. 4/2/21: Had pt download the [employer] mar[employer] elena for FREE trials at this time. 3/16/21: Exec function exercise for attention, memory and thought organization and overall, pt was 95% accurate.   -HG     Patient will improve attention skills by sustaining consistent behavioral response during continuous and repetitive activity (e.g., listening for target words, auditory/reading comprehension tasks)  without cues;10 minute task  -HG     Status: Patient will improve attention skills by sustaining consistent behavioral response during continuous and repetitive activity (e.g., listening for target words, auditory/reading comprehension tasks)  Progressing as expected  -HG     Comments: Patient will improve attention skills by sustaining consistent behavioral response during continuous and repetitive activity (e.g., listening for target words, auditory/reading comprehension tasks)  4/30/21: Pt completed Moving on Up and Middle Management card games together with 100% acccuracy. 4/16/21: Scoreboard card game and pt was 100% accurate. 3/19/21: One Pile Card Game: pt was 80% accurate.  3/12/21: Sustained attention for the game of One Pile and pt was 80% accurate.   -HG     Patient will improve attention skills by sustaining focus in order to  actively hold and manipulate information provided (e.g., sequencing auditorily presented number series in ascending or descending order)  90%:;with cues  -HG     Status: Patient will improve attention skills by sustaining focus in order to actively hold and manipulate information provided (e.g., sequencing auditorily presented number series in ascending or descending order)  Progressing as expected  -HG     Comments: Patient will improve attention skills by sustaining focus in order to actively hold and manipulate information provided (e.g., sequencing auditorily presented number series in ascending or descending order)  6/25/21: Mental Manipulation with numbers: pt able to achieve 7 digits with chunking. 5/21/21: Pt sustained attention for opposites with a delay was 70% accurate with min-mod cues. 4/6/21: Sustained attention for new card game and pt was 95% accurate.  3/16/21: Mental Manipulation for 3 words, alphabetical and reversal and pt was 100% accurate.   -HG     Patient will improve attention skills by focusing to selective target/task when presented with competing stimuli or in a distracting environment in order to complete task  90%:;with cues  -HG     Status: Patient will improve attention skills by focusing to selective target/task when presented with competing stimuli or in a distracting environment in order to complete task  Progressing as expected  -HG     Comments: Patient will improve attention skills by focusing to selective target/task when presented with competing stimuli or in a distracting environment in order to complete task  5/21/21: Pt selective attention for opposites with a delay was 70% with min-mod cues. 5/10/21: RBANS attention score of 88 places pt in low average range. 4/30/21: Pt completed deduction puzzle with 100% accuracy independently. 4/27/21: Last letter game and pt selectively attended to cards while being distracted by other players. Pt 4/23/21: Step by step card game and  pt was 100% accurate independently. 3/26/21: The game of Concept3D and pt was 80% accurate. 3/12/21: Connecting the Dots- Alphabetical and pt was 90% accurate. Alternating attention task and pt was 95% accurate.   -HG     Patient will improve attention skills by alternating or shifting focus between two different tasks in order to complete both tasks  90%:;with cues  -HG     Status: Patient will improve attention skills by alternating or shifting focus between two different tasks in order to complete both tasks  Progressing as expected  -HG     Comments: Patient will improve attention skills by alternating or shifting focus between two different tasks in order to complete both tasks  21: Card sorting by changing opposite color: pt was 70% accurate. 21: Alternating attention for card sorting by odd and evens and colors and pt was 90% accurate. 21: 21: Sorting cards by the letter within a suit and pt was 100% accurate with min cues. 21: Alternating attention card game where pt had to add 2 cards then switch between adding 2 cards and naming the first letter of each numbered card. Pt was 100% accurate. 3/26/21: Memory Matches and pt was 70-90% accurate with use of attention strategies. 3/19/21: Memory Matches: pt was 80% accurate with min cues. 3/12/21: Divided attention for card sorting by  and by suit and pt was 85% accurate.   -HG        Other Goals    Other Adult Goal- 1  Pt will complete thought organization tasks with at least 90% accurate with cues.  -HG     Status: Other Adult Goal- 1  Progressing as expected  -HG     Comments: Other Adult Goal- 1  21: RBANS Language score of 94 remains in the Average range. 21: Pt reports that she recently struggled with how to help her mom to the toilet from her wheelchair. Six step sequencing, pt was 95% accurate. 21: Word scramble and pt was 80% accurate. 21: Following Written Directions: pt was 90% accurate.  21: Abstract and  concrete category naming was 100%. Pt was 100% for selecting words that do no tbelong from a field of 5. 4/6/21: Abstract category naming: pt was 90% accurate.  3/12/21: Divergent naming: pt was concrete: 15/15, abstract: 15/15 with mod cues.    -HG     Other Adult Goal- 2  Pt will participate in further reasoning and problem solving evaluation with goals to follow as indicated.  -HG     Status: Other Adult Goal- 2  Progressing as expected  -HG     Comments: Other Adult Goal- 2  6/25/21: Analogies: pt was 100% accurate. 6/15/21: Connect the dots alphabetical order: pt was 100%  accurate. 4/30/21: Pt completed deductive puzzle with 100% accuracy independently. 4/16/21: Deductive reasoning paragraph and pt was 80% accurate with minimal cues. 4/2/21: Deductive Reasoning puzzle and pt was 100% accurate. 3/26/21: Reasoning puzzle: Pt was 90% accurate with min cues.   -HG     Other Adult Goal- 3  Pt will improve RBANS Score to at least a 120 placing pt in the superior range.   -HG     Status: Other Adult Goal- 3  Progressing as expected  -HG     Comments: Other Adult Goal- 3  7/9/21: RBANS Total socre of 112 places pt in the high average. 6/15/21: APT Scores were within normal range except for Divided attention which was below average. 5/10/21: RBANS score of 103 places pt in the average range.   -HG        SLP Time Calculation    SLP Goal Re-Cert Due Date  07/15/21  -HG       User Key  (r) = Recorded By, (t) = Taken By, (c) = Cosigned By    Initials Name Provider Type    Sarita Bliss MS CCC-SLP Speech and Language Pathologist        OP SLP Education     Row Name 07/09/21 1328       Education    Education Comments  Hmwk from previous session.   -HG      User Key  (r) = Recorded By, (t) = Taken By, (c) = Cosigned By    Initials Name Effective Dates    Sarita Bliss MS CCC-SLP 06/16/21 -         OP SLP Assessment/Plan - 07/09/21 1328        SLP Assessment    Functional Problems  Speech Language-  Adult/Cognition   -HG    Impact on Function: Adult Speech Language/Cognition  Poor attention to task   -HG    Clinical Impression: Speech Language-Adult/Congnition  Minimal:;Cognitive Communication Impairment   -HG    Functional Problems Comment  Pt remains concerned about her attention and plans to discuss with her MD about ADD.   -HG    Clinical Impression Comments  SLUMS score of 112 places pt in a high range.  Improved from a previous score of 103.   -HG    Please refer to paper survey for additional self-reported information  Yes   -HG    Please refer to items scanned into chart for additional diagnostic informaiton and handouts as provided by clinician  Yes   -HG    SLP Diagnosis  Minimal cog comm impairment   -HG    Prognosis  Excellent (comment)   -HG    Patient/caregiver participated in establishment of treatment plan and goals  Yes   -HG    Patient would benefit from skilled therapy intervention  Yes   -HG       SLP Plan    Frequency  1x/week   -HG    Duration  4 weeks   -HG    Planned CPT's?  SLP INDIVIDUAL SPEECH THERAPY: 54131   -HG    Expected Duration of Therapy Session (SLP Eval)  60   -HG    Plan Comments  Cont with Cog tx with focus on attention    -HG      User Key  (r) = Recorded By, (t) = Taken By, (c) = Cosigned By    Initials Name Provider Type     Sarita Preston, MS CCC-SLP Speech and Language Pathologist               Time Calculation:   SLP Start Time: 1328  Untimed Charges  39855-PR Treatment/ST Modification Prosth Aug Alter : 60  Total Minutes  Untimed Charges Total Minutes: 60   Total Minutes: 60    Therapy Charges for Today     Code Description Service Date Service Provider Modifiers Qty    [id number]  ST TREATMENT SPEECH 4 7/9/2021 Sarita Preston, MS CCC-SLP GN 1                   Sarita Preston MS CCC-SLP  7/9/2021

## 2021-07-12 ENCOUNTER — TELEPHONE (OUTPATIENT)
Dept: INTERNAL MEDICINE | Facility: CLINIC | Age: 61
End: 2021-07-12

## 2021-07-12 ENCOUNTER — TRANSCRIBE ORDERS (OUTPATIENT)
Dept: ADMINISTRATIVE | Facility: HOSPITAL | Age: 61
End: 2021-07-12

## 2021-07-12 DIAGNOSIS — Z12.31 VISIT FOR SCREENING MAMMOGRAM: Primary | ICD-10-CM

## 2021-07-12 DIAGNOSIS — F41.9 ANXIETY: ICD-10-CM

## 2021-07-12 PROBLEM — F41.1 GENERALIZED ANXIETY DISORDER: Status: ACTIVE | Noted: 2021-07-12

## 2021-07-12 RX ORDER — ALPRAZOLAM 0.25 MG/1
0.25 TABLET ORAL DAILY PRN
Qty: 30 TABLET | Refills: 0 | Status: CANCELLED | OUTPATIENT
Start: 2021-07-12

## 2021-07-12 RX ORDER — ALPRAZOLAM 0.25 MG/1
0.25 TABLET ORAL DAILY PRN
Qty: 30 TABLET | Refills: 0 | Status: SHIPPED | OUTPATIENT
Start: 2021-07-12 | End: 2021-09-24 | Stop reason: SDUPTHER

## 2021-07-12 NOTE — TELEPHONE ENCOUNTER
Caller: Sandra Auguste    Relationship: Self    Best call back number:[phone]   Medication needed:   Requested Prescriptions     Pending Prescriptions Disp Refills   • ALPRAZolam (XANAX) 0.25 MG tablet 30 tablet 0     Sig: Take 1 tablet by mouth Daily As Needed for Anxiety.       When do you need the refill by: TODAY    What additional details did the patient provide when requesting the medication: HAS BEEN OUT FOR ABOUT A MONTH. MEDICINE WAS SUPPOSED TO HAVE BEEN CALLED IN Friday AFTER APPOINTMENT AND WAS NOT.    Does the patient have less than a 3 day supply:  [x] Yes  [] No    What is the patient's preferred pharmacy: [facility] [place] [address] [phone] [facility] [phone] FX

## 2021-07-12 NOTE — TELEPHONE ENCOUNTER
Pt states Tresiba is too expensive and would like to go back on Novolin R please. Please advise.    Sandra Auguste was seen for follow up and is due for routine refill of alprazolam 0.25 mg 1 po daily prn #30, 0RF. Last fill 2/2021. HEAVEN Flores and CS agreement are up to date. If you agree, please send in the attached prescription as ordered. Thank you

## 2021-07-12 NOTE — TELEPHONE ENCOUNTER
Patient advised the refill request has been sent to the physician and should be sent to her pharmacy today

## 2021-07-13 ENCOUNTER — APPOINTMENT (OUTPATIENT)
Dept: SPEECH THERAPY | Facility: HOSPITAL | Age: 61
End: 2021-07-13

## 2021-07-13 NOTE — ASSESSMENT & PLAN NOTE
Diabetes is worsening.   Reminded to bring in blood sugar diary at next visit.  Dietary recommendations for ADA diet.  Regular aerobic exercise.  Medication changes per orders. Start metformin  mg daily, increase to 2 tablets daily when tolerated.  Diabetes will be reassessed in 3 months.

## 2021-07-13 NOTE — ASSESSMENT & PLAN NOTE
Continue Viibryd and therapy. Refilled alprazolam to use sparingly prn. Prescribed by on-call provider #30, 0RF.    The patient has read and signed the [facility] Controlled Substance Contract.  I will continue to see patient for regular follow up appointments.  They are well controlled on their medication.  SHANTEL is updated every 3 months. The patient is aware of the potential for addiction and dependence.

## 2021-07-15 ENCOUNTER — OFFICE VISIT (OUTPATIENT)
Dept: INTERNAL MEDICINE | Facility: CLINIC | Age: 61
End: 2021-07-15

## 2021-07-15 VITALS
HEART RATE: 100 BPM | SYSTOLIC BLOOD PRESSURE: 122 MMHG | BODY MASS INDEX: 42.65 KG/M2 | HEIGHT: 66 IN | OXYGEN SATURATION: 97 % | DIASTOLIC BLOOD PRESSURE: 82 MMHG | WEIGHT: 265.4 LBS

## 2021-07-15 DIAGNOSIS — M25.841 CYST OF JOINT OF HAND, RIGHT: ICD-10-CM

## 2021-07-15 DIAGNOSIS — Z00.00 HEALTH CARE MAINTENANCE: Primary | ICD-10-CM

## 2021-07-15 DIAGNOSIS — Z23 NEED FOR TDAP VACCINATION: ICD-10-CM

## 2021-07-15 DIAGNOSIS — Z12.11 COLON CANCER SCREENING: ICD-10-CM

## 2021-07-15 DIAGNOSIS — Z78.0 POSTMENOPAUSAL: ICD-10-CM

## 2021-07-15 DIAGNOSIS — J30.2 SEASONAL ALLERGIES: ICD-10-CM

## 2021-07-15 DIAGNOSIS — Z23 NEED FOR HEPATITIS B BOOSTER VACCINATION: ICD-10-CM

## 2021-07-15 PROCEDURE — 90472 IMMUNIZATION ADMIN EACH ADD: CPT | Performed by: PHYSICIAN ASSISTANT

## 2021-07-15 PROCEDURE — 90471 IMMUNIZATION ADMIN: CPT | Performed by: PHYSICIAN ASSISTANT

## 2021-07-15 PROCEDURE — 99396 PREV VISIT EST AGE 40-64: CPT | Performed by: PHYSICIAN ASSISTANT

## 2021-07-15 PROCEDURE — 99213 OFFICE O/P EST LOW 20 MIN: CPT | Performed by: PHYSICIAN ASSISTANT

## 2021-07-15 PROCEDURE — 90746 HEPB VACCINE 3 DOSE ADULT IM: CPT | Performed by: PHYSICIAN ASSISTANT

## 2021-07-15 PROCEDURE — 90715 TDAP VACCINE 7 YRS/> IM: CPT | Performed by: PHYSICIAN ASSISTANT

## 2021-07-15 RX ORDER — FLUTICASONE PROPIONATE 50 MCG
2 SPRAY, SUSPENSION (ML) NASAL DAILY
Qty: 16 G | Refills: 5 | Status: SHIPPED | OUTPATIENT
Start: 2021-07-15 | End: 2023-01-26 | Stop reason: SDUPTHER

## 2021-07-15 NOTE — PROGRESS NOTES
Immunization  Immunization performed in RIGHT DELTOID  by Lara Wetzel MA. Patient tolerated the procedure well without complications.  07/15/21   Lara Wetzel MA

## 2021-07-15 NOTE — PROGRESS NOTES
Immunization  Immunization performed in LEFT DELTOID by Lara Wetzel MA. Patient tolerated the procedure well without complications.  07/15/21   Lara Wetzel MA

## 2021-07-15 NOTE — PROGRESS NOTES
"Chief Complaint   Patient presents with   • Annual Exam       Subjective     History of Present Illness    Sandra Auguste is a 60 y.o. female.     The patient is being seen for a health maintenance evaluation.  The last health maintenance was 1 year(s) ago.    Social History  Sandra  does not smoke cigarettes.   She drinks rare alcohol.  She does not use illicit drugs.    General History  Sandra  does not have regular dental visits.  She does complain of vision problems. Wears glasses. Last eye exam was 2020, she will schedule.  Immunizations are not up to date. The patient needs the following immunizations: thinks she had a tetanus shot; hepatitis B needed    Lifestyle  Sandra  consumes in general, a \"healthy\" diet  .  She exercises rarely.    Reproductive Health  Sandra  is postmenopausal.  She reports periods are nonexistent since hysterectomy in 2017 with Dr Grimaldo.  She is not sexually active. Her contraceptive plan is no method.    Screening  Last pap was 2017 by Dr Grimaldo. History of abnormal pap smear or family history of gyn cancer: none  Last mammogram was  5/2020, scheduled for August. Personal or family history of abnormal mammograms or breast cancer: none  Last colonoscopy was Cologuard in 2016, ordered again last year but did not do it. Family history of colon cancer: none  Last DEXA was never.     Pt has a cyst on her 1st digit that has become painful. Would like to talk to someone about removing it.              Current Outpatient Medications:   •  acetaminophen (TYLENOL) 500 MG tablet, Take 500 mg by mouth Every 6 (Six) Hours As Needed for Mild Pain ., Disp: , Rfl:   •  albuterol sulfate  (90 Base) MCG/ACT inhaler, Inhale 1 puff Every 6 (Six) Hours As Needed for Shortness of Air. INHALE TWO PUFFS BY MOUTH EVERY 6 HOURS AS NEEDED, Disp: 1 inhaler, Rfl: 2  •  ALPRAZolam (XANAX) 0.25 MG tablet, Take 1 tablet by mouth Daily As Needed for Anxiety., Disp: 30 tablet, Rfl: 0  •  Breo Ellipta 100-25 MCG/INH " inhaler, Inhale 1 puff Daily., Disp: 60 each, Rfl: 5  •  diclofenac (VOLTAREN) 1 % gel gel, Apply 4 g topically to the appropriate area as directed 4 (Four) Times a Day As Needed (for pain in shoulders)., Disp: 100 g, Rfl: 1  •  EPINEPHrine (EPIPEN) 0.3 MG/0.3ML solution auto-injector injection, Inject 0.3 mL into the appropriate muscle as directed by prescriber As Needed (FOR SEVERE ALLERGIC REACTION)., Disp: 1 each, Rfl: 1  •  famotidine (PEPCID) 40 MG tablet, Take 1 tablet by mouth Daily., Disp: 30 tablet, Rfl: 5  •  glucose blood test strip, Use as instructed to check blood glucose once daily., Disp: 100 each, Rfl: 12  •  glucose monitor monitoring kit, Use as directed to check blood glucose once daily., Disp: 1 each, Rfl: 0  •  [employer] Lancets UltraThin 30G misc, Use as instructed check blood glucose once daily., Disp: 100 each, Rfl: 12  •  loratadine (CLARITIN) 10 MG tablet, Take 1 tablet by mouth Daily., Disp: 90 tablet, Rfl: 1  •  metFORMIN ER (Glucophage XR) 500 MG 24 hr tablet, Take 2 tablets by mouth Daily With Breakfast., Disp: 60 tablet, Rfl: 2  •  ondansetron (Zofran) 8 MG tablet, Take 1 tablet by mouth Every 8 (Eight) Hours As Needed for Nausea or Vomiting., Disp: 15 tablet, Rfl: 0  •  vilazodone (VIIBRYD) 20 MG tablet tablet, Take 1 tablet by mouth Daily., Disp: 30 tablet, Rfl: 1  •  fluticasone (FLONASE) 50 MCG/ACT nasal spray, 2 sprays into the nostril(s) as directed by provider Daily for 30 days., Disp: 16 g, Rfl: 5     PMFSH  The following portions of the patient's history were reviewed and updated as appropriate: allergies, current medications, past family history, past medical history, past social history, past surgical history and problem list.    Review of Systems   Constitutional: Negative for appetite change, fever and unexpected weight change.   HENT: Negative for ear pain, facial swelling and sore throat.    Eyes: Negative for pain and visual disturbance.   Respiratory: Negative for chest  "tightness, shortness of breath and wheezing.    Cardiovascular: Negative for chest pain and palpitations.   Gastrointestinal: Negative for abdominal pain and blood in stool.   Endocrine: Negative.    Genitourinary: Negative for difficulty urinating and hematuria.   Musculoskeletal: Negative for joint swelling.   Neurological: Negative for tremors, seizures and syncope.   Hematological: Negative for adenopathy.   Psychiatric/Behavioral: Negative.        Objective   /82   Pulse 100   Ht 166.4 cm (65.5\")   Wt 120 kg (265 lb 6.4 oz)   LMP  (LMP Unknown)   SpO2 97%   BMI 43.49 kg/m²     Physical Exam  Vitals and nursing note reviewed.   Constitutional:       General: She is not in acute distress.     Appearance: She is well-developed. She is not diaphoretic.   HENT:      Head: Normocephalic and atraumatic. Hair is normal.      Right Ear: Hearing, tympanic membrane, ear canal and external ear normal. No decreased hearing noted. No drainage.      Left Ear: Hearing, tympanic membrane, ear canal and external ear normal. No decreased hearing noted.      Nose: No nasal deformity.   Eyes:      General: Lids are normal. Lids are everted, no foreign bodies appreciated.         Right eye: No discharge.         Left eye: No discharge.      Conjunctiva/sclera: Conjunctivae normal.      Pupils: Pupils are equal, round, and reactive to light.   Neck:      Thyroid: No thyromegaly.      Vascular: No JVD.      Trachea: No tracheal deviation.   Cardiovascular:      Rate and Rhythm: Normal rate and regular rhythm.      Pulses: Normal pulses.      Heart sounds: Normal heart sounds. No murmur heard.   No friction rub. No gallop.    Pulmonary:      Effort: Pulmonary effort is normal. No respiratory distress.      Breath sounds: Normal breath sounds. No wheezing or rales.   Chest:      Chest wall: No tenderness.   Abdominal:      General: Bowel sounds are normal. There is no distension.      Palpations: Abdomen is soft. There is no " mass.      Tenderness: There is no abdominal tenderness. There is no guarding or rebound.      Hernia: No hernia is present.   Musculoskeletal:         General: No tenderness or deformity. Normal range of motion.      Cervical back: Normal range of motion and neck supple.      Comments: Right distal index finger- palpable cyst   Lymphadenopathy:      Cervical: No cervical adenopathy.   Skin:     General: Skin is warm and dry.      Findings: No erythema or rash.   Neurological:      Mental Status: She is alert and oriented to person, place, and time.      Cranial Nerves: No cranial nerve deficit.      Motor: No abnormal muscle tone.      Coordination: Coordination normal.      Deep Tendon Reflexes: Reflexes are normal and symmetric. Reflexes normal.   Psychiatric:         Behavior: Behavior normal.         Thought Content: Thought content normal.         Judgment: Judgment normal.         Results for orders placed or performed in visit on 07/09/21   POC Glycosylated Hemoglobin (Hb A1C)    Specimen: Blood   Result Value Ref Range    Hemoglobin A1C 7.1 %        ASSESSMENT/PLAN    Diagnoses and all orders for this visit:    1. Health care maintenance (Primary)  Assessment & Plan:  Immunizations: hepatitis B series started today; TDaP today  Eye exam: done 2020  Pap Smear: done 2017 per Dr Grimaldo; pt will schedule f/u  Mammogram: done 5/2020; scheduled for follow up in August  Dexa: due age 62  Colonoscopy: cologuard ordered  Labs: fasting labs ordered    Counseled patient regarding multimodal approach with healthy nutrition, healthy sleep, regular physical activity, social activities, counseling, safety measures and medications.     Orders:  -     CBC & Differential; Future  -     Comprehensive Metabolic Panel; Future  -     Lipid Panel; Future  -     TSH; Future    2. Seasonal allergies  Comments:  Continue on current regimen.   Orders:  -     fluticasone (FLONASE) 50 MCG/ACT nasal spray; 2 sprays into the nostril(s) as  directed by provider Daily for 30 days.  Dispense: 16 g; Refill: 5    3. Postmenopausal  -     DEXA Bone Density Axial; Future    4. Colon cancer screening  -     Cologuard - Stool, Per Rectum; Future    5. Cyst of joint of hand, right  Comments:  Refer to hand specialist as ordered.  Orders:  -     Ambulatory Referral to Hand Surgery    6. Need for Tdap vaccination  -     Tdap Vaccine Greater Than or Equal To 6yo IM    7. Need for hepatitis B booster vaccination  -     Hepatitis B Vaccine Adult IM - Engerix           Return in about 1 year (around 7/15/2022), or if symptoms worsen or fail to improve, for Annual with fasting labs.

## 2021-07-16 ENCOUNTER — HOSPITAL ENCOUNTER (OUTPATIENT)
Dept: SPEECH THERAPY | Facility: HOSPITAL | Age: 61
Setting detail: THERAPIES SERIES
Discharge: HOME | End: 2021-07-16

## 2021-07-16 ENCOUNTER — HOSPITAL ENCOUNTER (OUTPATIENT)
Dept: NUCLEAR MEDICINE | Facility: HOSPITAL | Age: 61
Discharge: HOME | End: 2021-07-16

## 2021-07-16 DIAGNOSIS — R41.3 MEMORY LOSS: Primary | ICD-10-CM

## 2021-07-16 DIAGNOSIS — R11.0 NAUSEA: ICD-10-CM

## 2021-07-16 DIAGNOSIS — R10.13 EPIGASTRIC PAIN: ICD-10-CM

## 2021-07-16 PROCEDURE — 0 TECHNETIUM SULFUR COLLOID: Performed by: NURSE PRACTITIONER

## 2021-07-16 PROCEDURE — 92507 TX SP LANG VOICE COMM INDIV: CPT

## 2021-07-16 PROCEDURE — 78264 GASTRIC EMPTYING IMG STUDY: CPT

## 2021-07-16 PROCEDURE — A9541 TC99M SULFUR COLLOID: HCPCS | Performed by: NURSE PRACTITIONER

## 2021-07-16 RX ADMIN — TECHNETIUM TC 99M SULFUR COLLOID 1 DOSE: KIT at 09:42

## 2021-07-16 NOTE — THERAPY TREATMENT NOTE
Outpatient Speech Language Pathology   Adult Speech Language Cognitive Treatment Note   [facility]     Patient Name: Sandra Auguste  : 1960  MRN: [id number]  Today's Date: 2021         Visit Date: 2021   Patient Active Problem List   Diagnosis   • Abnormal computed tomography scan   • Anemia   • Post traumatic stress disorder (PTSD)   • Anxiety   • Strain of neck muscle   • Cough   • Diverticulitis of colon   • Fatigue   • Steatosis of liver   • Lateral epicondylitis   • Knee pain   • Spasm   • Adiposity   • Obstructive sleep apnea syndrome   • Osteoarthritis   • Palpitations   • Nausea   • Shortness of breath   • Skin tag   • Candidiasis of vagina   • Viral upper respiratory tract infection   • Gastroesophageal reflux disease   • Acute pain of left shoulder   • Abdominal distension (gaseous)   • SOB (shortness of breath) on exertion   • Sleep apnea with use of continuous positive airway pressure (CPAP)   • Seasonal allergies   • Obesity   • Migraine   • Mental disorder   • Diverticulosis   • Depression   • Arthritis   • Hypertension   • Itching   • Stabbing headache   • Temple tenderness   • TMJ click   • Mild intermittent asthma without complication   • Skull fracture (CMS/HCC)   • Type 2 diabetes mellitus without complication, without long-term current use of insulin (CMS/HCC)   • Health care maintenance   • Moderate episode of recurrent major depressive disorder (CMS/HCC)   • Generalized anxiety disorder          Visit Dx:    ICD-10-CM ICD-9-CM   1. Memory loss  R41.3 780.93                         SLP OP Goals     Row Name 21 1300          Goal Type Needed    Goal Type Needed  Memory;Attention/Orientation;Other Adult Goals  -HG        Subjective Comments    Subjective Comments  Pt alert, cooperative, returned with homework.   -HG        Memory Goals    Patient will be able to remember information needed to return to work and function on work-related tasks  100%:;with cues  -HG      Status: Patient will be able to remember information needed to return to work and function on work-related tasks  New  -HG     Patient will demonstrate improved ability to recall information by immediately recalling a series of words  90%:;unrelated;after delay  -HG     Status: Patient will demonstrate improved ability to recall information by immediately recalling a series of words  Progressing as expected  -HG     Comments: Patient will demonstrate improved ability to recall information by immediately recalling a series of words  7/16/21: Immediate recall of 16 groupable words and pt was 100% accurate. Recall of 4 un-related cards out of 15 and pt was 4/4.  7/9/21: RBANS score of 123 places pt in the Superior range. 7/6/21: Immediate recall of 16 groupable words: pt was 16/16.   6/25/21: Immediate recall for shapes and figures and pt was 90% accurate. 6/22/21: Immediate recall of names and faces: pt was 4/5. 6/15/21: Immediate recall of 10 un-related pictures and with no strategy, pt was 6/10. With use of grouping, pt was 9/10 and improved to 10/10. 5/21/21: Pt recall of reverse order of up to 4 words was 80% with minimal cues. 5/10/21: RBANS immediate recall score of 103 places pt in average range. 4/30/21: Pt immediate recall of 5 word arrays was 80% with minimal cues. 4/6/21: Immediate recall for ABC game pt was 90% accurate. 4/2/21: Picture recall for 75% accurate.  3/12/21: Word Placement: Inclusion for 4 words and pt was 100% accurate. 5 words: pt was 80% accurate. One Pile Card game: pt was required mod cues in order to recall rules of the game.   -HG     Patient’s memory skills will be enhanced as reported by patient by utilizing internal memory strategies to recall up to 3 pieces of information after a 5- minute delay  90%:;with cues  -HG     Status: Patient’s memory skills will be enhanced as reported by patient by utilizing internal memory strategies to recall up to 3 pieces of information after a 5-  minute delay  Progressing as expected  -HG     Comments: Patient’s memory skills will be enhanced as reported by patient by utilizing internal memory strategies to recall up to 3 pieces of information after a 5- minute delay  7/16/21: Delayed recall of 16 groupable words and pt was 95% accurate using a first letter initial strategy.  7/9/21: SLUMS Delayed score of 124 places pt in the Superior range. 7/6/21: Delayed recall of 16 groupable words and pt was 16/16.   6/22/21: Delayed recall of names and faces: pt was 5/5. 6/15/21: Delayed recall of 10 un-related pictures and pt was 10/10.  5/27/21: 8 un-related words: pt was 8/8 with use of chaining.  5/21/21: Pt was 70% accurate with opposites with a delay and min-mod cues. 5/10/21: RBANS delayed memory of 119 places pt in high average range. 4/2/21: Paired pictures: pt was 9/10. 3/26/21: Delayed recall of 8 un-related words and pt was 8/8 x 3 with use of chaining. 3/19/21: Delayed recall of 7 un-related words: pt was % accurate. 3/16/21: Delayed recall of 6 un-related words: pt was 6/6; with increased delay, pt was 6/6 x 2.  3/12/21: Delayed recall of 4 un-related words: pt was 4/4 x 3. Delayed recall of 5 un-related words: pt was 5/5 x 3.   -HG     Patient’s memory skills will be enhanced as reported by patient by using external memory aides  90%:;with cues  -HG     Status: Patient’s memory skills will be enhanced as reported by patient by using external memory aides  Progressing as expected  -HG     Comments: Patient’s memory skills will be enhanced as reported by patient by using external memory aides  5/21/21: Pt utilized tactile cues to recall words in session. 4/23/21: Pt states she is having some difficulties staying on top of tasks. 4/16/21: Pt noted she is using her phone often to make sure she is not forgetting daily tasks. 3/16/21: Pt states that the past few days have been rough and she hasn't kept up with it very well. 3/12/21: Pt using daily  journal per her report.   -HG        Attention/Orientation Goals    Patient will be able to execute all activities necessary to manage a home  Independently  -HG     Status: Patient will be able to execute all activities necessary to manage a home  Progressing as expected  -HG     Comments: Patient will be able to execute all activities necessary to manage a home  7/9/21: RBANS Attention score of 88 remains in the Low Average range. 4/30/21: Pt states she is having difficulties with caring for her mother. Pt states she continuing to perform ADL's but notes it is taking a large toll. 4/23/21: Pt notes that she is dealing with a lot due to trying to care for her mother while continuing to do ADL's. Pt states she is having to block off times for herself. 4/16/21: Pt has noted she is having difficulties with taking care of mother but is completing ADL's. Pt notes she is not doing as much as she would like to do. 4/2/21: Had pt download the [employer] maría elena for FREE trials at this time. 3/16/21: Exec function exercise for attention, memory and thought organization and overall, pt was 95% accurate.   -HG     Patient will improve attention skills by sustaining consistent behavioral response during continuous and repetitive activity (e.g., listening for target words, auditory/reading comprehension tasks)  without cues;10 minute task  -HG     Status: Patient will improve attention skills by sustaining consistent behavioral response during continuous and repetitive activity (e.g., listening for target words, auditory/reading comprehension tasks)  Progressing as expected  -HG     Comments: Patient will improve attention skills by sustaining consistent behavioral response during continuous and repetitive activity (e.g., listening for target words, auditory/reading comprehension tasks)  4/30/21: Pt completed Moving on Up and Middle Management card games together with 100% acccuracy. 4/16/21: Scoreboard card game and pt was 100%  accurate. 3/19/21: One Pile Card Game: pt was 80% accurate.  3/12/21: Sustained attention for the game of One Pile and pt was 80% accurate.   -HG     Patient will improve attention skills by sustaining focus in order to actively hold and manipulate information provided (e.g., sequencing auditorily presented number series in ascending or descending order)  90%:;with cues  -HG     Status: Patient will improve attention skills by sustaining focus in order to actively hold and manipulate information provided (e.g., sequencing auditorily presented number series in ascending or descending order)  Progressing as expected  -HG     Comments: Patient will improve attention skills by sustaining focus in order to actively hold and manipulate information provided (e.g., sequencing auditorily presented number series in ascending or descending order)  6/25/21: Mental Manipulation with numbers: pt able to achieve 7 digits with chunking. 5/21/21: Pt sustained attention for opposites with a delay was 70% accurate with min-mod cues. 4/6/21: Sustained attention for new card game and pt was 95% accurate.  3/16/21: Mental Manipulation for 3 words, alphabetical and reversal and pt was 100% accurate.   -HG     Patient will improve attention skills by focusing to selective target/task when presented with competing stimuli or in a distracting environment in order to complete task  90%:;with cues  -HG     Status: Patient will improve attention skills by focusing to selective target/task when presented with competing stimuli or in a distracting environment in order to complete task  Progressing as expected  -HG     Comments: Patient will improve attention skills by focusing to selective target/task when presented with competing stimuli or in a distracting environment in order to complete task  5/21/21: Pt selective attention for opposites with a delay was 70% with min-mod cues. 5/10/21: RBANS attention score of 88 places pt in low average  range. 21: Pt completed deduction puzzle with 100% accuracy independently. 21: Last letter game and pt selectively attended to cards while being distracted by other players. Pt 21: Step by step card game and pt was 100% accurate independently. 3/26/21: The game of Mooseoku and pt was 80% accurate. 3/12/21: Connecting the Dots- Alphabetical and pt was 90% accurate. Alternating attention task and pt was 95% accurate.   -HG     Patient will improve attention skills by alternating or shifting focus between two different tasks in order to complete both tasks  90%:;with cues  -HG     Status: Patient will improve attention skills by alternating or shifting focus between two different tasks in order to complete both tasks  Progressing as expected  -HG     Comments: Patient will improve attention skills by alternating or shifting focus between two different tasks in order to complete both tasks  21: Card sorting by changing opposite color: pt was 70% accurate. 21: Alternating attention for card sorting by odd and evens and colors and pt was 90% accurate. 21: 21: Sorting cards by the letter within a suit and pt was 100% accurate with min cues. 21: Alternating attention card game where pt had to add 2 cards then switch between adding 2 cards and naming the first letter of each numbered card. Pt was 100% accurate. 3/26/21: Memory Matches and pt was 70-90% accurate with use of attention strategies. 3/19/21: Memory Matches: pt was 80% accurate with min cues. 3/12/21: Divided attention for card sorting by  and by suit and pt was 85% accurate.   -HG        Other Goals    Other Adult Goal- 1  Pt will complete thought organization tasks with at least 90% accurate with cues.  -HG     Status: Other Adult Goal- 1  Progressing as expected  -HG     Comments: Other Adult Goal- 1  21: RBANS Language score of 94 remains in the Average range. 21: Pt reports that she recently struggled with how to  help her mom to the toilet from her wheelchair. Six step sequencing, pt was 95% accurate. 6/25/21: Word scramble and pt was 80% accurate. 5/27/21: Following Written Directions: pt was 90% accurate.  4/30/21: Abstract and concrete category naming was 100%. Pt was 100% for selecting words that do no tbelong from a field of 5. 4/6/21: Abstract category naming: pt was 90% accurate.  3/12/21: Divergent naming: pt was concrete: 15/15, abstract: 15/15 with mod cues.    -HG     Other Adult Goal- 2  Pt will participate in further reasoning and problem solving evaluation with goals to follow as indicated.  -HG     Status: Other Adult Goal- 2  Progressing as expected  -HG     Comments: Other Adult Goal- 2  7/16/21: Functional problem solving regarding pt's mom and current situation and pt was 100% accurate.  6/25/21: Analogies: pt was 100% accurate. 6/15/21: Connect the dots alphabetical order: pt was 100%  accurate. 4/30/21: Pt completed deductive puzzle with 100% accuracy independently. 4/16/21: Deductive reasoning paragraph and pt was 80% accurate with minimal cues. 4/2/21: Deductive Reasoning puzzle and pt was 100% accurate. 3/26/21: Reasoning puzzle: Pt was 90% accurate with min cues.   -HG     Other Adult Goal- 3  Pt will improve RBANS Score to at least a 120 placing pt in the superior range.   -HG     Status: Other Adult Goal- 3  Progressing as expected  -HG     Comments: Other Adult Goal- 3  7/9/21: RBANS Total socre of 112 places pt in the high average. 6/15/21: APT Scores were within normal range except for Divided attention which was below average. 5/10/21: RBANS score of 103 places pt in the average range.   -HG        SLP Time Calculation    SLP Goal Re-Cert Due Date  08/15/21  -       User Key  (r) = Recorded By, (t) = Taken By, (c) = Cosigned By    Initials Name Provider Type    Sarita Bliss MS CCC-SLP Speech and Language Pathologist        OP SLP Education     Row Name 07/16/21 1300       Education     Education Comments  Hmwk for attention strategies at home.   -      User Key  (r) = Recorded By, (t) = Taken By, (c) = Cosigned By    Initials Name Effective Dates    MARIUM Sarita Preston MS CCC-SLP 06/16/21 -         OP SLP Assessment/Plan - 07/16/21 1300        SLP Assessment    Functional Problems  Speech Language- Adult/Cognition   -    Clinical Impression: Speech Language-Adult/Congnition  Minimal:;Cognitive Communication Impairment   -HG    Functional Problems Comment  Pt continues to use strategies at home in order to improve attention and recall.    -HG    Clinical Impression Comments  Correction: RBANS score, not SLUMS, had improved to a 112 from a 103.    -       SLP Plan    Plan Comments  Cont with Cog tx.    -      User Key  (r) = Recorded By, (t) = Taken By, (c) = Cosigned By    Initials Name Provider Type    MARIUM Sarita Preston MS CCC-SLP Speech and Language Pathologist               Time Calculation:   SLP Start Time: 1300  Untimed Charges  35093-BE Treatment/ST Modification Prosth Aug Alter : 60  Total Minutes  Untimed Charges Total Minutes: 60   Total Minutes: 60    Therapy Charges for Today     Code Description Service Date Service Provider Modifiers Qty    20059750040 HC ST TREATMENT SPEECH 4 7/16/2021 Sarita Preston MS CCC-SLP GN 1                   Sarita Preston MS CCC-SLP  7/16/2021

## 2021-07-19 NOTE — ASSESSMENT & PLAN NOTE
Immunizations: hepatitis B series started today; TDaP today  Eye exam: done 2020  Pap Smear: done 2017 per Dr Grimaldo; pt will schedule f/u  Mammogram: done 5/2020; scheduled for follow up in August  Dexa: due age 62  Colonoscopy: cologuard ordered  Labs: fasting labs ordered    Counseled patient regarding multimodal approach with healthy nutrition, healthy sleep, regular physical activity, social activities, counseling, safety measures and medications.

## 2021-07-20 ENCOUNTER — LAB (OUTPATIENT)
Dept: LAB | Facility: HOSPITAL | Age: 61
End: 2021-07-20

## 2021-07-20 DIAGNOSIS — Z00.00 HEALTH CARE MAINTENANCE: ICD-10-CM

## 2021-07-20 LAB
BASOPHILS # BLD AUTO: 0.06 10*3/MM3 (ref 0–0.2)
BASOPHILS NFR BLD AUTO: 0.7 % (ref 0–1.5)
DEPRECATED RDW RBC AUTO: 43.5 FL (ref 37–54)
EOSINOPHIL # BLD AUTO: 0.35 10*3/MM3 (ref 0–0.4)
EOSINOPHIL NFR BLD AUTO: 3.9 % (ref 0.3–6.2)
ERYTHROCYTE [DISTWIDTH] IN BLOOD BY AUTOMATED COUNT: 13.1 % (ref 12.3–15.4)
HCT VFR BLD AUTO: 43.8 % (ref 34–46.6)
HGB BLD-MCNC: 14.3 G/DL (ref 12–15.9)
IMM GRANULOCYTES # BLD AUTO: 0.03 10*3/MM3 (ref 0–0.05)
IMM GRANULOCYTES NFR BLD AUTO: 0.3 % (ref 0–0.5)
LYMPHOCYTES # BLD AUTO: 1.79 10*3/MM3 (ref 0.7–3.1)
LYMPHOCYTES NFR BLD AUTO: 20.2 % (ref 19.6–45.3)
MCH RBC QN AUTO: 29.4 PG (ref 26.6–33)
MCHC RBC AUTO-ENTMCNC: 32.6 G/DL (ref 31.5–35.7)
MCV RBC AUTO: 90.1 FL (ref 79–97)
MONOCYTES # BLD AUTO: 0.62 10*3/MM3 (ref 0.1–0.9)
MONOCYTES NFR BLD AUTO: 7 % (ref 5–12)
NEUTROPHILS NFR BLD AUTO: 6.03 10*3/MM3 (ref 1.7–7)
NEUTROPHILS NFR BLD AUTO: 67.9 % (ref 42.7–76)
NRBC BLD AUTO-RTO: 0 /100 WBC (ref 0–0.2)
PLATELET # BLD AUTO: 339 10*3/MM3 (ref 140–450)
PMV BLD AUTO: 11 FL (ref 6–12)
RBC # BLD AUTO: 4.86 10*6/MM3 (ref 3.77–5.28)
WBC # BLD AUTO: 8.88 10*3/MM3 (ref 3.4–10.8)

## 2021-07-20 PROCEDURE — 80050 GENERAL HEALTH PANEL: CPT

## 2021-07-20 PROCEDURE — 80061 LIPID PANEL: CPT

## 2021-07-21 LAB
ALBUMIN SERPL-MCNC: 3.9 G/DL (ref 3.5–5.2)
ALBUMIN/GLOB SERPL: 1.3 G/DL
ALP SERPL-CCNC: 98 U/L (ref 39–117)
ALT SERPL W P-5'-P-CCNC: 26 U/L (ref 1–33)
ANION GAP SERPL CALCULATED.3IONS-SCNC: 11.1 MMOL/L (ref 5–15)
AST SERPL-CCNC: 20 U/L (ref 1–32)
BILIRUB SERPL-MCNC: 0.3 MG/DL (ref 0–1.2)
BUN SERPL-MCNC: 10 MG/DL (ref 6–20)
BUN/CREAT SERPL: 14.7 (ref 7–25)
CALCIUM SPEC-SCNC: 9.2 MG/DL (ref 8.6–10.5)
CHLORIDE SERPL-SCNC: 105 MMOL/L (ref 98–107)
CHOLEST SERPL-MCNC: 169 MG/DL (ref 0–200)
CO2 SERPL-SCNC: 22.9 MMOL/L (ref 22–29)
CREAT SERPL-MCNC: 0.68 MG/DL (ref 0.57–1)
GFR SERPL CREATININE-BSD FRML MDRD: 89 ML/MIN/1.73
GLOBULIN UR ELPH-MCNC: 2.9 GM/DL
GLUCOSE SERPL-MCNC: 136 MG/DL (ref 65–99)
HDLC SERPL-MCNC: 39 MG/DL (ref 40–60)
LDLC SERPL CALC-MCNC: 109 MG/DL (ref 0–100)
LDLC/HDLC SERPL: 2.75 {RATIO}
POTASSIUM SERPL-SCNC: 4.3 MMOL/L (ref 3.5–5.2)
PROT SERPL-MCNC: 6.8 G/DL (ref 6–8.5)
SODIUM SERPL-SCNC: 139 MMOL/L (ref 136–145)
TRIGL SERPL-MCNC: 113 MG/DL (ref 0–150)
TSH SERPL DL<=0.05 MIU/L-ACNC: 2.14 UIU/ML (ref 0.27–4.2)
VLDLC SERPL-MCNC: 21 MG/DL (ref 5–40)

## 2021-07-22 ENCOUNTER — TELEMEDICINE (OUTPATIENT)
Dept: PSYCHIATRY | Facility: CLINIC | Age: 61
End: 2021-07-22

## 2021-07-22 DIAGNOSIS — F33.1 MODERATE EPISODE OF RECURRENT MAJOR DEPRESSIVE DISORDER (HCC): Primary | ICD-10-CM

## 2021-07-22 DIAGNOSIS — F41.1 GENERALIZED ANXIETY DISORDER: ICD-10-CM

## 2021-07-22 PROCEDURE — 90834 PSYTX W PT 45 MINUTES: CPT | Performed by: COUNSELOR

## 2021-07-22 NOTE — PROGRESS NOTES
Date: July 22, 2021  Time In: 2:18 PM  Time Out: 3:03 PM  This provider is located at the [facility]), [address], [place] [address] using a secure Bundle [employer] Video Visit through [employer]. Patient is being seen remotely via telehealth at home address in [place] and stated they are in a secure environment for this session. The patient's condition being diagnosed/treated is appropriate for telemedicine. The provider identified herself as well as her credentials. The patient, and/or patients guardian, consent to be seen remotely, and when consent is given they understand that the consent allows for patient identifiable information to be sent to a third party as needed. They may refuse to be seen remotely at any time. The electronic data is encrypted and password protected, and the patient and/or guardian has been advised of the potential risks to privacy not withstanding such measures.     You have chosen to receive care through a telehealth visit.  Do you consent to use a video/audio connection for your medical care today? Yes    PROGRESS NOTE  Data:  Sandra Auguste is a 60 y.o. female who presents today for follow up. Patient states that she restarted her diabetes medication, Metformin and is taking it twice a day now. Patient states that she is feeling better with restarting the medication but she still gets anxious when she has to do her fingerstick blood sugars. Patient stated that she is trying to take better care of herself. Patient discussed understanding the need for letting others know how she feels and thinks as she got upset with her daughter when her daughter didn't want to watch a show with her and it was dicussed that although the show meant something to the patient she did not articulate this to her daughter and therefore it could not be assumed that the daughter was not interested in what the patient was trying to share with her. 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 Patient did not realize that without all of the information that her daughter was just making her answer on her opinion of the show and that if she had the reason for the patient wanting to watch the show with her the answer could have been different. Patient is still concerned about what will happen when he mother comes home and how life will look for her; discussed again the need for the patient to be honest and upfront with her siblings when discussing her mother's needs and what the patient is comfortable with in caring for her mother. Patient is concerned about her weight and blood sugars and wants to get her weight down to be healthier.      Chief Complaint: feelings of anxiety and depression, \"weird\" dreams    History of Present Illness: patient has a history of depression and anxiety from her childhood and has also experienced some PTSD symptoms.      Clinical Maneuvering/Intervention: CBT to illustrate the need for more open and honest communication    (Scales based on 0 - 10 with 10 being the worst)  Depression: 3-4 Anxiety: 4-5       Assisted patient in processing above session content; acknowledged and normalized patient’s thoughts, feelings, and concerns.  Rationalized patient thought process regarding her approach to communication and how that was not serving her in the best manner.   Discussed triggers associated with patient's feelings of anxiety in regard to her weight and past choices and how she can not hold the past against herself but only learn from it.  Also discussed coping skills for patient to implement such as being more direct with her communication, using exercise to " fill up free time, and continuing to set healthy boundaries for herself with others.    Allowed patient to freely discuss issues without interruption or judgment. Provided safe, confidential environment to facilitate the development of positive therapeutic relationship and encourage open, honest communication. Assisted patient in identifying risk factors which would indicate the need for higher level of care including thoughts to harm self or others and/or self-harming behavior and encouraged patient to contact this office, call 911, or present to the nearest emergency room should any of these events occur. Discussed crisis intervention services and means to access. Patient adamantly and convincingly denies current suicidal or homicidal ideation or perceptual disturbance.    Assessment:   Assessment   Patient appears to maintain relative stability as compared to their baseline and was in a more jovial mood than normal.  However, patient continues to struggle with feelings of anxiety and depression that cause impairment in important areas of functioning.  A result, they can be reasonably expected to continue to benefit from treatment and would likely be at increased risk for decompensation otherwise.    Mental Status Exam:   Hygiene:   good  Cooperation:  Cooperative  Eye Contact:  Good  Psychomotor Behavior:  Appropriate  Affect:  Appropriate  Mood: anxious  Speech:  Normal  Thought Process:  Circum  Thought Content:  Normal  Suicidal:  None  Homicidal:  None  Hallucinations:  None  Delusion:  None  Memory:  Deficits- at times   Orientation:  Person, Place, Time and Situation  Reliability:  fair  Insight:  Fair  Judgement:  Fair  Impulse Control:  Fair  Physical/Medical Issues:  No        Patient's Support Network Includes:  children    Functional Status: Moderate impairment     Progress toward goal: Not at goal    Prognosis: Guarded with Ongoing Treatment         Plan:    Patient will continue in individual  outpatient therapy with focus on improved functioning and coping skills, maintaining stability, and avoiding decompensation and the need for higher level of care.    Patient will adhere to medication regimen as prescribed and report any side effects. Patient will contact this office, call 911 or present to the nearest emergency room should suicidal or homicidal ideations occur. Provide Cognitive Behavioral Therapy and Solution Focused Therapy to improve functioning, maintain stability, and avoid decompensation and the need for higher level of care.     Return in about 2 weeks, or earlier if symptoms worsen or fail to improve.           VISIT DIAGNOSIS:     ICD-10-CM ICD-9-CM   1. Moderate episode of recurrent major depressive disorder (CMS/[facility])  F33.1 296.32   2. Generalized anxiety disorder  F41.1 300.02             This document has been electronically signed by ASIA Rich  July 22, 2021 14:53 EDT      Part of this note may be an electronic transcription/translation of spoken language to printed text using the Dragon Dictation System.

## 2021-07-23 ENCOUNTER — HOSPITAL ENCOUNTER (OUTPATIENT)
Dept: DIABETES SERVICES | Facility: HOSPITAL | Age: 61
Setting detail: RECURRING SERIES
Discharge: HOME | End: 2021-07-23

## 2021-07-23 NOTE — CONSULTS
Diabetes Education    Patient Name:  Sandra Auguste  YOB: 1960  MRN: [id number]  Admit Date:  7/23/2021        Pt attended ongoing diabetes education follow up visit--30 min visit via zoom video visit. Please see media tab for assessment and notes if you use EPIC. If you are not an EPIC user a copy of patient's assessment and notes will be sent per routine. Thank you.       Electronically signed by:  Mally Shaikh RN, [facility]  07/23/21 16:24 EDT

## 2021-07-24 RX ORDER — ROSUVASTATIN CALCIUM 5 MG/1
5 TABLET, COATED ORAL DAILY
Qty: 90 TABLET | Refills: 1 | Status: SHIPPED | OUTPATIENT
Start: 2021-07-24 | End: 2022-01-19

## 2021-07-24 NOTE — PROGRESS NOTES
Your labs show that your cholesterol remains a little elevated. Because you have diabetes, your cardiovascular risk is higher and we want to keep your cholesterol as low as possible. I am going to send in a low dose of cholesterol medication for you to start taking at night.    Everything else is stable and looks fine.

## 2021-07-28 ENCOUNTER — TELEMEDICINE (OUTPATIENT)
Dept: SLEEP MEDICINE | Facility: HOSPITAL | Age: 61
End: 2021-07-28

## 2021-07-28 VITALS — BODY MASS INDEX: 42.75 KG/M2 | HEIGHT: 66 IN | WEIGHT: 266 LBS

## 2021-07-28 DIAGNOSIS — G47.33 OBSTRUCTIVE SLEEP APNEA SYNDROME: Primary | ICD-10-CM

## 2021-07-28 PROCEDURE — 99213 OFFICE O/P EST LOW 20 MIN: CPT | Performed by: NURSE PRACTITIONER

## 2021-07-28 NOTE — PROGRESS NOTES
Chief Complaint:   Chief Complaint   Patient presents with   • Follow-up       HPI:    Sandra Auguste is a 61 y.o. female here for follow-up of sleep apnea.  Patient was last seen July 24, 2020.  Patient states she does do well with CPAP therapy but does suffer from severe anxiety so on some nights does not feel she can wear it.  She does get 7 to 8 hours a night of sleep and does feel rested.  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  Patient has an [place] score of 5/24.  Patient has no concerns or complaints today and wishes to continue.        Current medications are:   Current Outpatient Medications:   •  acetaminophen (TYLENOL) 500 MG tablet, Take 500 mg by mouth Every 6 (Six) Hours As Needed for Mild Pain ., Disp: , Rfl:   •  albuterol sulfate  (90 Base) MCG/ACT inhaler, Inhale 1 puff Every 6 (Six) Hours As Needed for Shortness of Air. INHALE TWO PUFFS BY MOUTH EVERY 6 HOURS AS NEEDED, Disp: 1 inhaler, Rfl: 2  •  ALPRAZolam (XANAX) 0.25 MG tablet, Take 1 tablet by mouth Daily As Needed for Anxiety., Disp: 30 tablet, Rfl: 0  •  Breo Ellipta 100-25 MCG/INH inhaler, Inhale 1 puff Daily., Disp: 60 each, Rfl: 5  •  diclofenac (VOLTAREN) 1 % gel gel, Apply 4 g topically to the appropriate area as directed 4 (Four) Times a Day As Needed (for pain in shoulders)., Disp: 100 g, Rfl: 1  •  EPINEPHrine (EPIPEN) 0.3 MG/0.3ML solution auto-injector injection, Inject 0.3 mL into the appropriate muscle as directed by prescriber As Needed (FOR SEVERE ALLERGIC REACTION)., Disp: 1 each, Rfl: 1  •  famotidine (PEPCID) 40 MG tablet, Take 1 tablet by mouth Daily., Disp: 30 tablet, Rfl: 5  •  fluticasone (FLONASE) 50 MCG/ACT nasal spray, 2 sprays into the nostril(s) as directed by provider Daily for 30 days., Disp: 16 g, Rfl: 5  •  glucose blood test strip, Use as instructed to check  blood glucose once daily., Disp: 100 each, Rfl: 12  •  glucose monitor monitoring kit, Use as directed to check blood glucose once daily., Disp: 1 each, Rfl: 0  •  [employer] Lancets UltraThin 30G misc, Use as instructed check blood glucose once daily., Disp: 100 each, Rfl: 12  •  loratadine (CLARITIN) 10 MG tablet, Take 1 tablet by mouth Daily., Disp: 90 tablet, Rfl: 1  •  metFORMIN ER (Glucophage XR) 500 MG 24 hr tablet, Take 2 tablets by mouth Daily With Breakfast., Disp: 60 tablet, Rfl: 2  •  ondansetron (Zofran) 8 MG tablet, Take 1 tablet by mouth Every 8 (Eight) Hours As Needed for Nausea or Vomiting., Disp: 15 tablet, Rfl: 0  •  rosuvastatin (Crestor) 5 MG tablet, Take 1 tablet by mouth Daily., Disp: 90 tablet, Rfl: 1  •  vilazodone (VIIBRYD) 20 MG tablet tablet, Take 1 tablet by mouth Daily., Disp: 30 tablet, Rfl: 1.      The patient's relevant past medical, surgical, family and social history were reviewed and updated in Epic as appropriate.       Review of Systems   Eyes: Positive for visual disturbance.   Respiratory: Positive for apnea, cough, shortness of breath and wheezing.    Cardiovascular: Positive for palpitations and leg swelling.   Musculoskeletal: Positive for arthralgias.   Allergic/Immunologic: Positive for environmental allergies.   Neurological: Positive for numbness.   Psychiatric/Behavioral: Positive for sleep disturbance. The patient is nervous/anxious.    All other systems reviewed and are negative.        Objective:    Physical Exam  Constitutional:       Appearance: Normal appearance. She is obese.   HENT:      Head: Normocephalic and atraumatic.      Mouth/Throat:      Comments: Class 4 airway  Pulmonary:      Effort: Pulmonary effort is normal. No respiratory distress.   Neurological:      Mental Status: She is alert and oriented to person, place, and time.   Psychiatric:         Mood and Affect: Mood normal.         Behavior: Behavior normal.         Thought Content: Thought content  normal.         Judgment: Judgment normal.     80/90 days of use  Greater than 4-hour use 66.7  90% pressure 12.4  AHI 1.8  Settings 818      ASSESSMENT/PLAN    Diagnoses and all orders for this visit:    1. Obstructive sleep apnea syndrome (Primary)  -     CPAP Therapy            1. Counseled patient regarding multimodal approach with healthy nutrition, healthy sleep, regular physical activity, social activities, counseling, and medications. Encouraged to practice lateral sleep position. Avoid alcohol and sedatives close to bedtime.  2.   Refill supplies x1 year.  Return to clinic 1 year or sooner symptoms warrant.  Patient gave verbal consent today for video visit.  I have reviewed the results of my evaluation and impression and discussed my recommendations in detail with the patient.      Signed by  Giana Rodriguez, REBECA    July 28, 2021      CC: Michelle Mandel PA          No ref. provider found

## 2021-07-29 ENCOUNTER — APPOINTMENT (OUTPATIENT)
Dept: SPEECH THERAPY | Facility: HOSPITAL | Age: 61
End: 2021-07-29

## 2021-08-02 ENCOUNTER — HOSPITAL ENCOUNTER (OUTPATIENT)
Dept: SPEECH THERAPY | Facility: HOSPITAL | Age: 61
Setting detail: THERAPIES SERIES
Discharge: HOME | End: 2021-08-02

## 2021-08-02 DIAGNOSIS — R41.3 MEMORY LOSS: Primary | ICD-10-CM

## 2021-08-02 PROCEDURE — 92507 TX SP LANG VOICE COMM INDIV: CPT

## 2021-08-02 NOTE — THERAPY TREATMENT NOTE
Outpatient Speech Language Pathology   Adult Speech Language Cognitive Treatment Note   Dore[facility]     Patient Name: Sandra Auguste  : 1960  MRN: [id number]  Today's Date: 2021         Visit Date: 2021   Patient Active Problem List   Diagnosis   • Abnormal computed tomography scan   • Anemia   • Post traumatic stress disorder (PTSD)   • Anxiety   • Strain of neck muscle   • Cough   • Diverticulitis of colon   • Fatigue   • Steatosis of liver   • Lateral epicondylitis   • Knee pain   • Spasm   • Adiposity   • Obstructive sleep apnea syndrome   • Osteoarthritis   • Palpitations   • Nausea   • Shortness of breath   • Skin tag   • Candidiasis of vagina   • Viral upper respiratory tract infection   • Gastroesophageal reflux disease   • Acute pain of left shoulder   • Abdominal distension (gaseous)   • SOB (shortness of breath) on exertion   • Sleep apnea with use of continuous positive airway pressure (CPAP)   • Seasonal allergies   • Obesity   • Migraine   • Mental disorder   • Diverticulosis   • Depression   • Arthritis   • Hypertension   • Itching   • Stabbing headache   • Temple tenderness   • TMJ click   • Mild intermittent asthma without complication   • Skull fracture (CMS/HCC)   • Type 2 diabetes mellitus without complication, without long-term current use of insulin (CMS/HCC)   • Health care maintenance   • Moderate episode of recurrent major depressive disorder (CMS/HCC)   • Generalized anxiety disorder          Visit Dx:    ICD-10-CM ICD-9-CM   1. Memory loss  R41.3 780.93       OP SLP Assessment/Plan - 21 1500        SLP Plan    Plan Comments  Cont with Cog tx.    -HG      User Key  (r) = Recorded By, (t) = Taken By, (c) = Cosigned By    Initials Name Provider Type    Sarita Bliss MS CCC-SLP Speech and Language Pathologist                            SLP OP Goals     Row Name 21 1500          Goal Type Needed    Goal Type Needed  Memory;Attention/Orientation;Other Adult  Goals  -HG        Subjective Comments    Subjective Comments  Pt alert, cooperative,   -HG        Memory Goals    Patient will be able to remember information needed to return to work and function on work-related tasks  100%:;with cues  -HG     Status: Patient will be able to remember information needed to return to work and function on work-related tasks  New  -HG     Patient will demonstrate improved ability to recall information by immediately recalling a series of words  90%:;unrelated;after delay  -HG     Status: Patient will demonstrate improved ability to recall information by immediately recalling a series of words  Progressing as expected  -HG     Comments: Patient will demonstrate improved ability to recall information by immediately recalling a series of words  8/2/21: Immediate recall for shapes and figures and pt was 100% accurate. 7/16/21: Immediate recall of 16 groupable words and pt was 100% accurate. Recall of 4 un-related cards out of 15 and pt was 4/4.  7/9/21: RBANS score of 123 places pt in the Superior range. 7/6/21: Immediate recall of 16 groupable words: pt was 16/16.   6/25/21: Immediate recall for shapes and figures and pt was 90% accurate. 6/22/21: Immediate recall of names and faces: pt was 4/5. 6/15/21: Immediate recall of 10 un-related pictures and with no strategy, pt was 6/10. With use of grouping, pt was 9/10 and improved to 10/10. 5/21/21: Pt recall of reverse order of up to 4 words was 80% with minimal cues. 5/10/21: RBANS immediate recall score of 103 places pt in average range. 4/30/21: Pt immediate recall of 5 word arrays was 80% with minimal cues. 4/6/21: Immediate recall for ABC game pt was 90% accurate. 4/2/21: Picture recall for 75% accurate.  3/12/21: Word Placement: Inclusion for 4 words and pt was 100% accurate. 5 words: pt was 80% accurate. One Pile Card game: pt was required mod cues in order to recall rules of the game.   -HG     Patient’s memory skills will be  enhanced as reported by patient by utilizing internal memory strategies to recall up to 3 pieces of information after a 5- minute delay  90%:;with cues  -HG     Status: Patient’s memory skills will be enhanced as reported by patient by utilizing internal memory strategies to recall up to 3 pieces of information after a 5- minute delay  Progressing as expected  -HG     Comments: Patient’s memory skills will be enhanced as reported by patient by utilizing internal memory strategies to recall up to 3 pieces of information after a 5- minute delay  7/16/21: Delayed recall of 16 groupable words and pt was 95% accurate using a first letter initial strategy.  7/9/21: SLUMS Delayed score of 124 places pt in the Superior range. 7/6/21: Delayed recall of 16 groupable words and pt was 16/16.   6/22/21: Delayed recall of names and faces: pt was 5/5. 6/15/21: Delayed recall of 10 un-related pictures and pt was 10/10.  5/27/21: 8 un-related words: pt was 8/8 with use of chaining.  5/21/21: Pt was 70% accurate with opposites with a delay and min-mod cues. 5/10/21: RBANS delayed memory of 119 places pt in high average range. 4/2/21: Paired pictures: pt was 9/10. 3/26/21: Delayed recall of 8 un-related words and pt was 8/8 x 3 with use of chaining. 3/19/21: Delayed recall of 7 un-related words: pt was % accurate. 3/16/21: Delayed recall of 6 un-related words: pt was 6/6; with increased delay, pt was 6/6 x 2.  3/12/21: Delayed recall of 4 un-related words: pt was 4/4 x 3. Delayed recall of 5 un-related words: pt was 5/5 x 3.   -HG     Patient’s memory skills will be enhanced as reported by patient by using external memory aides  90%:;with cues  -HG     Status: Patient’s memory skills will be enhanced as reported by patient by using external memory aides  Progressing as expected  -HG     Comments: Patient’s memory skills will be enhanced as reported by patient by using external memory aides  8/2/21: Pt continues to use her cell  phone and calendar for appts, dates, etc. 5/21/21: Pt utilized tactile cues to recall words in session. 4/23/21: Pt states she is having some difficulties staying on top of tasks. 4/16/21: Pt noted she is using her phone often to make sure she is not forgetting daily tasks. 3/16/21: Pt states that the past few days have been rough and she hasn't kept up with it very well. 3/12/21: Pt using daily journal per her report.   -HG        Attention/Orientation Goals    Patient will be able to execute all activities necessary to manage a home  Independently  -HG     Status: Patient will be able to execute all activities necessary to manage a home  Progressing as expected  -HG     Comments: Patient will be able to execute all activities necessary to manage a home  7/9/21: RBANS Attention score of 88 remains in the Low Average range. 4/30/21: Pt states she is having difficulties with caring for her mother. Pt states she continuing to perform ADL's but notes it is taking a large toll. 4/23/21: Pt notes that she is dealing with a lot due to trying to care for her mother while continuing to do ADL's. Pt states she is having to block off times for herself. 4/16/21: Pt has noted she is having difficulties with taking care of mother but is completing ADL's. Pt notes she is not doing as much as she would like to do. 4/2/21: Had pt download the [employer] maría elena for FREE trials at this time. 3/16/21: Exec function exercise for attention, memory and thought organization and overall, pt was 95% accurate.   -HG     Patient will improve attention skills by sustaining consistent behavioral response during continuous and repetitive activity (e.g., listening for target words, auditory/reading comprehension tasks)  without cues;10 minute task  -HG     Status: Patient will improve attention skills by sustaining consistent behavioral response during continuous and repetitive activity (e.g., listening for target words, auditory/reading comprehension  tasks)  Progressing as expected  -HG     Comments: Patient will improve attention skills by sustaining consistent behavioral response during continuous and repetitive activity (e.g., listening for target words, auditory/reading comprehension tasks)  4/30/21: Pt completed Moving on Up and Middle Management card games together with 100% acccuracy. 4/16/21: Scoreboard card game and pt was 100% accurate. 3/19/21: One Pile Card Game: pt was 80% accurate.  3/12/21: Sustained attention for the game of One Pile and pt was 80% accurate.   -HG     Patient will improve attention skills by sustaining focus in order to actively hold and manipulate information provided (e.g., sequencing auditorily presented number series in ascending or descending order)  90%:;with cues  -HG     Status: Patient will improve attention skills by sustaining focus in order to actively hold and manipulate information provided (e.g., sequencing auditorily presented number series in ascending or descending order)  Progressing as expected  -HG     Comments: Patient will improve attention skills by sustaining focus in order to actively hold and manipulate information provided (e.g., sequencing auditorily presented number series in ascending or descending order)  6/25/21: Mental Manipulation with numbers: pt able to achieve 7 digits with chunking. 5/21/21: Pt sustained attention for opposites with a delay was 70% accurate with min-mod cues. 4/6/21: Sustained attention for new card game and pt was 95% accurate.  3/16/21: Mental Manipulation for 3 words, alphabetical and reversal and pt was 100% accurate.   -HG     Patient will improve attention skills by focusing to selective target/task when presented with competing stimuli or in a distracting environment in order to complete task  90%:;with cues  -HG     Status: Patient will improve attention skills by focusing to selective target/task when presented with competing stimuli or in a distracting environment  in order to complete task  Progressing as expected  -HG     Comments: Patient will improve attention skills by focusing to selective target/task when presented with competing stimuli or in a distracting environment in order to complete task  8/2/21: Card activity and pt was 90% accurate. 5/21/21: Pt selective attention for opposites with a delay was 70% with min-mod cues. 5/10/21: RBANS attention score of 88 places pt in low average range. 4/30/21: Pt completed deduction puzzle with 100% accuracy independently. 4/27/21: Last letter game and pt selectively attended to cards while being distracted by other players. Pt 4/23/21: Step by step card game and pt was 100% accurate independently. 3/26/21: The game of Dynamic Yield and pt was 80% accurate. 3/12/21: Connecting the Dots- Alphabetical and pt was 90% accurate. Alternating attention task and pt was 95% accurate.   -HG     Patient will improve attention skills by alternating or shifting focus between two different tasks in order to complete both tasks  90%:;with cues  -HG     Status: Patient will improve attention skills by alternating or shifting focus between two different tasks in order to complete both tasks  Progressing as expected  -HG     Comments: Patient will improve attention skills by alternating or shifting focus between two different tasks in order to complete both tasks  8/2/21: Divided attention for card sorting and pt was 100% accurate with min cues. 7/6/21: Card sorting by changing opposite color: pt was 70% accurate. 6/22/21: Alternating attention for card sorting by odd and evens and colors and pt was 90% accurate. 21: 5/27/21: Sorting cards by the letter within a suit and pt was 100% accurate with min cues. 4/16/21: Alternating attention card game where pt had to add 2 cards then switch between adding 2 cards and naming the first letter of each numbered card. Pt was 100% accurate. 3/26/21: Memory Matches and pt was 70-90% accurate with use of attention  strategies. 3/19/21: Memory Matches: pt was 80% accurate with min cues. 3/12/21: Divided attention for card sorting by  and by suit and pt was 85% accurate.   -HG        Other Goals    Other Adult Goal- 1  Pt will complete thought organization tasks with at least 90% accurate with cues.  -HG     Status: Other Adult Goal- 1  Progressing as expected  -HG     Comments: Other Adult Goal- 1  21: RBANS Language score of 94 remains in the Average range. 21: Pt reports that she recently struggled with how to help her mom to the toilet from her wheelchair. Six step sequencing, pt was 95% accurate. 21: Word scramble and pt was 80% accurate. 21: Following Written Directions: pt was 90% accurate.  21: Abstract and concrete category naming was 100%. Pt was 100% for selecting words that do no tbelong from a field of 5. 21: Abstract category naming: pt was 90% accurate.  3/12/21: Divergent naming: pt was concrete: 1515, abstract: 1515 with mod cues.    -HG     Other Adult Goal- 2  Pt will participate in further reasoning and problem solving evaluation with goals to follow as indicated.  -HG     Status: Other Adult Goal- 2  Progressing as expected  -HG     Comments: Other Adult Goal- 2  21: Reasoning for paper pencil task and pt was 90% accurate. 21: Functional problem solving regarding pt's mom and current situation and pt was 100% accurate.  21: Analogies: pt was 100% accurate. 6/15/21: Connect the dots alphabetical order: pt was 100%  accurate. 21: Pt completed deductive puzzle with 100% accuracy independently. 21: Deductive reasoning paragraph and pt was 80% accurate with minimal cues. 21: Deductive Reasoning puzzle and pt was 100% accurate. 3/26/21: Reasoning puzzle: Pt was 90% accurate with min cues.   -HG     Other Adult Goal- 3  Pt will improve RBANS Score to at least a 120 placing pt in the superior range.   -HG     Status: Other Adult Goal- 3  Progressing as  expected  -     Comments: Other Adult Goal- 3  7/9/21: RBANS Total socre of 112 places pt in the high average. 6/15/21: APT Scores were within normal range except for Divided attention which was below average. 5/10/21: RBANS score of 103 places pt in the average range.   -        SLP Time Calculation    SLP Goal Re-Cert Due Date  08/15/21  -       User Key  (r) = Recorded By, (t) = Taken By, (c) = Cosigned By    Initials Name Provider Type    Sarita Bliss MS CCC-SLP Speech and Language Pathologist        OP SLP Education     Row Name 08/02/21 1500       Education    Education Comments  Hmwk for delayed recall.   -      User Key  (r) = Recorded By, (t) = Taken By, (c) = Cosigned By    Initials Name Effective Dates    Sarita Bliss MS CCC-SLP 06/16/21 -                Time Calculation:   SLP Start Time: 1500  Untimed Charges  24813-IW Treatment/ST Modification Prosth Aug Alter : 60  Total Minutes  Untimed Charges Total Minutes: 60   Total Minutes: 60    Therapy Charges for Today     Code Description Service Date Service Provider Modifiers Qty    [id number] HC ST TREATMENT SPEECH 4 8/2/2021 Sarita Preston MS CCC-SLP GN 1                   Sarita Preston MS CCC-SLP  8/2/2021

## 2021-08-04 ENCOUNTER — HOSPITAL ENCOUNTER (OUTPATIENT)
Dept: MAMMOGRAPHY | Facility: HOSPITAL | Age: 61
Discharge: HOME | End: 2021-08-04
Admitting: PHYSICIAN ASSISTANT

## 2021-08-04 DIAGNOSIS — Z12.31 VISIT FOR SCREENING MAMMOGRAM: ICD-10-CM

## 2021-08-04 DIAGNOSIS — S16.1XXD STRAIN OF NECK MUSCLE, SUBSEQUENT ENCOUNTER: ICD-10-CM

## 2021-08-04 PROCEDURE — 77063 BREAST TOMOSYNTHESIS BI: CPT | Performed by: RADIOLOGY

## 2021-08-04 PROCEDURE — 77067 SCR MAMMO BI INCL CAD: CPT

## 2021-08-04 PROCEDURE — 77067 SCR MAMMO BI INCL CAD: CPT | Performed by: RADIOLOGY

## 2021-08-04 PROCEDURE — 77063 BREAST TOMOSYNTHESIS BI: CPT

## 2021-08-05 ENCOUNTER — TELEMEDICINE (OUTPATIENT)
Dept: PSYCHIATRY | Facility: CLINIC | Age: 61
End: 2021-08-05

## 2021-08-05 ENCOUNTER — TELEPHONE (OUTPATIENT)
Dept: INTERNAL MEDICINE | Facility: CLINIC | Age: 61
End: 2021-08-05

## 2021-08-05 DIAGNOSIS — F33.1 MODERATE EPISODE OF RECURRENT MAJOR DEPRESSIVE DISORDER (HCC): ICD-10-CM

## 2021-08-05 DIAGNOSIS — F41.1 GENERALIZED ANXIETY DISORDER: Primary | ICD-10-CM

## 2021-08-05 PROCEDURE — 90837 PSYTX W PT 60 MINUTES: CPT | Performed by: COUNSELOR

## 2021-08-05 NOTE — TELEPHONE ENCOUNTER
PHARMACY CALLED TO CHECK DIRECTIONS FOR RX  Diclofenac Sodium (VOLTAREN) 1 % gel gel, STATED CHECKING ON THE DOSAGE.    PLEASE ADVISE.  CALL BACK:[phone]

## 2021-08-05 NOTE — PROGRESS NOTES
Date: August 10, 2021  Time In: 2:40 PM  Time Out: 3:45 PM  This provider is located at the [facility]), [address], [place] [address] using a secure [employer] Video Visit through [employer]. Patient is being seen remotely via telehealth at home address in [place] and stated they are in a secure environment for this session. The patient's condition being diagnosed/treated is appropriate for telemedicine. The provider identified herself as well as her credentials. The patient, and/or patients guardian, consent to be seen remotely, and when consent is given they understand that the consent allows for patient identifiable information to be sent to a third party as needed. They may refuse to be seen remotely at any time. The electronic data is encrypted and password protected, and the patient and/or guardian has been advised of the potential risks to privacy not withstanding such measures.     You have chosen to receive care through a telehealth visit.  Do you consent to use a video/audio connection for your medical care today? Yes    PROGRESS NOTE  Data:  Sandra Auguste is a 61 y.o. female who presents today for follow up. Patient states that she has been busy with trying to schedules for herself and her mother's appointments. Patient's mother is now set up on home health.Patient is wondering if she should be on disability as she was on it before due to depression, pain, and fibromyalgia but was miraculously cured from the condition. Patient states that she has not talked with her siblings about putting boundaries in place about what she can do in order to help her mother. Patient is afraid of what her siblings will say to her. Patient is feeling stuck due to not having anything of her own such as a house, car or a source of income and cannot get out of the situation that she is in. Patient states that she feels as though she wants acknowledgement that she  isn't getting from her family. Discussed self-validation and expanding social circles. Patient is concerned about how people perceive her.     Chief Complaint: feelings of anxiety, nervousness, irritability     History of Present Illness: patient has struggled with feelings of anxiety and depression since childhood.      Clinical Maneuvering/Intervention: CBT    (Scales based on 0 - 10 with 10 being the worst)  Depression: varies Anxiety: Varies but high       Assisted patient in processing above session content; acknowledged and normalized patient’s thoughts, feelings, and concerns.  Rationalized patient thought process regarding her status with her mother's care and her increased anxiety from wondering how others perceive her.  Discussed triggers associated with patient's feelings of anxiety and depression in feeling that she doesn't have many options for what she can do about her current situation.  Also discussed coping skills for patient to implement such as continuing to practice standing up for herself, deciding what she wants most for her life and attending to her own emotional needs.  Allowed patient to freely discuss issues without interruption or judgment. Provided safe, confidential environment to facilitate the development of positive therapeutic relationship and encourage open, honest communication. Assisted patient in identifying risk factors which would indicate the need for higher level of care including thoughts to harm self or others and/or self-harming behavior and encouraged patient to contact this office, call 911, or present to the nearest emergency room should any of these events occur. Discussed crisis intervention services and means to access. Patient adamantly and convincingly denies current suicidal or homicidal ideation or perceptual disturbance.    Assessment:   Assessment   Patient appears to maintain relative stability as compared to their baseline.  However, patient continues to  struggle with depression and anxiety due to family situations and her personal matters which continues to cause impairment in important areas of functioning.  A result, they can be reasonably expected to continue to benefit from treatment and would likely be at increased risk for decompensation otherwise.    Mental Status Exam:   Hygiene:   good  Cooperation:  Cooperative  Eye Contact:  Good  Psychomotor Behavior:  Appropriate  Affect:  Full range  Mood: fluctates  Speech:  Normal  Thought Process:  Circum  Thought Content:  Normal  Suicidal:  None  Homicidal:  None  Hallucinations:  None  Delusion:  None  Memory:  Deficits  Orientation:  Person, Place, Time and Situation  Reliability:  fair  Insight:  Fair  Judgement:  Fair  Impulse Control:  Good  Physical/Medical Issues:  No        Patient's Support Network Includes:  children    Functional Status: Moderate impairment     Progress toward goal: Not at goal    Prognosis: Fair with Ongoing Treatment          Plan:    Patient will continue in individual outpatient therapy with focus on improved functioning and coping skills, maintaining stability, and avoiding decompensation and the need for higher level of care.    Patient will adhere to medication regimen as prescribed and report any side effects. Patient will contact this office, call 911 or present to the nearest emergency room should suicidal or homicidal ideations occur. Provide Cognitive Behavioral Therapy and Solution Focused Therapy to improve functioning, maintain stability, and avoid decompensation and the need for higher level of care.     Return in about 2 weeks, or earlier if symptoms worsen or fail to improve.           VISIT DIAGNOSIS:     ICD-10-CM ICD-9-CM   1. Generalized anxiety disorder  F41.1 300.02   2. Moderate episode of recurrent major depressive disorder (CMS/HCC)  F33.1 296.32             This document has been electronically signed by ASIA Rich  August 10, 2021 08:30  EDT      Part of this note may be an electronic transcription/translation of spoken language to printed text using the Dragon Dictation System.

## 2021-08-06 ENCOUNTER — TELEPHONE (OUTPATIENT)
Dept: INTERNAL MEDICINE | Facility: CLINIC | Age: 61
End: 2021-08-06

## 2021-08-06 ENCOUNTER — OFFICE VISIT (OUTPATIENT)
Dept: INTERNAL MEDICINE | Facility: CLINIC | Age: 61
End: 2021-08-06

## 2021-08-06 VITALS
DIASTOLIC BLOOD PRESSURE: 82 MMHG | TEMPERATURE: 99.4 F | BODY MASS INDEX: 43.26 KG/M2 | WEIGHT: 264 LBS | HEART RATE: 78 BPM | SYSTOLIC BLOOD PRESSURE: 120 MMHG | OXYGEN SATURATION: 96 %

## 2021-08-06 DIAGNOSIS — Z20.822 EXPOSURE TO 2019-NCOV: ICD-10-CM

## 2021-08-06 DIAGNOSIS — R11.0 NAUSEA: ICD-10-CM

## 2021-08-06 DIAGNOSIS — R10.9 ABDOMINAL DISCOMFORT: Primary | ICD-10-CM

## 2021-08-06 PROCEDURE — 99213 OFFICE O/P EST LOW 20 MIN: CPT | Performed by: PHYSICIAN ASSISTANT

## 2021-08-06 PROCEDURE — U0004 COV-19 TEST NON-CDC HGH THRU: HCPCS | Performed by: PHYSICIAN ASSISTANT

## 2021-08-06 RX ORDER — AMOXICILLIN AND CLAVULANATE POTASSIUM 875; 125 MG/1; MG/1
1 TABLET, FILM COATED ORAL 2 TIMES DAILY
Qty: 20 TABLET | Refills: 0 | Status: SHIPPED | OUTPATIENT
Start: 2021-08-06 | End: 2021-09-24

## 2021-08-06 RX ORDER — ONDANSETRON HYDROCHLORIDE 8 MG/1
8 TABLET, FILM COATED ORAL EVERY 8 HOURS PRN
Qty: 15 TABLET | Refills: 0 | Status: SHIPPED | OUTPATIENT
Start: 2021-08-06 | End: 2022-12-14 | Stop reason: SDUPTHER

## 2021-08-06 NOTE — PROGRESS NOTES
Chief Complaint   Patient presents with   • Abdominal Pain     Acute    • Gas Pain   • Diarrhea       Subjective     Sandra Auguste is a 61 y.o. female.        History of Present Illness     Pt has had decreased appetite over the past week with some increased gassiness. Had episode of diarrhea last night. She has had diverticulitis before and it feels different than that. Her pain is more diffuse and some gurgling in her upper abdomen. Only new medication is the Crestor. Has not had much to eat today. Has felt nauseated, no vomiting.         Current Outpatient Medications:   •  acetaminophen (TYLENOL) 500 MG tablet, Take 500 mg by mouth Every 6 (Six) Hours As Needed for Mild Pain ., Disp: , Rfl:   •  albuterol sulfate  (90 Base) MCG/ACT inhaler, Inhale 1 puff Every 6 (Six) Hours As Needed for Shortness of Air. INHALE TWO PUFFS BY MOUTH EVERY 6 HOURS AS NEEDED, Disp: 1 inhaler, Rfl: 2  •  ALPRAZolam (XANAX) 0.25 MG tablet, Take 1 tablet by mouth Daily As Needed for Anxiety., Disp: 30 tablet, Rfl: 0  •  Breo Ellipta 100-25 MCG/INH inhaler, Inhale 1 puff Daily., Disp: 60 each, Rfl: 5  •  Diclofenac Sodium (VOLTAREN) 1 % gel gel, APPLY 4 GRAMS TOPICALLY TO THE APPROPRIATE AREA AS DIRECTED FOUR TIMES A DAYS AS NEEDED FOR PAIN IN SHOULDERS, Disp: 100 g, Rfl: 1  •  EPINEPHrine (EPIPEN) 0.3 MG/0.3ML solution auto-injector injection, Inject 0.3 mL into the appropriate muscle as directed by prescriber As Needed (FOR SEVERE ALLERGIC REACTION)., Disp: 1 each, Rfl: 1  •  famotidine (PEPCID) 40 MG tablet, Take 1 tablet by mouth Daily., Disp: 30 tablet, Rfl: 5  •  fluticasone (FLONASE) 50 MCG/ACT nasal spray, 2 sprays into the nostril(s) as directed by provider Daily for 30 days., Disp: 16 g, Rfl: 5  •  glucose blood test strip, Use as instructed to check blood glucose once daily., Disp: 100 each, Rfl: 12  •  glucose monitor monitoring kit, Use as directed to check blood glucose once daily., Disp: 1 each, Rfl: 0  •  [employer]  Lancets UltraThin 30G misc, Use as instructed check blood glucose once daily., Disp: 100 each, Rfl: 12  •  loratadine (CLARITIN) 10 MG tablet, Take 1 tablet by mouth Daily., Disp: 90 tablet, Rfl: 1  •  metFORMIN ER (Glucophage XR) 500 MG 24 hr tablet, Take 2 tablets by mouth Daily With Breakfast., Disp: 60 tablet, Rfl: 2  •  ondansetron (Zofran) 8 MG tablet, Take 1 tablet by mouth Every 8 (Eight) Hours As Needed for Nausea or Vomiting., Disp: 15 tablet, Rfl: 0  •  rosuvastatin (Crestor) 5 MG tablet, Take 1 tablet by mouth Daily., Disp: 90 tablet, Rfl: 1  •  vilazodone (VIIBRYD) 20 MG tablet tablet, Take 1 tablet by mouth Daily., Disp: 30 tablet, Rfl: 1  •  amoxicillin-clavulanate (Augmentin) 875-125 MG per tablet, Take 1 tablet by mouth 2 (Two) Times a Day., Disp: 20 tablet, Rfl: 0     PMFSH  The following portions of the patient's history were reviewed and updated as appropriate: allergies, current medications, past family history, past medical history, past social history, past surgical history and problem list.    Review of Systems   Constitutional: Negative for activity change, appetite change and fatigue.   HENT: Negative for congestion and rhinorrhea.    Respiratory: Negative for chest tightness and shortness of breath.    Cardiovascular: Negative for chest pain and palpitations.   Gastrointestinal: Positive for abdominal pain, diarrhea and nausea. Negative for vomiting.   Genitourinary: Negative for dysuria.   Musculoskeletal: Negative for arthralgias and myalgias.   Neurological: Negative for dizziness, weakness, light-headedness and headaches.   Psychiatric/Behavioral: Negative for dysphoric mood. The patient is not nervous/anxious.        Objective   /82   Pulse 78   Temp 99.4 °F (37.4 °C)   Wt 120 kg (264 lb)   LMP  (LMP Unknown)   SpO2 96%   BMI 43.26 kg/m²     Physical Exam  Vitals and nursing note reviewed.   Constitutional:       Appearance: She is well-developed.   HENT:      Head:  Normocephalic.      Right Ear: Hearing, tympanic membrane, ear canal and external ear normal.      Left Ear: Hearing, tympanic membrane, ear canal and external ear normal.      Nose: Nose normal.   Eyes:      Conjunctiva/sclera: Conjunctivae normal.      Pupils: Pupils are equal, round, and reactive to light.   Cardiovascular:      Rate and Rhythm: Normal rate and regular rhythm.      Heart sounds: Normal heart sounds.   Pulmonary:      Effort: Pulmonary effort is normal.      Breath sounds: Normal breath sounds. No decreased breath sounds, wheezing, rhonchi or rales.   Abdominal:      General: Bowel sounds are normal.      Palpations: Abdomen is soft.      Tenderness: There is generalized abdominal tenderness. There is no right CVA tenderness, left CVA tenderness, guarding or rebound. Negative signs include Borden's sign.   Musculoskeletal:         General: Normal range of motion.      Cervical back: Normal range of motion.   Skin:     General: Skin is warm and dry.   Neurological:      Mental Status: She is alert.   Psychiatric:         Behavior: Behavior normal.         ASSESSMENT/PLAN    Diagnoses and all orders for this visit:    1. Abdominal discomfort (Primary)  Comments:  Maintain fluids and bland diet. Check labs if discomfort persists tomorrow. Due to history of diverticulitis, will also start Augmentin at that time.  Orders:  -     Comprehensive Metabolic Panel; Future  -     CBC & Differential; Future  -     amoxicillin-clavulanate (Augmentin) 875-125 MG per tablet; Take 1 tablet by mouth 2 (Two) Times a Day.  Dispense: 20 tablet; Refill: 0    2. Exposure to 2019-nCoV  -     COVID-19 PCR, [facility], NP SWAB IN [facility]SP[facility]/ORAL SWISH 24-30 HR TAT - Swab, Nasopharynx; Future  -     COVID-19 PCR, [facility], NP SWAB IN [facility]SP[facility] MEDIA/ORAL SWISH 24-30 HR TAT - Swab, Nasopharynx    3. Nausea  -     ondansetron (Zofran) 8 MG tablet; Take 1 tablet by mouth Every 8 (Eight) Hours As  Needed for Nausea or Vomiting.  Dispense: 15 tablet; Refill: 0             Return if symptoms worsen or fail to improve.

## 2021-08-07 LAB — SARS-COV-2 RNA NOSE QL NAA+PROBE: NOT DETECTED

## 2021-08-08 NOTE — PROGRESS NOTES
Good news, your Covid test is negative. Please get the labs done and start the antibiotics if you are not feeling better.

## 2021-08-09 ENCOUNTER — LAB (OUTPATIENT)
Dept: LAB | Facility: HOSPITAL | Age: 61
End: 2021-08-09

## 2021-08-09 ENCOUNTER — TELEMEDICINE (OUTPATIENT)
Dept: NEUROLOGY | Facility: CLINIC | Age: 61
End: 2021-08-09

## 2021-08-09 DIAGNOSIS — R41.3 MEMORY LOSS: Primary | ICD-10-CM

## 2021-08-09 DIAGNOSIS — R10.9 ABDOMINAL DISCOMFORT: ICD-10-CM

## 2021-08-09 LAB
BASOPHILS # BLD AUTO: 0.05 10*3/MM3 (ref 0–0.2)
BASOPHILS NFR BLD AUTO: 0.5 % (ref 0–1.5)
DEPRECATED RDW RBC AUTO: 39.9 FL (ref 37–54)
EOSINOPHIL # BLD AUTO: 0.41 10*3/MM3 (ref 0–0.4)
EOSINOPHIL NFR BLD AUTO: 4.1 % (ref 0.3–6.2)
ERYTHROCYTE [DISTWIDTH] IN BLOOD BY AUTOMATED COUNT: 12.6 % (ref 12.3–15.4)
HCT VFR BLD AUTO: 41.6 % (ref 34–46.6)
HGB BLD-MCNC: 13.7 G/DL (ref 12–15.9)
IMM GRANULOCYTES # BLD AUTO: 0.08 10*3/MM3 (ref 0–0.05)
IMM GRANULOCYTES NFR BLD AUTO: 0.8 % (ref 0–0.5)
LYMPHOCYTES # BLD AUTO: 2.29 10*3/MM3 (ref 0.7–3.1)
LYMPHOCYTES NFR BLD AUTO: 23 % (ref 19.6–45.3)
MCH RBC QN AUTO: 28.8 PG (ref 26.6–33)
MCHC RBC AUTO-ENTMCNC: 32.9 G/DL (ref 31.5–35.7)
MCV RBC AUTO: 87.6 FL (ref 79–97)
MONOCYTES # BLD AUTO: 0.67 10*3/MM3 (ref 0.1–0.9)
MONOCYTES NFR BLD AUTO: 6.7 % (ref 5–12)
NEUTROPHILS NFR BLD AUTO: 6.46 10*3/MM3 (ref 1.7–7)
NEUTROPHILS NFR BLD AUTO: 64.9 % (ref 42.7–76)
NRBC BLD AUTO-RTO: 0 /100 WBC (ref 0–0.2)
PLATELET # BLD AUTO: 286 10*3/MM3 (ref 140–450)
PMV BLD AUTO: 11.3 FL (ref 6–12)
RBC # BLD AUTO: 4.75 10*6/MM3 (ref 3.77–5.28)
WBC # BLD AUTO: 9.96 10*3/MM3 (ref 3.4–10.8)

## 2021-08-09 PROCEDURE — 99214 OFFICE O/P EST MOD 30 MIN: CPT | Performed by: PSYCHIATRY & NEUROLOGY

## 2021-08-09 PROCEDURE — 80053 COMPREHEN METABOLIC PANEL: CPT

## 2021-08-09 PROCEDURE — 85025 COMPLETE CBC W/AUTO DIFF WBC: CPT

## 2021-08-10 LAB
ALBUMIN SERPL-MCNC: 4 G/DL (ref 3.5–5.2)
ALBUMIN/GLOB SERPL: 1.5 G/DL
ALP SERPL-CCNC: 96 U/L (ref 39–117)
ALT SERPL W P-5'-P-CCNC: 29 U/L (ref 1–33)
ANION GAP SERPL CALCULATED.3IONS-SCNC: 6.6 MMOL/L (ref 5–15)
AST SERPL-CCNC: 18 U/L (ref 1–32)
BILIRUB SERPL-MCNC: 0.2 MG/DL (ref 0–1.2)
BUN SERPL-MCNC: 9 MG/DL (ref 6–20)
BUN/CREAT SERPL: 11.8 (ref 7–25)
CALCIUM SPEC-SCNC: 9.5 MG/DL (ref 8.6–10.5)
CHLORIDE SERPL-SCNC: 102 MMOL/L (ref 98–107)
CO2 SERPL-SCNC: 29.4 MMOL/L (ref 22–29)
CREAT SERPL-MCNC: 0.76 MG/DL (ref 0.57–1)
GFR SERPL CREATININE-BSD FRML MDRD: 78 ML/MIN/1.73
GLOBULIN UR ELPH-MCNC: 2.7 GM/DL
GLUCOSE SERPL-MCNC: 110 MG/DL (ref 65–99)
POTASSIUM SERPL-SCNC: 4.4 MMOL/L (ref 3.5–5.2)
PROT SERPL-MCNC: 6.7 G/DL (ref 6–8.5)
SODIUM SERPL-SCNC: 138 MMOL/L (ref 136–145)

## 2021-08-10 NOTE — PROGRESS NOTES
Subjective:    CC: Sandra Auguste is seen today via telephone/video for memory problems    Current visit-this is a patient previously seen by Zen[facility] for memory problems.  Patient started noticing the symptoms in 2012 following a concussion marked by forgetfulness and word finding problems.  Over time her symptoms have remained stable.  She denies any impairment of ADLs including driving.  She resides with her mother and helps take care of her.  She has also had symptoms of anxiety and depression.  At her last visit patient scored a MMSE of 29/30 and it was felt that her cognitive symptoms could be in part due to her underlying anxiety and depression as well as some of her medications including Xanax.  Her neuropsych evaluation at  is scheduled for January 22.  Patient also had an MRI brain that showed right frontal atrophy as well as chronic ischemic changes but no acute intracranial abnormalities.  She also had blood work including TSH and B12 level that were both within normal limits.  She has been getting speech therapy which seems to be helping.  Wants to continue getting it.  Of note-I personally reviewed her MRI brain and Zeny's notes as follows-    Sandra Auguste is a 60 y.o. female who comes to clinic today for evaluation of memory loss . She has noted symptoms since at least 2012 following a concussion, marked initially by forgetfulness, repetitiveness, and word-finding difficulties. This has remained static over time. Additional symptoms have included impairments in both short and long term memory as well as executive function. There have been associated symptoms of significant anxiety and depression. She denies impairments in ADL's. She manages her medications and finances, though is having more difficulty doing so. She continues to drive.  She is currently residing with her mother in [place], for whom she is the primary caregiver.     She has also noted headaches for several years, though the history is  a bit unclear. She describes intermittent sharp shooting unilateral headachs with associated nausea as well as light and sound sensitivity. These typically last for several seconds. She also notes a history of long migraines. These headaches vary in location and character and typically occur 1-2 times a month, lasting up to several days. Her headaches are worse with increased stress.     Prior evaluation has included an MRI of the brain in 2017 which was unremarkable .        The following portions of the patient's history were reviewed today and updated as of 08/09/2021  : allergies, current medications, past family history, past medical history, past social history, past surgical history and problem list  These document will be scanned to patient's chart.      Current Outpatient Medications:   •  acetaminophen (TYLENOL) 500 MG tablet, Take 500 mg by mouth Every 6 (Six) Hours As Needed for Mild Pain ., Disp: , Rfl:   •  albuterol sulfate  (90 Base) MCG/ACT inhaler, Inhale 1 puff Every 6 (Six) Hours As Needed for Shortness of Air. INHALE TWO PUFFS BY MOUTH EVERY 6 HOURS AS NEEDED, Disp: 1 inhaler, Rfl: 2  •  ALPRAZolam (XANAX) 0.25 MG tablet, Take 1 tablet by mouth Daily As Needed for Anxiety., Disp: 30 tablet, Rfl: 0  •  amoxicillin-clavulanate (Augmentin) 875-125 MG per tablet, Take 1 tablet by mouth 2 (Two) Times a Day., Disp: 20 tablet, Rfl: 0  •  Breo Ellipta 100-25 MCG/INH inhaler, Inhale 1 puff Daily., Disp: 60 each, Rfl: 5  •  Diclofenac Sodium (VOLTAREN) 1 % gel gel, APPLY 4 GRAMS TOPICALLY TO THE APPROPRIATE AREA AS DIRECTED FOUR TIMES A DAYS AS NEEDED FOR PAIN IN SHOULDERS, Disp: 100 g, Rfl: 1  •  EPINEPHrine (EPIPEN) 0.3 MG/0.3ML solution auto-injector injection, Inject 0.3 mL into the appropriate muscle as directed by prescriber As Needed (FOR SEVERE ALLERGIC REACTION)., Disp: 1 each, Rfl: 1  •  famotidine (PEPCID) 40 MG tablet, Take 1 tablet by mouth Daily., Disp: 30 tablet, Rfl: 5  •   "fluticasone (FLONASE) 50 MCG/ACT nasal spray, 2 sprays into the nostril(s) as directed by provider Daily for 30 days., Disp: 16 g, Rfl: 5  •  glucose blood test strip, Use as instructed to check blood glucose once daily., Disp: 100 each, Rfl: 12  •  glucose monitor monitoring kit, Use as directed to check blood glucose once daily., Disp: 1 each, Rfl: 0  •  [employer] Lancets UltraThin 30G misc, Use as instructed check blood glucose once daily., Disp: 100 each, Rfl: 12  •  loratadine (CLARITIN) 10 MG tablet, Take 1 tablet by mouth Daily., Disp: 90 tablet, Rfl: 1  •  metFORMIN ER (Glucophage XR) 500 MG 24 hr tablet, Take 2 tablets by mouth Daily With Breakfast., Disp: 60 tablet, Rfl: 2  •  ondansetron (Zofran) 8 MG tablet, Take 1 tablet by mouth Every 8 (Eight) Hours As Needed for Nausea or Vomiting., Disp: 15 tablet, Rfl: 0  •  rosuvastatin (Crestor) 5 MG tablet, Take 1 tablet by mouth Daily., Disp: 90 tablet, Rfl: 1  •  vilazodone (VIIBRYD) 20 MG tablet tablet, Take 1 tablet by mouth Daily., Disp: 30 tablet, Rfl: 1   Past Medical History:   Diagnosis Date   • Anesthesia complication     HAS TROUBLE GETTING \"NUMB\"    • Anxiety    • Arthritis    • Bladder spasms    • Depression    • Diverticulosis    • GERD (gastroesophageal reflux disease)    • Memory loss     FROM SKULL FRACTURE AND FALL-SHORT TERM MEMORY LOSS   • Mental disorder     PTSD   • Migraine    • Obesity    • Seasonal allergies    • Seizures (CMS/[facility])     \"convulsions\" at age 5   • Skull fracture (CMS/[facility]) 2012   • Sleep apnea with use of continuous positive airway pressure (CPAP)     INSTRUCTED TO BRING OWN MASK AND TUBING    • SOB (shortness of breath) on exertion    • Type 2 diabetes mellitus without complication, without long-term current use of insulin (CMS/[facility]) 10/11/2018   • Wears contact lenses    • Wears partial dentures     TOP ONLY    • Wears prescription eyeglasses    • Wears prescription eyeglasses       Past Surgical History:   Procedure " Laterality Date   • CERVICAL POLYPECTOMY     • TOTAL LAPAROSCOPIC HYSTERECTOMY N/A 9/18/2017    Procedure: TOTAL LAPAROSCOPIC HYSTERECTOMY, BILATERAL SALPINGO OOPHORECTOMY WITH DAVINCI ROBOT ;  Surgeon: Shannon Grimaldo DO;  Location: [facility] OR;  Service:    • WISDOM TOOTH EXTRACTION        Family History   Problem Relation Age of Onset   • Cancer Mother    • Cancer Maternal Aunt    • Cancer Paternal Aunt    • Breast cancer Paternal Aunt 70      Social History     Socioeconomic History   • Marital status: Single     Spouse name: Not on file   • Number of children: Not on file   • Years of education: Not on file   • Highest education level: Not on file   Tobacco Use   • Smoking status: Never Smoker   • Smokeless tobacco: Never Used   Vaping Use   • Vaping Use: Never used   Substance and Sexual Activity   • Alcohol use: No     Comment: ONCE A YEAR ON XMAS, IF THAT   • Drug use: No   • Sexual activity: Never     Birth control/protection: Post-menopausal     Review of Systems   Neurological: Positive for memory problem.   All other systems reviewed and are negative.      Objective:    LMP  (LMP Unknown)     Neurology Exam:    Speech/mentation intact.  Rest of the examination not performed    Assessment and Plan:  1. Memory loss  Her mild cognitive problems could be due to underlying stresses as well as due to certain medications  -Neuropsych evaluation pending for January  -I have counseled her to carry on physical and mental exercises such as reading, solving crossword puzzles etc.  -I will renew her speech therapy referral  -I also counseled her on her vascular risk factors including managing her diabetes, blood pressure and hyperlipidemia.  -Her A1c is well controlled at 7.1.  -She is already on Crestor 5 mg for goal LDL of less than 100.  Her LDL was 109       Return in about 6 months (around 2/9/2022).         Laura Atkins MD

## 2021-08-12 ENCOUNTER — APPOINTMENT (OUTPATIENT)
Dept: SPEECH THERAPY | Facility: HOSPITAL | Age: 61
End: 2021-08-12

## 2021-08-13 DIAGNOSIS — E11.9 TYPE 2 DIABETES MELLITUS WITHOUT COMPLICATION, WITHOUT LONG-TERM CURRENT USE OF INSULIN (HCC): ICD-10-CM

## 2021-08-13 NOTE — TELEPHONE ENCOUNTER
Caller: Sandra Auguste    Relationship: Self    Best call back number: [phone] (M)    Medication needed:   Requested Prescriptions     Pending Prescriptions Disp Refills   • glucose blood test strip 100 each 12     Sig: Use as instructed to check blood glucose once daily.       When do you need the refill by: ASAP    What additional details did the patient provide when requesting the medication: PATIENT IS OUT OF TEST STRIPS     Does the patient have less than a 3 day supply:  [x] Yes  [] No    What is the patient's preferred pharmacy: [facility] [phone] [facility] [phone] FX

## 2021-08-16 ENCOUNTER — TELEPHONE (OUTPATIENT)
Dept: INTERNAL MEDICINE | Facility: CLINIC | Age: 61
End: 2021-08-16

## 2021-08-16 RX ORDER — FLUCONAZOLE 150 MG/1
TABLET ORAL
Qty: 2 TABLET | Refills: 0 | Status: SHIPPED | OUTPATIENT
Start: 2021-08-16 | End: 2021-09-24

## 2021-08-16 NOTE — TELEPHONE ENCOUNTER
Spoke with patient and she states her insurance got changed without her knowing about it but they are telling her that if we do a prior authorization it will be covered and she will not have to pay anything, I have called the pharmacy and they are telling me it does not need a prior authorization that the insurance is paying on it but that she still has to pay $25 on it

## 2021-08-16 NOTE — TELEPHONE ENCOUNTER
PATIENT CALLED TO CHECK ON PRIOR AUTHORIZATION FOR HER DIABETIC TEST STRIPS. WANTS TO KNOW IF YOU HAVE BEEN ABLE TO GET THIS TAKEN CARE OF? ONLY HAS 1 STRIP LEFT.    INSURANCE COMPANY TOLD HER THAT THEY WOULD COVER FULL COST AND HAD SENT PRIOR AUTHORIZATION TO YOUR OFFICE.    PHARMACY IS STILL ASKING FOR CO PAY.    Sandra Auguste [phone]

## 2021-08-16 NOTE — TELEPHONE ENCOUNTER
Caller: Sandra Auguste    Relationship: Self    Best call back number: [phone]    What medication are you requesting: DIFLUCAN OR SOMETHING ELSE FOR YEAST INFECTION    What are your current symptoms: ITCHING    How long have you been experiencing symptoms: SINCE STARTING ANTIBIOTIC LAST WEEK    Have you had these symptoms before:    [x] Yes  [] No    Have you been treated for these symptoms before:   [x] Yes  [] No    If a prescription is needed, what is your preferred pharmacy and phone number: [facility] [address] [facility] [phone] [facility] [phone] FX     Additional notes:

## 2021-08-23 NOTE — TELEPHONE ENCOUNTER
Patient stated she spoke with [employer] and they are stating she does need a PA in order for her to get her test stripes. She is requesting the PA be started as soon as possible. They gave her a phone number for someone to call and get that started. [phone]    Please advise

## 2021-08-24 NOTE — TELEPHONE ENCOUNTER
Called number given and did a verbal PA for the test strips, it will take 24-72 hrs to hear back, [facility] pharmacy had called and stated the manufacture paid for part of it and call the insurance to see if they will cover the rest   PA # [id number]

## 2021-08-26 ENCOUNTER — APPOINTMENT (OUTPATIENT)
Dept: SPEECH THERAPY | Facility: HOSPITAL | Age: 61
End: 2021-08-26

## 2021-08-27 ENCOUNTER — TELEPHONE (OUTPATIENT)
Dept: INTERNAL MEDICINE | Facility: CLINIC | Age: 61
End: 2021-08-27

## 2021-08-27 RX ORDER — BLOOD-GLUCOSE METER
1 EACH MISCELLANEOUS DAILY
Qty: 1 KIT | Refills: 0 | Status: SHIPPED | OUTPATIENT
Start: 2021-08-27

## 2021-08-27 RX ORDER — LANCETS
EACH MISCELLANEOUS
Qty: 100 EACH | Refills: 3 | Status: SHIPPED | OUTPATIENT
Start: 2021-08-27

## 2021-08-27 RX ORDER — BLOOD SUGAR DIAGNOSTIC
STRIP MISCELLANEOUS
Qty: 100 EACH | Refills: 3 | Status: SHIPPED | OUTPATIENT
Start: 2021-08-27

## 2021-08-27 NOTE — TELEPHONE ENCOUNTER
Spoke with patient to let her know the PA for the True Metrix strips was not covered by insurance but Ray has said the  has paid for most of it and her portion would be $25, so I sent in One Touch Verio and the meter and lancets are covered but not the strips, out of pocket the strips are $80 for #100, I advised her to call her insurance and see what they will cover or she could get the True Metrix for $25

## 2021-08-27 NOTE — TELEPHONE ENCOUNTER
Caller: Sandra Auguste    Relationship to patient: Self    Best call back number: [phone]    Patient is needing: PATIENT CALLING TO SPEAK WITH [employer]IK[employer] REGARDING THE MEM[facility]. PATIENT STATES YESIKA THOUGHT THERE MAY HAVE BEEN A MISTAKE ON THE CARD BUT STATES SHE HAD THE MEMBER ID AND MEDICAID ID  MIXED UP

## 2021-08-27 NOTE — TELEPHONE ENCOUNTER
Patient has been on True Metrix for several years, her insurance will not cover it, so I sent in the One Touch Verio

## 2021-08-30 ENCOUNTER — TELEPHONE (OUTPATIENT)
Dept: INTERNAL MEDICINE | Facility: CLINIC | Age: 61
End: 2021-08-30

## 2021-08-30 NOTE — TELEPHONE ENCOUNTER
Caller: Sandra Auguste    Relationship: Self    Best call back number: [phone] (M)    What test/procedure requested:   ONE TOUCH VERIO TEST STRIPS    When is it needed: IMMEDIATELY    Where is the test/procedure going to be performed:   [facility] [address] [place] [address] [address] [facility] - [phone] [facility] [phone] FX      Additional information or concerns:   PHARMACY STATES THAT INSURANCE IS REQUIRING A PRIOR  AUTHORIZATION ON ALL TEST STRIPS. PATIENT IS REQUESTING THAT A PRIOR AUTHORIZATON BE SENT FOR HER NEW ONE TOUCH VERIO GLUCOSE METER AS SHE DOES NOT HAVE ANY TEST STRIPS.       PATIENT HAS BEEN ADVISED THAT THIS REQUEST HAS BEEN MARKED AS A HIGH PRIORITY, PLEASE ALLOW 48 HOURS FOR OUR CLINICAL TEAM TO FOLLOW UP ON THIS REQUEST.

## 2021-08-31 NOTE — TELEPHONE ENCOUNTER
Patient ended up getting the one touch verio and the test strips did not need a PA, everything was covered

## 2021-09-07 ENCOUNTER — HOSPITAL ENCOUNTER (OUTPATIENT)
Dept: MAMMOGRAPHY | Facility: HOSPITAL | Age: 61
Discharge: HOME | End: 2021-09-07

## 2021-09-07 ENCOUNTER — APPOINTMENT (OUTPATIENT)
Dept: GENERAL RADIOLOGY | Facility: HOSPITAL | Age: 61
End: 2021-09-07

## 2021-09-07 ENCOUNTER — HOSPITAL ENCOUNTER (OUTPATIENT)
Dept: ULTRASOUND IMAGING | Facility: HOSPITAL | Age: 61
Discharge: HOME | End: 2021-09-07

## 2021-09-07 DIAGNOSIS — R92.8 ABNORMAL MAMMOGRAM: ICD-10-CM

## 2021-09-07 LAB
ALBUMIN SERPL-MCNC: 3.9 G/DL (ref 3.5–5.2)
ALBUMIN/GLOB SERPL: 1.3 G/DL
ALP SERPL-CCNC: 96 U/L (ref 39–117)
ALT SERPL W P-5'-P-CCNC: 27 U/L (ref 1–33)
ANION GAP SERPL CALCULATED.3IONS-SCNC: 13 MMOL/L (ref 5–15)
AST SERPL-CCNC: 23 U/L (ref 1–32)
BASOPHILS # BLD AUTO: 0.04 10*3/MM3 (ref 0–0.2)
BASOPHILS NFR BLD AUTO: 0.4 % (ref 0–1.5)
BILIRUB SERPL-MCNC: 0.3 MG/DL (ref 0–1.2)
BUN SERPL-MCNC: 12 MG/DL (ref 6–20)
BUN/CREAT SERPL: 15.6 (ref 7–25)
CALCIUM SPEC-SCNC: 9.6 MG/DL (ref 8.6–10.5)
CHLORIDE SERPL-SCNC: 109 MMOL/L (ref 98–107)
CO2 SERPL-SCNC: 22 MMOL/L (ref 22–29)
CREAT SERPL-MCNC: 0.77 MG/DL (ref 0.57–1)
DEPRECATED RDW RBC AUTO: 43.8 FL (ref 37–54)
EOSINOPHIL # BLD AUTO: 0.33 10*3/MM3 (ref 0–0.4)
EOSINOPHIL NFR BLD AUTO: 3.2 % (ref 0.3–6.2)
ERYTHROCYTE [DISTWIDTH] IN BLOOD BY AUTOMATED COUNT: 13.3 % (ref 12.3–15.4)
GFR SERPL CREATININE-BSD FRML MDRD: 77 ML/MIN/1.73
GLOBULIN UR ELPH-MCNC: 2.9 GM/DL
GLUCOSE SERPL-MCNC: 150 MG/DL (ref 65–99)
HCT VFR BLD AUTO: 40.6 % (ref 34–46.6)
HGB BLD-MCNC: 13.2 G/DL (ref 12–15.9)
HOLD SPECIMEN: NORMAL
HOLD SPECIMEN: NORMAL
IMM GRANULOCYTES # BLD AUTO: 0.03 10*3/MM3 (ref 0–0.05)
IMM GRANULOCYTES NFR BLD AUTO: 0.3 % (ref 0–0.5)
LYMPHOCYTES # BLD AUTO: 2.06 10*3/MM3 (ref 0.7–3.1)
LYMPHOCYTES NFR BLD AUTO: 20.2 % (ref 19.6–45.3)
MCH RBC QN AUTO: 29.1 PG (ref 26.6–33)
MCHC RBC AUTO-ENTMCNC: 32.5 G/DL (ref 31.5–35.7)
MCV RBC AUTO: 89.4 FL (ref 79–97)
MONOCYTES # BLD AUTO: 0.67 10*3/MM3 (ref 0.1–0.9)
MONOCYTES NFR BLD AUTO: 6.6 % (ref 5–12)
NEUTROPHILS NFR BLD AUTO: 69.3 % (ref 42.7–76)
NEUTROPHILS NFR BLD AUTO: 7.06 10*3/MM3 (ref 1.7–7)
NRBC BLD AUTO-RTO: 0 /100 WBC (ref 0–0.2)
NT-PROBNP SERPL-MCNC: 55 PG/ML (ref 0–900)
PLATELET # BLD AUTO: 280 10*3/MM3 (ref 140–450)
PMV BLD AUTO: 10.2 FL (ref 6–12)
POTASSIUM SERPL-SCNC: 4 MMOL/L (ref 3.5–5.2)
PROT SERPL-MCNC: 6.8 G/DL (ref 6–8.5)
RBC # BLD AUTO: 4.54 10*6/MM3 (ref 3.77–5.28)
SODIUM SERPL-SCNC: 144 MMOL/L (ref 136–145)
TROPONIN T SERPL-MCNC: <0.01 NG/ML (ref 0–0.03)
WBC # BLD AUTO: 10.19 10*3/MM3 (ref 3.4–10.8)
WHOLE BLOOD HOLD SPECIMEN: NORMAL
WHOLE BLOOD HOLD SPECIMEN: NORMAL

## 2021-09-07 PROCEDURE — 85025 COMPLETE CBC W/AUTO DIFF WBC: CPT

## 2021-09-07 PROCEDURE — 71045 X-RAY EXAM CHEST 1 VIEW: CPT

## 2021-09-07 PROCEDURE — 77061 BREAST TOMOSYNTHESIS UNI: CPT | Performed by: RADIOLOGY

## 2021-09-07 PROCEDURE — 77065 DX MAMMO INCL CAD UNI: CPT

## 2021-09-07 PROCEDURE — 77065 DX MAMMO INCL CAD UNI: CPT | Performed by: RADIOLOGY

## 2021-09-07 PROCEDURE — 93005 ELECTROCARDIOGRAM TRACING: CPT

## 2021-09-07 PROCEDURE — 99283 EMERGENCY DEPT VISIT LOW MDM: CPT

## 2021-09-07 PROCEDURE — 80053 COMPREHEN METABOLIC PANEL: CPT

## 2021-09-07 PROCEDURE — 83880 ASSAY OF NATRIURETIC PEPTIDE: CPT

## 2021-09-07 PROCEDURE — G0279 TOMOSYNTHESIS, MAMMO: HCPCS

## 2021-09-07 PROCEDURE — 84484 ASSAY OF TROPONIN QUANT: CPT

## 2021-09-07 PROCEDURE — 93005 ELECTROCARDIOGRAM TRACING: CPT | Performed by: EMERGENCY MEDICINE

## 2021-09-07 PROCEDURE — 76642 ULTRASOUND BREAST LIMITED: CPT | Performed by: RADIOLOGY

## 2021-09-07 PROCEDURE — 76642 ULTRASOUND BREAST LIMITED: CPT

## 2021-09-07 RX ORDER — SODIUM CHLORIDE 0.9 % (FLUSH) 0.9 %
10 SYRINGE (ML) INJECTION AS NEEDED
Status: DISCONTINUED | OUTPATIENT
Start: 2021-09-07 | End: 2021-09-08 | Stop reason: HOSPADM

## 2021-09-08 ENCOUNTER — HOSPITAL ENCOUNTER (EMERGENCY)
Facility: HOSPITAL | Age: 61
Discharge: HOME | End: 2021-09-08
Attending: EMERGENCY MEDICINE | Admitting: EMERGENCY MEDICINE

## 2021-09-08 ENCOUNTER — PATIENT MESSAGE (OUTPATIENT)
Dept: INTERNAL MEDICINE | Facility: CLINIC | Age: 61
End: 2021-09-08

## 2021-09-08 ENCOUNTER — TRANSCRIBE ORDERS (OUTPATIENT)
Dept: MAMMOGRAPHY | Facility: HOSPITAL | Age: 61
End: 2021-09-08

## 2021-09-08 VITALS
SYSTOLIC BLOOD PRESSURE: 123 MMHG | DIASTOLIC BLOOD PRESSURE: 94 MMHG | TEMPERATURE: 97.9 F | HEART RATE: 85 BPM | HEIGHT: 66 IN | RESPIRATION RATE: 20 BRPM | WEIGHT: 265 LBS | OXYGEN SATURATION: 98 % | BODY MASS INDEX: 42.59 KG/M2

## 2021-09-08 DIAGNOSIS — J45.21 INTERMITTENT ASTHMA WITH ACUTE EXACERBATION, UNSPECIFIED ASTHMA SEVERITY: Primary | ICD-10-CM

## 2021-09-08 DIAGNOSIS — R92.8 ABNORMAL MAMMOGRAM: Primary | ICD-10-CM

## 2021-09-08 LAB
HOLD SPECIMEN: NORMAL
QT INTERVAL: 386 MS
QTC INTERVAL: 436 MS
SARS-COV-2 RNA PNL SPEC NAA+PROBE: NOT DETECTED

## 2021-09-08 PROCEDURE — 63710000001 PREDNISONE PER 1 MG: Performed by: EMERGENCY MEDICINE

## 2021-09-08 PROCEDURE — U0004 COV-19 TEST NON-CDC HGH THRU: HCPCS | Performed by: EMERGENCY MEDICINE

## 2021-09-08 PROCEDURE — C9803 HOPD COVID-19 SPEC COLLECT: HCPCS | Performed by: EMERGENCY MEDICINE

## 2021-09-08 RX ORDER — PREDNISONE 20 MG/1
20 TABLET ORAL ONCE
Status: COMPLETED | OUTPATIENT
Start: 2021-09-08 | End: 2021-09-08

## 2021-09-08 RX ORDER — PREDNISONE 10 MG/1
10 TABLET ORAL DAILY
Qty: 5 TABLET | Refills: 0 | Status: SHIPPED | OUTPATIENT
Start: 2021-09-08 | End: 2021-09-24

## 2021-09-08 RX ADMIN — PREDNISONE 20 MG: 20 TABLET ORAL at 01:38

## 2021-09-08 NOTE — DISCHARGE INSTRUCTIONS
Take prednisone, 10 mg for the next 4 days.  We gave you a 20 mg dose here in the emergency department.    Always utilize your albuterol with the spacer you have at home.    Closely monitor your blood sugars and let your primary care provider know how they are running to see if changes are necessary to your medication regimen.    If you have any worsening symptoms please return to the emergency department.    We have obtained a panther Covid test.  Results should be back within 24 to 36 hours.  We will call you if positive.  You can check [employer] to verify results as well.

## 2021-09-08 NOTE — ED PROVIDER NOTES
" EMERGENCY DEPARTMENT ENCOUNTER    Pt Name: Sandra Auguste  MRN: [id number]  Pt :   1960  Room Number:  RW1/R1  Date of encounter:  2021  PCP: Michelle Mandel PA  ED Provider: Jason Pineda MD    Historian: Patient      HPI:  Chief Complaint: Shortness of breath        Context: Sandra Auguste is a 61 y.o. female who presents to the ED c/o shortness of breath ongoing for the last 3 weeks.  The patient has been utilizing her inhalers to include albuterol and Breo as prescribed.  She is not using her albuterol spacer however.  She notes a mild wheeze.  She has not been tested for Covid since she was tested 1 month ago when suffering from some diarrhea.  She did not receive the Covid vaccine 2 dose series and March and April.  She denies fevers, chills, nausea, vomiting.  No recent unilateral leg swelling.  No history of DVT/PE.  No recent oral steroid use.  The patient has been rather stressed over taking care of her 91-year-old mother who has chronic medical conditions.  She is concerned that part of this could be a stress reaction.    PAST MEDICAL HISTORY  Past Medical History:   Diagnosis Date   • Anesthesia complication     HAS TROUBLE GETTING \"NUMB\"    • Anxiety    • Arthritis    • Bladder spasms    • Depression    • Diverticulosis    • GERD (gastroesophageal reflux disease)    • Memory loss     FROM SKULL FRACTURE AND FALL-SHORT TERM MEMORY LOSS   • Mental disorder     PTSD   • Migraine    • Obesity    • Seasonal allergies    • Seizures (CMS/[facility])     \"convulsions\" at age 5   • Skull fracture (CMS/[facility])    • Sleep apnea with use of continuous positive airway pressure (CPAP)     INSTRUCTED TO BRING OWN MASK AND TUBING    • SOB (shortness of breath) on exertion    • Type 2 diabetes mellitus without complication, without long-term current use of insulin (CMS/[facility]) 10/11/2018   • Wears contact lenses    • Wears partial dentures     TOP ONLY    • Wears prescription eyeglasses    • Wears prescription " eyeglasses          PAST SURGICAL HISTORY  Past Surgical History:   Procedure Laterality Date   • CERVICAL POLYPECTOMY     • TOTAL LAPAROSCOPIC HYSTERECTOMY N/A 9/18/2017    Procedure: TOTAL LAPAROSCOPIC HYSTERECTOMY, BILATERAL SALPINGO OOPHORECTOMY WITH DAVINCI ROBOT ;  Surgeon: Shannon Grimaldo DO;  Location: [facility] OR;  Service:    • WISDOM TOOTH EXTRACTION           FAMILY HISTORY  Family History   Problem Relation Age of Onset   • Cancer Mother    • Cancer Maternal Aunt    • Cancer Paternal Aunt    • Breast cancer Paternal Aunt 70         SOCIAL HISTORY  Social History     Socioeconomic History   • Marital status: Single     Spouse name: Not on file   • Number of children: Not on file   • Years of education: Not on file   • Highest education level: Not on file   Tobacco Use   • Smoking status: Never Smoker   • Smokeless tobacco: Never Used   Vaping Use   • Vaping Use: Never used   Substance and Sexual Activity   • Alcohol use: No     Comment: ONCE A YEAR ON XMAS, IF THAT   • Drug use: No   • Sexual activity: Never     Birth control/protection: Post-menopausal         ALLERGIES  Mold extract [trichophyton], Lortab [hydrocodone-acetaminophen], Methylprednisolone, and Pollen extract        REVIEW OF SYSTEMS  Review of Systems       All systems reviewed and negative except for those discussed in HPI.       PHYSICAL EXAM    I have reviewed the triage vital signs and nursing notes.    ED Triage Vitals [09/07/21 1935]   Temp Heart Rate Resp BP SpO2   97.9 °F (36.6 °C) 85 18 123/94 95 %      Temp src Heart Rate Source Patient Position BP Location FiO2 (%)   -- Monitor Sitting Left arm --       Physical Exam  GENERAL:   Appears very pleasant, nontoxic  HENT: Nares patent.  EYES: No scleral icterus.  CV: Regular rhythm, regular rate.  No murmurs gallops rubs  RESPIRATORY: Normal effort.  Mild expiratory wheeze on forced expiration only.  No rales or rhonchi.  No accessory muscle use or retractions.  Speaking in full  sentences.  ABDOMEN: Soft, nontender  MUSCULOSKELETAL: No deformities.   NEURO: Alert, moves all extremities, follows commands.  SKIN: Warm, dry, no rash visualized.        LAB RESULTS  Recent Results (from the past 24 hour(s))   ECG 12 Lead    Collection Time: 09/07/21  8:17 PM   Result Value Ref Range    QT Interval 386 ms    QTC Interval 436 ms   Comprehensive Metabolic Panel    Collection Time: 09/07/21  8:22 PM    Specimen: Blood   Result Value Ref Range    Glucose 150 (H) 65 - 99 mg/dL    BUN 12 6 - 20 mg/dL    Creatinine 0.77 0.57 - 1.00 mg/dL    Sodium 144 136 - 145 mmol/L    Potassium 4.0 3.5 - 5.2 mmol/L    Chloride 109 (H) 98 - 107 mmol/L    CO2 22.0 22.0 - 29.0 mmol/L    Calcium 9.6 8.6 - 10.5 mg/dL    Total Protein 6.8 6.0 - 8.5 g/dL    Albumin 3.90 3.50 - 5.20 g/dL    ALT (SGPT) 27 1 - 33 U/L    AST (SGOT) 23 1 - 32 U/L    Alkaline Phosphatase 96 39 - 117 U/L    Total Bilirubin 0.3 0.0 - 1.2 mg/dL    eGFR Non African Amer 77 >60 mL/min/1.73    Globulin 2.9 gm/dL    A/G Ratio 1.3 g/dL    BUN/Creatinine Ratio 15.6 7.0 - 25.0    Anion Gap 13.0 5.0 - 15.0 mmol/L   BNP    Collection Time: 09/07/21  8:22 PM    Specimen: Blood   Result Value Ref Range    proBNP 55.0 0.0 - 900.0 pg/mL   Troponin    Collection Time: 09/07/21  8:22 PM    Specimen: Blood   Result Value Ref Range    Troponin T <0.010 0.000 - 0.030 ng/mL   Green Top (Gel)    Collection Time: 09/07/21  8:22 PM   Result Value Ref Range    Extra Tube Hold for add-ons.    Lavender Top    Collection Time: 09/07/21  8:22 PM   Result Value Ref Range    Extra Tube hold for add-on    Gold Top - SST    Collection Time: 09/07/21  8:22 PM   Result Value Ref Range    Extra Tube Hold for add-ons.    Gray Top    Collection Time: 09/07/21  8:22 PM   Result Value Ref Range    Extra Tube Hold for add-ons.    Light Blue Top    Collection Time: 09/07/21  8:22 PM   Result Value Ref Range    Extra Tube hold for add-on    CBC Auto Differential    Collection Time:  09/07/21  8:22 PM    Specimen: Blood   Result Value Ref Range    WBC 10.19 3.40 - 10.80 10*3/mm3    RBC 4.54 3.77 - 5.28 10*6/mm3    Hemoglobin 13.2 12.0 - 15.9 g/dL    Hematocrit 40.6 34.0 - 46.6 %    MCV 89.4 79.0 - 97.0 fL    MCH 29.1 26.6 - 33.0 pg    MCHC 32.5 31.5 - 35.7 g/dL    RDW 13.3 12.3 - 15.4 %    RDW-SD 43.8 37.0 - 54.0 fl    MPV 10.2 6.0 - 12.0 fL    Platelets 280 140 - 450 10*3/mm3    Neutrophil % 69.3 42.7 - 76.0 %    Lymphocyte % 20.2 19.6 - 45.3 %    Monocyte % 6.6 5.0 - 12.0 %    Eosinophil % 3.2 0.3 - 6.2 %    Basophil % 0.4 0.0 - 1.5 %    Immature Grans % 0.3 0.0 - 0.5 %    Neutrophils, Absolute 7.06 (H) 1.70 - 7.00 10*3/mm3    Lymphocytes, Absolute 2.06 0.70 - 3.10 10*3/mm3    Monocytes, Absolute 0.67 0.10 - 0.90 10*3/mm3    Eosinophils, Absolute 0.33 0.00 - 0.40 10*3/mm3    Basophils, Absolute 0.04 0.00 - 0.20 10*3/mm3    Immature Grans, Absolute 0.03 0.00 - 0.05 10*3/mm3    nRBC 0.0 0.0 - 0.2 /100 WBC       If labs were ordered, I independently reviewed the results.        RADIOLOGY  XR Chest 1 View    Result Date: 9/7/2021  CR Chest 1 Vw INDICATION: Shortness of air. Hypertension. COMPARISON:  12/4/2019 FINDINGS: Portable AP view(s) of the chest.  Low lung volumes. No infiltrates or effusions. Heart and mediastinum unremarkable. No pneumothorax.     No acute cardiopulmonary findings. Signer Name: MIGUELITO Manjarrez MD  Signed: 9/7/2021 11:34 PM  Workstation Name: [facility]  [facility]    Mammo Diagnostic Digital Tomosynthesis Left With CAD, US Breast Left Limited    Result Date: 9/7/2021  LEFT DIAGNOSTIC MAMMOGRAM WITH TOMOSYNTHESIS AND A FOCUSED LEFT BREAST ULTRASOUND  CLINICAL INDICATION:  61-year-old patient recalled from screening study done on 8/4/2021 for further evaluation of the left breast.  TECHNIQUE: Left CC and MLO focal compression views were performed as well is a full left MLO view and left ML view all with Tomosynthesis. Also, rolled 2-D left CC  views were performed. Finally, focused ultrasound imaging of the left breast was performed.  COMPARISON: 8/4/2021, 5/14/2020, 8/21/2017, 8/14/2017  FINDINGS:The nodular asymmetry noted centrally in the left breast on the MLO view was not apparent on focal compression imaging or ML imaging. A repeat left MLO view was also performed. The nodular asymmetry is again noted on this projection. What was thought to possibly be the correlate in the left central breast on the CC view does improve with focal compression and rolled CC imaging as well. Correlative ultrasound imaging of the left central breast was performed. Located in the 12:30 position, 1 cm from the nipple in the anterior one third of the breast is an oval hypoechoic mass measuring 0.3 cm in size with some associated posterior acoustic shadowing. This is felt to correlate to a coarse lucent centered calcifications seen on the mammogram in 12:00 distribution. No solid masses or abnormal areas of shadowing are seen    otherwise. IMPRESSION: The nodular asymmetry in the left central breast on the MLO view improved with uncertain images but did appear to persist on repeat MLO imaging. This is not definitely seen on the prior films on this projection. This felt to be the correlate on the CC view did improve with additional imaging evaluation. A corresponding sonographic abnormalities identified to the nodular asymmetry.  RECOMMENDATION:  Given the persistence of the nodular asymmetry and repeat left MLO imaging as well as this tissue being questionably Compared to the prior exam recommend the patient return for attempted stereotactic/tomographic core biopsy of this asymmetry is in the MLO approach.  ACR BI-RADS CATEGORY:  4, SUSPICIOUS  CAD was utilized.  The standard false-negative rate of mammography is between 10% and 25%. Complex patterns or increased breast density will markedly elevate the false-negative rate of mammography.    A letter, in lay terminology,  with the results of this exam was given to the patient at the time of the visit.  At our facility, a triangular marker is positioned over a palpable area of concern indicated by the patient. A [place] marker is placed over a visible skin lesion. A linear marker indicates a scar.  ________________________________________________________________________ _______ Physicians Order  Stereotactic Breast Biopsy  Diagnosis: Abnormal Mammogram        I ordered and reviewed the above noted radiographic studies.      I viewed images of chest x-ray which showed no active disease per my independent interpretation.    See radiologist's dictation for official interpretation.        PROCEDURES    Procedures    ECG 12 Lead   Final Result   Test Reason : SOA Protocol   Blood Pressure :   */*   mmHG   Vent. Rate :  77 BPM     Atrial Rate :  77 BPM      P-R Int :   * ms          QRS Dur :  96 ms       QT Int : 386 ms       P-R-T Axes :   * -23  14 degrees      QTc Int : 436 ms      Sinus Rhythm with PACs   Cannot rule out Anterior infarct (cited on or before 04-DEC-2019)   Abnormal ECG   Confirmed by CHRISTINA LOCKETT MD ([id number]) on 9/8/2021 12:46:30 AM      Referred By:            Confirmed By: CHRISTINA LOCKETT MD          MEDICATIONS GIVEN IN ER    Medications   sodium chloride 0.9 % flush 10 mL (has no administration in time range)   predniSONE (DELTASONE) tablet 20 mg (has no administration in time range)         PROGRESS, DATA ANALYSIS, CONSULTS, AND MEDICAL DECISION MAKING    All labs have been independently reviewed by me.  All radiology studies have been reviewed by me and the radiologist dictating the report.   EKG's have been independently viewed and interpreted by me.      Differential diagnoses: Asthma exacerbation versus pneumonia versus bronchitis versus Covid, etc.                 AS OF 00:46 EDT VITALS:    BP - 123/94  HR - 85  TEMP - 97.9 °F (36.6 °C)  O2 SATS - 95%        DIAGNOSIS  Final diagnoses:   Intermittent asthma with  acute exacerbation, unspecified asthma severity         DISPOSITION  DISCHARGE    FOLLOW-UP  Michelle Mandel, PA  [address] [facility] [address] [phone]      NEXT AVAILABLE APPOINTMENT - RECHECK OF CONDITION         Medication List      No changes were made to your prescriptions during this visit.                  Jason Pineda MD  09/08/21 0046

## 2021-09-09 ENCOUNTER — PATIENT OUTREACH (OUTPATIENT)
Dept: CASE MANAGEMENT | Facility: OTHER | Age: 61
End: 2021-09-09

## 2021-09-09 NOTE — PATIENT INSTRUCTIONS
Diabetes Mellitus and Nutrition, Adult  When you have diabetes, or diabetes mellitus, it is very important to have healthy eating habits because your blood sugar (glucose) levels are greatly affected by what you eat and drink. Eating healthy foods in the right amounts, at about the same times every day, can help you:  · Control your blood glucose.  · Lower your risk of heart disease.  · Improve your blood pressure.  · Reach or maintain a healthy weight.  What can affect my meal plan?  Every person with diabetes is different, and each person has different needs for a meal plan. Your health care provider may recommend that you work with a dietitian to make a meal plan that is best for you. Your meal plan may vary depending on factors such as:  · The calories you need.  · The medicines you take.  · Your weight.  · Your blood glucose, blood pressure, and cholesterol levels.  · Your activity level.  · Other health conditions you have, such as heart or kidney disease.  How do carbohydrates affect me?  Carbohydrates, also called carbs, affect your blood glucose level more than any other type of food. Eating carbs naturally raises the amount of glucose in your blood. Carb counting is a method for keeping track of how many carbs you eat. Counting carbs is important to keep your blood glucose at a healthy level, especially if you use insulin or take certain oral diabetes medicines.  It is important to know how many carbs you can safely have in each meal. This is different for every person. Your dietitian can help you calculate how many carbs you should have at each meal and for each snack.  How does alcohol affect me?  Alcohol can cause a sudden decrease in blood glucose (hypoglycemia), especially if you use insulin or take certain oral diabetes medicines. Hypoglycemia can be a life-threatening condition. Symptoms of hypoglycemia, such as sleepiness, dizziness, and confusion, are similar to symptoms of having too much  "alcohol.  · Do not drink alcohol if:  ? Your health care provider tells you not to drink.  ? You are pregnant, may be pregnant, or are planning to become pregnant.  · If you drink alcohol:  ? Do not drink on an empty stomach.  ? Limit how much you use to:  § 0-1 drink a day for women.  § 0-2 drinks a day for men.  ? Be aware of how much alcohol is in your drink. In the [place] one drink equals one 12 oz bottle of beer (355 mL), one 5 oz glass of wine (148 mL), or one 1½ oz glass of hard liquor (44 mL).  ? Keep yourself hydrated with water, diet soda, or unsweetened iced tea.  § Keep in mind that regular soda, juice, and other mixers may contain a lot of sugar and must be counted as carbs.  What are tips for following this plan?    Reading food labels  · Start by checking the serving size on the \"Nutrition Facts\" label of packaged foods and drinks. The amount of calories, carbs, fats, and other nutrients listed on the label is based on one serving of the item. Many items contain more than one serving per package.  · Check the total grams (g) of carbs in one serving. You can calculate the number of servings of carbs in one serving by dividing the total carbs by 15. For example, if a food has 30 g of total carbs per serving, it would be equal to 2 servings of carbs.  · Check the number of grams (g) of saturated fats and trans fats in one serving. Choose foods that have a low amount or none of these fats.  · Check the number of milligrams (mg) of salt (sodium) in one serving. Most people should limit total sodium intake to less than 2,300 mg per day.  · Always check the nutrition information of foods labeled as \"low-fat\" or \"nonfat.\" These foods may be higher in added sugar or refined carbs and should be avoided.  · Talk to your dietitian to identify your daily goals for nutrients listed on the label.  Shopping  · Avoid buying canned, pre-made, or processed foods. These foods tend to be high in fat, sodium, and added " sugar.  · Shop around the outside edge of the grocery store. This is where you will most often find fresh fruits and vegetables, bulk grains, fresh meats, and fresh dairy.  Cooking  · Use low-heat cooking methods, such as baking, instead of high-heat cooking methods like deep frying.  · Cook using healthy oils, such as olive, canola, or sunflower oil.  · Avoid cooking with butter, cream, or high-fat meats.  Meal planning  · Eat meals and snacks regularly, preferably at the same times every day. Avoid going long periods of time without eating.  · Eat foods that are high in fiber, such as fresh fruits, vegetables, beans, and whole grains. Talk with your dietitian about how many servings of carbs you can eat at each meal.  · Eat 4-6 oz (112-168 g) of lean protein each day, such as lean meat, chicken, fish, eggs, or tofu. One ounce (oz) of lean protein is equal to:  ? 1 oz (28 g) of meat, chicken, or fish.  ? 1 egg.  ? ¼ cup (62 g) of tofu.  · Eat some foods each day that contain healthy fats, such as avocado, nuts, seeds, and fish.  What foods should I eat?  Fruits  Berries. Apples. Oranges. Peaches. Apricots. Plums. Grapes. [place]. Papaya. Pomegranate. Kiwi. Cherries.  Vegetables  Lettuce. Spinach. Leafy greens, including kale, chard, igor greens, and mustard greens. Beets. Cauliflower. Cabbage. Broccoli. Carrots. Green beans. Tomatoes. Peppers. Onions. Cucumbers. [place] sprouts.  Grains  Whole grains, such as whole-wheat or whole-grain bread, crackers, tortillas, cereal, and pasta. Unsweetened oatmeal. Quinoa. Brown or wild rice.  Meats and other proteins  Seafood. Poultry without skin. Lean cuts of poultry and beef. Tofu. Nuts. Seeds.  Dairy  Low-fat or fat-free dairy products such as milk, yogurt, and cheese.  The items listed above may not be a complete list of foods and beverages you can eat. Contact a dietitian for more information.  What foods should I avoid?  Fruits  Fruits canned with  syrup.  Vegetables  Canned vegetables. Frozen vegetables with butter or cream sauce.  Grains  Refined white flour and flour products such as bread, pasta, snack foods, and cereals. Avoid all processed foods.  Meats and other proteins  Fatty cuts of meat. Poultry with skin. Breaded or fried meats. Processed meat. Avoid saturated fats.  Dairy  Full-fat yogurt, cheese, or milk.  Beverages  Sweetened drinks, such as soda or iced tea.  The items listed above may not be a complete list of foods and beverages you should avoid. Contact a dietitian for more information.  Questions to ask a health care provider  · Do I need to meet with a diabetes educator?  · Do I need to meet with a dietitian?  · What number can I call if I have questions?  · When are the best times to check my blood glucose?  Where to find more information:  · American Diabetes Association: diabetes.org  · Academy of Nutrition and Dietetics: www.eatright.org  · National [place] of Diabetes and Digestive and Kidney Diseases: www.niddk.nih.gov  · Association of Diabetes Care and Education Specialists: www.diabeteseducator.org  Summary  · It is important to have healthy eating habits because your blood sugar (glucose) levels are greatly affected by what you eat and drink.  · A healthy meal plan will help you control your blood glucose and maintain a healthy lifestyle.  · Your health care provider may recommend that you work with a dietitian to make a meal plan that is best for you.  · Keep in mind that carbohydrates (carbs) and alcohol have immediate effects on your blood glucose levels. It is important to count carbs and to use alcohol carefully.  This information is not intended to replace advice given to you by your health care provider. Make sure you discuss any questions you have with your health care provider.  Document Revised: 11/24/2020 Document Reviewed: 11/24/2020  Elsevier Patient Education © 2021 Elsevier Inc.  Diabetes Mellitus  Basics    Diabetes mellitus, or diabetes, is a long-term (chronic) disease. It occurs when the body does not properly use sugar (glucose) that is released from food after you eat.  Diabetes mellitus may be caused by one or both of these problems:  · Your pancreas does not make enough of a hormone called insulin.  · Your body does not react in a normal way to the insulin that it makes.  Insulin lets glucose enter cells in your body. This gives you energy. If you have diabetes, glucose cannot get into cells. This causes high blood glucose (hyperglycemia).  How to treat and manage diabetes  You may need to take insulin or other diabetes medicines daily to keep your glucose in balance. If you are prescribed insulin, you will learn how to give yourself insulin by injection. You may need to adjust the amount of insulin you take based on the foods that you eat.  You will need to check your blood glucose levels using a glucose monitor as told by your health care provider. The readings can help determine if you have low or high blood glucose.  Generally, you should have these blood glucose levels:  · Before meals (preprandial):  mg/dL (4.4-7.2 mmol/L).  · After meals (postprandial): below 180 mg/dL (10 mmol/L).  · Hemoglobin A1c (HbA1c) level: less than 7%.  Your health care provider will set treatment goals for you.  Keep all follow-up visits. This is important.  Follow these instructions at home:  Diabetes medicines  Take your diabetes medicines every day as told by your health care provider. List your diabetes medicines here:  · Name of medicine: ______________________________  ? Amount (dose): _______________ Time (a.m./p.m.): _______________ Notes: ___________________________________  · Name of medicine: ______________________________  ? Amount (dose): _______________ Time (a.m./p.m.): _______________ Notes: ___________________________________  · Name of medicine: ______________________________  ? Amount (dose):  _______________ Time (a.m./p.m.): _______________ Notes: ___________________________________  Insulin  If you use insulin, list the types of insulin you use here:  · Insulin type: ______________________________  ? Amount (dose): _______________ Time (a.m./p.m.): _______________Notes: ___________________________________  · Insulin type: ______________________________  ? Amount (dose): _______________ Time (a.m./p.m.): _______________ Notes: ___________________________________  · Insulin type: ______________________________  ? Amount (dose): _______________ Time (a.m./p.m.): _______________ Notes: ___________________________________  · Insulin type: ______________________________  ? Amount (dose): _______________ Time (a.m./p.m.): _______________ Notes: ___________________________________  · Insulin type: ______________________________  ? Amount (dose): _______________ Time (a.m./p.m.): _______________ Notes: ___________________________________  Managing blood glucose    Check your blood glucose levels using a glucose monitor as told by your health care provider.  Write down the times that you check your glucose levels here:  · Time: _______________ Notes: ___________________________________  · Time: _______________ Notes: ___________________________________  · Time: _______________ Notes: ___________________________________  · Time: _______________ Notes: ___________________________________  · Time: _______________ Notes: ___________________________________  · Time: _______________ Notes: ___________________________________    Low blood glucose  Low blood glucose (hypoglycemia) is when glucose is at or below 70 mg/dL (3.9 mmol/L). Symptoms may include:  · Feeling:  ? Hungry.  ? Sweaty and clammy.  ? Irritable or easily upset.  ? Dizzy.  ? Sleepy.  · Having:  ? A fast heartbeat.  ? A headache.  ? A change in your vision.  ? Numbness around the mouth, lips, or tongue.  · Having trouble with:  ? Moving  (coordination).  ? Sleeping.  Treating low blood glucose  To treat low blood glucose, eat or drink something containing sugar right away. If you can think clearly and swallow safely, follow the 15:15 rule:  · Take 15 grams of a fast-acting carb (carbohydrate), as told by your health care provider.  · Some fast-acting carbs are:  ? Glucose tablets: take 3-4 tablets.  ? Hard candy: eat 3-5 pieces.  ? Fruit juice: drink 4 oz (120 mL).  ? Regular (not diet) soda: drink 4-6 oz (120-180 mL).  ? Honey or sugar: eat 1 Tbsp (15 mL).  · Check your blood glucose levels 15 minutes after you take the carb.  · If your glucose is still at or below 70 mg/dL (3.9 mmol/L), take 15 grams of a carb again.  · If your glucose does not go above 70 mg/dL (3.9 mmol/L) after 3 tries, get help right away.  · After your glucose goes back to normal, eat a meal or a snack within 1 hour.  Treating very low blood glucose  If your glucose is at or below 54 mg/dL (3 mmol/L), you have very low blood glucose (severe hypoglycemia).  This is an emergency. Do not wait to see if the symptoms will go away. Get medical help right away. Call your local emergency services (911 in the [place] Do not drive yourself to the hospital.  Questions to ask your health care provider  · Should I talk with a diabetes educator?  · What equipment will I need to care for myself at home?  · What diabetes medicines do I need? When should I take them?  · How often do I need to check my blood glucose levels?  · What number can I call if I have questions?  · When is my follow-up visit?  · Where can I find a support group for people with diabetes?  Where to find more information  · American Diabetes Association: www.diabetes.org  · Association of Diabetes Care and Education Specialists: www.diabeteseducator.org  Contact a health care provider if:  · Your blood glucose is at or above 240 mg/dL (13.3 mmol/L) for 2 days in a row.  · You have been sick or have had a fever for 2 days  or more, and you are not getting better.  · You have any of these problems for more than 6 hours:  ? You cannot eat or drink.  ? You feel nauseous.  ? You vomit.  ? You have diarrhea.  Get help right away if:  · Your blood glucose is lower than 54 mg/dL (3 mmol/L).  · You get confused.  · You have trouble thinking clearly.  · You have trouble breathing.  These symptoms may represent a serious problem that is an emergency. Do not wait to see if the symptoms will go away. Get medical help right away. Call your local emergency services (911 in the [place] Do not drive yourself to the hospital.  Summary  · Diabetes mellitus is a chronic disease that occurs when the body does not properly use sugar (glucose) that is released from food after you eat.  · Take insulin and diabetes medicines as told.  · Check your blood glucose every day, as often as told.  · Keep all follow-up visits. This is important.  This information is not intended to replace advice given to you by your health care provider. Make sure you discuss any questions you have with your health care provider.  Document Revised: 04/20/2021 Document Reviewed: 04/20/2021  Elsevier Patient Education © 2021 Elsevier Inc.

## 2021-09-09 NOTE — OUTREACH NOTE
Ambulatory Case Management Note      Care Plan: Diabetes   Updates made since 9/9/2021 12:00 AM      Problem: DIET MANAGEMENT       Goal: Learn to Manage Calories       Task: Provide resources for diabetic diet Completed 9/9/2021   Responsible User: Yecenia Shaver RN      Task: Provide resources on counting calories    Responsible User: Yecenia Shaver RN      Problem: HBA1C UNCONTROLLED       Goal: Establish Regular Follow-Ups with PCP       Task: Refer patient to PCP    Responsible User: Yecenia Shaver RN      Task: Determine patient's next PCP visit    Responsible User: Yecenia Shaver RN      Task: Discuss schedule for PCP visits with patient    Responsible User: Yecenia Shaver RN      Goal: Reduce HbA1c levels below 9%       Task: Educate patient on diet and exercise    Responsible User: Yecenia Shaver RN      Task: Educate patient on regular BS checks    Responsible User: Yecenia Shaver RN      Task: Discuss diabetes treatment plan with patient    Responsible User: Yecenia Shaver RN      Problem: MEDICATION ADHERENCE       Goal: Consistently take medications as prescribed       Task: Discuss barriers to medication adherence with patient    Responsible User: Yecenia Shaver RN      Task: Educate patient on frequency and refill details of meds    Responsible User: Yecenia Shaver RN      Task: Assist patients in setting up daily notes/alarms for meds. Consider pill box use    Responsible User: Yecenia Shaver RN      Task: Refer to value-based pharmacist    Responsible User: Yecenia Shaver RN      Problem: PATIENT IS INACTIVE       Goal: Exercise at least 20 minutes per day       Task: Discuss barriers to activity with patient. Update SDOH. Completed 9/9/2021   Responsible User: Yecenia Shaver RN      Task: Develop exercise plan with patient    Responsible User: Yecenia Shaver RN      Task: Explore community resources including  walking groups, assistance programs, and home videos.    Responsible User: Yecenia Shaver RN      Problem: KNOWLEDGE DEFICIT       Goal: Patient able to teach back disease management       Task: Refer to value-based pharmacist    Responsible User: Yecenia Shaver RN      Task: Refer to [facility] Certified Diabetic Educators    Responsible User: Yecenia Shaver RN      Task: Provide diabetic educational resources Completed 9/9/2021   Responsible User: Yecenia Shaver RN      Task: Educate on hyper/hypo-glycemia signs and symptoms    Responsible User: Yecenia Shaver RN      Task: Educate/provide resource for ADA blood glucose and HbA1c targets Completed 9/9/2021   Responsible User: Yecenia Shaver RN      Task: Educate on health prevention for diabetes: eye/foot exam, nephropathy screening, and potential statin use Completed 9/9/2021   Responsible User: Yecenia Shaver RN        Patient Outreach    Contacted pt regarding [facility] ED visit 9/7-9/8/21 with chief c/o listed as shortness of breath.  States she is doing better, but still has cough.  She did obtain the prednisone prescription and is taking as directed.  She also is using her albuterol inhaler.  States she feels like allergies and stress caused this episode. Offered to assist in scheduling f/u appt with her PCP, however she declined, but states she will call.  Discussed monitoring her blood sugar closely while on the steroids.  She had lots of questions regarding her diabetes.  States this is new diagnosis and that she did have nutritional class, but  is interested in diabetic education class.   Asked about written general diabetes education, which she welcomed.  Will send via [employer]. She lives with her mother and is her caregiver.  States she does have some help from her sister and brother, but that this is stressful.  She ambulates without assistive device and is self-sufficient.  She drives herself.  Care gaps  reviewed.  States she was ordered cologuard “about a month ago” and has never received kit in mail.  Will send message to her PCP for pt.  She states she does not have living will, but is interested in material.  Will send.  Also, provided with [facility] hotline number.  Pt is interested in f/u calls.  Will place in high risk management (diabetic) program. Role of  explained and contact number given.  [facility] 24/7 Nurse line explained and contact number provided.    Together decided on f/u call in approx 1 month.    General & Health Literacy Assessment    Questions/Answers      Most Recent Value   Assessment Completed With  Patient   Living Arrangement  -- [lives with mother]   Type of Residence  Private Residence   Communication Device  No   Bed or Wheelchair Confined  No   Difficulty Keeping Appointments  No        Care Evaluation    Questions/Answers      Most Recent Value   Annual Wellness Visit:   Patient Has Completed   Care Gaps Addressed  Diabetic A1C, Diabetic Eye Exam, Mammogram, Pneumonia Vaccine [is going to do cologuard ]   HbA1c Status  -- [7.1   7/9/21 ]   Diabetic Eye Exam Status  Patient will Complete   Mammogram Status  Up to Date [9/7/21]   Mammogram Completion at [facility] or Other  [facility]   Pneumonia Vaccine Status  Up to Date   Other Patient Education/Resources   24/[facility] Nurse Call Line, Personal Caregiver, Advanced Care Planning   24/7 Nurse Call Line Education Method  Verbal   ACP Education Method  Verbal   Personal Caregiver Education Method  Verbal   Advanced Directives:  Send Materials   Medication Adherence  Medications understood   Healthy Lifestyle (Self-Efficacy)  recognizes when to contact medical assistance, correctly recognizes signs/symptoms of pulmonary distress        Yecenia Shaver RN  Ambulatory Case Management    9/9/2021, 10:10 EDT

## 2021-09-13 ENCOUNTER — HOSPITAL ENCOUNTER (OUTPATIENT)
Dept: SPEECH THERAPY | Facility: HOSPITAL | Age: 61
Setting detail: THERAPIES SERIES
Discharge: HOME | End: 2021-09-13

## 2021-09-13 DIAGNOSIS — R41.3 MEMORY LOSS: Primary | ICD-10-CM

## 2021-09-13 PROCEDURE — 92507 TX SP LANG VOICE COMM INDIV: CPT

## 2021-09-13 NOTE — THERAPY PROGRESS REPORT/RE-CERT
Outpatient Speech Language Pathology   Adult Speech Language Cognitive Progress Note  [facility]     Patient Name: Sandra Auguste  : 1960  MRN: [id number]  Today's Date: 2021         Visit Date: 2021   Patient Active Problem List   Diagnosis   • Abnormal computed tomography scan   • Anemia   • Post traumatic stress disorder (PTSD)   • Anxiety   • Strain of neck muscle   • Cough   • Diverticulitis of colon   • Fatigue   • Steatosis of liver   • Lateral epicondylitis   • Knee pain   • Spasm   • Adiposity   • Obstructive sleep apnea syndrome   • Osteoarthritis   • Palpitations   • Nausea   • Shortness of breath   • Skin tag   • Candidiasis of vagina   • Viral upper respiratory tract infection   • Gastroesophageal reflux disease   • Acute pain of left shoulder   • Abdominal distension (gaseous)   • SOB (shortness of breath) on exertion   • Sleep apnea with use of continuous positive airway pressure (CPAP)   • Seasonal allergies   • Obesity   • Migraine   • Mental disorder   • Diverticulosis   • Depression   • Arthritis   • Hypertension   • Itching   • Stabbing headache   • Temple tenderness   • TMJ click   • Mild intermittent asthma without complication   • Skull fracture (CMS/HCC)   • Type 2 diabetes mellitus without complication, without long-term current use of insulin (CMS/HCC)   • Health care maintenance   • Moderate episode of recurrent major depressive disorder (CMS/HCC)   • Generalized anxiety disorder          Visit Dx:    ICD-10-CM ICD-9-CM   1. Memory loss  R41.3 780.93       OP SLP Assessment/Plan - 21 1500        SLP Assessment    Functional Problems  Speech Language- Adult/Cognition   -HG    Impact on Function: Adult Speech Language/Cognition  Poor attention to task;Trouble learning or remembering new information   -HG    Clinical Impression: Speech Language-Adult/Congnition  Minimal:;Cognitive Communication Impairment   -HG    Functional Problems Comment  Pt recognizes that her  delayed recall is impacted.   -HG    Clinical Impression Comments  RBANS Total score of 111 is down from a 112.    -HG    Please refer to paper survey for additional self-reported information  Yes   -HG    Please refer to items scanned into chart for additional diagnostic informaiton and handouts as provided by clinician  Yes   -HG    SLP Diagnosis  Minimal cog/comm impairment   -HG    Prognosis  Excellent (comment)   -HG    Patient/caregiver participated in establishment of treatment plan and goals  Yes   -HG    Patient would benefit from skilled therapy intervention  Yes   -HG       SLP Plan    Frequency  1x/week   -HG    Duration  4-6 weeks   -HG    Planned CPT's?  SLP INDIVIDUAL SPEECH THERAPY: 67605   -HG    Expected Duration of Therapy Session (SLP Eval)  60   -HG    Plan Comments  Cont with Cog tx.    -HG      User Key  (r) = Recorded By, (t) = Taken By, (c) = Cosigned By    Initials Name Provider Type    HG Sarita Preston, MS CCC-SLP Speech and Language Pathologist                            SLP OP Goals     Row Name 09/13/21 1500          Goal Type Needed    Goal Type Needed  Memory;Attention/Orientation;Other Adult Goals  -HG        Subjective Comments    Subjective Comments  Pt alert, cooperative,   -HG        Memory Goals    Patient will be able to remember information needed to return to work and function on work-related tasks  100%:;with cues  -HG     Status: Patient will be able to remember information needed to return to work and function on work-related tasks  New  -HG     Patient will demonstrate improved ability to recall information by immediately recalling a series of words  90%:;unrelated;after delay  -HG     Status: Patient will demonstrate improved ability to recall information by immediately recalling a series of words  Progressing as expected  -HG     Comments: Patient will demonstrate improved ability to recall information by immediately recalling a series of words  9/13/21: RBANS  Immediate recall score of 123 remains the same in the Superior range. 8/2/21: Immediate recall for shapes and figures and pt was 100% accurate. 7/16/21: Immediate recall of 16 groupable words and pt was 100% accurate. Recall of 4 un-related cards out of 15 and pt was 4/4.  7/9/21: RBANS score of 123 places pt in the Superior range. 7/6/21: Immediate recall of 16 groupable words: pt was 16/16.   6/25/21: Immediate recall for shapes and figures and pt was 90% accurate. 6/22/21: Immediate recall of names and faces: pt was 4/5. 6/15/21: Immediate recall of 10 un-related pictures and with no strategy, pt was 6/10. With use of grouping, pt was 9/10 and improved to 10/10. 5/21/21: Pt recall of reverse order of up to 4 words was 80% with minimal cues. 5/10/21: RBANS immediate recall score of 103 places pt in average range. 4/30/21: Pt immediate recall of 5 word arrays was 80% with minimal cues. 4/6/21: Immediate recall for ABC game pt was 90% accurate. 4/2/21: Picture recall for 75% accurate.  3/12/21: Word Placement: Inclusion for 4 words and pt was 100% accurate. 5 words: pt was 80% accurate. One Pile Card game: pt was required mod cues in order to recall rules of the game.   -HG     Patient’s memory skills will be enhanced as reported by patient by utilizing internal memory strategies to recall up to 3 pieces of information after a 5- minute delay  90%:;with cues  -HG     Status: Patient’s memory skills will be enhanced as reported by patient by utilizing internal memory strategies to recall up to 3 pieces of information after a 5- minute delay  Progressing as expected  -HG     Comments: Patient’s memory skills will be enhanced as reported by patient by utilizing internal memory strategies to recall up to 3 pieces of information after a 5- minute delay  9/13/21: RBANS Delayed recall score of 111, down from a 124. 7/16/21: Delayed recall of 16 groupable words and pt was 95% accurate using a first letter initial strategy.   7/9/21: SLUMS Delayed score of 124 places pt in the Superior range. 7/6/21: Delayed recall of 16 groupable words and pt was 16/16.   6/22/21: Delayed recall of names and faces: pt was 5/5. 6/15/21: Delayed recall of 10 un-related pictures and pt was 10/10.  5/27/21: 8 un-related words: pt was 8/8 with use of chaining.  5/21/21: Pt was 70% accurate with opposites with a delay and min-mod cues. 5/10/21: RBANS delayed memory of 119 places pt in high average range. 4/2/21: Paired pictures: pt was 9/10. 3/26/21: Delayed recall of 8 un-related words and pt was 8/8 x 3 with use of chaining. 3/19/21: Delayed recall of 7 un-related words: pt was % accurate. 3/16/21: Delayed recall of 6 un-related words: pt was 6/6; with increased delay, pt was 6/6 x 2.  3/12/21: Delayed recall of 4 un-related words: pt was 4/4 x 3. Delayed recall of 5 un-related words: pt was 5/5 x 3.   -HG     Patient’s memory skills will be enhanced as reported by patient by using external memory aides  90%:;with cues  -HG     Status: Patient’s memory skills will be enhanced as reported by patient by using external memory aides  Progressing as expected  -HG     Comments: Patient’s memory skills will be enhanced as reported by patient by using external memory aides  9/13/21: Pt reminded to use her phone for appts. 8/2/21: Pt continues to use her cell phone and calendar for appts, dates, etc. 5/21/21: Pt utilized tactile cues to recall words in session. 4/23/21: Pt states she is having some difficulties staying on top of tasks. 4/16/21: Pt noted she is using her phone often to make sure she is not forgetting daily tasks. 3/16/21: Pt states that the past few days have been rough and she hasn't kept up with it very well. 3/12/21: Pt using daily journal per her report.   -HG        Attention/Orientation Goals    Patient will be able to execute all activities necessary to manage a home  Independently  -HG     Status: Patient will be able to execute all  activities necessary to manage a home  Progressing as expected  -HG     Comments: Patient will be able to execute all activities necessary to manage a home  9/13/21: RBANS Attention score of 88 remains in the Low Average range. 7/9/21: RBANS Attention score of 88 remains in the Low Average range. 4/30/21: Pt states she is having difficulties with caring for her mother. Pt states she continuing to perform ADL's but notes it is taking a large toll. 4/23/21: Pt notes that she is dealing with a lot due to trying to care for her mother while continuing to do ADL's. Pt states she is having to block off times for herself. 4/16/21: Pt has noted she is having difficulties with taking care of mother but is completing ADL's. Pt notes she is not doing as much as she would like to do. 4/2/21: Had pt download the [employer] for FREE trials at this time. 3/16/21: Exec function exercise for attention, memory and thought organization and overall, pt was 95% accurate.   -HG     Patient will improve attention skills by sustaining consistent behavioral response during continuous and repetitive activity (e.g., listening for target words, auditory/reading comprehension tasks)  without cues;10 minute task  -HG     Status: Patient will improve attention skills by sustaining consistent behavioral response during continuous and repetitive activity (e.g., listening for target words, auditory/reading comprehension tasks)  Progressing as expected  -HG     Comments: Patient will improve attention skills by sustaining consistent behavioral response during continuous and repetitive activity (e.g., listening for target words, auditory/reading comprehension tasks)  4/30/21: Pt completed Moving on Up and Middle Management card games together with 100% acccuracy. 4/16/21: Scoreboard card game and pt was 100% accurate. 3/19/21: One Pile Card Game: pt was 80% accurate.  3/12/21: Sustained attention for the game of One Pile and pt was 80% accurate.    -HG     Patient will improve attention skills by sustaining focus in order to actively hold and manipulate information provided (e.g., sequencing auditorily presented number series in ascending or descending order)  90%:;with cues  -HG     Status: Patient will improve attention skills by sustaining focus in order to actively hold and manipulate information provided (e.g., sequencing auditorily presented number series in ascending or descending order)  Progressing as expected  -HG     Comments: Patient will improve attention skills by sustaining focus in order to actively hold and manipulate information provided (e.g., sequencing auditorily presented number series in ascending or descending order)  6/25/21: Mental Manipulation with numbers: pt able to achieve 7 digits with chunking. 5/21/21: Pt sustained attention for opposites with a delay was 70% accurate with min-mod cues. 4/6/21: Sustained attention for new card game and pt was 95% accurate.  3/16/21: Mental Manipulation for 3 words, alphabetical and reversal and pt was 100% accurate.   -HG     Patient will improve attention skills by focusing to selective target/task when presented with competing stimuli or in a distracting environment in order to complete task  90%:;with cues  -HG     Status: Patient will improve attention skills by focusing to selective target/task when presented with competing stimuli or in a distracting environment in order to complete task  Progressing as expected  -HG     Comments: Patient will improve attention skills by focusing to selective target/task when presented with competing stimuli or in a distracting environment in order to complete task  8/2/21: Card activity and pt was 90% accurate. 5/21/21: Pt selective attention for opposites with a delay was 70% with min-mod cues. 5/10/21: RBANS attention score of 88 places pt in low average range. 4/30/21: Pt completed deduction puzzle with 100% accuracy independently. 4/27/21: Last  letter game and pt selectively attended to cards while being distracted by other players. Pt 21: Step by step card game and pt was 100% accurate independently. 3/26/21: The game of Mooseoku and pt was 80% accurate. 3/12/21: Connecting the Dots- Alphabetical and pt was 90% accurate. Alternating attention task and pt was 95% accurate.   -HG     Patient will improve attention skills by alternating or shifting focus between two different tasks in order to complete both tasks  90%:;with cues  -HG     Status: Patient will improve attention skills by alternating or shifting focus between two different tasks in order to complete both tasks  Progressing as expected  -HG     Comments: Patient will improve attention skills by alternating or shifting focus between two different tasks in order to complete both tasks  21: Divided attention for card sorting and pt was 100% accurate with min cues. 21: Card sorting by changing opposite color: pt was 70% accurate. 21: Alternating attention for card sorting by odd and evens and colors and pt was 90% accurate. 21: 21: Sorting cards by the letter within a suit and pt was 100% accurate with min cues. 21: Alternating attention card game where pt had to add 2 cards then switch between adding 2 cards and naming the first letter of each numbered card. Pt was 100% accurate. 3/26/21: Memory Matches and pt was 70-90% accurate with use of attention strategies. 3/19/21: Memory Matches: pt was 80% accurate with min cues. 3/12/21: Divided attention for card sorting by  and by suit and pt was 85% accurate.   -HG        Other Goals    Other Adult Goal- 1  Pt will complete thought organization tasks with at least 90% accurate with cues.  -HG     Status: Other Adult Goal- 1  Progressing as expected  -HG     Comments: Other Adult Goal- 1  21: RBANS Language score of 102 is improved from a 94. 21: RBANS Language score of 94 remains in the Average range. 21: Pt  reports that she recently struggled with how to help her mom to the toilet from her wheelchair. Six step sequencing, pt was 95% accurate. 6/25/21: Word scramble and pt was 80% accurate. 5/27/21: Following Written Directions: pt was 90% accurate.  4/30/21: Abstract and concrete category naming was 100%. Pt was 100% for selecting words that do no tbelong from a field of 5. 4/6/21: Abstract category naming: pt was 90% accurate.  3/12/21: Divergent naming: pt was concrete: 15/15, abstract: 15/15 with mod cues.    -HG     Other Adult Goal- 2  Pt will participate in further reasoning and problem solving evaluation with goals to follow as indicated.  -HG     Status: Other Adult Goal- 2  Progressing as expected  -HG     Comments: Other Adult Goal- 2  8/2/21: Reasoning for paper pencil task and pt was 90% accurate. 7/16/21: Functional problem solving regarding pt's mom and current situation and pt was 100% accurate.  6/25/21: Analogies: pt was 100% accurate. 6/15/21: Connect the dots alphabetical order: pt was 100%  accurate. 4/30/21: Pt completed deductive puzzle with 100% accuracy independently. 4/16/21: Deductive reasoning paragraph and pt was 80% accurate with minimal cues. 4/2/21: Deductive Reasoning puzzle and pt was 100% accurate. 3/26/21: Reasoning puzzle: Pt was 90% accurate with min cues.   -HG     Other Adult Goal- 3  Pt will improve RBANS Score to at least a 120 placing pt in the superior range.   -HG     Status: Other Adult Goal- 3  Progressing as expected  -HG     Comments: Other Adult Goal- 3  9/13/21: RBANS Total Score of 111 is only down one point from 112. 7/9/21: RBANS Total socre of 112 places pt in the high average. 6/15/21: APT Scores were within normal range except for Divided attention which was below average. 5/10/21: RBANS score of 103 places pt in the average range.   -HG        SLP Time Calculation    SLP Goal Re-Cert Due Date  10/13/21  -HG       User Key  (r) = Recorded By, (t) = Taken By, (c) =  Cosigned By    Initials Name Provider Type    Sarita Bliss MS CCC-SLP Speech and Language Pathologist        OP SLP Education     Row Name 09/13/21 1500       Education    Education Comments  Hmwk for delayed recall.   -      User Key  (r) = Recorded By, (t) = Taken By, (c) = Cosigned By    Initials Name Effective Dates    Sarita Bliss MS CCC-SLP 06/16/21 -                Time Calculation:   SLP Start Time: 1500  Untimed Charges  24852-OE Treatment/ST Modification Prosth Aug Alter : 60  Total Minutes  Untimed Charges Total Minutes: 60   Total Minutes: 60    Therapy Charges for Today     Code Description Service Date Service Provider Modifiers Qty    [id number] HC ST TREATMENT SPEECH 4 9/13/2021 Sarita Preston MS CCC-SLP GN 1                   Sarita Preston MS CCC-SLP  9/13/2021

## 2021-09-20 ENCOUNTER — TELEPHONE (OUTPATIENT)
Dept: INTERNAL MEDICINE | Facility: CLINIC | Age: 61
End: 2021-09-20

## 2021-09-20 NOTE — TELEPHONE ENCOUNTER
Left voice mail message for patient to return call for an appointment and that I will try and call her again later

## 2021-09-24 ENCOUNTER — LAB (OUTPATIENT)
Dept: LAB | Facility: HOSPITAL | Age: 61
End: 2021-09-24

## 2021-09-24 ENCOUNTER — OFFICE VISIT (OUTPATIENT)
Dept: INTERNAL MEDICINE | Facility: CLINIC | Age: 61
End: 2021-09-24

## 2021-09-24 ENCOUNTER — TELEPHONE (OUTPATIENT)
Dept: INTERNAL MEDICINE | Facility: CLINIC | Age: 61
End: 2021-09-24

## 2021-09-24 VITALS
BODY MASS INDEX: 42.46 KG/M2 | WEIGHT: 264.2 LBS | OXYGEN SATURATION: 96 % | SYSTOLIC BLOOD PRESSURE: 142 MMHG | DIASTOLIC BLOOD PRESSURE: 86 MMHG | HEIGHT: 66 IN | HEART RATE: 87 BPM

## 2021-09-24 DIAGNOSIS — F41.1 GENERALIZED ANXIETY DISORDER: ICD-10-CM

## 2021-09-24 DIAGNOSIS — J45.20 MILD INTERMITTENT ASTHMA WITHOUT COMPLICATION: ICD-10-CM

## 2021-09-24 DIAGNOSIS — J30.2 SEASONAL ALLERGIES: ICD-10-CM

## 2021-09-24 DIAGNOSIS — Z79.899 HIGH RISK MEDICATIONS (NOT ANTICOAGULANTS) LONG-TERM USE: ICD-10-CM

## 2021-09-24 DIAGNOSIS — F41.9 ANXIETY: ICD-10-CM

## 2021-09-24 DIAGNOSIS — E78.5 HYPERLIPIDEMIA, UNSPECIFIED HYPERLIPIDEMIA TYPE: ICD-10-CM

## 2021-09-24 DIAGNOSIS — Z23 NEED FOR INFLUENZA VACCINATION: Primary | ICD-10-CM

## 2021-09-24 LAB
ALBUMIN SERPL-MCNC: 4.2 G/DL (ref 3.5–5.2)
ALBUMIN/GLOB SERPL: 1.4 G/DL
ALP SERPL-CCNC: 101 U/L (ref 39–117)
ALT SERPL W P-5'-P-CCNC: 33 U/L (ref 1–33)
ANION GAP SERPL CALCULATED.3IONS-SCNC: 12.8 MMOL/L (ref 5–15)
AST SERPL-CCNC: 24 U/L (ref 1–32)
BILIRUB SERPL-MCNC: 0.2 MG/DL (ref 0–1.2)
BUN SERPL-MCNC: 10 MG/DL (ref 6–20)
BUN/CREAT SERPL: 13.3 (ref 7–25)
CALCIUM SPEC-SCNC: 9.3 MG/DL (ref 8.6–10.5)
CHLORIDE SERPL-SCNC: 103 MMOL/L (ref 98–107)
CHOLEST SERPL-MCNC: 123 MG/DL (ref 0–200)
CO2 SERPL-SCNC: 24.2 MMOL/L (ref 22–29)
CREAT SERPL-MCNC: 0.75 MG/DL (ref 0.57–1)
GFR SERPL CREATININE-BSD FRML MDRD: 79 ML/MIN/1.73
GLOBULIN UR ELPH-MCNC: 3.1 GM/DL
GLUCOSE SERPL-MCNC: 142 MG/DL (ref 65–99)
HDLC SERPL-MCNC: 36 MG/DL (ref 40–60)
LDLC SERPL CALC-MCNC: 63 MG/DL (ref 0–100)
LDLC/HDLC SERPL: 1.66 {RATIO}
POTASSIUM SERPL-SCNC: 4.4 MMOL/L (ref 3.5–5.2)
PROT SERPL-MCNC: 7.3 G/DL (ref 6–8.5)
SODIUM SERPL-SCNC: 140 MMOL/L (ref 136–145)
TRIGL SERPL-MCNC: 136 MG/DL (ref 0–150)
VLDLC SERPL-MCNC: 24 MG/DL (ref 5–40)

## 2021-09-24 PROCEDURE — 90471 IMMUNIZATION ADMIN: CPT | Performed by: PHYSICIAN ASSISTANT

## 2021-09-24 PROCEDURE — 90686 IIV4 VACC NO PRSV 0.5 ML IM: CPT | Performed by: PHYSICIAN ASSISTANT

## 2021-09-24 PROCEDURE — 80061 LIPID PANEL: CPT

## 2021-09-24 PROCEDURE — 99214 OFFICE O/P EST MOD 30 MIN: CPT | Performed by: PHYSICIAN ASSISTANT

## 2021-09-24 PROCEDURE — 80053 COMPREHEN METABOLIC PANEL: CPT

## 2021-09-24 RX ORDER — ALPRAZOLAM 0.25 MG/1
0.25 TABLET ORAL DAILY PRN
Qty: 30 TABLET | Refills: 0 | Status: SHIPPED | OUTPATIENT
Start: 2021-09-24 | End: 2022-01-05

## 2021-09-24 RX ORDER — EPINEPHRINE 0.3 MG/.3ML
0.3 INJECTION SUBCUTANEOUS AS NEEDED
Qty: 1 EACH | Refills: 1 | Status: CANCELLED | OUTPATIENT
Start: 2021-09-24

## 2021-09-24 NOTE — TELEPHONE ENCOUNTER
PT CALLED STATED THAT SHE JUST HAS AN APPT. WITH MS. [facility] AND WANTED TO KNOW IF SHE WAS SUPPOSE TO  RX ALPRAZOLAM OVER AT HER PHARMACY.    PLEASE ADVISE.  CALL BACK:[phone]

## 2021-09-24 NOTE — TELEPHONE ENCOUNTER
Yes, it was changed from the 100/25 dose to 200/25. She should check to make sure she received the correct one.

## 2021-09-24 NOTE — ASSESSMENT & PLAN NOTE
Continue Viibryd and therapy. Refilled alprazolam to use sparingly prn. Cautioned against increasing frequency of use. Prescribed by on-call provider #[id number], 0RF. Refer to Behavioral Health prescriber to discuss medication management.    The patient has read and signed the [facility] Controlled Substance Contract.  I will continue to see patient for regular follow up appointments.  They are well controlled on their medication.  SHANTEL is updated every 3 months. The patient is aware of the potential for addiction and dependence.

## 2021-09-24 NOTE — ASSESSMENT & PLAN NOTE
Continue Viibryd and therapy. Refilled alprazolam to use sparingly prn. Cautioned against increasing frequency of use. Prescribed by on-call provider #30, 0RF. Refer to Behavioral Health prescriber to discuss medication management.    The patient has read and signed the [facility] Controlled Substance Contract.  I will continue to see patient for regular follow up appointments.  They are well controlled on their medication.  SHANTEL is updated every 3 months. The patient is aware of the potential for addiction and dependence.

## 2021-09-24 NOTE — PROGRESS NOTES
Chief Complaint   Patient presents with   • ER fu     Asthma       Subjective     Sandra Auguste is a 61 y.o. female.        History of Present Illness     Pt went to the ER on 9/8 with asthma exacerbation. She had chest xray, Covid test and cardiac work up. Given prednisone and discharged. She knows that her allergies are flared because she is around dust and pet dander. Struggles to stay on top of everything but she is also caring for her mother at home.    Her anxiety is heightened because of her situation with constantly caring for her mom. She does not get a break, her siblings help some but most of the burden falls on her.    She uses her Breo daily but does not feel like it works as well as it used to. Uses her Albuterol sometimes once a day.    She was given Epi pen when she was getting allergy shots, wonders if she needs this again.    Also getting close to running out of her alprazolam, has 8 pills left. Has had to take some more with the increased anxiety at home. Takes it sparingly.             Current Outpatient Medications:   •  acetaminophen (TYLENOL) 500 MG tablet, Take 500 mg by mouth Every 6 (Six) Hours As Needed for Mild Pain ., Disp: , Rfl:   •  albuterol sulfate  (90 Base) MCG/ACT inhaler, Inhale 1 puff Every 6 (Six) Hours As Needed for Shortness of Air. INHALE TWO PUFFS BY MOUTH EVERY 6 HOURS AS NEEDED, Disp: 1 inhaler, Rfl: 2  •  ALPRAZolam (XANAX) 0.25 MG tablet, Take 1 tablet by mouth Daily As Needed for Anxiety., Disp: 30 tablet, Rfl: 0  •  Blood Glucose Monitoring Suppl (OneTouch Verio) w/Device kit, 1 kit Daily., Disp: 1 kit, Rfl: 0  •  Diclofenac Sodium (VOLTAREN) 1 % gel gel, APPLY 4 GRAMS TOPICALLY TO THE APPROPRIATE AREA AS DIRECTED FOUR TIMES A DAYS AS NEEDED FOR PAIN IN SHOULDERS, Disp: 100 g, Rfl: 1  •  EPINEPHrine (EPIPEN) 0.3 MG/0.3ML solution auto-injector injection, Inject 0.3 mL into the appropriate muscle as directed by prescriber As Needed (FOR SEVERE ALLERGIC  REACTION)., Disp: 1 each, Rfl: 1  •  famotidine (PEPCID) 40 MG tablet, Take 1 tablet by mouth Daily., Disp: 30 tablet, Rfl: 5  •  glucose blood (OneTouch Verio) test strip, Use one strip to check blood sugars one time daily, Disp: 100 each, Rfl: 3  •  glucose blood test strip, Use as instructed to check blood glucose once daily., Disp: 100 each, Rfl: 5  •  glucose monitor monitoring kit, Use as directed to check blood glucose once daily., Disp: 1 each, Rfl: 0  •  [employer] Lancets UltraThin 30G misc, Use as instructed check blood glucose once daily., Disp: 100 each, Rfl: 12  •  Lancets (onetouch ultrasoft) lancets, Use one lancet daily to check blood sugars, Disp: 100 each, Rfl: 3  •  loratadine (CLARITIN) 10 MG tablet, Take 1 tablet by mouth Daily., Disp: 90 tablet, Rfl: 1  •  metFORMIN ER (Glucophage XR) 500 MG 24 hr tablet, Take 2 tablets by mouth Daily With Breakfast., Disp: 60 tablet, Rfl: 2  •  ondansetron (Zofran) 8 MG tablet, Take 1 tablet by mouth Every 8 (Eight) Hours As Needed for Nausea or Vomiting., Disp: 15 tablet, Rfl: 0  •  rosuvastatin (Crestor) 5 MG tablet, Take 1 tablet by mouth Daily., Disp: 90 tablet, Rfl: 1  •  vilazodone (VIIBRYD) 20 MG tablet tablet, Take 1 tablet by mouth Daily., Disp: 30 tablet, Rfl: 1  •  fluticasone (FLONASE) 50 MCG/ACT nasal spray, 2 sprays into the nostril(s) as directed by provider Daily for 30 days., Disp: 16 g, Rfl: 5  •  Fluticasone Furoate-Vilanterol (Breo Ellipta) 200-25 MCG/INH inhaler, Inhale 1 puff Daily., Disp: 60 each, Rfl: 5     PMFSH  The following portions of the patient's history were reviewed and updated as appropriate: allergies, current medications, past family history, past medical history, past social history, past surgical history and problem list.    Review of Systems   Constitutional: Negative for activity change, appetite change and fatigue.   HENT: Negative for congestion and rhinorrhea.    Respiratory: Positive for shortness of breath and  "wheezing. Negative for chest tightness.    Cardiovascular: Negative for chest pain and palpitations.   Gastrointestinal: Negative for abdominal pain.   Genitourinary: Negative for dysuria.   Musculoskeletal: Negative for arthralgias and myalgias.   Neurological: Negative for dizziness, weakness, light-headedness and headaches.   Psychiatric/Behavioral: Negative for dysphoric mood. The patient is nervous/anxious.        Objective   /86   Pulse 87   Ht 166.4 cm (65.5\")   Wt 120 kg (264 lb 3.2 oz)   LMP  (LMP Unknown)   SpO2 96% Comment: ra  BMI 43.30 kg/m²     Physical Exam  Vitals and nursing note reviewed.   Constitutional:       Appearance: She is well-developed.   HENT:      Head: Normocephalic.      Right Ear: Hearing, tympanic membrane, ear canal and external ear normal.      Left Ear: Hearing, tympanic membrane, ear canal and external ear normal.      Nose: Nose normal.   Eyes:      Conjunctiva/sclera: Conjunctivae normal.      Pupils: Pupils are equal, round, and reactive to light.   Cardiovascular:      Rate and Rhythm: Normal rate and regular rhythm.      Heart sounds: Normal heart sounds.   Pulmonary:      Effort: Pulmonary effort is normal.      Breath sounds: Normal breath sounds. No decreased breath sounds, wheezing, rhonchi or rales.   Musculoskeletal:         General: Normal range of motion.      Cervical back: Normal range of motion.   Skin:     General: Skin is warm and dry.   Neurological:      Mental Status: She is alert.   Psychiatric:         Behavior: Behavior normal.              ASSESSMENT/PLAN    Diagnoses and all orders for this visit:    1. Need for influenza vaccination (Primary)  -     FluLaval/Fluarix (VFC) >6 Months    2. Seasonal allergies  Assessment & Plan:  Refer to Allergist to discuss treatment of worsening allergies and asthma.    Orders:  -     Ambulatory Referral to Allergy  -     Fluticasone Furoate-Vilanterol (Breo Ellipta) 200-25 MCG/INH inhaler; Inhale 1 puff " Daily.  Dispense: 60 each; Refill: 5    3. Mild intermittent asthma without complication  Assessment & Plan:  Increased frequency of symptoms. Increase Breo to 200/25 mcg with continued prn use of albuterol. Refer to allergy/asthma specialist to discuss treatment of underlying allergies.        Orders:  -     Ambulatory Referral to Allergy  -     Fluticasone Furoate-Vilanterol (Breo Ellipta) 200-25 MCG/INH inhaler; Inhale 1 puff Daily.  Dispense: 60 each; Refill: 5    4. Generalized anxiety disorder  Assessment & Plan:  Continue Viibryd and therapy. Refilled alprazolam to use sparingly prn. Cautioned against increasing frequency of use. Prescribed by on-call provider #30, 0RF. Refer to Behavioral Health prescriber to discuss medication management.    The patient has read and signed the [facility] Controlled Substance Contract.  I will continue to see patient for regular follow up appointments.  They are well controlled on their medication.  SHANTEL is updated every 3 months. The patient is aware of the potential for addiction and dependence.    Orders:  -     Ambulatory Referral to Behavioral Health    5. Hyperlipidemia, unspecified hyperlipidemia type  Assessment & Plan:  Check lipid profile. Further recommendations based on results.      Orders:  -     Comprehensive Metabolic Panel; Future  -     Lipid Panel; Future    6. High risk medications (not anticoagulants) long-term use  -     Compliance Drug Analysis, Ur - Urine, Clean Catch; Future  -     Compliance Drug Analysis, Ur - Urine, Clean Catch    Other orders  -     Cancel: EPINEPHrine (EPIPEN) 0.3 MG/0.3ML solution auto-injector injection; Inject 0.3 mL into the appropriate muscle as directed by prescriber As Needed (FOR SEVERE ALLERGIC REACTION).  Dispense: 1 each; Refill: 1           Return in about 3 months (around 12/24/2021) for Follow up.

## 2021-09-24 NOTE — TELEPHONE ENCOUNTER
Caller: Sandra Auguste    Relationship to patient: Self    Best call back number: [phone]    Patient is needing: PATIENT STATES SHE PICKED UP THE BREO BUT IS UNSURE IF THIS PRESCRIPTION WAS SUPPOSED TO HAVE BEEN CHANGED. PATIENT WOULD LIKE A CALL BACK

## 2021-09-24 NOTE — TELEPHONE ENCOUNTER
Sandra Auguste was seen for follow up and is due for routine refill of alprazolam 0.25 mg 1 po daily prn #30, 0RF- uses sparingly for panic attacks. HEAVEN Flores and CS agreement are up to date. If you agree, please send in the attached prescription as ordered. Thank you

## 2021-09-24 NOTE — ASSESSMENT & PLAN NOTE
Increased frequency of symptoms. Increase Breo to 200/25 mcg with continued prn use of albuterol. Refer to allergy/asthma specialist to discuss treatment of underlying allergies.

## 2021-09-26 NOTE — PROGRESS NOTES
Your labs show that your cholesterol has improved with the new cholesterol medication. Continue your current dose of medication.

## 2021-09-27 ENCOUNTER — TELEMEDICINE (OUTPATIENT)
Dept: PSYCHIATRY | Facility: CLINIC | Age: 61
End: 2021-09-27

## 2021-09-27 DIAGNOSIS — F41.1 GENERALIZED ANXIETY DISORDER: ICD-10-CM

## 2021-09-27 DIAGNOSIS — F33.1 MODERATE EPISODE OF RECURRENT MAJOR DEPRESSIVE DISORDER (HCC): Primary | ICD-10-CM

## 2021-09-27 PROCEDURE — 90834 PSYTX W PT 45 MINUTES: CPT | Performed by: COUNSELOR

## 2021-09-30 ENCOUNTER — TELEPHONE (OUTPATIENT)
Dept: NUTRITION | Facility: HOSPITAL | Age: 61
End: 2021-09-30

## 2021-09-30 NOTE — PROGRESS NOTES
Adult Outpatient Nutrition  Assessment/PES    Patient Name:  Sandra Auguste  YOB: 1960  MRN: [id number]    Assessment Date:  9/30/2021    Comments:  Patient called to inquire as to reason for recently elevated blood sugars. Patient states that her diet has not changed except that she is now drinking 1-2 propel zero sugar byrnes per day. RD informed that this should not elevate blood sugar but asked if patient has noticed any other changes to diet. Patient denies. Patient currently checking blood sugar 1x per day and rotates between FBS and PP daily. She states that her FBS is often most elevated (212 this morning compared to 130 typically). She did recently start statin medication for HLD. Patient currently eating dinner 2-3 hours before bedtime. RD did discuss benefits of bedtime snack consisting of 15 g of CHO as well as protein or fat. Patient agreeable to trying. RD also advised patient to log blood sugars x 1 week as well as log food intake. Scheduled appt for 10/21/21 with DM RD to review logs and discuss most appropriate path forward. Patient states that he has no further questions or concerns at this time. RD provided contact information and instructed to call should further nutrition concerns arise.     Electronically signed by:  Monika Granados RD  09/30/21 15:50 EDT

## 2021-10-03 DIAGNOSIS — E11.9 TYPE 2 DIABETES MELLITUS WITHOUT COMPLICATION, WITHOUT LONG-TERM CURRENT USE OF INSULIN (HCC): ICD-10-CM

## 2021-10-03 RX ORDER — METFORMIN HYDROCHLORIDE 500 MG/1
TABLET, EXTENDED RELEASE ORAL
Qty: 60 TABLET | Refills: 2 | Status: SHIPPED | OUTPATIENT
Start: 2021-10-03 | End: 2022-01-03 | Stop reason: SDUPTHER

## 2021-10-07 ENCOUNTER — TELEMEDICINE (OUTPATIENT)
Dept: PSYCHIATRY | Facility: CLINIC | Age: 61
End: 2021-10-07

## 2021-10-07 DIAGNOSIS — F41.1 GENERALIZED ANXIETY DISORDER: Primary | ICD-10-CM

## 2021-10-07 DIAGNOSIS — F33.1 MODERATE EPISODE OF RECURRENT MAJOR DEPRESSIVE DISORDER (HCC): ICD-10-CM

## 2021-10-07 PROCEDURE — 90837 PSYTX W PT 60 MINUTES: CPT | Performed by: COUNSELOR

## 2021-10-07 NOTE — PROGRESS NOTES
Date: October 10, 2021  Time In: 1:25 PM  Time Out: 2:44 PM  This provider is located at the [facility] ([facility]), [address], [place] [address] using a secure [employer] Video Visit through [employer]. Patient is being seen remotely via telehealth at home address in [place] and stated they are in a secure environment for this session. The patient's condition being diagnosed/treated is appropriate for telemedicine. The provider identified herself as well as her credentials. The patient, and/or patients guardian, consent to be seen remotely, and when consent is given they understand that the consent allows for patient identifiable information to be sent to a third party as needed. They may refuse to be seen remotely at any time. The electronic data is encrypted and password protected, and the patient and/or guardian has been advised of the potential risks to privacy not withstanding such measures.     You have chosen to receive care through a telehealth visit.  Do you consent to use a video/audio connection for your medical care today? Yes    PROGRESS NOTE  Data:  Sandra Auguste is a 61 y.o. female who presents today for follow up. Patient states that she is struggling with trying to juggle both her and her mother's appointments. Patient states that next week she will have a breast biopsy and her daughter will be taking her for the procedure and patient will not be able to care for her mother on that day but she must clear things with her sister. Patient is feeling increased anxiety in regard to this biopsy. Patient discussed that her diagnosis of mental disorder that shows in MyChart and how it makes her feel about herself. Patient states that she continues to worry about her family's thoughts about her and how she takes care of her mother. Patient states that she doesn't know how to get the support she needs and feels that this is an issue that is unresolved  from childhood. Discussed the on-going need for open communication and not letting worry of what others will think stop her from speaking her needs and understanding that the reaction of others is more about them than the patient herself.    Chief Complaint: depression, anxiety, worry    History of Present Illness: patient has battled anxiety and depression for several years      Clinical Maneuvering/Intervention:  CBT    (Scales based on 0 - 10 with 10 being the worst)  Depression: Sometimes worse than others per patient Anxiety: Depends on the situation per patient       Assisted patient in processing above session content; acknowledged and normalized patient’s thoughts, feelings, and concerns.  Rationalized patient thought process regarding fear of confrontation with her family and understanding of the mental disorder diagnosis in her MyChart.   Discussed triggers associated with patient's anxiety and depression such as worrying about her family, managing several appointments and not knowing how to get her concerns heard by others. Also discussed coping skills for patient to implement such as writing out conversations she wants to have to practice saying what she wants to say, speaking with her children about other options for housing, and looking to get another source of income to relieve her financial stress.    Allowed patient to freely discuss issues without interruption or judgment. Provided safe, confidential environment to facilitate the development of positive therapeutic relationship and encourage open, honest communication. Assisted patient in identifying risk factors which would indicate the need for higher level of care including thoughts to harm self or others and/or self-harming behavior and encouraged patient to contact this office, call 911, or present to the nearest emergency room should any of these events occur. Discussed crisis intervention services and means to access. Patient adamantly and  convincingly denies current suicidal or homicidal ideation or perceptual disturbance.    Assessment:   Assessment   Patient appears to maintain relative stability as compared to their baseline.  However, patient continues to struggle with depression and anxiety which continues to cause impairment in important areas of functioning.  A result, they can be reasonably expected to continue to benefit from treatment and would likely be at increased risk for decompensation otherwise.    Mental Status Exam:   Hygiene:   good  Cooperation:  Cooperative  Eye Contact:  Good  Psychomotor Behavior:  Appropriate  Affect:  Appropriate  Mood: normal  Speech:  Normal  Thought Process:  Goal directed  Thought Content:  Normal  Suicidal:  None  Homicidal:  None  Hallucinations:  None  Delusion:  None  Memory:  Deficits  Orientation:  Person, Place, Time and Situation  Reliability:  good  Insight:  Fair  Judgement:  Good  Impulse Control:  Good  Physical/Medical Issues:  Yes upcoming breast biopsy       Patient's Support Network Includes:  children    Functional Status: Moderate impairment     Progress toward goal: Not at goal    Prognosis: Fair with Ongoing Treatment          Plan:    Patient will continue in individual outpatient therapy with focus on improved functioning and coping skills, maintaining stability, and avoiding decompensation and the need for higher level of care.    Patient will adhere to medication regimen as prescribed and report any side effects. Patient will contact this office, call 911 or present to the nearest emergency room should suicidal or homicidal ideations occur. Provide Cognitive Behavioral Therapy and Solution Focused Therapy to improve functioning, maintain stability, and avoid decompensation and the need for higher level of care.     Return in about 4 weeks, or earlier if symptoms worsen or fail to improve.           VISIT DIAGNOSIS:     ICD-10-CM ICD-9-CM   1. Generalized anxiety disorder  F41.1  300.02   2. Moderate episode of recurrent major depressive disorder (HCC)  F33.1 296.32             This document has been electronically signed by ASIA Rich  October 10, 2021 21:18 EDT      Part of this note may be an electronic transcription/translation of spoken language to printed text using the Dragon Dictation System.

## 2021-10-12 ENCOUNTER — APPOINTMENT (OUTPATIENT)
Dept: BONE DENSITY | Facility: HOSPITAL | Age: 61
End: 2021-10-12

## 2021-10-14 ENCOUNTER — HOSPITAL ENCOUNTER (OUTPATIENT)
Dept: MAMMOGRAPHY | Facility: HOSPITAL | Age: 61
Discharge: HOME | End: 2021-10-14

## 2021-10-14 DIAGNOSIS — R92.8 ABNORMAL MAMMOGRAM: ICD-10-CM

## 2021-10-14 PROCEDURE — 77065 DX MAMMO INCL CAD UNI: CPT | Performed by: RADIOLOGY

## 2021-10-14 RX ORDER — LIDOCAINE HYDROCHLORIDE AND EPINEPHRINE 10; 10 MG/ML; UG/ML
10 INJECTION, SOLUTION INFILTRATION; PERINEURAL ONCE
Status: DISCONTINUED | OUTPATIENT
Start: 2021-10-14 | End: 2022-01-13

## 2021-10-14 RX ORDER — LIDOCAINE HYDROCHLORIDE 10 MG/ML
5 INJECTION, SOLUTION INFILTRATION; PERINEURAL ONCE
Status: DISCONTINUED | OUTPATIENT
Start: 2021-10-14 | End: 2022-01-13

## 2021-10-21 ENCOUNTER — PATIENT OUTREACH (OUTPATIENT)
Dept: CASE MANAGEMENT | Facility: OTHER | Age: 61
End: 2021-10-21

## 2021-10-21 ENCOUNTER — HOSPITAL ENCOUNTER (OUTPATIENT)
Dept: DIABETES SERVICES | Facility: HOSPITAL | Age: 61
Setting detail: RECURRING SERIES
Discharge: HOME | End: 2021-10-21

## 2021-10-21 NOTE — OUTREACH NOTE
Ambulatory Case Management Note    Care Plan: Diabetes   Updates made since 10/21/2021 12:00 AM      Problem: DIET MANAGEMENT       Goal: Learn to Manage Calories       Task: Provide resources on counting calories Completed 10/21/2021   Responsible User: Yecenia Shaver RN      Problem: HBA1C UNCONTROLLED       Goal: Establish Regular Follow-Ups with PCP       Task: Determine patient's next PCP visit Completed 10/21/2021   Responsible User: Yecenia Shaver RN      Task: Discuss schedule for PCP visits with patient Completed 10/21/2021   Responsible User: Yecenia Shaver RN      Goal: Reduce HbA1c levels below 9%       Task: Educate patient on diet and exercise Completed 10/21/2021   Responsible User: Yecenia Shaver RN      Task: Educate patient on regular BS checks Completed 10/21/2021   Responsible User: Yecenia Shaver RN      Task: Discuss diabetes treatment plan with patient Completed 10/21/2021   Responsible User: Yecenia Shaver RN      Problem: MEDICATION ADHERENCE       Goal: Consistently take medications as prescribed       Task: Discuss barriers to medication adherence with patient Completed 10/21/2021   Responsible User: Yecenia Shaver RN      Problem: PATIENT IS INACTIVE       Goal: Exercise at least 20 minutes per day       Task: Develop exercise plan with patient Completed 10/21/2021   Responsible User: Yecenia Shaver RN      Task: Explore community resources including walking groups, assistance programs, and home videos. Completed 10/21/2021   Responsible User: Yecenia Shaver RN      Problem: KNOWLEDGE DEFICIT       Goal: Patient able to teach back disease management       Task: Educate on hyper/hypo-glycemia signs and symptoms Completed 10/21/2021   Responsible User: Yecenia Shaver RN      Patient Outreach    F/u with pt.  States she just completed a visit with [facility] dietician today and discussed carbs, increasing fiber to help HDL and animal  "fats.  She states her insurance will pay for weight watchers, so has signed up.  She is monitoring her bs, but \"not as much as I was.\"  Having trouble with her new monitor and getting enough blood to test.  Discussed changing dept on her lancet device and to also contact the 1800 number of the machine to trouble shoot.  Encouraged to keep log and to take log to PCP.  She denies any issues with obtaining her medicines and states she is compliant with everything, except forgets her Breo.  When talking with pt, she states she does not keep the Breo in the same place as the rest of her meds.  Encouraged to keep all together, which she states \"yes that is a good idea.  My insurance company sent me a medicine planner and I can actually put the Breo in that.\"  Discussed exercise.  States it is hard to get in routine, b/c by the time her sister gets home in the evening to take over caring for their mom, she is exhausted.  Encouraged routine exercise, including walking program increasing distance and speed as tolerated, aerobic exercise or even sit and fit with weights.  Regular exercise may help with her stamina. Discussed hypo and hyperglycemia symptoms and encouraged to note symptoms to take to PCP visit.  She does not think she received the material for living will, but will check.  Explained and provided with [facility] hotline number.  She states for the most part her asthma is doing pretty good, however is trying to avoid triggers and has some family issues with her trying to avoid, vacuuming, bathing the dog, etc.  Will send pt education on Asthma Prevention and Asthma Action Tool.    Together decided on return call in approx 1 month, but knows to contact RN-BHARTI for any needs in the interim.     Yecenia Shaver RN  Ambulatory Case Management    10/21/2021, 15:51 EDT    "

## 2021-10-21 NOTE — PATIENT INSTRUCTIONS
Asthma Attack Prevention, Adult  Although you may not be able to control the fact that you have asthma, you can take actions to prevent episodes of asthma (asthma attacks).  How can this condition affect me?  Asthma attacks (flare ups) can cause trouble breathing, wheezing, and coughing. They may keep you from doing activities you like to do.  What can increase my risk?  Coming into contact with things that cause asthma symptoms (asthma triggers) can put you at risk for an asthma attack. Common asthma triggers include:  · Things you are allergic to (allergens), such as:  ? Dust mite and cockroach droppings.  ? Pet dander.  ? Mold.  ? Pollen from trees and grasses.  ? Food allergies. This might be a specific food or added chemicals called sulfites.  · Irritants, such as:  ? Weather changes including very cold, dry, or humid air.  ? Smoke. This includes campfire smoke, air pollution, and tobacco smoke.  ? Strong odors from aerosol sprays and fumes from perfume, candles, and household .  · Other triggers, such as:  ? Certain medicines. This includes NSAIDs, such as ibuprofen and aspirin.  ? Viral respiratory infections (colds), including runny nose (rhinitis) or infection in the sinuses (sinusitis).  ? Activity including exercise, laughing, or crying.  ? Not using inhaled medicines (corticosteroids) as told.  What actions can I take to prevent an asthma attack?  · Stay healthy. Stay up to date on all immunizations as told by your health care provider, including the yearly flu (influenza) vaccine and pneumonia vaccine.  · Many asthma attacks can be prevented by carefully following your written asthma action plan.  Follow your asthma action plan  Work with your health care provider to create a written asthma action plan. This plan should include:  · A list of your asthma triggers and how to avoid or reduce them.  · A list of symptoms that you may have during an asthma attack.  · Information about which medicine  to take, when to take the medicine, and how much of the medicine to take.  · Information to help you understand your peak flow measurements.  · Daily actions that you can take to prevent (control) your asthma symptoms.  · Contact information for your health care providers.  · If you have an asthma attack, act quickly. Follow the emergency steps on your written asthma action plan. This may prevent you from needing to go to the hospital.  Monitor your asthma. To do this:  · Use your peak flow meter every morning and every evening for 2-3 weeks or as told by your health care provider.  ? Record the results in a journal.  ? A drop in your peak flow numbers on one or more days may mean that you are starting to have an asthma attack, even if you are not having symptoms.  · When you have asthma symptoms, write them down in a journal.  · Write down in your journal how often you need to use your fast-acting rescue inhaler. If you are using your rescue inhaler more often, it may mean that your asthma is not under control. Talk with your health care provider about adjusting your asthma treatment plan to help you prevent future asthma attacks and gain better control of your condition.    Lifestyle  · Avoid or reduce contact with known outdoor allergens by staying indoors, keeping windows closed, and using air conditioning when pollen and mold counts are high.  · Do not use any products that contain nicotine or tobacco, such as cigarettes, e-cigarettes, and chewing tobacco. If you need help quitting, ask your health care provider.  · If you are overweight, consider a weight-loss plan.  · Find ways to cope with stress and your feelings, such as mindfulness, relaxation, or breathing exercises.  · Ask your health care provider if a breathing exercise program (pulmonary rehabilitation) may be helpful to control symptoms and improve your quality of life.  Medicines    · Take over-the-counter and prescription medicines only as told by  your health care provider.  · Do not stop taking your medicine and do not take less medicine even if you are doing well.  · Let your health care provider know:  ? How often you use your rescue inhaler.  ? How often you have symptoms when you are taking your regular medicines.  ? If you wake up at night because of asthma symptoms.  ? If you have more trouble with your breathing when you exercise.    Activity  · Do your normal activities as told by your health care provider. Ask your health care provider what activities are safe for you.  · Some people have asthma symptoms or more asthma symptoms when they exercise. This is called exercise-induced bronchoconstriction (EIB). If you have this problem, talk with your health care provider about how to manage EIB. Some tips to follow include:  ? Use your fast-acting inhaler before exercise.  ? Exercise indoors if it is very cold, humid, or the pollen and mold counts are high.  ? Warm up and cool down before and after exercise.  ? Stop exercising right away if your asthma symptoms start or get worse.  Where to find more information  · Asthma and Allergy Foundation of [place]: www.aafa.org  · Centers for Disease Control and Prevention: www.cdc.gov  · American Lung Association: www.lung.org  · National Heart, Lung, and Blood [place]: www.nhlbi.nih.gov  · World Health Organization: www.who.int  Get help right away if:  · You have followed your written asthma action plan and your symptoms are not improving.  Summary  · Asthma attacks (flare ups) can cause trouble breathing, wheezing, and coughing. They may keep you from doing activities you normally like to do.  · Work with your health care provider to create a written asthma action plan.  · Do not stop taking your medicine and do not take less medicine even if you are doing well.  · Do not use any products that contain nicotine or tobacco, such as cigarettes, e-cigarettes, and chewing tobacco. If you need help quitting, ask  your health care provider.  This information is not intended to replace advice given to you by your health care provider. Make sure you discuss any questions you have with your health care provider.  Document Revised: 12/15/2020 Document Reviewed: 12/15/2020  Elsevier Patient Education © 2021 [employer]  Asthma Action Plan, Adult  An asthma action plan helps you understand how to manage your asthma and what to do when you have an asthma attack. The action plan is a color-coded plan that lists the symptoms that indicate whether or not your condition is under control and what actions to take.  · If you have symptoms in the green zone, it means you are doing well.  · If you have symptoms in the yellow zone, it means you are having problems.  · If you have symptoms in the red zone, you need medical care right away.  Follow the plan that you and your health care provider develop. Review your plan with your health care provider at each visit.  What triggers your asthma?  Knowing the things that can trigger an asthma attack or make your asthma symptoms worse is very important. Talk to your health care provider about your asthma triggers and how to avoid them. Record your known asthma triggers here: _______________  What is your personal best peak flow reading?  If you use a peak flow meter, determine your personal best reading. Record it here: _______________  Red zone  Symptoms in this zone mean that you should get medical help right away. You will likely feel distressed and have symptoms at rest that restrict your activity. You are in the red zone if:  · You are breathing hard and quickly.  · Your nose opens wide, your ribs show, and your neck muscles become visible when you breathe in.  · Your lips, fingers, or toes are a bluish color.  · You have trouble speaking in full sentences.  · Your peak flow reading is less than __________ (less than 50% of your personal best).  · Your symptoms do not improve within 15-20  minutes after you use your reliever or rescue medicine (bronchodilator).  If you have any of these symptoms:  · Call your local emergency services (911 in the [place] or go to the nearest emergency room.  · Use your reliever or rescue medicine.  ? Start a nebulizer treatment or take 2-4 puffs from a metered-dose inhaler with a spacer.  ? Repeat this action every 15-20 minutes until help arrives.  Yellow zone  Symptoms in this zone mean that your condition may be getting worse. You may have symptoms that interfere with exercise, are noticeably worse after exposure to triggers, or are worse at the first sign of a cold (upper respiratory infection). These may include:  · Waking from sleep.  · Coughing, especially at night or first thing in the morning.  · Mild wheezing.  · Chest tightness.  · A peak flow reading that is __________ to __________ (50-79% of your personal best).  If you have any of these symptoms:  · Add the following medicine to the ones that you use daily:  ? Reliever or rescue medicine and dosage: _______________  ? Additional medicine and dosage: _______________  Call your health care provider if:  · You remain in the yellow zone for __________ hours.  · You are using a reliever or rescue medicine more than 2-3 times a week.  Green zone  This zone means that your asthma is under control. You may not have any symptoms while you are in the green zone. This means that you:  · Have no coughing or wheezing, even while you are working or playing.  · Sleep through the night.  · Are breathing well.  · Have a peak flow reading that is above __________ (80% of your personal best or greater).  If you are in the green zone, continue to manage your asthma as directed:  · Take these medicines every day:  ? Controller medicine and dosage: _______________  ? Controller medicine and dosage: _______________  ? Controller medicine and dosage: _______________  ? Controller medicine and dosage: _______________  · Before  exercise, use this reliever or rescue medicine: _______________  Call your health care provider if you are using a reliever or rescue medicine more than 2-3 times a week.  Where to find more information  You can find more information about asthma from:  · Centers for Disease Control and Prevention: www.cdc.gov/asthma  · American Lung Association: www.lung.org  This information is not intended to replace advice given to you by your health care provider. Make sure you discuss any questions you have with your health care provider.  Document Revised: 04/13/2021 Document Reviewed: 04/13/2021  Elsevier Patient Education © 2021 [employer]

## 2021-10-21 NOTE — CONSULTS
DIABETES NUTRITION EDUCATION CONSULT, 60 minutes diabetes nutrition education.  This medical referred consult was provided as a telephone call, tele-health or e-visit, as patient is unable to attend an in-office appointment due to the COVID-19 crisis. Consent for treatment was given verbally. Please see media tab for assessment and notes if you use [facility]. If you are not an EP[employer] user a copy of patient's assessment and notes will be sent per routine. Thank you.

## 2021-11-02 ENCOUNTER — DOCUMENTATION (OUTPATIENT)
Dept: SPEECH THERAPY | Facility: HOSPITAL | Age: 61
End: 2021-11-02

## 2021-11-02 DIAGNOSIS — R41.3 MEMORY LOSS: Primary | ICD-10-CM

## 2021-11-02 NOTE — THERAPY DISCHARGE NOTE
Speech Language Pathology Discharge Summary         Patient Name: Sandra Auguste  : 1960  MRN: [id number]    Today's Date: 2021       SLP OP Goals     Row Name 21 1500          Goal Type Needed    Goal Type Needed Memory; Attention/Orientation; Other Adult Goals  -HG            Subjective Comments    Subjective Comments Pt seen for evaluation and 23 tx sessions. 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  -HG            Memory Goals    Patient will be able to remember information needed to return to work and function on work-related tasks 100%:; with cues  -HG     Status: Patient will be able to remember information needed to return to work and function on work-related tasks New  -HG     Patient will demonstrate improved ability to recall information by immediately recalling a series of words 90%:; unrelated; after delay  -HG     Status: Patient will demonstrate improved ability to recall information by immediately recalling a series of words Progressing as expected  -HG     Comments: Patient will demonstrate improved ability to recall information by immediately recalling a series of words 21: RBANS Immediate recall score of 123 remains the same in the Superior range. 21: Immediate recall for shapes and figures and pt was 100% accurate. 21: Immediate recall of 16 groupable words and pt was 100% accurate. Recall of 4 un-related cards out of 15 and pt was 4/4.  21: RBANS score of 123 places pt in the Superior range. 21: Immediate recall of 16 groupable words: pt was 16/16.   21: Immediate recall for shapes and figures and pt was 90% accurate. 21: Immediate recall of names and faces: pt was 4/5. 6/15/21: Immediate recall of 10 un-related pictures and with no strategy, pt was 6/10. With use of grouping, pt was 9/10 and improved to 10/10. 21: Pt recall of reverse order of up to 4 words was 80% with minimal cues. 5/10/21:  RBANS immediate recall score of 103 places pt in average range. 4/30/21: Pt immediate recall of 5 word arrays was 80% with minimal cues. 4/6/21: Immediate recall for ABC game pt was 90% accurate. 4/2/21: Picture recall for 75% accurate.  3/12/21: Word Placement: Inclusion for 4 words and pt was 100% accurate. 5 words: pt was 80% accurate. One Pile Card game: pt was required mod cues in order to recall rules of the game.   -HG     Patient’s memory skills will be enhanced as reported by patient by utilizing internal memory strategies to recall up to 3 pieces of information after a 5- minute delay 90%:; with cues  -HG     Status: Patient’s memory skills will be enhanced as reported by patient by utilizing internal memory strategies to recall up to 3 pieces of information after a 5- minute delay Progressing as expected  -HG     Comments: Patient’s memory skills will be enhanced as reported by patient by utilizing internal memory strategies to recall up to 3 pieces of information after a 5- minute delay 9/13/21: RBANS Delayed recall score of 111, down from a 124. 7/16/21: Delayed recall of 16 groupable words and pt was 95% accurate using a first letter initial strategy.  7/9/21: SLUMS Delayed score of 124 places pt in the Superior range. 7/6/21: Delayed recall of 16 groupable words and pt was 16/16.   6/22/21: Delayed recall of names and faces: pt was 5/5. 6/15/21: Delayed recall of 10 un-related pictures and pt was 10/10.  5/27/21: 8 un-related words: pt was 8/8 with use of chaining.  5/21/21: Pt was 70% accurate with opposites with a delay and min-mod cues. 5/10/21: RBANS delayed memory of 119 places pt in high average range. 4/2/21: Paired pictures: pt was 9/10. 3/26/21: Delayed recall of 8 un-related words and pt was 8/8 x 3 with use of chaining. 3/19/21: Delayed recall of 7 un-related words: pt was % accurate. 3/16/21: Delayed recall of 6 un-related words: pt was 6/6; with increased delay, pt was 6/6 x 2.   3/12/21: Delayed recall of 4 un-related words: pt was 4/4 x 3. Delayed recall of 5 un-related words: pt was 5/5 x 3.   -HG     Patient’s memory skills will be enhanced as reported by patient by using external memory aides 90%:; with cues  -HG     Status: Patient’s memory skills will be enhanced as reported by patient by using external memory aides Progressing as expected  -HG     Comments: Patient’s memory skills will be enhanced as reported by patient by using external memory aides 9/13/21: Pt reminded to use her phone for appts. 8/2/21: Pt continues to use her cell phone and calendar for appts, dates, etc. 5/21/21: Pt utilized tactile cues to recall words in session. 4/23/21: Pt states she is having some difficulties staying on top of tasks. 4/16/21: Pt noted she is using her phone often to make sure she is not forgetting daily tasks. 3/16/21: Pt states that the past few days have been rough and she hasn't kept up with it very well. 3/12/21: Pt using daily journal per her report.   -HG            Attention/Orientation Goals    Patient will be able to execute all activities necessary to manage a home Independently  -HG     Status: Patient will be able to execute all activities necessary to manage a home Progressing as expected  -HG     Comments: Patient will be able to execute all activities necessary to manage a home 9/13/21: RBANS Attention score of 88 remains in the Low Average range. 7/9/21: RBANS Attention score of 88 remains in the Low Average range. 4/30/21: Pt states she is having difficulties with caring for her mother. Pt states she continuing to perform ADL's but notes it is taking a large toll. 4/23/21: Pt notes that she is dealing with a lot due to trying to care for her mother while continuing to do ADL's. Pt states she is having to block off times for herself. 4/16/21: Pt has noted she is having difficulties with taking care of mother but is completing ADL's. Pt notes she is not doing as much as she  would like to do. 4/2/21: Had pt download the [employer] mar[employer] elena for FREE trials at this time. 3/16/21: Exec function exercise for attention, memory and thought organization and overall, pt was 95% accurate.   -HG     Patient will improve attention skills by sustaining consistent behavioral response during continuous and repetitive activity (e.g., listening for target words, auditory/reading comprehension tasks) without cues; 10 minute task  -HG     Status: Patient will improve attention skills by sustaining consistent behavioral response during continuous and repetitive activity (e.g., listening for target words, auditory/reading comprehension tasks) Progressing as expected  -HG     Comments: Patient will improve attention skills by sustaining consistent behavioral response during continuous and repetitive activity (e.g., listening for target words, auditory/reading comprehension tasks) 4/30/21: Pt completed Moving on Up and Middle Management card games together with 100% acccuracy. 4/16/21: Scoreboard card game and pt was 100% accurate. 3/19/21: One Pile Card Game: pt was 80% accurate.  3/12/21: Sustained attention for the game of One Pile and pt was 80% accurate.   -HG     Patient will improve attention skills by sustaining focus in order to actively hold and manipulate information provided (e.g., sequencing auditorily presented number series in ascending or descending order) 90%:; with cues  -HG     Status: Patient will improve attention skills by sustaining focus in order to actively hold and manipulate information provided (e.g., sequencing auditorily presented number series in ascending or descending order) Progressing as expected  -HG     Comments: Patient will improve attention skills by sustaining focus in order to actively hold and manipulate information provided (e.g., sequencing auditorily presented number series in ascending or descending order) 6/25/21: Mental Manipulation with numbers: pt able to achieve 7  digits with chunking. 5/21/21: Pt sustained attention for opposites with a delay was 70% accurate with min-mod cues. 4/6/21: Sustained attention for new card game and pt was 95% accurate.  3/16/21: Mental Manipulation for 3 words, alphabetical and reversal and pt was 100% accurate.   -HG     Patient will improve attention skills by focusing to selective target/task when presented with competing stimuli or in a distracting environment in order to complete task 90%:; with cues  -HG     Status: Patient will improve attention skills by focusing to selective target/task when presented with competing stimuli or in a distracting environment in order to complete task Progressing as expected  -HG     Comments: Patient will improve attention skills by focusing to selective target/task when presented with competing stimuli or in a distracting environment in order to complete task 8/2/21: Card activity and pt was 90% accurate. 5/21/21: Pt selective attention for opposites with a delay was 70% with min-mod cues. 5/10/21: RBANS attention score of 88 places pt in low average range. 4/30/21: Pt completed deduction puzzle with 100% accuracy independently. 4/27/21: Last letter game and pt selectively attended to cards while being distracted by other players. Pt 4/23/21: Step by step card game and pt was 100% accurate independently. 3/26/21: The game of Avaz and pt was 80% accurate. 3/12/21: Connecting the Dots- Alphabetical and pt was 90% accurate. Alternating attention task and pt was 95% accurate.   -HG     Patient will improve attention skills by alternating or shifting focus between two different tasks in order to complete both tasks 90%:; with cues  -HG     Status: Patient will improve attention skills by alternating or shifting focus between two different tasks in order to complete both tasks Progressing as expected  -HG     Comments: Patient will improve attention skills by alternating or shifting focus between two different  tasks in order to complete both tasks 21: Divided attention for card sorting and pt was 100% accurate with min cues. 21: Card sorting by changing opposite color: pt was 70% accurate. 21: Alternating attention for card sorting by odd and evens and colors and pt was 90% accurate. 21: 21: Sorting cards by the letter within a suit and pt was 100% accurate with min cues. 21: Alternating attention card game where pt had to add 2 cards then switch between adding 2 cards and naming the first letter of each numbered card. Pt was 100% accurate. 3/26/21: Memory Matches and pt was 70-90% accurate with use of attention strategies. 3/19/21: Memory Matches: pt was 80% accurate with min cues. 3/12/21: Divided attention for card sorting by  and by suit and pt was 85% accurate.   -HG            Other Goals    Other Adult Goal- 1 Pt will complete thought organization tasks with at least 90% accurate with cues.  -HG     Status: Other Adult Goal- 1 Progressing as expected  -HG     Comments: Other Adult Goal- 1 21: RBANS Language score of 102 is improved from a 94. 21: RBANS Language score of 94 remains in the Average range. 21: Pt reports that she recently struggled with how to help her mom to the toilet from her wheelchair. Six step sequencing, pt was 95% accurate. 21: Word scramble and pt was 80% accurate. 21: Following Written Directions: pt was 90% accurate.  21: Abstract and concrete category naming was 100%. Pt was 100% for selecting words that do no tbelong from a field of 5. 21: Abstract category naming: pt was 90% accurate.  3/12/21: Divergent naming: pt was concrete: 15/15, abstract: 15/15 with mod cues.    -HG     Other Adult Goal- 2 Pt will participate in further reasoning and problem solving evaluation with goals to follow as indicated.  -HG     Status: Other Adult Goal- 2 Progressing as expected  -HG     Comments: Other Adult Goal- 2 21: Reasoning for paper  pencil task and pt was 90% accurate. 7/16/21: Functional problem solving regarding pt's mom and current situation and pt was 100% accurate.  6/25/21: Analogies: pt was 100% accurate. 6/15/21: Connect the dots alphabetical order: pt was 100%  accurate. 4/30/21: Pt completed deductive puzzle with 100% accuracy independently. 4/16/21: Deductive reasoning paragraph and pt was 80% accurate with minimal cues. 4/2/21: Deductive Reasoning puzzle and pt was 100% accurate. 3/26/21: Reasoning puzzle: Pt was 90% accurate with min cues.   -HG     Other Adult Goal- 3 Pt will improve RBANS Score to at least a 120 placing pt in the superior range.   -HG     Status: Other Adult Goal- 3 Progressing as expected  -HG     Comments: Other Adult Goal- 3 9/13/21: RBANS Total Score of 111 is only down one point from 112. 7/9/21: RBANS Total socre of 112 places pt in the high average. 6/15/21: APT Scores were within normal range except for Divided attention which was below average. 5/10/21: RBANS score of 103 places pt in the average range.   -            SLP Time Calculation    SLP Goal Re-Cert Due Date 10/13/21  -           User Key  (r) = Recorded By, (t) = Taken By, (c) = Cosigned By    Initials Name Provider Type    Sarita Bliss MS CCC-SLP Speech and Language Pathologist                       Time Calculation:                    Sarita Preston MS CCC-SLP  11/2/2021

## 2021-11-18 ENCOUNTER — TELEMEDICINE (OUTPATIENT)
Dept: PSYCHIATRY | Facility: CLINIC | Age: 61
End: 2021-11-18

## 2021-11-18 ENCOUNTER — TELEPHONE (OUTPATIENT)
Dept: INTERNAL MEDICINE | Facility: CLINIC | Age: 61
End: 2021-11-18

## 2021-11-18 DIAGNOSIS — F41.1 GENERALIZED ANXIETY DISORDER: Primary | ICD-10-CM

## 2021-11-18 DIAGNOSIS — F33.1 MODERATE EPISODE OF RECURRENT MAJOR DEPRESSIVE DISORDER (HCC): ICD-10-CM

## 2021-11-18 PROCEDURE — 90837 PSYTX W PT 60 MINUTES: CPT | Performed by: COUNSELOR

## 2021-11-18 NOTE — TELEPHONE ENCOUNTER
Caller: Sandra Auguste    Relationship to patient: Self    Best call back number:     [phone]     Chief complaint:     N/A    Type of visit:     FOLLOW UP APPOINTMENT    Requested date:     Friday, 11/19/21      If rescheduling, when is the original appointment:     N/A    Additional notes:    PATIENT STATED SHE ONLY HAS TOMORROW, Friday 11/19/21, TO SCHEDULE A FOLLOW UP APPOINTMENT    PATIENT IS A CARETAKER FOR HER MOTHER AND TYPICALLY HAS NO ONE TO COVER FOR HER    PATIENT STATED THE 11/19 AVAILABILITY WAS A SUDDEN COVER FOR HER     PLEASE CONTACT PATIENT WITH RECOMMENDATION FOR NEXT STEPS AND WHAT PATIENT CAN DO NEXT FOR SCHEDULING AN APPOINTMENT    PATIENT ALSO STATED SHE WOULD NEED TO BE BACK HOME TO CARE FOR HER MOTHER BY 12 NOON    ALESIA MONTERO

## 2021-11-18 NOTE — PROGRESS NOTES
Date: November 29, 2021  Time In: 2:35 PM  Time Out: 3:36 PM  This provider is located at [facility]), [address], [place] [address] using a secure Touch[employer] Video Visit through [employer]. Patient is being seen remotely via telehealth at home address in [place] and stated they are in a secure environment for this session. The patient's condition being diagnosed/treated is appropriate for telemedicine. The provider identified herself as well as her credentials. The patient, and/or patients guardian, consent to be seen remotely, and when consent is given they understand that the consent allows for patient identifiable information to be sent to a third party as needed. They may refuse to be seen remotely at any time. The electronic data is encrypted and password protected, and the patient and/or guardian has been advised of the potential risks to privacy not withstanding such measures.     You have chosen to receive care through a telehealth visit.  Do you consent to use a video/audio connection for your medical care today? Yes    PROGRESS NOTE  Data:  Sandra Auguste is a 61 y.o. female who presents today for follow up. Patient states that she was having trouble logging in and that's why she was late for the session. Patient states that she had an issue with her mother's health care and she had to take her mother to the ER due to elevated blood pressure. Patient states that she is still having issues with her sister and she is unable to keep her appointments due to not getting any help from her siblings in order to go to her appointments. Patient states that she has nothing of her own and feels that she is almost institutionalized. Patient acknowledges that she has nothing and is unsure of what will happen when her mother passes and discussed a fear of what would happen to her and how she would be treated by her siblings if something happened to her  mother. Discussed the need for the patient to talk frankly with her siblings and find out what the plan is for her in that she lives with her mother and talking with her children to create a back up plan for the client to feel secure with if her mother should pass away.    Chief Complaint: increased anxiety     History of Present Illness: patient has struggled with depression and anxiety for several years.      Clinical Maneuvering/Intervention: solution focused    (Scales based on 0 - 10 with 10 being the worst)  Depression: 3 Anxiety: 7-10       Assisted patient in processing above session content; acknowledged and normalized patient’s thoughts, feelings, and concerns.  Rationalized patient thought process regarding worrying about her future.  Discussed triggers associated with patient's anxiety such as managing her health care needs and those of her mother and worry about conflict with her siblings.  Also discussed coping skills for patient to implement such as writing down questions for her siblings, having open conversations with her siblings and children and working on a budget for herself to prepare for the future.    Allowed patient to freely discuss issues without interruption or judgment. Provided safe, confidential environment to facilitate the development of positive therapeutic relationship and encourage open, honest communication. Assisted patient in identifying risk factors which would indicate the need for higher level of care including thoughts to harm self or others and/or self-harming behavior and encouraged patient to contact this office, call 911, or present to the nearest emergency room should any of these events occur. Discussed crisis intervention services and means to access. Patient adamantly and convincingly denies current suicidal or homicidal ideation or perceptual disturbance.    Assessment:   Assessment   Patient appears to maintain relative stability as compared to their baseline.   However, patient continues to struggle with feelings of anxiety and depression which continues to cause impairment in important areas of functioning.  A result, they can be reasonably expected to continue to benefit from treatment and would likely be at increased risk for decompensation otherwise.    Mental Status Exam:   Hygiene:   good  Cooperation:  Cooperative  Eye Contact:  Good  Psychomotor Behavior:  Aggitated  Affect:  Appropriate  Mood: fluctates  Speech:  Normal  Thought Process:  Goal directed  Thought Content:  Normal  Suicidal:  None  Homicidal:  None  Hallucinations:  None  Delusion:  None  Memory:  Deficits  Orientation:  Person, Place, Time and Situation  Reliability:  fair  Insight:  Fair  Judgement:  Good  Impulse Control:  Good  Physical/Medical Issues:  No        Patient's Support Network Includes:  children, mother and extended family    Functional Status: Moderate impairment     Progress toward goal: Not at goal    Prognosis: Fair with Ongoing Treatment          Plan:    Patient will continue in individual outpatient therapy with focus on improved functioning and coping skills, maintaining stability, and avoiding decompensation and the need for higher level of care.    Patient will adhere to medication regimen as prescribed and report any side effects. Patient will contact this office, call 911 or present to the nearest emergency room should suicidal or homicidal ideations occur. Provide Cognitive Behavioral Therapy and Solution Focused Therapy to improve functioning, maintain stability, and avoid decompensation and the need for higher level of care.     Return in about 4 weeks, or earlier if symptoms worsen or fail to improve.           VISIT DIAGNOSIS:     ICD-10-CM ICD-9-CM   1. Generalized anxiety disorder  F41.1 300.02   2. Moderate episode of recurrent major depressive disorder (HCC)  F33.1 296.32             This document has been electronically signed by ASIA Rich  November  29, 2021 21:05 EST      Part of this note may be an electronic transcription/translation of spoken language to printed text using the Dragon Dictation System.

## 2021-11-18 NOTE — TELEPHONE ENCOUNTER
Why does she need an appointment tomorrow? Is she unable to come in December for her 3 month follow up?

## 2021-11-18 NOTE — TELEPHONE ENCOUNTER
Last seen 6/15/18.  Next appointment 12/12/18.  Last filled Losartan-HCTZ # 90 with 1 refill on 1/3/18. Refilled today #90 with 1 refill per standing orders.     Patient had canceled an appointment in October and was trying to get in, but she already has appointment scheduled for 12/10 and she will keep that appointment, patient stated her blood pressure was running a little high in the 140's , per Michelle she said she could do a video visit if she needed to but patient will monitor and keep appointment in Dec.

## 2021-11-18 NOTE — TELEPHONE ENCOUNTER
Patient's original appointment is in December and you do not have any openings for tomorrow, please advise

## 2021-11-24 DIAGNOSIS — F41.9 ANXIETY: ICD-10-CM

## 2021-11-24 RX ORDER — VILAZODONE HYDROCHLORIDE 20 MG/1
TABLET ORAL
Qty: 30 TABLET | Refills: 0 | Status: SHIPPED | OUTPATIENT
Start: 2021-11-24 | End: 2021-12-29

## 2021-12-09 ENCOUNTER — OFFICE VISIT (OUTPATIENT)
Dept: INTERNAL MEDICINE | Facility: CLINIC | Age: 61
End: 2021-12-09

## 2021-12-09 VITALS
DIASTOLIC BLOOD PRESSURE: 88 MMHG | BODY MASS INDEX: 43.13 KG/M2 | HEART RATE: 69 BPM | WEIGHT: 263.2 LBS | SYSTOLIC BLOOD PRESSURE: 130 MMHG | OXYGEN SATURATION: 96 %

## 2021-12-09 DIAGNOSIS — E11.9 TYPE 2 DIABETES MELLITUS WITHOUT COMPLICATION, WITHOUT LONG-TERM CURRENT USE OF INSULIN (HCC): Primary | ICD-10-CM

## 2021-12-09 DIAGNOSIS — R07.9 CHEST PAIN, UNSPECIFIED TYPE: ICD-10-CM

## 2021-12-09 LAB
EXPIRATION DATE: NORMAL
GLUCOSE BLDC GLUCOMTR-MCNC: 122 MG/DL (ref 70–130)
HBA1C MFR BLD: 6.9 %
Lab: NORMAL

## 2021-12-09 PROCEDURE — 99214 OFFICE O/P EST MOD 30 MIN: CPT | Performed by: PHYSICIAN ASSISTANT

## 2021-12-09 PROCEDURE — 3044F HG A1C LEVEL LT 7.0%: CPT | Performed by: PHYSICIAN ASSISTANT

## 2021-12-09 PROCEDURE — 82962 GLUCOSE BLOOD TEST: CPT | Performed by: PHYSICIAN ASSISTANT

## 2021-12-09 PROCEDURE — 83036 HEMOGLOBIN GLYCOSYLATED A1C: CPT | Performed by: PHYSICIAN ASSISTANT

## 2021-12-09 PROCEDURE — 93000 ELECTROCARDIOGRAM COMPLETE: CPT | Performed by: PHYSICIAN ASSISTANT

## 2021-12-09 NOTE — PROGRESS NOTES
Chief Complaint   Patient presents with   • Anxiety     Follow Up   • Depression   • Hyperlipidemia   • Hypertension   • Diabetes     Last A1C7.1       Subjective     Sandra Auguste is a 61 y.o. female.        History of Present Illness     Pt notes that she has had an intermittent cough for the past few weeks. She does have allergies in the fall. Has not used her Breo inhaler recently. Feels like it makes her cough.     Notes that she recently switched to taking her Viibryd in the evenings because pharmacist told her it would make her sleepy. Does feel less fatigued during the day.     Taking higher dose of metformin XR 1000 mg daily.    She is starting to take a day off from caring for her mother. Her brother is helping out once a week. She is looking for other options for respite care.     She has had some episodes of intermittent chest pain. Thinks she may have been told she had afib in the past. Wore a holter monitor in March that showed supraventricular beats.    She would like to go back to speech therapy for cognitive exercises she was doing.        Current Outpatient Medications:   •  acetaminophen (TYLENOL) 500 MG tablet, Take 500 mg by mouth Every 6 (Six) Hours As Needed for Mild Pain ., Disp: , Rfl:   •  albuterol sulfate  (90 Base) MCG/ACT inhaler, Inhale 1 puff Every 6 (Six) Hours As Needed for Shortness of Air. INHALE TWO PUFFS BY MOUTH EVERY 6 HOURS AS NEEDED, Disp: 1 inhaler, Rfl: 2  •  ALPRAZolam (XANAX) 0.25 MG tablet, Take 1 tablet by mouth Daily As Needed for Anxiety., Disp: 30 tablet, Rfl: 0  •  Blood Glucose Monitoring Suppl (OneTouch Verio) w/Device kit, 1 kit Daily., Disp: 1 kit, Rfl: 0  •  Diclofenac Sodium (VOLTAREN) 1 % gel gel, APPLY 4 GRAMS TOPICALLY TO THE APPROPRIATE AREA AS DIRECTED FOUR TIMES A DAYS AS NEEDED FOR PAIN IN SHOULDERS, Disp: 100 g, Rfl: 1  •  EPINEPHrine (EPIPEN) 0.3 MG/0.3ML solution auto-injector injection, Inject 0.3 mL into the appropriate muscle as directed  by prescriber As Needed (FOR SEVERE ALLERGIC REACTION)., Disp: 1 each, Rfl: 1  •  famotidine (PEPCID) 40 MG tablet, Take 1 tablet by mouth Daily., Disp: 30 tablet, Rfl: 5  •  Fluticasone Furoate-Vilanterol (Breo Ellipta) 200-25 MCG/INH inhaler, Inhale 1 puff Daily., Disp: 60 each, Rfl: 5  •  glucose blood (OneTouch Verio) test strip, Use one strip to check blood sugars one time daily, Disp: 100 each, Rfl: 3  •  glucose blood test strip, Use as instructed to check blood glucose once daily., Disp: 100 each, Rfl: 5  •  glucose monitor monitoring kit, Use as directed to check blood glucose once daily., Disp: 1 each, Rfl: 0  •  Lancets (onetouch ultrasoft) lancets, Use one lancet daily to check blood sugars, Disp: 100 each, Rfl: 3  •  loratadine (CLARITIN) 10 MG tablet, Take 1 tablet by mouth Daily., Disp: 90 tablet, Rfl: 1  •  metFORMIN ER (GLUCOPHAGE-XR) 500 MG 24 hr tablet, TAKE TWO TABLETS BY MOUTH DAILY WITH BREAKFAST, Disp: 60 tablet, Rfl: 2  •  ondansetron (Zofran) 8 MG tablet, Take 1 tablet by mouth Every 8 (Eight) Hours As Needed for Nausea or Vomiting., Disp: 15 tablet, Rfl: 0  •  rosuvastatin (Crestor) 5 MG tablet, Take 1 tablet by mouth Daily., Disp: 90 tablet, Rfl: 1  •  Viibryd 20 MG tablet tablet, TAKE ONE TABLET BY MOUTH DAILY AFTER COMPLETING THE STARTER PACK, Disp: 30 tablet, Rfl: 0  •  fluticasone (FLONASE) 50 MCG/ACT nasal spray, 2 sprays into the nostril(s) as directed by provider Daily for 30 days., Disp: 16 g, Rfl: 5    Current Facility-Administered Medications:   •  lidocaine (XYLOCAINE) 1 % injection 5 mL, 5 mL, Infiltration, Once, Giana Chow MD  •  lidocaine 1% - EPINEPHrine 1:[id number] (XYLOCAINE W/EPI) 1 %-1:960139 injection 10 mL, 10 mL, Infiltration, Once, Giana Chow MD     [facility]  The following portions of the patient's history were reviewed and updated as appropriate: allergies, current medications, past family history, past medical history, past social history, past surgical  history and problem list.    Review of Systems   Constitutional: Negative for fever and unexpected weight change.   HENT: Negative for ear pain, nosebleeds and trouble swallowing.    Eyes: Negative for pain.   Respiratory: Positive for chest tightness and shortness of breath. Negative for choking and wheezing.    Cardiovascular: Positive for chest pain.   Gastrointestinal: Negative for abdominal pain, blood in stool and vomiting.   Endocrine: Negative.    Genitourinary: Negative.    Musculoskeletal: Negative for joint swelling.   Allergic/Immunologic: Negative.    Neurological: Negative for dizziness, seizures, syncope and headaches.   Hematological: Negative for adenopathy.   Psychiatric/Behavioral: Negative.        Objective   /88   Pulse 69   Wt 119 kg (263 lb 3.2 oz)   LMP  (LMP Unknown)   SpO2 96%   BMI 43.13 kg/m²     Physical Exam  Vitals and nursing note reviewed.   Constitutional:       Appearance: She is well-developed.   HENT:      Head: Normocephalic.      Right Ear: Hearing, tympanic membrane, ear canal and external ear normal.      Left Ear: Hearing, tympanic membrane, ear canal and external ear normal.      Nose: Nose normal.   Eyes:      Conjunctiva/sclera: Conjunctivae normal.      Pupils: Pupils are equal, round, and reactive to light.   Cardiovascular:      Rate and Rhythm: Normal rate and regular rhythm.      Heart sounds: Normal heart sounds.   Pulmonary:      Effort: Pulmonary effort is normal.      Breath sounds: Normal breath sounds. No decreased breath sounds, wheezing, rhonchi or rales.   Musculoskeletal:         General: Normal range of motion.      Cervical back: Normal range of motion.   Skin:     General: Skin is warm and dry.   Neurological:      Mental Status: She is alert.   Psychiatric:         Behavior: Behavior normal.         Results for orders placed or performed in visit on 12/09/21   POC Glucose    Specimen: Blood   Result Value Ref Range    Glucose 122 70 - 130  mg/dL   POC Glycosylated Hemoglobin (Hb A1C)    Specimen: Blood   Result Value Ref Range    Hemoglobin A1C 6.9 %    Lot Number [id number]     Expiration Date 08/30/2023         ECG 12 Lead    Date/Time: 12/9/2021 12:16 PM  Performed by: Michelle Mandel PA  Authorized by: Michelle Mandel PA   Comparison: compared with previous ECG from 9/8/2021  Comparison to previous ECG: New incomplete RBBB  Rhythm: sinus rhythm  Rate: normal  BPM: 71  Conduction: incomplete right bundle branch block  ST Segments: ST segments normal  T Waves: T waves normal    Clinical impression: non-specific ECG            ASSESSMENT/PLAN    Diagnoses and all orders for this visit:    1. Type 2 diabetes mellitus without complication, without long-term current use of insulin (HCC) (Primary)  Assessment & Plan:  Diabetes is improving with treatment.   Reminded to bring in blood sugar diary at next visit.  Dietary recommendations for ADA diet.  Regular aerobic exercise.  Diabetes will be reassessed in 3 months.    Orders:  -     POC Glucose  -     POC Glycosylated Hemoglobin (Hb A1C)    2. Chest pain, unspecified type  Comments:  ECG shows new incomplete RBB. Refer for stress test. Further recs based on results.  Orders:  -     Stress test with myocardial perfusion; Future  -     ECG 12 Lead           Return in about 3 months (around 3/9/2022) for Follow up.

## 2021-12-13 NOTE — ASSESSMENT & PLAN NOTE
Discussed restarting daily med, she will let me know which ones are on the DNA test she did. Refilled alprazolam to use sparingly prn. Prescribed by on-call provider #30, 0RF.    The patient has read and signed the [facility] Controlled Substance Contract.  I will continue to see patient for regular follow up appointments.  They are well controlled on their medication.  SHANTEL is updated every 3 months. The patient is aware of the potential for addiction and dependence.   TBI (traumatic brain injury)   S06.9X9A

## 2021-12-13 NOTE — ASSESSMENT & PLAN NOTE
Diabetes is improving with treatment.   Reminded to bring in blood sugar diary at next visit.  Dietary recommendations for ADA diet.  Regular aerobic exercise.  Diabetes will be reassessed in 3 months.

## 2021-12-15 ENCOUNTER — TELEMEDICINE (OUTPATIENT)
Dept: PSYCHIATRY | Facility: CLINIC | Age: 61
End: 2021-12-15

## 2021-12-15 DIAGNOSIS — F33.1 MODERATE EPISODE OF RECURRENT MAJOR DEPRESSIVE DISORDER (HCC): ICD-10-CM

## 2021-12-15 DIAGNOSIS — F41.1 GENERALIZED ANXIETY DISORDER: Primary | ICD-10-CM

## 2021-12-15 PROCEDURE — 90834 PSYTX W PT 45 MINUTES: CPT | Performed by: COUNSELOR

## 2021-12-15 NOTE — PROGRESS NOTES
Date: December 15, 2021  Time In: 10:15 AM   Time Out: 10:54 AM   This provider is located at the [facility]), [address] [place] [address] using a secure booskhart Video Visit through [email or web address]. Patient is being seen remotely via telehealth at home address in [place] and stated they are in a secure environment for this session. The patient's condition being diagnosed/treated is appropriate for telemedicine. The provider identified herself as well as her credentials. The patient, and/or patients guardian, consent to be seen remotely, and when consent is given they understand that the consent allows for patient identifiable information to be sent to a third party as needed. They may refuse to be seen remotely at any time. The electronic data is encrypted and password protected, and the patient and/or guardian has been advised of the potential risks to privacy not withstanding such measures.     You have chosen to receive care through a telehealth visit.  Do you consent to use a video/audio connection for your medical care today? Yes    PROGRESS NOTE  Data:  Sandra Auguste is a 61 y.o. female who presents today for follow up.  Patient's connection would not connect so the visit  Was conducted by phone. Patient's mother has been in the hospital since Saturday and she is scared that her mother will not make it until Angie and feels that this started with a reaction to her flu vaccine. Patient feels that she needs additional help with her mother and has asked her sister to consider pallative care for their mother as her sister is the mother's POA. Patient is waiting to hear from the doctor's about her mother's prognosis and that is increasing her anxiety. Patient states that she has not been given any information from her mother or siblings about what will happen to her and the home she resides in should her mother pass away; patient states that she  has been thinking about how she felt as though she had been adopted as a child due to how different she was treated as a child. Patient also stated that she remembers seeing several pictures of the other children in the childhood home and only one of her in which she was in a photo with the other children. Patient states that she just doesn't understand why her family doesn't understand that she gets anxious when she doesn't know how to prepare for things and she is also concerned that she told her son that his grandmother was in the hospital and he didn't reply to the message. Patient states that she is hopeful that her sister will accept additional help but is thankful that her brother has started coming over more and that gave her a couple days of break; discussed the patient using her additional break time to do things for herself and trying to find more activities that she enjoys.     Chief Complaint: increased anxiety related to her mother's health and feelings of depression    History of Present Illness: Patient has struggled with depression and anxiety for several years.      Clinical Maneuvering/Intervention: solution focused  (Scales based on 0 - 10 with 10 being the worst)  Depression: 6-7 Anxiety: 10       Assisted patient in processing above session content; acknowledged and normalized patient’s thoughts, feelings, and concerns.  Rationalized patient thought process regarding her mother's physical condition and what will have to happen next to care for her mother .  Discussed triggers associated with patient's anxiety such as her mother's on-going medical needs and discussing issues with her siblings and figuring out what she wants to do for herself.  Also discussed coping skills for patient to implement such as planning for her additional days off, trying to get information from her mother or siblings about what will happen to her if her mother passes away and ensuring that she is not neglecting her  own needs at this time.    Allowed patient to freely discuss issues without interruption or judgment. Provided safe, confidential environment to facilitate the development of positive therapeutic relationship and encourage open, honest communication. Assisted patient in identifying risk factors which would indicate the need for higher level of care including thoughts to harm self or others and/or self-harming behavior and encouraged patient to contact this office, call 911, or present to the nearest emergency room should any of these events occur. Discussed crisis intervention services and means to access. Patient adamantly and convincingly denies current suicidal or homicidal ideation or perceptual disturbance.    Assessment:   Assessment   Patient appears to maintain relative stability as compared to their baseline.  However, patient continues to struggle with depression and anxiety which continues to cause impairment in important areas of functioning.  A result, they can be reasonably expected to continue to benefit from treatment and would likely be at increased risk for decompensation otherwise.    Mental Status Exam:   Hygiene:   unable to be seen  Cooperation:  Cooperative  Eye Contact:  unable to be seen  Psychomotor Behavior:  unable to be seen  Affect:  unable to be seen  Mood: anxious  Speech:  Normal  Thought Process:  Goal directed  Thought Content:  Normal  Suicidal:  None  Homicidal:  None  Hallucinations:  None  Delusion:  None  Memory:  Intact  Orientation:  Person, Place, Time and Situation  Reliability:  fair  Insight:  Fair  Judgement:  Good  Impulse Control:  Good  Physical/Medical Issues:  Yes heart issues will have follow up in January       Patient's Support Network Includes:  children and caregiver's for her mother    Functional Status: Moderate impairment     Progress toward goal: Not at goal    Prognosis: Fair with Ongoing Treatment          Plan:    Patient will continue in individual  outpatient therapy with focus on improved functioning and coping skills, maintaining stability, and avoiding decompensation and the need for higher level of care.    Patient will adhere to medication regimen as prescribed and report any side effects. Patient will contact this office, call 911 or present to the nearest emergency room should suicidal or homicidal ideations occur. Provide Cognitive Behavioral Therapy and Solution Focused Therapy to improve functioning, maintain stability, and avoid decompensation and the need for higher level of care.     Return in about 1 weeks, or earlier if symptoms worsen or fail to improve.           VISIT DIAGNOSIS:     ICD-10-CM ICD-9-CM   1. Generalized anxiety disorder  F41.1 300.02   2. Moderate episode of recurrent major depressive disorder (HCC)  F33.1 296.32             This document has been electronically signed by ASIA Rich  December 15, 2021 10:41 EST      Part of this note may be an electronic transcription/translation of spoken language to printed text using the Dragon Dictation System.

## 2021-12-22 DIAGNOSIS — J30.2 SEASONAL ALLERGIES: ICD-10-CM

## 2021-12-22 RX ORDER — LORATADINE 10 MG/1
TABLET ORAL
Qty: 30 TABLET | Refills: 0 | Status: SHIPPED | OUTPATIENT
Start: 2021-12-22 | End: 2022-04-21 | Stop reason: SDUPTHER

## 2021-12-23 ENCOUNTER — TELEMEDICINE (OUTPATIENT)
Dept: INTERNAL MEDICINE | Facility: CLINIC | Age: 61
End: 2021-12-23

## 2021-12-23 DIAGNOSIS — J06.9 ACUTE URI: Primary | ICD-10-CM

## 2021-12-23 PROCEDURE — 99213 OFFICE O/P EST LOW 20 MIN: CPT | Performed by: PHYSICIAN ASSISTANT

## 2021-12-23 PROCEDURE — U0004 COV-19 TEST NON-CDC HGH THRU: HCPCS | Performed by: PHYSICIAN ASSISTANT

## 2021-12-23 NOTE — PROGRESS NOTES
Chief Complaint   Patient presents with   • Fatigue       Subjective   Sandra Auguste is a 61 y.o. female.       History of Present Illness     This was an audio and video enabled telemedicine encounter.    Pt's mom has been in the hospital so she has been staying with her there. Pt has had coughing, sneezing, loss of voice. No fever or chills. Feels fatigued and run down.     Has not had her Covid booster.        Current Outpatient Medications:   •  acetaminophen (TYLENOL) 500 MG tablet, Take 500 mg by mouth Every 6 (Six) Hours As Needed for Mild Pain ., Disp: , Rfl:   •  albuterol sulfate  (90 Base) MCG/ACT inhaler, Inhale 1 puff Every 6 (Six) Hours As Needed for Shortness of Air. INHALE TWO PUFFS BY MOUTH EVERY 6 HOURS AS NEEDED, Disp: 1 inhaler, Rfl: 2  •  ALPRAZolam (XANAX) 0.25 MG tablet, Take 1 tablet by mouth Daily As Needed for Anxiety., Disp: 30 tablet, Rfl: 0  •  Blood Glucose Monitoring Suppl (OneTouch Verio) w/Device kit, 1 kit Daily., Disp: 1 kit, Rfl: 0  •  Diclofenac Sodium (VOLTAREN) 1 % gel gel, APPLY 4 GRAMS TOPICALLY TO THE APPROPRIATE AREA AS DIRECTED FOUR TIMES A DAYS AS NEEDED FOR PAIN IN SHOULDERS, Disp: 100 g, Rfl: 1  •  EPINEPHrine (EPIPEN) 0.3 MG/0.3ML solution auto-injector injection, Inject 0.3 mL into the appropriate muscle as directed by prescriber As Needed (FOR SEVERE ALLERGIC REACTION)., Disp: 1 each, Rfl: 1  •  famotidine (PEPCID) 40 MG tablet, Take 1 tablet by mouth Daily., Disp: 30 tablet, Rfl: 5  •  fluticasone (FLONASE) 50 MCG/ACT nasal spray, 2 sprays into the nostril(s) as directed by provider Daily for 30 days., Disp: 16 g, Rfl: 5  •  Fluticasone Furoate-Vilanterol (Breo Ellipta) 200-25 MCG/INH inhaler, Inhale 1 puff Daily., Disp: 60 each, Rfl: 5  •  glucose blood (OneTouch Verio) test strip, Use one strip to check blood sugars one time daily, Disp: 100 each, Rfl: 3  •  glucose blood test strip, Use as instructed to check blood glucose once daily., Disp: 100 each,  Rfl: 5  •  glucose monitor monitoring kit, Use as directed to check blood glucose once daily., Disp: 1 each, Rfl: 0  •  Lancets (onetouch ultrasoft) lancets, Use one lancet daily to check blood sugars, Disp: 100 each, Rfl: 3  •  loratadine (CLARITIN) 10 MG tablet, TAKE ONE TABLET BY MOUTH DAILY, Disp: 30 tablet, Rfl: 0  •  metFORMIN ER (GLUCOPHAGE-XR) 500 MG 24 hr tablet, TAKE TWO TABLETS BY MOUTH DAILY WITH BREAKFAST, Disp: 60 tablet, Rfl: 2  •  ondansetron (Zofran) 8 MG tablet, Take 1 tablet by mouth Every 8 (Eight) Hours As Needed for Nausea or Vomiting., Disp: 15 tablet, Rfl: 0  •  rosuvastatin (Crestor) 5 MG tablet, Take 1 tablet by mouth Daily., Disp: 90 tablet, Rfl: 1  •  Viibryd 20 MG tablet tablet, TAKE ONE TABLET BY MOUTH DAILY AFTER COMPLETING THE STARTER PACK, Disp: 30 tablet, Rfl: 0    Current Facility-Administered Medications:   •  lidocaine (XYLOCAINE) 1 % injection 5 mL, 5 mL, Infiltration, Once, Giana Chow MD  •  lidocaine 1% - EPINEPHrine 1:311309 (XYLOCAINE W/EPI) 1 %-1:460521 injection 10 mL, 10 mL, Infiltration, Once, Giana Chow MD     Critical access hospital  The following portions of the patient's history were reviewed and updated as appropriate: allergies, current medications, past family history, past medical history, past social history, past surgical history and problem list.    Review of Systems    Objective   LMP  (LMP Unknown)     Physical Exam  Constitutional:       Appearance: She is well-developed.   HENT:      Head: Normocephalic and atraumatic.      Right Ear: External ear normal.      Left Ear: External ear normal.      Nose: Nose normal.   Eyes:      Conjunctiva/sclera: Conjunctivae normal.   Cardiovascular:      Rate and Rhythm: Normal rate.   Pulmonary:      Effort: Pulmonary effort is normal.   Musculoskeletal:         General: Normal range of motion.      Cervical back: Normal range of motion.   Neurological:      Mental Status: She is alert and oriented to person, place, and  time.   Psychiatric:         Behavior: Behavior normal.         Thought Content: Thought content normal.         Judgment: Judgment normal.         Results for orders placed or performed in visit on 12/23/21   COVID-19 PCR, [facility], NP SWAB IN [facility] TRANSPORT MEDIA/ORAL SWISH 24-30 HR TAT - Swab, Nasopharynx    Specimen: Nasopharynx; Swab   Result Value Ref Range    SARS-CoV-2 CAROLE Not Detected Not Detected        ASSESSMENT/PLAN    Diagnoses and all orders for this visit:    1. Acute URI (Primary)  Comments:  Pt will come by office for Covid and influenza test. Use otc symptomatic care, increase rest and fluids. Call if worsening or no better.  Orders:  -     COVID-19 PCR, [facility], NP SWAB IN [facility] TRANSPORT MEDIA/ORAL SWISH 24-30 HR TAT - Swab, Nasopharynx; Future  -     POCT Influenza A/B; Future  -     COVID-19 PCR, [facility], NP SWAB IN [facility] TRANSPORT MEDIA/ORAL SWISH 24-30 HR TAT - Swab, Nasopharynx             Return if symptoms worsen or fail to improve, for Next scheduled follow up.

## 2021-12-24 LAB — SARS-COV-2 RNA NOSE QL NAA+PROBE: NOT DETECTED

## 2021-12-27 ENCOUNTER — TELEPHONE (OUTPATIENT)
Dept: PSYCHIATRY | Facility: CLINIC | Age: 61
End: 2021-12-27

## 2021-12-27 ENCOUNTER — TELEMEDICINE (OUTPATIENT)
Dept: PSYCHIATRY | Facility: CLINIC | Age: 61
End: 2021-12-27

## 2021-12-27 DIAGNOSIS — F33.1 MODERATE EPISODE OF RECURRENT MAJOR DEPRESSIVE DISORDER (HCC): ICD-10-CM

## 2021-12-27 DIAGNOSIS — F41.1 GENERALIZED ANXIETY DISORDER: Primary | ICD-10-CM

## 2021-12-27 PROCEDURE — 90834 PSYTX W PT 45 MINUTES: CPT | Performed by: COUNSELOR

## 2021-12-27 NOTE — TELEPHONE ENCOUNTER
" Patient just called, tearful and \"stressed\" she spent last weekend at the hospital with her mother who is now at a nursing home, she is having difficulty dealing with the stress she has an appointment Monday 1/3 but says she cant wait that long??Please Advise?    Thank You    "

## 2021-12-27 NOTE — PROGRESS NOTES
Date: December 30, 2021  Time In: 1:30 PM  Time Out: 2:21 PM   This provider is located at the [facility] [address], [place] [address] using a secure [employer] Video Visit through [employer]. Patient is being seen remotely via telehealth at home address in [place] and stated they are in a secure environment for this session. The patient's condition being diagnosed/treated is appropriate for telemedicine. The provider identified herself as well as her credentials. The patient, and/or patients guardian, consent to be seen remotely, and when consent is given they understand that the consent allows for patient identifiable information to be sent to a third party as needed. They may refuse to be seen remotely at any time. The electronic data is encrypted and password protected, and the patient and/or guardian has been advised of the potential risks to privacy not withstanding such measures.     You have chosen to receive care through a telehealth visit.  Do you consent to use a video/audio connection for your medical care today? Yes    PROGRESS NOTE  Data:  Sandra Auguste is a 61 y.o. female who presents today for follow up.  Patient called the office in tears but stated that she was upset due to her mother being in the hospital and now being moved to a nursing home. Patient stated that she was stress by seeing her mother being catheterized over the course of the weekend that she spend with her. Patient states that when her mother was brought home for [place] she wouldn't even go down there to see her mother and told her sister that she was sick but patient was nervous and was also having some physical symptoms. Patient states that she is still not feeling the best and patient is feeling physically and mentally worn down. Dicussed the need for self-care for the the patient. Discussed with the patient that she must initiate conversations that are  information seeking to ensure she is understanding what is going on with her mother and to state her personal needs and concerns at this time. Encouraged the patient to have conversations as are appropriate with her mother to gain closure for anything that she feels that needs to be addressed.      Chief Complaint: feelings of anxiety and depression  History of Present Illness: Patient has struggled with depression and anxiety for several years      Clinical Maneuvering/Intervention: CBT  (Scales based on 0 - 10 with 10 being the worst)  Depression: 8 Anxiety: 8       Assisted patient in processing above session content; acknowledged and normalized patient’s thoughts, feelings, and concerns.  Rationalized patient thought process regarding her situation with her mother and family.  Discussed triggers associated with patient's anxiety such as coming to terms with her mother's declining condition, not knowing what the future holds for her and not feeling that she is capable of continuing to care for her mother. Also discussed coping skills for patient to implement such as self-validation, increasing self-care and following through with conversations with her siblings and children about her status within the home should something happen to her mother    Allowed patient to freely discuss issues without interruption or judgment. Provided safe, confidential environment to facilitate the development of positive therapeutic relationship and encourage open, honest communication. Assisted patient in identifying risk factors which would indicate the need for higher level of care including thoughts to harm self or others and/or self-harming behavior and encouraged patient to contact this office, call 911, or present to the nearest emergency room should any of these events occur. Discussed crisis intervention services and means to access. Patient adamantly and convincingly denies current suicidal or homicidal ideation or  perceptual disturbance.    Assessment:   Assessment   Patient appears to maintain relative stability as compared to their baseline.  However, patient continues to struggle with feelings of depression and anxiety which continues to cause impairment in important areas of functioning.  A result, they can be reasonably expected to continue to benefit from treatment and would likely be at increased risk for decompensation otherwise.    Mental Status Exam:   Hygiene:   good  Cooperation:  Cooperative  Eye Contact:  Good  Psychomotor Behavior:  Appropriate  Affect:  Appropriate  Mood: fluctates  Speech:  Normal  Thought Process:  Goal directed  Thought Content:  Normal  Suicidal:  None  Homicidal:  None  Hallucinations:  None  Delusion:  None  Memory:  Deficits  Orientation:  Person, Place, Time and Situation  Reliability:  fair  Insight:  Fair  Judgement:  Good  Impulse Control:  Good  Physical/Medical Issues:  Yes sick at [place]       Patient's Support Network Includes:  children and extended family    Functional Status: Moderate impairment     Progress toward goal: Not at goal    Prognosis: Fair with Ongoing Treatment          Plan:    Patient will continue in individual outpatient therapy with focus on improved functioning and coping skills, maintaining stability, and avoiding decompensation and the need for higher level of care.    Patient will adhere to medication regimen as prescribed and report any side effects. Patient will contact this office, call 911 or present to the nearest emergency room should suicidal or homicidal ideations occur. Provide Cognitive Behavioral Therapy and Solution Focused Therapy to improve functioning, maintain stability, and avoid decompensation and the need for higher level of care.     Return in about 1 weeks, or earlier if symptoms worsen or fail to improve.           VISIT DIAGNOSIS:     ICD-10-CM ICD-9-CM   1. Generalized anxiety disorder  F41.1 300.02   2. Moderate episode of  recurrent major depressive disorder (HCC)  F33.1 296.32             This document has been electronically signed by ASIA Rich  December 30, 2021 11:03 EST      Part of this note may be an electronic transcription/translation of spoken language to printed text using the Dragon Dictation System.

## 2021-12-28 ENCOUNTER — PATIENT OUTREACH (OUTPATIENT)
Dept: CASE MANAGEMENT | Facility: OTHER | Age: 61
End: 2021-12-28

## 2021-12-28 NOTE — OUTREACH NOTE
Ambulatory Case Management Note    Patient Outreach    F/u with pt.  States she is sick at present.  Says she was tested for flu and covid and was negative.  Thinks she has bronchitis.  Is coughing and heard wheeze at end of cough in our conversation.  She was not sure if needed antibiotic or not. She is also scheduled for bone density tomorrow afternoon and not sure what she should do.  States she has called and left message for return call, but has not received call back.  Offered to assist in scheduling with her PCP, which she accepted.  They are closed at present.  Will call PCP office in am.     Yecenia Shaver RN  Ambulatory Case Management    12/28/2021, 17:11 EST

## 2021-12-29 ENCOUNTER — OFFICE VISIT (OUTPATIENT)
Dept: INTERNAL MEDICINE | Facility: CLINIC | Age: 61
End: 2021-12-29

## 2021-12-29 ENCOUNTER — PATIENT OUTREACH (OUTPATIENT)
Dept: CASE MANAGEMENT | Facility: OTHER | Age: 61
End: 2021-12-29

## 2021-12-29 ENCOUNTER — HOSPITAL ENCOUNTER (OUTPATIENT)
Dept: BONE DENSITY | Facility: HOSPITAL | Age: 61
Discharge: HOME | End: 2021-12-29
Admitting: PHYSICIAN ASSISTANT

## 2021-12-29 VITALS
TEMPERATURE: 98.2 F | HEART RATE: 96 BPM | DIASTOLIC BLOOD PRESSURE: 72 MMHG | OXYGEN SATURATION: 98 % | BODY MASS INDEX: 43.17 KG/M2 | WEIGHT: 263.4 LBS | SYSTOLIC BLOOD PRESSURE: 138 MMHG

## 2021-12-29 DIAGNOSIS — Z78.0 POSTMENOPAUSAL: ICD-10-CM

## 2021-12-29 DIAGNOSIS — F41.9 ANXIETY: ICD-10-CM

## 2021-12-29 DIAGNOSIS — J40 BRONCHITIS: Primary | ICD-10-CM

## 2021-12-29 PROCEDURE — 99213 OFFICE O/P EST LOW 20 MIN: CPT | Performed by: STUDENT IN AN ORGANIZED HEALTH CARE EDUCATION/TRAINING PROGRAM

## 2021-12-29 PROCEDURE — 77080 DXA BONE DENSITY AXIAL: CPT

## 2021-12-29 RX ORDER — VILAZODONE HYDROCHLORIDE 20 MG/1
TABLET ORAL
Qty: 30 TABLET | Refills: 0 | Status: SHIPPED | OUTPATIENT
Start: 2021-12-29 | End: 2022-01-05 | Stop reason: SDUPTHER

## 2021-12-29 RX ORDER — DOXYCYCLINE HYCLATE 100 MG/1
100 CAPSULE ORAL 2 TIMES DAILY
Qty: 14 CAPSULE | Refills: 0 | OUTPATIENT
Start: 2021-12-29 | End: 2022-01-13

## 2021-12-29 RX ORDER — GUAIFENESIN AND CODEINE PHOSPHATE 100; 10 MG/5ML; MG/5ML
5 SOLUTION ORAL 3 TIMES DAILY PRN
Qty: 118 ML | Refills: 0 | Status: SHIPPED | OUTPATIENT
Start: 2021-12-29 | End: 2023-02-16

## 2021-12-29 RX ORDER — FLUCONAZOLE 150 MG/1
150 TABLET ORAL ONCE
Qty: 1 TABLET | Refills: 0 | Status: SHIPPED | OUTPATIENT
Start: 2021-12-29 | End: 2021-12-29

## 2021-12-29 NOTE — OUTREACH NOTE
Ambulatory Case Management Note    Care Coordination    Scheduling for PCP office contacted and appt made for today 12/29/21 4:15 with Dr. Mohr    Patient Outreach    Pt contacted with the details of appt.     Yecenia Shaver RN  Ambulatory Case Management    12/29/2021, 09:01 EST

## 2021-12-29 NOTE — PROGRESS NOTES
Internal Medicine Acute Visit     Patient Name: Sandra Auguste  : 1960   MRN: [id number]     Chief Complaint:    Chief Complaint   Patient presents with   • Cough   • Asthma       History of Present Illness: Sandra Auguste is a 61 y.o. female who presents for severe cough.  She reports symptoms have been ongoing for about a week.  She reports being negative for both Covid and flu.  She notes that her cough is so severe that it is caused abdominal pain and pleuritic chest pain when she coughs forcefully.  She has not had a fever.  She is also having congestion and rhinorrhea.    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  She is currently in a rehab facility and feels a lot of pressure from family members to constantly be there with her.  She is exhausted from this.    Subjective     I have reviewed and the following portions of the patient's history were updated as appropriate: past family history, past medical history, past social history, past surgical history and problem list.    Allergies:   Allergies   Allergen Reactions   • Mold Extract [Trichophyton] Shortness Of Breath, Anxiety, Irritability, Other (See Comments) and Palpitations     SOA   • Lortab [Hydrocodone-Acetaminophen] Nausea And Vomiting     SEVERE NAUSEA AND SLIGHT VOMITING   • Methylprednisolone Other (See Comments)     Bleeding, cramping   • Pollen Extract Other (See Comments)     SNEEZING AND CONGESTION        Objective     Physical Exam:  Vital Signs:   Vitals:    21 1610   BP: 138/72   Pulse: 96   Temp: 98.2 °F (36.8 °C)   SpO2: 98%   Weight: 119 kg (263 lb 6.4 oz)   PainSc: 0-No pain     Body mass index is 43.17 kg/m².    Physical Exam  Vitals and nursing note reviewed.   Constitutional:       Appearance: Normal appearance.   Cardiovascular:      Rate and Rhythm: Normal rate and regular rhythm.   Pulmonary:      Effort: Pulmonary effort is normal.   Skin:     General: Skin is warm and dry.   Neurological:       Mental Status: She is alert.   Psychiatric:         Mood and Affect: Mood normal.         Behavior: Behavior normal.       Assessment / Plan      Assessment/Plan:   Diagnoses and all orders for this visit:    1. Bronchitis (Primary)  Covid and flu negative.  Will treat for bronchitis with doxycycline 100 mg twice daily for 7 days.  We will also prescribe fluconazole in case of yeast infection.  We will also prescribe a cough suppressant.  She is unable to take dextromethorphan so will not prescribe Phenergan-dextromethorphan.  Alternative would be codeine-guaifenesin.  Counseled patient on side effects of drowsiness.  She will only take at night and will not drive after taking.  Take as prescribed only as needed.  Also recommended staying away from the rehabilitation center her mother is currently so that the patient is able to rest as much as possible and to not spread any potential infection.     Follow Up:   Return for Next scheduled follow up.    Time:   I spent approximately 15 minutes providing clinical care for this patient; including review of patient's chart and provider documentation, face to face time spent with patient in examination room (obtaining history, performing physical exam, discussing diagnosis and management options), placing orders, and completing patient documentation.     Addendum (01/03/2021): Patient wanted to let us know that she is taking the cough suppressant TID as this is how the precription is written. As long as this isn't causing too much drowsiness, this is totally fine.     Kristin Mohr MD  [facility] [place]

## 2022-01-03 ENCOUNTER — TELEMEDICINE (OUTPATIENT)
Dept: PSYCHIATRY | Facility: CLINIC | Age: 62
End: 2022-01-03

## 2022-01-03 DIAGNOSIS — F41.1 GENERALIZED ANXIETY DISORDER: Primary | ICD-10-CM

## 2022-01-03 DIAGNOSIS — F33.1 MODERATE EPISODE OF RECURRENT MAJOR DEPRESSIVE DISORDER: ICD-10-CM

## 2022-01-03 DIAGNOSIS — E11.9 TYPE 2 DIABETES MELLITUS WITHOUT COMPLICATION, WITHOUT LONG-TERM CURRENT USE OF INSULIN: ICD-10-CM

## 2022-01-03 PROCEDURE — 90834 PSYTX W PT 45 MINUTES: CPT | Performed by: COUNSELOR

## 2022-01-03 RX ORDER — METFORMIN HYDROCHLORIDE 500 MG/1
1000 TABLET, EXTENDED RELEASE ORAL
Qty: 60 TABLET | Refills: 2 | Status: SHIPPED | OUTPATIENT
Start: 2022-01-03 | End: 2022-04-05

## 2022-01-03 NOTE — PROGRESS NOTES
Date: January 3, 2022  Time In: 12:40 PM  Time Out: 1:18 PM  This provider is located at the [facility]), [address], [place] [address] using a secure [employer] Video Visit through [employer]. Patient is being seen remotely via telehealth at home address in [place] and stated they are in a secure environment for this session. The patient's condition being diagnosed/treated is appropriate for telemedicine. The provider identified herself as well as her credentials. The patient, and/or patients guardian, consent to be seen remotely, and when consent is given they understand that the consent allows for patient identifiable information to be sent to a third party as needed. They may refuse to be seen remotely at any time. The electronic data is encrypted and password protected, and the patient and/or guardian has been advised of the potential risks to privacy not withstanding such measures.     You have chosen to receive care through a telehealth visit.  Do you consent to use a video/audio connection for your medical care today? Yes    PROGRESS NOTE  Data:  Sandra Auguste is a 61 y.o. female who presents today for follow up. Patient states that she is still sick and has been ordered by her doctor to stay in bed. Patient is still questioning the motives of her sister and is not at peace with the situation with her mother who remains in the nursing home. Patient states that she doesn't believe that her mother will ever come out of the nursing home but she is unsure of what will happen when her mother passes. Patient states that she is kept in the dark about the plans for her and her mother's estate. Patient states that since she has been sick she has been able to take time to relax and has seen how much her stress and anxiety has affected her. Patient now realizes that she needs to focus on herself and pay more attention to her self care. Patient understands  that her mental and physical health must be of top priority if she is to continue to care for her mother if she does come home and to meet her personal needs. Discussed the mind and body connection and how depression and anxiety can lead to physical symptoms.    Chief Complaint: physical illness, depression, worry and anxiety    History of Present Illness: Patient has struggled with feelings of anxiety and depression for several years      Clinical Maneuvering/Intervention:  CBT      Assisted patient in processing above session content; acknowledged and normalized patient’s thoughts, feelings, and concerns.  Rationalized patient thought process regarding her needs.  Discussed triggers associated with patient's feelings of anxiety and depression such as worry about her mother, concerns about her own physical health and what will happen to her based on her mother's situation.  Also discussed coping skills for patient to implement such as relaxing and following doctor's orders until she gets well, determining what will make her happy in her life and thinking through her plans for her future.    Allowed patient to freely discuss issues without interruption or judgment. Provided safe, confidential environment to facilitate the development of positive therapeutic relationship and encourage open, honest communication. Assisted patient in identifying risk factors which would indicate the need for higher level of care including thoughts to harm self or others and/or self-harming behavior and encouraged patient to contact this office, call 911, or present to the nearest emergency room should any of these events occur. Discussed crisis intervention services and means to access. Patient adamantly and convincingly denies current suicidal or homicidal ideation or perceptual disturbance.    Assessment:   Assessment   Patient appears to maintain relative stability as compared to their baseline.  However, patient continues to  struggle with feelings of depression and anxiety which continues to cause impairment in important areas of functioning.  A result, they can be reasonably expected to continue to benefit from treatment and would likely be at increased risk for decompensation otherwise.    Mental Status Exam:   Hygiene:   good  Cooperation:  Cooperative  Eye Contact:  Good  Psychomotor Behavior:  Appropriate  Affect:  Appropriate  Mood: fluctates  Speech:  Normal  Thought Process:  Goal directed  Thought Content:  Normal  Suicidal:  None  Homicidal:  None  Hallucinations:  None  Delusion:  None  Memory:  Intact  Orientation:  Person, Place, Time and Situation  Reliability:  good  Insight:  Fair  Judgement:  Good  Impulse Control:  Good  Physical/Medical Issues:  Yes bronchitis       Patient's Support Network Includes:  children and extended family    Functional Status: Moderate impairment     Progress toward goal: Not at goal    Prognosis: Good with Ongoing Treatment          Plan:    Patient will continue in individual outpatient therapy with focus on improved functioning and coping skills, maintaining stability, and avoiding decompensation and the need for higher level of care.    Patient will adhere to medication regimen as prescribed and report any side effects. Patient will contact this office, call 911 or present to the nearest emergency room should suicidal or homicidal ideations occur. Provide Cognitive Behavioral Therapy and Solution Focused Therapy to improve functioning, maintain stability, and avoid decompensation and the need for higher level of care.     Return in about 2 weeks, or earlier if symptoms worsen or fail to improve.           VISIT DIAGNOSIS:     ICD-10-CM ICD-9-CM   1. Generalized anxiety disorder  F41.1 300.02   2. Moderate episode of recurrent major depressive disorder (HCC)  F33.1 296.32             This document has been electronically signed by ASIA Rich  January 3, 2022 16:40 EST      Part  of this note may be an electronic transcription/translation of spoken language to printed text using the Dragon Dictation System.

## 2022-01-05 ENCOUNTER — TELEMEDICINE (OUTPATIENT)
Dept: PSYCHIATRY | Facility: CLINIC | Age: 62
End: 2022-01-05

## 2022-01-05 DIAGNOSIS — G47.9 SLEEPING DIFFICULTIES: ICD-10-CM

## 2022-01-05 DIAGNOSIS — F33.1 MAJOR DEPRESSIVE DISORDER, RECURRENT EPISODE, MODERATE: Primary | Chronic | ICD-10-CM

## 2022-01-05 DIAGNOSIS — F41.9 ANXIETY DISORDER, UNSPECIFIED TYPE: Chronic | ICD-10-CM

## 2022-01-05 PROCEDURE — 90792 PSYCH DIAG EVAL W/MED SRVCS: CPT | Performed by: NURSE PRACTITIONER

## 2022-01-05 RX ORDER — HYDROXYZINE HYDROCHLORIDE 10 MG/1
10 TABLET, FILM COATED ORAL 2 TIMES DAILY PRN
Qty: 60 TABLET | Refills: 0 | Status: SHIPPED | OUTPATIENT
Start: 2022-01-05 | End: 2022-02-07 | Stop reason: SDUPTHER

## 2022-01-05 RX ORDER — VILAZODONE HYDROCHLORIDE 40 MG/1
40 TABLET ORAL DAILY
Qty: 30 TABLET | Refills: 0 | Status: SHIPPED | OUTPATIENT
Start: 2022-01-05 | End: 2022-02-07 | Stop reason: SDUPTHER

## 2022-01-05 NOTE — PROGRESS NOTES
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 history, current presentation, individual strengths and weaknesses, psychosocial and environmental stressors and variables, psychiatric illness and symptoms, medical conditions and pain, took place in this interview. Based on those considerations, the patient is determined: within individual baseline and presenting no imminent risk for suicide or homicide. Other recommendations: The patient does not meet the criteria for inpatient admission and is not a safety risk to self or others at today's visit. Inpatient treatment offers no significant advantages over outpatient treatment for this patient at today's visit.  The patient was given ample time for questions and fully participated in treatment planning.  The patient was encouraged to call the clinic with any questions or concerns.  The patient was informed of access to emergency care. If patient were to develop any significant symptomatology, suicidal ideation, homicidal ideation, any concerns, or feel unsafe at any time they are to call the clinic and if unable to get immediate assistance should immediately call 911 or go to the nearest emergency room.  Patient contracted verbally for the following: If you are experiencing an emotional crisis or have thoughts of harming yourself or others,  please go to your nearest local emergency room or call 911. Will continue to re-assess medication response and side effects frequently to establish efficacy and ensure safety. Risks, any black box warnings, side effects, off label usage, and benefits of medication and treatment discussed with patient, along with potential adverse side effects of current and/or newly prescribed medication, alternative treatment options, and OTC medications.  Patient verbalized understanding of potential risks, any off label use of medication, any black box warnings, and any side effects in their own words. The patient verbalized understanding and agreed to comply with the safety plan discussed in their own words.  Patient given the number to the office. Number also discussed of the 24- hour suicide hotline.         MEDS ORDERED DURING VISIT:  New Medications Ordered This Visit   Medications   • vilazodone (Viibryd) 40 MG tablet tablet     Sig: Take 1 tablet by mouth Daily.     Dispense:  30 tablet     Refill:  0   • hydrOXYzine (ATARAX) 10 MG tablet     Sig: Take 1 tablet by mouth 2 (Two) Times a Day As Needed (anxiety and/or sleep).     Dispense:  60 tablet     Refill:  0       Return in about 2 weeks (around 1/19/2022), or if symptoms worsen or fail to improve, for Next scheduled follow up and Recheck.         Functional Status: Moderate impairment     Prognosis: Fair with Ongoing Treatment             This document has been electronically signed by REBECA Bello  January 5, 2022 19:17 EST    Please note that portions of this note were completed with a voice recognition program. Efforts were made to edit dictation, but occasionally words are mistranscribed.

## 2022-01-13 PROCEDURE — U0004 COV-19 TEST NON-CDC HGH THRU: HCPCS | Performed by: NURSE PRACTITIONER

## 2022-01-13 PROCEDURE — 87086 URINE CULTURE/COLONY COUNT: CPT | Performed by: NURSE PRACTITIONER

## 2022-01-14 ENCOUNTER — PATIENT OUTREACH (OUTPATIENT)
Dept: CASE MANAGEMENT | Facility: OTHER | Age: 62
End: 2022-01-14

## 2022-01-14 NOTE — OUTREACH NOTE
"Ambulatory Case Management Note    Patient Outreach    F/u with pt.  States she was at  yest with several issues.  Thought she may have covid, b/c had been exposed to family member with covid, but states it was negative.  They did diagnosis \"broken arm.\"  States they have put a referral in for orthopedic.  She is trying to \"take care of my mother and I have a lot of appts scheduled.  Could you help me with those?\"  She asked that stress test be rescheduled.  Has \"long memory evaluation on the same day at .\"  Also requested appt with Dr. Atkins. Thought it was supposed to be around 2/9/22, however not scheduled.  Needs regular office visit appt with her PCP, SHAUNA Ramírez.  Her speech was non labored through conversation.       Care Coordination    Scheduling for all the appts contacted and was able to reschedule her stress test (2/21/22)and schedule Dr. Atkins (2/23/22, but do have her on cancellation list also) and VERENA Mandel (2/2/22) appts.  Confirmed with pts that all appts are OK.      Patient Outreach    Pt notified of all appt.       Yecenia Shaver RN  Ambulatory Case Management    1/14/2022, 13:34 EST    "

## 2022-01-19 RX ORDER — ROSUVASTATIN CALCIUM 5 MG/1
TABLET, COATED ORAL
Qty: 90 TABLET | Refills: 0 | Status: SHIPPED | OUTPATIENT
Start: 2022-01-19 | End: 2022-04-30

## 2022-01-21 ENCOUNTER — TELEMEDICINE (OUTPATIENT)
Dept: PSYCHIATRY | Facility: CLINIC | Age: 62
End: 2022-01-21

## 2022-01-21 DIAGNOSIS — F33.1 MODERATE EPISODE OF RECURRENT MAJOR DEPRESSIVE DISORDER: ICD-10-CM

## 2022-01-21 DIAGNOSIS — F41.1 GENERALIZED ANXIETY DISORDER: Primary | ICD-10-CM

## 2022-01-21 PROCEDURE — 90832 PSYTX W PT 30 MINUTES: CPT | Performed by: COUNSELOR

## 2022-01-21 NOTE — PROGRESS NOTES
Date: January 30, 2022  Time In: 1:45 PM  Time Out: 2:16 PM  This provider is located at the [facility]), [address], [place] [address] using a secure [employer] Video Visit through [facility]. Patient is being seen remotely via telehealth at home address in [place] and stated they are in a secure environment for this session. The patient's condition being diagnosed/treated is appropriate for telemedicine. The provider identified herself as well as her credentials. The patient, and/or patients guardian, consent to be seen remotely, and when consent is given they understand that the consent allows for patient identifiable information to be sent to a third party as needed. They may refuse to be seen remotely at any time. The electronic data is encrypted and password protected, and the patient and/or guardian has been advised of the potential risks to privacy not withstanding such measures.     You have chosen to receive care through a telehealth visit.  Do you consent to use a video/audio connection for your medical care today? Yes    PROGRESS NOTE  Data:  Sandra Auguste is a 61 y.o. female who presents today for follow up. Patient states that her mother came home 2 weeks ago and has home health care through [facility]. Patient states that her sister has COVID and had been being mean to the patient and then was diagnosed with COVID. Patient states that she woke up last Thursday and her hand was swollen and she went to [facility] and was told that she had a broken wrist and put her in a wrist brace. Patient was also given a referral for a hand surgeon. Patient is struggling with so many things going on at once that she doesn't understand and is feeling somewhat overwhelmed by. Patient states that she is enjoying the time away from her sister and that has seemed to help her anxiety go down but she is now worried about her own health and again meeting the  demands necessary to take care of her mother. Patient did report that her brother is trying to help her with getting her mother right now and is at the home to allow her to do this appointment.    Chief Complaint: continued anxiety and depression, increased health concerns    History of Present Illness: Patient has struggled with feelings of depression and anxiety for several years.      Clinical Maneuvering/Intervention:  CBT      Assisted patient in processing above session content; acknowledged and normalized patient’s thoughts, feelings, and concerns.  Rationalized patient thought process regarding her health needs and those of her mother.  Discussed triggers associated with patient's anxiety such as not being able to meet her mother's needs, her sister being displeased when she come back around and not knowing what the plan is for anything regarding her mother.  Also discussed coping skills for patient to implement such as self-care, asking questions of her siblings that she is in need of answers to and speaking up for her needs.    Allowed patient to freely discuss issues without interruption or judgment. Provided safe, confidential environment to facilitate the development of positive therapeutic relationship and encourage open, honest communication. Assisted patient in identifying risk factors which would indicate the need for higher level of care including thoughts to harm self or others and/or self-harming behavior and encouraged patient to contact this office, call 911, or present to the nearest emergency room should any of these events occur. Discussed crisis intervention services and means to access. Patient adamantly and convincingly denies current suicidal or homicidal ideation or perceptual disturbance.    Assessment:   Assessment   Patient appears to maintain relative stability as compared to their baseline.  However, patient continues to struggle with feelings of depression and anxiety which  continues to cause impairment in important areas of functioning.  A result, they can be reasonably expected to continue to benefit from treatment and would likely be at increased risk for decompensation otherwise.    Mental Status Exam:   Hygiene:   good  Cooperation:  Cooperative  Eye Contact:  Good  Psychomotor Behavior:  Appropriate  Affect:  Appropriate  Mood: anxious  Speech:  Normal  Thought Process:  Goal directed  Thought Content:  Normal  Suicidal:  None  Homicidal:  None  Hallucinations:  None  Delusion:  None  Memory:  Deficits  Orientation:  Person, Place, Time and Situation  Reliability:  good  Insight:  Fair  Judgement:  Fair  Impulse Control:  Good  Physical/Medical Issues:  Yes swelling in hand       Patient's Support Network Includes:  children and mother    Functional Status: Moderate impairment     Progress toward goal: Not at goal    Prognosis: Fair with Ongoing Treatment          Plan:    Patient will continue in individual outpatient therapy with focus on improved functioning and coping skills, maintaining stability, and avoiding decompensation and the need for higher level of care.    Patient will adhere to medication regimen as prescribed and report any side effects. Patient will contact this office, call 911 or present to the nearest emergency room should suicidal or homicidal ideations occur. Provide Cognitive Behavioral Therapy and Solution Focused Therapy to improve functioning, maintain stability, and avoid decompensation and the need for higher level of care.     Return in about 2 weeks, or earlier if symptoms worsen or fail to improve.           VISIT DIAGNOSIS:     ICD-10-CM ICD-9-CM   1. Generalized anxiety disorder  F41.1 300.02   2. Moderate episode of recurrent major depressive disorder (HCC)  F33.1 296.32             This document has been electronically signed by ASIA Rich  January 30, 2022 22:52 EST      Part of this note may be an electronic  transcription/translation of spoken language to printed text using the Dragon Dictation System.

## 2022-01-24 RX ORDER — VILAZODONE HYDROCHLORIDE 20 MG/1
TABLET ORAL
Qty: 30 TABLET | Refills: 2 | Status: SHIPPED | OUTPATIENT
Start: 2022-01-24 | End: 2022-02-07

## 2022-01-27 ENCOUNTER — APPOINTMENT (OUTPATIENT)
Dept: CARDIOLOGY | Facility: HOSPITAL | Age: 62
End: 2022-01-27

## 2022-02-07 ENCOUNTER — TELEMEDICINE (OUTPATIENT)
Dept: PSYCHIATRY | Facility: CLINIC | Age: 62
End: 2022-02-07

## 2022-02-07 DIAGNOSIS — G47.9 SLEEPING DIFFICULTIES: ICD-10-CM

## 2022-02-07 DIAGNOSIS — F33.1 MAJOR DEPRESSIVE DISORDER, RECURRENT EPISODE, MODERATE: Primary | Chronic | ICD-10-CM

## 2022-02-07 DIAGNOSIS — F41.9 ANXIETY DISORDER, UNSPECIFIED TYPE: Chronic | ICD-10-CM

## 2022-02-07 PROCEDURE — 99214 OFFICE O/P EST MOD 30 MIN: CPT | Performed by: NURSE PRACTITIONER

## 2022-02-07 RX ORDER — HYDROXYZINE HYDROCHLORIDE 10 MG/1
10 TABLET, FILM COATED ORAL 2 TIMES DAILY PRN
Qty: 60 TABLET | Refills: 0 | Status: SHIPPED | OUTPATIENT
Start: 2022-02-07 | End: 2022-03-07 | Stop reason: SDUPTHER

## 2022-02-07 RX ORDER — VILAZODONE HYDROCHLORIDE 40 MG/1
40 TABLET ORAL DAILY
Qty: 30 TABLET | Refills: 0 | Status: SHIPPED | OUTPATIENT
Start: 2022-02-07 | End: 2022-03-07 | Stop reason: SDUPTHER

## 2022-02-07 NOTE — PROGRESS NOTES
This provider is located at the [facility] (through [facility]), [address], [facility], [address] using a secure [employer] Video Visit through [address]. Patient is being seen remotely via telehealth at their home address in [place], and stated they are in a secure environment for this session. The patient's condition being diagnosed/treated is appropriate for telemedicine. The provider identified herself as well as her credentials.   The patient, and/or patients guardian, consent to be seen remotely, and when consent is given they understand that the consent allows for patient identifiable information to be sent to a third party as needed.   They may refuse to be seen remotely at any time. The electronic data is encrypted and password protected, and the patient and/or guardian has been advised of the potential risks to privacy not withstanding such measures.    You have chosen to receive care through a telehealth visit.  Do you consent to use a video/audio connection for your medical care today? Yes        Subjective   Sandra Auguste is a 61 y.o. female who presents today for follow up    Chief Complaint: Depression, anxiety, and history of sleeping difficulties    Accompanied by: The patient is interviewed alone at today's encounter    History of Present Illness:   The patient describes her mood as improved over the last few weeks.  The patient states feels the recent increase in Viibryd as well as addition of hydroxyzine has been beneficial, and helps improve her mood some.  The patient reports she read on hydroxyzine that it was an antihistamine, so she has not been taking the loratadine/Claritin with the hydroxyzine.  The patient is not able to rate her symptoms of both depression and anxiety on a 0-10 scale, with 10 being the worst at today's encounter, although she reports they are improved.  The patient reports her mother is home from rehab, and her sister who helps  her care for her mother had COVID, so she has been caring for her mother 24/7 up until the last couple of days.  She reports she is very tired because of this.  The patient reports her appetite as good.  The patient reports her sleep as good.  The patient reports when her sister comes in the evenings to help care for the mother she is tired and wants to go to sleep, but she often times will stay up and do something for herself such as watch TV or do something that she enjoys because she feels like she needs to have some time for herself.  The patient reports she recently had bronchitis, and is still having some lingering effects from that and plans to follow-up with her primary care provider.  She reports having pain in her ribs when she coughs, she is afraid she is cracked her rib due to her reported forceful cough.  The patient reports having tendinitis in her wrist, and she has been referred to a specialist because the primary care provider is worried there may be damage to a nerve in her arm that comes out of her neck.  The patient reports after she sees the specialist she may start some hand/wrist therapy.  The patient also reports experiencing dizziness recently, she reports she is used to wearing trifocals but her insurance would only cover for a bifocal prescription, so she has been trying to adjust wearing bifocals which has been difficult.  The patient reports due to everything going on she had to cancel her appointment at  for neuropsych testing, she reports that has been rescheduled for July of this year.  The patient denies any other new medical problems or changes in medications since last appointment with this facility.  The patient reports compliance with her current medication regimen.  The patient denies any known current side effects or concerns from her current medication regimen.   The patient would like to not adjust or change her medications at this visit as she has had recent improvement  "with the recent adjustment/changes.  The patient denies any suicidal or homicidal ideations, plans, or intent at today's encounter and is convincing.  The patient denies any auditory hallucinations or visual hallucinations.  The patient does not endorse any significant symptoms consistent with carey or psychosis at today's encounter.  The patient reports she accidentally missed her last therapy appointment, she plans to call and reschedule.        Prior Psychiatric Medications:  -Xanax 0.25 mg by mouth once daily as needed for anxiety - last prescribed 9/24/21  -Paxil  -Wellbutrin  -Zoloft  -Prozac  -Trazodone  -Buspirone -nauseated stomach  -Possibly Lexapro, but she is not sure  -Possibly Effexor, but she is not sure  -Possible medicine to assist with sleep  -Viibryd  -Hydroxyzine      Last Menstrual Period:  Hysterectomy.        The following portions of the patient's history were reviewed and updated as appropriate: allergies, current medications, past family history, past medical history, past social history, past surgical history and problem list.          Past Medical History:  Past Medical History:   Diagnosis Date   • Anesthesia complication     HAS TROUBLE GETTING \"NUMB\"    • Anxiety    • Arthritis    • Asthma    • Bladder spasms    • Depression    • Diverticulosis    • GERD (gastroesophageal reflux disease)    • Memory loss     FROM SKULL FRACTURE AND FALL-SHORT TERM MEMORY LOSS   • Mental disorder     PTSD   • Migraine    • Obesity    • Seasonal allergies    • Seizures ([facility])     \"convulsions\" at age 5   • Skull fracture ([facility]) 2012   • Sleep apnea with use of continuous positive airway pressure (CPAP)     INSTRUCTED TO BRING OWN MASK AND TUBING    • SOB (shortness of breath) on exertion    • Type 2 diabetes mellitus without complication, without long-term current use of insulin ([facility]) 10/11/2018   • Wears contact lenses    • Wears partial dentures     TOP ONLY    • Wears prescription eyeglasses    • Wears " prescription eyeglasses        Social History:  Social History     Socioeconomic History   • Marital status: Single   Tobacco Use   • Smoking status: Never Smoker   • Smokeless tobacco: Never Used   Vaping Use   • Vaping Use: Never used   Substance and Sexual Activity   • Alcohol use: Yes     Comment: Patient reports occasoinal wine use to help relax her and fall asleep, not daily use   • Drug use: Never   • Sexual activity: Not Currently     Birth control/protection: Post-menopausal, Surgical       Family History:  Family History   Problem Relation Age of Onset   • Cancer Mother         cervical cancer   • Dementia Mother    • Cancer Maternal Aunt         lymphoma   • Cancer Paternal Aunt    • Breast cancer Paternal Aunt 70   • Alcohol abuse Other    • Depression Other        Past Surgical History:  Past Surgical History:   Procedure Laterality Date   • CERVICAL POLYPECTOMY     • TOTAL LAPAROSCOPIC HYSTERECTOMY N/A 9/18/2017    Procedure: TOTAL LAPAROSCOPIC HYSTERECTOMY, BILATERAL SALPINGO OOPHORECTOMY WITH DAVINCI ROBOT ;  Surgeon: Shannon Grimaldo DO;  Location: [facility];  Service:    • WISDOM TOOTH EXTRACTION         Problem List:  Patient Active Problem List   Diagnosis   • Abnormal computed tomography scan   • Anemia   • Post traumatic stress disorder (PTSD)   • Anxiety   • Strain of neck muscle   • Cough   • Diverticulitis of colon   • Fatigue   • Steatosis of liver   • Lateral epicondylitis   • Knee pain   • Spasm   • Adiposity   • Obstructive sleep apnea syndrome   • Osteoarthritis   • Palpitations   • Nausea   • Shortness of breath   • Skin tag   • Candidiasis of vagina   • Viral upper respiratory tract infection   • Gastroesophageal reflux disease   • Acute pain of left shoulder   • Abdominal distension (gaseous)   • SOB (shortness of breath) on exertion   • Sleep apnea with use of continuous positive airway pressure (CPAP)   • Seasonal allergies   • Obesity   • Migraine   • Mental disorder   •  Diverticulosis   • Depression   • Arthritis   • Hypertension   • Itching   • Stabbing headache   • Temple tenderness   • TMJ click   • Mild intermittent asthma without complication   • Skull fracture (HCC)   • Type 2 diabetes mellitus without complication, without long-term current use of insulin (HCC)   • Health care maintenance   • Moderate episode of recurrent major depressive disorder (HCC)   • Generalized anxiety disorder   • High risk medications (not anticoagulants) long-term use   • Hyperlipidemia       Allergy:   Allergies   Allergen Reactions   • Mold Extract [Trichophyton] Shortness Of Breath, Anxiety, Irritability, Other (See Comments) and Palpitations     SOA   • Lortab [Hydrocodone-Acetaminophen] Nausea And Vomiting     SEVERE NAUSEA AND SLIGHT VOMITING   • Methylprednisolone Other (See Comments)     Bleeding, cramping   • Pollen Extract Other (See Comments)     SNEEZING AND CONGESTION         Current Medications:   Current Outpatient Medications   Medication Sig Dispense Refill   • acetaminophen (TYLENOL) 500 MG tablet Take 500 mg by mouth Every 6 (Six) Hours As Needed for Mild Pain .     • albuterol sulfate  (90 Base) MCG/ACT inhaler Inhale 1 puff Every 6 (Six) Hours As Needed for Shortness of Air. INHALE TWO PUFFS BY MOUTH EVERY 6 HOURS AS NEEDED 1 inhaler 2   • Blood Glucose Monitoring Suppl (OneTouch Verio) w/Device kit 1 kit Daily. 1 kit 0   • cyclobenzaprine (FLEXERIL) 5 MG tablet Take 1 tablet by mouth 2 (Two) Times a Day As Needed for Muscle Spasms. 20 tablet 0   • Diclofenac Sodium (VOLTAREN) 1 % gel gel APPLY 4 GRAMS TOPICALLY TO THE APPROPRIATE AREA AS DIRECTED FOUR TIMES A DAYS AS NEEDED FOR PAIN IN SHOULDERS 100 g 1   • EPINEPHrine (EPIPEN) 0.3 MG/0.3ML solution auto-injector injection Inject 0.3 mL into the appropriate muscle as directed by prescriber As Needed (FOR SEVERE ALLERGIC REACTION). 1 each 1   • famotidine (PEPCID) 40 MG tablet Take 1 tablet by mouth Daily. 30 tablet 5    • fluticasone (FLONASE) 50 MCG/ACT nasal spray 2 sprays into the nostril(s) as directed by provider Daily for 30 days. 16 g 5   • Fluticasone Furoate-Vilanterol (Breo Ellipta) 200-25 MCG/INH inhaler Inhale 1 puff Daily. 60 each 5   • glucose blood (OneTouch Verio) test strip Use one strip to check blood sugars one time daily 100 each 3   • glucose blood test strip Use as instructed to check blood glucose once daily. 100 each 5   • glucose monitor monitoring kit Use as directed to check blood glucose once daily. 1 each 0   • guaiFENesin-codeine (GUAIFENESIN AC) 100-10 MG/5ML liquid Take 5 mL by mouth 3 (Three) Times a Day As Needed for Cough. 118 mL 0   • hydrOXYzine (ATARAX) 10 MG tablet Take 1 tablet by mouth 2 (Two) Times a Day As Needed (anxiety and/or sleep). 60 tablet 0   • Lancets (onetouch ultrasoft) lancets Use one lancet daily to check blood sugars 100 each 3   • loratadine (CLARITIN) 10 MG tablet TAKE ONE TABLET BY MOUTH DAILY 30 tablet 0   • metFORMIN ER (GLUCOPHAGE-XR) 500 MG 24 hr tablet Take 2 tablets by mouth Daily With Breakfast. 60 tablet 2   • methylPREDNISolone (MEDROL) 4 MG dose pack Take as directed on package instructions. 21 tablet 0   • ondansetron (Zofran) 8 MG tablet Take 1 tablet by mouth Every 8 (Eight) Hours As Needed for Nausea or Vomiting. 15 tablet 0   • rosuvastatin (CRESTOR) 5 MG tablet TAKE ONE TABLET BY MOUTH DAILY 90 tablet 0   • vilazodone (Viibryd) 40 MG tablet tablet Take 1 tablet by mouth Daily. 30 tablet 0     No current facility-administered medications for this visit.       Review of Symptoms:    Review of Systems   Constitutional: Positive for activity change and fatigue.   HENT: Negative.    Eyes: Negative.    Respiratory: Positive for cough (with rib pain when coughs).    Cardiovascular: Negative.    Gastrointestinal: Negative.    Endocrine: Negative.    Genitourinary: Negative.    Musculoskeletal: Positive for arthralgias.   Skin: Negative.    Neurological: Negative.     Psychiatric/Behavioral: Positive for decreased concentration, sleep disturbance, depressed mood and stress. Negative for agitation, behavioral problems, dysphoric mood, hallucinations, self-injury, suicidal ideas and negative for hyperactivity. The patient is nervous/anxious.          Physical Exam:   not currently breastfeeding. There is no height or weight on file to calculate BMI.   Due to the remote nature of this encounter (virtual encounter), vitals were unable to be obtained.  Height stated at 65.5 inches.  Weight stated at 257 pounds.      Physical Exam  Constitutional:       Appearance: Normal appearance.   Neurological:      Mental Status: She is alert and oriented to person, place, and time.   Psychiatric:         Attention and Perception: Attention normal.         Mood and Affect: Mood and affect normal.         Speech: Speech normal.         Behavior: Behavior normal. Behavior is cooperative.         Thought Content: Thought content normal. Thought content does not include homicidal or suicidal ideation. Thought content does not include homicidal or suicidal plan.         Cognition and Memory: Cognition and memory normal.         Judgment: Judgment normal.           Mental Status Exam:   Hygiene:   good  Cooperation:  Cooperative  Eye Contact:  Good  Psychomotor Behavior:  Appropriate  Affect:  Appropriate  Mood: normal  Hopelessness: Denies  Speech:  Normal  Thought Process:  Linear  Thought Content:  Mood congruent  Suicidal:  None  Homicidal:  None  Hallucinations:  None  Delusion:  None  Memory:  Intact  Orientation:  Person, Place and Time  Reliability:  good  Insight:  Good  Judgement:  Good  Impulse Control:  Good  Physical/Medical Issues:  Chronic, no acute physical/medical issues at today's encounter         Lab Results:   Admission on 01/13/2022, Discharged on 01/13/2022   Component Date Value Ref Range Status   • SARS-CoV-2 CAROLE 01/13/2022 Not Detected  Not Detected Final   • Rapid  Influenza A Ag 01/13/2022 Negative  Negative Final   • Rapid Influenza B Ag 01/13/2022 Negative  Negative Final   • Internal Control 01/13/2022 Passed  Passed Final   • Lot Number 01/13/2022 [id number]   Final   • Expiration Date 01/13/2022 [id number]   Final   • Color 01/13/2022 Yellow  Yellow, Straw, Dark Yellow, Agnes Final   • Clarity, UA 01/13/2022 Clear  Clear Final   • Glucose, UA 01/13/2022 Negative  Negative, 1000 mg/dL (3+) mg/dL Final   • Bilirubin 01/13/2022 Negative  Negative Final   • Ketones, UA 01/13/2022 Negative  Negative Final   • Specific Gravity  01/13/2022 1.010  1.005 - 1.030 Final   • Blood, UA 01/13/2022 Negative  Negative Final   • pH, Urine 01/13/2022 6.0  5.0 - 8.0 Final   • Protein, POC 01/13/2022 Negative  Negative mg/dL Final   • Urobilinogen, UA 01/13/2022 Normal  Normal Final   • Nitrite, UA 01/13/2022 Negative  Negative Final   • Leukocytes 01/13/2022 Negative  Negative Final   • Urine Culture 01/13/2022 <25,000 CFU/mL Gram Negative Bacilli*  Final         Assessment/Plan   Problems Addressed this Visit     None      Visit Diagnoses     Major depressive disorder, recurrent episode, moderate (HCC)  (Chronic)   -  Primary    Relevant Medications    vilazodone (Viibryd) 40 MG tablet tablet    hydrOXYzine (ATARAX) 10 MG tablet    Anxiety disorder, unspecified type  (Chronic)       Relevant Medications    vilazodone (Viibryd) 40 MG tablet tablet    hydrOXYzine (ATARAX) 10 MG tablet    Sleeping difficulties        Relevant Medications    hydrOXYzine (ATARAX) 10 MG tablet      Diagnoses       Codes Comments    Major depressive disorder, recurrent episode, moderate (HCC)    -  Primary ICD-10-CM: F33.1  ICD-9-CM: 296.32     Anxiety disorder, unspecified type     ICD-10-CM: F41.9  ICD-9-CM: 300.00     Sleeping difficulties     ICD-10-CM: G47.9  ICD-9-CM: 780.50           Visit Diagnoses:    ICD-10-CM ICD-9-CM   1. Major depressive disorder, recurrent episode, moderate (HCC)  F33.1 296.32   2.  Anxiety disorder, unspecified type  F41.9 300.00   3. Sleeping difficulties  G47.9 780.50          GOALS:  Short Term Goals: Patient will be compliant with medication, and patient will have no significant medication related side effects.  Patient will be engaged in psychotherapy as indicated.  Patient will report subjective improvement of symptoms.  Long term goals: To stabilize mood and treat/improve subjective symptoms, the patient will stay out of the hospital, the patient will be at an optimal level of functioning, and the patient will take all medications as prescribed.  The patient verbalized understanding and agreement with goals that were mutually set.      TREATMENT PLAN: Continue supportive psychotherapy efforts and medications as indicated.  Medication and treatment options, both pharmacological and non-pharmacological treatment options, discussed during today's visit, including any off label use of medication. Patient acknowledged and verbally consented with current treatment plan and was educated on the importance of compliance with treatment and follow-up appointments.      -Continue Viibryd 40 mg by mouth once daily for mood.  -Continue hydroxyzine 10 mg by mouth up to twice daily as needed for anxiety and or sleep.  -The patient reports she has neuropsychological testing scheduled at  July 2022, she is advised to keep this testing.      MEDICATION ISSUES:  Discussed medication options and treatment plan of prescribed medication, any off label use of medication, as well as the risks, benefits, any black box warnings including increased suicidality, and side effects including but not limited to potential falls, dizziness, possible impaired driving, GI side effects (change in appetite, abdominal discomfort, nausea, vomiting, diarrhea, and/or constipation), dry mouth, somnolence, sedation, insomnia, activation, agitation, irritation, tremors, abnormal muscle movements or disorders, headache, sweating,  possible bruising or rare bleeding, electrolyte and/or fluid abnormalities, change in blood pressure/heart rate/and or heart rhythm, sexual dysfunction, and metabolic adversities among others. Patient and/or guardian agreeable to call the office with any worsening of symptoms or onset of side effects, or if any concerns or questions arise.  The contact information for the office is made available to the patient and/or guardian.  Patient and/or guardian agreeable to call 911 or go to the nearest ER should they begin having any SI/HI, or if any urgent concerns arise. No medication side effects or related complaints today.      SUICIDE RISK ASSESSMENT AND SAFETY PLAN: Unalterable demographics and a history of mental health intervention indicate this patient is in a high risk category compared to the general population. At present, the patient denies active SI/HI, intentions, or plans at this time and agrees to seek immediate care should such thoughts develop. The patient verbalizes understanding of how to access emergency care if needed and agrees to do so. Consideration of suicide risk and protective factors such as history, current presentation, individual strengths and weaknesses, psychosocial and environmental stressors and variables, psychiatric illness and symptoms, medical conditions and pain, took place in this interview. Based on those considerations, the patient is determined: within individual baseline and presenting no imminent risk for suicide or homicide. Other recommendations: The patient does not meet the criteria for inpatient admission and is not a safety risk to self or others at today's visit. Inpatient treatment offers no significant advantages over outpatient treatment for this patient at today's visit.  The patient was given ample time for questions and fully participated in treatment planning.  The patient was encouraged to call the clinic with any questions or concerns.  The patient was  informed of access to emergency care. If patient were to develop any significant symptomatology, suicidal ideation, homicidal ideation, any concerns, or feel unsafe at any time they are to call the clinic and if unable to get immediate assistance should immediately call 911 or go to the nearest emergency room.  Patient contracted verbally for the following: If you are experiencing an emotional crisis or have thoughts of harming yourself or others, please go to your nearest local emergency room or call 911. Will continue to re-assess medication response and side effects frequently to establish efficacy and ensure safety. Risks, any black box warnings, side effects, off label usage, and benefits of medication and treatment discussed with patient, along with potential adverse side effects of current and/or newly prescribed medication, alternative treatment options, and OTC medications.  Patient verbalized understanding of potential risks, any off label use of medication, any black box warnings, and any side effects in their own words. The patient verbalized understanding and agreed to comply with the safety plan discussed in their own words.  Patient given the number to the office. Number also discussed of the 24- hour suicide hotline.         MEDS ORDERED DURING VISIT:  New Medications Ordered This Visit   Medications   • vilazodone (Viibryd) 40 MG tablet tablet     Sig: Take 1 tablet by mouth Daily.     Dispense:  30 tablet     Refill:  0   • hydrOXYzine (ATARAX) 10 MG tablet     Sig: Take 1 tablet by mouth 2 (Two) Times a Day As Needed (anxiety and/or sleep).     Dispense:  60 tablet     Refill:  0       Return in about 4 weeks (around 3/7/2022), or if symptoms worsen or fail to improve, for Next scheduled follow up and Recheck.         Functional Status: Moderate impairment     Prognosis: Fair with Ongoing Treatment             This document has been electronically signed by REBECA Bello  February 7,  2022 16:13 EST    Some of the data in this electronic note has been brought forward from a previous encounter, any necessary changes have been made, it has been reviewed by this APRN, and it is accurate.    Please note that portions of this note were completed with a voice recognition program.

## 2022-02-08 PROCEDURE — 87086 URINE CULTURE/COLONY COUNT: CPT | Performed by: NURSE PRACTITIONER

## 2022-02-08 NOTE — PATIENT INSTRUCTIONS
Food Choices to Help Relieve Diarrhea, Adult  When you have diarrhea, the foods you eat and your eating habits are very important. Choosing the right foods and drinks can help:  · Relieve diarrhea.  · Replace lost fluids and nutrients.  · Prevent dehydration.  What general guidelines should I follow?    Relieving diarrhea  · Choose foods with less than 2 g or .07 oz. of fiber per serving.  · Limit fats to less than 8 tsp (38 g or 1.34 oz.) a day.  · Avoid the following:  ? Foods and beverages sweetened with high-fructose corn syrup, honey, or sugar alcohols such as xylitol, sorbitol, and mannitol.  ? Foods that contain a lot of fat or sugar.  ? Fried, greasy, or spicy foods.  ? High-fiber grains, breads, and cereals.  ? Raw fruits and vegetables.  · Eat foods that are rich in probiotics. These foods include dairy products such as yogurt and fermented milk products. They help increase healthy bacteria in the stomach and intestines (gastrointestinal tract, or GI tract).  · If you have lactose intolerance, avoid dairy products. These may make your diarrhea worse.  · Take medicine to help stop diarrhea (antidiarrheal medicine) only as told by your health care provider.  Replacing nutrients  · Eat small meals or snacks every 3-4 hours.  · Eat bland foods, such as white rice, toast, or baked potato, until your diarrhea starts to get better. Gradually reintroduce nutrient-rich foods as tolerated or as told by your health care provider. This includes:  ? Well-cooked protein foods.  ? Peeled, seeded, and soft-cooked fruits and vegetables.  ? Low-fat dairy products.  · Take vitamin and mineral supplements as told by your health care provider.  Preventing dehydration  · Start by sipping water or a special solution to prevent dehydration (oral rehydration solution, ORS). Urine that is clear or pale yellow means that you are getting enough fluid.  · Try to drink at least 8-10 cups of fluid each day to help replace lost  Goal Outcome Evaluation:  Plan of Care Reviewed With: patient     PRN pain med given. Pt's stats dropping in the 80s on 2L NC. O2 bumped to 2.5 and stats improving. O2 back on 2L. Pt denies SOA. Infreq nonproductive coug. Afib with controlled rate on monitor.Refused SCDs and pt received education.Midline incision intact and dry. NS 20K infusing. No BM but pt passing gas.       Pt requested to get NG inserted. NG inserted 60cm at the Left nostril. LWS with green output of 2L. NG still place and pt tolerating well. Will cont to monitor.          fluids.  · You may add other liquids in addition to water, such as clear juice or decaffeinated sports drinks, as tolerated or as told by your health care provider.  · Avoid drinks with caffeine, such as coffee, tea, or soft drinks.  · Avoid alcohol.  What foods are recommended?         The items listed may not be a complete list. Talk with your health care provider about what dietary choices are best for you.  Grains  White rice. White, Armenian, or romulo breads (fresh or toasted), including plain rolls, buns, or bagels. White pasta. Saltine, soda, or lorraine crackers. Pretzels. Low-fiber cereal. Cooked cereals made with water (such as cornmeal, farina, or cream cereals). Plain muffins. Matzo. Tarpley toast. Zwieback.  Vegetables  Potatoes (without the skin). Most well-cooked and canned vegetables without skins or seeds. Tender lettuce.  Fruits  Apple sauce. Fruits canned in juice. Cooked apricots, cherries, grapefruit, peaches, pears, or plums. Fresh bananas and cantaloupe.  Meats and other protein foods  Baked or boiled chicken. Eggs. Tofu. Fish. Seafood. Smooth nut butters. Ground or well-cooked tender beef, ham, veal, lamb, pork, or poultry.  Dairy  Plain yogurt, kefir, and unsweetened liquid yogurt. Lactose-free milk, buttermilk, skim milk, or soy milk. Low-fat or nonfat hard cheese.  Beverages  Water. Low-calorie sports drinks. Fruit juices without pulp. Strained tomato and vegetable juices. Decaffeinated teas. Sugar-free beverages not sweetened with sugar alcohols. Oral rehydration solutions, if approved by your health care provider.  Seasoning and other foods  Bouillon, broth, or soups made from recommended foods.  What foods are not recommended?  The items listed may not be a complete list. Talk with your health care provider about what dietary choices are best for you.  Grains  Whole grain, whole wheat, bran, or rye breads, rolls, pastas, and crackers. Wild or brown rice. Whole grain or bran cereals. Barley.  Oats and oatmeal. Corn tortillas or taco shells. Granola. Popcorn.  Vegetables  Raw vegetables. Fried vegetables. Cabbage, broccoli, [place] sprouts, artichokes, baked beans, beet greens, corn, kale, legumes, peas, sweet potatoes, and yams. Potato skins. Cooked spinach and cabbage.  Fruits  Dried fruit, including raisins and dates. Raw fruits. Stewed or dried prunes. Canned fruits with syrup.  Meat and other protein foods  Fried or fatty meats. Deli meats. [place] nut butters. Nuts and seeds. Beans and lentils. [place]. Hot dogs. Sausage.  Dairy  High-fat cheeses. Whole milk, chocolate milk, and beverages made with milk, such as milk shakes. Half-and-half. Cream. sour cream. Ice cream.  Beverages  Caffeinated beverages (such as coffee, tea, soda, or energy drinks). Alcoholic beverages. Fruit juices with pulp. Prune juice. Soft drinks sweetened with high-fructose corn syrup or sugar alcohols. High-calorie sports drinks.  Fats and oils  Butter. Cream sauces. Margarine. Salad oils. Plain salad dressings. Olives. Avocados. Mayonnaise.  Sweets and desserts  Sweet rolls, doughnuts, and sweet breads. Sugar-free desserts sweetened with sugar alcohols such as xylitol and sorbitol.  Seasoning and other foods  Honey. Hot sauce. Chili powder. Gravy. Cream-based or milk-based soups. Pancakes and waffles.  Summary  · When you have diarrhea, the foods you eat and your eating habits are very important.  · Make sure you get at least 8-10 cups of fluid each day, or enough to keep your urine clear or pale yellow.  · Eat bland foods and gradually reintroduce healthy, nutrient-rich foods as tolerated, or as told by your health care provider.  · Avoid high-fiber, fried, greasy, or spicy foods.  This information is not intended to replace advice given to you by your health care provider. Make sure you discuss any questions you have with your health care provider.  Document Released: 03/09/2005 Document Revised: 12/15/2017 Document Reviewed:  12/15/2017  Elsevier Interactive Patient Education © 2019 [employer]      Take nexium.

## 2022-02-10 ENCOUNTER — OFFICE VISIT (OUTPATIENT)
Dept: INTERNAL MEDICINE | Facility: CLINIC | Age: 62
End: 2022-02-10

## 2022-02-10 ENCOUNTER — HOSPITAL ENCOUNTER (OUTPATIENT)
Dept: GENERAL RADIOLOGY | Facility: HOSPITAL | Age: 62
Discharge: HOME | End: 2022-02-10
Admitting: PHYSICIAN ASSISTANT

## 2022-02-10 ENCOUNTER — TELEPHONE (OUTPATIENT)
Dept: INTERNAL MEDICINE | Facility: CLINIC | Age: 62
End: 2022-02-10

## 2022-02-10 VITALS
HEART RATE: 81 BPM | SYSTOLIC BLOOD PRESSURE: 116 MMHG | OXYGEN SATURATION: 96 % | WEIGHT: 259.4 LBS | BODY MASS INDEX: 42.51 KG/M2 | DIASTOLIC BLOOD PRESSURE: 78 MMHG

## 2022-02-10 DIAGNOSIS — M77.8 RIGHT WRIST TENDONITIS: ICD-10-CM

## 2022-02-10 DIAGNOSIS — R07.89 RIGHT-SIDED CHEST WALL PAIN: ICD-10-CM

## 2022-02-10 DIAGNOSIS — R35.0 URINARY FREQUENCY: ICD-10-CM

## 2022-02-10 DIAGNOSIS — R07.89 RIGHT-SIDED CHEST WALL PAIN: Primary | ICD-10-CM

## 2022-02-10 PROCEDURE — 99214 OFFICE O/P EST MOD 30 MIN: CPT | Performed by: PHYSICIAN ASSISTANT

## 2022-02-10 PROCEDURE — 71100 X-RAY EXAM RIBS UNI 2 VIEWS: CPT

## 2022-02-10 RX ORDER — EPINEPHRINE 0.3 MG/.3ML
0.3 INJECTION SUBCUTANEOUS AS NEEDED
Qty: 1 EACH | Refills: 1 | Status: SHIPPED | OUTPATIENT
Start: 2022-02-10

## 2022-02-10 RX ORDER — FAMOTIDINE 20 MG
1000 TABLET ORAL
COMMUNITY
End: 2022-05-16

## 2022-02-10 RX ORDER — METHYLPREDNISOLONE 4 MG/1
TABLET ORAL
Qty: 21 TABLET | Refills: 0 | Status: SHIPPED | OUTPATIENT
Start: 2022-02-10 | End: 2022-03-10

## 2022-02-10 NOTE — TELEPHONE ENCOUNTER
Please let her know that the bone scan for ribs is no longer the best approach. Instead I have ordered a rib specific xray that she can have done at a diagnostic center. No need for scheduling- she can just walk in.

## 2022-02-10 NOTE — PROGRESS NOTES
Chief Complaint   Patient presents with   • Anxiety     Follow Up   • Depression   • Diabetes   • Hyperlipidemia   • Hypertension       Subjective     Sandra Auguste is a 61 y.o. female.        History of Present Illness     Pt has been having pain in her right flank. Went to Urgent Care on 2/8. She has had a pain in her right side since having bronchitis at the end of December. Had chest xray but no evidence of rib fracture. She was given toradol 60 mg IM and started on Advil. Feels somewhat better.     Also treated for UTI with Cefdinir because she was having urinary frequency. Had bacteria in her urine a month prior. Urine culture did not grow any bacteria.    Prior to that she hurt her right wrist and was initially told she had a fracture. Saw Ortho and was told it was tendonitis and she would need PT. The location she was sent to does not accept her insurance. She was also given a shot in her wrist. Also wondered if her discomfort is related to her neck. She will be seeing a specialist and was told to wait to schedule PT until after seeing the specialist.        Current Outpatient Medications:   •  acetaminophen (TYLENOL) 500 MG tablet, Take 500 mg by mouth Every 6 (Six) Hours As Needed for Mild Pain ., Disp: , Rfl:   •  albuterol sulfate  (90 Base) MCG/ACT inhaler, Inhale 1 puff Every 6 (Six) Hours As Needed for Shortness of Air. INHALE TWO PUFFS BY MOUTH EVERY 6 HOURS AS NEEDED, Disp: 1 inhaler, Rfl: 2  •  Blood Glucose Monitoring Suppl (OneTouch Verio) w/Device kit, 1 kit Daily., Disp: 1 kit, Rfl: 0  •  cefdinir (OMNICEF) 300 MG capsule, Take 1 capsule by mouth 2 (Two) Times a Day., Disp: 14 capsule, Rfl: 0  •  cyclobenzaprine (FLEXERIL) 5 MG tablet, Take 1 tablet by mouth 2 (Two) Times a Day As Needed for Muscle Spasms., Disp: 20 tablet, Rfl: 0  •  Diclofenac Sodium (VOLTAREN) 1 % gel gel, APPLY 4 GRAMS TOPICALLY TO THE APPROPRIATE AREA AS DIRECTED FOUR TIMES A DAYS AS NEEDED FOR PAIN IN SHOULDERS,  Disp: 100 g, Rfl: 1  •  EPINEPHrine (EPIPEN) 0.3 MG/0.3ML solution auto-injector injection, Inject 0.3 mL into the appropriate muscle as directed by prescriber As Needed (FOR SEVERE ALLERGIC REACTION)., Disp: 1 each, Rfl: 1  •  famotidine (PEPCID) 40 MG tablet, Take 1 tablet by mouth Daily., Disp: 30 tablet, Rfl: 5  •  Fluticasone Furoate-Vilanterol (Breo Ellipta) 200-25 MCG/INH inhaler, Inhale 1 puff Daily., Disp: 60 each, Rfl: 5  •  glucose blood (OneTouch Verio) test strip, Use one strip to check blood sugars one time daily, Disp: 100 each, Rfl: 3  •  glucose blood test strip, Use as instructed to check blood glucose once daily., Disp: 100 each, Rfl: 5  •  glucose monitor monitoring kit, Use as directed to check blood glucose once daily., Disp: 1 each, Rfl: 0  •  guaiFENesin-codeine (GUAIFENESIN AC) 100-10 MG/5ML liquid, Take 5 mL by mouth 3 (Three) Times a Day As Needed for Cough., Disp: 118 mL, Rfl: 0  •  hydrOXYzine (ATARAX) 10 MG tablet, Take 1 tablet by mouth 2 (Two) Times a Day As Needed (anxiety and/or sleep)., Disp: 60 tablet, Rfl: 0  •  Lancets (onetouch ultrasoft) lancets, Use one lancet daily to check blood sugars, Disp: 100 each, Rfl: 3  •  lidocaine (LIDODERM) 5 %, Place 1 patch on the skin as directed by provider Daily. Remove & Discard patch within 12 hours or as directed by MD, Disp: 6 patch, Rfl: 0  •  loratadine (CLARITIN) 10 MG tablet, TAKE ONE TABLET BY MOUTH DAILY, Disp: 30 tablet, Rfl: 0  •  metFORMIN ER (GLUCOPHAGE-XR) 500 MG 24 hr tablet, Take 2 tablets by mouth Daily With Breakfast., Disp: 60 tablet, Rfl: 2  •  ondansetron (Zofran) 8 MG tablet, Take 1 tablet by mouth Every 8 (Eight) Hours As Needed for Nausea or Vomiting., Disp: 15 tablet, Rfl: 0  •  rosuvastatin (CRESTOR) 5 MG tablet, TAKE ONE TABLET BY MOUTH DAILY, Disp: 90 tablet, Rfl: 0  •  vilazodone (Viibryd) 40 MG tablet tablet, Take 1 tablet by mouth Daily., Disp: 30 tablet, Rfl: 0  •  Vitamin D, Cholecalciferol, 25 MCG (1000 UT)  capsule, Take 1,000 Units by mouth. Take one po daily, Disp: , Rfl:   •  fluticasone (FLONASE) 50 MCG/ACT nasal spray, 2 sprays into the nostril(s) as directed by provider Daily for 30 days., Disp: 16 g, Rfl: 5  •  methylPREDNISolone (Medrol) 4 MG dose pack, follow package directions, Disp: 21 tablet, Rfl: 0     PMFSH  The following portions of the patient's history were reviewed and updated as appropriate: allergies, current medications, past family history, past medical history, past social history, past surgical history and problem list.    Review of Systems   Constitutional: Negative for fever.   Cardiovascular: Positive for chest pain.   Gastrointestinal: Positive for abdominal pain.   Genitourinary: Positive for flank pain. Negative for dysuria and pelvic pain.   Musculoskeletal: Positive for back pain.   Neurological: Positive for weakness, numbness and headaches.       Objective   /78   Pulse 81   Wt 118 kg (259 lb 6.4 oz)   LMP  (LMP Unknown)   SpO2 96%   BMI 42.51 kg/m²     Physical Exam  Vitals and nursing note reviewed.   Constitutional:       Appearance: She is well-developed.   HENT:      Head: Normocephalic and atraumatic.      Right Ear: Hearing, tympanic membrane, ear canal and external ear normal.      Left Ear: Hearing, tympanic membrane, ear canal and external ear normal.      Nose: Nose normal.   Eyes:      Conjunctiva/sclera: Conjunctivae normal.      Pupils: Pupils are equal, round, and reactive to light.   Cardiovascular:      Rate and Rhythm: Normal rate and regular rhythm.      Heart sounds: Normal heart sounds.   Pulmonary:      Effort: Pulmonary effort is normal.      Breath sounds: Normal breath sounds. No decreased breath sounds, wheezing, rhonchi or rales.   Chest:      Chest wall: Tenderness (localized right sided mid rib pain) present. No deformity, swelling, crepitus or edema.   Musculoskeletal:         General: Normal range of motion.      Cervical back: Normal range of  motion.   Skin:     General: Skin is warm and dry.   Neurological:      Mental Status: She is alert.   Psychiatric:         Behavior: Behavior normal.         ASSESSMENT/PLAN    Diagnoses and all orders for this visit:    1. Right-sided chest wall pain (Primary)  Comments:  Check right rib xray to make sure no evidence of fracture. Start medrol dose pack as ordered.  Orders:  -     methylPREDNISolone (Medrol) 4 MG dose pack; follow package directions  Dispense: 21 tablet; Refill: 0  -     XR ribs 2 vw right; Future    2. Right wrist tendonitis  Comments:  Ongoing management by Ortho.    3. Urinary frequency  Comments:  Due to recurrent infection, pt will complete course of Omnicef she has already started.    Other orders  -     EPINEPHrine (EPIPEN) 0.3 MG/0.3ML solution auto-injector injection; Inject 0.3 mL into the appropriate muscle as directed by prescriber As Needed (FOR SEVERE ALLERGIC REACTION).  Dispense: 1 each; Refill: 1             Return in about 1 month (around 3/10/2022) for Follow up.  Answers for HPI/ROS submitted by the patient on 2/10/2022  What is the primary reason for your visit?: Back Pain

## 2022-02-11 ENCOUNTER — PATIENT OUTREACH (OUTPATIENT)
Dept: CASE MANAGEMENT | Facility: OTHER | Age: 62
End: 2022-02-11

## 2022-02-11 NOTE — OUTREACH NOTE
Ambulatory Case Management Note    Patient Outreach    F/u with pt.  States she has had a lot going on.  Saw her PCP, Michelle VILLATORO, yest b/c of rib pain.  X-ray of ribs done, however does not have results yet.  Also, states she is dealing with tendonitis in wrist.  At this time she stated that a therapist was there working with her mother, asked if could call back at later time.  Together decided on call back next week.  Of note, her last A1C 12/9/21 was 6.9.      Yecenia Shaver RN  Ambulatory Case Management    2/11/2022, 14:59 EST

## 2022-02-16 ENCOUNTER — PATIENT OUTREACH (OUTPATIENT)
Dept: CASE MANAGEMENT | Facility: OTHER | Age: 62
End: 2022-02-16

## 2022-02-16 NOTE — OUTREACH NOTE
Ambulatory Case Management Note    Patient Outreach    Pt contacted for f/u outreach.  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      Attempted to call back, but had to leave message on her voicemail.  Did leave RN-ACM contact info for any needs she may have.        Yecenia Shaver RN  Ambulatory Case Management    2/16/2022, 16:51 EST

## 2022-02-21 ENCOUNTER — HOSPITAL ENCOUNTER (OUTPATIENT)
Dept: CARDIOLOGY | Facility: HOSPITAL | Age: 62
Discharge: HOME | End: 2022-02-21

## 2022-03-07 ENCOUNTER — TELEMEDICINE (OUTPATIENT)
Dept: PSYCHIATRY | Facility: CLINIC | Age: 62
End: 2022-03-07

## 2022-03-07 DIAGNOSIS — G47.9 SLEEPING DIFFICULTIES: ICD-10-CM

## 2022-03-07 DIAGNOSIS — F41.9 ANXIETY DISORDER, UNSPECIFIED TYPE: Chronic | ICD-10-CM

## 2022-03-07 DIAGNOSIS — F33.1 MAJOR DEPRESSIVE DISORDER, RECURRENT EPISODE, MODERATE: Chronic | ICD-10-CM

## 2022-03-07 PROCEDURE — 99214 OFFICE O/P EST MOD 30 MIN: CPT | Performed by: NURSE PRACTITIONER

## 2022-03-07 RX ORDER — HYDROXYZINE HYDROCHLORIDE 25 MG/1
25 TABLET, FILM COATED ORAL 2 TIMES DAILY PRN
Qty: 60 TABLET | Refills: 1 | Status: SHIPPED | OUTPATIENT
Start: 2022-03-07 | End: 2022-05-16 | Stop reason: SDUPTHER

## 2022-03-07 RX ORDER — VILAZODONE HYDROCHLORIDE 40 MG/1
40 TABLET ORAL DAILY
Qty: 30 TABLET | Refills: 1 | Status: SHIPPED | OUTPATIENT
Start: 2022-03-07 | End: 2022-05-11

## 2022-03-07 NOTE — PROGRESS NOTES
This provider is located at the [facility] (through [facility]), [address], [facility], [address] using a secure MyChart Video Visit through [employer]. Patient is being seen remotely via telehealth at their home address in [place], and stated they are in a secure environment for this session. The patient's condition being diagnosed/treated is appropriate for telemedicine. The provider identified herself as well as her credentials.   The patient, and/or patients guardian, consent to be seen remotely, and when consent is given they understand that the consent allows for patient identifiable information to be sent to a third party as needed.   They may refuse to be seen remotely at any time. The electronic data is encrypted and password protected, and the patient and/or guardian has been advised of the potential risks to privacy not withstanding such measures.    You have chosen to receive care through a telehealth visit.  Do you consent to use a video/audio connection for your medical care today? Yes        Subjective   Sandra Auguste is a 61 y.o. female who presents today for follow up    Chief Complaint: Depression, anxiety, and history of sleeping difficulties    Accompanied by: The patient is interviewed alone at today's encounter    History of Present Illness:   The patient describes her mood as about the same over the last few weeks.  The patient states she has had some stress with her family recently, she reports she doesn't feel they understand the caregiver burnout/fatigue that she experiences.  The patient reports her symptoms of depression and anxiety remain the same since her last encounter with this APRN.  The patient reports her appetite as good.  The patient reports her sleep as fair.  She reports she has a lot of stiffness in her neck and shoulders and it can make it hard to get comfortable and fall asleep.  The patient states she has something wrong with her  "wrist that is being further investigated.  She reports she also had xray's of her spine/neck, and there is suspicion of a pinched nerve in her neck, and she may have to have an MRI of her neck/spine soon.  The patient states they started her on a muscle relaxer and anti-inflammatory medication because of this.  The patient denies any other new medical problems or changes in medications since last appointment with this facility.  The patient reports compliance with her current medication regimen.  The patient denies any side effects or concerns from her current medication regimen.   The patient would like to adjust her hydroxyzine at this visit, and reports when she takes hydroxyzine as needed it does seem to help but she would like to try a higher dose if possible.  The patient denies any suicidal or homicidal ideations, plans, or intent at today's encounter and is convincing.  The patient denies any auditory hallucinations or visual hallucinations.  The patient does not endorse any significant symptoms consistent with carey or psychosis at today's encounter.      Prior Psychiatric Medications:  -Xanax 0.25 mg by mouth once daily as needed for anxiety - last prescribed 9/24/21  -Paxil  -Wellbutrin  -Zoloft  -Prozac  -Trazodone  -Buspirone -nauseated stomach  -Possibly Lexapro, but she is not sure  -Possibly Effexor, but she is not sure  -Possible medicine to assist with sleep  -Viibryd  -Hydroxyzine      Last Menstrual Period:  Hysterectomy.        The following portions of the patient's history were reviewed and updated as appropriate: allergies, current medications, past family history, past medical history, past social history, past surgical history and problem list.          Past Medical History:  Past Medical History:   Diagnosis Date   • Anesthesia complication     HAS TROUBLE GETTING \"NUMB\"    • Anxiety    • Arthritis    • Asthma    • Bladder spasms    • Depression    • Diverticulosis    • GERD " "(gastroesophageal reflux disease)    • Memory loss     FROM SKULL FRACTURE AND FALL-SHORT TERM MEMORY LOSS   • Mental disorder     PTSD   • Migraine    • Obesity    • Seasonal allergies    • Seizures ([facility])     \"convulsions\" at age 5   • Skull fracture ([facility]) 2012   • Sleep apnea with use of continuous positive airway pressure (CPAP)     INSTRUCTED TO BRING OWN MASK AND TUBING    • SOB (shortness of breath) on exertion    • Type 2 diabetes mellitus without complication, without long-term current use of insulin ([facility]) 10/11/2018   • Wears contact lenses    • Wears partial dentures     TOP ONLY    • Wears prescription eyeglasses    • Wears prescription eyeglasses        Social History:  Social History     Socioeconomic History   • Marital status: Single   Tobacco Use   • Smoking status: Never Smoker   • Smokeless tobacco: Never Used   Vaping Use   • Vaping Use: Never used   Substance and Sexual Activity   • Alcohol use: Yes     Comment: Patient reports occasoinal wine use to help relax her and fall asleep, not daily use   • Drug use: Never   • Sexual activity: Not Currently     Birth control/protection: Post-menopausal, Surgical       Family History:  Family History   Problem Relation Age of Onset   • Cancer Mother         cervical cancer   • Dementia Mother    • Cancer Maternal Aunt         lymphoma   • Cancer Paternal Aunt    • Breast cancer Paternal Aunt 70   • Alcohol abuse Other    • Depression Other        Past Surgical History:  Past Surgical History:   Procedure Laterality Date   • CERVICAL POLYPECTOMY     • TOTAL LAPAROSCOPIC HYSTERECTOMY N/A 9/18/2017    Procedure: TOTAL LAPAROSCOPIC HYSTERECTOMY, BILATERAL SALPINGO OOPHORECTOMY WITH DAVINCI ROBOT ;  Surgeon: Shannon Grimaldo DO;  Location: [facility];  Service:    • WISDOM TOOTH EXTRACTION         Problem List:  Patient Active Problem List   Diagnosis   • Abnormal computed tomography scan   • Anemia   • Post traumatic stress disorder (PTSD)   • Anxiety   • Strain " of neck muscle   • Cough   • Diverticulitis of colon   • Fatigue   • Steatosis of liver   • Lateral epicondylitis   • Knee pain   • Spasm   • Adiposity   • Obstructive sleep apnea syndrome   • Osteoarthritis   • Palpitations   • Nausea   • Shortness of breath   • Skin tag   • Candidiasis of vagina   • Viral upper respiratory tract infection   • Gastroesophageal reflux disease   • Acute pain of left shoulder   • Abdominal distension (gaseous)   • SOB (shortness of breath) on exertion   • Sleep apnea with use of continuous positive airway pressure (CPAP)   • Seasonal allergies   • Obesity   • Migraine   • Mental disorder   • Diverticulosis   • Depression   • Arthritis   • Hypertension   • Itching   • Stabbing headache   • Temple tenderness   • TMJ click   • Mild intermittent asthma without complication   • Skull fracture (HCC)   • Type 2 diabetes mellitus without complication, without long-term current use of insulin (HCC)   • Health care maintenance   • Moderate episode of recurrent major depressive disorder (HCC)   • Generalized anxiety disorder   • High risk medications (not anticoagulants) long-term use   • Hyperlipidemia       Allergy:   Allergies   Allergen Reactions   • Mold Extract [Trichophyton] Shortness Of Breath, Anxiety, Irritability, Other (See Comments) and Palpitations     SOA   • Lortab [Hydrocodone-Acetaminophen] Nausea And Vomiting     SEVERE NAUSEA AND SLIGHT VOMITING   • Methylprednisolone Other (See Comments)     Bleeding, cramping   • Pollen Extract Other (See Comments)     SNEEZING AND CONGESTION         Current Medications:   Current Outpatient Medications   Medication Sig Dispense Refill   • hydrOXYzine (ATARAX) 25 MG tablet Take 1 tablet by mouth 2 (Two) Times a Day As Needed (anxiety and/or sleep). 60 tablet 1   • vilazodone (Viibryd) 40 MG tablet tablet Take 1 tablet by mouth Daily. 30 tablet 1   • acetaminophen (TYLENOL) 500 MG tablet Take 500 mg by mouth Every 6 (Six) Hours As Needed for  Mild Pain .     • albuterol sulfate  (90 Base) MCG/ACT inhaler Inhale 1 puff Every 6 (Six) Hours As Needed for Shortness of Air. INHALE TWO PUFFS BY MOUTH EVERY 6 HOURS AS NEEDED 1 inhaler 2   • Blood Glucose Monitoring Suppl (OneTouch Verio) w/Device kit 1 kit Daily. 1 kit 0   • cyclobenzaprine (FLEXERIL) 5 MG tablet Take 1 tablet by mouth 2 (Two) Times a Day As Needed for Muscle Spasms. 20 tablet 0   • Diclofenac Sodium (VOLTAREN) 1 % gel gel APPLY 4 GRAMS TOPICALLY TO THE APPROPRIATE AREA AS DIRECTED FOUR TIMES A DAYS AS NEEDED FOR PAIN IN SHOULDERS 100 g 1   • EPINEPHrine (EPIPEN) 0.3 MG/0.3ML solution auto-injector injection Inject 0.3 mL into the appropriate muscle as directed by prescriber As Needed (FOR SEVERE ALLERGIC REACTION). 1 each 1   • famotidine (PEPCID) 40 MG tablet Take 1 tablet by mouth Daily. 30 tablet 5   • fluticasone (FLONASE) 50 MCG/ACT nasal spray 2 sprays into the nostril(s) as directed by provider Daily for 30 days. 16 g 5   • Fluticasone Furoate-Vilanterol (Breo Ellipta) 200-25 MCG/INH inhaler Inhale 1 puff Daily. 60 each 5   • glucose blood (OneTouch Verio) test strip Use one strip to check blood sugars one time daily 100 each 3   • glucose monitor monitoring kit Use as directed to check blood glucose once daily. 1 each 0   • guaiFENesin-codeine (GUAIFENESIN AC) 100-10 MG/5ML liquid Take 5 mL by mouth 3 (Three) Times a Day As Needed for Cough. 118 mL 0   • Lancets (onetouch ultrasoft) lancets Use one lancet daily to check blood sugars 100 each 3   • lidocaine (LIDODERM) 5 % Place 1 patch on the skin as directed by provider Daily. Remove & Discard patch within 12 hours or as directed by MD 6 patch 0   • loratadine (CLARITIN) 10 MG tablet TAKE ONE TABLET BY MOUTH DAILY 30 tablet 0   • metFORMIN ER (GLUCOPHAGE-XR) 500 MG 24 hr tablet Take 2 tablets by mouth Daily With Breakfast. 60 tablet 2   • methylPREDNISolone (Medrol) 4 MG dose pack follow package directions 21 tablet 0   •  ondansetron (Zofran) 8 MG tablet Take 1 tablet by mouth Every 8 (Eight) Hours As Needed for Nausea or Vomiting. 15 tablet 0   • rosuvastatin (CRESTOR) 5 MG tablet TAKE ONE TABLET BY MOUTH DAILY 90 tablet 0   • Vitamin D, Cholecalciferol, 25 MCG (1000 UT) capsule Take 1,000 Units by mouth. Take one po daily       No current facility-administered medications for this visit.       Review of Symptoms:    Review of Systems   Constitutional: Positive for activity change and fatigue.   HENT: Negative.    Eyes: Negative.    Respiratory: Negative.    Cardiovascular: Negative.    Gastrointestinal: Negative.    Endocrine: Negative.    Genitourinary: Negative.    Musculoskeletal: Positive for arthralgias, back pain, myalgias, neck pain and neck stiffness.   Skin: Negative.    Neurological: Positive for memory problem (chronic).   Psychiatric/Behavioral: Positive for decreased concentration, sleep disturbance, depressed mood and stress. Negative for agitation, behavioral problems, dysphoric mood, hallucinations, self-injury, suicidal ideas and negative for hyperactivity. The patient is nervous/anxious.          Physical Exam:   not currently breastfeeding. There is no height or weight on file to calculate BMI.   Due to the remote nature of this encounter (virtual encounter), vitals were unable to be obtained.  Height stated at 65.5 inches.  Weight stated at around 259 pounds.      Physical Exam  Constitutional:       Appearance: Normal appearance.   Neurological:      Mental Status: She is alert and oriented to person, place, and time.   Psychiatric:         Attention and Perception: Attention normal. She is attentive.         Mood and Affect: Mood and affect normal.         Speech: Speech normal.         Behavior: Behavior normal. Behavior is cooperative.         Thought Content: Thought content normal. Thought content is not paranoid or delusional. Thought content does not include homicidal or suicidal ideation. Thought  content does not include homicidal or suicidal plan.         Cognition and Memory: Cognition and memory normal.         Judgment: Judgment normal.           Mental Status Exam:   Hygiene:   good  Cooperation:  Cooperative  Eye Contact:  Good  Psychomotor Behavior:  Appropriate  Affect:  Appropriate  Mood: normal  Hopelessness: Denies  Speech:  Normal  Thought Process:  Linear  Thought Content:  Mood congruent  Suicidal:  None  Homicidal:  None  Hallucinations:  None  Delusion:  None  Memory:  Intact  Orientation:  Person, Place and Time  Reliability:  good  Insight:  Good  Judgement:  Good  Impulse Control:  Good  Physical/Medical Issues:  Chronic, no acute physical/medical issues at today's encounter         Lab Results:   Admission on 02/08/2022, Discharged on 02/08/2022   Component Date Value Ref Range Status   • Color 02/08/2022 Yellow  Yellow, Straw, Dark Yellow, Agnes Final   • Clarity, UA 02/08/2022 Clear  Clear Final   • Glucose, UA 02/08/2022 Negative  Negative, 1000 mg/dL (3+) mg/dL Final   • Bilirubin 02/08/2022 Negative  Negative Final   • Ketones, UA 02/08/2022 Negative  Negative Final   • Specific Gravity  02/08/2022 1.010  1.005 - 1.030 Final   • Blood, UA 02/08/2022 Negative  Negative Final   • pH, Urine 02/08/2022 6.5  5.0 - 8.0 Final   • Protein, POC 02/08/2022 Negative  Negative mg/dL Final   • Urobilinogen, UA 02/08/2022 Normal  Normal Final   • Nitrite, UA 02/08/2022 Negative  Negative Final   • Leukocytes 02/08/2022 Negative  Negative Final   • Urine Culture 02/08/2022 No growth   Final   • Glucose 02/08/2022 97  70 - 130 mg/dL Final         Assessment/Plan   Problems Addressed this Visit    None     Visit Diagnoses     Major depressive disorder, recurrent episode, moderate (HCC)  (Chronic)       Relevant Medications    vilazodone (Viibryd) 40 MG tablet tablet    hydrOXYzine (ATARAX) 25 MG tablet    Anxiety disorder, unspecified type  (Chronic)       Relevant Medications    vilazodone (Viibryd)  40 MG tablet tablet    hydrOXYzine (ATARAX) 25 MG tablet    Sleeping difficulties        Relevant Medications    hydrOXYzine (ATARAX) 25 MG tablet      Diagnoses       Codes Comments    Major depressive disorder, recurrent episode, moderate (HCC)     ICD-10-CM: F33.1  ICD-9-CM: 296.32     Anxiety disorder, unspecified type     ICD-10-CM: F41.9  ICD-9-CM: 300.00     Sleeping difficulties     ICD-10-CM: G47.9  ICD-9-CM: 780.50           Visit Diagnoses:    ICD-10-CM ICD-9-CM   1. Major depressive disorder, recurrent episode, moderate (HCC)  F33.1 296.32   2. Anxiety disorder, unspecified type  F41.9 300.00   3. Sleeping difficulties  G47.9 780.50          GOALS:  Short Term Goals: Patient will be compliant with medication, and patient will have no significant medication related side effects.  Patient will be engaged in psychotherapy as indicated.  Patient will report subjective improvement of symptoms.  Long term goals: To stabilize mood and treat/improve subjective symptoms, the patient will stay out of the hospital, the patient will be at an optimal level of functioning, and the patient will take all medications as prescribed.  The patient verbalized understanding and agreement with goals that were mutually set.      TREATMENT PLAN: Continue supportive psychotherapy efforts and medications as indicated.  Medication and treatment options, both pharmacological and non-pharmacological treatment options, discussed during today's visit, including any off label use of medication. Patient acknowledged and verbally consented with current treatment plan and was educated on the importance of compliance with treatment and follow-up appointments.      -Continue Viibryd 40 mg by mouth once daily for mood.  -Increase hydroxyzine to 25 mg by mouth up to twice daily as needed for anxiety and or sleep.  -Patient to call today and reschedule/schedule with her therapist as she has no upcoming appointments.  -The patient reports she  has neuropsychological testing scheduled at  July 2022, she is advised to keep this testing.      MEDICATION ISSUES:  Discussed medication options and treatment plan of prescribed medication, any off label use of medication, as well as the risks, benefits, any black box warnings including increased suicidality, and side effects including but not limited to potential falls, dizziness, possible impaired driving, GI side effects (change in appetite, abdominal discomfort, nausea, vomiting, diarrhea, and/or constipation), dry mouth, somnolence, sedation, insomnia, activation, agitation, irritation, tremors, abnormal muscle movements or disorders, headache, sweating, possible bruising or rare bleeding, electrolyte and/or fluid abnormalities, change in blood pressure/heart rate/and or heart rhythm, sexual dysfunction, and metabolic adversities among others. Patient and/or guardian agreeable to call the office with any worsening of symptoms or onset of side effects, or if any concerns or questions arise.  The contact information for the office is made available to the patient and/or guardian.  Patient and/or guardian agreeable to call 911 or go to the nearest ER should they begin having any SI/HI, or if any urgent concerns arise. No medication side effects or related complaints today.      SUICIDE RISK ASSESSMENT AND SAFETY PLAN: Unalterable demographics and a history of mental health intervention indicate this patient is in a high risk category compared to the general population. At present, the patient denies active SI/HI, intentions, or plans at this time and agrees to seek immediate care should such thoughts develop. The patient verbalizes understanding of how to access emergency care if needed and agrees to do so. Consideration of suicide risk and protective factors such as history, current presentation, individual strengths and weaknesses, psychosocial and environmental stressors and variables, psychiatric illness  and symptoms, medical conditions and pain, took place in this interview. Based on those considerations, the patient is determined: within individual baseline and presenting no imminent risk for suicide or homicide. Other recommendations: The patient does not meet the criteria for inpatient admission and is not a safety risk to self or others at today's visit. Inpatient treatment offers no significant advantages over outpatient treatment for this patient at today's visit.  The patient was given ample time for questions and fully participated in treatment planning.  The patient was encouraged to call the clinic with any questions or concerns.  The patient was informed of access to emergency care. If patient were to develop any significant symptomatology, suicidal ideation, homicidal ideation, any concerns, or feel unsafe at any time they are to call the clinic and if unable to get immediate assistance should immediately call 911 or go to the nearest emergency room.  Patient contracted verbally for the following: If you are experiencing an emotional crisis or have thoughts of harming yourself or others, please go to your nearest local emergency room or call 911. Will continue to re-assess medication response and side effects frequently to establish efficacy and ensure safety. Risks, any black box warnings, side effects, off label usage, and benefits of medication and treatment discussed with patient, along with potential adverse side effects of current and/or newly prescribed medication, alternative treatment options, and OTC medications.  Patient verbalized understanding of potential risks, any off label use of medication, any black box warnings, and any side effects in their own words. The patient verbalized understanding and agreed to comply with the safety plan discussed in their own words.  Patient given the number to the office. Number also discussed of the 24- hour suicide hotline.         MEDS ORDERED DURING  VISIT:  New Medications Ordered This Visit   Medications   • vilazodone (Viibryd) 40 MG tablet tablet     Sig: Take 1 tablet by mouth Daily.     Dispense:  30 tablet     Refill:  1   • hydrOXYzine (ATARAX) 25 MG tablet     Sig: Take 1 tablet by mouth 2 (Two) Times a Day As Needed (anxiety and/or sleep).     Dispense:  60 tablet     Refill:  1       Return in about 8 weeks (around 5/2/2022), or if symptoms worsen or fail to improve, for Next scheduled follow up and Recheck.         Functional Status: Moderate impairment     Prognosis: Fair with Ongoing Treatment             This document has been electronically signed by REBECA Bello  March 7, 2022 17:47 EST    Some of the data in this electronic note has been brought forward from a previous encounter, any necessary changes have been made, it has been reviewed by this APRN, and it is accurate.    Please note that portions of this note were completed with a voice recognition program.

## 2022-03-10 ENCOUNTER — OFFICE VISIT (OUTPATIENT)
Dept: INTERNAL MEDICINE | Facility: CLINIC | Age: 62
End: 2022-03-10

## 2022-03-10 ENCOUNTER — TELEPHONE (OUTPATIENT)
Dept: PSYCHIATRY | Facility: CLINIC | Age: 62
End: 2022-03-10

## 2022-03-10 VITALS
BODY MASS INDEX: 42.87 KG/M2 | DIASTOLIC BLOOD PRESSURE: 78 MMHG | WEIGHT: 261.6 LBS | SYSTOLIC BLOOD PRESSURE: 118 MMHG | HEART RATE: 77 BPM | OXYGEN SATURATION: 97 %

## 2022-03-10 DIAGNOSIS — M79.601 RIGHT ARM PAIN: ICD-10-CM

## 2022-03-10 DIAGNOSIS — E11.9 TYPE 2 DIABETES MELLITUS WITHOUT COMPLICATION, WITHOUT LONG-TERM CURRENT USE OF INSULIN: Primary | ICD-10-CM

## 2022-03-10 LAB
EXPIRATION DATE: NORMAL
GLUCOSE BLDC GLUCOMTR-MCNC: 179 MG/DL (ref 70–130)
HBA1C MFR BLD: 7.2 %
Lab: NORMAL

## 2022-03-10 PROCEDURE — 83036 HEMOGLOBIN GLYCOSYLATED A1C: CPT | Performed by: PHYSICIAN ASSISTANT

## 2022-03-10 PROCEDURE — 82947 ASSAY GLUCOSE BLOOD QUANT: CPT | Performed by: PHYSICIAN ASSISTANT

## 2022-03-10 PROCEDURE — 99214 OFFICE O/P EST MOD 30 MIN: CPT | Performed by: PHYSICIAN ASSISTANT

## 2022-03-10 PROCEDURE — 3051F HG A1C>EQUAL 7.0%<8.0%: CPT | Performed by: PHYSICIAN ASSISTANT

## 2022-03-10 RX ORDER — DULAGLUTIDE 0.75 MG/.5ML
0.75 INJECTION, SOLUTION SUBCUTANEOUS WEEKLY
Qty: 4 PEN | Refills: 3 | Status: SHIPPED | OUTPATIENT
Start: 2022-03-10 | End: 2022-05-27

## 2022-03-10 RX ORDER — BLOOD SUGAR DIAGNOSTIC
1 STRIP MISCELLANEOUS WEEKLY
Qty: 100 EACH | Refills: 3 | Status: SHIPPED | OUTPATIENT
Start: 2022-03-10

## 2022-03-10 RX ORDER — CELECOXIB 50 MG/1
50 CAPSULE ORAL AS NEEDED
COMMUNITY
Start: 2022-03-04

## 2022-03-10 NOTE — ASSESSMENT & PLAN NOTE
Diabetes is worsening.   Continue current treatment regimen.  Reminded to bring in blood sugar diary at next visit.  Dietary recommendations for ADA diet.  Regular aerobic exercise.  Start Trulicity as ordered.  Diabetes will be reassessed in 3 months.

## 2022-03-10 NOTE — TELEPHONE ENCOUNTER
APRN attempted to call patient and it went to voicemail, but was unable to leave a voicemail for the patient as the voicemail memory was full.  Please let this APRN know if the patient calls back.

## 2022-03-10 NOTE — TELEPHONE ENCOUNTER
PATIENT REPORTS PHARMACISTS MADE HER AWARE THAT SHE SHOULDN'T BE TAKING HER VIIBYRD AND ATARAX TOGETHER AT NIGHT ITS DANGEROUS .    PT IS REQUESTING A CALL BACK FROM PROVIDER [phone] .

## 2022-03-10 NOTE — PROGRESS NOTES
Chief Complaint   Patient presents with   • Right sided chest wall pain     Follow Up   • Diabetes       Subjective     Sandra Auguste is a 61 y.o. female.        History of Present Illness     Pt saw [facility], Dr. Bartholomew, and was referred to PM&R physician, Dr. Smith. He thinks she has a problem in her cervical spine that is causing her right hand pain and neck pain. Also some question of whether her urinary stress incontinence is related to this. Had xrays of her neck and will be having an MRI. She may need physical therapy vs a surgeon depending on the MRI.     Pt has not been checking her fasting blood sugars regularly. She has been going to bed when her sister gets home at 5-6 and then gets up to eat around 10 pm to eat. Goes back to bed at 2-3.       Current Outpatient Medications:   •  acetaminophen (TYLENOL) 500 MG tablet, Take 500 mg by mouth Every 6 (Six) Hours As Needed for Mild Pain ., Disp: , Rfl:   •  albuterol sulfate  (90 Base) MCG/ACT inhaler, Inhale 1 puff Every 6 (Six) Hours As Needed for Shortness of Air. INHALE TWO PUFFS BY MOUTH EVERY 6 HOURS AS NEEDED, Disp: 1 inhaler, Rfl: 2  •  Blood Glucose Monitoring Suppl (OneTouch Verio) w/Device kit, 1 kit Daily., Disp: 1 kit, Rfl: 0  •  cyclobenzaprine (FLEXERIL) 5 MG tablet, Take 1 tablet by mouth 2 (Two) Times a Day As Needed for Muscle Spasms., Disp: 20 tablet, Rfl: 0  •  Diclofenac Sodium (VOLTAREN) 1 % gel gel, APPLY 4 GRAMS TOPICALLY TO THE APPROPRIATE AREA AS DIRECTED FOUR TIMES A DAYS AS NEEDED FOR PAIN IN SHOULDERS, Disp: 100 g, Rfl: 1  •  EPINEPHrine (EPIPEN) 0.3 MG/0.3ML solution auto-injector injection, Inject 0.3 mL into the appropriate muscle as directed by prescriber As Needed (FOR SEVERE ALLERGIC REACTION)., Disp: 1 each, Rfl: 1  •  famotidine (PEPCID) 40 MG tablet, Take 1 tablet by mouth Daily., Disp: 30 tablet, Rfl: 5  •  Fluticasone Furoate-Vilanterol (Breo Ellipta) 200-25 MCG/INH inhaler, Inhale 1 puff Daily., Disp:  60 each, Rfl: 5  •  glucose blood (OneTouch Verio) test strip, Use one strip to check blood sugars one time daily, Disp: 100 each, Rfl: 3  •  glucose monitor monitoring kit, Use as directed to check blood glucose once daily., Disp: 1 each, Rfl: 0  •  guaiFENesin-codeine (GUAIFENESIN AC) 100-10 MG/5ML liquid, Take 5 mL by mouth 3 (Three) Times a Day As Needed for Cough., Disp: 118 mL, Rfl: 0  •  hydrOXYzine (ATARAX) 25 MG tablet, Take 1 tablet by mouth 2 (Two) Times a Day As Needed (anxiety and/or sleep)., Disp: 60 tablet, Rfl: 1  •  Lancets (onetouch ultrasoft) lancets, Use one lancet daily to check blood sugars, Disp: 100 each, Rfl: 3  •  lidocaine (LIDODERM) 5 %, Place 1 patch on the skin as directed by provider Daily. Remove & Discard patch within 12 hours or as directed by MD, Disp: 6 patch, Rfl: 0  •  loratadine (CLARITIN) 10 MG tablet, TAKE ONE TABLET BY MOUTH DAILY, Disp: 30 tablet, Rfl: 0  •  metFORMIN ER (GLUCOPHAGE-XR) 500 MG 24 hr tablet, Take 2 tablets by mouth Daily With Breakfast., Disp: 60 tablet, Rfl: 2  •  ondansetron (Zofran) 8 MG tablet, Take 1 tablet by mouth Every 8 (Eight) Hours As Needed for Nausea or Vomiting., Disp: 15 tablet, Rfl: 0  •  rosuvastatin (CRESTOR) 5 MG tablet, TAKE ONE TABLET BY MOUTH DAILY, Disp: 90 tablet, Rfl: 0  •  vilazodone (Viibryd) 40 MG tablet tablet, Take 1 tablet by mouth Daily., Disp: 30 tablet, Rfl: 1  •  Vitamin D, Cholecalciferol, 25 MCG (1000 UT) capsule, Take 1,000 Units by mouth. Take one po daily, Disp: , Rfl:   •  celecoxib (CeleBREX) 50 MG capsule, , Disp: , Rfl:   •  Dulaglutide (Trulicity) 0.75 MG/0.5ML solution pen-injector, Inject 0.75 mg under the skin into the appropriate area as directed 1 (One) Time Per Week., Disp: 4 pen, Rfl: 3  •  fluticasone (FLONASE) 50 MCG/ACT nasal spray, 2 sprays into the nostril(s) as directed by provider Daily for 30 days., Disp: 16 g, Rfl: 5  •  Insulin Pen Needle (Pen Needles) 32G X 6 MM misc, 1 dose 1 (One) Time Per  Week., Disp: 100 each, Rfl: 3     PMFSH  The following portions of the patient's history were reviewed and updated as appropriate: allergies, current medications, past family history, past medical history, past social history, past surgical history and problem list.    Review of Systems   Constitutional: Negative for activity change, appetite change and fatigue.   HENT: Negative for congestion and rhinorrhea.    Respiratory: Negative for chest tightness and shortness of breath.    Cardiovascular: Negative for chest pain and palpitations.   Gastrointestinal: Negative for abdominal pain.   Genitourinary: Negative for dysuria.   Musculoskeletal: Positive for arthralgias and myalgias.   Neurological: Positive for numbness. Negative for dizziness, weakness, light-headedness and headaches.   Psychiatric/Behavioral: Negative for dysphoric mood. The patient is not nervous/anxious.        Objective   /78   Pulse 77   Wt 119 kg (261 lb 9.6 oz)   LMP  (LMP Unknown)   SpO2 97%   BMI 42.87 kg/m²     Physical Exam  Vitals and nursing note reviewed.   Constitutional:       Appearance: She is well-developed.   HENT:      Head: Normocephalic and atraumatic.      Right Ear: External ear normal.      Left Ear: External ear normal.   Eyes:      Conjunctiva/sclera: Conjunctivae normal.   Cardiovascular:      Rate and Rhythm: Normal rate and regular rhythm.   Pulmonary:      Effort: Pulmonary effort is normal.      Breath sounds: Normal breath sounds.   Musculoskeletal:         General: Normal range of motion.      Cervical back: Normal range of motion.   Skin:     General: Skin is warm and dry.   Psychiatric:         Behavior: Behavior normal.         Results for orders placed or performed in visit on 03/10/22   POC Glucose    Specimen: Blood   Result Value Ref Range    Glucose 179 (A) 70 - 130 mg/dL   POC Glycosylated Hemoglobin (Hb A1C)    Specimen: Blood   Result Value Ref Range    Hemoglobin A1C 7.2 %    Lot Number  [id number]     Expiration Date 11/15/2023         ASSESSMENT/PLAN    Diagnoses and all orders for this visit:    1. Type 2 diabetes mellitus without complication, without long-term current use of insulin (HCC) (Primary)  Assessment & Plan:  Diabetes is worsening.   Continue current treatment regimen.  Reminded to bring in blood sugar diary at next visit.  Dietary recommendations for ADA diet.  Regular aerobic exercise.  Start Trulicity as ordered.  Diabetes will be reassessed in 3 months.    Orders:  -     POC Glucose  -     POC Glycosylated Hemoglobin (Hb A1C)  -     Dulaglutide (Trulicity) 0.75 MG/0.5ML solution pen-injector; Inject 0.75 mg under the skin into the appropriate area as directed 1 (One) Time Per Week.  Dispense: 4 pen; Refill: 3  -     Insulin Pen Needle (Pen Needles) 32G X 6 MM misc; 1 dose 1 (One) Time Per Week.  Dispense: 100 each; Refill: 3    2. Right arm pain  Comments:  Ongoing evaluation and treatment by .           Return in about 3 months (around 6/10/2022) for Follow up.  Answers for HPI/ROS submitted by the patient on 3/10/2022  Please describe your symptoms.: Multiple problems with follow up.  Have you had these symptoms before?: No  How long have you been having these symptoms?: Greater than 2 weeks  What is the primary reason for your visit?: Other

## 2022-03-18 ENCOUNTER — PRIOR AUTHORIZATION (OUTPATIENT)
Dept: INTERNAL MEDICINE | Facility: CLINIC | Age: 62
End: 2022-03-18

## 2022-03-24 ENCOUNTER — OFFICE VISIT (OUTPATIENT)
Dept: NEUROLOGY | Facility: CLINIC | Age: 62
End: 2022-03-24

## 2022-03-24 VITALS
OXYGEN SATURATION: 99 % | HEART RATE: 93 BPM | SYSTOLIC BLOOD PRESSURE: 122 MMHG | WEIGHT: 263.2 LBS | DIASTOLIC BLOOD PRESSURE: 88 MMHG | BODY MASS INDEX: 42.3 KG/M2 | HEIGHT: 66 IN | TEMPERATURE: 97.3 F

## 2022-03-24 DIAGNOSIS — M54.2 NECK PAIN: ICD-10-CM

## 2022-03-24 DIAGNOSIS — G31.84 MILD COGNITIVE IMPAIRMENT: Primary | ICD-10-CM

## 2022-03-24 PROCEDURE — 99214 OFFICE O/P EST MOD 30 MIN: CPT | Performed by: PSYCHIATRY & NEUROLOGY

## 2022-03-24 RX ORDER — MULTIPLE VITAMINS W/ MINERALS TAB 9MG-400MCG
1 TAB ORAL DAILY
COMMUNITY

## 2022-03-24 NOTE — PROGRESS NOTES
Subjective:    CC: Sandra Auguste is seen today  for memory problems    Current visit-patient had to cancel her neuropsych evaluation in January due to snow.  It is rescheduled for July.  She still has some forgetfulness and short-term memory problems.  Continues to be stressed out.  Has also been having difficulties sleeping at night due to being unable to relax and being overly tired.  Does use her CPAP for her KELLY and takes hydroxyzine.  Her diabetes is fairly well controlled and her last A1c was 7.2.  Her PCP has started her on Trulicity which she will start taking from today.  She also went to  recently with neck pain, back pain and right arm/hand pain.  Had x-rays of the cervical and lumbar spine that showed multilevel degenerative changes.  She saw a physiatrist who has ordered a MRI cervical and lumbar spine for her.  He told her that she may have tendinitis in her right wrist.    Last visit-this is a patient previously seen by [facility] for memory problems.  Patient started noticing the symptoms in 2012 following a concussion marked by forgetfulness and word finding problems.  Over time her symptoms have remained stable.  She denies any impairment of ADLs including driving.  She resides with her mother and helps take care of her.  She has also had symptoms of anxiety and depression.  At her last visit patient scored a MMSE of 29/30 and it was felt that her cognitive symptoms could be in part due to her underlying anxiety and depression as well as some of her medications including Xanax.  Her neuropsych evaluation at  is scheduled for January 22.  Patient also had an MRI brain that showed right frontal atrophy as well as chronic ischemic changes but no acute intracranial abnormalities.  She also had blood work including TSH and B12 level that were both within normal limits.  She has been getting speech therapy which seems to be helping.  Wants to continue getting it.  Of note-I personally reviewed her MRI  hugh and Zeny's notes as follows-    Sandra Auguste is a 60 y.o. female who comes to clinic today for evaluation of memory loss . She has noted symptoms since at least 2012 following a concussion, marked initially by forgetfulness, repetitiveness, and word-finding difficulties. This has remained static over time. Additional symptoms have included impairments in both short and long term memory as well as executive function. There have been associated symptoms of significant anxiety and depression. She denies impairments in ADL's. She manages her medications and finances, though is having more difficulty doing so. She continues to drive.  She is currently residing with her mother in [place], for whom she is the primary caregiver.     She has also noted headaches for several years, though the history is a bit unclear. She describes intermittent sharp shooting unilateral headachs with associated nausea as well as light and sound sensitivity. These typically last for several seconds. She also notes a history of long migraines. These headaches vary in location and character and typically occur 1-2 times a month, lasting up to several days. Her headaches are worse with increased stress.     Prior evaluation has included an MRI of the brain in 2017 which was unremarkable .        The following portions of the patient's history were reviewed today and updated as of 08/09/2021  : allergies, current medications, past family history, past medical history, past social history, past surgical history and problem list  These document will be scanned to patient's chart.      Current Outpatient Medications:   •  acetaminophen (TYLENOL) 500 MG tablet, Take 500 mg by mouth Every 6 (Six) Hours As Needed for Mild Pain ., Disp: , Rfl:   •  albuterol sulfate  (90 Base) MCG/ACT inhaler, Inhale 1 puff Every 6 (Six) Hours As Needed for Shortness of Air. INHALE TWO PUFFS BY MOUTH EVERY 6 HOURS AS NEEDED, Disp: 1 inhaler, Rfl: 2  •  Blood  Glucose Monitoring Suppl (OneTouch Verio) w/Device kit, 1 kit Daily., Disp: 1 kit, Rfl: 0  •  celecoxib (CeleBREX) 50 MG capsule, , Disp: , Rfl:   •  cyclobenzaprine (FLEXERIL) 5 MG tablet, Take 1 tablet by mouth 2 (Two) Times a Day As Needed for Muscle Spasms., Disp: 20 tablet, Rfl: 0  •  Diclofenac Sodium (VOLTAREN) 1 % gel gel, APPLY 4 GRAMS TOPICALLY TO THE APPROPRIATE AREA AS DIRECTED FOUR TIMES A DAYS AS NEEDED FOR PAIN IN SHOULDERS, Disp: 100 g, Rfl: 1  •  Dulaglutide (Trulicity) 0.75 MG/0.5ML solution pen-injector, Inject 0.75 mg under the skin into the appropriate area as directed 1 (One) Time Per Week., Disp: 4 pen, Rfl: 3  •  EPINEPHrine (EPIPEN) 0.3 MG/0.3ML solution auto-injector injection, Inject 0.3 mL into the appropriate muscle as directed by prescriber As Needed (FOR SEVERE ALLERGIC REACTION)., Disp: 1 each, Rfl: 1  •  famotidine (PEPCID) 40 MG tablet, Take 1 tablet by mouth Daily., Disp: 30 tablet, Rfl: 5  •  Fluticasone Furoate-Vilanterol (Breo Ellipta) 200-25 MCG/INH inhaler, Inhale 1 puff Daily., Disp: 60 each, Rfl: 5  •  glucose blood (OneTouch Verio) test strip, Use one strip to check blood sugars one time daily, Disp: 100 each, Rfl: 3  •  glucose monitor monitoring kit, Use as directed to check blood glucose once daily., Disp: 1 each, Rfl: 0  •  guaiFENesin-codeine (GUAIFENESIN AC) 100-10 MG/5ML liquid, Take 5 mL by mouth 3 (Three) Times a Day As Needed for Cough., Disp: 118 mL, Rfl: 0  •  hydrOXYzine (ATARAX) 25 MG tablet, Take 1 tablet by mouth 2 (Two) Times a Day As Needed (anxiety and/or sleep)., Disp: 60 tablet, Rfl: 1  •  Insulin Pen Needle (Pen Needles) 32G X 6 MM misc, 1 dose 1 (One) Time Per Week., Disp: 100 each, Rfl: 3  •  Lancets (onetouch ultrasoft) lancets, Use one lancet daily to check blood sugars, Disp: 100 each, Rfl: 3  •  lidocaine (LIDODERM) 5 %, Place 1 patch on the skin as directed by provider Daily. Remove & Discard patch within 12 hours or as directed by MD, Disp: 6  "patch, Rfl: 0  •  loratadine (CLARITIN) 10 MG tablet, TAKE ONE TABLET BY MOUTH DAILY, Disp: 30 tablet, Rfl: 0  •  metFORMIN ER (GLUCOPHAGE-XR) 500 MG 24 hr tablet, Take 2 tablets by mouth Daily With Breakfast., Disp: 60 tablet, Rfl: 2  •  multivitamin with minerals (PX CENTURY VITAMIN PO), Take 1 tablet by mouth Daily., Disp: , Rfl:   •  ondansetron (Zofran) 8 MG tablet, Take 1 tablet by mouth Every 8 (Eight) Hours As Needed for Nausea or Vomiting., Disp: 15 tablet, Rfl: 0  •  rosuvastatin (CRESTOR) 5 MG tablet, TAKE ONE TABLET BY MOUTH DAILY, Disp: 90 tablet, Rfl: 0  •  vilazodone (Viibryd) 40 MG tablet tablet, Take 1 tablet by mouth Daily., Disp: 30 tablet, Rfl: 1  •  fluticasone (FLONASE) 50 MCG/ACT nasal spray, 2 sprays into the nostril(s) as directed by provider Daily for 30 days., Disp: 16 g, Rfl: 5  •  Vitamin D, Cholecalciferol, 25 MCG (1000 UT) capsule, Take 1,000 Units by mouth. Take one po daily, Disp: , Rfl:    Past Medical History:   Diagnosis Date   • Anesthesia complication     HAS TROUBLE GETTING \"NUMB\"    • Anxiety    • Arthritis    • Asthma    • Bladder spasms    • Depression    • Diverticulosis    • GERD (gastroesophageal reflux disease)    • Memory loss     FROM SKULL FRACTURE AND FALL-SHORT TERM MEMORY LOSS   • Mental disorder     PTSD   • Migraine    • Obesity    • Seasonal allergies    • Seizures (HCC)     \"convulsions\" at age 5   • Skull fracture (HCC) 2012   • Sleep apnea with use of continuous positive airway pressure (CPAP)     INSTRUCTED TO BRING OWN MASK AND TUBING    • SOB (shortness of breath) on exertion    • Tendonitis    • Type 2 diabetes mellitus without complication, without long-term current use of insulin ([facility]) 10/11/2018   • Wears contact lenses    • Wears partial dentures     TOP ONLY    • Wears prescription eyeglasses    • Wears prescription eyeglasses       Past Surgical History:   Procedure Laterality Date   • CERVICAL POLYPECTOMY     • TOTAL LAPAROSCOPIC HYSTERECTOMY N/A " "9/18/2017    Procedure: TOTAL LAPAROSCOPIC HYSTERECTOMY, BILATERAL SALPINGO OOPHORECTOMY WITH DAVINCI ROBOT ;  Surgeon: Shannon Grimaldo DO;  Location: [facility] OR;  Service:    • WISDOM TOOTH EXTRACTION        Family History   Problem Relation Age of Onset   • Cancer Mother         cervical cancer   • Dementia Mother    • Cancer Maternal Aunt         lymphoma   • Cancer Paternal Aunt    • Breast cancer Paternal Aunt 70   • Alcohol abuse Other    • Depression Other       Social History     Socioeconomic History   • Marital status: Single   Tobacco Use   • Smoking status: Never Smoker   • Smokeless tobacco: Never Used   Vaping Use   • Vaping Use: Never used   Substance and Sexual Activity   • Alcohol use: Yes     Comment: Patient reports occasoinal wine use to help relax her and fall asleep, not daily use   • Drug use: Never   • Sexual activity: Not Currently     Birth control/protection: Post-menopausal, Surgical     Review of Systems   Musculoskeletal: Positive for back pain and neck pain.   Neurological: Positive for memory problem.   All other systems reviewed and are negative.      Objective:    /88   Pulse 93   Temp 97.3 °F (36.3 °C)   Ht 166.4 cm (65.51\")   Wt 119 kg (263 lb 3.2 oz)   LMP  (LMP Unknown)   SpO2 99%   BMI 43.12 kg/m²     Neurology Exam:    Mental Status   Morbidly obese  Oriented to person, place, and time.   Memory-intact.  MMSE 29/30 previously.  Delayed recall of 3/3 at this visit.  Attention: normal.   Speech: speech is normal   Level of consciousness: alert  Able to perform simple calculations.   Able to name object. Able to read. Able to repeat. Able to write. Normal comprehension.     Cranial Nerves   Cranial nerves II through XII intact.     Motor Exam   Muscle bulk: normal  Overall muscle tone: normal    Strength   Strength 5/5 throughout.     Sensory Exam   Light touch normal.     Gait, Coordination, and Reflexes     Gait  Gait: normal    Coordination   Finger to nose " coordination: normal  Heel to shin coordination: normal    Tremor   Resting tremor: absent    Reflexes   Right brachioradialis: 2+  Left brachioradialis: 2+  Right biceps: 2+  Left biceps: 2+  Right patellar: 2+  Left patellar: 2+    Romberg negative      Assessment and Plan:  1.  Mild cognitive impairment  Her mild cognitive problems could be due to underlying anxiety, depression and sleep disturbances  -Neuropsych evaluation pending for July  -She does take hydroxyzine at night for her sleep.  I have also told her to try relaxation exercises and meditation prior to sleeping  -I have counseled her to carry on physical and mental exercises such as reading, solving crossword puzzles etc.  -I also counseled her on her vascular risk factors including managing her diabetes, blood pressure and hyperlipidemia.  -Her A1c is well controlled at 7.2.  -She is already on Crestor 5 mg for goal LDL of less than 100.  Her LDL was 109    2.  Neck pain  X-rays of the cervical and lumbar spine showed multilevel degenerative changes  She is seeing physiatry and will get MRI of the cervical and thoracic spine       Return in about 1 year (around 3/24/2023).     I spent 25 minutes with the patient out of which over 20 minutes were spent face to face.  I also reviewed her test results from     Laura Atkins MD

## 2022-03-30 NOTE — TELEPHONE ENCOUNTER
Chief Complaint   Patient presents with   • Urgent Care Follow Up     3/2/22 Swallowing issuses.  Didn't  the Pepcid.  Did a soft diet which helped for a little bit.      Visit Vitals  BP (!) 106/58   Pulse 80   Ht 5' 0.5\" (1.537 m)   Wt 43.7 kg (96 lb 6.4 oz)   SpO2 99%   BMI 18.52 kg/m²     HISTORY OF PRESENT ILLNESS:  Bharat is a 15 year old White male here today with his mother for a follow-up from Urgent Care. He was seen in Urgent Care on 03/02/2022 for globus sensation. Symptoms started 2 months ago. He was prescribed famotidine 20 mg twice daily and referred to ENT. He did not trial famotidine or see ENT. He had tried a soft food diet, which helped temporarily. He describes the sensation as irritation, \"feels uncomfortable.\" He reports after eating, he has a sensation of food lingering in his throat for a while. He denies having pain in his throat. He does drink adequate water daily and drinks water with his meals. Mother wonders if he has EoE. She does reports the patient has environmental allergies, but he has never been tested. His father has a history of acid reflux in his teens.    Mother reports the patient does have some anxiety about his throat. Prior to 2 months ago, he did have anxiety, but his anxiety wasn't as bad. He has reached out to his parents and stated he needs help with his anxiety. He does have a family history of anxiety. His mother plans to contact the school counselor today. He has had a lot of changes within the past year, including going to a new school. He does have good friends at school. He denies anxiety with test taking or other problems with anxiety in general at school.     I have reviewed the patient's allergies, surgical, social and family history, updating these as appropriate.  See Histories section of the EMR (electronic medical record) for a display of this information.  Medications and past medical history are noted below.    RECENT LABORATORY RESULTS:  Lab  Spoke with patient informed her of UA she stated she went to [facility] and they are sending her to a Urologist    Results   Component Value Date    POTASSIUM 4.0 06/11/2015    CREATININE 0.49 06/11/2015    AST 30 06/11/2015    BILIRUBIN 0.4 06/11/2015    TSH 1.871 06/11/2015    WBC 7.8 06/11/2015    HGB 12.3 06/11/2015     06/11/2015       Current Outpatient Medications   Medication Sig Dispense Refill   • Cetirizine HCl (ZYRTEC CHILDRENS ALLERGY PO) Take by mouth as needed.     • Multiple Vitamin (MULTI VITAMIN DAILY PO)      • famotidine (PEPCID) 40 MG/5ML suspension Take 2.5 mLs by mouth 2 times daily. 150 mL 0     No current facility-administered medications for this visit.     Past Medical History:   Diagnosis Date   • Hyperopia of both eyes with astigmatism      Social History     Tobacco Use   • Smoking status: Never Smoker   • Smokeless tobacco: Never Used   Vaping Use   • Vaping Use: never used   Substance Use Topics   • Alcohol use: Not on file   • Drug use: Not on file       REVIEW OF SYSTEMS:  Constitutional:  Denies fever or chills.   HENT: +throat irritation Denies nasal congestion or sore throat.   Respiratory:  Denies cough or shortness of breath.   Cardiovascular:  Denies chest pain or edema.   Gastrointestinal:  Denies abdominal pain, nausea, vomiting  Musculoskeletal:  Denies back pain or joint pain.   Integument:  Denies rash.   Neurologic:  Denies headache, focal weakness or sensory changes.   Psychiatric:  +anxiety Denies depression.   Additional ROS as noted in HPI.    PHYSICAL EXAMINATION:  Visit Vitals  BP (!) 106/58   Pulse 80   Ht 5' 0.5\" (1.537 m)   Wt 43.7 kg (96 lb 6.4 oz)   SpO2 99%   BMI 18.52 kg/m²      Constitutional:  Well-developed, well-nourished, no acute distress, nontoxic appearance.  Eyes:  Pupils equal, round, conjunctivae normal.   HENT:  Atraumatic.  External ears are normal.   Neck - Normal range of motion, no tenderness, supple. No lymphadenopathy or thyroid abnormality noted.  Respiratory:  No respiratory distress, normal breath sounds, no rales, no wheezing.    Cardiovascular:  Normal rate, normal rhythm, no murmurs, no gallops, no rubs.   Gastrointestinal:  Soft, nondistended, nontender.  No epigastric tenderness.  Musculoskeletal:  No edema  Integument:  Well-hydrated, no rash.   Lymphatic:  No lymphadenopathy noted.   Neurologic:  Alert and oriented, no focal deficits noted.   Psychiatric:  Anxious affect, tearful on occasion.  Speech and eye contact appropriate.     ASSESSMENT:  1. Globus sensation    2. Situational anxiety        PLAN:  Orders Placed This Encounter   • famotidine (PEPCID) 40 MG/5ML suspension     Recommend to trial over the counter famotidine 20 mg twice daily for globus sensation. If no improvement, recommend a follow-up with ENT or Allergy and Immunology. Call if a referral is needed.     Follow-up with counselors at school for anxiety. Discussed globus sensation can occur with anxiety. If no improvement in anxiety, we discussed starting a low-dose medication or a referral to behavioral health.     Return if symptoms worsen or fail to improve.    He will call or return to the office for any persistent, worsening, or concerning symptoms.  For any emergencies, he will present to the emergency room or call 911.     Plan alternatives, risks and benefits were discussed with the patient in detail and all resulting questions were answered to the patient's satisfaction.     Patient left the office in stable condition with verbal and written instructions.    On 04/01/22, Elisa CARBONE scribed the services personally performed by Renae Benito DO.    The documentation recorded by the scribe accurately and completely reflects the service(s) I personally performed and the decisions made by me.

## 2022-03-31 ENCOUNTER — TELEMEDICINE (OUTPATIENT)
Dept: PSYCHIATRY | Facility: CLINIC | Age: 62
End: 2022-03-31

## 2022-03-31 DIAGNOSIS — F33.1 MAJOR DEPRESSIVE DISORDER, RECURRENT EPISODE, MODERATE: Primary | ICD-10-CM

## 2022-03-31 PROCEDURE — 90791 PSYCH DIAGNOSTIC EVALUATION: CPT | Performed by: SOCIAL WORKER

## 2022-04-01 ENCOUNTER — TELEPHONE (OUTPATIENT)
Dept: GASTROENTEROLOGY | Facility: CLINIC | Age: 62
End: 2022-04-01

## 2022-04-01 NOTE — TELEPHONE ENCOUNTER
Ms Auguste called this afternoon. Patient wondering if Dr Chavarria diagnosed her with Gastroparesis when she had her EGD. I gave patient EGD results. I will also send copy or report to patient's my chart.

## 2022-04-04 DIAGNOSIS — J45.20 MILD INTERMITTENT ASTHMA WITHOUT COMPLICATION: ICD-10-CM

## 2022-04-04 DIAGNOSIS — J30.2 SEASONAL ALLERGIES: ICD-10-CM

## 2022-04-05 DIAGNOSIS — E11.9 TYPE 2 DIABETES MELLITUS WITHOUT COMPLICATION, WITHOUT LONG-TERM CURRENT USE OF INSULIN: ICD-10-CM

## 2022-04-05 RX ORDER — METFORMIN HYDROCHLORIDE 500 MG/1
1000 TABLET, EXTENDED RELEASE ORAL
Qty: 60 TABLET | Refills: 2 | Status: SHIPPED | OUTPATIENT
Start: 2022-04-05 | End: 2022-07-11

## 2022-04-19 DIAGNOSIS — R11.0 NAUSEA: ICD-10-CM

## 2022-04-19 RX ORDER — FAMOTIDINE 40 MG/1
40 TABLET, FILM COATED ORAL DAILY
Qty: 30 TABLET | Refills: 1 | Status: SHIPPED | OUTPATIENT
Start: 2022-04-19 | End: 2022-07-11

## 2022-04-21 ENCOUNTER — OFFICE VISIT (OUTPATIENT)
Dept: INTERNAL MEDICINE | Facility: CLINIC | Age: 62
End: 2022-04-21

## 2022-04-21 VITALS
RESPIRATION RATE: 18 BRPM | BODY MASS INDEX: 42.36 KG/M2 | HEART RATE: 87 BPM | SYSTOLIC BLOOD PRESSURE: 130 MMHG | DIASTOLIC BLOOD PRESSURE: 78 MMHG | TEMPERATURE: 97.7 F | OXYGEN SATURATION: 97 % | WEIGHT: 258.6 LBS

## 2022-04-21 DIAGNOSIS — L29.9 ITCHY SKIN: Primary | ICD-10-CM

## 2022-04-21 DIAGNOSIS — J30.2 SEASONAL ALLERGIES: ICD-10-CM

## 2022-04-21 PROCEDURE — 99214 OFFICE O/P EST MOD 30 MIN: CPT | Performed by: PHYSICIAN ASSISTANT

## 2022-04-21 RX ORDER — LORATADINE 10 MG/1
10 TABLET ORAL DAILY
Qty: 30 TABLET | Refills: 5 | Status: SHIPPED | OUTPATIENT
Start: 2022-04-21 | End: 2022-12-01 | Stop reason: SDUPTHER

## 2022-04-21 NOTE — PROGRESS NOTES
Chief Complaint   Patient presents with   • office visit     Patient experiencing lots of itching all over body. Feels like something is wrong. Believes it's a side affect of a medication.        Subjective     Sandra Auguste is a 61 y.o. female.        History of Present Illness     Pt has recently started itching all over intermittently. Having some vivid dreams and wonders if the 2 symptoms may be related. May have started after she started Trulicity.    Also started celebrex and not sure if it is related. She has itching in her ears and skin seems drier. She is using lotion. Has not been taking her hydroxyzine as regularly since the pharmacist told her she should separate it from the Viibryd. She has tolerated the 2 together before.            Current Outpatient Medications:   •  acetaminophen (TYLENOL) 500 MG tablet, Take 500 mg by mouth Every 6 (Six) Hours As Needed for Mild Pain ., Disp: , Rfl:   •  albuterol sulfate  (90 Base) MCG/ACT inhaler, Inhale 1 puff Every 6 (Six) Hours As Needed for Shortness of Air. INHALE TWO PUFFS BY MOUTH EVERY 6 HOURS AS NEEDED, Disp: 1 inhaler, Rfl: 2  •  Blood Glucose Monitoring Suppl (OneTouch Verio) w/Device kit, 1 kit Daily., Disp: 1 kit, Rfl: 0  •  Breo Ellipta 200-25 MCG/INH inhaler, INHALE ONE DOSE BY MOUTH DAILY, Disp: 60 each, Rfl: 5  •  celecoxib (CeleBREX) 50 MG capsule, , Disp: , Rfl:   •  EPINEPHrine (EPIPEN) 0.3 MG/0.3ML solution auto-injector injection, Inject 0.3 mL into the appropriate muscle as directed by prescriber As Needed (FOR SEVERE ALLERGIC REACTION)., Disp: 1 each, Rfl: 1  •  glucose blood (OneTouch Verio) test strip, Use one strip to check blood sugars one time daily, Disp: 100 each, Rfl: 3  •  glucose monitor monitoring kit, Use as directed to check blood glucose once daily., Disp: 1 each, Rfl: 0  •  guaiFENesin-codeine (GUAIFENESIN AC) 100-10 MG/5ML liquid, Take 5 mL by mouth 3 (Three) Times a Day As Needed for Cough., Disp: 118 mL, Rfl: 0  •   hydrOXYzine (ATARAX) 25 MG tablet, Take 1 tablet by mouth 2 (Two) Times a Day As Needed (anxiety and/or sleep)., Disp: 60 tablet, Rfl: 1  •  Insulin Pen Needle (Pen Needles) 32G X 6 MM misc, 1 dose 1 (One) Time Per Week., Disp: 100 each, Rfl: 3  •  Lancets (onetouch ultrasoft) lancets, Use one lancet daily to check blood sugars, Disp: 100 each, Rfl: 3  •  lidocaine (LIDODERM) 5 %, Place 1 patch on the skin as directed by provider Daily. Remove & Discard patch within 12 hours or as directed by MD, Disp: 6 patch, Rfl: 0  •  loratadine (CLARITIN) 10 MG tablet, Take 1 tablet by mouth Daily., Disp: 30 tablet, Rfl: 5  •  metFORMIN ER (GLUCOPHAGE-XR) 500 MG 24 hr tablet, TAKE 2 TABLETS BY MOUTH DAILY WITH BREAKFAST, Disp: 60 tablet, Rfl: 2  •  multivitamin with minerals tablet tablet, Take 1 tablet by mouth Daily., Disp: , Rfl:   •  ondansetron (Zofran) 8 MG tablet, Take 1 tablet by mouth Every 8 (Eight) Hours As Needed for Nausea or Vomiting., Disp: 15 tablet, Rfl: 0  •  rosuvastatin (CRESTOR) 5 MG tablet, TAKE ONE TABLET BY MOUTH DAILY, Disp: 90 tablet, Rfl: 0  •  vilazodone (Viibryd) 40 MG tablet tablet, Take 1 tablet by mouth Daily., Disp: 30 tablet, Rfl: 1  •  cyclobenzaprine (FLEXERIL) 5 MG tablet, Take 1 tablet by mouth 2 (Two) Times a Day As Needed for Muscle Spasms., Disp: 20 tablet, Rfl: 0  •  Diclofenac Sodium (VOLTAREN) 1 % gel gel, APPLY 4 GRAMS TOPICALLY TO THE APPROPRIATE AREA AS DIRECTED FOUR TIMES A DAYS AS NEEDED FOR PAIN IN SHOULDERS, Disp: 100 g, Rfl: 1  •  Dulaglutide (Trulicity) 0.75 MG/0.5ML solution pen-injector, Inject 0.75 mg under the skin into the appropriate area as directed 1 (One) Time Per Week., Disp: 4 pen, Rfl: 3  •  famotidine (PEPCID) 40 MG tablet, Take 1 tablet by mouth Daily., Disp: 30 tablet, Rfl: 1  •  fluticasone (FLONASE) 50 MCG/ACT nasal spray, 2 sprays into the nostril(s) as directed by provider Daily for 30 days., Disp: 16 g, Rfl: 5  •  Vitamin D, Cholecalciferol, 25 MCG (1000  UT) capsule, Take 1,000 Units by mouth. Take one po daily, Disp: , Rfl:      PMFSH  The following portions of the patient's history were reviewed and updated as appropriate: allergies, current medications, past family history, past medical history, past social history, past surgical history and problem list.    Review of Systems   Constitutional: Negative for activity change, appetite change and fatigue.   HENT: Negative for congestion and rhinorrhea.    Respiratory: Negative for chest tightness and shortness of breath.    Cardiovascular: Negative for chest pain and palpitations.   Gastrointestinal: Negative for abdominal pain.   Genitourinary: Negative for dysuria.   Musculoskeletal: Negative for arthralgias and myalgias.   Skin: Negative for color change, rash and wound.   Neurological: Negative for dizziness, weakness, light-headedness and headaches.   Psychiatric/Behavioral: Negative for dysphoric mood. The patient is not nervous/anxious.        Objective   /78 (BP Location: Left arm, Patient Position: Sitting, Cuff Size: Adult)   Pulse 87   Temp 97.7 °F (36.5 °C) (Infrared)   Resp 18   Wt 117 kg (258 lb 9.6 oz)   LMP  (LMP Unknown)   SpO2 97%   BMI 42.36 kg/m²     Physical Exam  Vitals and nursing note reviewed.   Constitutional:       Appearance: She is well-developed.   HENT:      Head: Normocephalic and atraumatic.      Right Ear: External ear normal.      Left Ear: External ear normal.   Eyes:      Conjunctiva/sclera: Conjunctivae normal.   Cardiovascular:      Rate and Rhythm: Normal rate and regular rhythm.   Pulmonary:      Effort: Pulmonary effort is normal.      Breath sounds: Normal breath sounds.   Musculoskeletal:         General: Normal range of motion.      Cervical back: Normal range of motion.   Skin:     General: Skin is warm and dry.   Psychiatric:         Behavior: Behavior normal.         ASSESSMENT/PLAN    Diagnoses and all orders for this visit:    1. Itchy skin  (Primary)  Comments:  Check labs as ordered. Further recs based on results. Restart loratadine.  Orders:  -     Comprehensive Metabolic Panel; Future  -     TSH; Future    2. Seasonal allergies  Comments:  Restart loratadine as ordered.  Orders:  -     loratadine (CLARITIN) 10 MG tablet; Take 1 tablet by mouth Daily.  Dispense: 30 tablet; Refill: 5             Return for Next scheduled follow up.

## 2022-04-30 RX ORDER — ROSUVASTATIN CALCIUM 5 MG/1
TABLET, COATED ORAL
Qty: 90 TABLET | Refills: 0 | Status: SHIPPED | OUTPATIENT
Start: 2022-04-30 | End: 2022-08-08

## 2022-05-03 ENCOUNTER — TELEMEDICINE (OUTPATIENT)
Dept: PSYCHIATRY | Facility: CLINIC | Age: 62
End: 2022-05-03

## 2022-05-03 DIAGNOSIS — F33.1 MAJOR DEPRESSIVE DISORDER, RECURRENT EPISODE, MODERATE: Primary | ICD-10-CM

## 2022-05-03 PROCEDURE — 90837 PSYTX W PT 60 MINUTES: CPT | Performed by: SOCIAL WORKER
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
## 2022-05-11 DIAGNOSIS — F33.1 MAJOR DEPRESSIVE DISORDER, RECURRENT EPISODE, MODERATE: Chronic | ICD-10-CM

## 2022-05-11 DIAGNOSIS — F41.9 ANXIETY DISORDER, UNSPECIFIED TYPE: Chronic | ICD-10-CM

## 2022-05-11 RX ORDER — VILAZODONE HYDROCHLORIDE 40 MG/1
TABLET ORAL
Qty: 30 TABLET | Refills: 0 | Status: SHIPPED | OUTPATIENT
Start: 2022-05-11 | End: 2022-05-16 | Stop reason: SDUPTHER

## 2022-05-12 ENCOUNTER — TELEMEDICINE (OUTPATIENT)
Dept: PSYCHIATRY | Facility: CLINIC | Age: 62
End: 2022-05-12

## 2022-05-12 DIAGNOSIS — F41.9 ANXIETY DISORDER, UNSPECIFIED TYPE: Primary | ICD-10-CM

## 2022-05-12 PROCEDURE — 90837 PSYTX W PT 60 MINUTES: CPT | Performed by: COUNSELOR

## 2022-05-12 NOTE — PROGRESS NOTES
Date: May 12, 2022  Time In: 3:29 pm   Time Out: 4:26 pm   This provider is located at the [facility]), [address], [place] [address] using a secure [employer] Video Visit through [employer]. Patient is being seen remotely via telehealth at home address in [place] and stated they are in a secure environment for this session. The patient's condition being diagnosed/treated is appropriate for telemedicine. The provider identified herself as well as her credentials. The patient, and/or patients guardian, consent to be seen remotely, and when consent is given they understand that the consent allows for patient identifiable information to be sent to a third party as needed. They may refuse to be seen remotely at any time. The electronic data is encrypted and password protected, and the patient and/or guardian has been advised of the potential risks to privacy not withstanding such measures.     You have chosen to receive care through a telehealth visit.  Do you consent to use a video/audio connection for your medical care today? Yes    PROGRESS NOTE  Data:  Sandra Auguste is a 61 y.o. female who presents today for follow up. Patient was a transfer from another therapist. Conducted rapport building contact utilizing person centered techniques. Facilitated patient verbalizing what she had been working on during therapy and outcomes that she would like to achieve during the therapeutic progress.     Chief Complaint: anxiety, decreased feelings of self worth/self esteem      Clinical Maneuvering/Intervention:Person centered techniques, CBT    (Scales based on 0 - 10 with 10 being the worst)  Depression:  7-8 Patient reported changes in her appetite  Anxiety:  7        Engaged patient in rapport building techniques. Patient has been transfer and this is initial follow up with therapist. Patient reported that she has only worked briefly with the last therapist.  Assisted patient in processing above session content; acknowledged and normalized patient’s thoughts, feelings, and concerns.  Rationalized patient thought process regarding being in the caretaker role of her mom. Patient provided therapist with some background information regarding her current situation and interactions with her family. Patient stated that she feels bullied by her family. .  Discussed triggers associated with patient's lower self esteem and feelings of self worth as it relates to interactions with her family. Patient state that she doesn't feel like her family would care if they knew how she felt. Patient stated that she has a difficult time identifying her own emotions due to feeling like she had to suppress them.  Patient also verbalized that due to her moms cognition of dementia and that this increases anxiety due to intensity of care at times.  Also discussed coping skills for patient to  Facilitated patient in discussing coping skills that she currently uses but patient had some difficulty reporting coping skills.      Allowed patient to freely discuss issues without interruption or judgment. Provided safe, confidential environment to facilitate the development of positive therapeutic relationship and encourage open, honest communication. Assisted patient in identifying risk factors which would indicate the need for higher level of care including thoughts to harm self or others and/or self-harming behavior and encouraged patient to contact this office, call 911, or present to the nearest emergency room should any of these events occur. Discussed crisis intervention services and means to access. Patient adamantly and convincingly denies current suicidal or homicidal ideation or perceptual disturbance.    Assessment:   Assessment   Patient appears to maintain relative stability as compared to their baseline.  However, patient continues to struggle with anxiety and depression which continues to  cause impairment in important areas of functioning.  A result, they can be reasonably expected to continue to benefit from treatment and would likely be at increased risk for decompensation otherwise.    Mental Status Exam:   Hygiene:   good  Cooperation:  Cooperative  Eye Contact:  Fair  Psychomotor Behavior:  Appropriate  Affect:  Full range  Mood: normal  Speech:  Normal  Thought Process:  Difficulty staying on certain topics and would occasionally be avoidant. .   Thought Content:  Normal  Suicidal:  None  Homicidal:  None  Hallucinations:  None  Delusion:  None  Memory:  Intact  Orientation:  Person, Place, Time and Situation  Reliability:  fair  Insight:  Fair  Judgement:  Fair  Impulse Control:  Fair  Physical/Medical Issues:  No  Patient reported health issues and noted that she does not feel like she has time for herself. Patient discussed diabetes diagnosis.       Patient's Support Network Includes:  Patient noted that she gets support from her daughter, doctors and through counseling.     Functional Status: Moderate impairment     Progress toward goal: Not at goal    Prognosis: Fair with Ongoing Treatment          Plan:  Patient will continue in individual outpatient therapy with focus on improved functioning and coping skills, maintaining stability, and avoiding decompensation and the need for higher level of care.    Patient will adhere to medication regimen as prescribed and report any side effects. Patient will contact this office, call 911 or present to the nearest emergency room should suicidal or homicidal ideations occur. Provide Cognitive Behavioral Therapy and Solution Focused Therapy to improve functioning, maintain stability, and avoid decompensation and the need for higher level of care.     Return in about 2 weeks, or earlier if symptoms worsen or fail to improve.           VISIT DIAGNOSIS:     ICD-10-CM ICD-9-CM   1. Anxiety disorder, unspecified type  F41.9 300.00             This document  has been electronically signed by ASIA Brown  May 12, 2022 16:14 EDT      Part of this note may be an electronic transcription/translation of spoken language to printed text using the Dragon Dictation System.

## 2022-05-16 ENCOUNTER — TELEMEDICINE (OUTPATIENT)
Dept: PSYCHIATRY | Facility: CLINIC | Age: 62
End: 2022-05-16

## 2022-05-16 DIAGNOSIS — F41.9 ANXIETY DISORDER, UNSPECIFIED TYPE: Chronic | ICD-10-CM

## 2022-05-16 DIAGNOSIS — F33.1 MAJOR DEPRESSIVE DISORDER, RECURRENT EPISODE, MODERATE: Primary | Chronic | ICD-10-CM

## 2022-05-16 DIAGNOSIS — G47.9 SLEEPING DIFFICULTIES: ICD-10-CM

## 2022-05-16 PROCEDURE — 99214 OFFICE O/P EST MOD 30 MIN: CPT | Performed by: NURSE PRACTITIONER

## 2022-05-16 RX ORDER — HYDROXYZINE HYDROCHLORIDE 25 MG/1
25 TABLET, FILM COATED ORAL 2 TIMES DAILY PRN
Qty: 60 TABLET | Refills: 1 | Status: SHIPPED | OUTPATIENT
Start: 2022-05-16 | End: 2022-08-16 | Stop reason: SDUPTHER

## 2022-05-16 RX ORDER — VILAZODONE HYDROCHLORIDE 40 MG/1
40 TABLET ORAL DAILY
Qty: 30 TABLET | Refills: 1 | Status: SHIPPED | OUTPATIENT
Start: 2022-05-16 | End: 2022-08-16 | Stop reason: SDUPTHER

## 2022-05-16 NOTE — PROGRESS NOTES
This provider is located at the [facility] (through [facility]), [address], [place], [address] using a secure [employer] Video Visit through [employer]. Patient is being seen remotely via telehealth at their home address in [place], and stated they are in a secure environment for this session. The patient's condition being diagnosed/treated is appropriate for telemedicine. The provider identified herself as well as her credentials.   The patient, and/or patients guardian, consent to be seen remotely, and when consent is given they understand that the consent allows for patient identifiable information to be sent to a third party as needed.   They may refuse to be seen remotely at any time. The electronic data is encrypted and password protected, and the patient and/or guardian has been advised of the potential risks to privacy not withstanding such measures.    You have chosen to receive care through a telehealth visit.  Do you consent to use a video/audio connection for your medical care today? Yes        Subjective   Sandra Auguste is a 61 y.o. female who presents today for follow up    Chief Complaint: Depression, anxiety, and history of sleeping difficulties    Accompanied by: The patient is interviewed alone at today's encounter    History of Present Illness:   The patient describes her mood as okay over the last few weeks and since her last encounter with this APRN.  The patient states she recently had an MRI of the spine done, and she goes back to this Thursday for the results.  The patient reports she feels her current symptoms of depression and anxiety are well controlled on her current psychotropic medication regimen.  The patient reports one of her medical priorities at this time is figuring out what is wrong with her neck and arm pains, and nerve pain.  The patient reports her appetite as good.  The patient reports her sleep as good with the use of her current  "medications.  The patient states she has been having a lot of vivid dreams, but not nightmares, since starting Trulicity.  The patient denies any other new medical problems or changes in medications since last appointment with this facility.  The patient reports compliance with her current medication regimen.  She reports she does only take hydroxyzine as needed, and not scheduled.  The patient denies any side effects or concerns from her current medication regimen.   The patient would like to not adjust or change her medications at this visit.  The patient denies any suicidal or homicidal ideations, plans, or intent at today's encounter and is convincing.  The patient denies any auditory hallucinations or visual hallucinations.  The patient does not endorse any significant symptoms consistent with carey or psychosis at today's encounter.      Prior Psychiatric Medications:  -Xanax 0.25 mg by mouth once daily as needed for anxiety - last prescribed 9/24/21  -Paxil  -Wellbutrin  -Zoloft  -Prozac  -Trazodone  -Buspirone -nauseated stomach  -Possibly Lexapro, but she is not sure  -Possibly Effexor, but she is not sure  -Possible medicine to assist with sleep  -Viibryd  -Hydroxyzine      Last Menstrual Period:  Hysterectomy.        The following portions of the patient's history were reviewed and updated as appropriate: allergies, current medications, past family history, past medical history, past social history, past surgical history and problem list.          Past Medical History:  Past Medical History:   Diagnosis Date   • Anesthesia complication     HAS TROUBLE GETTING \"NUMB\"    • Anxiety    • Arthritis    • Asthma    • Bladder spasms    • Depression    • Diverticulosis    • GERD (gastroesophageal reflux disease)    • Memory loss     FROM SKULL FRACTURE AND FALL-SHORT TERM MEMORY LOSS   • Mental disorder     PTSD   • Migraine    • Obesity    • Seasonal allergies    • Seizures (HCC)     \"convulsions\" at age 5   • " Skull fracture ([facility]) 2012   • Sleep apnea with use of continuous positive airway pressure (CPAP)     INSTRUCTED TO BRING OWN MASK AND TUBING    • SOB (shortness of breath) on exertion    • Tendonitis    • Type 2 diabetes mellitus without complication, without long-term current use of insulin ([facility]) 10/11/2018   • Wears contact lenses    • Wears partial dentures     TOP ONLY    • Wears prescription eyeglasses    • Wears prescription eyeglasses        Social History:  Social History     Socioeconomic History   • Marital status: Single   Tobacco Use   • Smoking status: Never Smoker   • Smokeless tobacco: Never Used   Vaping Use   • Vaping Use: Never used   Substance and Sexual Activity   • Alcohol use: Yes     Comment: Patient reports occasoinal wine use to help relax her and fall asleep, not daily use   • Drug use: Never   • Sexual activity: Not Currently     Birth control/protection: Post-menopausal, Surgical       Family History:  Family History   Problem Relation Age of Onset   • Cancer Mother         cervical cancer   • Dementia Mother    • Cancer Maternal Aunt         lymphoma   • Cancer Paternal Aunt    • Breast cancer Paternal Aunt 70   • Alcohol abuse Other    • Depression Other        Past Surgical History:  Past Surgical History:   Procedure Laterality Date   • CERVICAL POLYPECTOMY     • TOTAL LAPAROSCOPIC HYSTERECTOMY N/A 9/18/2017    Procedure: TOTAL LAPAROSCOPIC HYSTERECTOMY, BILATERAL SALPINGO OOPHORECTOMY WITH DAVINCI ROBOT ;  Surgeon: Shannon Grimaldo DO;  Location: [facility];  Service:    • WISDOM TOOTH EXTRACTION         Problem List:  Patient Active Problem List   Diagnosis   • Abnormal computed tomography scan   • Anemia   • Post traumatic stress disorder (PTSD)   • Anxiety   • Strain of neck muscle   • Cough   • Diverticulitis of colon   • Fatigue   • Steatosis of liver   • Lateral epicondylitis   • Knee pain   • Spasm   • Adiposity   • Obstructive sleep apnea syndrome   • Osteoarthritis   •  Palpitations   • Nausea   • Shortness of breath   • Skin tag   • Candidiasis of vagina   • Viral upper respiratory tract infection   • Gastroesophageal reflux disease   • Acute pain of left shoulder   • Abdominal distension (gaseous)   • SOB (shortness of breath) on exertion   • Sleep apnea with use of continuous positive airway pressure (CPAP)   • Seasonal allergies   • Obesity   • Migraine   • Mental disorder   • Diverticulosis   • Depression   • Arthritis   • Hypertension   • Itching   • Stabbing headache   • Temple tenderness   • TMJ click   • Mild intermittent asthma without complication   • Skull fracture (HCC)   • Type 2 diabetes mellitus without complication, without long-term current use of insulin (HCC)   • Health care maintenance   • Moderate episode of recurrent major depressive disorder (HCC)   • Generalized anxiety disorder   • High risk medications (not anticoagulants) long-term use   • Hyperlipidemia       Allergy:   Allergies   Allergen Reactions   • Mold Extract [Trichophyton] Shortness Of Breath, Anxiety, Irritability, Other (See Comments) and Palpitations     SOA   • Lortab [Hydrocodone-Acetaminophen] Nausea And Vomiting     SEVERE NAUSEA AND SLIGHT VOMITING   • Methylprednisolone Other (See Comments)     Bleeding, cramping   • Pollen Extract Other (See Comments)     SNEEZING AND CONGESTION         Current Medications:   Current Outpatient Medications   Medication Sig Dispense Refill   • hydrOXYzine (ATARAX) 25 MG tablet Take 1 tablet by mouth 2 (Two) Times a Day As Needed (anxiety and/or sleep). 60 tablet 1   • vilazodone (Viibryd) 40 MG tablet tablet Take 1 tablet by mouth Daily. 30 tablet 1   • acetaminophen (TYLENOL) 500 MG tablet Take 500 mg by mouth Every 6 (Six) Hours As Needed for Mild Pain .     • albuterol sulfate  (90 Base) MCG/ACT inhaler Inhale 1 puff Every 6 (Six) Hours As Needed for Shortness of Air. INHALE TWO PUFFS BY MOUTH EVERY 6 HOURS AS NEEDED 1 inhaler 2   • Blood  Glucose Monitoring Suppl (OneTouch Verio) w/Device kit 1 kit Daily. 1 kit 0   • Breo Ellipta 200-25 MCG/INH inhaler INHALE ONE DOSE BY MOUTH DAILY 60 each 5   • celecoxib (CeleBREX) 50 MG capsule      • cyclobenzaprine (FLEXERIL) 5 MG tablet Take 1 tablet by mouth 2 (Two) Times a Day As Needed for Muscle Spasms. 20 tablet 0   • Diclofenac Sodium (VOLTAREN) 1 % gel gel APPLY 4 GRAMS TOPICALLY TO THE APPROPRIATE AREA AS DIRECTED FOUR TIMES A DAYS AS NEEDED FOR PAIN IN SHOULDERS 100 g 1   • Dulaglutide (Trulicity) 0.75 MG/0.5ML solution pen-injector Inject 0.75 mg under the skin into the appropriate area as directed 1 (One) Time Per Week. 4 pen 3   • EPINEPHrine (EPIPEN) 0.3 MG/0.3ML solution auto-injector injection Inject 0.3 mL into the appropriate muscle as directed by prescriber As Needed (FOR SEVERE ALLERGIC REACTION). 1 each 1   • famotidine (PEPCID) 40 MG tablet Take 1 tablet by mouth Daily. 30 tablet 1   • fluticasone (FLONASE) 50 MCG/ACT nasal spray 2 sprays into the nostril(s) as directed by provider Daily for 30 days. 16 g 5   • glucose blood (OneTouch Verio) test strip Use one strip to check blood sugars one time daily 100 each 3   • glucose monitor monitoring kit Use as directed to check blood glucose once daily. 1 each 0   • guaiFENesin-codeine (GUAIFENESIN AC) 100-10 MG/5ML liquid Take 5 mL by mouth 3 (Three) Times a Day As Needed for Cough. 118 mL 0   • Insulin Pen Needle (Pen Needles) 32G X 6 MM misc 1 dose 1 (One) Time Per Week. 100 each 3   • Lancets (onetouch ultrasoft) lancets Use one lancet daily to check blood sugars 100 each 3   • lidocaine (LIDODERM) 5 % Place 1 patch on the skin as directed by provider Daily. Remove & Discard patch within 12 hours or as directed by MD 6 patch 0   • loratadine (CLARITIN) 10 MG tablet Take 1 tablet by mouth Daily. 30 tablet 5   • metFORMIN ER (GLUCOPHAGE-XR) 500 MG 24 hr tablet TAKE 2 TABLETS BY MOUTH DAILY WITH BREAKFAST 60 tablet 2   • multivitamin with  minerals tablet tablet Take 1 tablet by mouth Daily.     • ondansetron (Zofran) 8 MG tablet Take 1 tablet by mouth Every 8 (Eight) Hours As Needed for Nausea or Vomiting. 15 tablet 0   • rosuvastatin (CRESTOR) 5 MG tablet TAKE ONE TABLET BY MOUTH DAILY 90 tablet 0     No current facility-administered medications for this visit.       Review of Symptoms:    Review of Systems   Constitutional: Positive for fatigue.   Musculoskeletal: Positive for arthralgias, back pain, myalgias, neck pain and neck stiffness.   Neurological: Positive for memory problem (chronic).   Psychiatric/Behavioral: Positive for decreased concentration, sleep disturbance (improved with medication), depressed mood and stress. Negative for agitation, behavioral problems, dysphoric mood, hallucinations, self-injury, suicidal ideas and negative for hyperactivity. The patient is nervous/anxious.          Physical Exam:   not currently breastfeeding. There is no height or weight on file to calculate BMI.   Due to the remote nature of this encounter (virtual encounter), vitals were unable to be obtained.  Height stated at 65.5 inches.  Weight stated at around 258 pounds.      Physical Exam  Constitutional:       Appearance: Normal appearance.   Neurological:      Mental Status: She is alert and oriented to person, place, and time.   Psychiatric:         Attention and Perception: Attention normal. She is attentive.         Mood and Affect: Mood and affect normal.         Speech: Speech normal.         Behavior: Behavior normal. Behavior is cooperative.         Thought Content: Thought content normal. Thought content is not paranoid or delusional. Thought content does not include homicidal or suicidal ideation. Thought content does not include homicidal or suicidal plan.         Cognition and Memory: Cognition and memory normal.         Judgment: Judgment normal.           Mental Status Exam:   Hygiene:   good  Cooperation:  Cooperative  Eye Contact:   Good  Psychomotor Behavior:  Appropriate  Affect:  Appropriate  Mood: normal  Hopelessness: Denies  Speech:  Normal  Thought Process:  Linear  Thought Content:  Mood congruent  Suicidal:  None  Homicidal:  None  Hallucinations:  None  Delusion:  None  Memory:  Intact  Orientation:  Person, Place and Time  Reliability:  good  Insight:  Good  Judgement:  Good  Impulse Control:  Good  Physical/Medical Issues:  Chronic, no acute physical/medical issues at today's encounter         Lab Results:   No visits with results within 1 Month(s) from this visit.   Latest known visit with results is:   Office Visit on 03/10/2022   Component Date Value Ref Range Status   • Glucose 03/10/2022 179 (A) 70 - 130 mg/dL Final   • Hemoglobin A1C 03/10/2022 7.2  % Final   • Lot Number 03/10/2022 [id number]   Final   • Expiration Date 03/10/2022 11/15/2023   Final         Assessment & Plan   Problems Addressed this Visit    None     Visit Diagnoses     Major depressive disorder, recurrent episode, moderate (HCC)  (Chronic)   -  Primary    Relevant Medications    hydrOXYzine (ATARAX) 25 MG tablet    vilazodone (Viibryd) 40 MG tablet tablet    Anxiety disorder, unspecified type  (Chronic)       Relevant Medications    hydrOXYzine (ATARAX) 25 MG tablet    vilazodone (Viibryd) 40 MG tablet tablet    Sleeping difficulties        Relevant Medications    hydrOXYzine (ATARAX) 25 MG tablet      Diagnoses       Codes Comments    Major depressive disorder, recurrent episode, moderate (HCC)    -  Primary ICD-10-CM: F33.1  ICD-9-CM: 296.32     Anxiety disorder, unspecified type     ICD-10-CM: F41.9  ICD-9-CM: 300.00     Sleeping difficulties     ICD-10-CM: G47.9  ICD-9-CM: 780.50           Visit Diagnoses:    ICD-10-CM ICD-9-CM   1. Major depressive disorder, recurrent episode, moderate (HCC)  F33.1 296.32   2. Anxiety disorder, unspecified type  F41.9 300.00   3. Sleeping difficulties  G47.9 780.50          GOALS:  Short Term Goals: Patient will be  compliant with medication, and patient will have no significant medication related side effects.  Patient will be engaged in psychotherapy as indicated.  Patient will report subjective improvement of symptoms.  Long term goals: To stabilize mood and treat/improve subjective symptoms, the patient will stay out of the hospital, the patient will be at an optimal level of functioning, and the patient will take all medications as prescribed.  The patient verbalized understanding and agreement with goals that were mutually set.      TREATMENT PLAN: Continue supportive psychotherapy efforts and medications as indicated.  Medication and treatment options, both pharmacological and non-pharmacological treatment options, discussed during today's visit, including any off label use of medication. Patient acknowledged and verbally consented with current treatment plan and was educated on the importance of compliance with treatment and follow-up appointments.      -Continue Viibryd 40 mg by mouth once daily for mood.  -Continue hydroxyzine 25 mg by mouth up to twice daily as needed for anxiety and/or sleep.  -The patient reports she has neuropsychological testing scheduled at  July 2022, she is advised to keep this testing.      MEDICATION ISSUES:  Discussed medication options and treatment plan of prescribed medication, any off label use of medication, as well as the risks, benefits, any black box warnings including increased suicidality, and side effects including but not limited to potential falls, dizziness, possible impaired driving, GI side effects (change in appetite, abdominal discomfort, nausea, vomiting, diarrhea, and/or constipation), dry mouth, somnolence, sedation, insomnia, activation, agitation, irritation, tremors, abnormal muscle movements or disorders, headache, sweating, possible bruising or rare bleeding, electrolyte and/or fluid abnormalities, change in blood pressure/heart rate/and or heart rhythm, sexual  dysfunction, and metabolic adversities among others. Patient and/or guardian agreeable to call the office with any worsening of symptoms or onset of side effects, or if any concerns or questions arise.  The contact information for the office is made available to the patient and/or guardian.  Patient and/or guardian agreeable to call 911 or go to the nearest ER should they begin having any SI/HI, or if any urgent concerns arise. No medication side effects or related complaints today.      SUICIDE RISK ASSESSMENT AND SAFETY PLAN: Unalterable demographics and a history of mental health intervention indicate this patient is in a high risk category compared to the general population. At present, the patient denies active SI/HI, intentions, or plans at this time and agrees to seek immediate care should such thoughts develop. The patient verbalizes understanding of how to access emergency care if needed and agrees to do so. Consideration of suicide risk and protective factors such as history, current presentation, individual strengths and weaknesses, psychosocial and environmental stressors and variables, psychiatric illness and symptoms, medical conditions and pain, took place in this interview. Based on those considerations, the patient is determined: within individual baseline and presenting no imminent risk for suicide or homicide. Other recommendations: The patient does not meet the criteria for inpatient admission and is not a safety risk to self or others at today's visit. Inpatient treatment offers no significant advantages over outpatient treatment for this patient at today's visit.  The patient was given ample time for questions and fully participated in treatment planning.  The patient was encouraged to call the clinic with any questions or concerns.  The patient was informed of access to emergency care. If patient were to develop any significant symptomatology, suicidal ideation, homicidal ideation, any  concerns, or feel unsafe at any time they are to call the clinic and if unable to get immediate assistance should immediately call 911 or go to the nearest emergency room.  Patient contracted verbally for the following: If you are experiencing an emotional crisis or have thoughts of harming yourself or others, please go to your nearest local emergency room or call 911. Will continue to re-assess medication response and side effects frequently to establish efficacy and ensure safety. Risks, any black box warnings, side effects, off label usage, and benefits of medication and treatment discussed with patient, along with potential adverse side effects of current and/or newly prescribed medication, alternative treatment options, and OTC medications.  Patient verbalized understanding of potential risks, any off label use of medication, any black box warnings, and any side effects in their own words. The patient verbalized understanding and agreed to comply with the safety plan discussed in their own words.  Patient given the number to the office. Number also discussed of the 24- hour suicide hotline.         MEDS ORDERED DURING VISIT:  New Medications Ordered This Visit   Medications   • hydrOXYzine (ATARAX) 25 MG tablet     Sig: Take 1 tablet by mouth 2 (Two) Times a Day As Needed (anxiety and/or sleep).     Dispense:  60 tablet     Refill:  1   • vilazodone (Viibryd) 40 MG tablet tablet     Sig: Take 1 tablet by mouth Daily.     Dispense:  30 tablet     Refill:  1       Return in about 4 weeks (around 6/13/2022), or if symptoms worsen or fail to improve, for Next scheduled follow up and Recheck.         Functional Status: Moderate impairment     Prognosis: Fair with Ongoing Treatment             This document has been electronically signed by REBECA Bello  May 16, 2022 14:17 EDT    Some of the data in this electronic note has been brought forward from a previous encounter, any necessary changes have been  made, it has been reviewed by this APRN, and it is accurate.    Please note that portions of this note were completed with a voice recognition program.

## 2022-05-24 ENCOUNTER — TELEPHONE (OUTPATIENT)
Dept: INTERNAL MEDICINE | Facility: CLINIC | Age: 62
End: 2022-05-24

## 2022-05-24 NOTE — TELEPHONE ENCOUNTER
Pt states having headache and pulsing/stinging pain in head. Has tried ibuprofen but didn't help, wanting to know if needs CT scan, or what could she take that wouldn't interact with current meds. Made appt for Friday to discuss further with you as well.

## 2022-05-24 NOTE — TELEPHONE ENCOUNTER
Caller: Sandra Auguste    Relationship: Self    Best call back number: [phone]    What is the best time to reach you: ANYTIME    Who are you requesting to speak with (clinical staff, provider,  specific staff member): CLINICAL STAFF    Do you know the name of the person who called: N A    What was the call regarding: HEADACHES/ NERVE PAIN    Do you require a callback:YES

## 2022-05-25 NOTE — TELEPHONE ENCOUNTER
She needs to be seen before I can advise on medication or imaging. If headache is becoming severe, she needs to go to ER.

## 2022-05-26 ENCOUNTER — TELEMEDICINE (OUTPATIENT)
Dept: PSYCHIATRY | Facility: CLINIC | Age: 62
End: 2022-05-26

## 2022-05-26 DIAGNOSIS — F41.9 ANXIETY DISORDER, UNSPECIFIED TYPE: Primary | ICD-10-CM

## 2022-05-26 PROCEDURE — 90832 PSYTX W PT 30 MINUTES: CPT | Performed by: COUNSELOR

## 2022-05-27 ENCOUNTER — OFFICE VISIT (OUTPATIENT)
Dept: INTERNAL MEDICINE | Facility: CLINIC | Age: 62
End: 2022-05-27

## 2022-05-27 ENCOUNTER — LAB (OUTPATIENT)
Dept: LAB | Facility: HOSPITAL | Age: 62
End: 2022-05-27

## 2022-05-27 VITALS
BODY MASS INDEX: 42.43 KG/M2 | WEIGHT: 259 LBS | DIASTOLIC BLOOD PRESSURE: 72 MMHG | SYSTOLIC BLOOD PRESSURE: 110 MMHG | TEMPERATURE: 96.4 F | OXYGEN SATURATION: 96 % | HEART RATE: 70 BPM

## 2022-05-27 DIAGNOSIS — E11.9 TYPE 2 DIABETES MELLITUS WITHOUT COMPLICATION, WITHOUT LONG-TERM CURRENT USE OF INSULIN: ICD-10-CM

## 2022-05-27 DIAGNOSIS — R25.2 MUSCLE CRAMPS: Primary | ICD-10-CM

## 2022-05-27 DIAGNOSIS — G44.85 STABBING HEADACHE: ICD-10-CM

## 2022-05-27 DIAGNOSIS — R25.2 MUSCLE CRAMPS: ICD-10-CM

## 2022-05-27 DIAGNOSIS — L29.9 ITCHY SKIN: ICD-10-CM

## 2022-05-27 PROCEDURE — 36415 COLL VENOUS BLD VENIPUNCTURE: CPT

## 2022-05-27 PROCEDURE — 80053 COMPREHEN METABOLIC PANEL: CPT

## 2022-05-27 PROCEDURE — 84443 ASSAY THYROID STIM HORMONE: CPT

## 2022-05-27 PROCEDURE — 83036 HEMOGLOBIN GLYCOSYLATED A1C: CPT

## 2022-05-27 PROCEDURE — 99214 OFFICE O/P EST MOD 30 MIN: CPT | Performed by: PHYSICIAN ASSISTANT

## 2022-05-27 PROCEDURE — 83735 ASSAY OF MAGNESIUM: CPT

## 2022-05-27 RX ORDER — METHOCARBAMOL 500 MG/1
500 TABLET, FILM COATED ORAL DAILY
COMMUNITY
Start: 2022-05-26

## 2022-05-27 NOTE — PROGRESS NOTES
Chief Complaint   Patient presents with   • Headache   • Muscle Pain     Leg/ feet cramping       Subjective     Cherylechris Auguste is a 61 y.o. female.        History of Present Illness     Pt had a few days this week when she was having sharp, shooting pain in her head. Tried excedrin migraine and it helped. She has noticed that the shooting pains are occurring when she is under emotional or physical stress. Like when she is lifting her mother or feels anxious.     She does have history of neck pain and met with Dr. Smith, PMR, regarding MRI of her neck. 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 He has referred her to physical therapy and interventional pain.        Current Outpatient Medications:   •  acetaminophen (TYLENOL) 500 MG tablet, Take 500 mg by mouth Every 6 (Six) Hours As Needed for Mild Pain ., Disp: , Rfl:   •  albuterol sulfate  (90 Base) MCG/ACT inhaler, Inhale 1 puff Every 6 (Six) Hours As Needed for Shortness of Air. INHALE TWO PUFFS BY MOUTH EVERY 6 HOURS AS NEEDED, Disp: 1 inhaler, Rfl: 2  •  Blood Glucose Monitoring Suppl (OneTouch Verio) w/Device kit, 1 kit Daily., Disp: 1 kit, Rfl: 0  •  Breo Ellipta 200-25 MCG/INH inhaler, INHALE ONE DOSE BY MOUTH DAILY, Disp: 60 each, Rfl: 5  •  celecoxib (CeleBREX) 50 MG capsule, , Disp: , Rfl:   •  cyclobenzaprine (FLEXERIL) 5 MG tablet, Take 1 tablet by mouth 2 (Two) Times a Day As Needed for Muscle Spasms., Disp: 20 tablet, Rfl: 0  •  Diclofenac Sodium (VOLTAREN) 1 % gel gel, APPLY 4 GRAMS TOPICALLY TO THE APPROPRIATE AREA AS DIRECTED FOUR TIMES A DAYS AS NEEDED FOR PAIN IN SHOULDERS, Disp: 100 g, Rfl: 1  •  EPINEPHrine (EPIPEN) 0.3 MG/0.3ML solution auto-injector injection, Inject 0.3 mL into the appropriate muscle as directed by prescriber As Needed (FOR SEVERE ALLERGIC REACTION)., Disp: 1 each, Rfl: 1  •  famotidine (PEPCID) 40 MG tablet, Take 1 tablet by mouth Daily., Disp: 30 tablet, Rfl: 1  •   glucose blood (OneTouch Verio) test strip, Use one strip to check blood sugars one time daily, Disp: 100 each, Rfl: 3  •  glucose monitor monitoring kit, Use as directed to check blood glucose once daily., Disp: 1 each, Rfl: 0  •  guaiFENesin-codeine (GUAIFENESIN AC) 100-10 MG/5ML liquid, Take 5 mL by mouth 3 (Three) Times a Day As Needed for Cough., Disp: 118 mL, Rfl: 0  •  hydrOXYzine (ATARAX) 25 MG tablet, Take 1 tablet by mouth 2 (Two) Times a Day As Needed (anxiety and/or sleep)., Disp: 60 tablet, Rfl: 1  •  Insulin Pen Needle (Pen Needles) 32G X 6 MM misc, 1 dose 1 (One) Time Per Week., Disp: 100 each, Rfl: 3  •  Lancets (onetouch ultrasoft) lancets, Use one lancet daily to check blood sugars, Disp: 100 each, Rfl: 3  •  lidocaine (LIDODERM) 5 %, Place 1 patch on the skin as directed by provider Daily. Remove & Discard patch within 12 hours or as directed by MD, Disp: 6 patch, Rfl: 0  •  loratadine (CLARITIN) 10 MG tablet, Take 1 tablet by mouth Daily., Disp: 30 tablet, Rfl: 5  •  metFORMIN ER (GLUCOPHAGE-XR) 500 MG 24 hr tablet, TAKE 2 TABLETS BY MOUTH DAILY WITH BREAKFAST, Disp: 60 tablet, Rfl: 2  •  methocarbamol (ROBAXIN) 500 MG tablet, Take 500 mg by mouth., Disp: , Rfl:   •  multivitamin with minerals tablet tablet, Take 1 tablet by mouth Daily., Disp: , Rfl:   •  ondansetron (Zofran) 8 MG tablet, Take 1 tablet by mouth Every 8 (Eight) Hours As Needed for Nausea or Vomiting., Disp: 15 tablet, Rfl: 0  •  rosuvastatin (CRESTOR) 5 MG tablet, TAKE ONE TABLET BY MOUTH DAILY, Disp: 90 tablet, Rfl: 0  •  vilazodone (Viibryd) 40 MG tablet tablet, Take 1 tablet by mouth Daily., Disp: 30 tablet, Rfl: 1  •  Dulaglutide 1.5 MG/0.5ML solution pen-injector, Inject 1.5 mg under the skin into the appropriate area as directed 1 (One) Time Per Week., Disp: 12 pen, Rfl: 1  •  fluticasone (FLONASE) 50 MCG/ACT nasal spray, 2 sprays into the nostril(s) as directed by provider Daily for 30 days., Disp: 16 g, Rfl: 5      [facility]  The following portions of the patient's history were reviewed and updated as appropriate: allergies, current medications, past family history, past medical history, past social history, past surgical history and problem list.    Review of Systems   Constitutional: Negative for activity change, fatigue and unexpected weight change.   HENT: Negative for dental problem, ear pain, nosebleeds and sore throat.    Eyes: Negative for pain and discharge.   Respiratory: Negative for chest tightness, shortness of breath and wheezing.    Gastrointestinal: Negative for abdominal pain and blood in stool.   Endocrine: Negative.    Genitourinary: Negative for difficulty urinating and hematuria.   Musculoskeletal: Negative for joint swelling.   Skin: Negative for color change, pallor, rash and wound.   Allergic/Immunologic: Negative.    Neurological: Positive for headaches. Negative for tremors, seizures, syncope, facial asymmetry, speech difficulty and numbness.   Hematological: Negative for adenopathy.   Psychiatric/Behavioral: Negative for agitation, confusion, sleep disturbance and suicidal ideas.       Objective   /72   Pulse 70   Temp 96.4 °F (35.8 °C)   Wt 117 kg (259 lb)   LMP  (LMP Unknown)   SpO2 96%   BMI 42.43 kg/m²     Physical Exam  Vitals and nursing note reviewed.   Constitutional:       General: She is not in acute distress.     Appearance: She is well-developed. She is not toxic-appearing or diaphoretic.   HENT:      Head: Normocephalic and atraumatic. No right periorbital erythema or left periorbital erythema.      Nose: Nose normal.   Eyes:      General: No scleral icterus.        Right eye: No discharge.         Left eye: No discharge.      Conjunctiva/sclera: Conjunctivae normal.      Pupils: Pupils are equal, round, and reactive to light.   Cardiovascular:      Rate and Rhythm: Normal rate and regular rhythm.      Heart sounds: Normal heart sounds. No murmur heard.  Pulmonary:       Effort: Pulmonary effort is normal.   Abdominal:      Palpations: Abdomen is soft.      Tenderness: There is no abdominal tenderness.   Musculoskeletal:         General: No tenderness or deformity. Normal range of motion.      Cervical back: Normal range of motion and neck supple.   Skin:     General: Skin is warm and dry.      Findings: No erythema or rash.   Neurological:      Mental Status: She is alert and oriented to person, place, and time.      Cranial Nerves: No cranial nerve deficit.      Motor: No tremor, atrophy or abnormal muscle tone.      Coordination: Coordination normal.   Psychiatric:         Behavior: Behavior normal.         Thought Content: Thought content normal.         Judgment: Judgment normal.         ASSESSMENT/PLAN    Diagnoses and all orders for this visit:    1. Muscle cramps (Primary)  Comments:  Check labs as orderd. Increase water intake, muscle stretching.  Orders:  -     Basic Metabolic Panel; Future  -     Magnesium; Future    2. Type 2 diabetes mellitus without complication, without long-term current use of insulin (HCC)  Assessment & Plan:  Increase trulicity to 1.5 mg weekly. Check hemoglobin a1c in labs today. Further recommendations based on results.      Orders:  -     Hemoglobin A1c; Future  -     Dulaglutide 1.5 MG/0.5ML solution pen-injector; Inject 1.5 mg under the skin into the appropriate area as directed 1 (One) Time Per Week.  Dispense: 12 pen; Refill: 1    3. Stabbing headache  Assessment & Plan:  Headaches are worsening.  Proceed with PT and referral for injection therapy as ordered by PMR provider.                 Return in about 3 months (around 8/27/2022) for Follow up.

## 2022-05-28 LAB
ALBUMIN SERPL-MCNC: 4.4 G/DL (ref 3.5–5.2)
ALBUMIN/GLOB SERPL: 1.6 G/DL
ALP SERPL-CCNC: 103 U/L (ref 39–117)
ALT SERPL W P-5'-P-CCNC: 36 U/L (ref 1–33)
ANION GAP SERPL CALCULATED.3IONS-SCNC: 11.6 MMOL/L (ref 5–15)
AST SERPL-CCNC: 24 U/L (ref 1–32)
BILIRUB SERPL-MCNC: 0.2 MG/DL (ref 0–1.2)
BUN SERPL-MCNC: 12 MG/DL (ref 6–20)
BUN/CREAT SERPL: 16.2 (ref 7–25)
CALCIUM SPEC-SCNC: 9.7 MG/DL (ref 8.6–10.5)
CHLORIDE SERPL-SCNC: 104 MMOL/L (ref 98–107)
CO2 SERPL-SCNC: 24.4 MMOL/L (ref 22–29)
CREAT SERPL-MCNC: 0.74 MG/DL (ref 0.57–1)
EGFRCR SERPLBLD CKD-EPI 2021: 93.3 ML/MIN/1.73
GLOBULIN UR ELPH-MCNC: 2.7 GM/DL
GLUCOSE SERPL-MCNC: 62 MG/DL (ref 65–99)
HBA1C MFR BLD: 6.6 % (ref 4.8–5.6)
MAGNESIUM SERPL-MCNC: 2.1 MG/DL (ref 1.6–2.6)
POTASSIUM SERPL-SCNC: 4.6 MMOL/L (ref 3.5–5.2)
PROT SERPL-MCNC: 7.1 G/DL (ref 6–8.5)
SODIUM SERPL-SCNC: 140 MMOL/L (ref 136–145)
TSH SERPL DL<=0.05 MIU/L-ACNC: 1.96 UIU/ML (ref 0.27–4.2)

## 2022-05-30 NOTE — ASSESSMENT & PLAN NOTE
Headaches are worsening.  Proceed with PT and referral for injection therapy as ordered by PMR provider.

## 2022-05-30 NOTE — ASSESSMENT & PLAN NOTE
Increase trulicity to 1.5 mg weekly. Check hemoglobin a1c in labs today. Further recommendations based on results.

## 2022-06-13 ENCOUNTER — PRIOR AUTHORIZATION (OUTPATIENT)
Dept: INTERNAL MEDICINE | Facility: CLINIC | Age: 62
End: 2022-06-13

## 2022-06-23 ENCOUNTER — OFFICE VISIT (OUTPATIENT)
Dept: INTERNAL MEDICINE | Facility: CLINIC | Age: 62
End: 2022-06-23

## 2022-06-23 VITALS
HEART RATE: 89 BPM | OXYGEN SATURATION: 97 % | SYSTOLIC BLOOD PRESSURE: 120 MMHG | BODY MASS INDEX: 42.33 KG/M2 | WEIGHT: 258.4 LBS | DIASTOLIC BLOOD PRESSURE: 78 MMHG

## 2022-06-23 DIAGNOSIS — I49.9 IRREGULAR HEART BEAT: Primary | ICD-10-CM

## 2022-06-23 PROCEDURE — 93000 ELECTROCARDIOGRAM COMPLETE: CPT | Performed by: PHYSICIAN ASSISTANT

## 2022-06-23 PROCEDURE — 99213 OFFICE O/P EST LOW 20 MIN: CPT | Performed by: PHYSICIAN ASSISTANT

## 2022-06-23 NOTE — PROGRESS NOTES
Chief Complaint   Patient presents with   • Diabetes   • Heartburn     Follow Up       Subjective     Sandra Auguste is a 61 y.o. female.        History of Present Illness     Pt wears a fitbit and it has been notifying her that she is having irregular heartbeats. Does not give her an ECG strip, just thought it was notifying her of irregular beats. She has palpitations at times. Wore a holter monitor last year that showed some supraventricular beats but not often enough to be worrisome.    She is feeling well overall.       Current Outpatient Medications:   •  acetaminophen (TYLENOL) 500 MG tablet, Take 500 mg by mouth Every 6 (Six) Hours As Needed for Mild Pain ., Disp: , Rfl:   •  albuterol sulfate  (90 Base) MCG/ACT inhaler, Inhale 1 puff Every 6 (Six) Hours As Needed for Shortness of Air. INHALE TWO PUFFS BY MOUTH EVERY 6 HOURS AS NEEDED, Disp: 1 inhaler, Rfl: 2  •  Blood Glucose Monitoring Suppl (OneTouch Verio) w/Device kit, 1 kit Daily., Disp: 1 kit, Rfl: 0  •  Breo Ellipta 200-25 MCG/INH inhaler, INHALE ONE DOSE BY MOUTH DAILY, Disp: 60 each, Rfl: 5  •  celecoxib (CeleBREX) 50 MG capsule, , Disp: , Rfl:   •  cyclobenzaprine (FLEXERIL) 5 MG tablet, Take 1 tablet by mouth 2 (Two) Times a Day As Needed for Muscle Spasms., Disp: 20 tablet, Rfl: 0  •  Diclofenac Sodium (VOLTAREN) 1 % gel gel, APPLY 4 GRAMS TOPICALLY TO THE APPROPRIATE AREA AS DIRECTED FOUR TIMES A DAYS AS NEEDED FOR PAIN IN SHOULDERS, Disp: 100 g, Rfl: 1  •  Dulaglutide 1.5 MG/0.5ML solution pen-injector, Inject 1.5 mg under the skin into the appropriate area as directed 1 (One) Time Per Week., Disp: 12 pen, Rfl: 1  •  EPINEPHrine (EPIPEN) 0.3 MG/0.3ML solution auto-injector injection, Inject 0.3 mL into the appropriate muscle as directed by prescriber As Needed (FOR SEVERE ALLERGIC REACTION)., Disp: 1 each, Rfl: 1  •  famotidine (PEPCID) 40 MG tablet, Take 1 tablet by mouth Daily., Disp: 30 tablet, Rfl: 1  •  glucose blood (OneTouch  Verio) test strip, Use one strip to check blood sugars one time daily, Disp: 100 each, Rfl: 3  •  glucose monitor monitoring kit, Use as directed to check blood glucose once daily., Disp: 1 each, Rfl: 0  •  guaiFENesin-codeine (GUAIFENESIN AC) 100-10 MG/5ML liquid, Take 5 mL by mouth 3 (Three) Times a Day As Needed for Cough., Disp: 118 mL, Rfl: 0  •  hydrOXYzine (ATARAX) 25 MG tablet, Take 1 tablet by mouth 2 (Two) Times a Day As Needed (anxiety and/or sleep)., Disp: 60 tablet, Rfl: 1  •  Insulin Pen Needle (Pen Needles) 32G X 6 MM misc, 1 dose 1 (One) Time Per Week., Disp: 100 each, Rfl: 3  •  Lancets (onetouch ultrasoft) lancets, Use one lancet daily to check blood sugars, Disp: 100 each, Rfl: 3  •  lidocaine (LIDODERM) 5 %, Place 1 patch on the skin as directed by provider Daily. Remove & Discard patch within 12 hours or as directed by MD, Disp: 6 patch, Rfl: 0  •  loratadine (CLARITIN) 10 MG tablet, Take 1 tablet by mouth Daily., Disp: 30 tablet, Rfl: 5  •  metFORMIN ER (GLUCOPHAGE-XR) 500 MG 24 hr tablet, TAKE 2 TABLETS BY MOUTH DAILY WITH BREAKFAST, Disp: 60 tablet, Rfl: 2  •  methocarbamol (ROBAXIN) 500 MG tablet, Take 500 mg by mouth., Disp: , Rfl:   •  multivitamin with minerals tablet tablet, Take 1 tablet by mouth Daily., Disp: , Rfl:   •  ondansetron (Zofran) 8 MG tablet, Take 1 tablet by mouth Every 8 (Eight) Hours As Needed for Nausea or Vomiting., Disp: 15 tablet, Rfl: 0  •  rosuvastatin (CRESTOR) 5 MG tablet, TAKE ONE TABLET BY MOUTH DAILY, Disp: 90 tablet, Rfl: 0  •  vilazodone (Viibryd) 40 MG tablet tablet, Take 1 tablet by mouth Daily., Disp: 30 tablet, Rfl: 1  •  fluticasone (FLONASE) 50 MCG/ACT nasal spray, 2 sprays into the nostril(s) as directed by provider Daily for 30 days., Disp: 16 g, Rfl: 5     [facility]  The following portions of the patient's history were reviewed and updated as appropriate: allergies, current medications, past family history, past medical history, past social history,  past surgical history and problem list.    Review of Systems   Constitutional: Negative for activity change, appetite change and fatigue.   HENT: Negative for congestion and rhinorrhea.    Respiratory: Negative for chest tightness and shortness of breath.    Cardiovascular: Negative for chest pain and palpitations.   Gastrointestinal: Negative for abdominal pain.   Genitourinary: Negative for dysuria.   Musculoskeletal: Negative for arthralgias and myalgias.   Neurological: Negative for dizziness, weakness, light-headedness and headaches.   Psychiatric/Behavioral: Negative for dysphoric mood. The patient is not nervous/anxious.        Objective   /78   Pulse 89   Wt 117 kg (258 lb 6.4 oz)   LMP  (LMP Unknown)   SpO2 97%   BMI 42.33 kg/m²     Physical Exam  Vitals and nursing note reviewed.   Constitutional:       Appearance: She is well-developed.   HENT:      Head: Normocephalic.      Right Ear: Hearing, tympanic membrane, ear canal and external ear normal.      Left Ear: Hearing, tympanic membrane, ear canal and external ear normal.      Nose: Nose normal.   Eyes:      Conjunctiva/sclera: Conjunctivae normal.      Pupils: Pupils are equal, round, and reactive to light.   Cardiovascular:      Rate and Rhythm: Normal rate and regular rhythm.      Heart sounds: Normal heart sounds.   Pulmonary:      Effort: Pulmonary effort is normal.      Breath sounds: Normal breath sounds. No decreased breath sounds, wheezing, rhonchi or rales.   Musculoskeletal:         General: Normal range of motion.      Cervical back: Normal range of motion.   Skin:     General: Skin is warm and dry.   Neurological:      Mental Status: She is alert.   Psychiatric:         Behavior: Behavior normal.             ECG 12 Lead    Date/Time: 6/23/2022 1:21 PM  Performed by: Michelle Mandel PA  Authorized by: Michelle Mandel PA   Comparison: compared with previous ECG from 12/9/2021  Similar to previous ECG  Rhythm: sinus rhythm  Rate:  normal  BPM: 70  Conduction: conduction normal  ST Segments: ST segments normal  T Waves: T waves normal  QRS axis: normal    Clinical impression: normal ECG             ASSESSMENT/PLAN    Diagnoses and all orders for this visit:    1. Irregular heart beat (Primary)  Comments:  Reviewed notifications on watch and no abnormal rhythms- maría elena was just explaining the possibility of abnormal beats. Normal exam and unchanged ECG.    Other orders  -     ECG 12 Lead             Return in about 2 months (around 8/23/2022) for Follow up.

## 2022-07-10 DIAGNOSIS — E11.9 TYPE 2 DIABETES MELLITUS WITHOUT COMPLICATION, WITHOUT LONG-TERM CURRENT USE OF INSULIN: ICD-10-CM

## 2022-07-10 DIAGNOSIS — R11.0 NAUSEA: ICD-10-CM

## 2022-07-11 RX ORDER — METFORMIN HYDROCHLORIDE 500 MG/1
TABLET, EXTENDED RELEASE ORAL
Qty: 180 TABLET | Refills: 0 | Status: SHIPPED | OUTPATIENT
Start: 2022-07-11 | End: 2022-08-25 | Stop reason: SDUPTHER

## 2022-07-11 RX ORDER — FAMOTIDINE 40 MG/1
TABLET, FILM COATED ORAL
Qty: 90 TABLET | Refills: 0 | Status: SHIPPED | OUTPATIENT
Start: 2022-07-11

## 2022-07-28 ENCOUNTER — TELEPHONE (OUTPATIENT)
Dept: PULMONOLOGY | Facility: CLINIC | Age: 62
End: 2022-07-28

## 2022-07-28 ENCOUNTER — OFFICE VISIT (OUTPATIENT)
Dept: SLEEP MEDICINE | Facility: HOSPITAL | Age: 62
End: 2022-07-28

## 2022-07-28 VITALS
WEIGHT: 251.2 LBS | HEIGHT: 66 IN | BODY MASS INDEX: 40.37 KG/M2 | HEART RATE: 79 BPM | OXYGEN SATURATION: 96 % | SYSTOLIC BLOOD PRESSURE: 147 MMHG | DIASTOLIC BLOOD PRESSURE: 74 MMHG

## 2022-07-28 DIAGNOSIS — G47.33 OBSTRUCTIVE SLEEP APNEA SYNDROME: Primary | ICD-10-CM

## 2022-07-28 PROCEDURE — 99213 OFFICE O/P EST LOW 20 MIN: CPT | Performed by: NURSE PRACTITIONER

## 2022-07-28 RX ORDER — NICOTINE POLACRILEX 2 MG
GUM BUCCAL
COMMUNITY

## 2022-07-28 NOTE — TELEPHONE ENCOUNTER
"Provider: CARLOS  Caller: MADIE  Relationship to Patient: SELF  Phone Number: [phone]  Reason for Call: PT THINKS SHE LEFT HER TUMBLER CUP THERE. IT IS WHITE AND SAYS \"LADY BOSS\". SHE WAS SITTING UNDER TV IN WAITING ROOM.       "

## 2022-07-28 NOTE — PROGRESS NOTES
Sleep Clinic Follow Up Note    Chief Complaint  Follow-up    Subjective     History of Present Illness (from previous telemedicine visit on 7/28/2021):  Sandra Auguste is a 61 y.o. female here for follow-up of sleep apnea.  Patient was last seen July 24, 2020.  Patient states she does do well with CPAP therapy but does suffer from severe anxiety so on some nights does not feel she can wear it.  She does get 7 to 8 hours a night of sleep and does feel rested.  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  Patient has an [place] score of 5/24.  Patient has no concerns or complaints today and wishes to continue.  (End copied text)       Interval History:  Sandra Auguste is a 62 y.o. female returns for follow up and compliance of CPAP therapy. The patient was last seen on 7/28/2021. Overall the patient feels poor with regard to therapy because she doesn't get enough sleep. She has difficulty with congestion and cant sleep when  The device appears to be/does not appear to be working appropriately. On average the patient sleeps 3-6 hours per night. She has anxiety and also has to get up to take care of her mother. She will stay up till 2-3 am due to anxiety.The patient wakes once or twice per night. She does see a therapist but has not been able to see her for several months. The patient reports the following changes to their medical and medication history since they were last seen: She was started on Hydroxazine for anxiety       Further details are as follows:    [place] Scale is: 10/24      Weight:  Current Weight: 251 lb down from 266 lbs    Weight change in the last year:  loss: 15 lbs    The patient's relevant past medical, surgical, family, and social history reviewed and updated in Epic as appropriate.    PMH:    Past Medical History:   Diagnosis Date   • Anesthesia complication      "HAS TROUBLE GETTING \"NUMB\"    • Anxiety    • Arthritis    • Asthma    • Bladder spasms    • Depression    • Diverticulosis    • GERD (gastroesophageal reflux disease)    • Memory loss     FROM SKULL FRACTURE AND FALL-SHORT TERM MEMORY LOSS   • Mental disorder     PTSD   • Migraine    • Obesity    • Seasonal allergies    • Seizures ([facility])     \"convulsions\" at age 5   • Skull fracture ([facility])    • Sleep apnea with use of continuous positive airway pressure (CPAP)     INSTRUCTED TO BRING OWN MASK AND TUBING    • SOB (shortness of breath) on exertion    • Tendonitis    • Type 2 diabetes mellitus without complication, without long-term current use of insulin ([facility]) 10/11/2018   • Wears contact lenses    • Wears partial dentures     TOP ONLY    • Wears prescription eyeglasses    • Wears prescription eyeglasses      Past Surgical History:   Procedure Laterality Date   • CERVICAL POLYPECTOMY     • TOTAL LAPAROSCOPIC HYSTERECTOMY N/A 2017    Procedure: TOTAL LAPAROSCOPIC HYSTERECTOMY, BILATERAL SALPINGO OOPHORECTOMY WITH DAVINCI ROBOT ;  Surgeon: Shannon Grimaldo DO;  Location: [facility];  Service:    • WISDOM TOOTH EXTRACTION       OB History        2    Para   2    Term   2            AB        Living   2       SAB        IAB        Ectopic        Molar        Multiple        Live Births                  Allergies   Allergen Reactions   • Mold Extract [Trichophyton] Shortness Of Breath, Anxiety, Irritability, Other (See Comments) and Palpitations     SOA   • Lortab [Hydrocodone-Acetaminophen] Nausea And Vomiting     SEVERE NAUSEA AND SLIGHT VOMITING   • Methylprednisolone Other (See Comments)     Bleeding, cramping   • Pollen Extract Other (See Comments)     SNEEZING AND CONGESTION        MEDS:  Prior to Admission medications    Medication Sig Start Date End Date Taking? Authorizing Provider   acetaminophen (TYLENOL) 500 MG tablet Take 500 mg by mouth Every 6 (Six) Hours As Needed for Mild Pain .    " Provider, MD Arleen   albuterol sulfate  (90 Base) MCG/ACT inhaler Inhale 1 puff Every 6 (Six) Hours As Needed for Shortness of Air. INHALE TWO PUFFS BY MOUTH EVERY 6 HOURS AS NEEDED 12/20/19   Christi Talbot APRN   Blood Glucose Monitoring Suppl (OneTouch Verio) w/Device kit 1 kit Daily. 8/27/21   Michelle Mandel PA   Breo Ellipta 200-25 MCG/INH inhaler INHALE ONE DOSE BY MOUTH DAILY 4/4/22   Michelle Mandel PA   celecoxib (CeleBREX) 50 MG capsule  3/4/22   Provider, MD Arleen   cyclobenzaprine (FLEXERIL) 5 MG tablet Take 1 tablet by mouth 2 (Two) Times a Day As Needed for Muscle Spasms. 1/13/22   Ko Adams Jr., APRN   Diclofenac Sodium (VOLTAREN) 1 % gel gel APPLY 4 GRAMS TOPICALLY TO THE APPROPRIATE AREA AS DIRECTED FOUR TIMES A DAYS AS NEEDED FOR PAIN IN SHOULDERS 8/4/21   Michelle Mandel PA   Dulaglutide 1.5 MG/0.5ML solution pen-injector Inject 1.5 mg under the skin into the appropriate area as directed 1 (One) Time Per Week. 5/27/22   Michelle Mandel PA   EPINEPHrine (EPIPEN) 0.3 MG/0.3ML solution auto-injector injection Inject 0.3 mL into the appropriate muscle as directed by prescriber As Needed (FOR SEVERE ALLERGIC REACTION). 2/10/22   Michelle Mandel PA   famotidine (PEPCID) 40 MG tablet TAKE ONE TABLET BY MOUTH DAILY 7/11/22   Michelle Mandel PA   fluticasone (FLONASE) 50 MCG/ACT nasal spray 2 sprays into the nostril(s) as directed by provider Daily for 30 days. 7/15/21 8/14/21  Michelle Mandel PA   glucose blood (OneTouch Verio) test strip Use one strip to check blood sugars one time daily 8/27/21   Michelle Mandel PA   glucose monitor monitoring kit Use as directed to check blood glucose once daily. 10/29/20   Michelle Mandel PA   guaiFENesin-codeine (GUAIFENESIN AC) 100-10 MG/5ML liquid Take 5 mL by mouth 3 (Three) Times a Day As Needed for Cough. 12/29/21   Kristin Mohr MD   hydrOXYzine (ATARAX) 25 MG tablet Take 1 tablet by mouth 2 (Two) Times a Day As Needed  "(anxiety and/or sleep). 5/16/22   Audra Zhao APRN   Insulin Pen Needle (Pen Needles) 32G X 6 MM misc 1 dose 1 (One) Time Per Week. 3/10/22   Michelle Mandel PA   Lancets (onetouch ultrasoft) lancets Use one lancet daily to check blood sugars 8/27/21   Michelle Mandel PA   lidocaine (LIDODERM) 5 % Place 1 patch on the skin as directed by provider Daily. Remove & Discard patch within 12 hours or as directed by MD 2/8/22   Tasha Marie APRN   loratadine (CLARITIN) 10 MG tablet Take 1 tablet by mouth Daily. 4/21/22   Michelle Mandel PA   metFORMIN ER (GLUCOPHAGE-XR) 500 MG 24 hr tablet TAKE TWO TABLETS BY MOUTH DAILY WITH BREAKFAST 7/11/22   Michelle Mandel PA   methocarbamol (ROBAXIN) 500 MG tablet Take 500 mg by mouth. 5/26/22   ProviderArleen MD   multivitamin with minerals tablet tablet Take 1 tablet by mouth Daily.    ProviderArleen MD   ondansetron (Zofran) 8 MG tablet Take 1 tablet by mouth Every 8 (Eight) Hours As Needed for Nausea or Vomiting. 8/6/21   Michelle Mandel PA   rosuvastatin (CRESTOR) 5 MG tablet TAKE ONE TABLET BY MOUTH DAILY 4/30/22   Michelle Mandel PA   vilazodone (Viibryd) 40 MG tablet tablet Take 1 tablet by mouth Daily. 5/16/22   Audra Zhao APRN         FH:  Family History   Problem Relation Age of Onset   • Cancer Mother         cervical cancer   • Dementia Mother    • Cancer Maternal Aunt         lymphoma   • Cancer Paternal Aunt    • Breast cancer Paternal Aunt 70   • Alcohol abuse Other    • Depression Other        Objective   Vital Signs:  /74   Pulse 79   Ht 166.4 cm (65.5\")   Wt 114 kg (251 lb 3.2 oz)   SpO2 96%   BMI 41.17 kg/m²     Class 3 Severe Obesity (BMI >=40). Obesity-related health conditions include the following: obstructive sleep apnea, hypertension and coronary heart disease. Obesity is unchanged. BMI is is above average; BMI management plan is completed. We discussed low calorie, low carb based diet program and portion " control.        Physical Exam  Constitutional:       Appearance: Normal appearance.   HENT:      Head: Normocephalic and atraumatic.      Nose: Nose normal.      Mouth/Throat:      Mouth: Mucous membranes are dry.   Cardiovascular:      Rate and Rhythm: Normal rate and regular rhythm.      Heart sounds: No murmur heard.    No friction rub. No gallop.   Pulmonary:      Effort: Pulmonary effort is normal. No respiratory distress.      Breath sounds: Normal breath sounds. No wheezing or rhonchi.   Neurological:      Mental Status: She is alert and oriented to person, place, and time.   Psychiatric:         Behavior: Behavior normal.       Mallampati Score: III (soft and hard palate and base of uvula visible)    CPAP Report:  AHI: 2.4  Days of Usage: 80/90-88.9%  90th percentile pressure: 10 cm H2O  Number of Days Greater than 4 hours: 47.8%  Average large leak 57.6% (2 hours 24 minutes)  Settings: Auto CPAP/a flex-minimum pressure 8 cm H2O, maximum pressure 18 cm H2O, a flex-3, Smart ramp 20 minutes, ramp pressure 4 cm H2O    Result Review :              Assessment and Plan  This is a pleasant 62-year-old female who returns for yearly follow-up of CPAP compliance and supply prescription.  Patient has had worsening leak with her mask lasting approximately 57.6% of the time.  Her AHI however is within normal limits at 2.4.  Overall the patient has only been using greater than 4 hours 47.8% of the time. She does have difficulty in compliance due to anxiety and taking care of her mother. She is anxious when wearing pap during storms. She is trying to lose weight with meal replacement shakes. She would like a referral to ENT to discuss surgery for Sleep Apnea.  We will refill supplies and she will return in 3 months for further compliance check.  I have encouraged her to to continue to work towards greater than 70% compliance for greater than 4 hours.  I have also encouraged her to continue to see her therapist and work on  her anxiety issues.    Diagnoses and all orders for this visit:    1. Obstructive sleep apnea syndrome (Primary)  -     PAP Therapy  -     Ambulatory Referral to ENT (Otolaryngology)         The patient continues to use and benefit from CPAP therapy.    1. The patient was counseled regarding multimodal approach with healthy nutrition, healthy sleep, regular physical activity, social activities, counseling, and medications. Encouraged to practice lateral sleep position. Avoid alcohol and sedatives close to bedtime.     2.  We will refill supplies x1 year.  Return to clinic in 3 months or sooner if symptoms warrant.  Patient gave verbal consent today for video visit. I have reviewed the results of my evaluation and impression and discussed my recommendations in detail with the patient.    Follow Up  Return in about 3 months (around 10/28/2022).  Patient was given instructions and counseling regarding her condition or for health maintenance advice. Please see specific information pulled into the AVS if appropriate.       REBECA Queen, [facility]  Pulmonology, Critical Care, and Sleep Medicine  Electronically signed by REBECA Ford, 07/28/22, 11:29 AM EDT.

## 2022-07-28 NOTE — TELEPHONE ENCOUNTER
Tried to return patients call. There was no cup in the waiting room or the room that she was in. If she calls, please inform her.

## 2022-08-08 RX ORDER — ROSUVASTATIN CALCIUM 5 MG/1
TABLET, COATED ORAL
Qty: 90 TABLET | Refills: 0 | Status: SHIPPED | OUTPATIENT
Start: 2022-08-08 | End: 2022-11-13

## 2022-08-16 ENCOUNTER — PRIOR AUTHORIZATION (OUTPATIENT)
Dept: PSYCHIATRY | Facility: CLINIC | Age: 62
End: 2022-08-16

## 2022-08-16 ENCOUNTER — TELEMEDICINE (OUTPATIENT)
Dept: PSYCHIATRY | Facility: CLINIC | Age: 62
End: 2022-08-16

## 2022-08-16 DIAGNOSIS — F43.10 POST TRAUMATIC STRESS DISORDER (PTSD): Chronic | ICD-10-CM

## 2022-08-16 DIAGNOSIS — G47.9 SLEEPING DIFFICULTIES: ICD-10-CM

## 2022-08-16 DIAGNOSIS — F33.1 MAJOR DEPRESSIVE DISORDER, RECURRENT EPISODE, MODERATE: Primary | Chronic | ICD-10-CM

## 2022-08-16 DIAGNOSIS — F41.9 ANXIETY DISORDER, UNSPECIFIED TYPE: Chronic | ICD-10-CM

## 2022-08-16 PROCEDURE — 99214 OFFICE O/P EST MOD 30 MIN: CPT | Performed by: NURSE PRACTITIONER

## 2022-08-16 PROCEDURE — 90833 PSYTX W PT W E/M 30 MIN: CPT | Performed by: NURSE PRACTITIONER

## 2022-08-16 RX ORDER — HYDROXYZINE HYDROCHLORIDE 25 MG/1
25 TABLET, FILM COATED ORAL 2 TIMES DAILY PRN
Qty: 60 TABLET | Refills: 0 | Status: SHIPPED | OUTPATIENT
Start: 2022-08-16 | End: 2022-09-15 | Stop reason: SDUPTHER

## 2022-08-16 RX ORDER — BREXPIPRAZOLE 0.25 MG/1
0.25 TABLET ORAL DAILY
Qty: 30 TABLET | Refills: 0 | Status: SHIPPED | OUTPATIENT
Start: 2022-08-16 | End: 2022-09-13

## 2022-08-16 RX ORDER — VILAZODONE HYDROCHLORIDE 40 MG/1
40 TABLET ORAL DAILY
Qty: 30 TABLET | Refills: 0 | Status: SHIPPED | OUTPATIENT
Start: 2022-08-16 | End: 2022-08-29 | Stop reason: SDUPTHER

## 2022-08-16 NOTE — PROGRESS NOTES
This provider is located at the [facility] (through [facility]), [address], [place], [address] using a secure [employer] Video Visit through [employer] Patient is being seen remotely via telehealth at their home address in [place], and stated they are in a secure environment for this session. The patient's condition being diagnosed/treated is appropriate for telemedicine. The provider identified herself as well as her credentials.   The patient, and/or patients guardian, consent to be seen remotely, and when consent is given they understand that the consent allows for patient identifiable information to be sent to a third party as needed.   They may refuse to be seen remotely at any time. The electronic data is encrypted and password protected, and the patient and/or guardian has been advised of the potential risks to privacy not withstanding such measures.    You have chosen to receive care through a telehealth visit.  Do you consent to use a video/audio connection for your medical care today? Yes    Subjective   Sandar Auguste is a 62 y.o. female who presents today for follow up    Chief Complaint: Depression, anxiety, and history of sleeping difficulties follow-up    Accompanied by: The patient is interviewed alone at today's encounter    History of Present Illness:   The patient describes her mood as good overall since her last encounter with this APRN.  The patient states she has had a lot of medical appointments recently, she has overbooked herself, and has been overwhelmed with her medical care and still caring for her mother.  The patient reports she has neglected her mental health appointments and care due to trying to keep up with medical appointments.  The patient rates her symptoms of depression at a 5/10 on a 0-10 scale, with 10 being the worst.  The patient rates her symptoms of anxiety at a 5-7/10 on a 0-10 scale, with 10 being the worst.  The patient  "reports her appetite as fair, but has been decreased.  The patient reports she has been drinking meal replacement shakes to lose some weight, and because she does not feel like eating.  The patient reports other times she just wants to eat \"junk food\".  The patient reports her sleep as fair.  The patient states when she gets to sleep she typically sleeps okay, but she is distracted often a lot at bedtime by helping care for her mother.  The patient is followed by sleep medicine for sleep apnea and sleeping difficulties.  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  She also reports lower back problems as well.  The patient reports she has had some dizziness for several months which her primary care is aware of.  She reports she does have testing at  for neuropsychological testing scheduled at the end of this month which she is scared to do because of her difficulties with her memory, but knows she needs to get this done.  The patient reports ever since her head injury she has had difficulty with her memory, difficulty with focus and concentration, and difficulty with energy and motivation.  The patient reports she has a lot of fatigue, and she does not have any motivation to get things done.  The patient does not report any other new medical problems or changes in medications since last appointment with this facility.  The patient reports compliance with her current medication regimen, and only takes hydroxyzine as needed.  The patient does not report any side effects or concerns from her current medication regimen other than wondering if her \"depression medicine\" is making her feel more tired.  She reports she is not sure if her fatigue comes from her depression medicine, her chronic medical issues, or if it comes from the depression and physical fatigue from being a caretaker for her mother.  The " "patient reports she does feel her anxiety is worse than her depression, and the hydroxyzine does help with her anxiety when she needs to take it.   This APRN and the patient have used a shared decision-making approach regarding her treatment plan.  The patient reports she does not want to try decreasing any of her psychotropic medications at today's encounter.  The patient would like to adjust her medications at this visit, possibly add something for her reported depressive symptoms if possible.  The patient denies any suicidal or homicidal ideations, plans, or intent at today's encounter and is convincing.  The patient denies any auditory hallucinations or visual hallucinations.  The patient does not endorse any significant symptoms consistent with carey or psychosis at today's encounter.      Prior Psychiatric Medications:  -Xanax 0.25 mg by mouth once daily as needed for anxiety - last prescribed 9/24/21  -Paxil  -Wellbutrin  -Zoloft  -Prozac  -Trazodone  -Buspirone -nauseated stomach  -Possibly Lexapro, but she is not sure  -Possibly Effexor, but she is not sure  -Possible medicine to assist with sleep  -Viibryd  -Hydroxyzine  -Possibly Abilify  -Other possible antipsychotics the patient cannot remember the name of them at this time      Last Menstrual Period:  Hysterectomy.        The following portions of the patient's history were reviewed and updated as appropriate: allergies, current medications, past family history, past medical history, past social history, past surgical history and problem list.          Past Medical History:  Past Medical History:   Diagnosis Date   • Anesthesia complication     HAS TROUBLE GETTING \"NUMB\"    • Anxiety    • Arthritis    • Asthma    • Bladder spasms    • Depression    • Diverticulosis    • GERD (gastroesophageal reflux disease)    • Memory loss     FROM SKULL FRACTURE AND FALL-SHORT TERM MEMORY LOSS   • Mental disorder     PTSD   • Migraine    • Obesity    • Seasonal " "allergies    • Seizures ([facility])     \"convulsions\" at age 5   • Skull fracture ([facility]) 2012   • Sleep apnea with use of continuous positive airway pressure (CPAP)     INSTRUCTED TO BRING OWN MASK AND TUBING    • SOB (shortness of breath) on exertion    • Tendonitis    • Type 2 diabetes mellitus without complication, without long-term current use of insulin ([facility]) 10/11/2018   • Wears contact lenses    • Wears partial dentures     TOP ONLY    • Wears prescription eyeglasses    • Wears prescription eyeglasses        Social History:  Social History     Socioeconomic History   • Marital status: Single   Tobacco Use   • Smoking status: Never Smoker   • Smokeless tobacco: Never Used   Vaping Use   • Vaping Use: Never used   Substance and Sexual Activity   • Alcohol use: Yes     Comment: Patient reports occasoinal wine use to help relax her and fall asleep, not daily use   • Drug use: Never   • Sexual activity: Not Currently     Birth control/protection: Post-menopausal, Surgical       Family History:  Family History   Problem Relation Age of Onset   • Cancer Mother         cervical cancer   • Dementia Mother    • Cancer Maternal Aunt         lymphoma   • Cancer Paternal Aunt    • Breast cancer Paternal Aunt 70   • Alcohol abuse Other    • Depression Other        Past Surgical History:  Past Surgical History:   Procedure Laterality Date   • CERVICAL POLYPECTOMY     • TOTAL LAPAROSCOPIC HYSTERECTOMY N/A 9/18/2017    Procedure: TOTAL LAPAROSCOPIC HYSTERECTOMY, BILATERAL SALPINGO OOPHORECTOMY WITH DAVINCI ROBOT ;  Surgeon: Shannon Grimaldo DO;  Location: [facility];  Service:    • WISDOM TOOTH EXTRACTION         Problem List:  Patient Active Problem List   Diagnosis   • Abnormal computed tomography scan   • Anemia   • Post traumatic stress disorder (PTSD)   • Anxiety   • Strain of neck muscle   • Cough   • Diverticulitis of colon   • Fatigue   • Steatosis of liver   • Lateral epicondylitis   • Knee pain   • Spasm   • Adiposity   • " Obstructive sleep apnea syndrome   • Osteoarthritis   • Palpitations   • Nausea   • Shortness of breath   • Skin tag   • Candidiasis of vagina   • Viral upper respiratory tract infection   • Gastroesophageal reflux disease   • Acute pain of left shoulder   • Abdominal distension (gaseous)   • SOB (shortness of breath) on exertion   • Sleep apnea with use of continuous positive airway pressure (CPAP)   • Seasonal allergies   • Obesity   • Migraine   • Mental disorder   • Diverticulosis   • Depression   • Arthritis   • Hypertension   • Itching   • Stabbing headache   • Temple tenderness   • TMJ click   • Mild intermittent asthma without complication   • Skull fracture (HCC)   • Type 2 diabetes mellitus without complication, without long-term current use of insulin (HCC)   • Health care maintenance   • Moderate episode of recurrent major depressive disorder (HCC)   • Generalized anxiety disorder   • High risk medications (not anticoagulants) long-term use   • Hyperlipidemia       Allergy:   Allergies   Allergen Reactions   • Mold Extract [Trichophyton] Shortness Of Breath, Anxiety, Irritability, Other (See Comments) and Palpitations     SOA   • Lortab [Hydrocodone-Acetaminophen] Nausea And Vomiting     SEVERE NAUSEA AND SLIGHT VOMITING   • Methylprednisolone Other (See Comments)     Bleeding, cramping   • Pollen Extract Other (See Comments)     SNEEZING AND CONGESTION         Current Medications:   Current Outpatient Medications   Medication Sig Dispense Refill   • hydrOXYzine (ATARAX) 25 MG tablet Take 1 tablet by mouth 2 (Two) Times a Day As Needed (anxiety and/or sleep). 60 tablet 0   • vilazodone (Viibryd) 40 MG tablet tablet Take 1 tablet by mouth Daily. 30 tablet 0   • acetaminophen (TYLENOL) 500 MG tablet Take 500 mg by mouth Every 6 (Six) Hours As Needed for Mild Pain .     • albuterol sulfate  (90 Base) MCG/ACT inhaler Inhale 1 puff Every 6 (Six) Hours As Needed for Shortness of Air. INHALE TWO PUFFS BY  MOUTH EVERY 6 HOURS AS NEEDED 1 inhaler 2   • Biotin 1 MG capsule Take  by mouth.     • Blood Glucose Monitoring Suppl (OneTouch Verio) w/Device kit 1 kit Daily. 1 kit 0   • Breo Ellipta 200-25 MCG/INH inhaler INHALE ONE DOSE BY MOUTH DAILY 60 each 5   • Brexpiprazole (Rexulti) 0.25 MG tablet Take 0.25 mg by mouth Daily. 30 tablet 0   • celecoxib (CeleBREX) 50 MG capsule      • Cranberry 1000 MG capsule Take  by mouth.     • cyclobenzaprine (FLEXERIL) 5 MG tablet Take 1 tablet by mouth 2 (Two) Times a Day As Needed for Muscle Spasms. 20 tablet 0   • Diclofenac Sodium (VOLTAREN) 1 % gel gel APPLY 4 GRAMS TOPICALLY TO THE APPROPRIATE AREA AS DIRECTED FOUR TIMES A DAYS AS NEEDED FOR PAIN IN SHOULDERS 100 g 1   • Dulaglutide 1.5 MG/0.5ML solution pen-injector Inject 1.5 mg under the skin into the appropriate area as directed 1 (One) Time Per Week. 12 pen 1   • EPINEPHrine (EPIPEN) 0.3 MG/0.3ML solution auto-injector injection Inject 0.3 mL into the appropriate muscle as directed by prescriber As Needed (FOR SEVERE ALLERGIC REACTION). 1 each 1   • famotidine (PEPCID) 40 MG tablet TAKE ONE TABLET BY MOUTH DAILY 90 tablet 0   • fluticasone (FLONASE) 50 MCG/ACT nasal spray 2 sprays into the nostril(s) as directed by provider Daily for 30 days. 16 g 5   • glucose blood (OneTouch Verio) test strip Use one strip to check blood sugars one time daily 100 each 3   • glucose monitor monitoring kit Use as directed to check blood glucose once daily. 1 each 0   • guaiFENesin-codeine (GUAIFENESIN AC) 100-10 MG/5ML liquid Take 5 mL by mouth 3 (Three) Times a Day As Needed for Cough. 118 mL 0   • Insulin Pen Needle (Pen Needles) 32G X 6 MM misc 1 dose 1 (One) Time Per Week. 100 each 3   • Lancets (onetouch ultrasoft) lancets Use one lancet daily to check blood sugars 100 each 3   • lidocaine (LIDODERM) 5 % Place 1 patch on the skin as directed by provider Daily. Remove & Discard patch within 12 hours or as directed by MD 6 patch 0   •  loratadine (CLARITIN) 10 MG tablet Take 1 tablet by mouth Daily. 30 tablet 5   • metFORMIN ER (GLUCOPHAGE-XR) 500 MG 24 hr tablet TAKE TWO TABLETS BY MOUTH DAILY WITH BREAKFAST 180 tablet 0   • methocarbamol (ROBAXIN) 500 MG tablet Take 500 mg by mouth.     • multivitamin with minerals tablet tablet Take 1 tablet by mouth Daily.     • ondansetron (Zofran) 8 MG tablet Take 1 tablet by mouth Every 8 (Eight) Hours As Needed for Nausea or Vomiting. 15 tablet 0   • rosuvastatin (CRESTOR) 5 MG tablet TAKE ONE TABLET BY MOUTH DAILY 90 tablet 0     No current facility-administered medications for this visit.       Review of Symptoms:    Review of Systems   Musculoskeletal: Positive for arthralgias, back pain, myalgias, neck pain and neck stiffness.   Neurological: Positive for memory problem (chronic).   Psychiatric/Behavioral: Positive for decreased concentration, sleep disturbance (improved with medication), depressed mood and stress. Negative for agitation, behavioral problems, dysphoric mood, hallucinations, self-injury, suicidal ideas and negative for hyperactivity. The patient is nervous/anxious.          Physical Exam:   not currently breastfeeding. There is no height or weight on file to calculate BMI.   Due to the remote nature of this encounter (virtual encounter), vitals were unable to be obtained.  Height stated at 65.5 inches.  Weight stated at around 258 pounds.      Physical Exam  Constitutional:       Appearance: Normal appearance.   Neurological:      Mental Status: She is alert and oriented to person, place, and time.   Psychiatric:         Attention and Perception: Attention normal. She is attentive.         Mood and Affect: Affect normal. Mood is anxious and depressed.         Speech: Speech normal.         Behavior: Behavior normal. Behavior is cooperative.         Thought Content: Thought content normal. Thought content is not paranoid or delusional. Thought content does not include homicidal or  suicidal ideation. Thought content does not include homicidal or suicidal plan.         Judgment: Judgment normal.           Mental Status Exam:   Hygiene:   good  Cooperation:  Cooperative  Eye Contact:  Good  Psychomotor Behavior:  Appropriate  Affect:  Appropriate  Mood: depressed and anxious  Hopelessness: Denies  Speech:  Normal  Thought Process:  Linear  Thought Content:  Mood congruent  Suicidal:  None  Homicidal:  None  Hallucinations:  None  Delusion:  None  Memory:  Intact  Orientation:  Person, Place and Time  Reliability:  good  Insight:  Good  Judgement:  Good  Impulse Control:  Good  Physical/Medical Issues:  Chronic, no acute physical/medical issues at today's encounter         Lab Results:   No visits with results within 1 Month(s) from this visit.   Latest known visit with results is:   Lab on 05/27/2022   Component Date Value Ref Range Status   • Glucose 05/27/2022 62 (A) 65 - 99 mg/dL Final   • BUN 05/27/2022 12  6 - 20 mg/dL Final   • Creatinine 05/27/2022 0.74  0.57 - 1.00 mg/dL Final   • Sodium 05/27/2022 140  136 - 145 mmol/L Final   • Potassium 05/27/2022 4.6  3.5 - 5.2 mmol/L Final   • Chloride 05/27/2022 104  98 - 107 mmol/L Final   • CO2 05/27/2022 24.4  22.0 - 29.0 mmol/L Final   • Calcium 05/27/2022 9.7  8.6 - 10.5 mg/dL Final   • Total Protein 05/27/2022 7.1  6.0 - 8.5 g/dL Final   • Albumin 05/27/2022 4.40  3.50 - 5.20 g/dL Final   • ALT (SGPT) 05/27/2022 36 (A) 1 - 33 U/L Final   • AST (SGOT) 05/27/2022 24  1 - 32 U/L Final   • Alkaline Phosphatase 05/27/2022 103  39 - 117 U/L Final   • Total Bilirubin 05/27/2022 0.2  0.0 - 1.2 mg/dL Final   • Globulin 05/27/2022 2.7  gm/dL Final   • A/G Ratio 05/27/2022 1.6  g/dL Final   • BUN/Creatinine Ratio 05/27/2022 16.2  7.0 - 25.0 Final   • Anion Gap 05/27/2022 11.6  5.0 - 15.0 mmol/L Final   • eGFR 05/27/2022 93.3  >60.0 mL/min/1.73 Final    National Kidney Foundation and American Society of Nephrology (ASN) Task Force recommended calculation  based on the Chronic Kidney Disease Epidemiology Collaboration (CKD-EPI) equation refit without adjustment for race.   • TSH 05/27/2022 1.960  0.270 - 4.200 uIU/mL Final   • Magnesium 05/27/2022 2.1  1.6 - 2.6 mg/dL Final   • Hemoglobin A1C 05/27/2022 6.60 (A) 4.80 - 5.60 % Final         Assessment & Plan   Problems Addressed this Visit        Mental Health    Post traumatic stress disorder (PTSD)    Relevant Medications    vilazodone (Viibryd) 40 MG tablet tablet    hydrOXYzine (ATARAX) 25 MG tablet    Brexpiprazole (Rexulti) 0.25 MG tablet      Other Visit Diagnoses     Major depressive disorder, recurrent episode, moderate (HCC)  (Chronic)   -  Primary    Relevant Medications    vilazodone (Viibryd) 40 MG tablet tablet    hydrOXYzine (ATARAX) 25 MG tablet    Brexpiprazole (Rexulti) 0.25 MG tablet    Anxiety disorder, unspecified type  (Chronic)       Relevant Medications    vilazodone (Viibryd) 40 MG tablet tablet    hydrOXYzine (ATARAX) 25 MG tablet    Brexpiprazole (Rexulti) 0.25 MG tablet    Sleeping difficulties        Relevant Medications    hydrOXYzine (ATARAX) 25 MG tablet      Diagnoses       Codes Comments    Major depressive disorder, recurrent episode, moderate (HCC)    -  Primary ICD-10-CM: F33.1  ICD-9-CM: 296.32     Anxiety disorder, unspecified type     ICD-10-CM: F41.9  ICD-9-CM: 300.00     Post traumatic stress disorder (PTSD)     ICD-10-CM: F43.10  ICD-9-CM: 309.81     Sleeping difficulties     ICD-10-CM: G47.9  ICD-9-CM: 780.50           Visit Diagnoses:    ICD-10-CM ICD-9-CM   1. Major depressive disorder, recurrent episode, moderate (HCC)  F33.1 296.32   2. Anxiety disorder, unspecified type  F41.9 300.00   3. Post traumatic stress disorder (PTSD)  F43.10 309.81   4. Sleeping difficulties  G47.9 780.50          GOALS:  Short Term Goals: Patient will be compliant with medication, and patient will have no significant medication related side effects.  Patient will be engaged in psychotherapy as  indicated.  Patient will report subjective improvement of symptoms.  Long term goals: To stabilize mood and treat/improve subjective symptoms, the patient will stay out of the hospital, the patient will be at an optimal level of functioning, and the patient will take all medications as prescribed.  The patient verbalized understanding and agreement with goals that were mutually set.      TREATMENT PLAN: Continue supportive psychotherapy efforts and medications as indicated.  Medication and treatment options, both pharmacological and non-pharmacological treatment options, discussed during today's visit, including any off label use of medication. Patient acknowledged and verbally consented with current treatment plan and was educated on the importance of compliance with treatment and follow-up appointments.      -Continue Viibryd 40 mg by mouth once daily for mood.  -Continue hydroxyzine 25 mg by mouth up to twice daily as needed for anxiety and/or sleep.  -Start Rexulti 0.25 mg by mouth once daily as an adjunct for depression.  -The patient reports she has neuropsychological testing scheduled at  at the end of this month, it is advised and recommended for the patient to complete this testing.    In/Start Time: 1:30 PM.  Out/Stop Time: 1:50 PM.  20 minutes of face to face direct patient care with patient was spent for supportive psychotherapy including promoting the therapeutic alliance, strengthening self awareness and insights, strengthening coping skills, counseling the patient regarding diagnoses, utilizing cognitive behavioral therapy to assist the patient in recognizing more appropriate coping mechanisms which are proven effective in reducing the severity of frequency of symptoms and and in coordination of care.  This APRN assisted the patient in processing the patient's diagnoses including depression and anxiety, and also acknowledged and normalized the patient's thoughts, feelings, and concerns  The patient  is also strongly urged to eat healthy well balanced foods in order to reduce further health risks, and exercise as tolerated and in guidance with the patient's PCP's recommendations. This APRN allowed the patient to freely discuss issues without interruption or judgment.  This APRN answered any questions the patient had regarding medication and treatment plan.      MEDICATION ISSUES:  Discussed medication options and treatment plan of prescribed medication, any off label use of medication, as well as the risks, benefits, any black box warnings including increased suicidality, and side effects including but not limited to potential falls, dizziness, possible impaired driving, GI side effects (change in appetite, abdominal discomfort, nausea, vomiting, diarrhea, and/or constipation), dry mouth, somnolence, sedation, insomnia, activation, agitation, irritation, tremors, abnormal muscle movements or disorders, tardive dyskinesia, akathisia, asthenia, headache, sweating, possible bruising or rare bleeding, electrolyte and/or fluid abnormalities, change in blood pressure/heart rate/and or heart rhythm, hypotension, sexual dysfunction, rare impulse control problems, rare seizures, rare neuroleptic malignant syndrome, increased risk of death and cerebrovascular events, change in blood glucose and increased risk for diabetes, change in triglycerides and cholesterol and increased risk for dyslipidemia,  weight gain, weight gain that can become problematic to health, skin conditions and reactions, and metabolic adversities among others. Patient and/or guardian are agreeable to call the office with any worsening of symptoms or onset of side effects, or if any concerns or questions arise.  The contact information for the office is made available to the patient and/or guardian. Patient and/or guardian are agreeable to call 911 or go to the nearest ER should they begin having any SI/HI, or if any urgent concerns  arise.      SUICIDE RISK ASSESSMENT AND SAFETY PLAN: Unalterable demographics and a history of mental health intervention indicate this patient is in a high risk category compared to the general population. At present, the patient denies active SI/HI, intentions, or plans at this time and agrees to seek immediate care should such thoughts develop. The patient verbalizes understanding of how to access emergency care if needed and agrees to do so. Consideration of suicide risk and protective factors such as history, current presentation, individual strengths and weaknesses, psychosocial and environmental stressors and variables, psychiatric illness and symptoms, medical conditions and pain, took place in this interview. Based on those considerations, the patient is determined: within individual baseline and presenting no imminent risk for suicide or homicide. Other recommendations: The patient does not meet the criteria for inpatient admission and is not a safety risk to self or others at today's visit. Inpatient treatment offers no significant advantages over outpatient treatment for this patient at today's visit.  The patient was given ample time for questions and fully participated in treatment planning.  The patient was encouraged to call the clinic with any questions or concerns.  The patient was informed of access to emergency care. If patient were to develop any significant symptomatology, suicidal ideation, homicidal ideation, any concerns, or feel unsafe at any time they are to call the clinic and if unable to get immediate assistance should immediately call 911 or go to the nearest emergency room.  Patient contracted verbally for the following: If you are experiencing an emotional crisis or have thoughts of harming yourself or others, please go to your nearest local emergency room or call 911. Will continue to re-assess medication response and side effects frequently to establish efficacy and ensure safety.  Risks, any black box warnings, side effects, off label usage, and benefits of medication and treatment discussed with patient, along with potential adverse side effects of current and/or newly prescribed medication, alternative treatment options, and OTC medications.  Patient verbalized understanding of potential risks, any off label use of medication, any black box warnings, and any side effects in their own words. The patient verbalized understanding and agreed to comply with the safety plan discussed in their own words.  Patient given the number to the office. Number also discussed of the 24- hour suicide hotline.         MEDS ORDERED DURING VISIT:  New Medications Ordered This Visit   Medications   • vilazodone (Viibryd) 40 MG tablet tablet     Sig: Take 1 tablet by mouth Daily.     Dispense:  30 tablet     Refill:  0   • hydrOXYzine (ATARAX) 25 MG tablet     Sig: Take 1 tablet by mouth 2 (Two) Times a Day As Needed (anxiety and/or sleep).     Dispense:  60 tablet     Refill:  0   • Brexpiprazole (Rexulti) 0.25 MG tablet     Sig: Take 0.25 mg by mouth Daily.     Dispense:  30 tablet     Refill:  0       Return in about 4 weeks (around 9/13/2022), or if symptoms worsen or fail to improve, for Next scheduled follow up and Recheck.         Functional Status: Moderate impairment     Prognosis: Fair with Ongoing Treatment             This document has been electronically signed by REBECA Bello  August 16, 2022 17:27 EDT    Some of the data in this electronic note has been brought forward from a previous encounter, any necessary changes have been made, it has been reviewed by this APRN, and it is accurate.    Please note that portions of this note were completed with a voice recognition program.

## 2022-08-24 ENCOUNTER — TELEMEDICINE (OUTPATIENT)
Dept: PSYCHIATRY | Facility: CLINIC | Age: 62
End: 2022-08-24

## 2022-08-24 DIAGNOSIS — F33.1 MAJOR DEPRESSIVE DISORDER, RECURRENT EPISODE, MODERATE: Primary | ICD-10-CM

## 2022-08-24 PROCEDURE — 90837 PSYTX W PT 60 MINUTES: CPT | Performed by: COUNSELOR

## 2022-08-24 NOTE — PROGRESS NOTES
Date: August 24, 2022  Time In: 2:00 pm   Time Out: 3:06 pm   This provider is located at the [facility]through [facility]), [address], [place] [address] using a secure [employer] Video Visit through [employer]. Patient is being seen remotely via telehealth at home address in [place] and stated they are in a secure environment for this session. The patient's condition being diagnosed/treated is appropriate for telemedicine. The provider identified herself as well as her credentials. The patient, and/or patients guardian, consent to be seen remotely, and when consent is given they understand that the consent allows for patient identifiable information to be sent to a third party as needed. They may refuse to be seen remotely at any time. The electronic data is encrypted and password protected, and the patient and/or guardian has been advised of the potential risks to privacy not withstanding such measures.     You have chosen to receive care through a telehealth visit.  Do you consent to use a video/audio connection for your medical care today? Yes    PROGRESS NOTE  Data:  Sandra Auguste is a 62 y.o. female who presents today for follow up. Provider conducted a check in with patient since last contact has almost 3 months. Provider utilized person centered rapport building with patient in order to engage due to lengthy duration since last contact. Patient stated that she had a hand injury. Patient also identified having a injury with her neck and back. Patient stated that she will be having some treatments for this. Patient processed how she feels about being on medication. Patient identified that she has been feeling down and lethargic. Patient processed her lack of focus and concentration. Patient did noted that she was somewhat more motivated. Patient identified having a lack of emotional support from her family. Patient shared feeling different from her family but with  difficulty explaining. Patient stated that she has tried to talk with her family. Provider discussed with patient about how she interacts and also see if there may be a different perspective. Patient and provider discussed implication of caregiver fatigue. Patient shared that now there is respite care that can assist. Patient inquired regarding skills for doing tasks. Provider discussed possible options for keeping with listing or taking notes as reminders.       Chief Complaint: depression        Clinical Maneuvering/Intervention: CBT     (Scales based on 0 - 10 with 10 being the worst)  Depression:  Not scaled during contact Anxiety: Not scaled during contact       Assisted patient in processing above session content; acknowledged and normalized patient’s thoughts, feelings, and concerns.  Rationalized patient thought process regarding family dynamics. Patient stated that she feels that she has always been bullied by her family.  Discussed triggers associated with patient's depression and anxiety .  Also discussed coping skills for patient to implement such as being able to be more physically active and practicing self care.    Allowed patient to freely discuss issues without interruption or judgment. Provided safe, confidential environment to facilitate the development of positive therapeutic relationship and encourage open, honest communication. Assisted patient in identifying risk factors which would indicate the need for higher level of care including thoughts to harm self or others and/or self-harming behavior and encouraged patient to contact this office, call 911, or present to the nearest emergency room should any of these events occur. Discussed crisis intervention services and means to access. Patient adamantly and convincingly denies current suicidal or homicidal ideation or perceptual disturbance.    Assessment:   Assessment   Patient appears to maintain relative stability as compared to their  baseline.  However, patient continues to struggle with depression and anxiety which continues to cause impairment in important areas of functioning.  A result, they can be reasonably expected to continue to benefit from treatment and would likely be at increased risk for decompensation otherwise.    Mental Status Exam:   Hygiene:   good  Cooperation:  Cooperative  Eye Contact:  Good  Psychomotor Behavior:  Appropriate  Affect:  Full range  Mood: normal  Speech:  Normal  Thought Process:  Somewhat disorganized at times  Thought Content:  Normal  Suicidal:  None  Homicidal:  None  Hallucinations:  None  Delusion:  None  Memory:  Deficits  Orientation:  Person, Place, Time and Situation  Reliability:  good  Insight:  Fair  Judgement:  Good  Impulse Control:  Good  Physical/Medical Issues:  Patient reported medical follow ups that she has       Patient's Support Network Includes:  daughter    Functional Status: Moderate impairment     Progress toward goal: Not at goal    Prognosis: Good with Ongoing Treatment          Plan:  Patient will continue in individual outpatient therapy with focus on improved functioning and coping skills, maintaining stability, and avoiding decompensation and the need for higher level of care.    Patient will adhere to medication regimen as prescribed and report any side effects. Patient will contact this office, call 911 or present to the nearest emergency room should suicidal or homicidal ideations occur. Provide Cognitive Behavioral Therapy and Solution Focused Therapy to improve functioning, maintain stability, and avoid decompensation and the need for higher level of care.     Return in about 7 weeks, or earlier if symptoms worsen or fail to improve. Provider discussed with patient regarding on-going scheduling. Patient shared availability and provider discussed no availability in September at this time however patient agreed to be on waitlist in case of cancellations.            VISIT  DIAGNOSIS:     ICD-10-CM ICD-9-CM   1. Major depressive disorder, recurrent episode, moderate (HCC)  F33.1 296.32             This document has been electronically signed by ASIA Brown  August 24, 2022 14:12 EDT      Part of this note may be an electronic transcription/translation of spoken language to printed text using the Dragon Dictation System.

## 2022-08-25 ENCOUNTER — LAB (OUTPATIENT)
Dept: LAB | Facility: HOSPITAL | Age: 62
End: 2022-08-25

## 2022-08-25 ENCOUNTER — OFFICE VISIT (OUTPATIENT)
Dept: INTERNAL MEDICINE | Facility: CLINIC | Age: 62
End: 2022-08-25

## 2022-08-25 VITALS
SYSTOLIC BLOOD PRESSURE: 110 MMHG | DIASTOLIC BLOOD PRESSURE: 80 MMHG | HEART RATE: 83 BPM | OXYGEN SATURATION: 97 % | WEIGHT: 250.2 LBS | TEMPERATURE: 97.7 F | BODY MASS INDEX: 41 KG/M2

## 2022-08-25 DIAGNOSIS — R10.9 ACUTE RIGHT FLANK PAIN: ICD-10-CM

## 2022-08-25 DIAGNOSIS — E11.9 TYPE 2 DIABETES MELLITUS WITHOUT COMPLICATION, WITHOUT LONG-TERM CURRENT USE OF INSULIN: Primary | ICD-10-CM

## 2022-08-25 LAB
BILIRUB BLD-MCNC: NEGATIVE MG/DL
CLARITY, POC: CLEAR
COLOR UR: YELLOW
EXPIRATION DATE: NORMAL
EXPIRATION DATE: NORMAL
GLUCOSE BLDC GLUCOMTR-MCNC: 126 MG/DL (ref 70–130)
GLUCOSE UR STRIP-MCNC: NEGATIVE MG/DL
HBA1C MFR BLD: 5.9 %
KETONES UR QL: NEGATIVE
LEUKOCYTE EST, POC: NEGATIVE
Lab: NORMAL
Lab: NORMAL
NITRITE UR-MCNC: NEGATIVE MG/ML
PH UR: 6 [PH] (ref 5–8)
PROT UR STRIP-MCNC: NEGATIVE MG/DL
RBC # UR STRIP: NEGATIVE /UL
SP GR UR: 1.02 (ref 1–1.03)
UROBILINOGEN UR QL: NORMAL

## 2022-08-25 PROCEDURE — 87086 URINE CULTURE/COLONY COUNT: CPT | Performed by: PHYSICIAN ASSISTANT

## 2022-08-25 PROCEDURE — 3044F HG A1C LEVEL LT 7.0%: CPT | Performed by: PHYSICIAN ASSISTANT

## 2022-08-25 PROCEDURE — 80053 COMPREHEN METABOLIC PANEL: CPT

## 2022-08-25 PROCEDURE — 83036 HEMOGLOBIN GLYCOSYLATED A1C: CPT | Performed by: PHYSICIAN ASSISTANT

## 2022-08-25 PROCEDURE — 99213 OFFICE O/P EST LOW 20 MIN: CPT | Performed by: PHYSICIAN ASSISTANT

## 2022-08-25 PROCEDURE — 82962 GLUCOSE BLOOD TEST: CPT | Performed by: PHYSICIAN ASSISTANT

## 2022-08-25 RX ORDER — METFORMIN HYDROCHLORIDE 500 MG/1
500 TABLET, EXTENDED RELEASE ORAL
Qty: 180 TABLET | Refills: 0
Start: 2022-08-25 | End: 2022-10-27

## 2022-08-25 NOTE — PROGRESS NOTES
Chief Complaint   Patient presents with   • Diabetes   • Hypertension     Follow Up       Subjective     Sandra Auguste is a 62 y.o. female.        History of Present Illness     Pt has continued to work on diet changes and increasing exercise. She has been losing weight but it does fluctuate. Wonders about going down to 1 metformin a day because it upsets her stomach.    She has been started on Rexulti and it is working well for her.    She had some injections in her back at . Had a trial run with lidocaine and got some temporary relief. She will return for another injection.  Thinks she is having some back spasms since then. Using a heating pad. Worried about her kidneys.        Current Outpatient Medications:   •  acetaminophen (TYLENOL) 500 MG tablet, Take 500 mg by mouth Every 6 (Six) Hours As Needed for Mild Pain ., Disp: , Rfl:   •  albuterol sulfate  (90 Base) MCG/ACT inhaler, Inhale 1 puff Every 6 (Six) Hours As Needed for Shortness of Air. INHALE TWO PUFFS BY MOUTH EVERY 6 HOURS AS NEEDED, Disp: 1 inhaler, Rfl: 2  •  Biotin 1 MG capsule, Take  by mouth., Disp: , Rfl:   •  Blood Glucose Monitoring Suppl (OneTouch Verio) w/Device kit, 1 kit Daily., Disp: 1 kit, Rfl: 0  •  Breo Ellipta 200-25 MCG/INH inhaler, INHALE ONE DOSE BY MOUTH DAILY, Disp: 60 each, Rfl: 5  •  Brexpiprazole (Rexulti) 0.25 MG tablet, Take 0.25 mg by mouth Daily., Disp: 30 tablet, Rfl: 0  •  celecoxib (CeleBREX) 50 MG capsule, , Disp: , Rfl:   •  Cranberry 1000 MG capsule, Take  by mouth., Disp: , Rfl:   •  cyclobenzaprine (FLEXERIL) 5 MG tablet, Take 1 tablet by mouth 2 (Two) Times a Day As Needed for Muscle Spasms., Disp: 20 tablet, Rfl: 0  •  Diclofenac Sodium (VOLTAREN) 1 % gel gel, APPLY 4 GRAMS TOPICALLY TO THE APPROPRIATE AREA AS DIRECTED FOUR TIMES A DAYS AS NEEDED FOR PAIN IN SHOULDERS, Disp: 100 g, Rfl: 1  •  Dulaglutide 1.5 MG/0.5ML solution pen-injector, Inject 1.5 mg under the skin into the appropriate area as  directed 1 (One) Time Per Week., Disp: 12 pen, Rfl: 1  •  EPINEPHrine (EPIPEN) 0.3 MG/0.3ML solution auto-injector injection, Inject 0.3 mL into the appropriate muscle as directed by prescriber As Needed (FOR SEVERE ALLERGIC REACTION)., Disp: 1 each, Rfl: 1  •  famotidine (PEPCID) 40 MG tablet, TAKE ONE TABLET BY MOUTH DAILY, Disp: 90 tablet, Rfl: 0  •  glucose blood (OneTouch Verio) test strip, Use one strip to check blood sugars one time daily, Disp: 100 each, Rfl: 3  •  glucose monitor monitoring kit, Use as directed to check blood glucose once daily., Disp: 1 each, Rfl: 0  •  guaiFENesin-codeine (GUAIFENESIN AC) 100-10 MG/5ML liquid, Take 5 mL by mouth 3 (Three) Times a Day As Needed for Cough., Disp: 118 mL, Rfl: 0  •  hydrOXYzine (ATARAX) 25 MG tablet, Take 1 tablet by mouth 2 (Two) Times a Day As Needed (anxiety and/or sleep)., Disp: 60 tablet, Rfl: 0  •  Insulin Pen Needle (Pen Needles) 32G X 6 MM misc, 1 dose 1 (One) Time Per Week., Disp: 100 each, Rfl: 3  •  Lancets (onetouch ultrasoft) lancets, Use one lancet daily to check blood sugars, Disp: 100 each, Rfl: 3  •  lidocaine (LIDODERM) 5 %, Place 1 patch on the skin as directed by provider Daily. Remove & Discard patch within 12 hours or as directed by MD, Disp: 6 patch, Rfl: 0  •  loratadine (CLARITIN) 10 MG tablet, Take 1 tablet by mouth Daily., Disp: 30 tablet, Rfl: 5  •  metFORMIN ER (GLUCOPHAGE-XR) 500 MG 24 hr tablet, Take 1 tablet by mouth Daily With Breakfast., Disp: 180 tablet, Rfl: 0  •  methocarbamol (ROBAXIN) 500 MG tablet, Take 500 mg by mouth., Disp: , Rfl:   •  multivitamin with minerals tablet tablet, Take 1 tablet by mouth Daily., Disp: , Rfl:   •  ondansetron (Zofran) 8 MG tablet, Take 1 tablet by mouth Every 8 (Eight) Hours As Needed for Nausea or Vomiting., Disp: 15 tablet, Rfl: 0  •  rosuvastatin (CRESTOR) 5 MG tablet, TAKE ONE TABLET BY MOUTH DAILY, Disp: 90 tablet, Rfl: 0  •  vilazodone (Viibryd) 40 MG tablet tablet, Take 1 tablet by  mouth Daily., Disp: 30 tablet, Rfl: 0  •  fluticasone (FLONASE) 50 MCG/ACT nasal spray, 2 sprays into the nostril(s) as directed by provider Daily for 30 days., Disp: 16 g, Rfl: 5     PMFSH  The following portions of the patient's history were reviewed and updated as appropriate: allergies, current medications, past family history, past medical history, past social history, past surgical history and problem list.    Review of Systems   Constitutional: Negative for activity change, appetite change and fatigue.   HENT: Negative for congestion and rhinorrhea.    Respiratory: Negative for chest tightness and shortness of breath.    Cardiovascular: Negative for chest pain and palpitations.   Gastrointestinal: Negative for abdominal pain.   Genitourinary: Negative for dysuria.   Musculoskeletal: Negative for arthralgias and myalgias.   Neurological: Negative for dizziness, weakness, light-headedness and headaches.   Psychiatric/Behavioral: Negative for dysphoric mood. The patient is not nervous/anxious.        Objective   /80   Pulse 83   Temp 97.7 °F (36.5 °C)   Wt 113 kg (250 lb 3.2 oz)   LMP  (LMP Unknown)   SpO2 97%   BMI 41.00 kg/m²     Physical Exam  Vitals and nursing note reviewed.   Constitutional:       Appearance: She is well-developed.   HENT:      Head: Normocephalic.      Right Ear: Hearing, tympanic membrane, ear canal and external ear normal.      Left Ear: Hearing, tympanic membrane, ear canal and external ear normal.      Nose: Nose normal.   Eyes:      Conjunctiva/sclera: Conjunctivae normal.      Pupils: Pupils are equal, round, and reactive to light.   Cardiovascular:      Rate and Rhythm: Normal rate and regular rhythm.      Heart sounds: Normal heart sounds.   Pulmonary:      Effort: Pulmonary effort is normal.      Breath sounds: Normal breath sounds. No decreased breath sounds, wheezing, rhonchi or rales.   Musculoskeletal:         General: Normal range of motion.      Cervical back:  Normal range of motion.   Skin:     General: Skin is warm and dry.   Neurological:      Mental Status: She is alert.   Psychiatric:         Behavior: Behavior normal.         Results for orders placed or performed in visit on 08/25/22   Urine Culture - Urine, Urine, Clean Catch    Specimen: Urine, Clean Catch   Result Value Ref Range    Urine Culture No growth    POC Glucose    Specimen: Blood   Result Value Ref Range    Glucose 126 70 - 130 mg/dL   POC Glycosylated Hemoglobin (Hb A1C)    Specimen: Blood   Result Value Ref Range    Hemoglobin A1C 5.9 %    Lot Number [id number]     Expiration Date 02/04/2024    POC Urinalysis Dipstick, Automated    Specimen: Urine   Result Value Ref Range    Color Yellow Yellow, Straw, Dark Yellow, Agnes    Clarity, UA Clear Clear    Specific Gravity  1.020 1.005 - 1.030    pH, Urine 6.0 5.0 - 8.0    Leukocytes Negative Negative    Nitrite, UA Negative Negative    Protein, POC Negative Negative mg/dL    Glucose, UA Negative Negative mg/dL    Ketones, UA Negative Negative    Urobilinogen, UA 0.2 E.U./dL Normal, 0.2 E.U./dL    Bilirubin Negative Negative    Blood, UA Negative Negative    Lot Number [id number]     Expiration Date 12/17/2023         ASSESSMENT/PLAN    Diagnoses and all orders for this visit:    1. Type 2 diabetes mellitus without complication, without long-term current use of insulin (HCC) (Primary)  Assessment & Plan:  Diabetes is improving with lifestyle modifications.   Reminded to bring in blood sugar diary at next visit.  Dietary recommendations for ADA diet.  Medication changes per orders. Decrease metformin to once daily.  Diabetes will be reassessed in 3 months.    Orders:  -     POC Glucose  -     POC Glycosylated Hemoglobin (Hb A1C)  -     metFORMIN ER (GLUCOPHAGE-XR) 500 MG 24 hr tablet; Take 1 tablet by mouth Daily With Breakfast.  Dispense: 180 tablet; Refill: 0    2. Acute right flank pain  Comments:  Check cmp and urine culture. Further recs based on  results.  Orders:  -     Comprehensive Metabolic Panel; Future  -     Urine Culture - Urine, Urine, Clean Catch; Future  -     POC Urinalysis Dipstick, Automated  -     Urine Culture - Urine, Urine, Clean Catch           Return in about 3 months (around 11/25/2022) for Follow up.

## 2022-08-26 LAB
ALBUMIN SERPL-MCNC: 4 G/DL (ref 3.5–5.2)
ALBUMIN/GLOB SERPL: 1.4 G/DL
ALP SERPL-CCNC: 94 U/L (ref 39–117)
ALT SERPL W P-5'-P-CCNC: 23 U/L (ref 1–33)
ANION GAP SERPL CALCULATED.3IONS-SCNC: 7.2 MMOL/L (ref 5–15)
AST SERPL-CCNC: 20 U/L (ref 1–32)
BACTERIA SPEC AEROBE CULT: NO GROWTH
BILIRUB SERPL-MCNC: 0.2 MG/DL (ref 0–1.2)
BUN SERPL-MCNC: 13 MG/DL (ref 8–23)
BUN/CREAT SERPL: 17.1 (ref 7–25)
CALCIUM SPEC-SCNC: 9.8 MG/DL (ref 8.6–10.5)
CHLORIDE SERPL-SCNC: 106 MMOL/L (ref 98–107)
CO2 SERPL-SCNC: 27.8 MMOL/L (ref 22–29)
CREAT SERPL-MCNC: 0.76 MG/DL (ref 0.57–1)
EGFRCR SERPLBLD CKD-EPI 2021: 89.8 ML/MIN/1.73
GLOBULIN UR ELPH-MCNC: 2.9 GM/DL
GLUCOSE SERPL-MCNC: 92 MG/DL (ref 65–99)
POTASSIUM SERPL-SCNC: 5.5 MMOL/L (ref 3.5–5.2)
PROT SERPL-MCNC: 6.9 G/DL (ref 6–8.5)
SODIUM SERPL-SCNC: 141 MMOL/L (ref 136–145)

## 2022-08-27 DIAGNOSIS — E87.5 HYPERKALEMIA: Primary | ICD-10-CM

## 2022-08-28 NOTE — PROGRESS NOTES
Your labs show a mild elevation in your potassium. Please stop by for a recheck so we can make sure this goes back to normal.

## 2022-08-29 ENCOUNTER — TELEPHONE (OUTPATIENT)
Dept: PSYCHIATRY | Facility: CLINIC | Age: 62
End: 2022-08-29

## 2022-08-29 DIAGNOSIS — F41.9 ANXIETY DISORDER, UNSPECIFIED TYPE: Chronic | ICD-10-CM

## 2022-08-29 DIAGNOSIS — F33.1 MAJOR DEPRESSIVE DISORDER, RECURRENT EPISODE, MODERATE: Chronic | ICD-10-CM

## 2022-08-29 RX ORDER — VILAZODONE HYDROCHLORIDE 20 MG/1
20 TABLET ORAL DAILY
Qty: 30 TABLET | Refills: 0 | Status: SHIPPED | OUTPATIENT
Start: 2022-08-29 | End: 2022-09-15 | Stop reason: SDUPTHER

## 2022-08-29 NOTE — TELEPHONE ENCOUNTER
That we will be okay, but if the patient's moods worsen, any new symptoms develop, any worsening of moods, or any SI/HI, please have the patient reach out to this office immediately or go to her closest emergency department/call 911.  Do I need to send in a new prescription for the Viibryd 20 mg tablets, or does she have some already?

## 2022-08-29 NOTE — ASSESSMENT & PLAN NOTE
Diabetes is improving with lifestyle modifications.   Reminded to bring in blood sugar diary at next visit.  Dietary recommendations for ADA diet.  Medication changes per orders. Decrease metformin to once daily.  Diabetes will be reassessed in 3 months.

## 2022-08-29 NOTE — TELEPHONE ENCOUNTER
A new prescription for Viibryd has been sent in for the patient to take a total of Viibryd 20 mg by mouth once daily for moods. Secondary Defect Length In Cm (Required For Flaps): 0

## 2022-09-01 ENCOUNTER — TELEPHONE (OUTPATIENT)
Dept: INTERNAL MEDICINE | Facility: CLINIC | Age: 62
End: 2022-09-01

## 2022-09-01 NOTE — TELEPHONE ENCOUNTER
Looks like excedrin migraine and rexulti together can increase the risk of seizures. She can take ibuprofen or plain tylenol with rexulti without any interaction

## 2022-09-01 NOTE — TELEPHONE ENCOUNTER
Caller: Sandra Auguste    Relationship: Self    Best call back number: [phone]  What medications are you currently taking:   Current Outpatient Medications on File Prior to Visit   Medication Sig Dispense Refill   • acetaminophen (TYLENOL) 500 MG tablet Take 500 mg by mouth Every 6 (Six) Hours As Needed for Mild Pain .     • albuterol sulfate  (90 Base) MCG/ACT inhaler Inhale 1 puff Every 6 (Six) Hours As Needed for Shortness of Air. INHALE TWO PUFFS BY MOUTH EVERY 6 HOURS AS NEEDED 1 inhaler 2   • Biotin 1 MG capsule Take  by mouth.     • Blood Glucose Monitoring Suppl (OneTouch Verio) w/Device kit 1 kit Daily. 1 kit 0   • Breo Ellipta 200-25 MCG/INH inhaler INHALE ONE DOSE BY MOUTH DAILY 60 each 5   • Brexpiprazole (Rexulti) 0.25 MG tablet Take 0.25 mg by mouth Daily. 30 tablet 0   • celecoxib (CeleBREX) 50 MG capsule      • Cranberry 1000 MG capsule Take  by mouth.     • cyclobenzaprine (FLEXERIL) 5 MG tablet Take 1 tablet by mouth 2 (Two) Times a Day As Needed for Muscle Spasms. 20 tablet 0   • Diclofenac Sodium (VOLTAREN) 1 % gel gel APPLY 4 GRAMS TOPICALLY TO THE APPROPRIATE AREA AS DIRECTED FOUR TIMES A DAYS AS NEEDED FOR PAIN IN SHOULDERS 100 g 1   • Dulaglutide 1.5 MG/0.5ML solution pen-injector Inject 1.5 mg under the skin into the appropriate area as directed 1 (One) Time Per Week. 12 pen 1   • EPINEPHrine (EPIPEN) 0.3 MG/0.3ML solution auto-injector injection Inject 0.3 mL into the appropriate muscle as directed by prescriber As Needed (FOR SEVERE ALLERGIC REACTION). 1 each 1   • famotidine (PEPCID) 40 MG tablet TAKE ONE TABLET BY MOUTH DAILY 90 tablet 0   • fluticasone (FLONASE) 50 MCG/ACT nasal spray 2 sprays into the nostril(s) as directed by provider Daily for 30 days. 16 g 5   • glucose blood (OneTouch Verio) test strip Use one strip to check blood sugars one time daily 100 each 3   • glucose monitor monitoring kit Use as directed to check blood glucose once daily. 1 each 0   •  guaiFENesin-codeine (GUAIFENESIN AC) 100-10 MG/5ML liquid Take 5 mL by mouth 3 (Three) Times a Day As Needed for Cough. 118 mL 0   • hydrOXYzine (ATARAX) 25 MG tablet Take 1 tablet by mouth 2 (Two) Times a Day As Needed (anxiety and/or sleep). 60 tablet 0   • Insulin Pen Needle (Pen Needles) 32G X 6 MM misc 1 dose 1 (One) Time Per Week. 100 each 3   • Lancets (onetouch ultrasoft) lancets Use one lancet daily to check blood sugars 100 each 3   • lidocaine (LIDODERM) 5 % Place 1 patch on the skin as directed by provider Daily. Remove & Discard patch within 12 hours or as directed by MD 6 patch 0   • loratadine (CLARITIN) 10 MG tablet Take 1 tablet by mouth Daily. 30 tablet 5   • metFORMIN ER (GLUCOPHAGE-XR) 500 MG 24 hr tablet Take 1 tablet by mouth Daily With Breakfast. 180 tablet 0   • methocarbamol (ROBAXIN) 500 MG tablet Take 500 mg by mouth.     • multivitamin with minerals tablet tablet Take 1 tablet by mouth Daily.     • ondansetron (Zofran) 8 MG tablet Take 1 tablet by mouth Every 8 (Eight) Hours As Needed for Nausea or Vomiting. 15 tablet 0   • rosuvastatin (CRESTOR) 5 MG tablet TAKE ONE TABLET BY MOUTH DAILY 90 tablet 0   • vilazodone (Viibryd) 20 MG tablet tablet Take 1 tablet by mouth Daily. 30 tablet 0     No current facility-administered medications on file prior to visit.          When did you start taking these medications: 8.31.22    Which medication are you concerned about: WANTS TO KNOW IF SHE CAN TAKE EXCEDRIN MIGRAINE WITH THE CURRENT MEDICATIONS SHE IS ON     Who prescribed you this medication:[facility] PRESCRIBED  REXULTI YESTERDAY    What are your concerns: WANTS TO MAKE SURE SHE CAN TAKE SOMETHING FOR THE MIGRAINE WITH THIS NEW MEDICATION     How long have you had these concerns: SINCE YESTERDAY 8.31.22    PATIENT TOOK 4 HOUR PSYCH TEST YESTERDAY AND IT HAS CAUSED A MIGRAINE. PLEASE CALL AS SOON AS POSSIBLE TODAY.

## 2022-09-13 DIAGNOSIS — F33.1 MAJOR DEPRESSIVE DISORDER, RECURRENT EPISODE, MODERATE: Chronic | ICD-10-CM

## 2022-09-13 RX ORDER — BREXPIPRAZOLE 0.25 MG/1
0.25 TABLET ORAL DAILY
Qty: 30 TABLET | Refills: 0 | Status: SHIPPED | OUTPATIENT
Start: 2022-09-13 | End: 2022-09-15

## 2022-09-15 ENCOUNTER — TELEMEDICINE (OUTPATIENT)
Dept: PSYCHIATRY | Facility: CLINIC | Age: 62
End: 2022-09-15

## 2022-09-15 DIAGNOSIS — F43.10 POST TRAUMATIC STRESS DISORDER (PTSD): ICD-10-CM

## 2022-09-15 DIAGNOSIS — F33.1 MAJOR DEPRESSIVE DISORDER, RECURRENT EPISODE, MODERATE: Primary | Chronic | ICD-10-CM

## 2022-09-15 DIAGNOSIS — G47.9 SLEEPING DIFFICULTIES: ICD-10-CM

## 2022-09-15 DIAGNOSIS — F41.9 ANXIETY DISORDER, UNSPECIFIED TYPE: Chronic | ICD-10-CM

## 2022-09-15 PROCEDURE — 99214 OFFICE O/P EST MOD 30 MIN: CPT | Performed by: NURSE PRACTITIONER

## 2022-09-15 PROCEDURE — 90833 PSYTX W PT W E/M 30 MIN: CPT | Performed by: NURSE PRACTITIONER

## 2022-09-15 RX ORDER — BREXPIPRAZOLE 0.5 MG/1
0.5 TABLET ORAL DAILY
Qty: 30 TABLET | Refills: 0 | Status: SHIPPED | OUTPATIENT
Start: 2022-09-15 | End: 2022-10-17

## 2022-09-15 RX ORDER — HYDROXYZINE HYDROCHLORIDE 25 MG/1
25 TABLET, FILM COATED ORAL 2 TIMES DAILY PRN
Qty: 60 TABLET | Refills: 0 | Status: SHIPPED | OUTPATIENT
Start: 2022-09-15 | End: 2022-11-22 | Stop reason: SDUPTHER

## 2022-09-15 RX ORDER — VILAZODONE HYDROCHLORIDE 20 MG/1
20 TABLET ORAL DAILY
Qty: 30 TABLET | Refills: 0 | Status: SHIPPED | OUTPATIENT
Start: 2022-09-15 | End: 2022-11-22 | Stop reason: SDUPTHER

## 2022-09-15 NOTE — PROGRESS NOTES
This provider is located at the [facility] (through [facility]), [address], [place], [address] using a secure [employer] Video Visit through [employer]. Patient is being seen remotely via telehealth at their home address in [place], and stated they are in a secure environment for this session. The patient's condition being diagnosed/treated is appropriate for telemedicine. The provider identified herself as well as her credentials.   The patient, and/or patients guardian, consent to be seen remotely, and when consent is given they understand that the consent allows for patient identifiable information to be sent to a third party as needed.   They may refuse to be seen remotely at any time. The electronic data is encrypted and password protected, and the patient and/or guardian has been advised of the potential risks to privacy not withstanding such measures.    You have chosen to receive care through a telehealth visit.  Do you consent to use a video/audio connection for your medical care today? Yes    Subjective   Sandra Auguste is a 62 y.o. female who presents today for follow up    Chief Complaint: Depression, anxiety, and history of sleeping difficulties follow-up    Accompanied by: The patient is interviewed alone at today's encounter    History of Present Illness:   The patient describes her mood as improved since her last encounter with this APRN since starting Rexulti.  The patient states she has never had a psychotropic medication in the past improve and boost her moods like the Rexulti seems to have done.  The patient reports she has more motivation and energy, she reports she feels better overall, and reports she is very pleased with the results of the recently added Rexulti.  The patient reports she has been trying to eat healthier, and she has also been going to the gym to work out with her daughter.  The patient rates her symptoms of depression at a 3-4/10 on  "a 0-10 scale, with 10 being the worst.  The patient rates her symptoms of anxiety at a 3-4/10 on a 0-10 scale, with 10 being the worst.  The patient reports her appetite as good.  The patient reports her sleep as poor even with the use of her CPAP machine. The patient reports she recently completed neuropsychological testing at  and is still waiting for the results.  The patient reports the physician was very concerned about her difficulties with sleeping, and reports even though she is using a CPAP machine he felt her sleep and REM sleep were currently equivalent to someone with sleep apnea and not being treated for sleep apnea.  The patient reports she is going to be following up with a sleep specialist.  The patient denies any other new medical problems or changes in medications since last appointment with this facility.  The patient reports compliance with her current medication regimen.  The patient denies any side effects or concerns from her current medication regimen.  The patient denies any abnormal musculoskeletal movements or uncontrollable movements.  The patient reports she does have episodes when she is lying in the bed that she feels she has \"electrical zaps\" that shoot throughout her body and sometimes will make her extremities jerk, but she has experienced this for many years, and they have not changed from her typical baseline.  The patient reports she is not sure what causes this, but it only happens when she is lying in the bed at night.   The patient would like to not adjust or change her hydroxyzine and Viibryd at this visit as she reports that her current doses seem beneficial, but she is interested in trying to increase Rexulti at this visit to further improve her moods.  The risks and side effects of Rexulti have been discussed, including in combination with her other chronically prescribed medications, and the patient reports she would still like to increase Rexulti but will reach out to " "this APRN if there are any new symptoms, side effects, questions, or concerns.  The patient denies any suicidal or homicidal ideations, plans, or intent at today's encounter and is convincing.  The patient denies any auditory hallucinations or visual hallucinations.  The patient does not endorse any significant symptoms consistent with carey or psychosis at today's encounter.      Prior Psychiatric Medications:  -Xanax 0.25 mg by mouth once daily as needed for anxiety - last prescribed 9/24/21  -Paxil  -Wellbutrin  -Zoloft  -Prozac  -Trazodone  -Buspirone -nauseated stomach  -Possibly Lexapro, but she is not sure  -Possibly Effexor, but she is not sure  -Possible medicine to assist with sleep  -Viibryd  -Hydroxyzine  -Possibly Abilify  -Other possible antipsychotics the patient cannot remember the name of them at this time  -Rexulti      Last Menstrual Period:  Hysterectomy.        The following portions of the patient's history were reviewed and updated as appropriate: allergies, current medications, past family history, past medical history, past social history, past surgical history and problem list.          Past Medical History:  Past Medical History:   Diagnosis Date   • Anesthesia complication     HAS TROUBLE GETTING \"NUMB\"    • Anxiety    • Arthritis    • Asthma    • Bladder spasms    • Depression    • Diverticulosis    • GERD (gastroesophageal reflux disease)    • Memory loss     FROM SKULL FRACTURE AND FALL-SHORT TERM MEMORY LOSS   • Mental disorder     PTSD   • Migraine    • Obesity    • Seasonal allergies    • Seizures ([facility])     \"convulsions\" at age 5   • Skull fracture ([facility]) 2012   • Sleep apnea with use of continuous positive airway pressure (CPAP)     INSTRUCTED TO BRING OWN MASK AND TUBING    • SOB (shortness of breath) on exertion    • Tendonitis    • Type 2 diabetes mellitus without complication, without long-term current use of insulin ([facility]) 10/11/2018   • Wears contact lenses    • Wears partial " dentures     TOP ONLY    • Wears prescription eyeglasses    • Wears prescription eyeglasses        Social History:  Social History     Socioeconomic History   • Marital status: Single   Tobacco Use   • Smoking status: Never Smoker   • Smokeless tobacco: Never Used   Vaping Use   • Vaping Use: Never used   Substance and Sexual Activity   • Alcohol use: Yes     Comment: Patient reports occasoinal wine use to help relax her and fall asleep, not daily use   • Drug use: Never   • Sexual activity: Not Currently     Birth control/protection: Post-menopausal, Surgical       Family History:  Family History   Problem Relation Age of Onset   • Cancer Mother         cervical cancer   • Dementia Mother    • Cancer Maternal Aunt         lymphoma   • Cancer Paternal Aunt    • Breast cancer Paternal Aunt 70   • Alcohol abuse Other    • Depression Other        Past Surgical History:  Past Surgical History:   Procedure Laterality Date   • CERVICAL POLYPECTOMY     • TOTAL LAPAROSCOPIC HYSTERECTOMY N/A 9/18/2017    Procedure: TOTAL LAPAROSCOPIC HYSTERECTOMY, BILATERAL SALPINGO OOPHORECTOMY WITH DAVINCI ROBOT ;  Surgeon: Shannon Grimaldo DO;  Location: [facility];  Service:    • WISDOM TOOTH EXTRACTION         Problem List:  Patient Active Problem List   Diagnosis   • Abnormal computed tomography scan   • Anemia   • Post traumatic stress disorder (PTSD)   • Anxiety   • Strain of neck muscle   • Cough   • Diverticulitis of colon   • Fatigue   • Steatosis of liver   • Lateral epicondylitis   • Knee pain   • Spasm   • Adiposity   • Obstructive sleep apnea syndrome   • Osteoarthritis   • Palpitations   • Nausea   • Shortness of breath   • Skin tag   • Candidiasis of vagina   • Viral upper respiratory tract infection   • Gastroesophageal reflux disease   • Acute pain of left shoulder   • Abdominal distension (gaseous)   • SOB (shortness of breath) on exertion   • Sleep apnea with use of continuous positive airway pressure (CPAP)   • Seasonal  allergies   • Obesity   • Migraine   • Mental disorder   • Diverticulosis   • Depression   • Arthritis   • Hypertension   • Itching   • Stabbing headache   • Temple tenderness   • TMJ click   • Mild intermittent asthma without complication   • Skull fracture (HCC)   • Type 2 diabetes mellitus without complication, without long-term current use of insulin (HCC)   • Health care maintenance   • Moderate episode of recurrent major depressive disorder (HCC)   • Generalized anxiety disorder   • High risk medications (not anticoagulants) long-term use   • Hyperlipidemia       Allergy:   Allergies   Allergen Reactions   • Mold Extract [Trichophyton] Shortness Of Breath, Anxiety, Irritability, Other (See Comments) and Palpitations     SOA   • Lortab [Hydrocodone-Acetaminophen] Nausea And Vomiting     SEVERE NAUSEA AND SLIGHT VOMITING   • Methylprednisolone Other (See Comments)     Bleeding, cramping   • Pollen Extract Other (See Comments)     SNEEZING AND CONGESTION         Current Medications:   Current Outpatient Medications   Medication Sig Dispense Refill   • hydrOXYzine (ATARAX) 25 MG tablet Take 1 tablet by mouth 2 (Two) Times a Day As Needed (anxiety and/or sleep). 60 tablet 0   • vilazodone (Viibryd) 20 MG tablet tablet Take 1 tablet by mouth Daily. 30 tablet 0   • acetaminophen (TYLENOL) 500 MG tablet Take 500 mg by mouth Every 6 (Six) Hours As Needed for Mild Pain .     • albuterol sulfate  (90 Base) MCG/ACT inhaler Inhale 1 puff Every 6 (Six) Hours As Needed for Shortness of Air. INHALE TWO PUFFS BY MOUTH EVERY 6 HOURS AS NEEDED 1 inhaler 2   • Biotin 1 MG capsule Take  by mouth.     • Blood Glucose Monitoring Suppl (OneTouch Verio) w/Device kit 1 kit Daily. 1 kit 0   • Breo Ellipta 200-25 MCG/INH inhaler INHALE ONE DOSE BY MOUTH DAILY 60 each 5   • Brexpiprazole (Rexulti) 0.5 MG tablet Take 0.5 mg by mouth Daily. 30 tablet 0   • celecoxib (CeleBREX) 50 MG capsule      • Cranberry 1000 MG capsule Take  by  mouth.     • cyclobenzaprine (FLEXERIL) 5 MG tablet Take 1 tablet by mouth 2 (Two) Times a Day As Needed for Muscle Spasms. 20 tablet 0   • Diclofenac Sodium (VOLTAREN) 1 % gel gel APPLY 4 GRAMS TOPICALLY TO THE APPROPRIATE AREA AS DIRECTED FOUR TIMES A DAYS AS NEEDED FOR PAIN IN SHOULDERS 100 g 1   • Dulaglutide 1.5 MG/0.5ML solution pen-injector Inject 1.5 mg under the skin into the appropriate area as directed 1 (One) Time Per Week. 12 pen 1   • EPINEPHrine (EPIPEN) 0.3 MG/0.3ML solution auto-injector injection Inject 0.3 mL into the appropriate muscle as directed by prescriber As Needed (FOR SEVERE ALLERGIC REACTION). 1 each 1   • famotidine (PEPCID) 40 MG tablet TAKE ONE TABLET BY MOUTH DAILY 90 tablet 0   • fluticasone (FLONASE) 50 MCG/ACT nasal spray 2 sprays into the nostril(s) as directed by provider Daily for 30 days. 16 g 5   • glucose blood (OneTouch Verio) test strip Use one strip to check blood sugars one time daily 100 each 3   • glucose monitor monitoring kit Use as directed to check blood glucose once daily. 1 each 0   • guaiFENesin-codeine (GUAIFENESIN AC) 100-10 MG/5ML liquid Take 5 mL by mouth 3 (Three) Times a Day As Needed for Cough. 118 mL 0   • Insulin Pen Needle (Pen Needles) 32G X 6 MM misc 1 dose 1 (One) Time Per Week. 100 each 3   • Lancets (onetouch ultrasoft) lancets Use one lancet daily to check blood sugars 100 each 3   • lidocaine (LIDODERM) 5 % Place 1 patch on the skin as directed by provider Daily. Remove & Discard patch within 12 hours or as directed by MD 6 patch 0   • loratadine (CLARITIN) 10 MG tablet Take 1 tablet by mouth Daily. 30 tablet 5   • metFORMIN ER (GLUCOPHAGE-XR) 500 MG 24 hr tablet Take 1 tablet by mouth Daily With Breakfast. 180 tablet 0   • methocarbamol (ROBAXIN) 500 MG tablet Take 500 mg by mouth.     • multivitamin with minerals tablet tablet Take 1 tablet by mouth Daily.     • ondansetron (Zofran) 8 MG tablet Take 1 tablet by mouth Every 8 (Eight) Hours As  Needed for Nausea or Vomiting. 15 tablet 0   • rosuvastatin (CRESTOR) 5 MG tablet TAKE ONE TABLET BY MOUTH DAILY 90 tablet 0     No current facility-administered medications for this visit.       Review of Symptoms:    Review of Systems   Musculoskeletal: Positive for arthralgias, back pain, myalgias, neck pain and neck stiffness.   Neurological: Positive for memory problem (chronic).   Psychiatric/Behavioral: Positive for decreased concentration, sleep disturbance (improved some with medication and c-pap), depressed mood and stress. Negative for agitation, behavioral problems, dysphoric mood, hallucinations, self-injury, suicidal ideas and negative for hyperactivity. The patient is nervous/anxious.          Physical Exam:   not currently breastfeeding. There is no height or weight on file to calculate BMI.   Due to the remote nature of this encounter (virtual encounter), vitals were unable to be obtained.  Height stated at 65.5 inches.  Weight stated at around 248 pounds.      Physical Exam  Constitutional:       Appearance: Normal appearance.   Neurological:      Mental Status: She is alert and oriented to person, place, and time.   Psychiatric:         Attention and Perception: Attention normal. She is attentive.         Mood and Affect: Affect normal. Mood is anxious and depressed.         Speech: Speech normal.         Behavior: Behavior normal. Behavior is cooperative.         Thought Content: Thought content normal. Thought content is not paranoid or delusional. Thought content does not include homicidal or suicidal ideation. Thought content does not include homicidal or suicidal plan.         Judgment: Judgment normal.           Mental Status Exam:   Hygiene:   good  Cooperation:  Cooperative  Eye Contact:  Good  Psychomotor Behavior:  Appropriate  Affect:  Appropriate  Mood: depressed and anxious  Hopelessness: Denies  Speech:  Normal  Thought Process:  Linear  Thought Content:  Mood congruent  Suicidal:   None  Homicidal:  None  Hallucinations:  None  Delusion:  None  Memory:  Intact  Orientation:  Person, Place and Time  Reliability:  good  Insight:  Good  Judgement:  Good  Impulse Control:  Good  Physical/Medical Issues:  Chronic, no acute physical/medical issues at today's encounter         Lab Results:   Lab on 08/25/2022   Component Date Value Ref Range Status   • Glucose 08/25/2022 92  65 - 99 mg/dL Final   • BUN 08/25/2022 13  8 - 23 mg/dL Final   • Creatinine 08/25/2022 0.76  0.57 - 1.00 mg/dL Final   • Sodium 08/25/2022 141  136 - 145 mmol/L Final   • Potassium 08/25/2022 5.5 (A) 3.5 - 5.2 mmol/L Final    Slight hemolysis detected by analyzer. Results may be affected.   • Chloride 08/25/2022 106  98 - 107 mmol/L Final   • CO2 08/25/2022 27.8  22.0 - 29.0 mmol/L Final   • Calcium 08/25/2022 9.8  8.6 - 10.5 mg/dL Final   • Total Protein 08/25/2022 6.9  6.0 - 8.5 g/dL Final   • Albumin 08/25/2022 4.00  3.50 - 5.20 g/dL Final   • ALT (SGPT) 08/25/2022 23  1 - 33 U/L Final   • AST (SGOT) 08/25/2022 20  1 - 32 U/L Final   • Alkaline Phosphatase 08/25/2022 94  39 - 117 U/L Final   • Total Bilirubin 08/25/2022 0.2  0.0 - 1.2 mg/dL Final   • Globulin 08/25/2022 2.9  gm/dL Final   • A/G Ratio 08/25/2022 1.4  g/dL Final   • BUN/Creatinine Ratio 08/25/2022 17.1  7.0 - 25.0 Final   • Anion Gap 08/25/2022 7.2  5.0 - 15.0 mmol/L Final   • eGFR 08/25/2022 89.8  >60.0 mL/min/1.73 Final    National Kidney Foundation and American Society of Nephrology (ASN) Task Force recommended calculation based on the Chronic Kidney Disease Epidemiology Collaboration (CKD-EPI) equation refit without adjustment for race.   Office Visit on 08/25/2022   Component Date Value Ref Range Status   • Glucose 08/25/2022 126  70 - 130 mg/dL Final   • Hemoglobin A1C 08/25/2022 5.9  % Final   • Lot Number 08/25/2022 [id number]   Final   • Expiration Date 08/25/2022 02/04/2024   Final   • Color 08/25/2022 Yellow  Yellow, Straw, Dark Yellow, Agnes Final    • Clarity, UA 08/25/2022 Clear  Clear Final   • Specific Gravity  08/25/2022 1.020  1.005 - 1.030 Final   • pH, Urine 08/25/2022 6.0  5.0 - 8.0 Final   • Leukocytes 08/25/2022 Negative  Negative Final   • Nitrite, UA 08/25/2022 Negative  Negative Final   • Protein, POC 08/25/2022 Negative  Negative mg/dL Final   • Glucose, UA 08/25/2022 Negative  Negative mg/dL Final   • Ketones, UA 08/25/2022 Negative  Negative Final   • Urobilinogen, UA 08/25/2022 0.2 E.U./dL  Normal, 0.2 E.U./dL Final   • Bilirubin 08/25/2022 Negative  Negative Final   • Blood, UA 08/25/2022 Negative  Negative Final   • Lot Number 08/25/2022 [id number]   Final   • Expiration Date 08/25/2022 12/17/2023   Final   • Urine Culture 08/25/2022 No growth   Final         Assessment & Plan   Problems Addressed this Visit        Mental Health    Post traumatic stress disorder (PTSD)    Relevant Medications    vilazodone (Viibryd) 20 MG tablet tablet    hydrOXYzine (ATARAX) 25 MG tablet    Brexpiprazole (Rexulti) 0.5 MG tablet      Other Visit Diagnoses     Major depressive disorder, recurrent episode, moderate (HCC)  (Chronic)   -  Primary    Relevant Medications    vilazodone (Viibryd) 20 MG tablet tablet    hydrOXYzine (ATARAX) 25 MG tablet    Brexpiprazole (Rexulti) 0.5 MG tablet    Anxiety disorder, unspecified type  (Chronic)       Relevant Medications    vilazodone (Viibryd) 20 MG tablet tablet    hydrOXYzine (ATARAX) 25 MG tablet    Brexpiprazole (Rexulti) 0.5 MG tablet    Sleeping difficulties        Relevant Medications    hydrOXYzine (ATARAX) 25 MG tablet      Diagnoses       Codes Comments    Major depressive disorder, recurrent episode, moderate (HCC)    -  Primary ICD-10-CM: F33.1  ICD-9-CM: 296.32     Anxiety disorder, unspecified type     ICD-10-CM: F41.9  ICD-9-CM: 300.00     Sleeping difficulties     ICD-10-CM: G47.9  ICD-9-CM: 780.50     Post traumatic stress disorder (PTSD)     ICD-10-CM: F43.10  ICD-9-CM: 309.81           Visit  Diagnoses:    ICD-10-CM ICD-9-CM   1. Major depressive disorder, recurrent episode, moderate (HCC)  F33.1 296.32   2. Anxiety disorder, unspecified type  F41.9 300.00   3. Sleeping difficulties  G47.9 780.50   4. Post traumatic stress disorder (PTSD)  F43.10 309.81          GOALS:  Short Term Goals: Patient will be compliant with medication, and patient will have no significant medication related side effects.  Patient will be engaged in psychotherapy as indicated.  Patient will report subjective improvement of symptoms.  Long term goals: To stabilize mood and treat/improve subjective symptoms, the patient will stay out of the hospital, the patient will be at an optimal level of functioning, and the patient will take all medications as prescribed.  The patient verbalized understanding and agreement with goals that were mutually set.      TREATMENT PLAN: Continue supportive psychotherapy efforts and medications as indicated.  Medication and treatment options, both pharmacological and non-pharmacological treatment options, discussed during today's visit, including any off label use of medication. Patient acknowledged and verbally consented with current treatment plan and was educated on the importance of compliance with treatment and follow-up appointments.      -Continue Viibryd at 20 mg by mouth once daily for mood.  -Continue hydroxyzine 25 mg by mouth up to twice daily as needed for anxiety and/or sleep.  -Increase Rexulti to 0.5 mg by mouth once daily as an adjunct for depression.  -The patient reports she recently completed neuropsychological testing at  and is still waiting for the results.    In/Start Time: 1:35 PM.  Out/Stop Time: 1:55 PM.  20 minutes of face to face direct patient care with patient was spent for supportive psychotherapy including promoting the therapeutic alliance, strengthening self awareness and insights, strengthening coping skills, counseling the patient regarding diagnoses, utilizing  cognitive behavioral therapy to assist the patient in recognizing more appropriate coping mechanisms which are proven effective in reducing the severity of frequency of symptoms and and in coordination of care.  This APRN assisted the patient in processing the patient's diagnoses including depression and anxiety, and also acknowledged and normalized the patient's thoughts, feelings, and concerns  The patient is also strongly urged to eat healthy well balanced foods in order to reduce further health risks, and exercise as tolerated and in guidance with the patient's PCP's recommendations. This APRN allowed the patient to freely discuss issues without interruption or judgment.  This APRN answered any questions the patient had regarding medication and treatment plan.      MEDICATION ISSUES:  Discussed medication options and treatment plan of prescribed medication, any off label use of medication, as well as the risks, benefits, any black box warnings including increased suicidality, and side effects including but not limited to potential falls, dizziness, possible impaired driving, GI side effects (change in appetite, abdominal discomfort, nausea, vomiting, diarrhea, and/or constipation), dry mouth, somnolence, sedation, insomnia, activation, agitation, irritation, tremors, abnormal muscle movements or disorders, tardive dyskinesia, akathisia, asthenia, headache, sweating, possible bruising or rare bleeding, electrolyte and/or fluid abnormalities, change in blood pressure/heart rate/and or heart rhythm, hypotension, sexual dysfunction, rare impulse control problems, rare seizures, rare neuroleptic malignant syndrome, increased risk of death and cerebrovascular events, change in blood glucose and increased risk for diabetes, change in triglycerides and cholesterol and increased risk for dyslipidemia,  weight gain, weight gain that can become problematic to health, skin conditions and reactions, and metabolic adversities  among others. Patient and/or guardian are agreeable to call the office with any worsening of symptoms or onset of side effects, or if any concerns or questions arise.  The contact information for the office is made available to the patient and/or guardian. Patient and/or guardian are agreeable to call 911 or go to the nearest ER should they begin having any SI/HI, or if any urgent concerns arise.      SUICIDE RISK ASSESSMENT AND SAFETY PLAN: Unalterable demographics and a history of mental health intervention indicate this patient is in a high risk category compared to the general population. At present, the patient denies active SI/HI, intentions, or plans at this time and agrees to seek immediate care should such thoughts develop. The patient verbalizes understanding of how to access emergency care if needed and agrees to do so. Consideration of suicide risk and protective factors such as history, current presentation, individual strengths and weaknesses, psychosocial and environmental stressors and variables, psychiatric illness and symptoms, medical conditions and pain, took place in this interview. Based on those considerations, the patient is determined: within individual baseline and presenting no imminent risk for suicide or homicide. Other recommendations: The patient does not meet the criteria for inpatient admission and is not a safety risk to self or others at today's visit. Inpatient treatment offers no significant advantages over outpatient treatment for this patient at today's visit.  The patient was given ample time for questions and fully participated in treatment planning.  The patient was encouraged to call the clinic with any questions or concerns.  The patient was informed of access to emergency care. If patient were to develop any significant symptomatology, suicidal ideation, homicidal ideation, any concerns, or feel unsafe at any time they are to call the clinic and if unable to get immediate  assistance should immediately call 911 or go to the nearest emergency room.  Patient contracted verbally for the following: If you are experiencing an emotional crisis or have thoughts of harming yourself or others, please go to your nearest local emergency room or call 911. Will continue to re-assess medication response and side effects frequently to establish efficacy and ensure safety. Risks, any black box warnings, side effects, off label usage, and benefits of medication and treatment discussed with patient, along with potential adverse side effects of current and/or newly prescribed medication, alternative treatment options, and OTC medications.  Patient verbalized understanding of potential risks, any off label use of medication, any black box warnings, and any side effects in their own words. The patient verbalized understanding and agreed to comply with the safety plan discussed in their own words.  Patient given the number to the office. Number also discussed of the 24- hour suicide hotline.         MEDS ORDERED DURING VISIT:  New Medications Ordered This Visit   Medications   • vilazodone (Viibryd) 20 MG tablet tablet     Sig: Take 1 tablet by mouth Daily.     Dispense:  30 tablet     Refill:  0   • hydrOXYzine (ATARAX) 25 MG tablet     Sig: Take 1 tablet by mouth 2 (Two) Times a Day As Needed (anxiety and/or sleep).     Dispense:  60 tablet     Refill:  0   • Brexpiprazole (Rexulti) 0.5 MG tablet     Sig: Take 0.5 mg by mouth Daily.     Dispense:  30 tablet     Refill:  0       Return in about 4 weeks (around 10/13/2022), or if symptoms worsen or fail to improve, for Next scheduled follow up and Recheck.         Functional Status: Moderate impairment     Prognosis: Fair with Ongoing Treatment             This document has been electronically signed by REBECA Bello  September 15, 2022 14:10 EDT    Some of the data in this electronic note has been brought forward from a previous encounter,  any necessary changes have been made, it has been reviewed by this APRN, and it is accurate.    Please note that portions of this note were completed with a voice recognition program.

## 2022-09-21 ENCOUNTER — TELEPHONE (OUTPATIENT)
Dept: PSYCHIATRY | Facility: CLINIC | Age: 62
End: 2022-09-21

## 2022-09-21 NOTE — TELEPHONE ENCOUNTER
Patient called requesting a sooner appointment before 10/14/2022 with provider Hawa Bender.  I checked provider's schedule but it is booked. Patient has been put on a waiting list.

## 2022-10-03 ENCOUNTER — PRIOR AUTHORIZATION (OUTPATIENT)
Dept: PSYCHIATRY | Facility: CLINIC | Age: 62
End: 2022-10-03

## 2022-10-16 DIAGNOSIS — F33.1 MAJOR DEPRESSIVE DISORDER, RECURRENT EPISODE, MODERATE: Chronic | ICD-10-CM

## 2022-10-17 RX ORDER — BREXPIPRAZOLE 0.5 MG/1
TABLET ORAL
Qty: 30 TABLET | Refills: 0 | Status: SHIPPED | OUTPATIENT
Start: 2022-10-17 | End: 2022-11-17 | Stop reason: SDUPTHER

## 2022-10-17 RX ORDER — BREXPIPRAZOLE 0.25 MG/1
TABLET ORAL
Qty: 30 TABLET | OUTPATIENT
Start: 2022-10-17

## 2022-10-26 ENCOUNTER — LAB (OUTPATIENT)
Dept: LAB | Facility: HOSPITAL | Age: 62
End: 2022-10-26

## 2022-10-26 ENCOUNTER — OFFICE VISIT (OUTPATIENT)
Dept: INTERNAL MEDICINE | Facility: CLINIC | Age: 62
End: 2022-10-26

## 2022-10-26 VITALS
WEIGHT: 248 LBS | RESPIRATION RATE: 16 BRPM | SYSTOLIC BLOOD PRESSURE: 124 MMHG | HEIGHT: 66 IN | OXYGEN SATURATION: 97 % | HEART RATE: 77 BPM | TEMPERATURE: 97.6 F | BODY MASS INDEX: 39.86 KG/M2 | DIASTOLIC BLOOD PRESSURE: 80 MMHG

## 2022-10-26 DIAGNOSIS — K62.5 RECTAL BLEEDING: ICD-10-CM

## 2022-10-26 DIAGNOSIS — K62.5 RECTAL BLEEDING: Primary | ICD-10-CM

## 2022-10-26 DIAGNOSIS — Z23 NEED FOR INFLUENZA VACCINATION: ICD-10-CM

## 2022-10-26 PROCEDURE — 83540 ASSAY OF IRON: CPT

## 2022-10-26 PROCEDURE — 90686 IIV4 VACC NO PRSV 0.5 ML IM: CPT | Performed by: NURSE PRACTITIONER

## 2022-10-26 PROCEDURE — 85025 COMPLETE CBC W/AUTO DIFF WBC: CPT

## 2022-10-26 PROCEDURE — 90471 IMMUNIZATION ADMIN: CPT | Performed by: NURSE PRACTITIONER

## 2022-10-26 PROCEDURE — 84466 ASSAY OF TRANSFERRIN: CPT

## 2022-10-26 PROCEDURE — 99213 OFFICE O/P EST LOW 20 MIN: CPT | Performed by: NURSE PRACTITIONER

## 2022-10-26 PROCEDURE — 82728 ASSAY OF FERRITIN: CPT

## 2022-10-26 PROCEDURE — 80053 COMPREHEN METABOLIC PANEL: CPT

## 2022-10-26 NOTE — PROGRESS NOTES
Follow Up Office Visit      Date: 10/26/2022   Patient Name: Sandra Auguste  : 1960   MRN: [id number]     Chief Complaint:    Chief Complaint   Patient presents with   • Rectal Bleeding     Pt states rectal bleeding on Friday, has since had abdominal pain, anal itching       History of Present Illness: Sandra Auguste is a 62 y.o. female who is here today to follow up with acute GI symptoms.     Has been working with pain management lately in prep for spinal nerve block. Recently experienced rash/ itchiness in low back at site where most recent procedure/injection site was.   -no loss of bowel/bladder control    5 days ago (Friday evening 10/21) she was sitting at home watching TV and when she went to the restroom- noticed blood in the back of her underwear that had bled through to her shorts. She then noticed the bleeding had also soaked through to the chair she was sitting on. The color was a mixture of dark and pink blood. She denies vaginal bleeding. This was the only episode of bleeding she has experienced.     Since that time, she has experienced intermittent LUQ abdominal pain. There is always some level of mild soreness in the abdomen that remains persistent. She also reports unintentional weight loss, anal itching, decreased appetite, and fatigue over the past ~10 days.     She has never had a colonoscopy.  EGD in 10/2020 by Dr Chavarria    Subjective      Review of Systems:   Review of Systems   Constitutional: Positive for appetite change, chills, fatigue and unexpected weight loss. Negative for fever.        +PICA, Endorses ice cravings    Respiratory: Positive for shortness of breath (unchanged from baseline with asthma ).    Cardiovascular: Positive for chest pain. Negative for palpitations and leg swelling.   Gastrointestinal: Positive for abdominal pain, anal bleeding, blood in stool, constipation, nausea and GERD. Negative for diarrhea, rectal pain and vomiting.        Reports rectal  soreness/ sensitivity and itching, but no pain. Reports changes in normal bowel bowel habits-- describes larger volume and frequency.    Endocrine: Positive for polydipsia.   Genitourinary: Negative for hematuria and vaginal bleeding.   Skin: Positive for pallor and rash.        Itchiness- trunk   Neurological: Positive for dizziness, weakness, light-headedness and headache.       I have reviewed the patients family history, social history, past medical history, past surgical history and have updated it as appropriate.     Medications:     Current Outpatient Medications:   •  acetaminophen (TYLENOL) 500 MG tablet, Take 500 mg by mouth Every 6 (Six) Hours As Needed for Mild Pain ., Disp: , Rfl:   •  albuterol sulfate  (90 Base) MCG/ACT inhaler, Inhale 1 puff Every 6 (Six) Hours As Needed for Shortness of Air. INHALE TWO PUFFS BY MOUTH EVERY 6 HOURS AS NEEDED, Disp: 1 inhaler, Rfl: 2  •  Biotin 1 MG capsule, Take  by mouth., Disp: , Rfl:   •  Blood Glucose Monitoring Suppl (OneTouch Verio) w/Device kit, 1 kit Daily., Disp: 1 kit, Rfl: 0  •  Breo Ellipta 200-25 MCG/INH inhaler, INHALE ONE DOSE BY MOUTH DAILY, Disp: 60 each, Rfl: 5  •  celecoxib (CeleBREX) 50 MG capsule, , Disp: , Rfl:   •  Cranberry 1000 MG capsule, Take  by mouth., Disp: , Rfl:   •  cyclobenzaprine (FLEXERIL) 5 MG tablet, Take 1 tablet by mouth 2 (Two) Times a Day As Needed for Muscle Spasms., Disp: 20 tablet, Rfl: 0  •  Diclofenac Sodium (VOLTAREN) 1 % gel gel, APPLY 4 GRAMS TOPICALLY TO THE APPROPRIATE AREA AS DIRECTED FOUR TIMES A DAYS AS NEEDED FOR PAIN IN SHOULDERS, Disp: 100 g, Rfl: 1  •  Dulaglutide 1.5 MG/0.5ML solution pen-injector, Inject 1.5 mg under the skin into the appropriate area as directed 1 (One) Time Per Week., Disp: 12 pen, Rfl: 1  •  EPINEPHrine (EPIPEN) 0.3 MG/0.3ML solution auto-injector injection, Inject 0.3 mL into the appropriate muscle as directed by prescriber As Needed (FOR SEVERE ALLERGIC REACTION)., Disp: 1 each,  Rfl: 1  •  famotidine (PEPCID) 40 MG tablet, TAKE ONE TABLET BY MOUTH DAILY, Disp: 90 tablet, Rfl: 0  •  glucose blood (OneTouch Verio) test strip, Use one strip to check blood sugars one time daily, Disp: 100 each, Rfl: 3  •  glucose monitor monitoring kit, Use as directed to check blood glucose once daily., Disp: 1 each, Rfl: 0  •  guaiFENesin-codeine (GUAIFENESIN AC) 100-10 MG/5ML liquid, Take 5 mL by mouth 3 (Three) Times a Day As Needed for Cough., Disp: 118 mL, Rfl: 0  •  hydrOXYzine (ATARAX) 25 MG tablet, Take 1 tablet by mouth 2 (Two) Times a Day As Needed (anxiety and/or sleep)., Disp: 60 tablet, Rfl: 0  •  Insulin Pen Needle (Pen Needles) 32G X 6 MM misc, 1 dose 1 (One) Time Per Week., Disp: 100 each, Rfl: 3  •  Lancets (onetouch ultrasoft) lancets, Use one lancet daily to check blood sugars, Disp: 100 each, Rfl: 3  •  lidocaine (LIDODERM) 5 %, Place 1 patch on the skin as directed by provider Daily. Remove & Discard patch within 12 hours or as directed by MD, Disp: 6 patch, Rfl: 0  •  loratadine (CLARITIN) 10 MG tablet, Take 1 tablet by mouth Daily., Disp: 30 tablet, Rfl: 5  •  metFORMIN ER (GLUCOPHAGE-XR) 500 MG 24 hr tablet, Take 1 tablet by mouth Daily With Breakfast., Disp: 180 tablet, Rfl: 0  •  methocarbamol (ROBAXIN) 500 MG tablet, Take 500 mg by mouth., Disp: , Rfl:   •  multivitamin with minerals tablet tablet, Take 1 tablet by mouth Daily., Disp: , Rfl:   •  ondansetron (Zofran) 8 MG tablet, Take 1 tablet by mouth Every 8 (Eight) Hours As Needed for Nausea or Vomiting., Disp: 15 tablet, Rfl: 0  •  Rexulti 0.5 MG tablet, TAKE ONE TABLET BY MOUTH DAILY, Disp: 30 tablet, Rfl: 0  •  rosuvastatin (CRESTOR) 5 MG tablet, TAKE ONE TABLET BY MOUTH DAILY, Disp: 90 tablet, Rfl: 0  •  vilazodone (Viibryd) 20 MG tablet tablet, Take 1 tablet by mouth Daily., Disp: 30 tablet, Rfl: 0  •  fluticasone (FLONASE) 50 MCG/ACT nasal spray, 2 sprays into the nostril(s) as directed by provider Daily for 30 days., Disp:  "16 g, Rfl: 5    Allergies:   Allergies   Allergen Reactions   • Mold Extract [Trichophyton] Shortness Of Breath, Anxiety, Irritability, Other (See Comments) and Palpitations     SOA   • Lortab [Hydrocodone-Acetaminophen] Nausea And Vomiting     SEVERE NAUSEA AND SLIGHT VOMITING   • Methylprednisolone Other (See Comments)     Bleeding, cramping   • Pollen Extract Other (See Comments)     SNEEZING AND CONGESTION        Objective     Physical Exam: Please see above  Vital Signs:   Vitals:    10/26/22 1402   BP: 124/80   Pulse: 77   Resp: 16   Temp: 97.6 °F (36.4 °C)   SpO2: 97%   Weight: 112 kg (248 lb)   Height: 166.4 cm (65.5\")   PainSc:   2     Body mass index is 40.64 kg/m².    Physical Exam  Vitals and nursing note reviewed. Exam conducted with a chaperone present.   Constitutional:       General: She is not in acute distress.     Appearance: Normal appearance. She is obese. She is not ill-appearing.   HENT:      Head: Normocephalic and atraumatic.      Mouth/Throat:      Mouth: Mucous membranes are moist.      Pharynx: Oropharynx is clear. No oropharyngeal exudate or posterior oropharyngeal erythema.   Eyes:      Conjunctiva/sclera: Conjunctivae normal.      Pupils: Pupils are equal, round, and reactive to light.   Cardiovascular:      Rate and Rhythm: Normal rate and regular rhythm.      Heart sounds: Normal heart sounds.   Pulmonary:      Effort: Pulmonary effort is normal. No respiratory distress.      Breath sounds: Normal breath sounds.   Abdominal:      General: Abdomen is protuberant. Bowel sounds are normal. There is no distension.      Palpations: Abdomen is soft. There is no fluid wave or mass.      Tenderness: There is generalized abdominal tenderness. There is no guarding or rebound.      Hernia: No hernia is present.   Genitourinary:     Rectum: Guaiac result negative. External hemorrhoid present. No tenderness or anal fissure. Normal anal tone.   Skin:     General: Skin is warm and dry.      " Capillary Refill: Capillary refill takes less than 2 seconds.      Coloration: Skin is pale.   Neurological:      General: No focal deficit present.      Mental Status: She is alert and oriented to person, place, and time. Mental status is at baseline.      Motor: No weakness.         Procedures    Results:   Imaging:     Labs:       Assessment / Plan      Assessment/Plan:   Diagnoses and all orders for this visit:    1. Rectal bleeding (Primary)  -     CBC & Differential; Future  -     Comprehensive Metabolic Panel; Future  -     Ferritin; Future  -     Iron Profile; Future  -hemoccult: negative   -consider CT abd/pelvis to r/o diverticulitis   -Call to schedule follow up and colonoscopy with Dr Chavarria    2. Need for influenza vaccination  -     FluLaval/Fluarix/Fluzone >6 Months         Follow Up:   Return in about 3 weeks (around 11/16/2022) for Recheck.    REBECA Hadley  [facility] Internal [facility] [place]

## 2022-10-27 DIAGNOSIS — E11.9 TYPE 2 DIABETES MELLITUS WITHOUT COMPLICATION, WITHOUT LONG-TERM CURRENT USE OF INSULIN: ICD-10-CM

## 2022-10-27 LAB
ALBUMIN SERPL-MCNC: 4.3 G/DL (ref 3.5–5.2)
ALBUMIN/GLOB SERPL: 1.4 G/DL
ALP SERPL-CCNC: 89 U/L (ref 39–117)
ALT SERPL W P-5'-P-CCNC: 22 U/L (ref 1–33)
ANION GAP SERPL CALCULATED.3IONS-SCNC: 10.8 MMOL/L (ref 5–15)
AST SERPL-CCNC: 23 U/L (ref 1–32)
BASOPHILS # BLD AUTO: 0.05 10*3/MM3 (ref 0–0.2)
BASOPHILS NFR BLD AUTO: 0.5 % (ref 0–1.5)
BILIRUB SERPL-MCNC: 0.2 MG/DL (ref 0–1.2)
BUN SERPL-MCNC: 12 MG/DL (ref 8–23)
BUN/CREAT SERPL: 15.2 (ref 7–25)
CALCIUM SPEC-SCNC: 9.8 MG/DL (ref 8.6–10.5)
CHLORIDE SERPL-SCNC: 104 MMOL/L (ref 98–107)
CO2 SERPL-SCNC: 27.2 MMOL/L (ref 22–29)
CREAT SERPL-MCNC: 0.79 MG/DL (ref 0.57–1)
DEPRECATED RDW RBC AUTO: 40.4 FL (ref 37–54)
EGFRCR SERPLBLD CKD-EPI 2021: 85.8 ML/MIN/1.73
EOSINOPHIL # BLD AUTO: 0.31 10*3/MM3 (ref 0–0.4)
EOSINOPHIL NFR BLD AUTO: 3.1 % (ref 0.3–6.2)
ERYTHROCYTE [DISTWIDTH] IN BLOOD BY AUTOMATED COUNT: 12.4 % (ref 12.3–15.4)
FERRITIN SERPL-MCNC: 106 NG/ML (ref 13–150)
GLOBULIN UR ELPH-MCNC: 3.1 GM/DL
GLUCOSE SERPL-MCNC: 91 MG/DL (ref 65–99)
HCT VFR BLD AUTO: 43.8 % (ref 34–46.6)
HGB BLD-MCNC: 14.2 G/DL (ref 12–15.9)
IMM GRANULOCYTES # BLD AUTO: 0.03 10*3/MM3 (ref 0–0.05)
IMM GRANULOCYTES NFR BLD AUTO: 0.3 % (ref 0–0.5)
IRON 24H UR-MRATE: 53 MCG/DL (ref 37–145)
IRON SATN MFR SERPL: 15 % (ref 20–50)
LYMPHOCYTES # BLD AUTO: 2.22 10*3/MM3 (ref 0.7–3.1)
LYMPHOCYTES NFR BLD AUTO: 22 % (ref 19.6–45.3)
MCH RBC QN AUTO: 29 PG (ref 26.6–33)
MCHC RBC AUTO-ENTMCNC: 32.4 G/DL (ref 31.5–35.7)
MCV RBC AUTO: 89.4 FL (ref 79–97)
MONOCYTES # BLD AUTO: 0.76 10*3/MM3 (ref 0.1–0.9)
MONOCYTES NFR BLD AUTO: 7.5 % (ref 5–12)
NEUTROPHILS NFR BLD AUTO: 6.7 10*3/MM3 (ref 1.7–7)
NEUTROPHILS NFR BLD AUTO: 66.6 % (ref 42.7–76)
NRBC BLD AUTO-RTO: 0 /100 WBC (ref 0–0.2)
PLATELET # BLD AUTO: 304 10*3/MM3 (ref 140–450)
PMV BLD AUTO: 10.9 FL (ref 6–12)
POTASSIUM SERPL-SCNC: 4.9 MMOL/L (ref 3.5–5.2)
PROT SERPL-MCNC: 7.4 G/DL (ref 6–8.5)
RBC # BLD AUTO: 4.9 10*6/MM3 (ref 3.77–5.28)
SODIUM SERPL-SCNC: 142 MMOL/L (ref 136–145)
TIBC SERPL-MCNC: 361 MCG/DL (ref 298–536)
TRANSFERRIN SERPL-MCNC: 242 MG/DL (ref 200–360)
WBC NRBC COR # BLD: 10.07 10*3/MM3 (ref 3.4–10.8)

## 2022-10-27 RX ORDER — METFORMIN HYDROCHLORIDE 500 MG/1
TABLET, EXTENDED RELEASE ORAL
Qty: 180 TABLET | Refills: 0 | Status: SHIPPED | OUTPATIENT
Start: 2022-10-27 | End: 2023-02-02

## 2022-10-28 ENCOUNTER — TELEPHONE (OUTPATIENT)
Dept: INTERNAL MEDICINE | Facility: CLINIC | Age: 62
End: 2022-10-28

## 2022-10-28 NOTE — TELEPHONE ENCOUNTER
Caller: Sandra Auguste    Relationship: Self    Best call back number: [phone]    What is the best time to reach you: ANY    Who are you requesting to speak with (clinical staff, provider,  specific staff member): CLINICAL        What was the call regarding:      PATIENT DOES NOT SEE ANY TEST RESULTS FOR MAGNESIUM BUT SHE THINK BY HER SYMPTOMS SHE SHOULD TAKE MAGNESIUM.      PLEASE CALL TO DISCUSS WHAT KIND OF MAGNESIUM AND WHAT DOSAGE    Do you require a callback: YES

## 2022-10-31 NOTE — TELEPHONE ENCOUNTER
She sent a few messages about the labs that Maria M ordered. Her magnesium level was not part of the lab work so I cannot speak to whether she needs to take it. We can check that at her upcoming appointment with me.    Her iron level shows that the saturation percentage is a little low but the rest of her numbers are normal. She can take a multivitamin with iron in it but does not need an iron supplement.

## 2022-11-03 RX ORDER — SODIUM, POTASSIUM,MAG SULFATES 17.5-3.13G
1 SOLUTION, RECONSTITUTED, ORAL ORAL TAKE AS DIRECTED
Qty: 354 ML | Refills: 0 | Status: SHIPPED | OUTPATIENT
Start: 2022-11-03 | End: 2022-12-01

## 2022-11-09 NOTE — PROGRESS NOTES
Sleep Clinic Video Visit Follow Up Note    You have chosen to receive care through a telehealth visit.  Do you consent to use a video/audio connection for your medical care today? Yes       Chief Complaint  Follow up    Subjective     History of Present Illness (from previous encounter on 7/28/2022):  Sandra Auguste is a 62 y.o. female returns for follow up and compliance of CPAP therapy. The patient was last seen on 7/28/2021. Overall the patient feels poor with regard to therapy because she doesn't get enough sleep. She has difficulty with congestion and cant sleep when  The device appears to be/does not appear to be working appropriately. On average the patient sleeps 3-6 hours per night. She has anxiety and also has to get up to take care of her mother. She will stay up till 2-3 am due to anxiety.The patient wakes once or twice per night. She does see a therapist but has not been able to see her for several months. The patient reports the following changes to their medical and medication history since they were last seen: She was started on Hydroxazine for anxiety.       Patient has had worsening leak with her mask lasting approximately 57.6% of the time.  Her AHI however is within normal limits at 2.4.  Overall the patient has only been using greater than 4 hours 47.8% of the time. She does have difficulty in compliance due to anxiety and taking care of her mother. She is anxious when wearing pap during storms. She is trying to lose weight with meal replacement shakes. She would like a referral to ENT to discuss surgery for Sleep Apnea.  We will refill supplies and she will return in 3 months for further compliance check.  I have encouraged her to to continue to work towards greater than 70% compliance for greater than 4 hours.  I have also encouraged her to continue to see her therapist and work on her anxiety issues.  (End copied text)    Interval History:  Sandra Auguste is a 62 y.o. female who presents for  "follow-up. She was previously seen on 7/28/22 at which time she was to be referred to Otolaryngology but never received a call for appointment. She had a test at  neurology and was told that she needs REM sleep and that she may need a different settings or different PAP machine. She was seen by neurology and had a cognitive test. She was told that he basic memory is fine but her retrieval of information is slow. She is trying to use her device and has a hard time keeping the mask on. The strap gets stretched and she cannot get another one from the [employer].     Further details are as follows:    The patient's relevant past medical, surgical, family, and social history reviewed and updated in Epic as appropriate.    PMH:    Past Medical History:   Diagnosis Date   • Anesthesia complication     HAS TROUBLE GETTING \"NUMB\"    • Anxiety    • Arthritis    • Asthma    • Bladder spasms    • Depression    • Diverticulosis    • GERD (gastroesophageal reflux disease)    • Memory loss     FROM SKULL FRACTURE AND FALL-SHORT TERM MEMORY LOSS   • Mental disorder     PTSD   • Migraine    • Obesity    • Seasonal allergies    • Seizures ([facility])     \"convulsions\" at age 5   • Skull fracture ([facility]) 2012   • Sleep apnea with use of continuous positive airway pressure (CPAP)     INSTRUCTED TO BRING OWN MASK AND TUBING    • SOB (shortness of breath) on exertion    • Tendonitis    • Type 2 diabetes mellitus without complication, without long-term current use of insulin ([facility]) 10/11/2018   • Wears contact lenses    • Wears partial dentures     TOP ONLY    • Wears prescription eyeglasses    • Wears prescription eyeglasses      Past Surgical History:   Procedure Laterality Date   • CERVICAL POLYPECTOMY     • TOTAL LAPAROSCOPIC HYSTERECTOMY N/A 9/18/2017    Procedure: TOTAL LAPAROSCOPIC HYSTERECTOMY, BILATERAL SALPINGO OOPHORECTOMY WITH DAVINCI ROBOT ;  Surgeon: Shannon Grimaldo DO;  Location: [facility];  Service:    • WISDOM TOOTH EXTRACTION   "     OB History        2    Para   2    Term   2            AB        Living   2       SAB        IAB        Ectopic        Molar        Multiple        Live Births                  Allergies   Allergen Reactions   • Mold Extract [Trichophyton] Shortness Of Breath, Anxiety, Irritability, Other (See Comments) and Palpitations     SOA   • Lortab [Hydrocodone-Acetaminophen] Nausea And Vomiting     SEVERE NAUSEA AND SLIGHT VOMITING   • Methylprednisolone Other (See Comments)     Bleeding, cramping   • Pollen Extract Other (See Comments)     SNEEZING AND CONGESTION        MEDS:  Prior to Admission medications    Medication Sig Start Date End Date Taking? Authorizing Provider   acetaminophen (TYLENOL) 500 MG tablet Take 500 mg by mouth Every 6 (Six) Hours As Needed for Mild Pain .    ProviderArleen MD   albuterol sulfate  (90 Base) MCG/ACT inhaler Inhale 1 puff Every 6 (Six) Hours As Needed for Shortness of Air. INHALE TWO PUFFS BY MOUTH EVERY 6 HOURS AS NEEDED 19   Christi Talbot, REBECA   Biotin 1 MG capsule Take  by mouth.    ProviderArleen MD   Blood Glucose Monitoring Suppl (OneTouch Verio) w/Device kit 1 kit Daily. 21   Michelle Mandel PA   Breo Ellipta 200-25 MCG/INH inhaler INHALE ONE DOSE BY MOUTH DAILY 22   Michelle Mandel PA   celecoxib (CeleBREX) 50 MG capsule  3/4/22   ProviderArleen MD   Cranberry 1000 MG capsule Take  by mouth.    ProviderArleen MD   cyclobenzaprine (FLEXERIL) 5 MG tablet Take 1 tablet by mouth 2 (Two) Times a Day As Needed for Muscle Spasms. 22   Ko Adams Jr., REBECA   Diclofenac Sodium (VOLTAREN) 1 % gel gel APPLY 4 GRAMS TOPICALLY TO THE APPROPRIATE AREA AS DIRECTED FOUR TIMES A DAYS AS NEEDED FOR PAIN IN SHOULDERS 21   Michelle Mandel PA   Dulaglutide 1.5 MG/0.5ML solution pen-injector Inject 1.5 mg under the skin into the appropriate area as directed 1 (One) Time Per Week. 22   Michelle Mandel  PA   EPINEPHrine (EPIPEN) 0.3 MG/0.3ML solution auto-injector injection Inject 0.3 mL into the appropriate muscle as directed by prescriber As Needed (FOR SEVERE ALLERGIC REACTION). 2/10/22   Michelle Mandel PA   famotidine (PEPCID) 40 MG tablet TAKE ONE TABLET BY MOUTH DAILY 7/11/22   Michelle Mandel PA   fluticasone (FLONASE) 50 MCG/ACT nasal spray 2 sprays into the nostril(s) as directed by provider Daily for 30 days. 7/15/21 8/14/21  Michelle Mandel PA   glucose blood (OneTouch Verio) test strip Use one strip to check blood sugars one time daily 8/27/21   Michelle Mandel PA   glucose monitor monitoring kit Use as directed to check blood glucose once daily. 10/29/20   Michelle Mandel PA   guaiFENesin-codeine (GUAIFENESIN AC) 100-10 MG/5ML liquid Take 5 mL by mouth 3 (Three) Times a Day As Needed for Cough. 12/29/21   Kristin Mohr MD   hydrOXYzine (ATARAX) 25 MG tablet Take 1 tablet by mouth 2 (Two) Times a Day As Needed (anxiety and/or sleep). 9/15/22   Audra Zhao APRN   Insulin Pen Needle (Pen Needles) 32G X 6 MM misc 1 dose 1 (One) Time Per Week. 3/10/22   Michelle Mandel PA   Lancets (onetouch ultrasoft) lancets Use one lancet daily to check blood sugars 8/27/21   Michelle Mandel PA   lidocaine (LIDODERM) 5 % Place 1 patch on the skin as directed by provider Daily. Remove & Discard patch within 12 hours or as directed by MD 2/8/22   Tasha Marie APRN   loratadine (CLARITIN) 10 MG tablet Take 1 tablet by mouth Daily. 4/21/22   Michelle Mandel PA   metFORMIN ER (GLUCOPHAGE-XR) 500 MG 24 hr tablet TAKE TWO TABLETS BY MOUTH DAILY WITH BREAKFAST 10/27/22   Michelle Mandel PA   methocarbamol (ROBAXIN) 500 MG tablet Take 500 mg by mouth. 5/26/22   Provider, MD Arleen   multivitamin with minerals tablet tablet Take 1 tablet by mouth Daily.    Provider, MD Arleen   ondansetron (Zofran) 8 MG tablet Take 1 tablet by mouth Every 8 (Eight) Hours As Needed for Nausea or Vomiting. 8/6/21    Michelle Mandel PA   Rexulti 0.5 MG tablet TAKE ONE TABLET BY MOUTH DAILY 10/17/22   Audra Zhao APRN   rosuvastatin (CRESTOR) 5 MG tablet TAKE ONE TABLET BY MOUTH DAILY 8/8/22   Michelle Mandel PA   sodium-potassium-magnesium sulfates (Suprep Bowel Prep Kit) 17.5-3.13-1.6 GM/177ML solution oral solution Take 1 bottle by mouth Take As Directed. Follow instructions that were mailed to your home. If you didn't receive these call [phone]. 11/3/22   Lio Chavarria MD   vilazodone (Viibryd) 20 MG tablet tablet Take 1 tablet by mouth Daily. 9/15/22   Audra Zhao APRN         FH:  Family History   Problem Relation Age of Onset   • Cancer Mother         cervical cancer   • Dementia Mother    • Cancer Maternal Aunt         lymphoma   • Cancer Paternal Aunt    • Breast cancer Paternal Aunt 70   • Alcohol abuse Other    • Depression Other        Objective   Vital Signs:  There were no vitals taken for this visit.          Physical Exam  Constitutional:       General: She is not in acute distress.     Appearance: Normal appearance.   Neurological:      General: No focal deficit present.      Mental Status: She is oriented to person, place, and time.   Psychiatric:         Mood and Affect: Mood normal.         Behavior: Behavior normal.             Result Review :              Assessment and Plan  She returns for follow up and reports having difficulty with pressures and can't breath very well with the device.  Patient was referred at her last visit to ear nose and throat as she wishes to pursue other options for her sleep apnea.  She never received a call and I have placed a new referral for her today.  Additionally, I will adjust her PAP pressures to see if they might be beneficial for her in the interim.  Patient will return for follow-up in 3 months.  Hopefully by that time she has been able to have evaluation by otolaryngology.    Diagnoses and all orders for this visit:    1. Obstructive  sleep apnea, adult (Primary)  -     Ambulatory Referral to ENT (Otolaryngology)  -     PAP Therapy             Follow Up  Return in about 3 months (around 2/10/2023), or after ENT referral and change in pressure., for Recheck.  Patient was given instructions and counseling regarding her condition or for health maintenance advice. Please see specific information pulled into the AVS if appropriate.       REBECA Queen, [facility] Center[facility]-BC  Pulmonology, Critical Care, and Sleep Medicine  Electronically signed by REBECA Ford, 11/09/22, 2:15 PM EST.

## 2022-11-10 ENCOUNTER — TELEPHONE (OUTPATIENT)
Dept: INTERNAL MEDICINE | Facility: CLINIC | Age: 62
End: 2022-11-10

## 2022-11-10 ENCOUNTER — TELEMEDICINE (OUTPATIENT)
Dept: SLEEP MEDICINE | Facility: HOSPITAL | Age: 62
End: 2022-11-10

## 2022-11-10 ENCOUNTER — TELEMEDICINE (OUTPATIENT)
Dept: PSYCHIATRY | Facility: CLINIC | Age: 62
End: 2022-11-10

## 2022-11-10 DIAGNOSIS — F41.9 ANXIETY DISORDER, UNSPECIFIED TYPE: ICD-10-CM

## 2022-11-10 DIAGNOSIS — G47.33 OBSTRUCTIVE SLEEP APNEA, ADULT: Primary | ICD-10-CM

## 2022-11-10 DIAGNOSIS — F33.1 MAJOR DEPRESSIVE DISORDER, RECURRENT EPISODE, MODERATE: Primary | ICD-10-CM

## 2022-11-10 PROCEDURE — 90837 PSYTX W PT 60 MINUTES: CPT | Performed by: COUNSELOR

## 2022-11-10 PROCEDURE — 99213 OFFICE O/P EST LOW 20 MIN: CPT | Performed by: NURSE PRACTITIONER

## 2022-11-10 NOTE — TELEPHONE ENCOUNTER
Caller: Sandra Auguste    Relationship: Self    Best call back number: [phone]    What is the best time to reach you: ANY    Who are you requesting to speak with (clinical staff, provider,  specific staff member): ANY    What was the call regarding: PATIENT JUST RECEIVED A CALL AND CANT SEE IF A MESSAGE WAS LEFT. PLEASE CALL BACK TO LET HER KNOW WHAT IT WAS REGARDING.     Do you require a callback: YES. IF NO ANSWER PLEASE MESSAGE THROUGH [facility].

## 2022-11-10 NOTE — PROGRESS NOTES
Date: November 10, 2022  Time In: 2:06 pm   Time Out: 3:03 pm   This provider is located at the [facility]through [facility]), [address], [place] [address] using a secure [facility] Video Visit through [employer]. Patient is being seen remotely via telehealth at home address in [place] and stated they are in a secure environment for this session. The patient's condition being diagnosed/treated is appropriate for telemedicine. The provider identified herself as well as her credentials. The patient, and/or patients guardian, consent to be seen remotely, and when consent is given they understand that the consent allows for patient identifiable information to be sent to a third party as needed. They may refuse to be seen remotely at any time. The electronic data is encrypted and password protected, and the patient and/or guardian has been advised of the potential risks to privacy not withstanding such measures.     You have chosen to receive care through a telehealth visit.  Do you consent to use a video/audio connection for your medical care today? Yes    PROGRESS NOTE  Data:  Sandra Auguste is a 62 y.o. female who presents today for follow up.  Patient explored that she has been overwhelmed with her own physical health and also her mom. Patient had a diagnostic test regarding her memory. Patient processed with therapist that she multiple referrals and providers that she is seeing. Patient processed stressful and taxing nature of situations. Patient explored that her mom has been put under hospice care. Patient processed that her mom has been declining. Patient reported that she has been getting mad recently. Patient expressed that she has a lot of hostility and anger about how she has been treated in the past. Patient continues to share feelings of frustration of not feeling that she is getting paid as she should and familial difficulties. Patient processed feelings that  she is being picked on and not having anyone for support outside of her daughter.     Chief Complaint: stressors related to family, physical illness,     History of Present Illness: on-going      Clinical Maneuvering/Intervention: solution focused     (Scales based on 0 - 10 with 10 being the worst)  Depression: Not scaled during contact Anxiety: Not scaled during contact       Assisted patient in processing above session content; acknowledged and normalized patient’s thoughts, feelings, and concerns.  Rationalized patient thought process regarding resentment within family. Patient and therapist processed ways in which to address.  Discussed triggers associated with patient's anxiety and depression.  Also discussed coping skills for patient to implement such as address family and appropriately expressing of feelings.    Allowed patient to freely discuss issues without interruption or judgment. Provided safe, confidential environment to facilitate the development of positive therapeutic relationship and encourage open, honest communication. Assisted patient in identifying risk factors which would indicate the need for higher level of care including thoughts to harm self or others and/or self-harming behavior and encouraged patient to contact this office, call 911, or present to the nearest emergency room should any of these events occur. Discussed crisis intervention services and means to access. Patient adamantly and convincingly denies current suicidal or homicidal ideation or perceptual disturbance.    Assessment:   Assessment   Patient appears to maintain relative stability as compared to their baseline.  However, patient continues to struggle with symptoms of depression, anxiety which continues to cause impairment in important areas of functioning.  A result, they can be reasonably expected to continue to benefit from treatment and would likely be at increased risk for decompensation otherwise.    Mental Status  Exam:   Hygiene:   good  Cooperation:  Cooperative  Eye Contact:  Good  Psychomotor Behavior:  Appropriate  Affect:  Full range  Mood: normal, irritable and fluctates  Speech:  Normal  Thought Process:  Goal directed  Thought Content:  Normal  Suicidal:  None  Homicidal:  None  Hallucinations:  None  Delusion:  None  Memory:  Deficits  Orientation:  Person, Place, Time and Situation  Reliability:  good  Insight:  Fair  Judgement:  Fair  Impulse Control:  Good and Fair  Physical/Medical Issues:  Yes Follow ups schedule       Patient's Support Network Includes:  children    Functional Status: Moderate impairment     Progress toward goal: Not at goal    Prognosis: Good with Ongoing Treatment          Plan:  Patient will continue in individual outpatient therapy with focus on improved functioning and coping skills, maintaining stability, and avoiding decompensation and the need for higher level of care.    Patient will adhere to medication regimen as prescribed and report any side effects. Patient will contact this office, call 911 or present to the nearest emergency room should suicidal or homicidal ideations occur. Provide Cognitive Behavioral Therapy and Solution Focused Therapy to improve functioning, maintain stability, and avoid decompensation and the need for higher level of care.     Return in about 2 weeks, or earlier if symptoms worsen or fail to improve.           VISIT DIAGNOSIS:     ICD-10-CM ICD-9-CM   1. Major depressive disorder, recurrent episode, moderate (HCC)  F33.1 296.32   2. Anxiety disorder, unspecified type  F41.9 300.00             This document has been electronically signed by ASIA Brown  November 10, 2022 14:51 EST      Part of this note may be an electronic transcription/translation of spoken language to printed text using the Dragon Dictation System.

## 2022-11-13 RX ORDER — ROSUVASTATIN CALCIUM 5 MG/1
TABLET, COATED ORAL
Qty: 90 TABLET | Refills: 0 | Status: SHIPPED | OUTPATIENT
Start: 2022-11-13

## 2022-11-14 DIAGNOSIS — E11.9 TYPE 2 DIABETES MELLITUS WITHOUT COMPLICATION, WITHOUT LONG-TERM CURRENT USE OF INSULIN: ICD-10-CM

## 2022-11-14 RX ORDER — DULAGLUTIDE 1.5 MG/.5ML
INJECTION, SOLUTION SUBCUTANEOUS
Qty: 6 ML | Refills: 0 | Status: SHIPPED | OUTPATIENT
Start: 2022-11-14 | End: 2023-02-18

## 2022-11-15 ENCOUNTER — HOSPITAL ENCOUNTER (OUTPATIENT)
Dept: GENERAL RADIOLOGY | Facility: HOSPITAL | Age: 62
Discharge: HOME | End: 2022-11-15
Admitting: NURSE PRACTITIONER

## 2022-11-15 ENCOUNTER — OUTSIDE FACILITY SERVICE (OUTPATIENT)
Dept: GASTROENTEROLOGY | Facility: CLINIC | Age: 62
End: 2022-11-15

## 2022-11-15 DIAGNOSIS — R93.3 ABNORMAL COLONOSCOPY: ICD-10-CM

## 2022-11-15 DIAGNOSIS — R93.3 ABNORMAL COLONOSCOPY: Primary | ICD-10-CM

## 2022-11-15 PROCEDURE — 74270 X-RAY XM COLON 1CNTRST STD: CPT

## 2022-11-15 PROCEDURE — 45378 DIAGNOSTIC COLONOSCOPY: CPT | Performed by: INTERNAL MEDICINE

## 2022-11-15 RX ADMIN — BARIUM SULFATE 400 ML: 1.05 SUSPENSION ORAL; RECTAL at 12:59

## 2022-11-16 ENCOUNTER — TELEPHONE (OUTPATIENT)
Dept: GASTROENTEROLOGY | Facility: CLINIC | Age: 62
End: 2022-11-16

## 2022-11-17 DIAGNOSIS — F33.1 MAJOR DEPRESSIVE DISORDER, RECURRENT EPISODE, MODERATE: Chronic | ICD-10-CM

## 2022-11-17 RX ORDER — BREXPIPRAZOLE 0.5 MG/1
1 TABLET ORAL DAILY
Qty: 30 TABLET | Refills: 0 | Status: SHIPPED | OUTPATIENT
Start: 2022-11-17 | End: 2022-11-22 | Stop reason: SDUPTHER

## 2022-11-22 ENCOUNTER — TELEMEDICINE (OUTPATIENT)
Dept: PSYCHIATRY | Facility: CLINIC | Age: 62
End: 2022-11-22

## 2022-11-22 DIAGNOSIS — F33.1 MAJOR DEPRESSIVE DISORDER, RECURRENT EPISODE, MODERATE: Primary | Chronic | ICD-10-CM

## 2022-11-22 DIAGNOSIS — F41.9 ANXIETY DISORDER, UNSPECIFIED TYPE: Chronic | ICD-10-CM

## 2022-11-22 DIAGNOSIS — G47.9 SLEEPING DIFFICULTIES: ICD-10-CM

## 2022-11-22 PROCEDURE — 99214 OFFICE O/P EST MOD 30 MIN: CPT | Performed by: NURSE PRACTITIONER

## 2022-11-22 RX ORDER — BREXPIPRAZOLE 0.5 MG/1
1 TABLET ORAL DAILY
Qty: 30 TABLET | Refills: 0 | Status: SHIPPED | OUTPATIENT
Start: 2022-11-22 | End: 2022-12-28 | Stop reason: SDUPTHER

## 2022-11-22 RX ORDER — HYDROXYZINE HYDROCHLORIDE 25 MG/1
25 TABLET, FILM COATED ORAL EVERY 8 HOURS PRN
Qty: 90 TABLET | Refills: 0 | Status: SHIPPED | OUTPATIENT
Start: 2022-11-22 | End: 2022-12-28 | Stop reason: SDUPTHER

## 2022-11-22 RX ORDER — VILAZODONE HYDROCHLORIDE 20 MG/1
20 TABLET ORAL DAILY
Qty: 30 TABLET | Refills: 0 | Status: SHIPPED | OUTPATIENT
Start: 2022-11-22 | End: 2022-12-28 | Stop reason: SDUPTHER

## 2022-11-22 NOTE — PROGRESS NOTES
This provider is located at the [facility] (through [facility]), [address], [facility], [address] using a secure [employer] Video Visit through "[employer]. Patient is being seen remotely via telehealth at their home address in [place], and stated they are in a secure environment for this session. The patient's condition being diagnosed/treated is appropriate for telemedicine. The provider identified herself as well as her credentials.   The patient, and/or patients guardian, consent to be seen remotely, and when consent is given they understand that the consent allows for patient identifiable information to be sent to a third party as needed.   They may refuse to be seen remotely at any time. The electronic data is encrypted and password protected, and the patient and/or guardian has been advised of the potential risks to privacy not withstanding such measures.    You have chosen to receive care through a telehealth visit.  Do you consent to use a video/audio connection for your medical care today? Yes    Subjective   Sandra Auguste is a 62 y.o. female who presents today for follow up    Chief Complaint: Depression, anxiety, and history of sleeping difficulties follow-up    Accompanied by: The patient is interviewed alone at today's encounter    History of Present Illness:   The patient describes her mood as improved overall and stable since her last encounter with this APRN and since recently increasing her Rexulti dosage.  The patient states she had some blood in her stool, and recently had a colonoscopy.  She reports she needs to call her Provider for a follow-up from this.  She reports they found she had a redundant colon, but there were no polyps or cancerous areas.  The patient states she is trying to force herself to take better care of herself lately, but it makes her feel lazy and neglectful when she does.  The patient expresses having caregiver fatigue while caring  for her elderly mother.  The patient reports she did have her mother go into hospice respite during the week of her colonoscopy and  recent testing.  The patient reports her mother's provider has started her on Ativan, and the patient reports she either has to medicate herself to be able to care for her mother or the mother needs to be medicated due to her behaviors.  The patient reports she realizes it is not healthy to try to overmedicate herself when a lot of her stressors are related to caregiver stressors, and medication to help her mother's behaviors would be of most benefit to both of them at this time.  The patient rates her symptoms of depression at a 3-4/10 on a 0-10 scale, with 10 being the worst.  The patient rates her symptoms of anxiety at a 3-4/10 on a 0-10 scale, with 10 being the worst.  The patient reports her appetite as good.  The patient reports her sleep as fair.  The patient reports she was told after having neuropsych testing that she does not get correct REM sleep.  The patient reports she was told that her sleep apnea is not being treated, even though she uses her machine.  The patient reports she is scheduled in December to follow up with a neurologist who is also a sleep specialist at .  The patient denies any other new medical problems or changes in medications since last appointment with this facility.  The patient reports compliance with her current medication regimen.  The patient denies any side effects or concerns from her current medication regimen.  The patient denies any abnormal musculoskeletal movements or tic like movements.   The patient would like to adjust her medications at this visit.  The patient reports she would like to try increasing her hydroxyzine at today's encounter if possible.  The patient reports hydroxyzine does help with her anxiety symptoms when she uses it.  The patient denies any suicidal or homicidal ideations, plans, or intent at today's encounter and  "is convincing.  The patient denies any auditory hallucinations or visual hallucinations.  The patient does not endorse any significant symptoms consistent with carey or psychosis at today's encounter.      Prior Psychiatric Medications:  -Xanax 0.25 mg by mouth once daily as needed for anxiety - last prescribed 9/24/21  -Paxil  -Wellbutrin  -Zoloft  -Prozac  -Trazodone  -Buspirone -nauseated stomach  -Possibly Lexapro, but she is not sure  -Possibly Effexor, but she is not sure  -Possible medicine to assist with sleep  -Viibryd  -Hydroxyzine  -Possibly Abilify  -Other possible antipsychotics the patient cannot remember the name of them at this time  -Rexulti      Last Menstrual Period:  Hysterectomy.        The following portions of the patient's history were reviewed and updated as appropriate: allergies, current medications, past family history, past medical history, past social history, past surgical history and problem list.          Past Medical History:  Past Medical History:   Diagnosis Date   • Anesthesia complication     HAS TROUBLE GETTING \"NUMB\"    • Anxiety    • Arthritis    • Asthma    • Bladder spasms    • Depression    • Diverticulosis    • GERD (gastroesophageal reflux disease)    • Memory loss     FROM SKULL FRACTURE AND FALL-SHORT TERM MEMORY LOSS   • Mental disorder     PTSD   • Migraine    • Obesity    • Seasonal allergies    • Seizures ([facility])     \"convulsions\" at age 5   • Skull fracture ([facility]) 2012   • Sleep apnea with use of continuous positive airway pressure (CPAP)     INSTRUCTED TO BRING OWN MASK AND TUBING    • SOB (shortness of breath) on exertion    • Tendonitis    • Type 2 diabetes mellitus without complication, without long-term current use of insulin ([facility]) 10/11/2018   • Wears contact lenses    • Wears partial dentures     TOP ONLY    • Wears prescription eyeglasses    • Wears prescription eyeglasses        Social History:  Social History     Socioeconomic History   • Marital status: " Single   Tobacco Use   • Smoking status: Never   • Smokeless tobacco: Never   Vaping Use   • Vaping Use: Never used   Substance and Sexual Activity   • Alcohol use: Yes     Comment: Patient reports occasoinal wine use to help relax her and fall asleep, not daily use   • Drug use: Never   • Sexual activity: Not Currently     Birth control/protection: Post-menopausal, Surgical       Family History:  Family History   Problem Relation Age of Onset   • Cancer Mother         cervical cancer   • Dementia Mother    • Cancer Maternal Aunt         lymphoma   • Cancer Paternal Aunt    • Breast cancer Paternal Aunt 70   • Alcohol abuse Other    • Depression Other        Past Surgical History:  Past Surgical History:   Procedure Laterality Date   • CERVICAL POLYPECTOMY     • TOTAL LAPAROSCOPIC HYSTERECTOMY N/A 9/18/2017    Procedure: TOTAL LAPAROSCOPIC HYSTERECTOMY, BILATERAL SALPINGO OOPHORECTOMY WITH DAVINCI ROBOT ;  Surgeon: Shannon Grimaldo DO;  Location: On license of [facility];  Service:    • WISDOM TOOTH EXTRACTION         Problem List:  Patient Active Problem List   Diagnosis   • Abnormal computed tomography scan   • Anemia   • Post traumatic stress disorder (PTSD)   • Anxiety   • Strain of neck muscle   • Cough   • Diverticulitis of colon   • Fatigue   • Steatosis of liver   • Lateral epicondylitis   • Knee pain   • Spasm   • Adiposity   • Obstructive sleep apnea syndrome   • Osteoarthritis   • Palpitations   • Nausea   • Shortness of breath   • Skin tag   • Candidiasis of vagina   • Viral upper respiratory tract infection   • Gastroesophageal reflux disease   • Acute pain of left shoulder   • Abdominal distension (gaseous)   • SOB (shortness of breath) on exertion   • Sleep apnea with use of continuous positive airway pressure (CPAP)   • Seasonal allergies   • Obesity   • Migraine   • Mental disorder   • Diverticulosis   • Depression   • Arthritis   • Hypertension   • Itching   • Stabbing headache   • Temple tenderness   • TMJ click    • Mild intermittent asthma without complication   • Skull fracture (HCC)   • Type 2 diabetes mellitus without complication, without long-term current use of insulin (HCC)   • Health care maintenance   • Moderate episode of recurrent major depressive disorder (HCC)   • Generalized anxiety disorder   • High risk medications (not anticoagulants) long-term use   • Hyperlipidemia       Allergy:   Allergies   Allergen Reactions   • Mold Extract [Trichophyton] Shortness Of Breath, Anxiety, Irritability, Other (See Comments) and Palpitations     SOA   • Lortab [Hydrocodone-Acetaminophen] Nausea And Vomiting     SEVERE NAUSEA AND SLIGHT VOMITING   • Methylprednisolone Other (See Comments)     Bleeding, cramping   • Pollen Extract Other (See Comments)     SNEEZING AND CONGESTION         Current Medications:   Current Outpatient Medications   Medication Sig Dispense Refill   • Brexpiprazole (Rexulti) 0.5 MG tablet Take 0.5 mg by mouth Daily. 30 tablet 0   • hydrOXYzine (ATARAX) 25 MG tablet Take 1 tablet by mouth Every 8 (Eight) Hours As Needed (anxiety and/or sleep). 90 tablet 0   • vilazodone (Viibryd) 20 MG tablet tablet Take 1 tablet by mouth Daily. 30 tablet 0   • acetaminophen (TYLENOL) 500 MG tablet Take 500 mg by mouth Every 6 (Six) Hours As Needed for Mild Pain .     • albuterol sulfate  (90 Base) MCG/ACT inhaler Inhale 1 puff Every 6 (Six) Hours As Needed for Shortness of Air. INHALE TWO PUFFS BY MOUTH EVERY 6 HOURS AS NEEDED 1 inhaler 2   • Biotin 1 MG capsule Take  by mouth.     • Blood Glucose Monitoring Suppl (OneTouch Verio) w/Device kit 1 kit Daily. 1 kit 0   • Breo Ellipta 200-25 MCG/INH inhaler INHALE ONE DOSE BY MOUTH DAILY 60 each 5   • celecoxib (CeleBREX) 50 MG capsule      • Cranberry 1000 MG capsule Take  by mouth.     • cyclobenzaprine (FLEXERIL) 5 MG tablet Take 1 tablet by mouth 2 (Two) Times a Day As Needed for Muscle Spasms. 20 tablet 0   • Diclofenac Sodium (VOLTAREN) 1 % gel gel APPLY 4  GRAMS TOPICALLY TO THE APPROPRIATE AREA AS DIRECTED FOUR TIMES A DAYS AS NEEDED FOR PAIN IN SHOULDERS 100 g 1   • EPINEPHrine (EPIPEN) 0.3 MG/0.3ML solution auto-injector injection Inject 0.3 mL into the appropriate muscle as directed by prescriber As Needed (FOR SEVERE ALLERGIC REACTION). 1 each 1   • famotidine (PEPCID) 40 MG tablet TAKE ONE TABLET BY MOUTH DAILY 90 tablet 0   • fluticasone (FLONASE) 50 MCG/ACT nasal spray 2 sprays into the nostril(s) as directed by provider Daily for 30 days. 16 g 5   • glucose blood (OneTouch Verio) test strip Use one strip to check blood sugars one time daily 100 each 3   • glucose monitor monitoring kit Use as directed to check blood glucose once daily. 1 each 0   • guaiFENesin-codeine (GUAIFENESIN AC) 100-10 MG/5ML liquid Take 5 mL by mouth 3 (Three) Times a Day As Needed for Cough. 118 mL 0   • Insulin Pen Needle (Pen Needles) 32G X 6 MM misc 1 dose 1 (One) Time Per Week. 100 each 3   • Lancets (onetouch ultrasoft) lancets Use one lancet daily to check blood sugars 100 each 3   • lidocaine (LIDODERM) 5 % Place 1 patch on the skin as directed by provider Daily. Remove & Discard patch within 12 hours or as directed by MD 6 patch 0   • loratadine (CLARITIN) 10 MG tablet Take 1 tablet by mouth Daily. 30 tablet 5   • metFORMIN ER (GLUCOPHAGE-XR) 500 MG 24 hr tablet TAKE TWO TABLETS BY MOUTH DAILY WITH BREAKFAST 180 tablet 0   • methocarbamol (ROBAXIN) 500 MG tablet Take 500 mg by mouth.     • multivitamin with minerals tablet tablet Take 1 tablet by mouth Daily.     • ondansetron (Zofran) 8 MG tablet Take 1 tablet by mouth Every 8 (Eight) Hours As Needed for Nausea or Vomiting. 15 tablet 0   • rosuvastatin (CRESTOR) 5 MG tablet TAKE ONE TABLET BY MOUTH DAILY 90 tablet 0   • sodium-potassium-magnesium sulfates (Suprep Bowel Prep Kit) 17.5-3.13-1.6 GM/177ML solution oral solution Take 1 bottle by mouth Take As Directed. Follow instructions that were mailed to your home. If you  didn't receive these call [phone]. 354 mL 0   • Trulicity 1.5 MG/0.5ML solution pen-injector INJECT 1/2 MILLILITER (1.5MG) UNDER THE SKIN INTO THE APPROPRIATE AREA ONCE WEEKLY 6 mL 0     No current facility-administered medications for this visit.       Review of Symptoms:    Review of Systems   Musculoskeletal: Positive for arthralgias, back pain, myalgias, neck pain and neck stiffness.   Neurological: Positive for memory problem (chronic).   Psychiatric/Behavioral: Positive for decreased concentration, sleep disturbance (improved some with medication and c-pap), depressed mood and stress. Negative for agitation, behavioral problems, dysphoric mood, hallucinations, self-injury, suicidal ideas and negative for hyperactivity. The patient is nervous/anxious.          Physical Exam:   not currently breastfeeding. There is no height or weight on file to calculate BMI.   Due to the remote nature of this encounter (virtual encounter), vitals were unable to be obtained.  Height stated at 65.5 inches.  Weight stated at around 248 pounds.      Physical Exam  Neurological:      Mental Status: She is alert and oriented to person, place, and time.   Psychiatric:         Attention and Perception: Attention normal. She is attentive.         Mood and Affect: Affect normal. Mood is anxious and depressed.         Speech: Speech normal.         Behavior: Behavior normal. Behavior is cooperative.         Thought Content: Thought content normal. Thought content is not paranoid or delusional. Thought content does not include homicidal or suicidal ideation. Thought content does not include homicidal or suicidal plan.         Judgment: Judgment normal.           Mental Status Exam:   Hygiene:   good  Cooperation:  Cooperative  Eye Contact:  Good  Psychomotor Behavior:  Appropriate  Affect:  Appropriate  Mood: depressed and anxious  Hopelessness: Denies  Speech:  Normal  Thought Process:  Linear  Thought Content:  Mood  congruent  Suicidal:  None  Homicidal:  None  Hallucinations:  None  Delusion:  None  Memory:  Intact  Orientation:  Person, Place and Time  Reliability:  good  Insight:  Good  Judgement:  Good  Impulse Control:  Good  Physical/Medical Issues:  Chronic, no acute physical/medical issues at today's encounter         Lab Results:   Lab on 10/26/2022   Component Date Value Ref Range Status   • Glucose 10/26/2022 91  65 - 99 mg/dL Final   • BUN 10/26/2022 12  8 - 23 mg/dL Final   • Creatinine 10/26/2022 0.79  0.57 - 1.00 mg/dL Final   • Sodium 10/26/2022 142  136 - 145 mmol/L Final   • Potassium 10/26/2022 4.9  3.5 - 5.2 mmol/L Final   • Chloride 10/26/2022 104  98 - 107 mmol/L Final   • CO2 10/26/2022 27.2  22.0 - 29.0 mmol/L Final   • Calcium 10/26/2022 9.8  8.6 - 10.5 mg/dL Final   • Total Protein 10/26/2022 7.4  6.0 - 8.5 g/dL Final   • Albumin 10/26/2022 4.30  3.50 - 5.20 g/dL Final   • ALT (SGPT) 10/26/2022 22  1 - 33 U/L Final   • AST (SGOT) 10/26/2022 23  1 - 32 U/L Final   • Alkaline Phosphatase 10/26/2022 89  39 - 117 U/L Final   • Total Bilirubin 10/26/2022 0.2  0.0 - 1.2 mg/dL Final   • Globulin 10/26/2022 3.1  gm/dL Final   • A/G Ratio 10/26/2022 1.4  g/dL Final   • BUN/Creatinine Ratio 10/26/2022 15.2  7.0 - 25.0 Final   • Anion Gap 10/26/2022 10.8  5.0 - 15.0 mmol/L Final   • eGFR 10/26/2022 85.8  >60.0 mL/min/1.73 Final    National Kidney Foundation and American Society of Nephrology (ASN) Task Force recommended calculation based on the Chronic Kidney Disease Epidemiology Collaboration (CKD-EPI) equation refit without adjustment for race.   • Ferritin 10/26/2022 106.00  13.00 - 150.00 ng/mL Final   • Iron 10/26/2022 53  37 - 145 mcg/dL Final   • Iron Saturation 10/26/2022 15 (L)  20 - 50 % Final   • Transferrin 10/26/2022 242  200 - 360 mg/dL Final   • TIBC 10/26/2022 361  298 - 536 mcg/dL Final   • WBC 10/26/2022 10.07  3.40 - 10.80 10*3/mm3 Final   • RBC 10/26/2022 4.90  3.77 - 5.28 10*6/mm3 Final   •  Hemoglobin 10/26/2022 14.2  12.0 - 15.9 g/dL Final   • Hematocrit 10/26/2022 43.8  34.0 - 46.6 % Final   • MCV 10/26/2022 89.4  79.0 - 97.0 fL Final   • MCH 10/26/2022 29.0  26.6 - 33.0 pg Final   • MCHC 10/26/2022 32.4  31.5 - 35.7 g/dL Final   • RDW 10/26/2022 12.4  12.3 - 15.4 % Final   • RDW-SD 10/26/2022 40.4  37.0 - 54.0 fl Final   • MPV 10/26/2022 10.9  6.0 - 12.0 fL Final   • Platelets 10/26/2022 304  140 - 450 10*3/mm3 Final   • Neutrophil % 10/26/2022 66.6  42.7 - 76.0 % Final   • Lymphocyte % 10/26/2022 22.0  19.6 - 45.3 % Final   • Monocyte % 10/26/2022 7.5  5.0 - 12.0 % Final   • Eosinophil % 10/26/2022 3.1  0.3 - 6.2 % Final   • Basophil % 10/26/2022 0.5  0.0 - 1.5 % Final   • Immature Grans % 10/26/2022 0.3  0.0 - 0.5 % Final   • Neutrophils, Absolute 10/26/2022 6.70  1.70 - 7.00 10*3/mm3 Final   • Lymphocytes, Absolute 10/26/2022 2.22  0.70 - 3.10 10*3/mm3 Final   • Monocytes, Absolute 10/26/2022 0.76  0.10 - 0.90 10*3/mm3 Final   • Eosinophils, Absolute 10/26/2022 0.31  0.00 - 0.40 10*3/mm3 Final   • Basophils, Absolute 10/26/2022 0.05  0.00 - 0.20 10*3/mm3 Final   • Immature Grans, Absolute 10/26/2022 0.03  0.00 - 0.05 10*3/mm3 Final   • nRBC 10/26/2022 0.0  0.0 - 0.2 /100 WBC Final         Assessment & Plan   Problems Addressed this Visit    None  Visit Diagnoses     Major depressive disorder, recurrent episode, moderate (HCC)  (Chronic)   -  Primary    Relevant Medications    Brexpiprazole (Rexulti) 0.5 MG tablet    hydrOXYzine (ATARAX) 25 MG tablet    vilazodone (Viibryd) 20 MG tablet tablet    Anxiety disorder, unspecified type  (Chronic)       Relevant Medications    Brexpiprazole (Rexulti) 0.5 MG tablet    hydrOXYzine (ATARAX) 25 MG tablet    vilazodone (Viibryd) 20 MG tablet tablet    Sleeping difficulties        Relevant Medications    hydrOXYzine (ATARAX) 25 MG tablet      Diagnoses       Codes Comments    Major depressive disorder, recurrent episode, moderate (HCC)    -  Primary  ICD-10-CM: F33.1  ICD-9-CM: 296.32     Anxiety disorder, unspecified type     ICD-10-CM: F41.9  ICD-9-CM: 300.00     Sleeping difficulties     ICD-10-CM: G47.9  ICD-9-CM: 780.50           Visit Diagnoses:    ICD-10-CM ICD-9-CM   1. Major depressive disorder, recurrent episode, moderate (HCC)  F33.1 296.32   2. Anxiety disorder, unspecified type  F41.9 300.00   3. Sleeping difficulties  G47.9 780.50          GOALS:  Short Term Goals: Patient will be compliant with medication, and patient will have no significant medication related side effects.  Patient will be engaged in psychotherapy as indicated.  Patient will report subjective improvement of symptoms.  Long term goals: To stabilize mood and treat/improve subjective symptoms, the patient will stay out of the hospital, the patient will be at an optimal level of functioning, and the patient will take all medications as prescribed.  The patient verbalized understanding and agreement with goals that were mutually set.      TREATMENT PLAN: Continue supportive psychotherapy efforts and medications as indicated.  Medication and treatment options, both pharmacological and non-pharmacological treatment options, discussed during today's visit, including any off label use of medication. Patient acknowledged and verbally consented with current treatment plan and was educated on the importance of compliance with treatment and follow-up appointments.      -Continue Viibryd at 20 mg by mouth once daily for mood.  -Increase hydroxyzine to 25 mg by mouth up to three times daily as needed for anxiety and/or sleep.  -Continue Rexulti 0.5 mg by mouth once daily as an adjunct for depression.      MEDICATION ISSUES:  Discussed medication options and treatment plan of prescribed medication, any off label use of medication, as well as the risks, benefits, any black box warnings including increased suicidality, and side effects including but not limited to potential falls, dizziness,  possible impaired driving, GI side effects (change in appetite, abdominal discomfort, nausea, vomiting, diarrhea, and/or constipation), dry mouth, somnolence, sedation, insomnia, activation, agitation, irritation, tremors, abnormal muscle movements or disorders, tardive dyskinesia, akathisia, asthenia, headache, sweating, possible bruising or rare bleeding, electrolyte and/or fluid abnormalities, change in blood pressure/heart rate/and or heart rhythm, hypotension, sexual dysfunction, rare impulse control problems, rare seizures, rare neuroleptic malignant syndrome, increased risk of death and cerebrovascular events, change in blood glucose and increased risk for diabetes, change in triglycerides and cholesterol and increased risk for dyslipidemia,  weight gain, weight gain that can become problematic to health, skin conditions and reactions, and metabolic adversities among others. Patient and/or guardian are agreeable to call the office with any worsening of symptoms or onset of side effects, or if any concerns or questions arise.  The contact information for the office is made available to the patient and/or guardian. Patient and/or guardian are agreeable to call 911 or go to the nearest ER should they begin having any SI/HI, or if any urgent concerns arise.      SUICIDE RISK ASSESSMENT AND SAFETY PLAN: Unalterable demographics and a history of mental health intervention indicate this patient is in a high risk category compared to the general population. At present, the patient denies active SI/HI, intentions, or plans at this time and agrees to seek immediate care should such thoughts develop. The patient verbalizes understanding of how to access emergency care if needed and agrees to do so. Consideration of suicide risk and protective factors such as history, current presentation, individual strengths and weaknesses, psychosocial and environmental stressors and variables, psychiatric illness and symptoms,  medical conditions and pain, took place in this interview. Based on those considerations, the patient is determined: within individual baseline and presenting no imminent risk for suicide or homicide. Other recommendations: The patient does not meet the criteria for inpatient admission and is not a safety risk to self or others at today's visit. Inpatient treatment offers no significant advantages over outpatient treatment for this patient at today's visit.  The patient was given ample time for questions and fully participated in treatment planning.  The patient was encouraged to call the clinic with any questions or concerns.  The patient was informed of access to emergency care. If patient were to develop any significant symptomatology, suicidal ideation, homicidal ideation, any concerns, or feel unsafe at any time they are to call the clinic and if unable to get immediate assistance should immediately call 911 or go to the nearest emergency room.  Patient contracted verbally for the following: If you are experiencing an emotional crisis or have thoughts of harming yourself or others, please go to your nearest local emergency room or call 911. Will continue to re-assess medication response and side effects frequently to establish efficacy and ensure safety. Risks, any black box warnings, side effects, off label usage, and benefits of medication and treatment discussed with patient, along with potential adverse side effects of current and/or newly prescribed medication, alternative treatment options, and OTC medications.  Patient verbalized understanding of potential risks, any off label use of medication, any black box warnings, and any side effects in their own words. The patient verbalized understanding and agreed to comply with the safety plan discussed in their own words.  Patient given the number to the office. Number also discussed of the 24- hour suicide hotline.           MEDS ORDERED DURING VISIT:  New  Medications Ordered This Visit   Medications   • Brexpiprazole (Rexulti) 0.5 MG tablet     Sig: Take 0.5 mg by mouth Daily.     Dispense:  30 tablet     Refill:  0   • hydrOXYzine (ATARAX) 25 MG tablet     Sig: Take 1 tablet by mouth Every 8 (Eight) Hours As Needed (anxiety and/or sleep).     Dispense:  90 tablet     Refill:  0   • vilazodone (Viibryd) 20 MG tablet tablet     Sig: Take 1 tablet by mouth Daily.     Dispense:  30 tablet     Refill:  0       Return in about 4 weeks (around 12/20/2022), or if symptoms worsen or fail to improve, for Next scheduled follow up and Recheck.         Functional Status: Moderate impairment     Prognosis: Fair with Ongoing Treatment             This document has been electronically signed by REBECA Bello  November 22, 2022 15:21 EST    Some of the data in this electronic note has been brought forward from a previous encounter, any necessary changes have been made, it has been reviewed by this APRN, and it is accurate.    Please note that portions of this note were completed with a voice recognition program.

## 2022-12-01 ENCOUNTER — TELEPHONE (OUTPATIENT)
Dept: INTERNAL MEDICINE | Facility: CLINIC | Age: 62
End: 2022-12-01

## 2022-12-01 ENCOUNTER — OFFICE VISIT (OUTPATIENT)
Dept: INTERNAL MEDICINE | Facility: CLINIC | Age: 62
End: 2022-12-01

## 2022-12-01 VITALS
SYSTOLIC BLOOD PRESSURE: 126 MMHG | BODY MASS INDEX: 41.33 KG/M2 | WEIGHT: 252.2 LBS | TEMPERATURE: 97.5 F | OXYGEN SATURATION: 97 % | HEART RATE: 86 BPM | DIASTOLIC BLOOD PRESSURE: 82 MMHG

## 2022-12-01 DIAGNOSIS — K64.9 HEMORRHOIDS, UNSPECIFIED HEMORRHOID TYPE: ICD-10-CM

## 2022-12-01 DIAGNOSIS — J30.2 SEASONAL ALLERGIES: ICD-10-CM

## 2022-12-01 DIAGNOSIS — E11.9 TYPE 2 DIABETES MELLITUS WITHOUT COMPLICATION, WITHOUT LONG-TERM CURRENT USE OF INSULIN: Primary | ICD-10-CM

## 2022-12-01 DIAGNOSIS — J45.20 MILD INTERMITTENT ASTHMA WITHOUT COMPLICATION: ICD-10-CM

## 2022-12-01 DIAGNOSIS — E66.01 CLASS 3 SEVERE OBESITY DUE TO EXCESS CALORIES WITHOUT SERIOUS COMORBIDITY WITH BODY MASS INDEX (BMI) OF 40.0 TO 44.9 IN ADULT: ICD-10-CM

## 2022-12-01 DIAGNOSIS — L91.8 SKIN TAGS, MULTIPLE ACQUIRED: ICD-10-CM

## 2022-12-01 LAB
EXPIRATION DATE: NORMAL
GLUCOSE BLDC GLUCOMTR-MCNC: 90 MG/DL (ref 70–130)
HBA1C MFR BLD: 6.3 %
Lab: NORMAL

## 2022-12-01 PROCEDURE — 82962 GLUCOSE BLOOD TEST: CPT | Performed by: PHYSICIAN ASSISTANT

## 2022-12-01 PROCEDURE — 99214 OFFICE O/P EST MOD 30 MIN: CPT | Performed by: PHYSICIAN ASSISTANT

## 2022-12-01 PROCEDURE — 3044F HG A1C LEVEL LT 7.0%: CPT | Performed by: PHYSICIAN ASSISTANT

## 2022-12-01 PROCEDURE — 83036 HEMOGLOBIN GLYCOSYLATED A1C: CPT | Performed by: PHYSICIAN ASSISTANT

## 2022-12-01 RX ORDER — LORATADINE 10 MG/1
10 TABLET ORAL DAILY
Qty: 30 TABLET | Refills: 5 | Status: SHIPPED | OUTPATIENT
Start: 2022-12-01

## 2022-12-01 RX ORDER — FLUTICASONE FUROATE AND VILANTEROL 200; 25 UG/1; UG/1
1 POWDER RESPIRATORY (INHALATION) DAILY
Qty: 60 EACH | Refills: 3 | Status: SHIPPED | OUTPATIENT
Start: 2022-12-01

## 2022-12-01 NOTE — PROGRESS NOTES
Chief Complaint   Patient presents with   • Diabetes   • Hypertension     Follow Up       Subjective     Sandra Auguste is a 62 y.o. female.        History of Present Illness     A1c is 6.3 percent today, which has decreased from 6.6 in 03/2022. Reports that it is increased due to Thanksgiving. Continues to take one tab of metformin daily. Turns to eating when she is happy, upset, sad. Learning this about herself and working to find other ways to deal with her emotions.    Would like to get rid of her hemorrhoids. These always bother her and are sore. If she is constipated she will have some bleeding. Her bleeding from her rectum was not from hemorrhoids or constipation.     Would like to get skin tags removed from her chest because they itch and bother her. Does not have a dermatologist.       Current Outpatient Medications:   •  acetaminophen (TYLENOL) 500 MG tablet, Take 500 mg by mouth Every 6 (Six) Hours As Needed for Mild Pain ., Disp: , Rfl:   •  albuterol sulfate  (90 Base) MCG/ACT inhaler, Inhale 1 puff Every 6 (Six) Hours As Needed for Shortness of Air. INHALE TWO PUFFS BY MOUTH EVERY 6 HOURS AS NEEDED, Disp: 1 inhaler, Rfl: 2  •  Biotin 1 MG capsule, Take  by mouth., Disp: , Rfl:   •  Blood Glucose Monitoring Suppl (OneTouch Verio) w/Device kit, 1 kit Daily., Disp: 1 kit, Rfl: 0  •  Brexpiprazole (Rexulti) 0.5 MG tablet, Take 0.5 mg by mouth Daily., Disp: 30 tablet, Rfl: 0  •  celecoxib (CeleBREX) 50 MG capsule, , Disp: , Rfl:   •  Cranberry 1000 MG capsule, Take  by mouth., Disp: , Rfl:   •  cyclobenzaprine (FLEXERIL) 5 MG tablet, Take 1 tablet by mouth 2 (Two) Times a Day As Needed for Muscle Spasms., Disp: 20 tablet, Rfl: 0  •  Diclofenac Sodium (VOLTAREN) 1 % gel gel, APPLY 4 GRAMS TOPICALLY TO THE APPROPRIATE AREA AS DIRECTED FOUR TIMES A DAYS AS NEEDED FOR PAIN IN SHOULDERS, Disp: 100 g, Rfl: 1  •  EPINEPHrine (EPIPEN) 0.3 MG/0.3ML solution auto-injector injection, Inject 0.3 mL into the  appropriate muscle as directed by prescriber As Needed (FOR SEVERE ALLERGIC REACTION)., Disp: 1 each, Rfl: 1  •  famotidine (PEPCID) 40 MG tablet, TAKE ONE TABLET BY MOUTH DAILY, Disp: 90 tablet, Rfl: 0  •  Fluticasone Furoate-Vilanterol (Breo Ellipta) 200-25 MCG/ACT inhaler, Inhale 1 puff Daily., Disp: 60 each, Rfl: 3  •  glucose blood (OneTouch Verio) test strip, Use one strip to check blood sugars one time daily, Disp: 100 each, Rfl: 3  •  glucose monitor monitoring kit, Use as directed to check blood glucose once daily., Disp: 1 each, Rfl: 0  •  guaiFENesin-codeine (GUAIFENESIN AC) 100-10 MG/5ML liquid, Take 5 mL by mouth 3 (Three) Times a Day As Needed for Cough., Disp: 118 mL, Rfl: 0  •  hydrOXYzine (ATARAX) 25 MG tablet, Take 1 tablet by mouth Every 8 (Eight) Hours As Needed (anxiety and/or sleep)., Disp: 90 tablet, Rfl: 0  •  Insulin Pen Needle (Pen Needles) 32G X 6 MM misc, 1 dose 1 (One) Time Per Week., Disp: 100 each, Rfl: 3  •  Lancets (onetouch ultrasoft) lancets, Use one lancet daily to check blood sugars, Disp: 100 each, Rfl: 3  •  lidocaine (LIDODERM) 5 %, Place 1 patch on the skin as directed by provider Daily. Remove & Discard patch within 12 hours or as directed by MD, Disp: 6 patch, Rfl: 0  •  loratadine (CLARITIN) 10 MG tablet, Take 1 tablet by mouth Daily., Disp: 30 tablet, Rfl: 5  •  metFORMIN ER (GLUCOPHAGE-XR) 500 MG 24 hr tablet, TAKE TWO TABLETS BY MOUTH DAILY WITH BREAKFAST, Disp: 180 tablet, Rfl: 0  •  methocarbamol (ROBAXIN) 500 MG tablet, Take 500 mg by mouth., Disp: , Rfl:   •  multivitamin with minerals tablet tablet, Take 1 tablet by mouth Daily., Disp: , Rfl:   •  ondansetron (Zofran) 8 MG tablet, Take 1 tablet by mouth Every 8 (Eight) Hours As Needed for Nausea or Vomiting., Disp: 15 tablet, Rfl: 0  •  rosuvastatin (CRESTOR) 5 MG tablet, TAKE ONE TABLET BY MOUTH DAILY, Disp: 90 tablet, Rfl: 0  •  Trulicity 1.5 MG/0.5ML solution pen-injector, INJECT 1/2 MILLILITER (1.5MG) UNDER THE  SKIN INTO THE APPROPRIATE AREA ONCE WEEKLY, Disp: 6 mL, Rfl: 0  •  vilazodone (Viibryd) 20 MG tablet tablet, Take 1 tablet by mouth Daily., Disp: 30 tablet, Rfl: 0  •  fluticasone (FLONASE) 50 MCG/ACT nasal spray, 2 sprays into the nostril(s) as directed by provider Daily for 30 days., Disp: 16 g, Rfl: 5     PMFSH  The following portions of the patient's history were reviewed and updated as appropriate: allergies, current medications, past family history, past medical history, past social history, past surgical history and problem list.    Review of Systems   Constitutional: Negative for activity change, appetite change and fatigue.   HENT: Negative for congestion and rhinorrhea.    Respiratory: Negative for chest tightness and shortness of breath.    Cardiovascular: Negative for chest pain and palpitations.   Gastrointestinal: Negative for abdominal pain.   Genitourinary: Negative for dysuria.   Musculoskeletal: Negative for arthralgias and myalgias.   Neurological: Negative for dizziness, weakness, light-headedness and headaches.   Psychiatric/Behavioral: Negative for dysphoric mood. The patient is not nervous/anxious.        Objective   /82   Pulse 86   Temp 97.5 °F (36.4 °C)   Wt 114 kg (252 lb 3.2 oz)   LMP  (LMP Unknown)   SpO2 97%   BMI 41.33 kg/m²     Physical Exam  Vitals and nursing note reviewed.   Constitutional:       Appearance: She is well-developed.   HENT:      Head: Normocephalic.      Right Ear: Hearing, tympanic membrane, ear canal and external ear normal.      Left Ear: Hearing, tympanic membrane, ear canal and external ear normal.      Nose: Nose normal.   Eyes:      Conjunctiva/sclera: Conjunctivae normal.      Pupils: Pupils are equal, round, and reactive to light.   Cardiovascular:      Rate and Rhythm: Normal rate and regular rhythm.      Heart sounds: Normal heart sounds.   Pulmonary:      Effort: Pulmonary effort is normal.      Breath sounds: Normal breath sounds. No  decreased breath sounds, wheezing, rhonchi or rales.   Musculoskeletal:         General: Normal range of motion.      Cervical back: Normal range of motion.   Skin:     General: Skin is warm and dry.   Neurological:      Mental Status: She is alert.   Psychiatric:         Behavior: Behavior normal.         Results for orders placed or performed in visit on 12/01/22   POC Glucose    Specimen: Blood   Result Value Ref Range    Glucose 90 70 - 130 mg/dL   POC Glycosylated Hemoglobin (Hb A1C)    Specimen: Blood   Result Value Ref Range    Hemoglobin A1C 6.3 %    Lot Number [id number]     Expiration Date 09/14/2024         ASSESSMENT/PLAN    Diagnoses and all orders for this visit:    1. Type 2 diabetes mellitus without complication, without long-term current use of insulin (HCC) (Primary)  Assessment & Plan:  Diabetes is improving with treatment.   Continue current treatment regimen.  Reminded to bring in blood sugar diary at next visit.  Dietary recommendations for ADA diet.  Regular aerobic exercise.  Diabetes will be reassessed in 3 months.    Orders:  -     POC Glucose  -     POC Glycosylated Hemoglobin (Hb A1C)    2. Seasonal allergies  -     Fluticasone Furoate-Vilanterol (Breo Ellipta) 200-25 MCG/ACT inhaler; Inhale 1 puff Daily.  Dispense: 60 each; Refill: 3  -     loratadine (CLARITIN) 10 MG tablet; Take 1 tablet by mouth Daily.  Dispense: 30 tablet; Refill: 5    3. Mild intermittent asthma without complication  -     Fluticasone Furoate-Vilanterol (Breo Ellipta) 200-25 MCG/ACT inhaler; Inhale 1 puff Daily.  Dispense: 60 each; Refill: 3    4. Seasonal allergies  Comments:  Restart loratadine as ordered.  Orders:  -     Fluticasone Furoate-Vilanterol (Breo Ellipta) 200-25 MCG/ACT inhaler; Inhale 1 puff Daily.  Dispense: 60 each; Refill: 3  -     loratadine (CLARITIN) 10 MG tablet; Take 1 tablet by mouth Daily.  Dispense: 30 tablet; Refill: 5    5. Class 3 severe obesity due to excess calories without serious  comorbidity with body mass index (BMI) of 40.0 to 44.9 in adult (HCC)  -     Ambulatory Referral to Bariatric Surgery    6. Hemorrhoids, unspecified hemorrhoid type  Comments:  Refer to colorectal specialist to discuss possibility of removal.  Orders:  -     Ambulatory Referral to Colorectal Surgery    7. Skin tags, multiple acquired  Comments:  Referral to Dermatology to disuss removal.  Orders:  -     Ambulatory Referral to Dermatology           Return in about 3 months (around 3/1/2023) for Follow up.     Transcribed from ambient dictation for SHAUNA Herrera by Angela Nicole.  12/01/22   15:07 EST    Patient or patient representative verbalized consent to the visit recording.  I have personally performed the services described in this document as transcribed by the above individual, and it is both accurate and complete.

## 2022-12-01 NOTE — TELEPHONE ENCOUNTER
PT HAD APPT THIS AFTERNOON AND FORGOT TO ASK ALESIA ABOUT LOW IRON SATURATION TEST RESULTS. PLEASE CALL THE PT TO DISCUSS.

## 2022-12-08 ENCOUNTER — OFFICE VISIT (OUTPATIENT)
Dept: INTERNAL MEDICINE | Facility: CLINIC | Age: 62
End: 2022-12-08

## 2022-12-08 ENCOUNTER — TELEPHONE (OUTPATIENT)
Dept: INTERNAL MEDICINE | Facility: CLINIC | Age: 62
End: 2022-12-08

## 2022-12-08 VITALS
RESPIRATION RATE: 16 BRPM | HEART RATE: 78 BPM | TEMPERATURE: 97.4 F | OXYGEN SATURATION: 97 % | HEIGHT: 66 IN | WEIGHT: 252 LBS | DIASTOLIC BLOOD PRESSURE: 76 MMHG | SYSTOLIC BLOOD PRESSURE: 124 MMHG | BODY MASS INDEX: 40.5 KG/M2

## 2022-12-08 DIAGNOSIS — G47.30 SLEEP APNEA WITH USE OF CONTINUOUS POSITIVE AIRWAY PRESSURE (CPAP): ICD-10-CM

## 2022-12-08 DIAGNOSIS — J40 BRONCHITIS: ICD-10-CM

## 2022-12-08 DIAGNOSIS — R05.1 ACUTE COUGH: Primary | ICD-10-CM

## 2022-12-08 DIAGNOSIS — J02.9 SORE THROAT: ICD-10-CM

## 2022-12-08 DIAGNOSIS — J45.20 MILD INTERMITTENT ASTHMA WITHOUT COMPLICATION: ICD-10-CM

## 2022-12-08 DIAGNOSIS — E11.9 TYPE 2 DIABETES MELLITUS WITHOUT COMPLICATION, WITHOUT LONG-TERM CURRENT USE OF INSULIN: ICD-10-CM

## 2022-12-08 DIAGNOSIS — B37.31 VAGINAL YEAST INFECTION: ICD-10-CM

## 2022-12-08 LAB
EXPIRATION DATE: NORMAL
EXPIRATION DATE: NORMAL
FLUAV AG UPPER RESP QL IA.RAPID: NOT DETECTED
FLUBV AG UPPER RESP QL IA.RAPID: NOT DETECTED
INTERNAL CONTROL: NORMAL
INTERNAL CONTROL: NORMAL
Lab: NORMAL
Lab: NORMAL
S PYO AG THROAT QL: NEGATIVE
SARS-COV-2 AG UPPER RESP QL IA.RAPID: NOT DETECTED

## 2022-12-08 PROCEDURE — 87081 CULTURE SCREEN ONLY: CPT | Performed by: NURSE PRACTITIONER

## 2022-12-08 PROCEDURE — 87880 STREP A ASSAY W/OPTIC: CPT | Performed by: NURSE PRACTITIONER

## 2022-12-08 PROCEDURE — 99214 OFFICE O/P EST MOD 30 MIN: CPT | Performed by: NURSE PRACTITIONER

## 2022-12-08 PROCEDURE — 87428 SARSCOV & INF VIR A&B AG IA: CPT | Performed by: NURSE PRACTITIONER

## 2022-12-08 RX ORDER — DOXYCYCLINE HYCLATE 100 MG/1
100 CAPSULE ORAL 2 TIMES DAILY
Qty: 14 CAPSULE | Refills: 0 | Status: SHIPPED | OUTPATIENT
Start: 2022-12-08 | End: 2022-12-15

## 2022-12-08 RX ORDER — BENZONATATE 100 MG/1
100 CAPSULE ORAL 3 TIMES DAILY PRN
Qty: 30 CAPSULE | Refills: 0 | Status: SHIPPED | OUTPATIENT
Start: 2022-12-08 | End: 2022-12-18

## 2022-12-08 RX ORDER — PREDNISONE 10 MG/1
10 TABLET ORAL DAILY
Qty: 4 TABLET | Refills: 0 | Status: SHIPPED | OUTPATIENT
Start: 2022-12-08 | End: 2022-12-12

## 2022-12-08 RX ORDER — FLUCONAZOLE 150 MG/1
150 TABLET ORAL ONCE
Qty: 1 TABLET | Refills: 0 | Status: SHIPPED | OUTPATIENT
Start: 2022-12-08 | End: 2022-12-08

## 2022-12-08 RX ORDER — ALBUTEROL SULFATE 90 UG/1
2 AEROSOL, METERED RESPIRATORY (INHALATION) EVERY 4 HOURS PRN
Qty: 18 G | Refills: 0 | Status: SHIPPED | OUTPATIENT
Start: 2022-12-08 | End: 2023-01-07

## 2022-12-08 NOTE — TELEPHONE ENCOUNTER
Caller: Sandra Auguste    Relationship: Self    Best call back number:[phone]    What medication are you requesting: MEDICATION FOR THE SYMPTOMS LISTED    What are your current symptoms: RUNNY NOSE,SHORT OF BREATH,COUGH ,HEAD AND CHEST CONGESTION    How long have you been experiencing symptoms: FOR A WEEK    Have you had these symptoms before:    [x] Yes  [] No    Have you been treated for these symptoms before:   [x] Yes  [] No    If a prescription is needed, what is your preferred pharmacy and phone number: [facility] [id number] - [facility] [address] [phone] [facility] [phone]

## 2022-12-08 NOTE — PROGRESS NOTES
"    Office Note     Name: Sandra Auguste    : 1960     MRN: [id number]     Chief Complaint  Cough and Nasal Congestion    Subjective     History of Present Illness:  Sandra Auguste is a 62 y.o. female who presents today for evaluation of symptoms of cough, nasal congestion, sore throat.  Symptoms started about a week ago.  She denies any fevers but has had chills.  She has had some intermittent shortness of breath.  Patient does have a history of asthma and allergies as well as obstructive sleep apnea with PAP therapy.  She states that she usually gets this illness about once per year and a few rounds of antibiotics normally clear this up.  She denies any chest pain.  Denies any noted sick contacts.  She is starting to get a little bit of a sore throat.  She does have a significant history of strep.  Not had her tonsils removed    Review of Systems   Constitutional: Negative for chills, fatigue and fever.   HENT: Positive for congestion and sore throat.    Eyes: Negative for visual disturbance.   Respiratory: Positive for cough. Negative for shortness of breath.    Cardiovascular: Negative for chest pain.   Gastrointestinal: Negative for abdominal pain.   Skin: Negative for color change.   Allergic/Immunologic: Negative for immunocompromised state.   Neurological: Negative for headaches.   Psychiatric/Behavioral: Negative for behavioral problems.       Past Medical History:   Diagnosis Date   • Anesthesia complication     HAS TROUBLE GETTING \"NUMB\"    • Anxiety    • Arthritis    • Asthma    • Bladder spasms    • Depression    • Diverticulosis    • GERD (gastroesophageal reflux disease)    • Memory loss     FROM SKULL FRACTURE AND FALL-SHORT TERM MEMORY LOSS   • Mental disorder     PTSD   • Migraine    • Obesity    • Seasonal allergies    • Seizures (HCC)     \"convulsions\" at age 5   • Skull fracture (HCC)    • Sleep apnea with use of continuous positive airway pressure (CPAP)     INSTRUCTED TO BRING OWN " MASK AND TUBING    • SOB (shortness of breath) on exertion    • Tendonitis    • Type 2 diabetes mellitus without complication, without long-term current use of insulin ([facility]) 10/11/2018   • Wears contact lenses    • Wears partial dentures     TOP ONLY    • Wears prescription eyeglasses    • Wears prescription eyeglasses        Past Surgical History:   Procedure Laterality Date   • CERVICAL POLYPECTOMY     • TOTAL LAPAROSCOPIC HYSTERECTOMY N/A 9/18/2017    Procedure: TOTAL LAPAROSCOPIC HYSTERECTOMY, BILATERAL SALPINGO OOPHORECTOMY WITH DAVINCI ROBOT ;  Surgeon: Shannon Grimaldo DO;  Location: [facility];  Service:    • WISDOM TOOTH EXTRACTION         Social History     Socioeconomic History   • Marital status: Single   Tobacco Use   • Smoking status: Never   • Smokeless tobacco: Never   Vaping Use   • Vaping Use: Never used   Substance and Sexual Activity   • Alcohol use: Yes     Comment: Patient reports occasoinal wine use to help relax her and fall asleep, not daily use   • Drug use: Never   • Sexual activity: Not Currently     Birth control/protection: Post-menopausal, Surgical         Current Outpatient Medications:   •  acetaminophen (TYLENOL) 500 MG tablet, Take 500 mg by mouth Every 6 (Six) Hours As Needed for Mild Pain ., Disp: , Rfl:   •  Biotin 1 MG capsule, Take  by mouth., Disp: , Rfl:   •  Blood Glucose Monitoring Suppl (OneTouch Verio) w/Device kit, 1 kit Daily., Disp: 1 kit, Rfl: 0  •  Brexpiprazole (Rexulti) 0.5 MG tablet, Take 0.5 mg by mouth Daily., Disp: 30 tablet, Rfl: 0  •  celecoxib (CeleBREX) 50 MG capsule, , Disp: , Rfl:   •  Cranberry 1000 MG capsule, Take  by mouth., Disp: , Rfl:   •  cyclobenzaprine (FLEXERIL) 5 MG tablet, Take 1 tablet by mouth 2 (Two) Times a Day As Needed for Muscle Spasms., Disp: 20 tablet, Rfl: 0  •  Diclofenac Sodium (VOLTAREN) 1 % gel gel, APPLY 4 GRAMS TOPICALLY TO THE APPROPRIATE AREA AS DIRECTED FOUR TIMES A DAYS AS NEEDED FOR PAIN IN SHOULDERS, Disp: 100 g, Rfl: 1  •   EPINEPHrine (EPIPEN) 0.3 MG/0.3ML solution auto-injector injection, Inject 0.3 mL into the appropriate muscle as directed by prescriber As Needed (FOR SEVERE ALLERGIC REACTION)., Disp: 1 each, Rfl: 1  •  famotidine (PEPCID) 40 MG tablet, TAKE ONE TABLET BY MOUTH DAILY, Disp: 90 tablet, Rfl: 0  •  Fluticasone Furoate-Vilanterol (Breo Ellipta) 200-25 MCG/ACT inhaler, Inhale 1 puff Daily., Disp: 60 each, Rfl: 3  •  glucose blood (OneTouch Verio) test strip, Use one strip to check blood sugars one time daily, Disp: 100 each, Rfl: 3  •  glucose monitor monitoring kit, Use as directed to check blood glucose once daily., Disp: 1 each, Rfl: 0  •  guaiFENesin-codeine (GUAIFENESIN AC) 100-10 MG/5ML liquid, Take 5 mL by mouth 3 (Three) Times a Day As Needed for Cough., Disp: 118 mL, Rfl: 0  •  hydrOXYzine (ATARAX) 25 MG tablet, Take 1 tablet by mouth Every 8 (Eight) Hours As Needed (anxiety and/or sleep)., Disp: 90 tablet, Rfl: 0  •  Insulin Pen Needle (Pen Needles) 32G X 6 MM misc, 1 dose 1 (One) Time Per Week., Disp: 100 each, Rfl: 3  •  Lancets (onetouch ultrasoft) lancets, Use one lancet daily to check blood sugars, Disp: 100 each, Rfl: 3  •  lidocaine (LIDODERM) 5 %, Place 1 patch on the skin as directed by provider Daily. Remove & Discard patch within 12 hours or as directed by MD, Disp: 6 patch, Rfl: 0  •  loratadine (CLARITIN) 10 MG tablet, Take 1 tablet by mouth Daily., Disp: 30 tablet, Rfl: 5  •  metFORMIN ER (GLUCOPHAGE-XR) 500 MG 24 hr tablet, TAKE TWO TABLETS BY MOUTH DAILY WITH BREAKFAST, Disp: 180 tablet, Rfl: 0  •  methocarbamol (ROBAXIN) 500 MG tablet, Take 500 mg by mouth., Disp: , Rfl:   •  multivitamin with minerals tablet tablet, Take 1 tablet by mouth Daily., Disp: , Rfl:   •  ondansetron (Zofran) 8 MG tablet, Take 1 tablet by mouth Every 8 (Eight) Hours As Needed for Nausea or Vomiting., Disp: 15 tablet, Rfl: 0  •  rosuvastatin (CRESTOR) 5 MG tablet, TAKE ONE TABLET BY MOUTH DAILY, Disp: 90 tablet, Rfl:  "0  •  Trulicity 1.5 MG/0.5ML solution pen-injector, INJECT 1/2 MILLILITER (1.5MG) UNDER THE SKIN INTO THE APPROPRIATE AREA ONCE WEEKLY, Disp: 6 mL, Rfl: 0  •  vilazodone (Viibryd) 20 MG tablet tablet, Take 1 tablet by mouth Daily., Disp: 30 tablet, Rfl: 0  •  albuterol sulfate  (90 Base) MCG/ACT inhaler, Inhale 2 puffs Every 4 (Four) Hours As Needed for Wheezing for up to 30 days., Disp: 18 g, Rfl: 0  •  benzonatate (Tessalon Perles) 100 MG capsule, Take 1 capsule by mouth 3 (Three) Times a Day As Needed for Cough for up to 10 days., Disp: 30 capsule, Rfl: 0  •  doxycycline (VIBRAMYCIN) 100 MG capsule, Take 1 capsule by mouth 2 (Two) Times a Day for 7 days., Disp: 14 capsule, Rfl: 0  •  fluconazole (DIFLUCAN) 150 MG tablet, Take 1 tablet by mouth 1 (One) Time for 1 dose., Disp: 1 tablet, Rfl: 0  •  fluticasone (FLONASE) 50 MCG/ACT nasal spray, 2 sprays into the nostril(s) as directed by provider Daily for 30 days., Disp: 16 g, Rfl: 5  •  predniSONE (DELTASONE) 10 MG tablet, Take 1 tablet by mouth Daily for 4 days., Disp: 4 tablet, Rfl: 0    Objective     Vital Signs  /76   Pulse 78   Temp 97.4 °F (36.3 °C)   Resp 16   Ht 166.4 cm (65.5\")   Wt 114 kg (252 lb)   SpO2 97%   BMI 41.30 kg/m²   Estimated body mass index is 41.3 kg/m² as calculated from the following:    Height as of this encounter: 166.4 cm (65.5\").    Weight as of this encounter: 114 kg (252 lb).    Class 3 Severe Obesity (BMI >=40). Obesity-related health conditions include the following: at next visit. Obesity is at next visit. BMI is at next visit. We discussed portion control, increasing exercise and at next visit.           Physical Exam  Vitals and nursing note reviewed.   Constitutional:       General: She is awake.      Appearance: Normal appearance.   HENT:      Head: Normocephalic and atraumatic.      Right Ear: Hearing, tympanic membrane, ear canal and external ear normal.      Left Ear: Hearing, tympanic membrane, ear " canal and external ear normal.      Nose: Congestion present.      Mouth/Throat:      Lips: Pink.      Mouth: Mucous membranes are moist.      Tongue: No lesions.      Pharynx: Oropharynx is clear.      Tonsils: No tonsillar exudate.   Eyes:      Extraocular Movements: Extraocular movements intact.      Pupils: Pupils are equal, round, and reactive to light.   Cardiovascular:      Rate and Rhythm: Normal rate and regular rhythm.      Pulses: Normal pulses.      Heart sounds: Normal heart sounds.   Pulmonary:      Effort: Pulmonary effort is normal. No respiratory distress.      Breath sounds: Normal breath sounds. No decreased breath sounds.      Comments: Dry frequent cough on exam.  Lungs clear throughout  Musculoskeletal:         General: Normal range of motion.   Skin:     General: Skin is warm and dry.   Neurological:      Mental Status: She is alert and oriented to person, place, and time.   Psychiatric:         Mood and Affect: Mood normal.         Behavior: Behavior normal. Behavior is cooperative.          Lab Review:   Latest Reference Range & Units 12/08/22 17:02   Rapid Strep A Screen Negative, VALID, INVALID, Not Performed  Negative   Expiration Date  1/16/23   Lot Number  [id number]        Latest Reference Range & Units 12/08/22 17:01   SARS Antigen Not Detected, Presumptive Negative  Not Detected   Expiration Date  3/9/23   Lot Number  [id number]   Influenza A Antigen JONATHON Not Detected  Not Detected   Influenza B Antigen JONATHON Not Detected  Not Detected          Assessment and Plan     Diagnoses and all orders for this visit:    1. Acute cough (Primary)  -     POCT SARS-CoV-2 Antigen JONATHON + Flu  -     benzonatate (Tessalon Perles) 100 MG capsule; Take 1 capsule by mouth 3 (Three) Times a Day As Needed for Cough for up to 10 days.  Dispense: 30 capsule; Refill: 0  -     albuterol sulfate  (90 Base) MCG/ACT inhaler; Inhale 2 puffs Every 4 (Four) Hours As Needed for Wheezing for up to 30 days.  Dispense:  18 g; Refill: 0    2. Mild intermittent asthma without complication    3. Sleep apnea with use of continuous positive airway pressure (CPAP)    4. Bronchitis  -     doxycycline (VIBRAMYCIN) 100 MG capsule; Take 1 capsule by mouth 2 (Two) Times a Day for 7 days.  Dispense: 14 capsule; Refill: 0  -     predniSONE (DELTASONE) 10 MG tablet; Take 1 tablet by mouth Daily for 4 days.  Dispense: 4 tablet; Refill: 0    5. Vaginal yeast infection  -     fluconazole (DIFLUCAN) 150 MG tablet; Take 1 tablet by mouth 1 (One) Time for 1 dose.  Dispense: 1 tablet; Refill: 0    6. Sore throat  -     POCT rapid strep A  -     Beta Strep Culture, Throat - , Throat; Future  -     Beta Strep Culture, Throat - Swab, Throat    7. Type 2 diabetes mellitus without complication, without long-term current use of insulin (HCC)    plan  Discussed with patient that all results today are negative.  We will send out for throat culture and notify of results.  Medication was sent to pharmacy to assist with symptoms.  Antibiotics were also sent to pharmacy.  Patient does get yeast infections with antibiotics.  Continue coughing and deep breathing exercises.  Continue to stay well-hydrated.  Patient does have a history of diabetes.  Steroids were sent.  Discussed with patient she may have a transient increase in blood sugars  Go to ER if any condition worsens or severe  Keep upcoming appointment with Augustin as scheduled  Follow Up  Return for as scheduled with augustin in march.    REBECA Alfredo    Part of this note may be an electronic transcription/translation of spoken language to printed text using the Dragon Dictation System.

## 2022-12-10 LAB — BACTERIA SPEC AEROBE CULT: NORMAL

## 2022-12-14 DIAGNOSIS — R11.0 NAUSEA: ICD-10-CM

## 2022-12-14 RX ORDER — ONDANSETRON HYDROCHLORIDE 8 MG/1
8 TABLET, FILM COATED ORAL EVERY 8 HOURS PRN
Qty: 15 TABLET | Refills: 0 | Status: SHIPPED | OUTPATIENT
Start: 2022-12-14

## 2022-12-14 NOTE — TELEPHONE ENCOUNTER
Caller: Sandra Auguste    Relationship: Self    Best call back number: [phone]    Requested Prescriptions:   Requested Prescriptions     Pending Prescriptions Disp Refills   • ondansetron (Zofran) 8 MG tablet 15 tablet 0     Sig: Take 1 tablet by mouth Every 8 (Eight) Hours As Needed for Nausea or Vomiting.        Pharmacy where request should be sent: [facility] [id number] [address] [phone] [facility] [phone] [facility]     Additional details provided by patient: COMPLETELY OUT OF MEDICATION AND HELPS WITH HER UPSET STOMACH      Does the patient have less than a 3 day supply:  [x] Yes  [] No    Would you like a call back once the refill request has been completed: [x] Yes [] No    If the office needs to give you a call back, can they leave a voicemail: [x] Yes [] No    Silviano Prado Rep   12/14/22 11:05 EST

## 2022-12-18 NOTE — PROGRESS NOTES
Date: May 26, 2022  Time In: 1:59 pm   Time Out: 2:29 pm   This provider is located at the [facility]), [address], [place] [address] using a secure [employer]BIhart Video Visit through [employer]. Patient is being seen remotely via telehealth at home address in [place] and stated they are in a secure environment for this session. The patient's condition being diagnosed/treated is appropriate for telemedicine. The provider identified herself as well as her credentials. The patient, and/or patients guardian, consent to be seen remotely, and when consent is given they understand that the consent allows for patient identifiable information to be sent to a third party as needed. They may refuse to be seen remotely at any time. The electronic data is encrypted and password protected, and the patient and/or guardian has been advised of the potential risks to privacy not withstanding such measures.     You have chosen to receive care through a telehealth visit.  Do you consent to use a video/audio connection for your medical care today? Yes    PROGRESS NOTE  Data:  Sandra Auguste is a 61 y.o. female who presents today for follow up. Patient discussed that she presently has some health concerns. Patient noted that she has had follow up appointment with a doctor and that she will have to go to PT. Patient reported that she continues to have conflict with her sister. Patient discussed how physical ailments impact her physical health. Patient discussed that she has sleep issues and only sleeps 4-5 hours per night but noted that she has been sleeping more during the day. Patient noted that her mom does not talk well with her. Patient noted that her sister assists with caring for her mom at night.  Patients anxiety is reported as still being present. Patient discussed that she has gotten into an argument with her sister regarding finances. Patients phone cut off during  session and connection was not re-established.     Clinical Maneuvering/Intervention: CBT    (Scales based on 0 - 10 with 10 being the worst)  Depression:   Not scored Anxiety:   Not scored       Assisted patient in processing above session content; acknowledged and normalized patient’s thoughts, feelings, and concerns.  Rationalized patient thought process regarding not feeling like herself. Patient stated that she feels that she has lot things about her own personality and interest that she is unable to explore. Patient discussed on-going family concerns and expressed feeling that her family is jealous of her.   Discussed triggers associated with patient's feelings of self worth and relationship with her siblings.  Also discussed coping skills for patient to implement such as talking with her family regarding her feelings.     Allowed patient to freely discuss issues without interruption or judgment. Provided safe, confidential environment to facilitate the development of positive therapeutic relationship and encourage open, honest communication. Patient was encouraged to talk about how she feels about her self. Willingness for patient to  address her concerns regarding relationships with members of her family to process her concerns.      Assessment:   Assessment   Patient appears to maintain relative stability as compared to their baseline.  However, patient continues to struggle with anxiety which continues to cause impairment in important areas of functioning.  A result, they can be reasonably expected to continue to benefit from treatment and would likely be at increased risk for decompensation otherwise.    Mental Status Exam:   Hygiene:   good  Cooperation:  Cooperative  Eye Contact:  Good  Psychomotor Behavior:  Appropriate  Affect:  Full range  Mood: normal  Speech:  Normal  Thought Process:  Goal directed  Thought Content:  Normal  Suicidal:  None  Homicidal:  None  Hallucinations:  None  Delusion:   None  Memory:  Intact  Orientation:  Person, Place, Time and Situation  Reliability:  fair  Insight:  Fair  Judgement:  Fair  Impulse Control:  Fair  Physical/Medical Issues:  Patient discussed in session having on-going health concerns       Patient's Support Network Includes:  daughter    Functional Status: Moderate impairment     Progress toward goal: Not at goal    Prognosis: Fair with Ongoing Treatment          Plan:  Patient will continue in individual outpatient therapy with focus on improved functioning and coping skills, maintaining stability, and avoiding decompensation and the need for higher level of care.    Patient will adhere to medication regimen as prescribed and report any side effects. Patient will contact this office, call 911 or present to the nearest emergency room should suicidal or homicidal ideations occur. Provide Cognitive Behavioral Therapy, Solution Focused Therapy and other therapeutic techniques as needed to improve functioning, maintain stability, and avoid decompensation and the need for higher level of care.     Return in about 3-4 weeks, or earlier if symptoms worsen or fail to improve. Therapist and patient was in the mist of scheduling when patients phone went out.            VISIT DIAGNOSIS:     ICD-10-CM ICD-9-CM   1. Anxiety disorder, unspecified type  F41.9 300.00             This document has been electronically signed by ASIA Brown  May 26, 2022 16:04 EDT      Part of this note may be an electronic transcription/translation of spoken language to printed text using the Dragon Dictation System.   [facility]

## 2022-12-28 ENCOUNTER — TELEMEDICINE (OUTPATIENT)
Dept: PSYCHIATRY | Facility: CLINIC | Age: 62
End: 2022-12-28

## 2022-12-28 DIAGNOSIS — F33.1 MAJOR DEPRESSIVE DISORDER, RECURRENT EPISODE, MODERATE: Primary | Chronic | ICD-10-CM

## 2022-12-28 DIAGNOSIS — G47.9 SLEEPING DIFFICULTIES: ICD-10-CM

## 2022-12-28 DIAGNOSIS — F41.9 ANXIETY DISORDER, UNSPECIFIED TYPE: Chronic | ICD-10-CM

## 2022-12-28 PROCEDURE — 90833 PSYTX W PT W E/M 30 MIN: CPT | Performed by: NURSE PRACTITIONER

## 2022-12-28 PROCEDURE — 99214 OFFICE O/P EST MOD 30 MIN: CPT | Performed by: NURSE PRACTITIONER

## 2022-12-28 RX ORDER — VILAZODONE HYDROCHLORIDE 20 MG/1
20 TABLET ORAL DAILY
Qty: 30 TABLET | Refills: 1 | Status: SHIPPED | OUTPATIENT
Start: 2022-12-28 | End: 2023-03-20 | Stop reason: SDUPTHER

## 2022-12-28 RX ORDER — BREXPIPRAZOLE 0.5 MG/1
1 TABLET ORAL DAILY
Qty: 30 TABLET | Refills: 1 | Status: SHIPPED | OUTPATIENT
Start: 2022-12-28 | End: 2023-03-07

## 2022-12-28 RX ORDER — HYDROXYZINE HYDROCHLORIDE 25 MG/1
25 TABLET, FILM COATED ORAL EVERY 8 HOURS PRN
Qty: 90 TABLET | Refills: 1 | Status: SHIPPED | OUTPATIENT
Start: 2022-12-28 | End: 2023-03-20 | Stop reason: SDUPTHER

## 2022-12-28 RX ORDER — AMITRIPTYLINE HYDROCHLORIDE 25 MG/1
25 TABLET, FILM COATED ORAL NIGHTLY
COMMUNITY
Start: 2022-12-21 | End: 2023-12-21

## 2022-12-28 NOTE — PROGRESS NOTES
This provider is located at the [facility] (through [facility]), [address], [facility], [address] using a secure [employer] Video Visit through [employer]. Patient is being seen remotely via telehealth at their home address in [place], and stated they are in a secure environment for this session. The patient's condition being diagnosed/treated is appropriate for telemedicine. The provider identified herself as well as her credentials.   The patient, and/or patients guardian, consent to be seen remotely, and when consent is given they understand that the consent allows for patient identifiable information to be sent to a third party as needed.   They may refuse to be seen remotely at any time. The electronic data is encrypted and password protected, and the patient and/or guardian has been advised of the potential risks to privacy not withstanding such measures.    Patient identifiers utilized: Name and date of birth.    Patient verbally confirmed consent for today's encounter 12/28/2022.    You have chosen to receive care through a telehealth visit.  Do you consent to use a video/audio connection for your medical care today? Yes    Subjective   Sandra Auguste is a 62 y.o. female who presents today for follow up    Chief Complaint: Medication management follow-up - Depression, anxiety, and history of sleeping difficulties follow-up    Accompanied by: The patient is interviewed alone at today's encounter    History of Present Illness:   The patient describes her mood as overall stable since her last encounter with this APRN.  The patient states she recently found out that her older sister was diagnosed with alzheimer's and will be placed in a special home for this soon, and this has been hard on her.  She reports feeling closer to this older sister as her older sister helped take care of her when she was younger.  The patient reports situational stressors in her life including  "disagreements amongst herself and her siblings regarding her mother's care.  The patient reports her mother's hospice provider has recently added a once monthly \"haldol injection\" to the mother's treatment regimen to help with behaviors, and her sister does not agree with this.  The patient rates her symptoms of depression at a 3-4/10 on a 0-10 scale, with 10 being the worst.  The patient rates her symptoms of anxiety at a 7-8/10 on a 0-10 scale, with 10 being the worst.  The patient reports when she finds herself thinking in depressed or anxious thoughts, and feeling stressed, diverting her thoughts elsewhere and more positively, which has been helpful.  This APRN and the patient have discussed relaxation techniques, and the patient report she recently purchased a facial steamer, and she reports having a facial steamer as a part of her pre-bedtime regimen has helped with relaxation and sleep some.  The patient reports her appetite as good.  The patient reports her sleep as improving overall.  The patient reports they increased the settings on her C-PAP recently.  She reports Dr. Hill, neurology at , has started her on amitriptyline at bedtime and will be re-testing her with another sleep study soon.  The patient denies any other new medical problems or changes in medications since last appointment with this facility.  The patient reports compliance with her current medication regimen.  The patient denies any side effects, symptoms of EPS or TD, or concerns from her current medication regimen.   The patient reports she would like to not adjust or change her medications at this visit as she feels she is doing better and stable overall.  The patient denies any suicidal or homicidal ideations, plans, or intent at today's encounter and is convincing.  The patient denies any auditory hallucinations or visual hallucinations.  The patient does not endorse any significant symptoms consistent with carey or psychosis at " "today's encounter.      Prior Psychiatric Medications:  -Xanax 0.25 mg by mouth once daily as needed for anxiety - last prescribed 9/24/21  -Paxil  -Wellbutrin  -Zoloft  -Prozac  -Trazodone  -Buspirone -nauseated stomach  -Possibly Lexapro, but she is not sure  -Possibly Effexor, but she is not sure  -Possible medicine to assist with sleep  -Viibryd  -Hydroxyzine  -Possibly Abilify  -Other possible antipsychotics the patient cannot remember the name of them at this time  -Rexulti  -Amitriptyline      Last Menstrual Period:  Hysterectomy.        The following portions of the patient's history were reviewed and updated as appropriate: allergies, current medications, past family history, past medical history, past social history, past surgical history and problem list.          Past Medical History:  Past Medical History:   Diagnosis Date   • Anesthesia complication     HAS TROUBLE GETTING \"NUMB\"    • Anxiety    • Arthritis    • Asthma    • Bladder spasms    • Depression    • Diverticulosis    • GERD (gastroesophageal reflux disease)    • Memory loss     FROM SKULL FRACTURE AND FALL-SHORT TERM MEMORY LOSS   • Mental disorder     PTSD   • Migraine    • Obesity    • Seasonal allergies    • Seizures ([facility])     \"convulsions\" at age 5   • Skull fracture ([facility]) 2012   • Sleep apnea with use of continuous positive airway pressure (CPAP)     INSTRUCTED TO BRING OWN MASK AND TUBING    • SOB (shortness of breath) on exertion    • Tendonitis    • Type 2 diabetes mellitus without complication, without long-term current use of insulin ([facility]) 10/11/2018   • Wears contact lenses    • Wears partial dentures     TOP ONLY    • Wears prescription eyeglasses    • Wears prescription eyeglasses        Social History:  Social History     Socioeconomic History   • Marital status: Single   Tobacco Use   • Smoking status: Never   • Smokeless tobacco: Never   Vaping Use   • Vaping Use: Never used   Substance and Sexual Activity   • Alcohol use: Yes "     Comment: Patient reports occasoinal wine use to help relax her and fall asleep, not daily use   • Drug use: Never   • Sexual activity: Not Currently     Birth control/protection: Post-menopausal, Surgical       Family History:  Family History   Problem Relation Age of Onset   • Cancer Mother         cervical cancer   • Dementia Mother    • Cancer Maternal Aunt         lymphoma   • Cancer Paternal Aunt    • Breast cancer Paternal Aunt 70   • Alcohol abuse Other    • Depression Other        Past Surgical History:  Past Surgical History:   Procedure Laterality Date   • CERVICAL POLYPECTOMY     • TOTAL LAPAROSCOPIC HYSTERECTOMY N/A 9/18/2017    Procedure: TOTAL LAPAROSCOPIC HYSTERECTOMY, BILATERAL SALPINGO OOPHORECTOMY WITH DAVINCI ROBOT ;  Surgeon: Shannon Grimaldo DO;  Location: [facility] OR;  Service:    • WISDOM TOOTH EXTRACTION         Problem List:  Patient Active Problem List   Diagnosis   • Abnormal computed tomography scan   • Anemia   • Post traumatic stress disorder (PTSD)   • Anxiety   • Strain of neck muscle   • Cough   • Diverticulitis of colon   • Fatigue   • Steatosis of liver   • Lateral epicondylitis   • Knee pain   • Spasm   • Adiposity   • Obstructive sleep apnea syndrome   • Osteoarthritis   • Palpitations   • Nausea   • Shortness of breath   • Skin tag   • Candidiasis of vagina   • Viral upper respiratory tract infection   • Gastroesophageal reflux disease   • Acute pain of left shoulder   • Abdominal distension (gaseous)   • SOB (shortness of breath) on exertion   • Sleep apnea with use of continuous positive airway pressure (CPAP)   • Seasonal allergies   • Obesity   • Migraine   • Mental disorder   • Diverticulosis   • Depression   • Arthritis   • Hypertension   • Itching   • Stabbing headache   • Temple tenderness   • TMJ click   • Mild intermittent asthma without complication   • Skull fracture (HCC)   • Type 2 diabetes mellitus without complication, without long-term current use of insulin  ([facility])   • Health care maintenance   • Moderate episode of recurrent major depressive disorder ([facility])   • Generalized anxiety disorder   • High risk medications (not anticoagulants) long-term use   • Hyperlipidemia       Allergy:   Allergies   Allergen Reactions   • Mold Extract [Trichophyton] Shortness Of Breath, Anxiety, Irritability, Other (See Comments) and Palpitations     SOA   • Lortab [Hydrocodone-Acetaminophen] Nausea And Vomiting     SEVERE NAUSEA AND SLIGHT VOMITING   • Methylprednisolone Other (See Comments)     Bleeding, cramping   • Pollen Extract Other (See Comments)     SNEEZING AND CONGESTION         Current Medications:   Current Outpatient Medications   Medication Sig Dispense Refill   • amitriptyline (ELAVIL) 25 MG tablet Take 25 mg by mouth.     • Brexpiprazole (Rexulti) 0.5 MG tablet Take 0.5 mg by mouth Daily. 30 tablet 1   • hydrOXYzine (ATARAX) 25 MG tablet Take 1 tablet by mouth Every 8 (Eight) Hours As Needed for Anxiety. 90 tablet 1   • vilazodone (Viibryd) 20 MG tablet tablet Take 1 tablet by mouth Daily. 30 tablet 1   • acetaminophen (TYLENOL) 500 MG tablet Take 500 mg by mouth Every 6 (Six) Hours As Needed for Mild Pain .     • albuterol sulfate  (90 Base) MCG/ACT inhaler Inhale 2 puffs Every 4 (Four) Hours As Needed for Wheezing for up to 30 days. 18 g 0   • Biotin 1 MG capsule Take  by mouth.     • Blood Glucose Monitoring Suppl (OneTouch Verio) w/Device kit 1 kit Daily. 1 kit 0   • celecoxib (CeleBREX) 50 MG capsule      • Cranberry 1000 MG capsule Take  by mouth.     • cyclobenzaprine (FLEXERIL) 5 MG tablet Take 1 tablet by mouth 2 (Two) Times a Day As Needed for Muscle Spasms. 20 tablet 0   • Diclofenac Sodium (VOLTAREN) 1 % gel gel APPLY 4 GRAMS TOPICALLY TO THE APPROPRIATE AREA AS DIRECTED FOUR TIMES A DAYS AS NEEDED FOR PAIN IN SHOULDERS 100 g 1   • EPINEPHrine (EPIPEN) 0.3 MG/0.3ML solution auto-injector injection Inject 0.3 mL into the appropriate muscle as directed by  prescriber As Needed (FOR SEVERE ALLERGIC REACTION). 1 each 1   • famotidine (PEPCID) 40 MG tablet TAKE ONE TABLET BY MOUTH DAILY 90 tablet 0   • fluticasone (FLONASE) 50 MCG/ACT nasal spray 2 sprays into the nostril(s) as directed by provider Daily for 30 days. 16 g 5   • Fluticasone Furoate-Vilanterol (Breo Ellipta) 200-25 MCG/ACT inhaler Inhale 1 puff Daily. 60 each 3   • glucose blood (OneTouch Verio) test strip Use one strip to check blood sugars one time daily 100 each 3   • glucose monitor monitoring kit Use as directed to check blood glucose once daily. 1 each 0   • guaiFENesin-codeine (GUAIFENESIN AC) 100-10 MG/5ML liquid Take 5 mL by mouth 3 (Three) Times a Day As Needed for Cough. 118 mL 0   • Insulin Pen Needle (Pen Needles) 32G X 6 MM misc 1 dose 1 (One) Time Per Week. 100 each 3   • Lancets (onetouch ultrasoft) lancets Use one lancet daily to check blood sugars 100 each 3   • lidocaine (LIDODERM) 5 % Place 1 patch on the skin as directed by provider Daily. Remove & Discard patch within 12 hours or as directed by MD 6 patch 0   • loratadine (CLARITIN) 10 MG tablet Take 1 tablet by mouth Daily. 30 tablet 5   • metFORMIN ER (GLUCOPHAGE-XR) 500 MG 24 hr tablet TAKE TWO TABLETS BY MOUTH DAILY WITH BREAKFAST 180 tablet 0   • methocarbamol (ROBAXIN) 500 MG tablet Take 500 mg by mouth.     • multivitamin with minerals tablet tablet Take 1 tablet by mouth Daily.     • ondansetron (Zofran) 8 MG tablet Take 1 tablet by mouth Every 8 (Eight) Hours As Needed for Nausea or Vomiting. 15 tablet 0   • rosuvastatin (CRESTOR) 5 MG tablet TAKE ONE TABLET BY MOUTH DAILY 90 tablet 0   • Trulicity 1.5 MG/0.5ML solution pen-injector INJECT 1/2 MILLILITER (1.5MG) UNDER THE SKIN INTO THE APPROPRIATE AREA ONCE WEEKLY 6 mL 0     No current facility-administered medications for this visit.       Review of Symptoms:    Review of Systems   Neurological: Positive for memory problem (chronic).   Psychiatric/Behavioral: Positive for  decreased concentration, sleep disturbance (improved some with medication and c-pap), depressed mood and stress. Negative for agitation, behavioral problems, dysphoric mood, hallucinations, self-injury, suicidal ideas and negative for hyperactivity. The patient is nervous/anxious.          Physical Exam:   not currently breastfeeding. There is no height or weight on file to calculate BMI.   Due to the remote nature of this encounter (virtual encounter), vitals were unable to be obtained.  Height stated at 65.5 inches.  Weight stated at around 248 pounds.      Physical Exam  Neurological:      Mental Status: She is alert and oriented to person, place, and time.   Psychiatric:         Attention and Perception: Attention normal.         Mood and Affect: Affect normal. Mood is anxious and depressed.         Speech: Speech normal.         Behavior: Behavior normal. Behavior is cooperative.         Thought Content: Thought content normal. Thought content is not paranoid or delusional. Thought content does not include homicidal or suicidal ideation. Thought content does not include homicidal or suicidal plan.         Judgment: Judgment normal.           Mental Status Exam:   Hygiene:   good  Cooperation:  Cooperative  Eye Contact:  Good  Psychomotor Behavior:  Appropriate  Affect:  Appropriate  Mood: depressed and anxious  Hopelessness: Denies  Speech:  Normal  Thought Process:  Linear  Thought Content:  Mood congruent  Suicidal:  None  Homicidal:  None  Hallucinations:  None  Delusion:  None  Memory:  Intact  Orientation:  Person, Place and Time  Reliability:  good  Insight:  Good  Judgement:  Good  Impulse Control:  Good  Physical/Medical Issues:  Chronic, no acute physical/medical issues at today's encounter         Lab Results:   Office Visit on 12/08/2022   Component Date Value Ref Range Status   • SARS Antigen 12/08/2022 Not Detected  Not Detected, Presumptive Negative Final   • Influenza A Antigen JONATHON 12/08/2022 Not  Detected  Not Detected Final   • Influenza B Antigen JONATHON 12/08/2022 Not Detected  Not Detected Final   • Internal Control 12/08/2022 Passed  Passed Final   • Lot Number 12/08/2022 [id number]   Final   • Expiration Date 12/08/2022 3/9/23   Final   • Rapid Strep A Screen 12/08/2022 Negative  Negative, VALID, INVALID, Not Performed Final   • Internal Control 12/08/2022 Passed  Passed Final   • Lot Number 12/08/2022 [id number]   Final   • Expiration Date 12/08/2022 1/16/23   Final   • Throat Culture, Beta Strep 12/08/2022 No Beta Hemolytic Streptococcus Isolated   Final   Office Visit on 12/01/2022   Component Date Value Ref Range Status   • Glucose 12/01/2022 90  70 - 130 mg/dL Final   • Hemoglobin A1C 12/01/2022 6.3  % Final   • Lot Number 12/01/2022 [id number]   Final   • Expiration Date 12/01/2022 09/14/2024   Final         Assessment & Plan   Problems Addressed this Visit    None  Visit Diagnoses     Major depressive disorder, recurrent episode, moderate (HCC)  (Chronic)   -  Primary    Relevant Medications    amitriptyline (ELAVIL) 25 MG tablet    Brexpiprazole (Rexulti) 0.5 MG tablet    hydrOXYzine (ATARAX) 25 MG tablet    vilazodone (Viibryd) 20 MG tablet tablet    Anxiety disorder, unspecified type  (Chronic)       Relevant Medications    amitriptyline (ELAVIL) 25 MG tablet    Brexpiprazole (Rexulti) 0.5 MG tablet    hydrOXYzine (ATARAX) 25 MG tablet    vilazodone (Viibryd) 20 MG tablet tablet    Sleeping difficulties          Diagnoses       Codes Comments    Major depressive disorder, recurrent episode, moderate (HCC)    -  Primary ICD-10-CM: F33.1  ICD-9-CM: 296.32     Anxiety disorder, unspecified type     ICD-10-CM: F41.9  ICD-9-CM: 300.00     Sleeping difficulties     ICD-10-CM: G47.9  ICD-9-CM: 780.50           Visit Diagnoses:    ICD-10-CM ICD-9-CM   1. Major depressive disorder, recurrent episode, moderate (HCC)  F33.1 296.32   2. Anxiety disorder, unspecified type  F41.9 300.00   3. Sleeping difficulties   G47.9 780.50          GOALS:  Short Term Goals: Patient will be compliant with medication, and patient will have no significant medication related side effects.  Patient will be engaged in psychotherapy as indicated.  Patient will report subjective improvement of symptoms.  Long term goals: To stabilize mood and treat/improve subjective symptoms, the patient will stay out of the hospital, the patient will be at an optimal level of functioning, and the patient will take all medications as prescribed.  The patient verbalized understanding and agreement with goals that were mutually set.      TREATMENT PLAN: Continue supportive psychotherapy efforts and medications as indicated.  Medication and treatment options, both pharmacological and non-pharmacological treatment options, discussed during today's visit, including any off label use of medication. Patient acknowledged and verbally consented with current treatment plan and was educated on the importance of compliance with treatment and follow-up appointments.      -Continue Viibryd at 20 mg by mouth once daily for mood.  -Continue hydroxyzine 25 mg by mouth up to three times daily as needed for anxiety.  -Continue Rexulti 0.5 mg by mouth once daily as an adjunct for depression.  -The patient is prescribed amitriptyline 25 mg by mouth once nightly for sleep by [facility].    In/Start Time: 1:50 PM.  Out/Stop Time: 2:10 PM.  20 minutes of face to face direct patient care with patient was spent for supportive psychotherapy including promoting the therapeutic alliance, strengthening self awareness and insights, strengthening coping skills, discussing relaxation techniques, counseling the patient regarding diagnoses, utilizing cognitive behavioral therapy to assist the patient in recognizing more appropriate coping mechanisms which are proven effective in reducing the severity of frequency of symptoms and and in coordination of care.  This APRN assisted the patient in  processing the patient's diagnoses including depression and anxiety, and also acknowledged and normalized the patient's thoughts, feelings, and concerns  The patient is also strongly urged to eat healthy well balanced foods in order to reduce further health risks, and exercise as tolerated and in guidance with the patient's PCP's recommendations. This APRN allowed the patient to freely discuss issues without interruption or judgment.  This APRN answered any questions the patient had regarding medication and treatment plan.      MEDICATION ISSUES:  Discussed medication options and treatment plan of prescribed medication, any off label use of medication, as well as the risks, benefits, any black box warnings including increased suicidality, and side effects including but not limited to potential falls, dizziness, possible impaired driving, GI side effects (change in appetite, abdominal discomfort, nausea, vomiting, diarrhea, and/or constipation), dry mouth, somnolence, sedation, insomnia, activation, agitation, irritation, tremors, abnormal muscle movements or disorders, tardive dyskinesia, akathisia, asthenia, headache, sweating, possible bruising or rare bleeding, electrolyte and/or fluid abnormalities, change in blood pressure/heart rate/and or heart rhythm, hypotension, sexual dysfunction, rare impulse control problems, rare seizures, rare neuroleptic malignant syndrome, increased risk of death and cerebrovascular events, change in blood glucose and increased risk for diabetes, change in triglycerides and cholesterol and increased risk for dyslipidemia,  weight gain, weight gain that can become problematic to health, skin conditions and reactions, and metabolic adversities among others. Patient and/or guardian are agreeable to call the office with any worsening of symptoms or onset of side effects, or if any concerns or questions arise.  The contact information for the office is made available to the patient  and/or guardian. Patient and/or guardian are agreeable to call 911 or go to the nearest ER should they begin having any SI/HI, or if any urgent concerns arise.      Verbal Informed Consent for Medication:  The patient was educated that their proposed/prescribed psychotropic medication(s) has potential risks, side effects, adverse effects, and black box warnings; and these have been discussed with the patient.  The patient has been informed that their treatment and medication dosage is to be individualized, and may even be above or below the recommended range/dosage due to patient individualization and response, but medication is prescribed using a shared decision making approach, and no medication or dosage will be prescribed without the patient's verbal consent.  The reason for the use of the medication including any off label use and alternative modes of treatment other than or in addition to medication has been considered and discussed, the probable consequences of not receiving the proposed treatment have been discussed, and any treatment side effects, black box warnings, and cautions associated with treatment have been discussed with the patient.  The patient is allowed ample time to openly discuss and ask questions regarding the proposed medication(s) and treatment plan and the patient verbalizes understanding the reasons for the use of the medication, its potential risks and benefits, other alternative treatment(s), and the probable consequences that may occur if the proposed medication is not given.  The patient has been given ample time to ask questions and study the information and find the information to be specific, accurate, and complete.  The patient gives verbal consent for the medication(s) proposed/prescribed, they verbalized understanding that they can refuse and withdraw consent at any time with the assistance of this APRN, and the patient has verbally confirmed that they are aware, and are  willing, to take the prescribed medication and follow the treatment plan with the known possible risks, side effect, black box warnings, and any potential medication interactions, and the patient reports they will be worse off without this medication and treatment plan.  The patient is advised to contact this APRN/this office if any questions or concerns arise at any time (at [phone]), or call 911/go to the closest emergency department if needed or outside of office hours.      SUICIDE RISK ASSESSMENT AND SAFETY PLAN: Unalterable demographics and a history of mental health intervention indicate this patient is in a high risk category compared to the general population. At present, the patient denies active SI/HI, intentions, or plans at this time and agrees to seek immediate care should such thoughts develop. The patient verbalizes understanding of how to access emergency care if needed and agrees to do so. Consideration of suicide risk and protective factors such as history, current presentation, individual strengths and weaknesses, psychosocial and environmental stressors and variables, psychiatric illness and symptoms, medical conditions and pain, took place in this interview. Based on those considerations, the patient is determined: within individual baseline and presenting no imminent risk for suicide or homicide. Other recommendations: The patient does not meet the criteria for inpatient admission and is not a safety risk to self or others at today's visit. Inpatient treatment offers no significant advantages over outpatient treatment for this patient at today's visit.  The patient was given ample time for questions and fully participated in treatment planning.  The patient was encouraged to call the clinic with any questions or concerns.  The patient was informed of access to emergency care. If patient were to develop any significant symptomatology, suicidal ideation, homicidal ideation, any concerns, or  feel unsafe at any time they are to call the clinic and if unable to get immediate assistance should immediately call 911 or go to the nearest emergency room.  Patient contracted verbally for the following: If you are experiencing an emotional crisis or have thoughts of harming yourself or others, please go to your nearest local emergency room or call 911. Will continue to re-assess medication response and side effects frequently to establish efficacy and ensure safety. Risks, any black box warnings, side effects, off label usage, and benefits of medication and treatment discussed with patient, along with potential adverse side effects of current and/or newly prescribed medication, alternative treatment options, and OTC medications.  Patient verbalized understanding of potential risks, any off label use of medication, any black box warnings, and any side effects in their own words. The patient verbalized understanding and agreed to comply with the safety plan discussed in their own words.  Patient given the number to the office. Number also discussed of the 24- hour suicide hotline.           MEDS ORDERED DURING VISIT:  New Medications Ordered This Visit   Medications   • Brexpiprazole (Rexulti) 0.5 MG tablet     Sig: Take 0.5 mg by mouth Daily.     Dispense:  30 tablet     Refill:  1   • hydrOXYzine (ATARAX) 25 MG tablet     Sig: Take 1 tablet by mouth Every 8 (Eight) Hours As Needed for Anxiety.     Dispense:  90 tablet     Refill:  1   • vilazodone (Viibryd) 20 MG tablet tablet     Sig: Take 1 tablet by mouth Daily.     Dispense:  30 tablet     Refill:  1       Return in about 8 weeks (around 2/22/2023), or if symptoms worsen or fail to improve, for Next scheduled follow up and Recheck.         Functional Status: Moderate impairment     Prognosis: Fair with Ongoing Treatment             This document has been electronically signed by REBECA Bello  December 28, 2022 14:28 EST    Some of the data in  this electronic note has been brought forward from a previous encounter, any necessary changes have been made, it has been reviewed by this APRN, and it is accurate.    Please note that portions of this note were completed with a voice recognition program.

## 2022-12-29 ENCOUNTER — TELEPHONE (OUTPATIENT)
Dept: INTERNAL MEDICINE | Facility: CLINIC | Age: 62
End: 2022-12-29

## 2022-12-29 ENCOUNTER — OFFICE VISIT (OUTPATIENT)
Dept: INTERNAL MEDICINE | Facility: CLINIC | Age: 62
End: 2022-12-29

## 2022-12-29 ENCOUNTER — HOSPITAL ENCOUNTER (OUTPATIENT)
Dept: GENERAL RADIOLOGY | Facility: HOSPITAL | Age: 62
Discharge: HOME | End: 2022-12-29
Admitting: NURSE PRACTITIONER
Payer: MEDICAID

## 2022-12-29 VITALS
HEART RATE: 76 BPM | OXYGEN SATURATION: 98 % | WEIGHT: 249 LBS | DIASTOLIC BLOOD PRESSURE: 78 MMHG | SYSTOLIC BLOOD PRESSURE: 136 MMHG | BODY MASS INDEX: 40.02 KG/M2 | HEIGHT: 66 IN

## 2022-12-29 DIAGNOSIS — R10.9 RIGHT FLANK PAIN: ICD-10-CM

## 2022-12-29 DIAGNOSIS — R10.9 RIGHT SIDED ABDOMINAL PAIN: Primary | ICD-10-CM

## 2022-12-29 DIAGNOSIS — R10.9 RIGHT SIDED ABDOMINAL PAIN: ICD-10-CM

## 2022-12-29 DIAGNOSIS — R10.13 EPIGASTRIC PAIN: ICD-10-CM

## 2022-12-29 DIAGNOSIS — R35.0 URINARY FREQUENCY: ICD-10-CM

## 2022-12-29 LAB
BILIRUB BLD-MCNC: NEGATIVE MG/DL
CLARITY, POC: CLEAR
COLOR UR: YELLOW
EXPIRATION DATE: NORMAL
GLUCOSE UR STRIP-MCNC: NEGATIVE MG/DL
KETONES UR QL: NEGATIVE
LEUKOCYTE EST, POC: NEGATIVE
Lab: NORMAL
NITRITE UR-MCNC: NEGATIVE MG/ML
PH UR: 6 [PH] (ref 5–8)
PROT UR STRIP-MCNC: NEGATIVE MG/DL
RBC # UR STRIP: NEGATIVE /UL
SP GR UR: 1.02 (ref 1–1.03)
UROBILINOGEN UR QL: NORMAL

## 2022-12-29 PROCEDURE — 99214 OFFICE O/P EST MOD 30 MIN: CPT | Performed by: NURSE PRACTITIONER

## 2022-12-29 PROCEDURE — 87086 URINE CULTURE/COLONY COUNT: CPT | Performed by: NURSE PRACTITIONER

## 2022-12-29 PROCEDURE — 74018 RADEX ABDOMEN 1 VIEW: CPT

## 2022-12-29 NOTE — TELEPHONE ENCOUNTER
----- Message from REBECA Alfredo sent at 12/29/2022  5:18 PM EST -----  Please let patient know x-ray returned.  There is no particular or definite evidence of a possible kidney stone.  There is a moderate to large stool burden.  I would highly recommend that she consider starting on stool softeners or MiraLAX to assist with the clearance of the stool burden.

## 2022-12-29 NOTE — PROGRESS NOTES
"    Office Note     Name: Sandra Auguste    : 1960     MRN: [id number]     Chief Complaint  Back Pain and Abdominal Pain (Side pain)    Subjective     History of Present Illness:  Sandra Auguste is a 62 y.o. female who presents today for complaints of right-sided abdominal and back pain.  She did have a colonoscopy about 1 month ago and was sore from that but it did start to get better.  She also recently had bronchitis and is unsure if it could be related to the coughing.  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  These current symptoms have been going on about a few days but patient does feel they are worsening.  Yesterday morning when she stretched before she got out of bed she felt a very sharp pain.  She does have Robaxin but does not feel that this is helping.  She has felt more constipated than usual.  She has noticed some urinary frequency.  Denies any recent or trauma or injury to that area.  Denies any significant alcohol intake or Tylenol use. Denies that she has had her gallbladder removed. No increase in high fat foods  Review of Systems   Constitutional: Negative for chills, fatigue and fever.   HENT: Negative for sore throat.    Eyes: Negative for visual disturbance.   Respiratory: Negative for cough and shortness of breath.    Cardiovascular: Negative for chest pain.   Gastrointestinal: Positive for abdominal pain (Right flank pain).   Genitourinary: Positive for flank pain and frequency.   Skin: Negative for color change.   Allergic/Immunologic: Negative for immunocompromised state.   Neurological: Negative for headaches.   Psychiatric/Behavioral: Negative for behavioral problems.       Past Medical History:   Diagnosis Date   • Anesthesia complication     HAS TROUBLE GETTING " "\"NUMB\"    • Anxiety    • Arthritis    • Asthma    • Bladder spasms    • Depression    • Diverticulosis    • GERD (gastroesophageal reflux disease)    • Memory loss     FROM SKULL FRACTURE AND FALL-SHORT TERM MEMORY LOSS   • Mental disorder     PTSD   • Migraine    • Obesity    • Seasonal allergies    • Seizures ([facility]ly [facility])     \"convulsions\" at age 5   • Skull fracture (Formerly [facility]) 2012   • Sleep apnea with use of continuous positive airway pressure (CPAP)     INSTRUCTED TO BRING OWN MASK AND TUBING    • SOB (shortness of breath) on exertion    • Tendonitis    • Type 2 diabetes mellitus without complication, without long-term current use of insulin ([facility]) 10/11/2018   • Wears contact lenses    • Wears partial dentures     TOP ONLY    • Wears prescription eyeglasses    • Wears prescription eyeglasses        Past Surgical History:   Procedure Laterality Date   • CERVICAL POLYPECTOMY     • TOTAL LAPAROSCOPIC HYSTERECTOMY N/A 9/18/2017    Procedure: TOTAL LAPAROSCOPIC HYSTERECTOMY, BILATERAL SALPINGO OOPHORECTOMY WITH DAVINCI ROBOT ;  Surgeon: Shannon Grimaldo DO;  Location: [facility];  Service:    • WISDOM TOOTH EXTRACTION         Social History     Socioeconomic History   • Marital status: Single   Tobacco Use   • Smoking status: Never   • Smokeless tobacco: Never   Vaping Use   • Vaping Use: Never used   Substance and Sexual Activity   • Alcohol use: Yes     Comment: Patient reports occasoinal wine use to help relax her and fall asleep, not daily use   • Drug use: Never   • Sexual activity: Not Currently     Birth control/protection: Post-menopausal, Surgical         Current Outpatient Medications:   •  acetaminophen (TYLENOL) 500 MG tablet, Take 500 mg by mouth Every 6 (Six) Hours As Needed for Mild Pain ., Disp: , Rfl:   •  albuterol sulfate  (90 Base) MCG/ACT inhaler, Inhale 2 puffs Every 4 (Four) Hours As Needed for Wheezing for up to 30 days., Disp: 18 g, Rfl: 0  •  amitriptyline (ELAVIL) 25 MG tablet, Take 25 mg by " mouth., Disp: , Rfl:   •  Biotin 1 MG capsule, Take  by mouth., Disp: , Rfl:   •  Blood Glucose Monitoring Suppl (OneTouch Verio) w/Device kit, 1 kit Daily., Disp: 1 kit, Rfl: 0  •  Brexpiprazole (Rexulti) 0.5 MG tablet, Take 0.5 mg by mouth Daily., Disp: 30 tablet, Rfl: 1  •  celecoxib (CeleBREX) 50 MG capsule, , Disp: , Rfl:   •  Cranberry 1000 MG capsule, Take  by mouth., Disp: , Rfl:   •  cyclobenzaprine (FLEXERIL) 5 MG tablet, Take 1 tablet by mouth 2 (Two) Times a Day As Needed for Muscle Spasms., Disp: 20 tablet, Rfl: 0  •  Diclofenac Sodium (VOLTAREN) 1 % gel gel, APPLY 4 GRAMS TOPICALLY TO THE APPROPRIATE AREA AS DIRECTED FOUR TIMES A DAYS AS NEEDED FOR PAIN IN SHOULDERS, Disp: 100 g, Rfl: 1  •  EPINEPHrine (EPIPEN) 0.3 MG/0.3ML solution auto-injector injection, Inject 0.3 mL into the appropriate muscle as directed by prescriber As Needed (FOR SEVERE ALLERGIC REACTION)., Disp: 1 each, Rfl: 1  •  famotidine (PEPCID) 40 MG tablet, TAKE ONE TABLET BY MOUTH DAILY, Disp: 90 tablet, Rfl: 0  •  Fluticasone Furoate-Vilanterol (Breo Ellipta) 200-25 MCG/ACT inhaler, Inhale 1 puff Daily., Disp: 60 each, Rfl: 3  •  glucose blood (OneTouch Verio) test strip, Use one strip to check blood sugars one time daily, Disp: 100 each, Rfl: 3  •  glucose monitor monitoring kit, Use as directed to check blood glucose once daily., Disp: 1 each, Rfl: 0  •  guaiFENesin-codeine (GUAIFENESIN AC) 100-10 MG/5ML liquid, Take 5 mL by mouth 3 (Three) Times a Day As Needed for Cough., Disp: 118 mL, Rfl: 0  •  hydrOXYzine (ATARAX) 25 MG tablet, Take 1 tablet by mouth Every 8 (Eight) Hours As Needed for Anxiety., Disp: 90 tablet, Rfl: 1  •  Insulin Pen Needle (Pen Needles) 32G X 6 MM misc, 1 dose 1 (One) Time Per Week., Disp: 100 each, Rfl: 3  •  Lancets (onetouch ultrasoft) lancets, Use one lancet daily to check blood sugars, Disp: 100 each, Rfl: 3  •  lidocaine (LIDODERM) 5 %, Place 1 patch on the skin as directed by provider Daily. Remove &  "Discard patch within 12 hours or as directed by MD, Disp: 6 patch, Rfl: 0  •  loratadine (CLARITIN) 10 MG tablet, Take 1 tablet by mouth Daily., Disp: 30 tablet, Rfl: 5  •  metFORMIN ER (GLUCOPHAGE-XR) 500 MG 24 hr tablet, TAKE TWO TABLETS BY MOUTH DAILY WITH BREAKFAST, Disp: 180 tablet, Rfl: 0  •  methocarbamol (ROBAXIN) 500 MG tablet, Take 500 mg by mouth., Disp: , Rfl:   •  multivitamin with minerals tablet tablet, Take 1 tablet by mouth Daily., Disp: , Rfl:   •  ondansetron (Zofran) 8 MG tablet, Take 1 tablet by mouth Every 8 (Eight) Hours As Needed for Nausea or Vomiting., Disp: 15 tablet, Rfl: 0  •  rosuvastatin (CRESTOR) 5 MG tablet, TAKE ONE TABLET BY MOUTH DAILY, Disp: 90 tablet, Rfl: 0  •  Trulicity 1.5 MG/0.5ML solution pen-injector, INJECT 1/2 MILLILITER (1.5MG) UNDER THE SKIN INTO THE APPROPRIATE AREA ONCE WEEKLY, Disp: 6 mL, Rfl: 0  •  vilazodone (Viibryd) 20 MG tablet tablet, Take 1 tablet by mouth Daily., Disp: 30 tablet, Rfl: 1  •  fluticasone (FLONASE) 50 MCG/ACT nasal spray, 2 sprays into the nostril(s) as directed by provider Daily for 30 days., Disp: 16 g, Rfl: 5    Objective     Vital Signs  /78   Pulse 76   Ht 166.4 cm (65.5\")   Wt 113 kg (249 lb)   SpO2 98%   BMI 40.81 kg/m²   Estimated body mass index is 40.81 kg/m² as calculated from the following:    Height as of this encounter: 166.4 cm (65.5\").    Weight as of this encounter: 113 kg (249 lb).               Physical Exam  Vitals and nursing note reviewed.   Constitutional:       Appearance: Normal appearance.   HENT:      Head: Normocephalic and atraumatic.   Eyes:      Extraocular Movements: Extraocular movements intact.      Pupils: Pupils are equal, round, and reactive to light.   Cardiovascular:      Rate and Rhythm: Normal rate and regular rhythm.      Pulses: Normal pulses.      Heart sounds: Normal heart sounds.   Pulmonary:      Effort: Pulmonary effort is normal.      Breath sounds: Normal breath sounds.   Abdominal: "      General: Abdomen is flat. Bowel sounds are normal.      Palpations: Abdomen is soft.      Tenderness: There is abdominal tenderness in the epigastric area. There is no right CVA tenderness, left CVA tenderness, guarding or rebound.   Musculoskeletal:         General: Normal range of motion.        Back:    Skin:     General: Skin is warm and dry.   Neurological:      Mental Status: She is alert and oriented to person, place, and time.   Psychiatric:         Mood and Affect: Mood normal.         Behavior: Behavior normal.          Lab Review:   Latest Reference Range & Units 12/29/22 15:23   Color, UA Yellow, Straw, Dark Yellow, Agnes  Yellow   Appearance, UA Clear  Clear   Specific Gravity, UA 1.005 - 1.030  1.020   pH, UA 5.0 - 8.0  6.0   Glucose Negative mg/dL Negative   Ketones, UA Negative  Negative   Blood, UA Negative  Negative   Nitrite, UA Negative  Negative   Leukocytes, UA Negative  Negative   Protein, UA Negative mg/dL Negative   Bilirubin, UA Negative  Negative   Urobilinogen, UA Normal, 0.2 E.U./dL  Normal   URINE CULTURE  Rpt   Expiration Date  03/25/2024   Lot Number  [id number]   Rpt: View report in Results Review for more information       Assessment and Plan     Diagnoses and all orders for this visit:    1. Right sided abdominal pain (Primary)  -     XR Abdomen KUB; Future  -     POCT urinalysis dipstick, automated  -     Urine Culture - Urine, Urine, Clean Catch; Future  -     Urine Culture - Urine, Urine, Clean Catch    2. Epigastric pain  -     Urine Culture - Urine, Urine, Clean Catch; Future  -     Urine Culture - Urine, Urine, Clean Catch    Patient reports history of hernia    3. Urinary frequency  -     POCT urinalysis dipstick, automated  -     Urine Culture - Urine, Urine, Clean Catch; Future  -     Urine Culture - Urine, Urine, Clean Catch    Addend:    4- right flank pain   Xray ordered of the thoracic spine   Ultrasound of bilateral renal ordered as well.    Will notify  patient when results return   See attached messaging with patient due to continued concerns. Explanation provided to patient as to why we started with the abdominal xray    Plan  Discussed with patient that we will start with an in office urinalysis and will send off for culture.  Will notify of culture results    X-ray was ordered today and ideally should be obtained today or tomorrow.  Will notify patient of results    Continue to stay well-hydrated with a high-fiber diet to rule out any constipation.    Discussed red flag symptoms and when to go to the ER    Follow-up as scheduled with Michelle    Follow Up  Return for As scheduled with Michelle.    REBECA Alfredo    Part of this note may be an electronic transcription/translation of spoken language to printed text using the Dragon Dictation System.

## 2022-12-30 ENCOUNTER — PATIENT MESSAGE (OUTPATIENT)
Dept: INTERNAL MEDICINE | Facility: CLINIC | Age: 62
End: 2022-12-30

## 2022-12-30 LAB — BACTERIA SPEC AEROBE CULT: NO GROWTH

## 2023-01-03 ENCOUNTER — TELEPHONE (OUTPATIENT)
Dept: INTERNAL MEDICINE | Facility: CLINIC | Age: 63
End: 2023-01-03
Payer: MEDICAID

## 2023-01-03 NOTE — TELEPHONE ENCOUNTER
----- Message from REBECA Alfredo sent at 12/30/2022  1:38 PM EST -----  Please let patient know urine culture resulted and showed no growth meaning no UTI/bacteria

## 2023-01-03 NOTE — TELEPHONE ENCOUNTER
Called and spoke to pt regarding the xray of the abdomen, let pt know AD did order a xray of the spine and an ultrasound of the kidneys. Pt verbalized understanding.

## 2023-01-03 NOTE — TELEPHONE ENCOUNTER
----- Message from REBECA Alfredo sent at 12/30/2022  9:05 AM EST -----  Regarding: FW: X-ray   Contact: [phone]  Yesterday I did understand that the pain was located in her back.  I usually start with an x-ray of the abdomen because it can also show some of the issues that you were stating.  Again I usually start with an x-ray of the abdomen because it can show multiple areas.  Due to your continued concerns though, I added an order of the thoracic spine x-ray as well as a ultrasound of the kidneys.  Again I did understand her concerns however the abdominal x-ray is where I started.    Please make sure that this patient has a sooner follow-up with Michelle as this is her PCP  ----- Message -----  From: Coral Kline MA  Sent: 12/30/2022   7:37 AM EST  To: REBECA Alfredo  Subject: FW: X-ray                                          ----- Message -----  From: Sandra Auguste  Sent: 12/30/2022   1:47 AM EST  To: Dali Mathews [facility]  Subject: X-ray                                            I understand the X-ray results, but the main pain I’ve been having is in my BACK?  Not my abdomen. I feel like there was a bad misunderstanding. Why X-ray abdomen when my back hurts?  I don’t understand.  Thanks, Sandra Auguste

## 2023-01-05 ENCOUNTER — HOSPITAL ENCOUNTER (OUTPATIENT)
Dept: GENERAL RADIOLOGY | Facility: HOSPITAL | Age: 63
Discharge: HOME | End: 2023-01-05
Admitting: NURSE PRACTITIONER
Payer: MEDICAID

## 2023-01-05 DIAGNOSIS — R10.9 RIGHT FLANK PAIN: ICD-10-CM

## 2023-01-05 PROCEDURE — 72070 X-RAY EXAM THORAC SPINE 2VWS: CPT

## 2023-01-06 ENCOUNTER — TELEPHONE (OUTPATIENT)
Dept: INTERNAL MEDICINE | Facility: CLINIC | Age: 63
End: 2023-01-06
Payer: MEDICAID

## 2023-01-06 NOTE — TELEPHONE ENCOUNTER
----- Message from REBECA Alfredo sent at 1/6/2023  8:52 AM EST -----  Please let patient know x-ray of thoracic spine returned.  There is no acute bone changes or findings.  There is some mild degenerative disc disease in the mid and lower thoracic spine.  Thank you

## 2023-01-09 ENCOUNTER — APPOINTMENT (OUTPATIENT)
Dept: GENERAL RADIOLOGY | Facility: HOSPITAL | Age: 63
End: 2023-01-09
Payer: MEDICAID

## 2023-01-16 ENCOUNTER — HOSPITAL ENCOUNTER (OUTPATIENT)
Dept: ULTRASOUND IMAGING | Facility: HOSPITAL | Age: 63
Discharge: HOME | End: 2023-01-16
Admitting: NURSE PRACTITIONER
Payer: MEDICAID

## 2023-01-16 DIAGNOSIS — R10.9 RIGHT FLANK PAIN: ICD-10-CM

## 2023-01-16 PROCEDURE — 76775 US EXAM ABDO BACK WALL LIM: CPT

## 2023-01-17 ENCOUNTER — TELEPHONE (OUTPATIENT)
Dept: INTERNAL MEDICINE | Facility: CLINIC | Age: 63
End: 2023-01-17
Payer: MEDICAID

## 2023-01-17 NOTE — TELEPHONE ENCOUNTER
----- Message from REBECA Alfredo sent at 1/16/2023  5:25 PM EST -----  Please let patient know bilateral renal ultrasound resulted with negative results.  No evidence of kidney stones.    Michelle please see attached

## 2023-01-18 ENCOUNTER — TELEMEDICINE (OUTPATIENT)
Dept: PSYCHIATRY | Facility: CLINIC | Age: 63
End: 2023-01-18
Payer: MEDICAID

## 2023-01-18 DIAGNOSIS — F41.9 ANXIETY DISORDER, UNSPECIFIED TYPE: ICD-10-CM

## 2023-01-18 DIAGNOSIS — F33.1 MAJOR DEPRESSIVE DISORDER, RECURRENT EPISODE, MODERATE: Primary | ICD-10-CM

## 2023-01-18 PROCEDURE — 90832 PSYTX W PT 30 MINUTES: CPT | Performed by: COUNSELOR
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
## 2023-01-23 ENCOUNTER — OFFICE VISIT (OUTPATIENT)
Dept: INTERNAL MEDICINE | Facility: CLINIC | Age: 63
End: 2023-01-23
Payer: MEDICAID

## 2023-01-23 VITALS
TEMPERATURE: 97.5 F | WEIGHT: 252.8 LBS | SYSTOLIC BLOOD PRESSURE: 118 MMHG | DIASTOLIC BLOOD PRESSURE: 80 MMHG | OXYGEN SATURATION: 98 % | BODY MASS INDEX: 41.43 KG/M2 | HEART RATE: 97 BPM

## 2023-01-23 DIAGNOSIS — J02.9 SORE THROAT: ICD-10-CM

## 2023-01-23 DIAGNOSIS — J06.9 ACUTE URI: Primary | ICD-10-CM

## 2023-01-23 PROCEDURE — 99213 OFFICE O/P EST LOW 20 MIN: CPT | Performed by: PHYSICIAN ASSISTANT

## 2023-01-23 PROCEDURE — 87428 SARSCOV & INF VIR A&B AG IA: CPT | Performed by: PHYSICIAN ASSISTANT

## 2023-01-23 PROCEDURE — 87880 STREP A ASSAY W/OPTIC: CPT | Performed by: PHYSICIAN ASSISTANT

## 2023-01-23 RX ORDER — BENZONATATE 200 MG/1
200 CAPSULE ORAL 3 TIMES DAILY PRN
Qty: 30 CAPSULE | Refills: 0 | Status: SHIPPED | OUTPATIENT
Start: 2023-01-23 | End: 2023-02-16

## 2023-01-23 NOTE — PROGRESS NOTES
Chief Complaint   Patient presents with   • Sore Throat   • Cough   • Nausea   • Ear Drainage   • Nasal Congestion     Acute        Subjective     Cherylechris Auguste is a 62 y.o. female.        History of Present Illness     Pt started feeling bad with ear pain and fullness last week. Progressed to some intermittent sore throat. Had some nausea last night. Feeling bad all over. Has nasal drainage and congestion. Using cough drops. Ran out of tessalon perles.      Current Outpatient Medications:   •  acetaminophen (TYLENOL) 500 MG tablet, Take 500 mg by mouth Every 6 (Six) Hours As Needed for Mild Pain ., Disp: , Rfl:   •  amitriptyline (ELAVIL) 25 MG tablet, Take 25 mg by mouth., Disp: , Rfl:   •  Biotin 1 MG capsule, Take  by mouth., Disp: , Rfl:   •  Blood Glucose Monitoring Suppl (OneTouch Verio) w/Device kit, 1 kit Daily., Disp: 1 kit, Rfl: 0  •  Brexpiprazole (Rexulti) 0.5 MG tablet, Take 0.5 mg by mouth Daily., Disp: 30 tablet, Rfl: 1  •  celecoxib (CeleBREX) 50 MG capsule, , Disp: , Rfl:   •  Cranberry 1000 MG capsule, Take  by mouth., Disp: , Rfl:   •  cyclobenzaprine (FLEXERIL) 5 MG tablet, Take 1 tablet by mouth 2 (Two) Times a Day As Needed for Muscle Spasms., Disp: 20 tablet, Rfl: 0  •  Diclofenac Sodium (VOLTAREN) 1 % gel gel, APPLY 4 GRAMS TOPICALLY TO THE APPROPRIATE AREA AS DIRECTED FOUR TIMES A DAYS AS NEEDED FOR PAIN IN SHOULDERS, Disp: 100 g, Rfl: 1  •  EPINEPHrine (EPIPEN) 0.3 MG/0.3ML solution auto-injector injection, Inject 0.3 mL into the appropriate muscle as directed by prescriber As Needed (FOR SEVERE ALLERGIC REACTION)., Disp: 1 each, Rfl: 1  •  famotidine (PEPCID) 40 MG tablet, TAKE ONE TABLET BY MOUTH DAILY, Disp: 90 tablet, Rfl: 0  •  Fluticasone Furoate-Vilanterol (Breo Ellipta) 200-25 MCG/ACT inhaler, Inhale 1 puff Daily., Disp: 60 each, Rfl: 3  •  glucose blood (OneTouch Verio) test strip, Use one strip to check blood sugars one time daily, Disp: 100 each, Rfl: 3  •  glucose monitor  monitoring kit, Use as directed to check blood glucose once daily., Disp: 1 each, Rfl: 0  •  guaiFENesin-codeine (GUAIFENESIN AC) 100-10 MG/5ML liquid, Take 5 mL by mouth 3 (Three) Times a Day As Needed for Cough., Disp: 118 mL, Rfl: 0  •  hydrOXYzine (ATARAX) 25 MG tablet, Take 1 tablet by mouth Every 8 (Eight) Hours As Needed for Anxiety., Disp: 90 tablet, Rfl: 1  •  Insulin Pen Needle (Pen Needles) 32G X 6 MM misc, 1 dose 1 (One) Time Per Week., Disp: 100 each, Rfl: 3  •  Lancets (onetouch ultrasoft) lancets, Use one lancet daily to check blood sugars, Disp: 100 each, Rfl: 3  •  lidocaine (LIDODERM) 5 %, Place 1 patch on the skin as directed by provider Daily. Remove & Discard patch within 12 hours or as directed by MD, Disp: 6 patch, Rfl: 0  •  loratadine (CLARITIN) 10 MG tablet, Take 1 tablet by mouth Daily., Disp: 30 tablet, Rfl: 5  •  metFORMIN ER (GLUCOPHAGE-XR) 500 MG 24 hr tablet, TAKE TWO TABLETS BY MOUTH DAILY WITH BREAKFAST, Disp: 180 tablet, Rfl: 0  •  methocarbamol (ROBAXIN) 500 MG tablet, Take 500 mg by mouth., Disp: , Rfl:   •  multivitamin with minerals tablet tablet, Take 1 tablet by mouth Daily., Disp: , Rfl:   •  ondansetron (Zofran) 8 MG tablet, Take 1 tablet by mouth Every 8 (Eight) Hours As Needed for Nausea or Vomiting., Disp: 15 tablet, Rfl: 0  •  rosuvastatin (CRESTOR) 5 MG tablet, TAKE ONE TABLET BY MOUTH DAILY, Disp: 90 tablet, Rfl: 0  •  Trulicity 1.5 MG/0.5ML solution pen-injector, INJECT 1/2 MILLILITER (1.5MG) UNDER THE SKIN INTO THE APPROPRIATE AREA ONCE WEEKLY, Disp: 6 mL, Rfl: 0  •  vilazodone (Viibryd) 20 MG tablet tablet, Take 1 tablet by mouth Daily., Disp: 30 tablet, Rfl: 1  •  benzonatate (TESSALON) 200 MG capsule, Take 1 capsule by mouth 3 (Three) Times a Day As Needed for Cough., Disp: 30 capsule, Rfl: 0  •  fluticasone (FLONASE) 50 MCG/ACT nasal spray, 2 sprays into the nostril(s) as directed by provider Daily for 30 days., Disp: 16 g, Rfl: 5     PMFSH  The following  portions of the patient's history were reviewed and updated as appropriate: allergies, current medications, past family history, past medical history, past social history, past surgical history and problem list.    Review of Systems   Constitutional: Positive for fatigue. Negative for activity change and unexpected weight change.   HENT: Positive for congestion, postnasal drip and sore throat. Negative for ear pain.    Eyes: Negative for pain and discharge.   Respiratory: Positive for cough. Negative for chest tightness, shortness of breath and wheezing.    Cardiovascular: Negative for chest pain and palpitations.   Gastrointestinal: Negative for abdominal pain, diarrhea and vomiting.   Endocrine: Negative.    Genitourinary: Negative.    Musculoskeletal: Negative for joint swelling.   Skin: Negative for color change, rash and wound.   Allergic/Immunologic: Negative.    Neurological: Positive for headaches. Negative for dizziness, seizures and syncope.   Psychiatric/Behavioral: Negative.        Objective   /80   Pulse 97   Temp 97.5 °F (36.4 °C)   Wt 115 kg (252 lb 12.8 oz)   LMP  (LMP Unknown)   SpO2 98%   BMI 41.43 kg/m²     Physical Exam  Vitals and nursing note reviewed.   Constitutional:       Appearance: She is well-developed.   HENT:      Head: Normocephalic.      Right Ear: Hearing, tympanic membrane, ear canal and external ear normal.      Left Ear: Hearing, tympanic membrane, ear canal and external ear normal.      Nose: Nose normal.   Eyes:      Conjunctiva/sclera: Conjunctivae normal.      Pupils: Pupils are equal, round, and reactive to light.   Cardiovascular:      Rate and Rhythm: Normal rate and regular rhythm.      Heart sounds: Normal heart sounds.   Pulmonary:      Effort: Pulmonary effort is normal.      Breath sounds: Normal breath sounds. No decreased breath sounds, wheezing, rhonchi or rales.   Musculoskeletal:         General: Normal range of motion.      Cervical back: Normal  range of motion.   Skin:     General: Skin is warm and dry.   Neurological:      Mental Status: She is alert.   Psychiatric:         Behavior: Behavior normal.         Results for orders placed or performed in visit on 01/23/23   POCT rapid strep A    Specimen: Swab   Result Value Ref Range    Rapid Strep A Screen Negative Negative, VALID, INVALID, Not Performed    Internal Control Passed Passed    Lot Number [id number]     Expiration Date 07/20/2024    POCT SARS-CoV-2 Antigen JONATHON + Flu    Specimen: Swab   Result Value Ref Range    SARS Antigen Not Detected Not Detected, Presumptive Negative    Influenza A Antigen JONATHON Not Detected Not Detected    Influenza B Antigen JONATHON Not Detected Not Detected    Internal Control Passed Passed    Lot Number [id number]     Expiration Date 03/07/2024         ASSESSMENT/PLAN    Diagnoses and all orders for this visit:    1. Acute URI (Primary)  Comments:  Continue otc symptomatic care, rest and fluids. Use tessalon perles prn. Call if no improvement by the end of the week.  Orders:  -     POCT SARS-CoV-2 Antigen JONATHON + Flu  -     benzonatate (TESSALON) 200 MG capsule; Take 1 capsule by mouth 3 (Three) Times a Day As Needed for Cough.  Dispense: 30 capsule; Refill: 0    2. Sore throat  -     POCT rapid strep A  -     POCT SARS-CoV-2 Antigen JONATHON + Flu             Return if symptoms worsen or fail to improve, for Next scheduled follow up.

## 2023-01-26 ENCOUNTER — OFFICE VISIT (OUTPATIENT)
Dept: INTERNAL MEDICINE | Facility: CLINIC | Age: 63
End: 2023-01-26
Payer: MEDICAID

## 2023-01-26 VITALS
SYSTOLIC BLOOD PRESSURE: 124 MMHG | HEART RATE: 94 BPM | HEIGHT: 66 IN | BODY MASS INDEX: 40.5 KG/M2 | DIASTOLIC BLOOD PRESSURE: 76 MMHG | OXYGEN SATURATION: 97 % | WEIGHT: 252 LBS

## 2023-01-26 DIAGNOSIS — J02.9 ACUTE SORE THROAT: ICD-10-CM

## 2023-01-26 DIAGNOSIS — R05.1 ACUTE COUGH: Primary | ICD-10-CM

## 2023-01-26 DIAGNOSIS — J30.2 SEASONAL ALLERGIES: ICD-10-CM

## 2023-01-26 DIAGNOSIS — R09.81 NASAL CONGESTION: ICD-10-CM

## 2023-01-26 DIAGNOSIS — J06.9 VIRAL UPPER RESPIRATORY ILLNESS: ICD-10-CM

## 2023-01-26 PROCEDURE — 87070 CULTURE OTHR SPECIMN AEROBIC: CPT | Performed by: NURSE PRACTITIONER

## 2023-01-26 PROCEDURE — 99213 OFFICE O/P EST LOW 20 MIN: CPT | Performed by: NURSE PRACTITIONER

## 2023-01-26 PROCEDURE — 87205 SMEAR GRAM STAIN: CPT | Performed by: NURSE PRACTITIONER

## 2023-01-26 PROCEDURE — 87428 SARSCOV & INF VIR A&B AG IA: CPT | Performed by: NURSE PRACTITIONER

## 2023-01-26 PROCEDURE — 87880 STREP A ASSAY W/OPTIC: CPT | Performed by: NURSE PRACTITIONER

## 2023-01-26 RX ORDER — NASAL AIRFLOW STRIPS
1 STRIP TOPICAL DAILY
Qty: 10 STRIP | Refills: 0 | Status: SHIPPED | OUTPATIENT
Start: 2023-01-26 | End: 2023-02-05

## 2023-01-26 RX ORDER — FLUTICASONE PROPIONATE 50 MCG
2 SPRAY, SUSPENSION (ML) NASAL DAILY
Qty: 16 G | Refills: 0 | Status: SHIPPED | OUTPATIENT
Start: 2023-01-26 | End: 2023-02-25

## 2023-01-26 RX ORDER — LIDOCAINE HYDROCHLORIDE 20 MG/ML
10 SOLUTION OROPHARYNGEAL 3 TIMES DAILY
Qty: 300 ML | Refills: 0 | Status: SHIPPED | OUTPATIENT
Start: 2023-01-26 | End: 2023-02-05

## 2023-01-26 RX ORDER — IPRATROPIUM BROMIDE 42 UG/1
2 SPRAY, METERED NASAL 4 TIMES DAILY
Qty: 15 ML | Refills: 0 | Status: SHIPPED | OUTPATIENT
Start: 2023-01-26 | End: 2023-02-16

## 2023-01-26 NOTE — PROGRESS NOTES
"    Office Note     Name: Sandra Auguste    : 1960     MRN: [id number]     Chief Complaint  Sore Throat, Nasal Congestion, Cough, and Earache    Subjective     History of Present Illness:  Sandra Auguste is a 62 y.o. female who presents today for evaluation of symptoms.    Patient recently saw Michelle, her PCP, on  for similar symptoms.  At that time patient reported her symptoms started last week with progression of symptoms.  COVID test, flu test, strep test were negative.  It was reported that she had a upper respiratory infection.  It was discussed she should continue symptomatic care and use of Tessalon Perles.  Refill of Tessalon Perles was sent to the pharmacy    She denies any sick contacts.  She has continued to have a sore throat, cough, congestion.  Denies any fevers but has felt hot.  When she did check her temperature was around 99.  She has been treating symptoms at home    Review of Systems   Constitutional: Negative for chills, fatigue and fever.   HENT: Positive for congestion and sore throat.    Eyes: Negative for visual disturbance.   Respiratory: Positive for cough. Negative for shortness of breath.    Cardiovascular: Negative for chest pain.   Gastrointestinal: Negative for abdominal pain.   Skin: Negative for color change.   Allergic/Immunologic: Negative for immunocompromised state.   Neurological: Negative for headaches.   Psychiatric/Behavioral: Negative for behavioral problems.       Past Medical History:   Diagnosis Date   • Anesthesia complication     HAS TROUBLE GETTING \"NUMB\"    • Anxiety    • Arthritis    • Asthma    • Bladder spasms    • Depression    • Diverticulosis    • GERD (gastroesophageal reflux disease)    • Memory loss     FROM SKULL FRACTURE AND FALL-SHORT TERM MEMORY LOSS   • Mental disorder     PTSD   • Migraine    • Obesity    • Seasonal allergies    • Seizures (HCC)     \"convulsions\" at age 5   • Skull fracture (HCC)    • Sleep apnea with use of " continuous positive airway pressure (CPAP)     INSTRUCTED TO BRING OWN MASK AND TUBING    • SOB (shortness of breath) on exertion    • Tendonitis    • Type 2 diabetes mellitus without complication, without long-term current use of insulin ([facility]) 10/11/2018   • Wears contact lenses    • Wears partial dentures     TOP ONLY    • Wears prescription eyeglasses    • Wears prescription eyeglasses        Past Surgical History:   Procedure Laterality Date   • CERVICAL POLYPECTOMY     • TOTAL LAPAROSCOPIC HYSTERECTOMY N/A 9/18/2017    Procedure: TOTAL LAPAROSCOPIC HYSTERECTOMY, BILATERAL SALPINGO OOPHORECTOMY WITH DAVINCI ROBOT ;  Surgeon: Shannon Grimaldo DO;  Location: [facility];  Service:    • WISDOM TOOTH EXTRACTION         Social History     Socioeconomic History   • Marital status: Single   Tobacco Use   • Smoking status: Never   • Smokeless tobacco: Never   Vaping Use   • Vaping Use: Never used   Substance and Sexual Activity   • Alcohol use: Yes     Comment: Patient reports occasoinal wine use to help relax her and fall asleep, not daily use   • Drug use: Never   • Sexual activity: Not Currently     Birth control/protection: Post-menopausal, Surgical         Current Outpatient Medications:   •  acetaminophen (TYLENOL) 500 MG tablet, Take 500 mg by mouth Every 6 (Six) Hours As Needed for Mild Pain ., Disp: , Rfl:   •  amitriptyline (ELAVIL) 25 MG tablet, Take 25 mg by mouth., Disp: , Rfl:   •  benzonatate (TESSALON) 200 MG capsule, Take 1 capsule by mouth 3 (Three) Times a Day As Needed for Cough., Disp: 30 capsule, Rfl: 0  •  Biotin 1 MG capsule, Take  by mouth., Disp: , Rfl:   •  Blood Glucose Monitoring Suppl (OneTouch Verio) w/Device kit, 1 kit Daily., Disp: 1 kit, Rfl: 0  •  Brexpiprazole (Rexulti) 0.5 MG tablet, Take 0.5 mg by mouth Daily., Disp: 30 tablet, Rfl: 1  •  celecoxib (CeleBREX) 50 MG capsule, , Disp: , Rfl:   •  Cranberry 1000 MG capsule, Take  by mouth., Disp: , Rfl:   •  cyclobenzaprine (FLEXERIL) 5 MG  tablet, Take 1 tablet by mouth 2 (Two) Times a Day As Needed for Muscle Spasms., Disp: 20 tablet, Rfl: 0  •  Diclofenac Sodium (VOLTAREN) 1 % gel gel, APPLY 4 GRAMS TOPICALLY TO THE APPROPRIATE AREA AS DIRECTED FOUR TIMES A DAYS AS NEEDED FOR PAIN IN SHOULDERS, Disp: 100 g, Rfl: 1  •  EPINEPHrine (EPIPEN) 0.3 MG/0.3ML solution auto-injector injection, Inject 0.3 mL into the appropriate muscle as directed by prescriber As Needed (FOR SEVERE ALLERGIC REACTION)., Disp: 1 each, Rfl: 1  •  famotidine (PEPCID) 40 MG tablet, TAKE ONE TABLET BY MOUTH DAILY, Disp: 90 tablet, Rfl: 0  •  fluticasone (FLONASE) 50 MCG/ACT nasal spray, 2 sprays into the nostril(s) as directed by provider Daily for 30 days., Disp: 16 g, Rfl: 0  •  Fluticasone Furoate-Vilanterol (Breo Ellipta) 200-25 MCG/ACT inhaler, Inhale 1 puff Daily., Disp: 60 each, Rfl: 3  •  glucose blood (OneTouch Verio) test strip, Use one strip to check blood sugars one time daily, Disp: 100 each, Rfl: 3  •  glucose monitor monitoring kit, Use as directed to check blood glucose once daily., Disp: 1 each, Rfl: 0  •  guaiFENesin-codeine (GUAIFENESIN AC) 100-10 MG/5ML liquid, Take 5 mL by mouth 3 (Three) Times a Day As Needed for Cough., Disp: 118 mL, Rfl: 0  •  hydrOXYzine (ATARAX) 25 MG tablet, Take 1 tablet by mouth Every 8 (Eight) Hours As Needed for Anxiety., Disp: 90 tablet, Rfl: 1  •  Insulin Pen Needle (Pen Needles) 32G X 6 MM misc, 1 dose 1 (One) Time Per Week., Disp: 100 each, Rfl: 3  •  Lancets (onetouch ultrasoft) lancets, Use one lancet daily to check blood sugars, Disp: 100 each, Rfl: 3  •  lidocaine (LIDODERM) 5 %, Place 1 patch on the skin as directed by provider Daily. Remove & Discard patch within 12 hours or as directed by MD, Disp: 6 patch, Rfl: 0  •  loratadine (CLARITIN) 10 MG tablet, Take 1 tablet by mouth Daily., Disp: 30 tablet, Rfl: 5  •  metFORMIN ER (GLUCOPHAGE-XR) 500 MG 24 hr tablet, TAKE TWO TABLETS BY MOUTH DAILY WITH BREAKFAST, Disp: 180  "tablet, Rfl: 0  •  methocarbamol (ROBAXIN) 500 MG tablet, Take 500 mg by mouth., Disp: , Rfl:   •  multivitamin with minerals tablet tablet, Take 1 tablet by mouth Daily., Disp: , Rfl:   •  ondansetron (Zofran) 8 MG tablet, Take 1 tablet by mouth Every 8 (Eight) Hours As Needed for Nausea or Vomiting., Disp: 15 tablet, Rfl: 0  •  rosuvastatin (CRESTOR) 5 MG tablet, TAKE ONE TABLET BY MOUTH DAILY, Disp: 90 tablet, Rfl: 0  •  Trulicity 1.5 MG/0.5ML solution pen-injector, INJECT 1/2 MILLILITER (1.5MG) UNDER THE SKIN INTO THE APPROPRIATE AREA ONCE WEEKLY, Disp: 6 mL, Rfl: 0  •  vilazodone (Viibryd) 20 MG tablet tablet, Take 1 tablet by mouth Daily., Disp: 30 tablet, Rfl: 1  •  ipratropium (ATROVENT) 0.06 % nasal spray, 2 sprays into the nostril(s) as directed by provider 4 (Four) Times a Day for 4 days., Disp: 15 mL, Rfl: 0  •  Lidocaine Viscous HCl (XYLOCAINE) 2 % solution, Take 10 mL by mouth 3 (Three) Times a Day for 10 days., Disp: 300 mL, Rfl: 0  •  Nasal Dilators (Breathe Right Advanced) strip, 1 each Daily for 10 days., Disp: 10 strip, Rfl: 0    Objective     Vital Signs  /76   Pulse 94   Ht 166.4 cm (65.5\")   Wt 114 kg (252 lb)   SpO2 97%   BMI 41.30 kg/m²   Estimated body mass index is 41.3 kg/m² as calculated from the following:    Height as of this encounter: 166.4 cm (65.5\").    Weight as of this encounter: 114 kg (252 lb).    Class 3 Severe Obesity (BMI >=40). Obesity-related health conditions include the following: Address at next visit. Obesity is Address at next visit. BMI is Address at next visit. We discussed portion control and increasing exercise.           Physical Exam  Vitals and nursing note reviewed.   Constitutional:       General: She is awake.      Appearance: Normal appearance. She is well-groomed. She is morbidly obese.   HENT:      Head: Normocephalic and atraumatic.      Right Ear: Hearing, ear canal and external ear normal.      Left Ear: Hearing, ear canal and external ear " normal.      Ears:      Comments: Clear effusion bilateral TM     Nose: Congestion present.      Mouth/Throat:      Lips: Pink.      Mouth: Mucous membranes are moist.      Tongue: No lesions.      Pharynx: Posterior oropharyngeal erythema present.   Eyes:      Extraocular Movements: Extraocular movements intact.      Pupils: Pupils are equal, round, and reactive to light.   Cardiovascular:      Rate and Rhythm: Normal rate and regular rhythm.      Pulses: Normal pulses.      Heart sounds: Normal heart sounds.   Pulmonary:      Effort: Pulmonary effort is normal.      Breath sounds: Normal breath sounds.      Comments: Dry frequent cough on exam  Musculoskeletal:         General: Normal range of motion.   Skin:     General: Skin is warm and dry.   Neurological:      Mental Status: She is alert and oriented to person, place, and time.   Psychiatric:         Mood and Affect: Mood normal.         Behavior: Behavior normal. Behavior is cooperative.          Lab Review:   Latest Reference Range & Units 01/26/23 15:21   Rapid Strep A Screen Negative, VALID, INVALID, Not Performed  Negative   SARS Antigen Not Detected, Presumptive Negative  Not Detected   Expiration Date  02/08/2023 07/20/2024   Lot Number  [id number]  [id number]   Influenza A Antigen JONATHON Not Detected  Not Detected   Influenza B Antigen JONATHON Not Detected  Not Detected          Assessment and Plan     Diagnoses and all orders for this visit:    1. Acute cough (Primary)  -     POCT SARS-CoV-2 Antigen JONATHON + Flu    2. Acute sore throat  -     Wound Culture - Wound, Oropharynx; Future  -     POCT SARS-CoV-2 Antigen JONATHON + Flu  -     POCT rapid strep A  -     Wound Culture - Wound, Oropharynx  -     Lidocaine Viscous HCl (XYLOCAINE) 2 % solution; Take 10 mL by mouth 3 (Three) Times a Day for 10 days.  Dispense: 300 mL; Refill: 0    3. Seasonal allergies  Comments:  Continue on current regimen.   Orders:  -     fluticasone (FLONASE) 50 MCG/ACT nasal spray; 2 sprays  into the nostril(s) as directed by provider Daily for 30 days.  Dispense: 16 g; Refill: 0    4. Nasal congestion  -     ipratropium (ATROVENT) 0.06 % nasal spray; 2 sprays into the nostril(s) as directed by provider 4 (Four) Times a Day for 4 days.  Dispense: 15 mL; Refill: 0  -     Nasal Dilators (Breathe Right Advanced) strip; 1 each Daily for 10 days.  Dispense: 10 strip; Refill: 0    5. Viral upper respiratory illness    Plan  Discussed that in office test results today are negative.  Patient's symptoms are likely viral in origin.  Sent additional medication to the pharmacy to assist with symptoms  Patient will be notified when throat culture returns  She needed a refill of Flonase.  Continue to stay well-hydrated and perform coughing and deep breathing exercises  We did discuss that viral illnesses can still be contagious.  Please be conscious when around others  Go to ER if any condition worsens or severe  Keep upcoming appointment with Michelle    Follow Up  Return for As scheduled with Michelle.    REBECA Alfredo    Part of this note may be an electronic transcription/translation of spoken language to printed text using the Dragon Dictation System.

## 2023-01-27 ENCOUNTER — TELEPHONE (OUTPATIENT)
Dept: INTERNAL MEDICINE | Facility: CLINIC | Age: 63
End: 2023-01-27
Payer: MEDICAID

## 2023-01-27 RX ORDER — AZITHROMYCIN 250 MG/1
TABLET, FILM COATED ORAL
Qty: 6 TABLET | Refills: 0 | Status: SHIPPED | OUTPATIENT
Start: 2023-01-27 | End: 2023-02-09

## 2023-01-27 NOTE — TELEPHONE ENCOUNTER
Called pt and gave results, pt verbalized understanding. Pt asked if she is considered contagious?

## 2023-01-27 NOTE — TELEPHONE ENCOUNTER
PATIENT CALLED REQUESTING TO SPEAK WITH CHICHO. SHE SAID THAT SHE IS HAVING A LOT GREEN AND YELLOW MUCUS WHEN SHE BLOWS HER NOSE AND THINKS SHE MAY HAVE A SINUS INFECTION.

## 2023-01-27 NOTE — TELEPHONE ENCOUNTER
----- Message from REBECA Alfredo sent at 1/27/2023 10:13 AM EST -----  Please let patient know that culture of the throat resulted and was negative for any particular bacteria other than normal throat db/bacteria.  Continue with interventions we discussed at previous visit    Michelle please see attached in regards to her recent visit with me yesterday

## 2023-01-28 LAB
BACTERIA SPEC AEROBE CULT: NORMAL
GRAM STN SPEC: NORMAL
GRAM STN SPEC: NORMAL

## 2023-02-02 DIAGNOSIS — E11.9 TYPE 2 DIABETES MELLITUS WITHOUT COMPLICATION, WITHOUT LONG-TERM CURRENT USE OF INSULIN: ICD-10-CM

## 2023-02-02 RX ORDER — METFORMIN HYDROCHLORIDE 500 MG/1
TABLET, EXTENDED RELEASE ORAL
Qty: 180 TABLET | Refills: 0 | Status: SHIPPED | OUTPATIENT
Start: 2023-02-02

## 2023-02-06 ENCOUNTER — TELEPHONE (OUTPATIENT)
Dept: INTERNAL MEDICINE | Facility: CLINIC | Age: 63
End: 2023-02-06

## 2023-02-06 NOTE — TELEPHONE ENCOUNTER
Caller: Sandra Auguste    Relationship: Self    Best call back number: [phone]    What was the call regarding: PATIENT STATES WE SHOULD BE RECEIVING A FAX FOR INCOMPETENT SUPPLIES    PATIENT IS JUST WANTING TO LET US KNOW    Do you require a callback: YES

## 2023-02-07 ENCOUNTER — TELEPHONE (OUTPATIENT)
Dept: INTERNAL MEDICINE | Facility: CLINIC | Age: 63
End: 2023-02-07

## 2023-02-07 NOTE — TELEPHONE ENCOUNTER
Provider: ALESIA MONTERO    Caller: MADIE EVANS     Phone Number: [phone]    Reason for Call: PATIENT WOULD LIKE A CALL BACK BECAUSE ON Sunday 2/5/23 SHE STARTED SHAKING. SHE SAID IT IS LIKE TREMORS. AND SHE WOULD LIKE TO TALK TO SOMEONE ABOUT WHAT SHE CAN DO TO HELP STOP THIS.

## 2023-02-09 ENCOUNTER — OFFICE VISIT (OUTPATIENT)
Dept: INTERNAL MEDICINE | Facility: CLINIC | Age: 63
End: 2023-02-09
Payer: MEDICAID

## 2023-02-09 VITALS
WEIGHT: 251.4 LBS | OXYGEN SATURATION: 95 % | SYSTOLIC BLOOD PRESSURE: 110 MMHG | DIASTOLIC BLOOD PRESSURE: 84 MMHG | BODY MASS INDEX: 41.2 KG/M2 | HEART RATE: 83 BPM

## 2023-02-09 DIAGNOSIS — F41.9 ANXIETY: Primary | ICD-10-CM

## 2023-02-09 PROCEDURE — 99213 OFFICE O/P EST LOW 20 MIN: CPT | Performed by: PHYSICIAN ASSISTANT

## 2023-02-09 RX ORDER — PROPRANOLOL HYDROCHLORIDE 10 MG/1
10 TABLET ORAL 2 TIMES DAILY PRN
Qty: 60 TABLET | Refills: 0 | Status: SHIPPED | OUTPATIENT
Start: 2023-02-09 | End: 2023-03-07

## 2023-02-09 NOTE — PROGRESS NOTES
Chief Complaint   Patient presents with   • Tremors   • Dropping things     Acute        Subjective     Sandra Auguste is a 62 y.o. female.        History of Present Illness     Ms. Auguste is a 62-year-old female who presents today for a follow-up.          Her mother fell between the railings on her bed and the floor this past Sunday. She had to call her brother and wait for him to come across town to help her get her up. She called hospice and they came to check her mother.   Her mother looked traumatized. Whenever her mother falls, it affects her and she gets upset about it. She started experiencing symptoms of tremors and weakness. She felt like she was having a nervous breakdown. She checked her blood sugar a little while later and it was 116. She could not get it to stop. She felt a little weak. It has gotten better. She cannot figure out why she is having the symptoms of tremor. She feels the tremor internal. Her family is so critical towards her.  She expresses concern about her mother not eating as much and sleeping more. Her mother will be 93-year-old this month.          She had her sister get her some chocolate candy because she could not figure out what to eat. She did not have an appetite. She weighed herself this morning and she weighed 246 pounds. Her weight has dropped several pounds since then. She cannot figure out what to eat. She has been eating salad, hard boiled eggs, toast. She has been trying to make herself eat. She has not felt like eating much.            She took hydroxyzine and it did not seem to help her. She takes amitriptyline and it has helped her sleep. She has an upcoming bariatric appointment on 02/16/2023.               Current Outpatient Medications:   •  acetaminophen (TYLENOL) 500 MG tablet, Take 500 mg by mouth Every 6 (Six) Hours As Needed for Mild Pain ., Disp: , Rfl:   •  amitriptyline (ELAVIL) 25 MG tablet, Take 25 mg by mouth., Disp: , Rfl:   •  benzonatate (TESSALON) 200  MG capsule, Take 1 capsule by mouth 3 (Three) Times a Day As Needed for Cough., Disp: 30 capsule, Rfl: 0  •  Biotin 1 MG capsule, Take  by mouth., Disp: , Rfl:   •  Blood Glucose Monitoring Suppl (OneTouch Verio) w/Device kit, 1 kit Daily., Disp: 1 kit, Rfl: 0  •  Brexpiprazole (Rexulti) 0.5 MG tablet, Take 0.5 mg by mouth Daily., Disp: 30 tablet, Rfl: 1  •  celecoxib (CeleBREX) 50 MG capsule, , Disp: , Rfl:   •  Cranberry 1000 MG capsule, Take  by mouth., Disp: , Rfl:   •  cyclobenzaprine (FLEXERIL) 5 MG tablet, Take 1 tablet by mouth 2 (Two) Times a Day As Needed for Muscle Spasms., Disp: 20 tablet, Rfl: 0  •  Diclofenac Sodium (VOLTAREN) 1 % gel gel, APPLY 4 GRAMS TOPICALLY TO THE APPROPRIATE AREA AS DIRECTED FOUR TIMES A DAYS AS NEEDED FOR PAIN IN SHOULDERS, Disp: 100 g, Rfl: 1  •  EPINEPHrine (EPIPEN) 0.3 MG/0.3ML solution auto-injector injection, Inject 0.3 mL into the appropriate muscle as directed by prescriber As Needed (FOR SEVERE ALLERGIC REACTION)., Disp: 1 each, Rfl: 1  •  famotidine (PEPCID) 40 MG tablet, TAKE ONE TABLET BY MOUTH DAILY, Disp: 90 tablet, Rfl: 0  •  fluticasone (FLONASE) 50 MCG/ACT nasal spray, 2 sprays into the nostril(s) as directed by provider Daily for 30 days., Disp: 16 g, Rfl: 0  •  Fluticasone Furoate-Vilanterol (Breo Ellipta) 200-25 MCG/ACT inhaler, Inhale 1 puff Daily., Disp: 60 each, Rfl: 3  •  glucose blood (OneTouch Verio) test strip, Use one strip to check blood sugars one time daily, Disp: 100 each, Rfl: 3  •  glucose monitor monitoring kit, Use as directed to check blood glucose once daily., Disp: 1 each, Rfl: 0  •  guaiFENesin-codeine (GUAIFENESIN AC) 100-10 MG/5ML liquid, Take 5 mL by mouth 3 (Three) Times a Day As Needed for Cough., Disp: 118 mL, Rfl: 0  •  hydrOXYzine (ATARAX) 25 MG tablet, Take 1 tablet by mouth Every 8 (Eight) Hours As Needed for Anxiety., Disp: 90 tablet, Rfl: 1  •  Insulin Pen Needle (Pen Needles) 32G X 6 MM misc, 1 dose 1 (One) Time Per Week.,  Disp: 100 each, Rfl: 3  •  Lancets (onetouch ultrasoft) lancets, Use one lancet daily to check blood sugars, Disp: 100 each, Rfl: 3  •  lidocaine (LIDODERM) 5 %, Place 1 patch on the skin as directed by provider Daily. Remove & Discard patch within 12 hours or as directed by MD, Disp: 6 patch, Rfl: 0  •  loratadine (CLARITIN) 10 MG tablet, Take 1 tablet by mouth Daily., Disp: 30 tablet, Rfl: 5  •  metFORMIN ER (GLUCOPHAGE-XR) 500 MG 24 hr tablet, TAKE TWO TABLETS BY MOUTH DAILY WITH BREAKFAST, Disp: 180 tablet, Rfl: 0  •  methocarbamol (ROBAXIN) 500 MG tablet, Take 500 mg by mouth., Disp: , Rfl:   •  multivitamin with minerals tablet tablet, Take 1 tablet by mouth Daily., Disp: , Rfl:   •  ondansetron (Zofran) 8 MG tablet, Take 1 tablet by mouth Every 8 (Eight) Hours As Needed for Nausea or Vomiting., Disp: 15 tablet, Rfl: 0  •  rosuvastatin (CRESTOR) 5 MG tablet, TAKE ONE TABLET BY MOUTH DAILY, Disp: 90 tablet, Rfl: 0  •  Trulicity 1.5 MG/0.5ML solution pen-injector, INJECT 1/2 MILLILITER (1.5MG) UNDER THE SKIN INTO THE APPROPRIATE AREA ONCE WEEKLY, Disp: 6 mL, Rfl: 0  •  vilazodone (Viibryd) 20 MG tablet tablet, Take 1 tablet by mouth Daily., Disp: 30 tablet, Rfl: 1  •  ipratropium (ATROVENT) 0.06 % nasal spray, 2 sprays into the nostril(s) as directed by provider 4 (Four) Times a Day for 4 days., Disp: 15 mL, Rfl: 0  •  propranolol (INDERAL) 10 MG tablet, Take 1 tablet by mouth 2 (Two) Times a Day As Needed (anxiety)., Disp: 60 tablet, Rfl: 0     PMFSH  The following portions of the patient's history were reviewed and updated as appropriate: allergies, current medications, past family history, past medical history, past social history, past surgical history and problem list.    Review of Systems   Constitutional: Negative for chills, fever and unexpected weight change.   HENT: Negative.    Eyes: Negative for pain and visual disturbance.   Respiratory: Negative for chest tightness and shortness of breath.     Cardiovascular: Negative for chest pain.   Gastrointestinal: Negative for abdominal pain and blood in stool.   Endocrine: Negative.    Genitourinary: Negative.    Musculoskeletal: Negative for joint swelling.   Skin: Negative for color change, rash and wound.   Allergic/Immunologic: Negative.    Neurological: Positive for tremors. Negative for syncope and speech difficulty.   Hematological: Negative for adenopathy.   Psychiatric/Behavioral: Positive for decreased concentration. Negative for confusion, hallucinations and suicidal ideas. The patient is nervous/anxious.        Objective   /84   Pulse 83   Wt 114 kg (251 lb 6.4 oz)   LMP  (LMP Unknown)   SpO2 95%   BMI 41.20 kg/m²     Physical Exam  Vitals and nursing note reviewed.   Constitutional:       General: She is not in acute distress.     Appearance: She is well-developed. She is not diaphoretic.   HENT:      Head: Normocephalic.   Eyes:      Conjunctiva/sclera: Conjunctivae normal.      Pupils: Pupils are equal, round, and reactive to light.   Cardiovascular:      Rate and Rhythm: Normal rate and regular rhythm.      Heart sounds: Normal heart sounds. No murmur heard.  Pulmonary:      Effort: Pulmonary effort is normal.      Breath sounds: Normal breath sounds.   Musculoskeletal:         General: Normal range of motion.      Cervical back: Normal range of motion and neck supple.   Skin:     General: Skin is warm and dry.      Findings: No rash.   Neurological:      Mental Status: She is alert and oriented to person, place, and time.   Psychiatric:         Mood and Affect: Affect is not inappropriate.         Behavior: Behavior normal.         Thought Content: Thought content normal.         Judgment: Judgment normal.              ASSESSMENT/PLAN    Diagnoses and all orders for this visit:    1. Anxiety (Primary)  Comments:  Improving. Use propranolol prn to help with anxiety symptoms.   Orders:  -     propranolol (INDERAL) 10 MG tablet; Take 1  tablet by mouth 2 (Two) Times a Day As Needed (anxiety).  Dispense: 60 tablet; Refill: 0        Transcribed from ambient dictation for SHAUNA Herrera by Reshma Moon.  02/09/23   16:49 EST    Patient or patient representative verbalized consent to the visit recording.  I have personally performed the services described in this document as transcribed by the above individual, and it is both accurate and complete.             Return if symptoms worsen or fail to improve, for Next scheduled follow up.

## 2023-02-16 ENCOUNTER — OFFICE VISIT (OUTPATIENT)
Dept: BEHAVIORAL HEALTH | Facility: CLINIC | Age: 63
End: 2023-02-16
Payer: MEDICAID

## 2023-02-16 ENCOUNTER — DOCUMENTATION (OUTPATIENT)
Dept: BARIATRICS/WEIGHT MGMT | Facility: CLINIC | Age: 63
End: 2023-02-16
Payer: MEDICAID

## 2023-02-16 ENCOUNTER — OFFICE VISIT (OUTPATIENT)
Dept: BARIATRICS/WEIGHT MGMT | Facility: CLINIC | Age: 63
End: 2023-02-16
Payer: MEDICAID

## 2023-02-16 VITALS
WEIGHT: 250 LBS | OXYGEN SATURATION: 97 % | RESPIRATION RATE: 18 BRPM | TEMPERATURE: 97.3 F | BODY MASS INDEX: 41.65 KG/M2 | DIASTOLIC BLOOD PRESSURE: 88 MMHG | HEIGHT: 65 IN | HEART RATE: 94 BPM | SYSTOLIC BLOOD PRESSURE: 140 MMHG

## 2023-02-16 DIAGNOSIS — Z63.6 CAREGIVER BURDEN: ICD-10-CM

## 2023-02-16 DIAGNOSIS — E66.01 OBESITY, CLASS III, BMI 40-49.9 (MORBID OBESITY): Primary | ICD-10-CM

## 2023-02-16 DIAGNOSIS — E11.69 DIABETES MELLITUS TYPE 2 IN OBESE: ICD-10-CM

## 2023-02-16 DIAGNOSIS — E66.9 DIABETES MELLITUS TYPE 2 IN OBESE: ICD-10-CM

## 2023-02-16 DIAGNOSIS — G47.30 SLEEP APNEA, UNSPECIFIED TYPE: ICD-10-CM

## 2023-02-16 DIAGNOSIS — J45.909 ASTHMA, UNSPECIFIED ASTHMA SEVERITY, UNSPECIFIED WHETHER COMPLICATED, UNSPECIFIED WHETHER PERSISTENT: ICD-10-CM

## 2023-02-16 DIAGNOSIS — K21.9 GASTROESOPHAGEAL REFLUX DISEASE WITHOUT ESOPHAGITIS: ICD-10-CM

## 2023-02-16 DIAGNOSIS — R10.13 DYSPEPSIA: ICD-10-CM

## 2023-02-16 DIAGNOSIS — E66.01 MORBID OBESITY: Primary | ICD-10-CM

## 2023-02-16 DIAGNOSIS — E78.5 HYPERLIPIDEMIA, UNSPECIFIED HYPERLIPIDEMIA TYPE: ICD-10-CM

## 2023-02-16 DIAGNOSIS — F33.0 MDD (MAJOR DEPRESSIVE DISORDER), RECURRENT EPISODE, MILD: ICD-10-CM

## 2023-02-16 DIAGNOSIS — Z71.89 ENCOUNTER FOR PSYCHOLOGICAL ASSESSMENT PRIOR TO BARIATRIC SURGERY: ICD-10-CM

## 2023-02-16 PROBLEM — E11.9 TYPE 2 DIABETES MELLITUS WITHOUT COMPLICATION, WITHOUT LONG-TERM CURRENT USE OF INSULIN (HCC): Status: ACTIVE | Noted: 2023-02-16

## 2023-02-16 PROCEDURE — 90791 PSYCH DIAGNOSTIC EVALUATION: CPT | Performed by: PSYCHOLOGIST

## 2023-02-16 PROCEDURE — 99214 OFFICE O/P EST MOD 30 MIN: CPT | Performed by: PHYSICIAN ASSISTANT

## 2023-02-16 SDOH — SOCIAL STABILITY - SOCIAL INSECURITY: DEPENDENT RELATIVE NEEDING CARE AT HOME: Z63.6

## 2023-02-16 NOTE — PROGRESS NOTES
PROGRESS NOTE    Data:    Sandra Auguste is a 62 y.o. female who met with the undersigned for a scheduled psychological evaluation from 1:45 - 2:30pm.      Clinical Maneuvering/Intervention:      Chief complaint and history of presenting illness/Problems: struggling with obesity for several years. Despite trying different weight loss plans and diets, the pt reported being unsuccessful in losing weight. A psychological evaluation was conducted in order to assess past and current level of functioning. Areas assessed included, but were not limited to: perception of social support, perception of ability to face and deal with challenges in life (positive functioning), anxiety symptoms, depressive symptoms, perspective on beliefs/belief system, coping skills for stress, intelligence level, addiction issues, etc. Therapeutic rapport was established. Interventions conducted today were geared towards assessing the pt's readiness for weight loss surgery and identifying and psychological contraindications for undergoing such a major life change. Social support was deemed strong (specific to weight loss surgery/weight loss in this manner and in a general sense): son, daughter, friends, and healthcare workers who come help her care for her mother. Current psychological struggles were described as low - moderate and included: depression and being frustrated with being overweight and caregiver burden (caring for her mother with Alzheimer's round the clock; she lives with her mother). Coping skills for distress and related to undergoing a major life change such as weight loss surgery/weight loss were deemed strong and included good/dry sense of humor, follows directions well (when direct and kind), responsible person, maintains quality relationships with others, and believes in herself that she will be successful with weight loss surgery. The pt endorsed having characteristics of readiness to undergo major life changes inherent in  the journey of weight loss surgery. She could speak to having 'suffered enough,' and the decision to have weight loss surgery is one she feels good about making. The pt expressed gratitude for today's visit.     Past Family and Social History:      History of family mental health problems: mother (Alzheimer's disease)    Psychosocial history: treatment of psychiatric care in the past (in counseling for many years), alcohol/substance abuse treatment in the past (N/A) , alcohol/substance abuse problems (N/A), inpatient psychiatric care (N/A).    Mental Status Exam (MSE):  Hygiene:  good  Dress: normal  Attitude:  cooperative and proactive  Motor Activity: normal  Speech: normal  Mood:   nervous, but hopeful  Affect:  congruent  Thought Processes: normal  Thought Content:  normal  Suicidal Thoughts:  not endorsed  Homicidal Thoughts:  not endorsed  Crisis Safety Plan: not needed   Hallucinations:  none      Patient's Support Network Includes:  family, friends      Progress toward goal: there is evidence to suggest that she is taking measures to improve the quality of her life including seeking weight loss surgery.       Functional Status: moderate to high      Prognosis: good with weight loss surgery    Evaluation, Diagnoses, and Ability/Capacity to Respond to Treatment:      The pt presented to be struggling with ongoing depression at a mild level, caregiver burden, and obesity (BMI = 41.60, morbid obesity). Results of MSE demonstrated a functional status of moderate to high. Strengths: belief in self that she will be successful with weight loss surgery, etc (see detailed list of coping skills above). Needed for growth (CPT code requirement for Weaknesses): weight loss.      Direct supportive comments are best for this pt. She needs structure (do this, then this) and encouragement to do it. From a psychological standpoint, the pt presents as a good candidate for bariatric surgery. She is motivated for the surgery, has  showed readiness for the lifestyle change in terms of starting to adjust her eating habits, and seems to have appropriate expectations of how to prepare and how to live after surgery in order to lose weight successfully.    Treatment Plan:      Short term goals: Start improving her health by following up with her bariatric surgeon in order to receive weight loss surgery as soon as feasible/appropriate and demonstrate success with compliance to adhering to the recommended diet. Long term goals: reach a healthy weight and alleviation of depression/increase in energy via taking control over her health.    Rosmery Morrow, PhD, LP

## 2023-02-16 NOTE — PROGRESS NOTES
"[facility] BARIATRIC SURGERY  [address]  [address]  [facility] [id number] [phone]      Patient  Name:  Sandra Auguste  :  1960      Date of Visit: 2023      Chief Complaint:  weight gain; unable to maintain weight loss      History of Present Illness:  Sandra Auguste is a 62 y.o. female who presents today for evaluation, education and consultation regarding metabolic and bariatric surgery with Dr. Hung.     Sandra has been overweight for at least 60 years, has been 35 pounds or more overweight for at least 45 years, has been 100 pounds or more overweight for 10 or more years.  Previous diet attempts include: High Protein, Low Carbohydrate, Pedro's Diet, Cabbage Soup and Slim Fast; Nutri-System and Weight Watchers; Wellbutrin, Dexatrim, Phendiet, Prozac and Phentrol.  The most weight Sandra lost was 50 pounds but was unable to maintain that weight loss.  Her maximum lifetime weight is 268 pounds.       Complete history has been obtained and discussed today, as pertinent to metabolic/ bariatric surgery.     Note:  c/o chronic dyspepsia, \"abdominal issues\" w/ no known diagnosis.  Has followed w/ GI (Dr. Chavarria in the past).  EGD 10/2020 revealed esophagitis and hiatal hernia, BX benign.  GBUS 2020 revealed fatty liver, but no gallbladder issue.  GES 2021 normal.  BE 2022 pancolonic redundancy and diverticulosis.    Past Medical History:   Diagnosis Date   • Anesthesia complication     HAS TROUBLE GETTING \"NUMB\"    • Anxiety    • Arthritis     on Celebrex + Robaxin, no steroids   • Asthma     does not follow w/ pulmonary   • Bladder spasms    • Depression     w/ PTSD   • Diverticulosis    • Dyspepsia    • Fatigue    • GERD (gastroesophageal reflux disease)     controlled w/ nightly Pepcid, EGD  w/ Dr. Chavarria, (+)    • Memory loss     FROM SKULL FRACTURE AND FALL-SHORT TERM MEMORY LOSS   • Migraine    • Morbid obesity (HCC)    • Seasonal allergies    • " "Seizures (HCC)     \"convulsions\" at age 5   • Skull fracture (HCC) 2012   • Sleep apnea with use of continuous positive airway pressure (CPAP)     CPAP compliant   • SOB (shortness of breath) on exertion    • Tendonitis    • Type 2 diabetes mellitus without complication, without long-term current use of insulin (HCC)     dx 2021, no insulin, A1C <7   • Wears contact lenses    • Wears partial dentures     TOP ONLY    • Wears prescription eyeglasses      Past Surgical History:   Procedure Laterality Date   • CERVICAL POLYPECTOMY     • TOTAL LAPAROSCOPIC HYSTERECTOMY N/A 9/18/2017    Procedure: TOTAL LAPAROSCOPIC HYSTERECTOMY, BILATERAL SALPINGO OOPHORECTOMY WITH DAVINCI ROBOT ;  Surgeon: Shannon Grimaldo DO;  Location: [facility] OR;  Service:    • WISDOM TOOTH EXTRACTION         Allergies   Allergen Reactions   • Mold Extract [Trichophyton] Shortness Of Breath, Anxiety, Palpitations and Irritability   • Lortab [Hydrocodone-Acetaminophen] Nausea And Vomiting   • Pollen Extract Other (See Comments)     SNEEZING AND CONGESTION        Current Outpatient Medications:   •  acetaminophen (TYLENOL) 500 MG tablet, Take 500 mg by mouth Every 6 (Six) Hours As Needed for Mild Pain ., Disp: , Rfl:   •  amitriptyline (ELAVIL) 25 MG tablet, Take 25 mg by mouth Every Night., Disp: , Rfl:   •  Biotin 1 MG capsule, Take  by mouth., Disp: , Rfl:   •  Blood Glucose Monitoring Suppl (OneTouch Verio) w/Device kit, 1 kit Daily., Disp: 1 kit, Rfl: 0  •  Brexpiprazole (Rexulti) 0.5 MG tablet, Take 0.5 mg by mouth Daily., Disp: 30 tablet, Rfl: 1  •  celecoxib (CeleBREX) 50 MG capsule, Take 50 mg by mouth As Needed., Disp: , Rfl:   •  Cranberry 1000 MG capsule, Take 1 capsule by mouth Daily., Disp: , Rfl:   •  cyclobenzaprine (FLEXERIL) 5 MG tablet, Take 1 tablet by mouth 2 (Two) Times a Day As Needed for Muscle Spasms., Disp: 20 tablet, Rfl: 0  •  Diclofenac Sodium (VOLTAREN) 1 % gel gel, APPLY 4 GRAMS TOPICALLY TO THE APPROPRIATE AREA AS DIRECTED " FOUR TIMES A DAYS AS NEEDED FOR PAIN IN SHOULDERS, Disp: 100 g, Rfl: 1  •  famotidine (PEPCID) 40 MG tablet, TAKE ONE TABLET BY MOUTH DAILY, Disp: 90 tablet, Rfl: 0  •  fluticasone (FLONASE) 50 MCG/ACT nasal spray, 2 sprays into the nostril(s) as directed by provider Daily for 30 days., Disp: 16 g, Rfl: 0  •  Fluticasone Furoate-Vilanterol (Breo Ellipta) 200-25 MCG/ACT inhaler, Inhale 1 puff Daily., Disp: 60 each, Rfl: 3  •  glucose blood (OneTouch Verio) test strip, Use one strip to check blood sugars one time daily, Disp: 100 each, Rfl: 3  •  glucose monitor monitoring kit, Use as directed to check blood glucose once daily., Disp: 1 each, Rfl: 0  •  Insulin Pen Needle (Pen Needles) 32G X 6 MM misc, 1 dose 1 (One) Time Per Week., Disp: 100 each, Rfl: 3  •  Lancets (onetouch ultrasoft) lancets, Use one lancet daily to check blood sugars, Disp: 100 each, Rfl: 3  •  lidocaine (LIDODERM) 5 %, Place 1 patch on the skin as directed by provider Daily. Remove & Discard patch within 12 hours or as directed by MD, Disp: 6 patch, Rfl: 0  •  loratadine (CLARITIN) 10 MG tablet, Take 1 tablet by mouth Daily., Disp: 30 tablet, Rfl: 5  •  metFORMIN ER (GLUCOPHAGE-XR) 500 MG 24 hr tablet, TAKE TWO TABLETS BY MOUTH DAILY WITH BREAKFAST (Patient taking differently: Take 500 mg by mouth Daily With Breakfast.), Disp: 180 tablet, Rfl: 0  •  methocarbamol (ROBAXIN) 500 MG tablet, Take 500 mg by mouth Daily., Disp: , Rfl:   •  multivitamin with minerals tablet tablet, Take 1 tablet by mouth Daily., Disp: , Rfl:   •  ondansetron (Zofran) 8 MG tablet, Take 1 tablet by mouth Every 8 (Eight) Hours As Needed for Nausea or Vomiting., Disp: 15 tablet, Rfl: 0  •  propranolol (INDERAL) 10 MG tablet, Take 1 tablet by mouth 2 (Two) Times a Day As Needed (anxiety)., Disp: 60 tablet, Rfl: 0  •  rosuvastatin (CRESTOR) 5 MG tablet, TAKE ONE TABLET BY MOUTH DAILY, Disp: 90 tablet, Rfl: 0  •  Trulicity 1.5 MG/0.5ML solution pen-injector, INJECT 1/2  MILLILITER (1.5MG) UNDER THE SKIN INTO THE APPROPRIATE AREA ONCE WEEKLY, Disp: 6 mL, Rfl: 0  •  vilazodone (Viibryd) 20 MG tablet tablet, Take 1 tablet by mouth Daily., Disp: 30 tablet, Rfl: 1  •  EPINEPHrine (EPIPEN) 0.3 MG/0.3ML solution auto-injector injection, Inject 0.3 mL into the appropriate muscle as directed by prescriber As Needed (FOR SEVERE ALLERGIC REACTION)., Disp: 1 each, Rfl: 1  •  hydrOXYzine (ATARAX) 25 MG tablet, Take 1 tablet by mouth Every 8 (Eight) Hours As Needed for Anxiety., Disp: 90 tablet, Rfl: 1    Social History     Socioeconomic History   • Marital status: Single   Tobacco Use   • Smoking status: Never   • Smokeless tobacco: Never   Vaping Use   • Vaping Use: Never used   Substance and Sexual Activity   • Alcohol use: Not Currently   • Drug use: Never   • Sexual activity: Not Currently     Birth control/protection: Post-menopausal, Surgical     Social History     Social History Narrative    Lives in [place], [place] w/ her mother.   with 2 adult children.  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         Family History   Problem Relation Age of Onset   • Cancer Mother         cervical cancer   • Dementia Mother    • Hypertension Mother    • Obesity Father    • Hypertension Father    • Heart attack Father    • Sleep apnea Father    • Sleep apnea Sister    • Hypertension Brother    • Sleep apnea Brother    • Cancer Maternal Aunt         lymphoma   • Cancer Paternal Aunt    • Breast cancer Paternal Aunt 70   • Cancer Maternal Grandmother    • Obesity Paternal Grandmother    • Diabetes Paternal Grandmother    • Hypertension Paternal Grandmother    • Stroke Paternal Grandmother    • Heart attack Paternal Grandmother    • Heart attack Paternal Grandfather    • Alcohol abuse Other    • Depression Other        Review of Systems:  Constitutional:  reports fatigue, weight gain and denies fevers, chills.  HEENT:  denies headache, ear pain or loss of hearing, blurred or double vision, nasal discharge or sore  throat.  Cardiovascular:   denies HTN, hx heart disease, hx MI, chest pain, hx DVT.  Respiratory:  reports sleep apnea, asthma and denies cough , wheezing, hx PE.  Gastrointestinal:  reports heartburn, abdominal pain and denies dysphagia, nausea, vomiting, IBS.  Genitourinary:   denies history of  frequent UTI, incontinence, hematuria, dysuria, polyuria, polydipsia, renal insufficiency.    Musculoskeletal:   denies fibromyalgia and autoimmune disease.  Neurological:  denies numbness /tingling, dizziness, confusion, seizure d/o, stroke.  Psychiatric:  reports depressed mood, feeling anxious and denies bipolar disorder.  Endocrine:  reports diabetes and denies thyroid disease.  Hematologic:  denies bruising, bleeding disorder, hx anemia, hx blood transfusion.  Skin:  denies rashes, hx MRSA.    Physical Exam:  Vital Signs:  Weight: 113 kg (250 lb)   Body mass index is 41.6 kg/m².  Temp: 97.3 °F (36.3 °C)   Heart Rate: 94   BP: 140/88     Physical Exam  Vitals reviewed.   Constitutional:       Appearance: She is well-developed.      Comments: wearing a mask   HENT:      Head: Normocephalic and atraumatic.   Eyes:      General: No scleral icterus.     Conjunctiva/sclera: Conjunctivae normal.   Neck:      Thyroid: No thyromegaly.   Cardiovascular:      Rate and Rhythm: Normal rate and regular rhythm.      Heart sounds: No murmur heard.  Pulmonary:      Effort: Pulmonary effort is normal. No respiratory distress.      Breath sounds: Normal breath sounds. No wheezing or rales.   Abdominal:      General: Bowel sounds are normal. There is no distension.      Palpations: Abdomen is soft. There is no mass.      Tenderness: There is no abdominal tenderness.      Hernia: No hernia is present.      Comments: scars: lap hysterctomy   Musculoskeletal:         General: Normal range of motion.      Cervical back: Neck supple.   Skin:     General: Skin is warm and dry.      Findings: No rash.   Neurological:      Mental Status: She is  alert and oriented to person, place, and time.      Gait: Gait normal.   Psychiatric:         Judgment: Judgment normal.         Patient Active Problem List   Diagnosis   • Anemia   • Post traumatic stress disorder (PTSD)   • Anxiety   • Strain of neck muscle   • Cough   • Diverticulitis of colon   • Fatigue   • Steatosis of liver   • Lateral epicondylitis   • Knee pain   • Spasm   • Osteoarthritis   • Palpitations   • Nausea   • Shortness of breath   • Skin tag   • Candidiasis of vagina   • Gastroesophageal reflux disease   • Acute pain of left shoulder   • Abdominal distension (gaseous)   • SOB (shortness of breath) on exertion   • Sleep apnea with use of continuous positive airway pressure (CPAP)   • Seasonal allergies   • Migraine   • Diverticulosis   • Depression   • Hypertension   • Itching   • Stabbing headache   • Temple tenderness   • TMJ click   • Skull fracture (HCC)   • Health care maintenance   • Moderate episode of recurrent major depressive disorder (HCC)   • Generalized anxiety disorder   • High risk medications (not anticoagulants) long-term use   • Hyperlipidemia   • Asthma   • Type 2 diabetes mellitus without complication, without long-term current use of insulin (HCC)   • Dyspepsia       Assessment:  62 y.o. female with medically complicated obesity pursuing sleeve gastrectomy.    Metabolic & Bariatric Surgery is deemed medically necessary given the following: Class 3 Severe Obesity (BMI >=40). Obesity-related health conditions include the following: obstructive sleep apnea, diabetes mellitus, dyslipidemias, GERD and osteoarthritis. Obesity is worsening. BMI is is above average; BMI management plan is completed. We discussed consulting a Bariatric surgeon.        Plan:  Further evaluation will include: CBC, CMP, Lipids, TSH, HgA1C, H.Pylori serum, EKG, CXR, Pulmonary Function Testing and Gallbladder Eval.  Previous GES and EGD reports obtained/reviewed.     Additional clearances needed prior to  surgery will include: Cardiology.     Patient understands that bariatric surgery is not cosmetic surgery but rather a tool to help make a lifelong commitment to lifestyle changes including diet, exercise and behavior modifications.  The patient has been educated today on those expected postoperative lifestyle changes.  Psychological and Nutritional consultations will be completed prior to surgery.  Instructions on how to access [employer] (an internet based site w/ educational surgical videos) were given to the patient.  Recommended perioperative vitamin supplementation was reviewed.  The importance of avoiding ASA/ NSAIDS/ steroids/ tobacco/nicotine/ hormones/ immunomodulators perioperatively was discussed in detail.  All questions/concerns have been addressed.      Further input to follow pending the above.           SHAUNA Marquis

## 2023-02-16 NOTE — PROGRESS NOTES
"Weight Loss Surgery  Presurgical Nutrition Assessment     Sandra Auguste  02/16/2023  [id number]  [id number]  1960   female    Surgery desired: Sleeve Gastrectomy    Height: 165.1 cm (65\")  Weight: 113 kg (250 #)  BMI: 41.60    Past Medical History:   Diagnosis Date   • Anesthesia complication     HAS TROUBLE GETTING \"NUMB\"    • Anxiety    • Arthritis     on Celebrex + Robaxin, no steroids   • Asthma     does not follow w/ pulmonary   • Bladder spasms    • Depression     w/ PTSD   • Diverticulosis    • Dyspepsia    • Fatigue    • GERD (gastroesophageal reflux disease)     controlled w/ nightly Pepcid, EGD 2020 w/ Dr. Chavarria, (+) HH   • Memory loss     FROM SKULL FRACTURE AND FALL-SHORT TERM MEMORY LOSS   • Migraine    • Morbid obesity (HCC)    • Seasonal allergies    • Seizures (HCC)     \"convulsions\" at age 5   • Skull fracture (HCC) 2012   • Sleep apnea with use of continuous positive airway pressure (CPAP)     CPAP compliant   • SOB (shortness of breath) on exertion    • Tendonitis    • Type 2 diabetes mellitus without complication, without long-term current use of insulin (HCC)     dx 2021, no insulin, A1C <7   • Wears contact lenses    • Wears partial dentures     TOP ONLY    • Wears prescription eyeglasses      Past Surgical History:   Procedure Laterality Date   • CERVICAL POLYPECTOMY     • TOTAL LAPAROSCOPIC HYSTERECTOMY N/A 9/18/2017    Procedure: TOTAL LAPAROSCOPIC HYSTERECTOMY, BILATERAL SALPINGO OOPHORECTOMY WITH DAVINCI ROBOT ;  Surgeon: Shannon Grimaldo DO;  Location: [facility];  Service:    • WISDOM TOOTH EXTRACTION       Allergies   Allergen Reactions   • Mold Extract [Trichophyton] Shortness Of Breath, Anxiety, Palpitations and Irritability   • Lortab [Hydrocodone-Acetaminophen] Nausea And Vomiting   • Pollen Extract Other (See Comments)     SNEEZING AND CONGESTION        Current Outpatient Medications:   •  acetaminophen (TYLENOL) 500 MG tablet, Take 500 mg by mouth Every 6 (Six) Hours As " Needed for Mild Pain ., Disp: , Rfl:   •  amitriptyline (ELAVIL) 25 MG tablet, Take 25 mg by mouth Every Night., Disp: , Rfl:   •  Biotin 1 MG capsule, Take  by mouth., Disp: , Rfl:   •  Blood Glucose Monitoring Suppl (OneTouch Verio) w/Device kit, 1 kit Daily., Disp: 1 kit, Rfl: 0  •  Brexpiprazole (Rexulti) 0.5 MG tablet, Take 0.5 mg by mouth Daily., Disp: 30 tablet, Rfl: 1  •  celecoxib (CeleBREX) 50 MG capsule, Take 50 mg by mouth As Needed., Disp: , Rfl:   •  Cranberry 1000 MG capsule, Take 1 capsule by mouth Daily., Disp: , Rfl:   •  cyclobenzaprine (FLEXERIL) 5 MG tablet, Take 1 tablet by mouth 2 (Two) Times a Day As Needed for Muscle Spasms., Disp: 20 tablet, Rfl: 0  •  Diclofenac Sodium (VOLTAREN) 1 % gel gel, APPLY 4 GRAMS TOPICALLY TO THE APPROPRIATE AREA AS DIRECTED FOUR TIMES A DAYS AS NEEDED FOR PAIN IN SHOULDERS, Disp: 100 g, Rfl: 1  •  EPINEPHrine (EPIPEN) 0.3 MG/0.3ML solution auto-injector injection, Inject 0.3 mL into the appropriate muscle as directed by prescriber As Needed (FOR SEVERE ALLERGIC REACTION)., Disp: 1 each, Rfl: 1  •  famotidine (PEPCID) 40 MG tablet, TAKE ONE TABLET BY MOUTH DAILY, Disp: 90 tablet, Rfl: 0  •  fluticasone (FLONASE) 50 MCG/ACT nasal spray, 2 sprays into the nostril(s) as directed by provider Daily for 30 days., Disp: 16 g, Rfl: 0  •  Fluticasone Furoate-Vilanterol (Breo Ellipta) 200-25 MCG/ACT inhaler, Inhale 1 puff Daily., Disp: 60 each, Rfl: 3  •  glucose blood (OneTouch Verio) test strip, Use one strip to check blood sugars one time daily, Disp: 100 each, Rfl: 3  •  glucose monitor monitoring kit, Use as directed to check blood glucose once daily., Disp: 1 each, Rfl: 0  •  hydrOXYzine (ATARAX) 25 MG tablet, Take 1 tablet by mouth Every 8 (Eight) Hours As Needed for Anxiety., Disp: 90 tablet, Rfl: 1  •  Insulin Pen Needle (Pen Needles) 32G X 6 MM misc, 1 dose 1 (One) Time Per Week., Disp: 100 each, Rfl: 3  •  Lancets (onetouch ultrasoft) lancets, Use one lancet  daily to check blood sugars, Disp: 100 each, Rfl: 3  •  lidocaine (LIDODERM) 5 %, Place 1 patch on the skin as directed by provider Daily. Remove & Discard patch within 12 hours or as directed by MD, Disp: 6 patch, Rfl: 0  •  loratadine (CLARITIN) 10 MG tablet, Take 1 tablet by mouth Daily., Disp: 30 tablet, Rfl: 5  •  metFORMIN ER (GLUCOPHAGE-XR) 500 MG 24 hr tablet, TAKE TWO TABLETS BY MOUTH DAILY WITH BREAKFAST (Patient taking differently: Take 500 mg by mouth Daily With Breakfast.), Disp: 180 tablet, Rfl: 0  •  methocarbamol (ROBAXIN) 500 MG tablet, Take 500 mg by mouth Daily., Disp: , Rfl:   •  multivitamin with minerals tablet tablet, Take 1 tablet by mouth Daily., Disp: , Rfl:   •  ondansetron (Zofran) 8 MG tablet, Take 1 tablet by mouth Every 8 (Eight) Hours As Needed for Nausea or Vomiting., Disp: 15 tablet, Rfl: 0  •  propranolol (INDERAL) 10 MG tablet, Take 1 tablet by mouth 2 (Two) Times a Day As Needed (anxiety)., Disp: 60 tablet, Rfl: 0  •  rosuvastatin (CRESTOR) 5 MG tablet, TAKE ONE TABLET BY MOUTH DAILY, Disp: 90 tablet, Rfl: 0  •  Trulicity 1.5 MG/0.5ML solution pen-injector, INJECT 1/2 MILLILITER (1.5MG) UNDER THE SKIN INTO THE APPROPRIATE AREA ONCE WEEKLY, Disp: 6 mL, Rfl: 0  •  vilazodone (Viibryd) 20 MG tablet tablet, Take 1 tablet by mouth Daily., Disp: 30 tablet, Rfl: 1      Assessment of Caloric needs    Estimated Current energy needs:  1700 kcal    Estimated calories for weight loss:  1500 kcal    IBW (Pounds):  150 #      Excess body weight (Pounds):  100 #       Nutrition Recall:  Example of Usual 24 hour intake:      Breakfast: at 9-10 am = 1 bowlful Frosted Mini Wheats /c 1 cup almond milk; 1 mixed fruit cup /c cherries, 2 pieces DK rye toast /c butter,  3 cups coffee /c 1/2 & 1/2 creamer and 2 squirts David Skinny syrup     Lunch: at 2 pm = kale/yobani salad /c 3/4 cup cheddar cheese shreds, 1 can albacore tuna, 2 tbsp ranch dressing, 1/2 cup croutons, 16 oz diet Coke, and 60 oz  drinking water.     Dinner: at 7-8 pm at [employer] = 1 Supreme Crunch wrap + 1 Dorito Taco, 2 donut holes and a laarge leidy tea to drink.      Snacks: at 9-10 pm (Pretty's Day) = 8 to 10 mixed variety of chocolates with water to drink                                                                                                                                                                                                                                                                                                                        Beverages of Choice: water, Diet Coke    Food Allergies or Intolerances: no food related allergies or intolerances          Exercise: no exercise program at this time       Assessment of Nutritional Adequacy, Excessive Intake or Deficiencies: Diet approaches meeting protein needs and is high in processed carbohydrate.                                                                                                                                                                                                               Education    Provided Nutrition Guidelines for Bariatric and Metabolic Surgery   1. Reviewed guidelines for higher protein, limited carbohydrate diet to promote weight loss.  Encouraged patient to incorporate these principles of healthy eating    2. Encouraged patient to choose an acceptable protein supplement beverage for the post-surgery liquid diet.  Provided product guidelines and examples.    3. Explained importance of goal setting to help in changing eating behaviors that are not conducive to weight loss.  Specific macronutrient goals as below.   4. Provided follow-up options for support, including contact information for dietitians here.   Web-based support information and apps for smart phones and computers given.        Nutrition Goals   Protein goal:  grams per day in three regular balanced meals and two to three high protein snacks each day, to  ensure desired weight loss.   Carbohydrate goal:  100-140 grams per day  Beverage goal: Appropriate non-carbonated beverage intake.  Patient will wean self off of diet Coke    Exercise Goals  1. Continue current exercise routine, adding 15-30 minutes of activity per day as tolerated and per medical advice.   2. Start activity plan per medical advice if not currently exercising.     Recommend that team proceed with surgery and follow per protocol.   Nohemy Bello RD  02/16/2023  16:03 EST

## 2023-02-17 ENCOUNTER — TELEPHONE (OUTPATIENT)
Dept: INTERNAL MEDICINE | Facility: CLINIC | Age: 63
End: 2023-02-17

## 2023-02-17 LAB
ALBUMIN SERPL-MCNC: 4.2 G/DL (ref 3.8–4.9)
ALBUMIN/GLOB SERPL: 1.6 {RATIO} (ref 1.2–2.2)
ALP SERPL-CCNC: 104 IU/L (ref 44–121)
ALT SERPL-CCNC: 20 IU/L (ref 0–32)
AST SERPL-CCNC: 19 IU/L (ref 0–40)
BASOPHILS # BLD AUTO: 0.1 X10E3/UL (ref 0–0.2)
BASOPHILS NFR BLD AUTO: 1 %
BILIRUB SERPL-MCNC: 0.3 MG/DL (ref 0–1.2)
BUN SERPL-MCNC: 13 MG/DL (ref 8–27)
BUN/CREAT SERPL: 17 (ref 12–28)
CALCIUM SERPL-MCNC: 9.9 MG/DL (ref 8.7–10.3)
CHLORIDE SERPL-SCNC: 104 MMOL/L (ref 96–106)
CHOLEST SERPL-MCNC: 141 MG/DL (ref 100–199)
CO2 SERPL-SCNC: 25 MMOL/L (ref 20–29)
CREAT SERPL-MCNC: 0.75 MG/DL (ref 0.57–1)
EGFRCR SERPLBLD CKD-EPI 2021: 91 ML/MIN/1.73
EOSINOPHIL # BLD AUTO: 0.5 X10E3/UL (ref 0–0.4)
EOSINOPHIL NFR BLD AUTO: 4 %
ERYTHROCYTE [DISTWIDTH] IN BLOOD BY AUTOMATED COUNT: 13.1 % (ref 11.7–15.4)
GLOBULIN SER CALC-MCNC: 2.6 G/DL (ref 1.5–4.5)
GLUCOSE SERPL-MCNC: 74 MG/DL (ref 70–99)
H PYLORI IGA SER-ACNC: <9 UNITS (ref 0–8.9)
H PYLORI IGG SER IA-ACNC: 0.13 INDEX VALUE (ref 0–0.79)
HBA1C MFR BLD: 6.5 % (ref 4.8–5.6)
HCT VFR BLD AUTO: 41.8 % (ref 34–46.6)
HDLC SERPL-MCNC: 49 MG/DL
HGB BLD-MCNC: 13.6 G/DL (ref 11.1–15.9)
IMM GRANULOCYTES # BLD AUTO: 0 X10E3/UL (ref 0–0.1)
IMM GRANULOCYTES NFR BLD AUTO: 0 %
LDLC SERPL CALC-MCNC: 64 MG/DL (ref 0–99)
LYMPHOCYTES # BLD AUTO: 2.7 X10E3/UL (ref 0.7–3.1)
LYMPHOCYTES NFR BLD AUTO: 25 %
MCH RBC QN AUTO: 28.8 PG (ref 26.6–33)
MCHC RBC AUTO-ENTMCNC: 32.5 G/DL (ref 31.5–35.7)
MCV RBC AUTO: 89 FL (ref 79–97)
MONOCYTES # BLD AUTO: 1 X10E3/UL (ref 0.1–0.9)
MONOCYTES NFR BLD AUTO: 9 %
NEUTROPHILS # BLD AUTO: 6.3 X10E3/UL (ref 1.4–7)
NEUTROPHILS NFR BLD AUTO: 61 %
PLATELET # BLD AUTO: 387 X10E3/UL (ref 150–450)
POTASSIUM SERPL-SCNC: 4.7 MMOL/L (ref 3.5–5.2)
PROT SERPL-MCNC: 6.8 G/DL (ref 6–8.5)
RBC # BLD AUTO: 4.72 X10E6/UL (ref 3.77–5.28)
SODIUM SERPL-SCNC: 143 MMOL/L (ref 134–144)
TRIGL SERPL-MCNC: 163 MG/DL (ref 0–149)
TSH SERPL DL<=0.005 MIU/L-ACNC: 2.17 UIU/ML (ref 0.45–4.5)
VLDLC SERPL CALC-MCNC: 28 MG/DL (ref 5–40)
WBC # BLD AUTO: 10.5 X10E3/UL (ref 3.4–10.8)

## 2023-02-17 NOTE — TELEPHONE ENCOUNTER
Caller: HERLINDA    Relationship: Other    Best call back number: [phone]    What orders are you requesting (i.e. lab or imaging): SUPPLIES    In what timeframe would the patient need to come in: ASAP    Where will you receive your lab/imaging services: [facility]    Additional notes: [facility] CALLING ABOUT INCOMPETENT SUPPLIES AND IS WANTING TO KNOW IF WE RECEIVED THE FAX FROM 02/08/23    [facility] IS REFAXING THIS TO US.

## 2023-02-18 DIAGNOSIS — E11.9 TYPE 2 DIABETES MELLITUS WITHOUT COMPLICATION, WITHOUT LONG-TERM CURRENT USE OF INSULIN: ICD-10-CM

## 2023-02-18 RX ORDER — DULAGLUTIDE 1.5 MG/.5ML
INJECTION, SOLUTION SUBCUTANEOUS
Qty: 6 ML | Refills: 0 | Status: SHIPPED | OUTPATIENT
Start: 2023-02-18

## 2023-02-20 NOTE — TELEPHONE ENCOUNTER
Spoke with Tonie at [facility] to let her know we have not received an order form for supplies for the patient, they will refax the form

## 2023-02-27 NOTE — PROGRESS NOTES
"Sleep Clinic Video Visit Follow Up Note    You have chosen to receive care through a telehealth visit.  Do you consent to use a video/audio connection for your medical care today? Yes     A telephone encounter was performed     Chief Complaint  Follow-up after device pressure change    Subjective     History of Present Illness (from previous encounter on 11/10/2022):  She returns for follow up and reports having difficulty with pressures and can't breath very well with the device.  Patient was referred at her last visit to ear nose and throat as she wishes to pursue other options for her sleep apnea.  She never received a call and I have placed a new referral for her today.  Additionally, I will adjust her PAP pressures to see if they might be beneficial for her in the interim.  Patient will return for follow-up in 3 months.  Hopefully by that time she has been able to have evaluation by otolaryngology. (End copied text)    Interval History:  Sandra Auguste is a 62 y.o. female who presents for follow-up after pressure change.  Patient has not used device since 1/1/2023.  She was referred to otolaryngology at her last visit. She is using her device and it does seem to help. She was started on amytryptaline which has also helped. She has seen bariatric surgery and is following procedures prior to surgery.    Further details are as follows:    [place] Scale is: 11/24    Weight:    Current Weight: 250 lbs    The patient's relevant past medical, surgical, family, and social history reviewed and updated in Epic as appropriate.    PMH:    Past Medical History:   Diagnosis Date   • Anesthesia complication     HAS TROUBLE GETTING \"NUMB\"    • Anxiety    • Arthritis     on Celebrex + Robaxin, no steroids   • Asthma     does not follow w/ pulmonary   • Bladder spasms    • Depression     w/ PTSD   • Diverticulosis    • Dyspepsia    • Fatigue    • GERD (gastroesophageal reflux disease)     controlled w/ nightly Pepcid, EGD 2020 " "w/ Dr. Chavarria, (+)    • Memory loss     FROM SKULL FRACTURE AND FALL-SHORT TERM MEMORY LOSS   • Migraine    • Morbid obesity (HCC)    • Seasonal allergies    • Seizures (HCC)     \"convulsions\" at age 5   • Skull fracture (HCC)    • Sleep apnea with use of continuous positive airway pressure (CPAP)     CPAP compliant   • SOB (shortness of breath) on exertion    • Tendonitis    • Type 2 diabetes mellitus without complication, without long-term current use of insulin (HCC)     dx , no insulin, A1C <7   • Wears contact lenses    • Wears partial dentures     TOP ONLY    • Wears prescription eyeglasses      Past Surgical History:   Procedure Laterality Date   • CERVICAL POLYPECTOMY     • TOTAL LAPAROSCOPIC HYSTERECTOMY N/A 2017    Procedure: TOTAL LAPAROSCOPIC HYSTERECTOMY, BILATERAL SALPINGO OOPHORECTOMY WITH DAVINCI ROBOT ;  Surgeon: Shannon Grimaldo DO;  Location: [facility] OR;  Service:    • WISDOM TOOTH EXTRACTION       OB History        2    Para   2    Term   2            AB        Living   2       SAB        IAB        Ectopic        Molar        Multiple        Live Births                  Allergies   Allergen Reactions   • Mold Extract [Trichophyton] Shortness Of Breath, Anxiety, Palpitations and Irritability   • Lortab [Hydrocodone-Acetaminophen] Nausea And Vomiting   • Pollen Extract Other (See Comments)     SNEEZING AND CONGESTION        MEDS:  Prior to Admission medications    Medication Sig Start Date End Date Taking? Authorizing Provider   Trulicity 1.5 MG/0.5ML solution pen-injector INJECT 1/2 MILLILITER UNDER THE SKIN ONCE WEEKLY 23   Michelle Mandel PA   acetaminophen (TYLENOL) 500 MG tablet Take 500 mg by mouth Every 6 (Six) Hours As Needed for Mild Pain .    Arleen Alberts MD   amitriptyline (ELAVIL) 25 MG tablet Take 25 mg by mouth Every Night. 22  Arleen Alberts MD   Biotin 1 MG capsule Take  by mouth.    Arleen Alberts MD   Blood " Glucose Monitoring Suppl (OneTouch Verio) w/Device kit 1 kit Daily. 8/27/21   Michelle Mandel PA   Brexpiprazole (Rexulti) 0.5 MG tablet Take 0.5 mg by mouth Daily. 12/28/22   Audra Zhao APRN   celecoxib (CeleBREX) 50 MG capsule Take 50 mg by mouth As Needed. 3/4/22   ProviderArleen MD   Cranberry 1000 MG capsule Take 1 capsule by mouth Daily.    ProviderArleen MD   cyclobenzaprine (FLEXERIL) 5 MG tablet Take 1 tablet by mouth 2 (Two) Times a Day As Needed for Muscle Spasms. 1/13/22   Ko Adams Jr., APRN   Diclofenac Sodium (VOLTAREN) 1 % gel gel APPLY 4 GRAMS TOPICALLY TO THE APPROPRIATE AREA AS DIRECTED FOUR TIMES A DAYS AS NEEDED FOR PAIN IN SHOULDERS 8/4/21   Michelle Mandel PA   EPINEPHrine (EPIPEN) 0.3 MG/0.3ML solution auto-injector injection Inject 0.3 mL into the appropriate muscle as directed by prescriber As Needed (FOR SEVERE ALLERGIC REACTION). 2/10/22   Michelle Mandel PA   famotidine (PEPCID) 40 MG tablet TAKE ONE TABLET BY MOUTH DAILY 7/11/22   Michelle aMndel PA   fluticasone (FLONASE) 50 MCG/ACT nasal spray 2 sprays into the nostril(s) as directed by provider Daily for 30 days. 1/26/23 2/25/23  Alexa Wang APRN   Fluticasone Furoate-Vilanterol (Breo Ellipta) 200-25 MCG/ACT inhaler Inhale 1 puff Daily. 12/1/22   Michelle Mandel PA   glucose blood (OneTouch Verio) test strip Use one strip to check blood sugars one time daily 8/27/21   Michelle Mandel PA   glucose monitor monitoring kit Use as directed to check blood glucose once daily. 10/29/20   Michelle Mandel PA   hydrOXYzine (ATARAX) 25 MG tablet Take 1 tablet by mouth Every 8 (Eight) Hours As Needed for Anxiety. 12/28/22   Pavel, Audra F, APRN   Insulin Pen Needle (Pen Needles) 32G X 6 MM misc 1 dose 1 (One) Time Per Week. 3/10/22   Michelle Mandel PA   Lancets (onetouch ultrasoft) lancets Use one lancet daily to check blood sugars 8/27/21   Michelle Mandel PA   lidocaine (LIDODERM) 5 % Place  "1 patch on the skin as directed by provider Daily. Remove & Discard patch within 12 hours or as directed by MD 2/8/22   Tasha Marie APRN   loratadine (CLARITIN) 10 MG tablet Take 1 tablet by mouth Daily. 12/1/22   Michelle Mandel PA   metFORMIN ER (GLUCOPHAGE-XR) 500 MG 24 hr tablet TAKE TWO TABLETS BY MOUTH DAILY WITH BREAKFAST  Patient taking differently: Take 500 mg by mouth Daily With Breakfast. 2/2/23   Michelle Mandel PA   methocarbamol (ROBAXIN) 500 MG tablet Take 500 mg by mouth Daily. 5/26/22   ProviderArleen MD   multivitamin with minerals tablet tablet Take 1 tablet by mouth Daily.    ProviderArleen MD   ondansetron (Zofran) 8 MG tablet Take 1 tablet by mouth Every 8 (Eight) Hours As Needed for Nausea or Vomiting. 12/14/22   Michelle Mandel PA   propranolol (INDERAL) 10 MG tablet Take 1 tablet by mouth 2 (Two) Times a Day As Needed (anxiety). 2/9/23   Michelle Mandel PA   rosuvastatin (CRESTOR) 5 MG tablet TAKE ONE TABLET BY MOUTH DAILY 11/13/22   Michelle Mandel PA   vilazodone (Viibryd) 20 MG tablet tablet Take 1 tablet by mouth Daily. 12/28/22   Audra Zhao APRN         FH:  Family History   Problem Relation Age of Onset   • Cancer Mother         cervical cancer   • Dementia Mother    • Hypertension Mother    • Obesity Father    • Hypertension Father    • Heart attack Father    • Sleep apnea Father    • Sleep apnea Sister    • Hypertension Brother    • Sleep apnea Brother    • Cancer Maternal Aunt         lymphoma   • Cancer Paternal Aunt    • Breast cancer Paternal Aunt 70   • Cancer Maternal Grandmother    • Obesity Paternal Grandmother    • Diabetes Paternal Grandmother    • Hypertension Paternal Grandmother    • Stroke Paternal Grandmother    • Heart attack Paternal Grandmother    • Heart attack Paternal Grandfather    • Alcohol abuse Other    • Depression Other        Objective   Vital Signs:  Ht 166.4 cm (65.51\")   Wt 113 kg (250 lb)   BMI 40.95 kg/m²     Class 3 " Severe Obesity (BMI >=40). Obesity-related health conditions include the following: obstructive sleep apnea. Obesity is improving with lifestyle modifications. BMI is is above average; BMI management plan is completed. We discussed evaluatio for bariatric surgery. .        Physical Exam  Neurological:      Mental Status: She is alert and oriented to person, place, and time.   Psychiatric:         Mood and Affect: Mood normal.         Behavior: Behavior normal.             Result Review :           PAP Report  AHI: 1.8/h       Assessment and Plan  Sandra Auguste is a 62 y.o. female returns for follow-up after pressure change.  Patient reports that she is using her machine and has had improvement with it.  AHI is well-controlled at 1.8/h.  Greater than 4-hour usage at 61.7% which I have encouraged her to increase as much as possible.  Patient has difficulty with insomnia.  Amitriptyline has helped with this.  I have noted to the patient that if she loses approximately 20% of her weight we will need to retest her for sleep apnea.  Patient is presently undergoing testing in preparation for possible bariatric surgery.  For now I will refill the patient's supplies and she may follow-up in 1 year for recheck.    Diagnoses and all orders for this visit:    1. Obstructive sleep apnea, adult (Primary)  -     PAP Therapy                Follow Up  No follow-ups on file.  Patient was given instructions and counseling regarding her condition or for health maintenance advice. Please see specific information pulled into the AVS if appropriate.       REBECA Queen, ACNP-BC  Pulmonology, Critical Care, and Sleep Medicine

## 2023-02-28 ENCOUNTER — TELEMEDICINE (OUTPATIENT)
Dept: SLEEP MEDICINE | Facility: HOSPITAL | Age: 63
End: 2023-02-28
Payer: MEDICAID

## 2023-02-28 VITALS — HEIGHT: 66 IN | WEIGHT: 250 LBS | BODY MASS INDEX: 40.18 KG/M2

## 2023-02-28 DIAGNOSIS — G47.33 OBSTRUCTIVE SLEEP APNEA, ADULT: Primary | ICD-10-CM

## 2023-02-28 PROCEDURE — 99213 OFFICE O/P EST LOW 20 MIN: CPT | Performed by: NURSE PRACTITIONER

## 2023-02-28 NOTE — TELEPHONE ENCOUNTER
Spoke with [employer] to let them know we have received the paperwork and once signed we will fax it back

## 2023-02-28 NOTE — TELEPHONE ENCOUNTER
MICHAEL WITH [employer] IS CALLING BECAUSE SHE WOULD LIKE TO VERIFY RECEIPT OF ORDER FORM FOR INCONTINENCE SUPPLIES. PLEASE CALL BACK TO VERIFY IF IT WAS RECEIVED. [phone].

## 2023-03-01 PROBLEM — R73.03 PREDIABETES: Status: ACTIVE | Noted: 2023-03-01

## 2023-03-03 ENCOUNTER — PATIENT ROUNDING (BHMG ONLY) (OUTPATIENT)
Dept: BARIATRICS/WEIGHT MGMT | Facility: CLINIC | Age: 63
End: 2023-03-03
Payer: MEDICAID

## 2023-03-03 NOTE — PROGRESS NOTES
A [employer] message has been sent to the patient for PATIENT ROUNDING for [facility] - Bariatric Surgery/[facility].

## 2023-03-07 DIAGNOSIS — F41.9 ANXIETY: ICD-10-CM

## 2023-03-07 DIAGNOSIS — F33.1 MAJOR DEPRESSIVE DISORDER, RECURRENT EPISODE, MODERATE: Chronic | ICD-10-CM

## 2023-03-07 RX ORDER — BREXPIPRAZOLE 0.5 MG/1
TABLET ORAL
Qty: 30 TABLET | Refills: 0 | Status: SHIPPED | OUTPATIENT
Start: 2023-03-07 | End: 2023-03-20 | Stop reason: SDUPTHER

## 2023-03-07 RX ORDER — PROPRANOLOL HYDROCHLORIDE 10 MG/1
TABLET ORAL
Qty: 60 TABLET | Refills: 0 | Status: SHIPPED | OUTPATIENT
Start: 2023-03-07

## 2023-03-09 ENCOUNTER — TELEMEDICINE (OUTPATIENT)
Dept: PSYCHIATRY | Facility: CLINIC | Age: 63
End: 2023-03-09
Payer: MEDICAID

## 2023-03-09 DIAGNOSIS — F41.9 ANXIETY DISORDER, UNSPECIFIED TYPE: ICD-10-CM

## 2023-03-09 DIAGNOSIS — F33.1 MAJOR DEPRESSIVE DISORDER, RECURRENT EPISODE, MODERATE: Primary | ICD-10-CM

## 2023-03-09 PROCEDURE — 90837 PSYTX W PT 60 MINUTES: CPT | Performed by: COUNSELOR
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
## 2023-03-14 ENCOUNTER — OFFICE VISIT (OUTPATIENT)
Dept: CARDIOLOGY | Facility: CLINIC | Age: 63
End: 2023-03-14
Payer: MEDICAID

## 2023-03-14 VITALS
HEART RATE: 84 BPM | SYSTOLIC BLOOD PRESSURE: 128 MMHG | DIASTOLIC BLOOD PRESSURE: 84 MMHG | WEIGHT: 252 LBS | BODY MASS INDEX: 41.99 KG/M2 | HEIGHT: 65 IN | OXYGEN SATURATION: 98 %

## 2023-03-14 DIAGNOSIS — R07.2 PRECORDIAL CHEST PAIN: Primary | ICD-10-CM

## 2023-03-14 DIAGNOSIS — R06.02 SHORTNESS OF BREATH: ICD-10-CM

## 2023-03-14 PROCEDURE — 93000 ELECTROCARDIOGRAM COMPLETE: CPT | Performed by: INTERNAL MEDICINE

## 2023-03-14 PROCEDURE — 99204 OFFICE O/P NEW MOD 45 MIN: CPT | Performed by: INTERNAL MEDICINE

## 2023-03-14 PROCEDURE — 3079F DIAST BP 80-89 MM HG: CPT | Performed by: INTERNAL MEDICINE

## 2023-03-14 PROCEDURE — 3074F SYST BP LT 130 MM HG: CPT | Performed by: INTERNAL MEDICINE

## 2023-03-14 NOTE — PROGRESS NOTES
"[facility]  Consultation H&P  Sandra Auguste  1960  [phone]  There is no work phone number on file..    VISIT DATE:  03/14/2023    PCP: Michelle Mandel PA  [address] [facility] [address]    CC:  Chief Complaint   Patient presents with   • Cardiac Clearance   • Hyperlipidemia   • Sleep Apnea       Previous cardiac studies and procedures:  May 2018 TTE: Normal  2/2021 Holter, 2-day: Occasional PACs, otherwise unremarkable.    ASSESSMENT:   Diagnosis Plan   1. Precordial chest pain  Treadmill Stress Test      2. Shortness of breath  Adult Transthoracic Echo Complete W/ Cont if Necessary Per Protocol            PLAN:  Exercise treadmill test for cardiac risk stratification and ischemia evaluation.  Transthoracic echo pending to assess underlying myocardial structure and function.    Awaiting results of cardiac evaluation before determining perioperative cardiac risk.    History of Present Illness   62-year-old diabetic female with history of hypertension and dyslipidemia, and sleep apnea being evaluated for bariatric surgery with sleeve gastrectomy.  She does report intermittent upper precordial chest heaviness and tightness which is can be brought on by emotional stress.  Intermittently with strenuous physical exertion.  Also having episodes of shortness of breath with such activities going up flights of stairs.  Blood pressures running less than 130/80 mmHg.  She is compliant with medical therapy.  Serial ECGs reveal developing right bundle branch block.    PHYSICAL EXAMINATION:  Vitals:    03/14/23 1139   BP: 128/84   BP Location: Right arm   Patient Position: Sitting   Cuff Size: Adult   Pulse: 84   SpO2: 98%   Weight: 114 kg (252 lb)   Height: 165.1 cm (65\")     General Appearance:    Alert, cooperative, no distress, appears stated age   Head:    Normocephalic, without obvious abnormality, atraumatic   Eyes:    conjunctiva/corneas clear, EOM's intact, fundi "     benign, both eyes   Ears:    Normal TM's and external ear canals, both ears   Nose:   Nares normal, septum midline, mucosa normal, no drainage    or sinus tenderness   Throat:   Lips, mucosa, and tongue normal; teeth and gums normal   Neck:   Supple, symmetrical, trachea midline, no adenopathy;     thyroid:  no enlargement/tenderness/nodules; no carotid    bruit or JVD   Back:     Symmetric, no curvature, ROM normal, no CVA tenderness   Lungs:     Clear to auscultation bilaterally, respirations unlabored   Chest Wall:    No tenderness or deformity    Heart:    Regular rate and rhythm, S1 and S2 normal, no murmur, rub   or gallop, normal carotid impulse bilaterally without bruit.   Abdomen:     Soft, non-tender, bowel sounds active all four quadrants,     no masses, no organomegaly   Extremities:   Extremities normal, atraumatic, no cyanosis or edema   Pulses:   2+ and symmetric all extremities   Skin:   Skin color, texture, turgor normal, no rashes or lesions   Lymph nodes:   Cervical, supraclavicular, and axillary nodes normal   Neurologic:   normal strength, sensation intact     throughout       Diagnostic Data:    ECG 12 Lead    Date/Time: 3/14/2023 12:05 PM  Performed by: Ivan Hilton III, MD  Authorized by: Ivan Hilton III, MD   Comparison: compared with previous ECG from 6/23/2022  Comparison to previous ECG: Right bundle branch block  Rhythm: sinus rhythm  Conduction: right bundle branch block    Clinical impression: abnormal EKG          Lab Results   Component Value Date    CHLPL 141 02/16/2023    TRIG 163 (H) 02/16/2023    HDL 49 02/16/2023     Lab Results   Component Value Date    GLUCOSE 74 02/16/2023    BUN 13 02/16/2023    CREATININE 0.75 02/16/2023     02/16/2023    K 4.7 02/16/2023     02/16/2023    CO2 25 02/16/2023     Lab Results   Component Value Date    HGBA1C 6.5 (H) 02/16/2023     Lab Results   Component Value Date    WBC 10.5 02/16/2023    HGB 13.6 02/16/2023    HCT 41.8  "02/16/2023     02/16/2023       PROBLEM LIST:  Patient Active Problem List   Diagnosis   • Anemia   • Post traumatic stress disorder (PTSD)   • Anxiety   • Strain of neck muscle   • Cough   • Diverticulitis of colon   • Fatigue   • Steatosis of liver   • Lateral epicondylitis   • Knee pain   • Spasm   • Osteoarthritis   • Palpitations   • Nausea   • Shortness of breath   • Skin tag   • Candidiasis of vagina   • Gastroesophageal reflux disease   • Acute pain of left shoulder   • Abdominal distension (gaseous)   • SOB (shortness of breath) on exertion   • Sleep apnea with use of continuous positive airway pressure (CPAP)   • Seasonal allergies   • Migraine   • Diverticulosis   • Depression   • Hypertension   • Itching   • Stabbing headache   • Temple tenderness   • TMJ click   • Skull fracture ([facility])   • Health care maintenance   • Moderate episode of recurrent major depressive disorder ([facility])   • Generalized anxiety disorder   • High risk medications (not anticoagulants) long-term use   • Hyperlipidemia   • Asthma   • Type 2 diabetes mellitus without complication, without long-term current use of insulin ([facility])   • Dyspepsia       PAST MEDICAL HX  Past Medical History:   Diagnosis Date   • Anesthesia complication     HAS TROUBLE GETTING \"NUMB\"    • Anxiety    • Arthritis     on Celebrex + Robaxin, no steroids   • Asthma     does not follow w/ pulmonary   • Bladder spasms    • Depression     w/ PTSD   • Diverticulosis    • Dyspepsia    • Fatigue    • GERD (gastroesophageal reflux disease)     controlled w/ nightly Pepcid, EGD 2020 w/ Dr. Chavarria, (+)    • Memory loss     FROM SKULL FRACTURE AND FALL-SHORT TERM MEMORY LOSS   • Migraine    • Morbid obesity ([facility])    • Seasonal allergies    • Seizures ([facility])     \"convulsions\" at age 5   • Skull fracture ([facility]) 2012   • Sleep apnea with use of continuous positive airway pressure (CPAP)     CPAP compliant   • SOB (shortness of breath) on exertion    • Tendonitis    • " Type 2 diabetes mellitus without complication, without long-term current use of insulin ([facility])     dx 2021, no insulin, A1C <7   • Wears contact lenses    • Wears partial dentures     TOP ONLY    • Wears prescription eyeglasses        Allergies  Allergies   Allergen Reactions   • Mold Extract [Trichophyton] Shortness Of Breath, Anxiety, Palpitations and Irritability   • Lortab [Hydrocodone-Acetaminophen] Nausea And Vomiting   • Pollen Extract Other (See Comments)     SNEEZING AND CONGESTION        Current Medications    Current Outpatient Medications:   •  acetaminophen (TYLENOL) 500 MG tablet, Take 1 tablet by mouth Every 6 (Six) Hours As Needed for Mild Pain., Disp: , Rfl:   •  amitriptyline (ELAVIL) 25 MG tablet, Take 1 tablet by mouth Every Night., Disp: , Rfl:   •  Biotin 1 MG capsule, Take  by mouth., Disp: , Rfl:   •  Blood Glucose Monitoring Suppl (OneTouch Verio) w/Device kit, 1 kit Daily., Disp: 1 kit, Rfl: 0  •  celecoxib (CeleBREX) 50 MG capsule, Take 1 capsule by mouth As Needed., Disp: , Rfl:   •  Cranberry 1000 MG capsule, Take 1 capsule by mouth Daily., Disp: , Rfl:   •  cyclobenzaprine (FLEXERIL) 5 MG tablet, Take 1 tablet by mouth 2 (Two) Times a Day As Needed for Muscle Spasms., Disp: 20 tablet, Rfl: 0  •  Diclofenac Sodium (VOLTAREN) 1 % gel gel, APPLY 4 GRAMS TOPICALLY TO THE APPROPRIATE AREA AS DIRECTED FOUR TIMES A DAYS AS NEEDED FOR PAIN IN SHOULDERS, Disp: 100 g, Rfl: 1  •  EPINEPHrine (EPIPEN) 0.3 MG/0.3ML solution auto-injector injection, Inject 0.3 mL into the appropriate muscle as directed by prescriber As Needed (FOR SEVERE ALLERGIC REACTION)., Disp: 1 each, Rfl: 1  •  famotidine (PEPCID) 40 MG tablet, TAKE ONE TABLET BY MOUTH DAILY, Disp: 90 tablet, Rfl: 0  •  Fluticasone Furoate-Vilanterol (Breo Ellipta) 200-25 MCG/ACT inhaler, Inhale 1 puff Daily., Disp: 60 each, Rfl: 3  •  glucose blood (OneTouch Verio) test strip, Use one strip to check blood sugars one time daily, Disp: 100 each,  Rfl: 3  •  glucose monitor monitoring kit, Use as directed to check blood glucose once daily., Disp: 1 each, Rfl: 0  •  hydrOXYzine (ATARAX) 25 MG tablet, Take 1 tablet by mouth Every 8 (Eight) Hours As Needed for Anxiety., Disp: 90 tablet, Rfl: 1  •  Insulin Pen Needle (Pen Needles) 32G X 6 MM misc, 1 dose 1 (One) Time Per Week., Disp: 100 each, Rfl: 3  •  Lancets (onetouch ultrasoft) lancets, Use one lancet daily to check blood sugars, Disp: 100 each, Rfl: 3  •  lidocaine (LIDODERM) 5 %, Place 1 patch on the skin as directed by provider Daily. Remove & Discard patch within 12 hours or as directed by MD, Disp: 6 patch, Rfl: 0  •  loratadine (CLARITIN) 10 MG tablet, Take 1 tablet by mouth Daily., Disp: 30 tablet, Rfl: 5  •  metFORMIN ER (GLUCOPHAGE-XR) 500 MG 24 hr tablet, TAKE TWO TABLETS BY MOUTH DAILY WITH BREAKFAST (Patient taking differently: Take 1 tablet by mouth Daily With Breakfast.), Disp: 180 tablet, Rfl: 0  •  methocarbamol (ROBAXIN) 500 MG tablet, Take 1 tablet by mouth Daily., Disp: , Rfl:   •  multivitamin with minerals tablet tablet, Take 1 tablet by mouth Daily., Disp: , Rfl:   •  ondansetron (Zofran) 8 MG tablet, Take 1 tablet by mouth Every 8 (Eight) Hours As Needed for Nausea or Vomiting., Disp: 15 tablet, Rfl: 0  •  propranolol (INDERAL) 10 MG tablet, TAKE 1 TABLET BY MOUTH TWO TIMES A DAY AS NEEDED FOR ANXIETY, Disp: 60 tablet, Rfl: 0  •  Rexulti 0.5 MG tablet, TAKE ONE TABLET BY MOUTH DAILY, Disp: 30 tablet, Rfl: 0  •  rosuvastatin (CRESTOR) 5 MG tablet, TAKE ONE TABLET BY MOUTH DAILY, Disp: 90 tablet, Rfl: 0  •  Trulicity 1.5 MG/0.5ML solution pen-injector, INJECT 1/2 MILLILITER UNDER THE SKIN ONCE WEEKLY, Disp: 6 mL, Rfl: 0  •  vilazodone (Viibryd) 20 MG tablet tablet, Take 1 tablet by mouth Daily., Disp: 30 tablet, Rfl: 1  •  fluticasone (FLONASE) 50 MCG/ACT nasal spray, 2 sprays into the nostril(s) as directed by provider Daily for 30 days., Disp: 16 g, Rfl: 0         ROS  ROS      SOCIAL  HX  Social History     Socioeconomic History   • Marital status: Single   Tobacco Use   • Smoking status: Never   • Smokeless tobacco: Never   Vaping Use   • Vaping Use: Never used   Substance and Sexual Activity   • Alcohol use: Not Currently   • Drug use: Never   • Sexual activity: Not Currently     Birth control/protection: Post-menopausal, Surgical       FAMILY HX  Family History   Problem Relation Age of Onset   • Cancer Mother         cervical cancer   • Dementia Mother    • Hypertension Mother    • Obesity Father    • Hypertension Father    • Heart attack Father    • Sleep apnea Father    • Sleep apnea Sister    • Hypertension Brother    • Sleep apnea Brother    • Cancer Maternal Aunt         lymphoma   • Cancer Paternal Aunt    • Breast cancer Paternal Aunt 70   • Cancer Maternal Grandmother    • Obesity Paternal Grandmother    • Diabetes Paternal Grandmother    • Hypertension Paternal Grandmother    • Stroke Paternal Grandmother    • Heart attack Paternal Grandmother    • Heart attack Paternal Grandfather    • Alcohol abuse Other    • Depression Other              Ivan Hilton III, MD, [facility]

## 2023-03-20 ENCOUNTER — TELEMEDICINE (OUTPATIENT)
Dept: PSYCHIATRY | Facility: CLINIC | Age: 63
End: 2023-03-20
Payer: MEDICAID

## 2023-03-20 DIAGNOSIS — F41.9 ANXIETY DISORDER, UNSPECIFIED TYPE: Chronic | ICD-10-CM

## 2023-03-20 DIAGNOSIS — G47.9 SLEEPING DIFFICULTIES: ICD-10-CM

## 2023-03-20 DIAGNOSIS — F33.1 MAJOR DEPRESSIVE DISORDER, RECURRENT EPISODE, MODERATE: Primary | Chronic | ICD-10-CM

## 2023-03-20 PROCEDURE — 90833 PSYTX W PT W E/M 30 MIN: CPT | Performed by: NURSE PRACTITIONER

## 2023-03-20 PROCEDURE — 1159F MED LIST DOCD IN RCRD: CPT | Performed by: NURSE PRACTITIONER

## 2023-03-20 PROCEDURE — 1160F RVW MEDS BY RX/DR IN RCRD: CPT | Performed by: NURSE PRACTITIONER

## 2023-03-20 PROCEDURE — 99214 OFFICE O/P EST MOD 30 MIN: CPT | Performed by: NURSE PRACTITIONER

## 2023-03-20 RX ORDER — BREXPIPRAZOLE 0.5 MG/1
1 TABLET ORAL DAILY
Qty: 30 TABLET | Refills: 0 | Status: SHIPPED | OUTPATIENT
Start: 2023-03-20

## 2023-03-20 RX ORDER — VILAZODONE HYDROCHLORIDE 20 MG/1
20 TABLET ORAL DAILY
Qty: 30 TABLET | Refills: 1 | Status: SHIPPED | OUTPATIENT
Start: 2023-03-20

## 2023-03-20 RX ORDER — HYDROXYZINE HYDROCHLORIDE 25 MG/1
25 TABLET, FILM COATED ORAL EVERY 8 HOURS PRN
Qty: 90 TABLET | Refills: 1 | Status: SHIPPED | OUTPATIENT
Start: 2023-03-20

## 2023-03-20 NOTE — PROGRESS NOTES
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 black box warnings, and any potential medication interactions, and the patient reports they will be worse off without this medication and treatment plan.  The patient is advised to contact this APRN/this office if any questions or concerns arise at any time (at [phone]), or call 911/go to the closest emergency department if needed or outside of office hours.        SUICIDE RISK ASSESSMENT AND SAFETY PLAN: Unalterable demographics and a history of mental health intervention indicate this patient is in a high risk category compared to the general population. At present, the patient denies active SI/HI, intentions, or plans at this time and agrees to seek immediate care should such thoughts develop. The patient verbalizes understanding of how to access emergency care if needed and agrees to do so. Consideration of suicide risk and protective factors such as history, current presentation, individual strengths and weaknesses, psychosocial and environmental stressors and variables, psychiatric illness and symptoms, medical conditions and pain, took place in this interview. Based on those considerations, the patient is determined: within individual baseline and presenting no imminent risk for suicide or homicide. Other recommendations: The patient does not meet the criteria for  inpatient admission and is not a safety risk to self or others at today's visit. Inpatient treatment offers no significant advantages over outpatient treatment for this patient at today's visit.  The patient was given ample time for questions and fully participated in treatment planning.  The patient was encouraged to call the clinic with any questions or concerns.  The patient was informed of access to emergency care. If patient were to develop any significant symptomatology, suicidal ideation, homicidal ideation, any concerns, or feel unsafe at any time they are to call the clinic and if unable to get immediate assistance should immediately call 911 or go to the nearest emergency room.  Patient contracted verbally for the following: If you are experiencing an emotional crisis or have thoughts of harming yourself or others, please go to your nearest local emergency room or call 911. Will continue to re-assess medication response and side effects frequently to establish efficacy and ensure safety. Risks, any black box warnings, side effects, off label usage, and benefits of medication and treatment discussed with patient, along with potential adverse side effects of current and/or newly prescribed medication, alternative treatment options, and OTC medications.  Patient verbalized understanding of potential risks, any off label use of medication, any black box warnings, and any side effects in their own words. The patient verbalized understanding and agreed to comply with the safety plan discussed in their own words.  Patient given the number to the office. Number also discussed of the 24- hour suicide hotline.           MEDS ORDERED DURING VISIT:  New Medications Ordered This Visit   Medications   • hydrOXYzine (ATARAX) 25 MG tablet     Sig: Take 1 tablet by mouth Every 8 (Eight) Hours As Needed for Anxiety.     Dispense:  90 tablet     Refill:  1   • Brexpiprazole (Rexulti) 0.5 MG tablet     Sig: Take 0.5 mg by  mouth Daily.     Dispense:  30 tablet     Refill:  0   • vilazodone (Viibryd) 20 MG tablet tablet     Sig: Take 1 tablet by mouth Daily.     Dispense:  30 tablet     Refill:  1       Return in about 8 weeks (around 5/15/2023), or if symptoms worsen or fail to improve, for Next scheduled follow up and Recheck.         Functional Status: Moderate impairment     Prognosis: Fair with Ongoing Treatment             This document has been electronically signed by REBECA Bello  March 20, 2023 11:57 EDT    Some of the data in this electronic note has been brought forward from a previous encounter, any necessary changes have been made, it has been reviewed by this APRN, and it is accurate.    Please note that portions of this note were completed with a voice recognition program.

## 2023-03-21 DIAGNOSIS — J40 BRONCHITIS: ICD-10-CM

## 2023-03-21 DIAGNOSIS — J02.9 ACUTE SORE THROAT: ICD-10-CM

## 2023-03-21 DIAGNOSIS — E11.9 TYPE 2 DIABETES MELLITUS WITHOUT COMPLICATION, WITHOUT LONG-TERM CURRENT USE OF INSULIN: ICD-10-CM

## 2023-03-22 RX ORDER — PREDNISONE 10 MG/1
TABLET ORAL
Qty: 4 TABLET | Refills: 0 | OUTPATIENT
Start: 2023-03-22

## 2023-03-22 RX ORDER — DULAGLUTIDE 1.5 MG/.5ML
INJECTION, SOLUTION SUBCUTANEOUS
Qty: 6 ML | Refills: 0 | OUTPATIENT
Start: 2023-03-22

## 2023-03-22 RX ORDER — METFORMIN HYDROCHLORIDE 500 MG/1
TABLET, EXTENDED RELEASE ORAL
Qty: 180 TABLET | Refills: 0 | OUTPATIENT
Start: 2023-03-22

## 2023-03-22 RX ORDER — LIDOCAINE HYDROCHLORIDE 20 MG/ML
SOLUTION OROPHARYNGEAL
Qty: 300 ML | Refills: 0 | OUTPATIENT
Start: 2023-03-22

## 2023-03-24 ENCOUNTER — HOSPITAL ENCOUNTER (EMERGENCY)
Facility: HOSPITAL | Age: 63
Discharge: HOME | End: 2023-03-25
Attending: STUDENT IN AN ORGANIZED HEALTH CARE EDUCATION/TRAINING PROGRAM | Admitting: STUDENT IN AN ORGANIZED HEALTH CARE EDUCATION/TRAINING PROGRAM
Payer: MEDICAID

## 2023-03-24 DIAGNOSIS — Z86.39 HISTORY OF OBESITY: ICD-10-CM

## 2023-03-24 DIAGNOSIS — G47.33 OSA ON CPAP: ICD-10-CM

## 2023-03-24 DIAGNOSIS — R11.0 NAUSEA: ICD-10-CM

## 2023-03-24 DIAGNOSIS — R52 GENERALIZED BODY ACHES: ICD-10-CM

## 2023-03-24 DIAGNOSIS — U07.1 COVID-19 VIRUS INFECTION: Primary | ICD-10-CM

## 2023-03-24 DIAGNOSIS — Z99.89 OSA ON CPAP: ICD-10-CM

## 2023-03-24 DIAGNOSIS — R09.81 NASAL CONGESTION: ICD-10-CM

## 2023-03-24 DIAGNOSIS — R51.9 GENERALIZED HEADACHE: ICD-10-CM

## 2023-03-24 DIAGNOSIS — F32.A ANXIETY AND DEPRESSION: ICD-10-CM

## 2023-03-24 DIAGNOSIS — F41.9 ANXIETY AND DEPRESSION: ICD-10-CM

## 2023-03-24 DIAGNOSIS — Z86.39 HISTORY OF DIABETES MELLITUS: ICD-10-CM

## 2023-03-24 PROCEDURE — 87636 SARSCOV2 & INF A&B AMP PRB: CPT | Performed by: STUDENT IN AN ORGANIZED HEALTH CARE EDUCATION/TRAINING PROGRAM

## 2023-03-24 PROCEDURE — 80053 COMPREHEN METABOLIC PANEL: CPT | Performed by: STUDENT IN AN ORGANIZED HEALTH CARE EDUCATION/TRAINING PROGRAM

## 2023-03-24 PROCEDURE — 83605 ASSAY OF LACTIC ACID: CPT | Performed by: STUDENT IN AN ORGANIZED HEALTH CARE EDUCATION/TRAINING PROGRAM

## 2023-03-24 PROCEDURE — 83690 ASSAY OF LIPASE: CPT | Performed by: STUDENT IN AN ORGANIZED HEALTH CARE EDUCATION/TRAINING PROGRAM

## 2023-03-24 PROCEDURE — C9803 HOPD COVID-19 SPEC COLLECT: HCPCS

## 2023-03-24 PROCEDURE — 85025 COMPLETE CBC W/AUTO DIFF WBC: CPT | Performed by: STUDENT IN AN ORGANIZED HEALTH CARE EDUCATION/TRAINING PROGRAM

## 2023-03-24 PROCEDURE — 99284 EMERGENCY DEPT VISIT MOD MDM: CPT

## 2023-03-24 RX ORDER — SODIUM CHLORIDE 9 MG/ML
10 INJECTION INTRAVENOUS AS NEEDED
Status: DISCONTINUED | OUTPATIENT
Start: 2023-03-24 | End: 2023-03-25 | Stop reason: HOSPADM

## 2023-03-24 RX ORDER — ONDANSETRON 2 MG/ML
4 INJECTION INTRAMUSCULAR; INTRAVENOUS ONCE
Status: COMPLETED | OUTPATIENT
Start: 2023-03-24 | End: 2023-03-25

## 2023-03-24 RX ADMIN — SODIUM CHLORIDE 1000 ML: 9 INJECTION, SOLUTION INTRAVENOUS at 23:49

## 2023-03-25 VITALS
BODY MASS INDEX: 41.65 KG/M2 | HEIGHT: 65 IN | HEART RATE: 88 BPM | DIASTOLIC BLOOD PRESSURE: 72 MMHG | SYSTOLIC BLOOD PRESSURE: 120 MMHG | OXYGEN SATURATION: 96 % | WEIGHT: 250 LBS | TEMPERATURE: 98.4 F | RESPIRATION RATE: 18 BRPM

## 2023-03-25 LAB
ALBUMIN SERPL-MCNC: 3.9 G/DL (ref 3.5–5.2)
ALBUMIN/GLOB SERPL: 1.3 G/DL
ALP SERPL-CCNC: 86 U/L (ref 39–117)
ALT SERPL W P-5'-P-CCNC: 21 U/L (ref 1–33)
ANION GAP SERPL CALCULATED.3IONS-SCNC: 13 MMOL/L (ref 5–15)
AST SERPL-CCNC: 24 U/L (ref 1–32)
BASOPHILS # BLD AUTO: 0.03 10*3/MM3 (ref 0–0.2)
BASOPHILS NFR BLD AUTO: 0.6 % (ref 0–1.5)
BILIRUB SERPL-MCNC: 0.2 MG/DL (ref 0–1.2)
BILIRUB UR QL STRIP: NEGATIVE
BUN SERPL-MCNC: 11 MG/DL (ref 8–23)
BUN/CREAT SERPL: 15.1 (ref 7–25)
CALCIUM SPEC-SCNC: 8.7 MG/DL (ref 8.6–10.5)
CHLORIDE SERPL-SCNC: 101 MMOL/L (ref 98–107)
CLARITY UR: CLEAR
CO2 SERPL-SCNC: 22 MMOL/L (ref 22–29)
COLOR UR: YELLOW
CREAT SERPL-MCNC: 0.73 MG/DL (ref 0.57–1)
D-LACTATE SERPL-SCNC: 2 MMOL/L (ref 0.5–2)
DEPRECATED RDW RBC AUTO: 45 FL (ref 37–54)
EGFRCR SERPLBLD CKD-EPI 2021: 94.3 ML/MIN/1.73
EOSINOPHIL # BLD AUTO: 0.08 10*3/MM3 (ref 0–0.4)
EOSINOPHIL NFR BLD AUTO: 1.5 % (ref 0.3–6.2)
ERYTHROCYTE [DISTWIDTH] IN BLOOD BY AUTOMATED COUNT: 13.6 % (ref 12.3–15.4)
FLUAV RNA RESP QL NAA+PROBE: NOT DETECTED
FLUBV RNA RESP QL NAA+PROBE: NOT DETECTED
GLOBULIN UR ELPH-MCNC: 2.9 GM/DL
GLUCOSE SERPL-MCNC: 261 MG/DL (ref 65–99)
GLUCOSE UR STRIP-MCNC: NEGATIVE MG/DL
HCT VFR BLD AUTO: 38.1 % (ref 34–46.6)
HGB BLD-MCNC: 12.4 G/DL (ref 12–15.9)
HGB UR QL STRIP.AUTO: NEGATIVE
HOLD SPECIMEN: NORMAL
IMM GRANULOCYTES # BLD AUTO: 0.02 10*3/MM3 (ref 0–0.05)
IMM GRANULOCYTES NFR BLD AUTO: 0.4 % (ref 0–0.5)
KETONES UR QL STRIP: NEGATIVE
LEUKOCYTE ESTERASE UR QL STRIP.AUTO: NEGATIVE
LIPASE SERPL-CCNC: 20 U/L (ref 13–60)
LYMPHOCYTES # BLD AUTO: 0.5 10*3/MM3 (ref 0.7–3.1)
LYMPHOCYTES NFR BLD AUTO: 9.6 % (ref 19.6–45.3)
MCH RBC QN AUTO: 29.2 PG (ref 26.6–33)
MCHC RBC AUTO-ENTMCNC: 32.5 G/DL (ref 31.5–35.7)
MCV RBC AUTO: 89.6 FL (ref 79–97)
MONOCYTES # BLD AUTO: 0.49 10*3/MM3 (ref 0.1–0.9)
MONOCYTES NFR BLD AUTO: 9.4 % (ref 5–12)
NEUTROPHILS NFR BLD AUTO: 4.11 10*3/MM3 (ref 1.7–7)
NEUTROPHILS NFR BLD AUTO: 78.5 % (ref 42.7–76)
NITRITE UR QL STRIP: NEGATIVE
NRBC BLD AUTO-RTO: 0 /100 WBC (ref 0–0.2)
PH UR STRIP.AUTO: 6.5 [PH] (ref 5–8)
PLATELET # BLD AUTO: 223 10*3/MM3 (ref 140–450)
PMV BLD AUTO: 10.8 FL (ref 6–12)
POTASSIUM SERPL-SCNC: 3.8 MMOL/L (ref 3.5–5.2)
PROT SERPL-MCNC: 6.8 G/DL (ref 6–8.5)
PROT UR QL STRIP: NEGATIVE
RBC # BLD AUTO: 4.25 10*6/MM3 (ref 3.77–5.28)
SARS-COV-2 RNA RESP QL NAA+PROBE: DETECTED
SODIUM SERPL-SCNC: 136 MMOL/L (ref 136–145)
SP GR UR STRIP: 1.01 (ref 1–1.03)
UROBILINOGEN UR QL STRIP: NORMAL
WBC NRBC COR # BLD: 5.23 10*3/MM3 (ref 3.4–10.8)
WHOLE BLOOD HOLD COAG: NORMAL
WHOLE BLOOD HOLD SPECIMEN: NORMAL

## 2023-03-25 PROCEDURE — 25010000002 KETOROLAC TROMETHAMINE PER 15 MG: Performed by: PHYSICIAN ASSISTANT

## 2023-03-25 PROCEDURE — 96374 THER/PROPH/DIAG INJ IV PUSH: CPT

## 2023-03-25 PROCEDURE — 96375 TX/PRO/DX INJ NEW DRUG ADDON: CPT

## 2023-03-25 PROCEDURE — 25010000002 METOCLOPRAMIDE PER 10 MG: Performed by: PHYSICIAN ASSISTANT

## 2023-03-25 PROCEDURE — 81003 URINALYSIS AUTO W/O SCOPE: CPT | Performed by: STUDENT IN AN ORGANIZED HEALTH CARE EDUCATION/TRAINING PROGRAM

## 2023-03-25 PROCEDURE — 25010000002 ONDANSETRON PER 1 MG: Performed by: STUDENT IN AN ORGANIZED HEALTH CARE EDUCATION/TRAINING PROGRAM

## 2023-03-25 RX ORDER — KETOROLAC TROMETHAMINE 15 MG/ML
15 INJECTION, SOLUTION INTRAMUSCULAR; INTRAVENOUS ONCE
Status: COMPLETED | OUTPATIENT
Start: 2023-03-25 | End: 2023-03-25

## 2023-03-25 RX ORDER — ONDANSETRON 4 MG/1
4 TABLET, ORALLY DISINTEGRATING ORAL EVERY 4 HOURS
Qty: 12 TABLET | Refills: 0 | Status: SHIPPED | OUTPATIENT
Start: 2023-03-25

## 2023-03-25 RX ORDER — METOCLOPRAMIDE HYDROCHLORIDE 5 MG/ML
5 INJECTION INTRAMUSCULAR; INTRAVENOUS ONCE
Status: COMPLETED | OUTPATIENT
Start: 2023-03-25 | End: 2023-03-25

## 2023-03-25 RX ADMIN — ONDANSETRON 4 MG: 2 INJECTION INTRAMUSCULAR; INTRAVENOUS at 00:05

## 2023-03-25 RX ADMIN — METOCLOPRAMIDE 5 MG: 5 INJECTION, SOLUTION INTRAMUSCULAR; INTRAVENOUS at 01:32

## 2023-03-25 RX ADMIN — KETOROLAC TROMETHAMINE 15 MG: 15 INJECTION, SOLUTION INTRAMUSCULAR; INTRAVENOUS at 01:32

## 2023-03-25 NOTE — DISCHARGE INSTRUCTIONS
ER evaluation reveals essentially normal CBC and chemistries other than some mild hyperglycemia.  Urinalysis was completely normal.  Patient tested positive for COVID-19 and negative for influenza.  Recommend increase fluids, rest, and Tylenol/ibuprofen every 4-6 hours as needed for body aches or headache or fever.  Patient may also take over-the-counter cold/flu preparations for symptomatic relief.  Rx for Zofran 4 mg ODT every 4-6 hours as needed for nausea.  We sent patient home with a pulse oximeter.  If patient experiences shortness of breath and O2 sat falls below 92% on room air, return to the ER if worsening symptoms.  Patient needs to quarantine for 5 days after initial symptom onset since she is vaccinated.  She can come out of quarantine on 3/28/2023.  Continue with all other current medical management.

## 2023-03-25 NOTE — ED PROVIDER NOTES
Subjective   History of Present Illness  This is a 62-year-old female that presents the ER with flulike symptoms that started last night.  Patient reports rhinorrhea, nasal congestion, nonproductive cough, fever with Tmax 100.5, generalized headache, nausea, and malaise/fatigue.  She denies any known sick contacts.  She denies any chest pain or shortness of breath.  She denies any abdominal pain.  She had a normal bowel movement yesterday.  She denies dysuria, urgency, or frequency.  She denies any personal history of COVID-19.  She is up-to-date on 2 Pfizer vaccinations but no boosters.  Past medical history is significant for morbid obesity with BMI of 41, childhood seizure, sleep apnea with use of CPAP, GERD, osteoarthritis, migraine headaches, anxiety, seasonal allergies, and controlled asthma.  No other concerns at this time.    History provided by:  Patient  Flu Symptoms  Presenting symptoms: cough (Nonproductive), fatigue, fever (Low-grade with Tmax 100.5), headache, myalgias, nausea and rhinorrhea    Presenting symptoms: no diarrhea, no shortness of breath, no sore throat and no vomiting    Onset quality:  Sudden  Duration:  2 days  Progression:  Unchanged  Chronicity:  New  Relieved by:  Nothing  Worsened by:  Nothing  Ineffective treatments:  None tried  Associated symptoms: chills, decreased appetite, decreased physical activity and nasal congestion    Associated symptoms: no ear pain, no mental status change, no neck stiffness and no syncope    Risk factors: no sick contacts        Review of Systems   Constitutional: Positive for activity change, appetite change, chills, decreased appetite, fatigue and fever (Low-grade with Tmax 100.5).   HENT: Positive for congestion and rhinorrhea. Negative for ear pain, sinus pressure, sinus pain, sneezing and sore throat.         No personal history of COVID-19.  Up-to-date on 2 Pfizer vaccinations but no booster.  No loss of taste or smell.   Respiratory: Positive  "for cough (Nonproductive). Negative for shortness of breath.    Cardiovascular: Negative.  Negative for chest pain and leg swelling.   Gastrointestinal: Positive for nausea. Negative for abdominal pain, diarrhea and vomiting.   Genitourinary: Negative.  Negative for dysuria, flank pain, frequency and urgency.   Musculoskeletal: Positive for myalgias. Negative for back pain and neck stiffness.   Neurological: Positive for headaches. Negative for dizziness, syncope and weakness.   All other systems reviewed and are negative.      Past Medical History:   Diagnosis Date   • Anesthesia complication     HAS TROUBLE GETTING \"NUMB\"    • Anxiety    • Arthritis     on Celebrex + Robaxin, no steroids   • Asthma     does not follow w/ pulmonary   • Bladder spasms    • Depression     w/ PTSD   • Diverticulosis    • Dyspepsia    • Fatigue    • GERD (gastroesophageal reflux disease)     controlled w/ nightly Pepcid, EGD 2020 w/ Dr. Chavarria, (+)    • Memory loss     FROM SKULL FRACTURE AND FALL-SHORT TERM MEMORY LOSS   • Migraine    • Morbid obesity (HCC)    • Seasonal allergies    • Seizures ([facility])     \"convulsions\" at age 5   • Skull fracture ([facility]) 2012   • Sleep apnea with use of continuous positive airway pressure (CPAP)     CPAP compliant   • SOB (shortness of breath) on exertion    • Tendonitis    • Type 2 diabetes mellitus without complication, without long-term current use of insulin ([facility])     dx 2021, no insulin, A1C <7   • Wears contact lenses    • Wears partial dentures     TOP ONLY    • Wears prescription eyeglasses        Allergies   Allergen Reactions   • Mold Extract [Trichophyton] Shortness Of Breath, Anxiety, Palpitations and Irritability   • Lortab [Hydrocodone-Acetaminophen] Nausea And Vomiting   • Pollen Extract Other (See Comments)     SNEEZING AND CONGESTION        Past Surgical History:   Procedure Laterality Date   • CERVICAL POLYPECTOMY     • TOTAL LAPAROSCOPIC HYSTERECTOMY N/A 9/18/2017    Procedure: " TOTAL LAPAROSCOPIC HYSTERECTOMY, BILATERAL SALPINGO OOPHORECTOMY WITH DAVINCI ROBOT ;  Surgeon: Shannon Grimaldo DO;  Location: [facility] OR;  Service:    • WISDOM TOOTH EXTRACTION         Family History   Problem Relation Age of Onset   • Cancer Mother         cervical cancer   • Dementia Mother    • Hypertension Mother    • Obesity Father    • Hypertension Father    • Heart attack Father    • Sleep apnea Father    • Sleep apnea Sister    • Hypertension Brother    • Sleep apnea Brother    • Cancer Maternal Aunt         lymphoma   • Cancer Paternal Aunt    • Breast cancer Paternal Aunt 70   • Cancer Maternal Grandmother    • Obesity Paternal Grandmother    • Diabetes Paternal Grandmother    • Hypertension Paternal Grandmother    • Stroke Paternal Grandmother    • Heart attack Paternal Grandmother    • Heart attack Paternal Grandfather    • Alcohol abuse Other    • Depression Other        Social History     Socioeconomic History   • Marital status: Single   Tobacco Use   • Smoking status: Never   • Smokeless tobacco: Never   Vaping Use   • Vaping Use: Never used   Substance and Sexual Activity   • Alcohol use: Not Currently   • Drug use: Never   • Sexual activity: Not Currently     Birth control/protection: Post-menopausal, Surgical           Objective   Physical Exam  Constitutional:       General: She is not in acute distress.     Appearance: Normal appearance. She is obese. She is ill-appearing. She is not toxic-appearing or diaphoretic.      Comments: Mildly ill.  Nontoxic.  Obesity with BMI of 41.   HENT:      Head: Normocephalic and atraumatic.      Right Ear: Tympanic membrane normal.      Left Ear: Tympanic membrane normal.      Nose: Congestion and rhinorrhea present.      Right Sinus: No maxillary sinus tenderness or frontal sinus tenderness.      Left Sinus: No maxillary sinus tenderness or frontal sinus tenderness.      Mouth/Throat:      Mouth: Mucous membranes are moist.      Pharynx: Oropharynx is clear. No  pharyngeal swelling, oropharyngeal exudate or posterior oropharyngeal erythema.      Comments: Oral mucous membranes are moist.  Posterior pharynx is not erythematous.  No exudate or vesicles.  Eyes:      Extraocular Movements: Extraocular movements intact.      Conjunctiva/sclera: Conjunctivae normal.      Pupils: Pupils are equal, round, and reactive to light.   Neck:      Meningeal: Brudzinski's sign and Kernig's sign absent.      Comments: No cervical lymphadenopathy.  No meningeal signs.  Cardiovascular:      Rate and Rhythm: Normal rate and regular rhythm.  No extrasystoles are present.     Pulses: Normal pulses.      Heart sounds: Normal heart sounds.      Comments: Regular rate and rhythm.  No ectopy.  Pulmonary:      Effort: Pulmonary effort is normal. No tachypnea or accessory muscle usage.      Breath sounds: Normal breath sounds. No decreased breath sounds, wheezing or rhonchi.      Comments: Normal respiratory effort.  Good air exchange to bilateral lung fields.  No wheezes, rhonchi, or decreased breath sounds concerning for consolidation.  Abdominal:      General: Bowel sounds are normal. There is no distension.      Palpations: Abdomen is soft.      Tenderness: There is abdominal tenderness. There is no right CVA tenderness, left CVA tenderness, guarding or rebound.      Comments: Central obesity.  Soft without distention.  Active bowel sounds in all 4 quadrants.  Nontender to palpation.   Musculoskeletal:         General: Normal range of motion.      Cervical back: Normal range of motion and neck supple.      Right lower leg: No edema.      Left lower leg: No edema.   Lymphadenopathy:      Cervical: No cervical adenopathy.   Skin:     General: Skin is warm and dry.   Neurological:      General: No focal deficit present.      Mental Status: She is alert and oriented to person, place, and time.      Cranial Nerves: Cranial nerves 2-12 are intact.      Sensory: Sensation is intact.      Motor: Motor  function is intact.      Coordination: Coordination is intact.      Comments: Generally weak with malaise/fatigue.  No focal deficits.   Psychiatric:         Mood and Affect: Mood and affect normal.         Speech: Speech normal.         Behavior: Behavior normal. Behavior is cooperative.         Thought Content: Thought content normal.         Cognition and Memory: Cognition normal.         Judgment: Judgment normal.      Comments: Friendly and talkative.  Cooperative.         Procedures           ED Course  ED Course as of 03/25/23 0131   Sat Mar 25, 2023   0117 Patient presents with flulike symptoms.  CBC and chemistries were essentially normal except for serum glucose of 261.  Lactic acid is 2.0.  Lipase is 20.  Urinalysis reveals no acute infectious process.  Patient tested positive for COVID-19 and negative for influenza.  She denies any chest pain or shortness of breath.  She mainly reports symptoms of generalized headache, body aches, and URI symptoms.  Patient is afebrile and O2 sat is 98% on room air.  Recommend increase fluids in over-the-counter cold/flu preparations for symptomatic relief.  Patient given IV fluid bolus, Toradol, and Reglan for headache.  We will send her home with a pulse oximeter and she is to return if O2 sat falls below 92% and she experiences shortness of breath.  Continue with CPAP with history of obstructive sleep apnea.  Patient needs to quarantine for 5 days from initial symptom onset due to being vaccinated.  She can come out of quarantine on 3/28/2023.  Return to the ER sooner if any worsening symptoms.  Patient appreciative and agreeable with above treatment plan. [FC]      ED Course User Index  [FC] Rosy Ramírez PA-C            Recent Results (from the past 24 hour(s))   COVID-19 and FLU A/B PCR - Swab, Nasopharynx    Collection Time: 03/24/23 11:45 PM    Specimen: Nasopharynx; Swab   Result Value Ref Range    COVID19 Detected (C) Not Detected - Ref. Range    Influenza A  PCR Not Detected Not Detected    Influenza B PCR Not Detected Not Detected   Comprehensive Metabolic Panel    Collection Time: 03/24/23 11:46 PM    Specimen: Blood   Result Value Ref Range    Glucose 261 (H) 65 - 99 mg/dL    BUN 11 8 - 23 mg/dL    Creatinine 0.73 0.57 - 1.00 mg/dL    Sodium 136 136 - 145 mmol/L    Potassium 3.8 3.5 - 5.2 mmol/L    Chloride 101 98 - 107 mmol/L    CO2 22.0 22.0 - 29.0 mmol/L    Calcium 8.7 8.6 - 10.5 mg/dL    Total Protein 6.8 6.0 - 8.5 g/dL    Albumin 3.9 3.5 - 5.2 g/dL    ALT (SGPT) 21 1 - 33 U/L    AST (SGOT) 24 1 - 32 U/L    Alkaline Phosphatase 86 39 - 117 U/L    Total Bilirubin 0.2 0.0 - 1.2 mg/dL    Globulin 2.9 gm/dL    A/G Ratio 1.3 g/dL    BUN/Creatinine Ratio 15.1 7.0 - 25.0    Anion Gap 13.0 5.0 - 15.0 mmol/L    eGFR 94.3 >60.0 mL/min/1.73   Lipase    Collection Time: 03/24/23 11:46 PM    Specimen: Blood   Result Value Ref Range    Lipase 20 13 - 60 U/L   Lactic Acid, Plasma    Collection Time: 03/24/23 11:46 PM    Specimen: Blood   Result Value Ref Range    Lactate 2.0 0.5 - 2.0 mmol/L   Green Top (Gel)    Collection Time: 03/24/23 11:46 PM   Result Value Ref Range    Extra Tube Hold for add-ons.    Lavender Top    Collection Time: 03/24/23 11:46 PM   Result Value Ref Range    Extra Tube hold for add-on    Gold Top - SST    Collection Time: 03/24/23 11:46 PM   Result Value Ref Range    Extra Tube Hold for add-ons.    Light Blue Top    Collection Time: 03/24/23 11:46 PM   Result Value Ref Range    Extra Tube Hold for add-ons.    CBC Auto Differential    Collection Time: 03/24/23 11:46 PM    Specimen: Blood   Result Value Ref Range    WBC 5.23 3.40 - 10.80 10*3/mm3    RBC 4.25 3.77 - 5.28 10*6/mm3    Hemoglobin 12.4 12.0 - 15.9 g/dL    Hematocrit 38.1 34.0 - 46.6 %    MCV 89.6 79.0 - 97.0 fL    MCH 29.2 26.6 - 33.0 pg    MCHC 32.5 31.5 - 35.7 g/dL    RDW 13.6 12.3 - 15.4 %    RDW-SD 45.0 37.0 - 54.0 fl    MPV 10.8 6.0 - 12.0 fL    Platelets 223 140 - 450 10*3/mm3     "Neutrophil % 78.5 (H) 42.7 - 76.0 %    Lymphocyte % 9.6 (L) 19.6 - 45.3 %    Monocyte % 9.4 5.0 - 12.0 %    Eosinophil % 1.5 0.3 - 6.2 %    Basophil % 0.6 0.0 - 1.5 %    Immature Grans % 0.4 0.0 - 0.5 %    Neutrophils, Absolute 4.11 1.70 - 7.00 10*3/mm3    Lymphocytes, Absolute 0.50 (L) 0.70 - 3.10 10*3/mm3    Monocytes, Absolute 0.49 0.10 - 0.90 10*3/mm3    Eosinophils, Absolute 0.08 0.00 - 0.40 10*3/mm3    Basophils, Absolute 0.03 0.00 - 0.20 10*3/mm3    Immature Grans, Absolute 0.02 0.00 - 0.05 10*3/mm3    nRBC 0.0 0.0 - 0.2 /100 WBC   Urinalysis With Microscopic If Indicated (No Culture) - Urine, Clean Catch    Collection Time: 03/25/23 12:18 AM    Specimen: Urine, Clean Catch   Result Value Ref Range    Color, UA Yellow Yellow, Straw    Appearance, UA Clear Clear    pH, UA 6.5 5.0 - 8.0    Specific Gravity, UA 1.006 1.001 - 1.030    Glucose, UA Negative Negative    Ketones, UA Negative Negative    Bilirubin, UA Negative Negative    Blood, UA Negative Negative    Protein, UA Negative Negative    Leuk Esterase, UA Negative Negative    Nitrite, UA Negative Negative    Urobilinogen, UA 0.2 E.U./dL 0.2 - 1.0 E.U./dL     Note: In addition to lab results from this visit, the labs listed above may include labs taken at another facility or during a different encounter within the last 24 hours. Please correlate lab times with ED admission and discharge times for further clarification of the services performed during this visit.    No orders to display     Vitals:    03/24/23 2320   BP: 137/84   BP Location: Left arm   Patient Position: Sitting   Pulse: 92   Resp: 18   Temp: 98.4 °F (36.9 °C)   TempSrc: Oral   SpO2: 98%   Weight: 113 kg (250 lb)   Height: 165.1 cm (65\")     Medications   Sodium Chloride (PF) 0.9 % 10 mL (has no administration in time range)   ketorolac (TORADOL) injection 15 mg (has no administration in time range)   metoclopramide (REGLAN) injection 5 mg (has no administration in time range)   sodium " chloride 0.9 % bolus 1,000 mL (0 mL Intravenous Stopped 3/25/23 0019)   ondansetron (ZOFRAN) injection 4 mg (4 mg Intravenous Given 3/25/23 0005)     ECG/EMG Results (last 24 hours)     ** No results found for the last 24 hours. **        No orders to display                                         MDM    Final diagnoses:   COVID-19 virus infection   Generalized headache   Generalized body aches   Nasal congestion   Nausea   History of diabetes mellitus   KELLY on CPAP   History of obesity   Anxiety and depression       ED Disposition  ED Disposition     ED Disposition   Discharge    Condition   Stable    Comment   --             Michelle Mandel PA  [address] [address]  [phone]    Schedule an appointment as soon as possible for a visit   As needed    [facility] Emergency Department  [facility] [facility] [address] [phone]    If symptoms worsen         Medication List      New Prescriptions    ondansetron ODT 4 MG disintegrating tablet  Commonly known as: ZOFRAN-ODT  Place 1 tablet on the tongue Every 4 (Four) Hours.        Changed    metFORMIN  MG 24 hr tablet  Commonly known as: GLUCOPHAGE-XR  TAKE TWO TABLETS BY MOUTH DAILY WITH BREAKFAST  What changed: how much to take           Where to Get Your Medications      These medications were sent to [facility] [id number] - [facility] [address] [facility] - [phone]  - [phone]   [address] [facility] [address]    Phone: [phone]   · ondansetron ODT 4 MG disintegrating tablet          Rosy Ramírez PA-C  03/25/23 0131

## 2023-03-28 ENCOUNTER — TELEPHONE (OUTPATIENT)
Dept: INTERNAL MEDICINE | Facility: CLINIC | Age: 63
End: 2023-03-28
Payer: MEDICAID

## 2023-03-28 ENCOUNTER — PATIENT OUTREACH (OUTPATIENT)
Dept: CASE MANAGEMENT | Facility: OTHER | Age: 63
End: 2023-03-28
Payer: MEDICAID

## 2023-03-28 DIAGNOSIS — E11.9 TYPE 2 DIABETES MELLITUS WITHOUT COMPLICATION, WITHOUT LONG-TERM CURRENT USE OF INSULIN: Primary | ICD-10-CM

## 2023-03-28 DIAGNOSIS — G47.30 SLEEP APNEA WITH USE OF CONTINUOUS POSITIVE AIRWAY PRESSURE (CPAP): ICD-10-CM

## 2023-03-28 NOTE — TELEPHONE ENCOUNTER
Pt is wanting Zofran and cough syrup and glucose test strips sent into her pharmacy for COVID treatment. Please advise.

## 2023-03-28 NOTE — OUTREACH NOTE
AMBULATORY CASE MANAGEMENT NOTE    Name and Relationship of Patient/Support Person: Sandra Auguste - Self    Spoke with patient as an ER follow up call. Patient stated she is doing okay, continues to have fatigue and productive cough. Patient lives with her elderly mother that is now on Hospice with [facility]. Patient's siblings are staying with them while patient is recovering from Covid. Patient's 5 day quarantine is up after today but she is still having some symptoms; cough, dizzy, nausea. Oxygen is 96-97% on room air. Patient reports constipation since having Covid despite Miralax. Encouraged her to limit interaction with others a few more days just to be safe. Encouraged increased fluid intake. She voiced understanding. Patient is agreeable to f/u with PCP for a video visit. Will message PCP with patient's med/supply requests.     Discussed CCM and potential copay's. Patient is agreeable to participate in the program. Patient is interested in bariatric surgery but was told she needs to get her A1C down. ACM will continue to work with patient to reach her goals.    Adult Patient Profile  Questions/Answers    Flowsheet Row Most Recent Value   How to be Addressed Sandra   How Well Do You Speak English? very well   Source of Information patient   Patient Aware of Diagnosis yes   Admission in Past 90 Days none   Hearing Difficulty or Deaf no   Wear Glasses or Blind yes   Vision Management glasses   Concentrating, Remembering or Making Decisions Difficulty no   Difficulty Communicating no   Difficulty Eating/Swallowing no   Walking or Climbing Stairs Difficulty no   Dressing/Bathing Difficulty no   Doing Errands Independently Difficulty (such as shopping) no   Equipment Currently Used at Home cpap, bp cuff, glucometer   Change in Functional Status Since Onset of Current Illness/Injury yes  [Difficulty with ADLs due to fatigue and shortness of breath]   Primary Source of Support/Comfort child(yogi)    Family Caregiver if Needed child(yogi), adult, sibling(s)   Family Caregiver Names April - daughter   Primary Roles/Responsibilities caregiver for other(s)   Current Living Arrangements home   Resource/Environmental Concerns reliable transportation   Transportation Concerns other (see comments)  [Patient's truck is not dependable]        SDOH updated and reviewed with the patient during this program:    Financial Resource Strain: Medium Risk   • Difficulty of Paying Living Expenses: Somewhat hard      Food Insecurity: No Food Insecurity   • Worried About Running Out of Food in the Last Year: Never true   • Ran Out of Food in the Last Year: Never true      Transportation Needs: Unmet Transportation Needs   • Lack of Transportation (Medical): Yes   • Lack of Transportation (Non-Medical): Yes       Faith WINSTON  Ambulatory Case Management    3/28/2023, 13:50 EDT

## 2023-03-29 ENCOUNTER — TELEMEDICINE (OUTPATIENT)
Dept: INTERNAL MEDICINE | Facility: CLINIC | Age: 63
End: 2023-03-29
Payer: MEDICAID

## 2023-03-29 DIAGNOSIS — U07.1 COVID-19: Primary | ICD-10-CM

## 2023-03-29 DIAGNOSIS — R11.0 NAUSEA: ICD-10-CM

## 2023-03-29 PROCEDURE — 99213 OFFICE O/P EST LOW 20 MIN: CPT | Performed by: PHYSICIAN ASSISTANT

## 2023-03-29 PROCEDURE — 3044F HG A1C LEVEL LT 7.0%: CPT | Performed by: PHYSICIAN ASSISTANT

## 2023-03-29 RX ORDER — DEXTROMETHORPHAN HYDROBROMIDE AND PROMETHAZINE HYDROCHLORIDE 15; 6.25 MG/5ML; MG/5ML
5 SYRUP ORAL 4 TIMES DAILY PRN
Qty: 118 ML | Refills: 0 | Status: SHIPPED | OUTPATIENT
Start: 2023-03-29

## 2023-03-29 NOTE — PROGRESS NOTES
"Chief Complaint   Patient presents with   • Follow-up     ER visit for Covid-19       Subjective   Sandra Auguste is a 62 y.o. female.       History of Present Illness     This was an audio and video enabled telemedicine encounter.    Went to the emergency room the evening of 03/24/2023. Started feeling bad on Thursday night, 03/23/2023. Thought it was a bad allergy. Woke up on 03/24/2023 with a bad headache and a temperature of 100.5 degrees Fahrenheit. She had a temperature of 100 degrees Fahrenheit on 03/28/2023. Realized she was \"getting sick pretty fast,\" and wore her mask around her mother. Went to the hospital later that night when it was less crowded. She was so nauseated. Tested positive for COVID-19 and negative for influenza. Was given Zofran that dissolves under the tongue every 4 hours and told to take Tylenol or Aleve. Not supposed to take anti-inflammatories because of one of her antidepressants. Denies still taking Celebrex or being given Paxlovid. Feels slightly better, but she is still having really bad headaches and feels nauseous. She has some Zofran that we prescribed previously, but she thinks she is to be taken every 8 hours. She inquires if she can take that Zofran. She denies having any refills of the dissolvable Zofran. She is trying to maintain her fluids. She lost her sense of taste and everything tastes bad. Started having a congested cough. She requests a prescription for cough syrup. Has used Delsym which was helpful. Reports being dizzy when she tries to stand up. Has been in bed when sick. Told to quarantine for a couple of days and has stayed in her room. Mother has COVID-19. Her sister and brother have been their Mother's caregiver. Inquires about seeing her mother.     Her blood glucose was 164 mg/dL on 03/28/2023 during midday. She denies having eaten anything and attributes it to being sick. Had to cancel last appointment because she was looking after her mother. Her blood " glucose in the emergency room was around 264 mg/dL. She notes that this was directly after she had eaten.    Taking MiraLAX because she has been constipated.    She needs a letter from her primary care physician stating that she has tried different weight loss approaches for bariatric medicine. She cannot recall who called her. Will provide us with the specifics. Bariatrics instructed her to follow up with primary care because they consider an A1c of 6.5 % to be too high.    Cardiology told her she has a right branch blockage. She is scheduled for a stress test and several different tests.      Current Outpatient Medications:   •  acetaminophen (TYLENOL) 500 MG tablet, Take 1 tablet by mouth Every 6 (Six) Hours As Needed for Mild Pain., Disp: , Rfl:   •  amitriptyline (ELAVIL) 25 MG tablet, Take 1 tablet by mouth Every Night., Disp: , Rfl:   •  Biotin 1 MG capsule, Take  by mouth., Disp: , Rfl:   •  Blood Glucose Monitoring Suppl (OneTouch Verio) w/Device kit, 1 kit Daily., Disp: 1 kit, Rfl: 0  •  Brexpiprazole (Rexulti) 0.5 MG tablet, Take 0.5 mg by mouth Daily., Disp: 30 tablet, Rfl: 0  •  celecoxib (CeleBREX) 50 MG capsule, Take 1 capsule by mouth As Needed., Disp: , Rfl:   •  Cranberry 1000 MG capsule, Take 1 capsule by mouth Daily., Disp: , Rfl:   •  cyclobenzaprine (FLEXERIL) 5 MG tablet, Take 1 tablet by mouth 2 (Two) Times a Day As Needed for Muscle Spasms., Disp: 20 tablet, Rfl: 0  •  Diclofenac Sodium (VOLTAREN) 1 % gel gel, APPLY 4 GRAMS TOPICALLY TO THE APPROPRIATE AREA AS DIRECTED FOUR TIMES A DAYS AS NEEDED FOR PAIN IN SHOULDERS, Disp: 100 g, Rfl: 1  •  EPINEPHrine (EPIPEN) 0.3 MG/0.3ML solution auto-injector injection, Inject 0.3 mL into the appropriate muscle as directed by prescriber As Needed (FOR SEVERE ALLERGIC REACTION)., Disp: 1 each, Rfl: 1  •  famotidine (PEPCID) 40 MG tablet, TAKE ONE TABLET BY MOUTH DAILY, Disp: 90 tablet, Rfl: 0  •  fluticasone (FLONASE) 50 MCG/ACT nasal spray, 2 sprays  into the nostril(s) as directed by provider Daily for 30 days., Disp: 16 g, Rfl: 0  •  Fluticasone Furoate-Vilanterol (Breo Ellipta) 200-25 MCG/ACT inhaler, Inhale 1 puff Daily., Disp: 60 each, Rfl: 3  •  glucose blood (OneTouch Verio) test strip, Use one strip to check blood sugars one time daily, Disp: 100 each, Rfl: 3  •  glucose monitor monitoring kit, Use as directed to check blood glucose once daily., Disp: 1 each, Rfl: 0  •  hydrOXYzine (ATARAX) 25 MG tablet, Take 1 tablet by mouth Every 8 (Eight) Hours As Needed for Anxiety., Disp: 90 tablet, Rfl: 1  •  Insulin Pen Needle (Pen Needles) 32G X 6 MM misc, 1 dose 1 (One) Time Per Week., Disp: 100 each, Rfl: 3  •  Lancets (onetouch ultrasoft) lancets, Use one lancet daily to check blood sugars, Disp: 100 each, Rfl: 3  •  lidocaine (LIDODERM) 5 %, Place 1 patch on the skin as directed by provider Daily. Remove & Discard patch within 12 hours or as directed by MD, Disp: 6 patch, Rfl: 0  •  loratadine (CLARITIN) 10 MG tablet, Take 1 tablet by mouth Daily., Disp: 30 tablet, Rfl: 5  •  metFORMIN ER (GLUCOPHAGE-XR) 500 MG 24 hr tablet, TAKE TWO TABLETS BY MOUTH DAILY WITH BREAKFAST (Patient taking differently: Take 1 tablet by mouth Daily With Breakfast.), Disp: 180 tablet, Rfl: 0  •  methocarbamol (ROBAXIN) 500 MG tablet, Take 1 tablet by mouth Daily., Disp: , Rfl:   •  multivitamin with minerals tablet tablet, Take 1 tablet by mouth Daily., Disp: , Rfl:   •  ondansetron (Zofran) 8 MG tablet, Take 1 tablet by mouth Every 8 (Eight) Hours As Needed for Nausea or Vomiting., Disp: 15 tablet, Rfl: 0  •  ondansetron ODT (ZOFRAN-ODT) 4 MG disintegrating tablet, Place 1 tablet on the tongue Every 4 (Four) Hours., Disp: 12 tablet, Rfl: 0  •  promethazine-dextromethorphan (PROMETHAZINE-DM) 6.25-15 MG/5ML syrup, Take 5 mL by mouth 4 (Four) Times a Day As Needed for Cough., Disp: 118 mL, Rfl: 0  •  propranolol (INDERAL) 10 MG tablet, TAKE 1 TABLET BY MOUTH TWO TIMES A DAY AS  NEEDED FOR ANXIETY, Disp: 60 tablet, Rfl: 0  •  rosuvastatin (CRESTOR) 5 MG tablet, TAKE ONE TABLET BY MOUTH DAILY, Disp: 90 tablet, Rfl: 0  •  Trulicity 1.5 MG/0.5ML solution pen-injector, INJECT 1/2 MILLILITER UNDER THE SKIN ONCE WEEKLY, Disp: 6 mL, Rfl: 0  •  vilazodone (Viibryd) 20 MG tablet tablet, Take 1 tablet by mouth Daily., Disp: 30 tablet, Rfl: 1     PMFSH  The following portions of the patient's history were reviewed and updated as appropriate: allergies, current medications, past family history, past medical history, past social history, past surgical history and problem list.    Review of Systems   Constitutional: Positive for fatigue. Negative for activity change and unexpected weight change.   HENT: Positive for congestion, postnasal drip and sore throat. Negative for ear pain.    Eyes: Negative for pain and discharge.   Respiratory: Positive for cough. Negative for chest tightness, shortness of breath and wheezing.    Cardiovascular: Negative for chest pain and palpitations.   Gastrointestinal: Negative for abdominal pain, diarrhea and vomiting.   Endocrine: Negative.    Genitourinary: Negative.    Musculoskeletal: Negative for joint swelling.   Skin: Negative for color change, rash and wound.   Allergic/Immunologic: Negative.    Neurological: Negative for seizures and syncope.   Psychiatric/Behavioral: Negative.        Objective   LMP  (LMP Unknown)     Physical Exam  Vitals and nursing note reviewed.   Constitutional:       General: She is not in acute distress.     Appearance: She is well-developed. She is not toxic-appearing or diaphoretic.   HENT:      Head: Normocephalic and atraumatic. Hair is normal.      Right Ear: External ear normal. No drainage, swelling or tenderness. Tympanic membrane is retracted.      Left Ear: External ear normal. No drainage, swelling or tenderness. Tympanic membrane is retracted.      Nose: Mucosal edema present.      Mouth/Throat:      Mouth: No oral lesions.       Pharynx: Uvula midline. Posterior oropharyngeal erythema present. No oropharyngeal exudate or uvula swelling.   Eyes:      General: No scleral icterus.        Right eye: No discharge.         Left eye: No discharge.      Conjunctiva/sclera: Conjunctivae normal.      Pupils: Pupils are equal, round, and reactive to light.   Cardiovascular:      Rate and Rhythm: Normal rate and regular rhythm.      Heart sounds: Normal heart sounds. No murmur heard.    No gallop.   Pulmonary:      Effort: No respiratory distress.      Breath sounds: Normal breath sounds. No stridor. No wheezing or rales.   Chest:      Chest wall: No tenderness.   Abdominal:      Palpations: Abdomen is soft.      Tenderness: There is no abdominal tenderness.   Musculoskeletal:      Cervical back: Normal range of motion and neck supple.   Lymphadenopathy:      Cervical: Cervical adenopathy present.   Skin:     General: Skin is warm and dry.      Findings: No rash.   Neurological:      Mental Status: She is alert and oriented to person, place, and time.      Motor: No abnormal muscle tone.   Psychiatric:         Behavior: Behavior normal.         Thought Content: Thought content normal.         Judgment: Judgment normal.         Results for orders placed or performed during the hospital encounter of 03/24/23   COVID-19 and FLU A/B PCR - Swab, Nasopharynx    Specimen: Nasopharynx; Swab   Result Value Ref Range    COVID19 Detected (C) Not Detected - Ref. Range    Influenza A PCR Not Detected Not Detected    Influenza B PCR Not Detected Not Detected   Comprehensive Metabolic Panel    Specimen: Blood   Result Value Ref Range    Glucose 261 (H) 65 - 99 mg/dL    BUN 11 8 - 23 mg/dL    Creatinine 0.73 0.57 - 1.00 mg/dL    Sodium 136 136 - 145 mmol/L    Potassium 3.8 3.5 - 5.2 mmol/L    Chloride 101 98 - 107 mmol/L    CO2 22.0 22.0 - 29.0 mmol/L    Calcium 8.7 8.6 - 10.5 mg/dL    Total Protein 6.8 6.0 - 8.5 g/dL    Albumin 3.9 3.5 - 5.2 g/dL    ALT (SGPT) 21 1  - 33 U/L    AST (SGOT) 24 1 - 32 U/L    Alkaline Phosphatase 86 39 - 117 U/L    Total Bilirubin 0.2 0.0 - 1.2 mg/dL    Globulin 2.9 gm/dL    A/G Ratio 1.3 g/dL    BUN/Creatinine Ratio 15.1 7.0 - 25.0    Anion Gap 13.0 5.0 - 15.0 mmol/L    eGFR 94.3 >60.0 mL/min/1.73   Lipase    Specimen: Blood   Result Value Ref Range    Lipase 20 13 - 60 U/L   Urinalysis With Microscopic If Indicated (No Culture) - Urine, Clean Catch    Specimen: Urine, Clean Catch   Result Value Ref Range    Color, UA Yellow Yellow, Straw    Appearance, UA Clear Clear    pH, UA 6.5 5.0 - 8.0    Specific Gravity, UA 1.006 1.001 - 1.030    Glucose, UA Negative Negative    Ketones, UA Negative Negative    Bilirubin, UA Negative Negative    Blood, UA Negative Negative    Protein, UA Negative Negative    Leuk Esterase, UA Negative Negative    Nitrite, UA Negative Negative    Urobilinogen, UA 0.2 E.U./dL 0.2 - 1.0 E.U./dL   Lactic Acid, Plasma    Specimen: Blood   Result Value Ref Range    Lactate 2.0 0.5 - 2.0 mmol/L   CBC Auto Differential    Specimen: Blood   Result Value Ref Range    WBC 5.23 3.40 - 10.80 10*3/mm3    RBC 4.25 3.77 - 5.28 10*6/mm3    Hemoglobin 12.4 12.0 - 15.9 g/dL    Hematocrit 38.1 34.0 - 46.6 %    MCV 89.6 79.0 - 97.0 fL    MCH 29.2 26.6 - 33.0 pg    MCHC 32.5 31.5 - 35.7 g/dL    RDW 13.6 12.3 - 15.4 %    RDW-SD 45.0 37.0 - 54.0 fl    MPV 10.8 6.0 - 12.0 fL    Platelets 223 140 - 450 10*3/mm3    Neutrophil % 78.5 (H) 42.7 - 76.0 %    Lymphocyte % 9.6 (L) 19.6 - 45.3 %    Monocyte % 9.4 5.0 - 12.0 %    Eosinophil % 1.5 0.3 - 6.2 %    Basophil % 0.6 0.0 - 1.5 %    Immature Grans % 0.4 0.0 - 0.5 %    Neutrophils, Absolute 4.11 1.70 - 7.00 10*3/mm3    Lymphocytes, Absolute 0.50 (L) 0.70 - 3.10 10*3/mm3    Monocytes, Absolute 0.49 0.10 - 0.90 10*3/mm3    Eosinophils, Absolute 0.08 0.00 - 0.40 10*3/mm3    Basophils, Absolute 0.03 0.00 - 0.20 10*3/mm3    Immature Grans, Absolute 0.02 0.00 - 0.05 10*3/mm3    nRBC 0.0 0.0 - 0.2 /100 WBC    Green Top (Gel)   Result Value Ref Range    Extra Tube Hold for add-ons.    Lavender Top   Result Value Ref Range    Extra Tube hold for add-on    Gold Top - SST   Result Value Ref Range    Extra Tube Hold for add-ons.    Gray Top   Result Value Ref Range    Extra Tube Hold for add-ons.    Light Blue Top   Result Value Ref Range    Extra Tube Hold for add-ons.       Recent A1C was 6.5%.    ASSESSMENT/PLAN    Diagnoses and all orders for this visit:    1. COVID-19 (Primary)  Comments:  - Phenergan, side effect drowsiness, don't double up with Zofran  - Deep breathes, sleep prone, and stay active.  - Drink Pedialyte.  - Mask until 04/03/2023.    Orders:  -     promethazine-dextromethorphan (PROMETHAZINE-DM) 6.25-15 MG/5ML syrup; Take 5 mL by mouth 4 (Four) Times a Day As Needed for Cough.  Dispense: 118 mL; Refill: 0    2. Nausea  Comments:  - Take Zofran tablets previously prescribed as needed.  - Cautioned not to double up Zofran and Phenergan.     3. Constipation  - Continue MiraLAX.    4. Upcoming bariatric surgery evaluation  - Provide detailed instructions of required paperwork.       No follow-ups on file.     Transcribed from ambient dictation for SHAUNA Herrera by Felicity Hannah.  03/29/23   13:04 EDT    Patient or patient representative verbalized consent to the visit recording.  I have personally performed the services described in this document as transcribed by the above individual, and it is both accurate and complete.

## 2023-03-30 ENCOUNTER — PATIENT OUTREACH (OUTPATIENT)
Dept: CASE MANAGEMENT | Facility: OTHER | Age: 63
End: 2023-03-30
Payer: MEDICAID

## 2023-03-30 DIAGNOSIS — E11.9 TYPE 2 DIABETES MELLITUS WITHOUT COMPLICATION, WITHOUT LONG-TERM CURRENT USE OF INSULIN: Primary | ICD-10-CM

## 2023-03-30 DIAGNOSIS — G47.30 SLEEP APNEA WITH USE OF CONTINUOUS POSITIVE AIRWAY PRESSURE (CPAP): ICD-10-CM

## 2023-03-30 NOTE — OUTREACH NOTE
[facility] End of Month Documentation    This Chronic Medical Management Care Plan for Sandra Auguste, 62 y.o. female, has been established; a new plan of care implemented and a new plan of care implemented for the month of March.  A cumulative time of 40 minutes was spent on this patient record this month, including phone call with patient; electronic communication with other providers.    Regarding the patient's problems: has Anemia; Post traumatic stress disorder (PTSD); Anxiety; Strain of neck muscle; Cough; Diverticulitis of colon; Fatigue; Steatosis of liver; Lateral epicondylitis; Knee pain; Spasm; Osteoarthritis; Palpitations; Nausea; Shortness of breath; Skin tag; Candidiasis of vagina; Gastroesophageal reflux disease; Acute pain of left shoulder; Abdominal distension (gaseous); SOB (shortness of breath) on exertion; Sleep apnea with use of continuous positive airway pressure (CPAP); Seasonal allergies; Migraine; Diverticulosis; Depression; Hypertension; Itching; Stabbing headache; Temple tenderness; TMJ click; Skull fracture (HCC); Health care maintenance; Moderate episode of recurrent major depressive disorder (HCC); Generalized anxiety disorder; High risk medications (not anticoagulants) long-term use; Hyperlipidemia; Asthma; Type 2 diabetes mellitus without complication, without long-term current use of insulin (HCC); and Dyspepsia on their problem list., the following items were addressed: medical records; medications and any changes can be found within the plan section of the note.  A detailed listing of time spent for chronic care management is tracked within each outreach encounter.  Current medications include:  has a current medication list which includes the following prescription(s): acetaminophen, amitriptyline, biotin, onetouch verio, rexulti, celecoxib, cranberry, cyclobenzaprine, diclofenac sodium, epinephrine, famotidine, fluticasone, fluticasone furoate-vilanterol, onetouch verio, glucose  monitor, hydroxyzine, pen needles, onetouch ultrasoft, lidocaine, loratadine, metformin er, methocarbamol, multivitamin with minerals, ondansetron, ondansetron odt, promethazine-dextromethorphan, propranolol, rosuvastatin, trulicity, and vilazodone. and the patient is reported to be No data recorded,  Medications are reported to be non-effective in controlling symptoms and changes have been made to the medication protocol. All notes on chart for PCP to review.    The patient was monitored remotely for blood glucose; activity level.    The patient's physical needs include:  needs met.     The patient's mental support needs include:  continued support    The patient's cognitive support needs include:  needs met    The patient's psychosocial support needs include:  needs met    The patient's functional needs include: needs met    The patient's environmental needs include:  not applicable    Care Plan overall comments:  No data recorded    Refer to previous outreach notes for more information on the areas listed above.    Monthly Billing Diagnoses  (E11.9) Type 2 diabetes mellitus without complication, without long-term current use of insulin (HCC)    (G47.30) Sleep apnea with use of continuous positive airway pressure (CPAP)    Medications   · Medications have been reconciled    Care Plan progress this month:      Recently Modified Care Plans Updates made since 2/27/2023 12:00 AM    No recently modified care plans.          · Current Specialty Plan of Care Status signed by both patient and provider    Instructions   · Patient was provided an electronic copy of care plan  · CCM services were explained and offered and patient has accepted these services.  · Patient has given their written consent to receive CCM services and understands that this includes the authorization of electronic communication of medical information with the other treating providers.  · Patient understands that they may stop CCM services at any  time and these changes will be effective at the end of the calendar month and will effectively revocate the agreement of CCM services.  · Patient understands that only one practitioner can furnish and be paid for CCM services during one calendar month.  Patient also understands that there may be co-payment or deductible fees in association with CCM services.  · Patient will continue with at least monthly follow-up calls with the Ambulatory .    Faith WINSTON  Ambulatory Case Management    3/30/2023, 13:20 EDT

## 2023-04-10 ENCOUNTER — TELEMEDICINE (OUTPATIENT)
Dept: PSYCHIATRY | Facility: CLINIC | Age: 63
End: 2023-04-10
Payer: MEDICAID

## 2023-04-10 DIAGNOSIS — F41.9 ANXIETY DISORDER, UNSPECIFIED TYPE: ICD-10-CM

## 2023-04-10 DIAGNOSIS — F33.1 MAJOR DEPRESSIVE DISORDER, RECURRENT EPISODE, MODERATE: Primary | ICD-10-CM

## 2023-04-10 PROCEDURE — 90837 PSYTX W PT 60 MINUTES: CPT | Performed by: COUNSELOR

## 2023-04-10 NOTE — PROGRESS NOTES
Date: April 10, 2023  Time In: 3:02 pm   Time Out: 3:59 pm   This provider is located at the [facility]), [address], [place] [address] using a secure [employer] Video Visit through [employer]. Patient is being seen remotely via telehealth at home address in [place] and stated they are in a secure environment for this session. The patient's condition being diagnosed/treated is appropriate for telemedicine. The provider identified herself as well as her credentials. The patient, and/or patients guardian, consent to be seen remotely, and when consent is given they understand that the consent allows for patient identifiable information to be sent to a third party as needed. They may refuse to be seen remotely at any time. The electronic data is encrypted and password protected, and the patient and/or guardian has been advised of the potential risks to privacy not withstanding such measures.     You have chosen to receive care through a telehealth visit.  Do you consent to use a video/audio connection for your medical care today? Yes    PROGRESS NOTE  Data:  Sandra Auguste is a 62 y.o. female who presents today for follow up    Chief Complaint: recent loss of mother, anxiety     History of Present Illness: Patient reported the last 3 nights she has been very restful. Patient also reported not being able to sleep for very long. Patient reported her mom had been in a catatonic state for about 24 hours. Patient reported her mom opened her eyes and started moving her mouth. Patient reported she move beside her and started to sing. Patient reported that she was with her mom when she passed away. Patient stated I'm trying to manage the emotions, tasks. Patient reported feeling relieved in a way. Patient processed that she had worried about being alone with her mom when she passed. Patient reported that she will going to the movies with her daughter tonight.  Patient reported that her sister has also been diagnosed with alzheimers  and processed thoughts and feelings connected to this. Patient reported that she worried about herself and the legality of things. Patient reported that she hs been feeling nauseous. Patient expressed that she is trying not to worry about the unknowns. Patient explored the possibility of doing her moms hair and makeup but shared worries about it being hard but the possibility of regretting the experience.       Clinical Maneuvering/Intervention: person centered     (Scales based on 0 - 10 with 10 being the worst)  Depression:  not scaled during contact Anxiety: Not scaled during contact.        Assisted patient in processing above session content; acknowledged and normalized patient’s thoughts, feelings, and concerns.  Rationalized patient thought process regarding having COVID which was eventually passed to her mom and sister. Patient processed that the nurse identified her mom did not pass from COVID but worries that her family will blame her.  Discussed triggers associated with patient's anxiety, present mood.  Also discussed coping skills for patient to implement such as self care, support of daughter.    Allowed patient to freely discuss issues without interruption or judgment. Provided safe, confidential environment to facilitate the development of positive therapeutic relationship and encourage open, honest communication. Patient inquired if any of her medication would impact her ability to drink wine. Therapist informed patient that there may be side effects/certain medication that would have interactions with alcohol but was unsure of the specifics. Patient expressed that she would contact pharmacy to be.  If risk factors which would indicate the need for higher level of care including thoughts to harm self or others and/or self-harming behavior then patient is encouraged to contact this office, call 911, or present to the nearest  emergency room should any of these events occur.  During contact no suicidal or homicidal ideation or perceptual disturbance were noted.    Assessment:   Assessment   Patient appears to maintain relative stability as compared to their baseline.  However, patient continues to struggle with loss  which continues to cause impairment in important areas of functioning.  A result, they can be reasonably expected to continue to benefit from treatment and would likely be at increased risk for decompensation otherwise.    Mental Status Exam:   Hygiene:   good  Cooperation:  Cooperative  Eye Contact:  Good  Psychomotor Behavior:  Appropriate  Affect:  Appropriate  Mood: sad  Speech:  Normal  Thought Process:  Linear  Thought Content:  Mood congruent  Suicidal:  not specifically asked but not noted during contact  Homicidal:  Not specifically asked but not noted during contact  Hallucinations:  None  Delusion:  None  Memory:  Intact  Orientation:  Person, Place, Time and Situation  Reliability:  good  Insight:  Good and Fair  Judgement:  Good  Impulse Control:  Good  Physical/Medical Issues:  Yes see medical list        Patient's Support Network Includes:  daughter and extended family    Functional Status: Moderate impairment     Progress toward goal: Not at goal    Prognosis: Good with Ongoing Treatment          Plan:  Patient will continue in individual outpatient therapy with focus on improved functioning and coping skills, maintaining stability, and avoiding decompensation and the need for higher level of care.    Patient will adhere to medication regimen as prescribed and report any side effects. Patient will contact this office, call 911 or present to the nearest emergency room should suicidal or homicidal ideations occur. Provide Cognitive Behavioral Therapy and Solution Focused Therapy to improve functioning, maintain stability, and avoid decompensation and the need for higher level of care.     Return in about 2 days,  or earlier if symptoms worsen or fail to improve. Patient was worked in and would still like to keep scheduled appointment for 4/12/23.            VISIT DIAGNOSIS:     ICD-10-CM ICD-9-CM   1. Major depressive disorder, recurrent episode, moderate  F33.1 296.32   2. Anxiety disorder, unspecified type  F41.9 300.00             This document has been electronically signed by ASIA Brown  April 11, 2023 05:20 EDT      Part of this note may be an electronic transcription/translation of spoken language to printed text using the Dragon Dictation System.

## 2023-04-11 ENCOUNTER — PATIENT OUTREACH (OUTPATIENT)
Dept: CASE MANAGEMENT | Facility: OTHER | Age: 63
End: 2023-04-11
Payer: MEDICAID

## 2023-04-11 DIAGNOSIS — G47.30 SLEEP APNEA WITH USE OF CONTINUOUS POSITIVE AIRWAY PRESSURE (CPAP): ICD-10-CM

## 2023-04-11 DIAGNOSIS — E11.9 TYPE 2 DIABETES MELLITUS WITHOUT COMPLICATION, WITHOUT LONG-TERM CURRENT USE OF INSULIN: Primary | ICD-10-CM

## 2023-04-11 NOTE — OUTREACH NOTE
AMBULATORY CASE MANAGEMENT NOTE    Name and Relationship of Patient/Support Person: Sandra Auguste - Self    CCM Interim Update    Spoke with patient briefly. She reported her mom passed away yesterday and she and her family were making arrangements. AC will continue to follow patient.    Faith WINSTON  Ambulatory Case Management    4/11/2023, 14:20 EDT

## 2023-04-12 ENCOUNTER — TELEMEDICINE (OUTPATIENT)
Dept: PSYCHIATRY | Facility: CLINIC | Age: 63
End: 2023-04-12
Payer: MEDICAID

## 2023-04-12 DIAGNOSIS — F41.9 ANXIETY DISORDER, UNSPECIFIED TYPE: ICD-10-CM

## 2023-04-12 DIAGNOSIS — F33.1 MAJOR DEPRESSIVE DISORDER, RECURRENT EPISODE, MODERATE: Primary | ICD-10-CM

## 2023-04-12 PROCEDURE — 90832 PSYTX W PT 30 MINUTES: CPT | Performed by: COUNSELOR

## 2023-04-12 NOTE — PROGRESS NOTES
Date: 2023  Time In: 1:09 pm   Time Out: 1:41 pm   This provider is located at the [facility]), [address], [place] [address] using a secure [employer] Video Visit through [employer]. Patient is being seen remotely via telehealth at home address in [place] and stated they are in a secure environment for this session. The patient's condition being diagnosed/treated is appropriate for telemedicine. The provider identified herself as well as her credentials. The patient, and/or patients guardian, consent to be seen remotely, and when consent is given they understand that the consent allows for patient identifiable information to be sent to a third party as needed. They may refuse to be seen remotely at any time. The electronic data is encrypted and password protected, and the patient and/or guardian has been advised of the potential risks to privacy not withstanding such measures.     You have chosen to receive care through a telehealth visit.  Do you consent to use a video/audio connection for your medical care today? Yes    PROGRESS NOTE  Data:  Sandra Auguste is a 62 y.o. female who presents today for follow up    Chief Complaint: anxiety, depression and grief     History of Present Illness: Patient reported that she has decided to do her moms hair and make up for her moms . Patient processed that doing it today makes her less anxious. Patient reported that there will be a lot going on the next two days specifically with  services being the following day (Thursday). Patient processed that by going out it has been helpful to go out with her daughter. Patient reported feeling positive about the support her daughter has been able to provide.  Patient discussed that she is worried about the things to come.   Patient and therapist processed having to been on the defensive when engaging then her siblings.Patient processed thoughts and  "feelings related to the family dynamic and specifically her sister. Patient reported feeling that her siblings are jealous of her. Patient inquired if patient would be willing to participate in family therapy to work on relationships.Patient expressed that her siblings would likely not participate. Patient reported feeling like she is \"squished by family\".   Patient reported that she will be doing things out of the house to stay busy during the weekend but will be returning the following week. Patient processed being worried about her home, finding income.     Clinical Maneuvering/Intervention: person centered     (Scales based on 0 - 10 with 10 being the worst)  Depression:  not scaled during contact Anxiety: Not scaled during contact       Assisted patient in processing above session content; acknowledged and normalized patient’s thoughts, feelings, and concerns.  Rationalized patient thought process regarding patient expressed that she feel criticized by her family and also unheard.  Patient reported that she is know more willing to share how she feels. Discussed triggers associated with patient's present symptoms and self identity.  Patient reported that she would like to learn to implement effective boundaries with her family.     Allowed patient to freely discuss issues without interruption or judgment. Provided safe, confidential environment to facilitate the development of positive therapeutic relationship and encourage open, honest communication. If risk factors which would indicate the need for higher level of care including thoughts to harm self or others and/or self-harming behavior then patient will encouraged  to contact this office, call 911, or present to the nearest emergency room should any of these events occur. No suicidal or homicidal ideation or perceptual disturbance was noted during contact.    Assessment:   Assessment   Patient appears to maintain relative stability as compared to their " baseline.  However, patient continues to struggle with anxiety and depression  which continues to cause impairment in important areas of functioning.  A result, they can be reasonably expected to continue to benefit from treatment and would likely be at increased risk for decompensation otherwise.    Mental Status Exam:   Hygiene:   good  Cooperation:  Cooperative  Eye Contact:  Good  Psychomotor Behavior:  Appropriate  Affect:  Appropriate  Mood: sad and irritable  Speech:  Normal  Thought Process:  Goal directed  Thought Content:  Normal  Suicidal:  None  Homicidal:  None  Hallucinations:  None  Delusion:  None  Memory:  Intact  Orientation:  Person, Place, Time and Situation  Reliability:  good  Insight:  Fair  Judgement:  Fair  Impulse Control:  Good  Physical/Medical Issues:  Yes see medical problem list        Patient's Support Network Includes:  daughter and older sister    Functional Status: Moderate impairment     Progress toward goal: Not at goal    Prognosis: Good with Ongoing Treatment          Plan:  Patient will continue in individual outpatient therapy with focus on improved functioning and coping skills, maintaining stability, and avoiding decompensation and the need for higher level of care.    Patient will adhere to medication regimen as prescribed and report any side effects. Patient will contact this office, call 911 or present to the nearest emergency room should suicidal or homicidal ideations occur. Provide Cognitive Behavioral Therapy and Solution Focused Therapy to improve functioning, maintain stability, and avoid decompensation and the need for higher level of care.     Return in about 1 week, or earlier if symptoms worsen or fail to improve.           VISIT DIAGNOSIS:     ICD-10-CM ICD-9-CM   1. Major depressive disorder, recurrent episode, moderate  F33.1 296.32   2. Anxiety disorder, unspecified type  F41.9 300.00             This document has been electronically signed by Hawa  [facility]  April 12, 2023 16:15 EDT      Part of this note may be an electronic transcription/translation of spoken language to printed text using the Dragon Dictation System.

## 2023-04-17 ENCOUNTER — HOSPITAL ENCOUNTER (EMERGENCY)
Facility: HOSPITAL | Age: 63
Discharge: HOME | End: 2023-04-17
Attending: EMERGENCY MEDICINE | Admitting: EMERGENCY MEDICINE
Payer: MEDICAID

## 2023-04-17 ENCOUNTER — APPOINTMENT (OUTPATIENT)
Dept: GENERAL RADIOLOGY | Facility: HOSPITAL | Age: 63
End: 2023-04-17
Payer: MEDICAID

## 2023-04-17 VITALS
BODY MASS INDEX: 41.65 KG/M2 | HEIGHT: 65 IN | DIASTOLIC BLOOD PRESSURE: 80 MMHG | HEART RATE: 90 BPM | SYSTOLIC BLOOD PRESSURE: 140 MMHG | WEIGHT: 250 LBS | TEMPERATURE: 98.8 F | RESPIRATION RATE: 18 BRPM | OXYGEN SATURATION: 98 %

## 2023-04-17 DIAGNOSIS — R07.89 FEELING OF CHEST TIGHTNESS: Primary | ICD-10-CM

## 2023-04-17 DIAGNOSIS — I10 ELEVATED BLOOD PRESSURE READING WITH DIAGNOSIS OF HYPERTENSION: ICD-10-CM

## 2023-04-17 DIAGNOSIS — E66.9 DIABETES MELLITUS TYPE 2 IN OBESE: ICD-10-CM

## 2023-04-17 DIAGNOSIS — J45.20 MILD INTERMITTENT ASTHMA WITHOUT COMPLICATION: ICD-10-CM

## 2023-04-17 DIAGNOSIS — E11.69 DIABETES MELLITUS TYPE 2 IN OBESE: ICD-10-CM

## 2023-04-17 DIAGNOSIS — Z87.09 HISTORY OF ASTHMA: ICD-10-CM

## 2023-04-17 DIAGNOSIS — J30.2 SEASONAL ALLERGIES: ICD-10-CM

## 2023-04-17 DIAGNOSIS — J45.901 REACTIVE AIRWAY DISEASE WITH ACUTE EXACERBATION, UNSPECIFIED ASTHMA SEVERITY, UNSPECIFIED WHETHER PERSISTENT: ICD-10-CM

## 2023-04-17 LAB
ALBUMIN SERPL-MCNC: 3.8 G/DL (ref 3.5–5.2)
ALBUMIN/GLOB SERPL: 1.2 G/DL
ALP SERPL-CCNC: 108 U/L (ref 39–117)
ALT SERPL W P-5'-P-CCNC: 21 U/L (ref 1–33)
ANION GAP SERPL CALCULATED.3IONS-SCNC: 10 MMOL/L (ref 5–15)
AST SERPL-CCNC: 16 U/L (ref 1–32)
BASOPHILS # BLD AUTO: 0.04 10*3/MM3 (ref 0–0.2)
BASOPHILS NFR BLD AUTO: 0.4 % (ref 0–1.5)
BILIRUB SERPL-MCNC: 0.2 MG/DL (ref 0–1.2)
BUN SERPL-MCNC: 10 MG/DL (ref 8–23)
BUN/CREAT SERPL: 13.3 (ref 7–25)
CALCIUM SPEC-SCNC: 9.1 MG/DL (ref 8.6–10.5)
CHLORIDE SERPL-SCNC: 106 MMOL/L (ref 98–107)
CO2 SERPL-SCNC: 24 MMOL/L (ref 22–29)
CREAT SERPL-MCNC: 0.75 MG/DL (ref 0.57–1)
D DIMER PPP FEU-MCNC: 0.55 MCGFEU/ML (ref 0–0.6)
DEPRECATED RDW RBC AUTO: 46.1 FL (ref 37–54)
EGFRCR SERPLBLD CKD-EPI 2021: 91.3 ML/MIN/1.73
EOSINOPHIL # BLD AUTO: 0.3 10*3/MM3 (ref 0–0.4)
EOSINOPHIL NFR BLD AUTO: 3.1 % (ref 0.3–6.2)
ERYTHROCYTE [DISTWIDTH] IN BLOOD BY AUTOMATED COUNT: 13.4 % (ref 12.3–15.4)
GEN 5 2HR TROPONIN T REFLEX: 15 NG/L
GLOBULIN UR ELPH-MCNC: 3.1 GM/DL
GLUCOSE SERPL-MCNC: 144 MG/DL (ref 65–99)
HCT VFR BLD AUTO: 39.6 % (ref 34–46.6)
HGB BLD-MCNC: 12.8 G/DL (ref 12–15.9)
HOLD SPECIMEN: NORMAL
IMM GRANULOCYTES # BLD AUTO: 0.03 10*3/MM3 (ref 0–0.05)
IMM GRANULOCYTES NFR BLD AUTO: 0.3 % (ref 0–0.5)
LIPASE SERPL-CCNC: 23 U/L (ref 13–60)
LYMPHOCYTES # BLD AUTO: 1.87 10*3/MM3 (ref 0.7–3.1)
LYMPHOCYTES NFR BLD AUTO: 19.3 % (ref 19.6–45.3)
MCH RBC QN AUTO: 29.7 PG (ref 26.6–33)
MCHC RBC AUTO-ENTMCNC: 32.3 G/DL (ref 31.5–35.7)
MCV RBC AUTO: 91.9 FL (ref 79–97)
MONOCYTES # BLD AUTO: 0.83 10*3/MM3 (ref 0.1–0.9)
MONOCYTES NFR BLD AUTO: 8.6 % (ref 5–12)
NEUTROPHILS NFR BLD AUTO: 6.62 10*3/MM3 (ref 1.7–7)
NEUTROPHILS NFR BLD AUTO: 68.3 % (ref 42.7–76)
NRBC BLD AUTO-RTO: 0 /100 WBC (ref 0–0.2)
NT-PROBNP SERPL-MCNC: 37.8 PG/ML (ref 0–900)
PLATELET # BLD AUTO: 287 10*3/MM3 (ref 140–450)
PMV BLD AUTO: 10.1 FL (ref 6–12)
POTASSIUM SERPL-SCNC: 4 MMOL/L (ref 3.5–5.2)
PROT SERPL-MCNC: 6.9 G/DL (ref 6–8.5)
QT INTERVAL: 376 MS
QTC INTERVAL: 459 MS
RBC # BLD AUTO: 4.31 10*6/MM3 (ref 3.77–5.28)
SODIUM SERPL-SCNC: 140 MMOL/L (ref 136–145)
TROPONIN T DELTA: 3 NG/L
TROPONIN T SERPL HS-MCNC: 12 NG/L
WBC NRBC COR # BLD: 9.69 10*3/MM3 (ref 3.4–10.8)
WHOLE BLOOD HOLD COAG: NORMAL
WHOLE BLOOD HOLD SPECIMEN: NORMAL

## 2023-04-17 PROCEDURE — 25010000002 KETOROLAC TROMETHAMINE PER 15 MG: Performed by: EMERGENCY MEDICINE

## 2023-04-17 PROCEDURE — 80053 COMPREHEN METABOLIC PANEL: CPT | Performed by: EMERGENCY MEDICINE

## 2023-04-17 PROCEDURE — 84484 ASSAY OF TROPONIN QUANT: CPT | Performed by: EMERGENCY MEDICINE

## 2023-04-17 PROCEDURE — 71045 X-RAY EXAM CHEST 1 VIEW: CPT

## 2023-04-17 PROCEDURE — 83880 ASSAY OF NATRIURETIC PEPTIDE: CPT | Performed by: EMERGENCY MEDICINE

## 2023-04-17 PROCEDURE — 99284 EMERGENCY DEPT VISIT MOD MDM: CPT

## 2023-04-17 PROCEDURE — 25010000002 DEXAMETHASONE PER 1 MG: Performed by: EMERGENCY MEDICINE

## 2023-04-17 PROCEDURE — 94640 AIRWAY INHALATION TREATMENT: CPT

## 2023-04-17 PROCEDURE — 36415 COLL VENOUS BLD VENIPUNCTURE: CPT

## 2023-04-17 PROCEDURE — 93005 ELECTROCARDIOGRAM TRACING: CPT

## 2023-04-17 PROCEDURE — 85379 FIBRIN DEGRADATION QUANT: CPT | Performed by: EMERGENCY MEDICINE

## 2023-04-17 PROCEDURE — 93005 ELECTROCARDIOGRAM TRACING: CPT | Performed by: EMERGENCY MEDICINE

## 2023-04-17 PROCEDURE — 85025 COMPLETE CBC W/AUTO DIFF WBC: CPT | Performed by: EMERGENCY MEDICINE

## 2023-04-17 PROCEDURE — 83690 ASSAY OF LIPASE: CPT | Performed by: EMERGENCY MEDICINE

## 2023-04-17 PROCEDURE — 96372 THER/PROPH/DIAG INJ SC/IM: CPT

## 2023-04-17 RX ORDER — ACETAMINOPHEN 500 MG
1000 TABLET ORAL EVERY 6 HOURS PRN
Qty: 30 TABLET | Refills: 0 | Status: SHIPPED | OUTPATIENT
Start: 2023-04-17

## 2023-04-17 RX ORDER — METHYLPREDNISOLONE SODIUM SUCCINATE 40 MG/ML
40 INJECTION, POWDER, LYOPHILIZED, FOR SOLUTION INTRAMUSCULAR; INTRAVENOUS ONCE
Status: DISCONTINUED | OUTPATIENT
Start: 2023-04-17 | End: 2023-04-17

## 2023-04-17 RX ORDER — DEXAMETHASONE SODIUM PHOSPHATE 4 MG/ML
4 INJECTION, SOLUTION INTRA-ARTICULAR; INTRALESIONAL; INTRAMUSCULAR; INTRAVENOUS; SOFT TISSUE ONCE
Status: COMPLETED | OUTPATIENT
Start: 2023-04-17 | End: 2023-04-17

## 2023-04-17 RX ORDER — PREDNISONE 20 MG/1
20 TABLET ORAL DAILY
Qty: 5 TABLET | Refills: 0 | Status: SHIPPED | OUTPATIENT
Start: 2023-04-17

## 2023-04-17 RX ORDER — KETOROLAC TROMETHAMINE 30 MG/ML
30 INJECTION, SOLUTION INTRAMUSCULAR; INTRAVENOUS ONCE
Status: COMPLETED | OUTPATIENT
Start: 2023-04-17 | End: 2023-04-17

## 2023-04-17 RX ORDER — ASPIRIN 81 MG/1
324 TABLET, CHEWABLE ORAL ONCE
Status: COMPLETED | OUTPATIENT
Start: 2023-04-17 | End: 2023-04-17

## 2023-04-17 RX ORDER — SODIUM CHLORIDE 0.9 % (FLUSH) 0.9 %
10 SYRINGE (ML) INJECTION AS NEEDED
Status: DISCONTINUED | OUTPATIENT
Start: 2023-04-17 | End: 2023-04-18 | Stop reason: HOSPADM

## 2023-04-17 RX ORDER — KETOROLAC TROMETHAMINE 15 MG/ML
15 INJECTION, SOLUTION INTRAMUSCULAR; INTRAVENOUS ONCE
Status: DISCONTINUED | OUTPATIENT
Start: 2023-04-17 | End: 2023-04-17

## 2023-04-17 RX ORDER — IPRATROPIUM BROMIDE AND ALBUTEROL SULFATE 2.5; .5 MG/3ML; MG/3ML
3 SOLUTION RESPIRATORY (INHALATION) ONCE
Status: COMPLETED | OUTPATIENT
Start: 2023-04-17 | End: 2023-04-17

## 2023-04-17 RX ORDER — ACETAMINOPHEN 500 MG
1000 TABLET ORAL ONCE
Status: COMPLETED | OUTPATIENT
Start: 2023-04-17 | End: 2023-04-17

## 2023-04-17 RX ADMIN — ASPIRIN 324 MG: 81 TABLET, CHEWABLE ORAL at 21:08

## 2023-04-17 RX ADMIN — IPRATROPIUM BROMIDE AND ALBUTEROL SULFATE 3 ML: 2.5; .5 SOLUTION RESPIRATORY (INHALATION) at 22:13

## 2023-04-17 RX ADMIN — DEXAMETHASONE SODIUM PHOSPHATE 4 MG: 4 INJECTION INTRA-ARTICULAR; INTRALESIONAL; INTRAMUSCULAR; INTRAVENOUS; SOFT TISSUE at 21:49

## 2023-04-17 RX ADMIN — ACETAMINOPHEN 1000 MG: 500 TABLET ORAL at 21:49

## 2023-04-17 RX ADMIN — KETOROLAC TROMETHAMINE 30 MG: 30 INJECTION, SOLUTION INTRAMUSCULAR; INTRAVENOUS at 21:49

## 2023-04-17 NOTE — ED PROVIDER NOTES
"Subjective   History of Present Illness  Patient is 62-year-old female presenting to the emergency department with chest tightness across the chest which is worse with deep breathing.  Patient reports symptoms off and on for about 2 days.  Symptoms are worse with activity.  She reports she has been under significant amount of stress recently as her mother passed away 1 week ago.  She also reports a history of asthma.  Patient has no history of coronary artery disease.  The patient does have a history of diabetes, obesity, but denies any history of dyslipidemia or hypertension.  Patient is a non-smoker.    History provided by:  Patient      Review of Systems    Past Medical History:   Diagnosis Date   • Anesthesia complication     HAS TROUBLE GETTING \"NUMB\"    • Anxiety    • Arthritis     on Celebrex + Robaxin, no steroids   • Asthma     does not follow w/ pulmonary   • Bladder spasms    • Depression     w/ PTSD   • Diverticulosis    • Dyspepsia    • Fatigue    • GERD (gastroesophageal reflux disease)     controlled w/ nightly Pepcid, EGD 2020 w/ Dr. Chavarria, (+) HH   • Memory loss     FROM SKULL FRACTURE AND FALL-SHORT TERM MEMORY LOSS   • Migraine    • Morbid obesity    • Seasonal allergies    • Seizures     \"convulsions\" at age 5   • Skull fracture 2012   • Sleep apnea with use of continuous positive airway pressure (CPAP)     CPAP compliant   • SOB (shortness of breath) on exertion    • Tendonitis    • Type 2 diabetes mellitus without complication, without long-term current use of insulin     dx 2021, no insulin, A1C <7   • Wears contact lenses    • Wears partial dentures     TOP ONLY    • Wears prescription eyeglasses        Allergies   Allergen Reactions   • Mold Extract [Trichophyton] Shortness Of Breath, Anxiety, Palpitations and Irritability   • Lortab [Hydrocodone-Acetaminophen] Nausea And Vomiting   • Pollen Extract Other (See Comments)     SNEEZING AND CONGESTION        Past Surgical History:   Procedure " Laterality Date   • CERVICAL POLYPECTOMY     • TOTAL LAPAROSCOPIC HYSTERECTOMY N/A 9/18/2017    Procedure: TOTAL LAPAROSCOPIC HYSTERECTOMY, BILATERAL SALPINGO OOPHORECTOMY WITH DAVINCI ROBOT ;  Surgeon: Shannon Grimaldo DO;  Location: [facility] OR;  Service:    • WISDOM TOOTH EXTRACTION         Family History   Problem Relation Age of Onset   • Cancer Mother         cervical cancer   • Dementia Mother    • Hypertension Mother    • Obesity Father    • Hypertension Father    • Heart attack Father    • Sleep apnea Father    • Sleep apnea Sister    • Hypertension Brother    • Sleep apnea Brother    • Cancer Maternal Aunt         lymphoma   • Cancer Paternal Aunt    • Breast cancer Paternal Aunt 70   • Cancer Maternal Grandmother    • Obesity Paternal Grandmother    • Diabetes Paternal Grandmother    • Hypertension Paternal Grandmother    • Stroke Paternal Grandmother    • Heart attack Paternal Grandmother    • Heart attack Paternal Grandfather    • Alcohol abuse Other    • Depression Other        Social History     Socioeconomic History   • Marital status: Single   Tobacco Use   • Smoking status: Never   • Smokeless tobacco: Never   Vaping Use   • Vaping Use: Never used   Substance and Sexual Activity   • Alcohol use: Not Currently   • Drug use: Never   • Sexual activity: Not Currently     Birth control/protection: Post-menopausal, Surgical           Objective   Physical Exam  Vitals and nursing note reviewed.   Constitutional:       General: She is not in acute distress.     Appearance: She is well-developed. She is obese. She is not toxic-appearing.   HENT:      Head: Normocephalic and atraumatic.   Cardiovascular:      Rate and Rhythm: Normal rate and regular rhythm.      Pulses:           Radial pulses are 2+ on the right side and 2+ on the left side.      Heart sounds: Normal heart sounds.   Pulmonary:      Effort: Pulmonary effort is normal. No tachypnea or respiratory distress.      Breath sounds: Normal breath  sounds. No wheezing or rhonchi.   Musculoskeletal:         General: Normal range of motion.   Skin:     General: Skin is warm and dry.   Neurological:      Mental Status: She is alert and oriented to person, place, and time.   Psychiatric:         Mood and Affect: Mood normal.         Behavior: Behavior normal.         Procedures           ED Course  ED Course as of 04/18/23 0125   Mon Apr 17, 2023   1840 D-Dimer, Quant: 0.55  Normal D-dimer level. [RS]   2127 XR Chest 1 View  Personally reviewed the single view the chest demonstrating no focal lobar infiltrate.  See report for radiology for details. [RS]   2130 Patient resting comfortably.  No emergent findings in our evaluation here in the ER.  Believe the patient is having likely asthma/reactive airway disease exacerbation secondary to recent COVID-19.  Patient reports he feels similar to when she had a person in the past.  She does report she has tolerated steroids in the past. I had a discussion with the patient/family regarding diagnosis, diagnostic results, treatment plan, and medications.  The patient/family indicated understanding of these instructions.  I spent adequate time at the bedside prior to discharge necessary to discuss the aftercare instructions, giving patient education, providing explanations of the results of our evaluations/findings, and my decision making to assure that the patient/family understand the plan of care.  Time was allotted to answer questions at that time and throughout the ED course.  Emphasis was placed on timely follow-up after discharge.  I also discussed the potential for the development of an acute emergent condition requiring further evaluation, return to the ER, admission, or even surgical intervention. I discussed that we found nothing during the visit today indicating the need for further ER workup at this time, admission to the hospital, or the presence of an acute unstable medical condition.  I encouraged the patient  to return to the emergency department immediately for ANY concerns, worsening, new complaints, or if symptoms persist and unable to seek follow-up in a timely fashion.  The patient/family expressed understanding and agreement with this plan.  [RS]      ED Course User Index  [RS] Marcelino Day MD                      HEART Score: 3                      Medical Decision Making  Diabetes mellitus type 2 in obese: chronic illness or injury  Elevated blood pressure reading with diagnosis of hypertension: chronic illness or injury  Feeling of chest tightness: acute illness or injury  History of asthma: chronic illness or injury  Reactive airway disease with acute exacerbation, unspecified asthma severity, unspecified whether persistent: acute illness or injury  Amount and/or Complexity of Data Reviewed  Labs: ordered. Decision-making details documented in ED Course.  Radiology: ordered. Decision-making details documented in ED Course.  ECG/medicine tests: ordered.      Risk  OTC drugs.  Prescription drug management.          Final diagnoses:   Feeling of chest tightness   Reactive airway disease with acute exacerbation, unspecified asthma severity, unspecified whether persistent   History of asthma   Diabetes mellitus type 2 in obese   Elevated blood pressure reading with diagnosis of hypertension       ED Disposition  ED Disposition     ED Disposition   Discharge    Condition   Stable    Comment   --             Michelle Mandel PA  [address] [place] [address]  [phone]    Schedule an appointment as soon as possible for a visit       [facility] Emergency Department  [address] [address] [phone]    As needed, If symptoms worsen or ANY concerns.         Medication List      New Prescriptions    predniSONE 20 MG tablet  Commonly known as: DELTASONE  Take 1 tablet by mouth Daily.        Changed    * acetaminophen 500 MG tablet  Commonly known as:  TYLENOL  What changed: Another medication with the same name was added. Make sure you understand how and when to take each.     * acetaminophen 500 MG tablet  Commonly known as: TYLENOL  Take 2 tablets by mouth Every 6 (Six) Hours As Needed for Mild Pain or Moderate Pain.  What changed: You were already taking a medication with the same name, and this prescription was added. Make sure you understand how and when to take each.     metFORMIN  MG 24 hr tablet  Commonly known as: GLUCOPHAGE-XR  TAKE TWO TABLETS BY MOUTH DAILY WITH BREAKFAST  What changed: how much to take         * This list has 2 medication(s) that are the same as other medications prescribed for you. Read the directions carefully, and ask your doctor or other care provider to review them with you.               Where to Get Your Medications      These medications were sent to [facility] [id number] - [facility] [address] - [phone]  - [phone]   [address] [place] [address] [address]    Phone: [phone]   · acetaminophen 500 MG tablet  · predniSONE 20 MG tablet          Marcelion Day MD  04/18/23 3919

## 2023-04-18 LAB
QT INTERVAL: 254 MS
QTC INTERVAL: 312 MS

## 2023-04-19 ENCOUNTER — PATIENT MESSAGE (OUTPATIENT)
Dept: INTERNAL MEDICINE | Facility: CLINIC | Age: 63
End: 2023-04-19
Payer: MEDICAID

## 2023-04-24 DIAGNOSIS — F41.9 ANXIETY: ICD-10-CM

## 2023-04-24 RX ORDER — PROPRANOLOL HYDROCHLORIDE 10 MG/1
TABLET ORAL
Qty: 60 TABLET | Refills: 0 | Status: SHIPPED | OUTPATIENT
Start: 2023-04-24

## 2023-04-26 ENCOUNTER — TELEPHONE (OUTPATIENT)
Dept: INTERNAL MEDICINE | Facility: CLINIC | Age: 63
End: 2023-04-26

## 2023-04-26 NOTE — TELEPHONE ENCOUNTER
Caller: Sandra Auguste    Relationship: Self    Best call back number: [phone]    Who are you requesting to speak with (clinical staff, provider,  specific staff member): CLINICAL STAFF    Do you know the name of the person who called: SANDRA    What was the call regarding: PATIENT CALLED TO SEE IF ANYTHING WAS UPCOMING ON THE SCHEDULE FOR HER TO SEE ALESIA WINSTON, HOWEVER NO APPOINTMENTS HAD BEEN MADE.     PATIENT IS REQUESTING TO KNOW IF ALESIA WOULD LIKE HER TO COME IN FOR AN APPOINTMENT FOR A FOLLOW UP.    PLEASE ADVISE.     Do you require a callback: YES

## 2023-04-26 NOTE — TELEPHONE ENCOUNTER
Per iam last note patient was supposed to follow up in 3 months. Patient is scheduled for May 11th.

## 2023-05-04 ENCOUNTER — TELEMEDICINE (OUTPATIENT)
Dept: PSYCHIATRY | Facility: CLINIC | Age: 63
End: 2023-05-04
Payer: MEDICAID

## 2023-05-04 DIAGNOSIS — F33.1 MAJOR DEPRESSIVE DISORDER, RECURRENT EPISODE, MODERATE: Primary | ICD-10-CM

## 2023-05-04 DIAGNOSIS — F41.9 ANXIETY DISORDER, UNSPECIFIED TYPE: ICD-10-CM

## 2023-05-04 NOTE — PROGRESS NOTES
"Date: May 4, 2023  Time In: 2:04 pm   Time Out: 3:02 pm   This provider is located at the [facility] ([facility]), [address], [place] [address] using a secure [employer] Video Visit through [employer]. Patient is being seen remotely via telehealth at home address in [place] and stated they are in a secure environment for this session. The patient's condition being diagnosed/treated is appropriate for telemedicine. The provider identified herself as well as her credentials. The patient, and/or patients guardian, consent to be seen remotely, and when consent is given they understand that the consent allows for patient identifiable information to be sent to a third party as needed. They may refuse to be seen remotely at any time. The electronic data is encrypted and password protected, and the patient and/or guardian has been advised of the potential risks to privacy not withstanding such measures.     You have chosen to receive care through a telehealth visit.  Do you consent to use a video/audio connection for your medical care today? Yes    PROGRESS NOTE  Data:  Sandra Auguste is a 62 y.o. female who presents today for follow up    Chief Complaint: depression, anxiety and grief    History of Present Illness: Patient reported that everything has been crazy since last contact. Patient reported that presently things are in probate regarding her moms house/will. Patient reported that she overheard her brother and sister talking on the curb via the video doorbell. Patient stated \"he threatened to put my things on the curb\". Patient share and reflected on conversations that she had with her mom about the home. Patient reported that she has been praying about the situation due to having no control. Patient reported that has been living with her daughter but only been back to her moms house to get a couple of things. 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 Patient identified feeling more hopeful since getting a job but reflected on concerns of maintenance since it has been a long time since having regular employment. Patient reported increase anxiety and worries. Patient also explored feelings Patient reported that she hasn't been emotional until this week. Patient reported that she was [facility]. Patient was encouraged process her feelings of loss.       Clinical Maneuvering/Intervention: person centered, transition planning     (Scales based on 0 - 10 with 10 being the worst)  Depression:   8-9 Anxiety:  8-9       Assisted patient in processing above session content; acknowledged and normalized patient’s thoughts, feelings, and concerns.  Rationalized patient thought process regarding completing tasks for her moms  and feeling upset that her siblings did not say thank you. Patient explored feelings of not being acknowledged. Patient discussed and reflected on relationship with her siblings and present impact after the loss of her mom. Discussed triggers associated with patient's depression.  Also discussed coping skills for patient to implement such as implementing boundaries. Processing thoughts and feelings with her daughter.    Allowed patient to freely discuss issues without interruption or judgment. Provided safe, confidential environment to facilitate the development of positive therapeutic relationship and encourage open, honest communication. Assisted patient in identifying risk factors which would indicate the need for higher level of care including thoughts to harm self or others and/or self-harming behavior and encouraged patient to contact this office, call 911, or present to the nearest emergency room should any of these events occur. Discussed crisis intervention services and means to access. Patient adamantly and convincingly denies current suicidal or homicidal ideation or  perceptual disturbance.    Assessment:   Assessment   Patient appears to maintain relative stability as compared to their baseline.  However, patient continues to struggle with depression, anxiety which continues to cause impairment in important areas of functioning.  A result, they can be reasonably expected to continue to benefit from treatment and would likely be at increased risk for decompensation otherwise.    Mental Status Exam:   Hygiene:   good  Cooperation:  Cooperative  Eye Contact:  Good  Psychomotor Behavior:  Appropriate  Affect:  Appropriate  Mood: depressed  Speech:  Normal  Thought Process:  Linear  Thought Content:  Mood congruent  Suicidal:  None  Homicidal:  None  Hallucinations:  None  Delusion:  None  Memory:  Intact and Patient has some recall difficulties   Orientation:  Person, Place, Time and Situation  Reliability:  good  Insight:  Good and Fair  Judgement:  Good and Fair  Impulse Control:  Good  Physical/Medical Issues:  Yes see medical problem list        Patient's Support Network Includes:  daughter    Functional Status: Moderate impairment     Progress toward goal: Not at goal    Prognosis: Good with Ongoing Treatment          Plan:  Patient will continue in individual outpatient therapy with focus on improved functioning and coping skills, maintaining stability, and avoiding decompensation and the need for higher level of care.    Patient will adhere to medication regimen as prescribed and report any side effects. Patient will contact this office, call 911 or present to the nearest emergency room should suicidal or homicidal ideations occur. Provide Cognitive Behavioral Therapy and Solution Focused Therapy to improve functioning, maintain stability, and avoid decompensation and the need for higher level of care.     Return in about 3 weeks, or earlier if symptoms worsen or fail to improve. Patient was informed of clinician leaving agency at the end of the month. Patient was given  options regarding current scheduled appointment or transitioning now. Patient elected to keep appointment for 5/22/23. Patient will transition to another clinician following this appointment for on-going sessions.            VISIT DIAGNOSIS:     ICD-10-CM ICD-9-CM   1. Major depressive disorder, recurrent episode, moderate  F33.1 296.32   2. Anxiety disorder, unspecified type  F41.9 300.00             This document has been electronically signed by ASIA Brown  May 4, 2023 15:01 EDT      Part of this note may be an electronic transcription/translation of spoken language to printed text using the Dragon Dictation System.

## 2023-05-06 DIAGNOSIS — E11.9 TYPE 2 DIABETES MELLITUS WITHOUT COMPLICATION, WITHOUT LONG-TERM CURRENT USE OF INSULIN: ICD-10-CM

## 2023-05-06 RX ORDER — METFORMIN HYDROCHLORIDE 500 MG/1
TABLET, EXTENDED RELEASE ORAL
Qty: 180 TABLET | Refills: 0 | Status: SHIPPED | OUTPATIENT
Start: 2023-05-06

## 2023-05-08 ENCOUNTER — TELEPHONE (OUTPATIENT)
Dept: CASE MANAGEMENT | Facility: OTHER | Age: 63
End: 2023-05-08
Payer: MEDICAID

## 2023-05-11 ENCOUNTER — HOSPITAL ENCOUNTER (OUTPATIENT)
Dept: PULMONOLOGY | Facility: HOSPITAL | Age: 63
Discharge: HOME | End: 2023-05-11
Admitting: PHYSICIAN ASSISTANT
Payer: MEDICAID

## 2023-05-11 DIAGNOSIS — E66.01 OBESITY, CLASS III, BMI 40-49.9 (MORBID OBESITY): ICD-10-CM

## 2023-05-11 DIAGNOSIS — J45.909 ASTHMA, UNSPECIFIED ASTHMA SEVERITY, UNSPECIFIED WHETHER COMPLICATED, UNSPECIFIED WHETHER PERSISTENT: ICD-10-CM

## 2023-05-11 DIAGNOSIS — G47.30 SLEEP APNEA, UNSPECIFIED TYPE: ICD-10-CM

## 2023-05-11 PROCEDURE — 94726 PLETHYSMOGRAPHY LUNG VOLUMES: CPT

## 2023-05-11 PROCEDURE — 94729 DIFFUSING CAPACITY: CPT

## 2023-05-11 PROCEDURE — 94010 BREATHING CAPACITY TEST: CPT

## 2023-05-15 DIAGNOSIS — F33.1 MAJOR DEPRESSIVE DISORDER, RECURRENT EPISODE, MODERATE: Chronic | ICD-10-CM

## 2023-05-16 RX ORDER — BREXPIPRAZOLE 0.5 MG/1
TABLET ORAL
Qty: 30 TABLET | Refills: 0 | Status: SHIPPED | OUTPATIENT
Start: 2023-05-16

## 2023-05-18 ENCOUNTER — TELEPHONE (OUTPATIENT)
Dept: PSYCHIATRY | Facility: CLINIC | Age: 63
End: 2023-05-18

## 2023-05-18 NOTE — TELEPHONE ENCOUNTER
Pt is not able to keep appt that was scheduled for 05/22/2023. Pt would like to know who her therapist recommends for her to continue her care with.    Please advise

## 2023-05-19 DIAGNOSIS — E11.9 TYPE 2 DIABETES MELLITUS WITHOUT COMPLICATION, WITHOUT LONG-TERM CURRENT USE OF INSULIN: ICD-10-CM

## 2023-05-19 RX ORDER — DULAGLUTIDE 1.5 MG/.5ML
INJECTION, SOLUTION SUBCUTANEOUS
Qty: 6 ML | Refills: 3 | Status: SHIPPED | OUTPATIENT
Start: 2023-05-19

## 2023-05-22 NOTE — TELEPHONE ENCOUNTER
Transfer of care will be mostly likely with Shagufta Salgado and appointment scheduled based on her availability.

## 2023-05-23 ENCOUNTER — TELEPHONE (OUTPATIENT)
Dept: CASE MANAGEMENT | Facility: OTHER | Age: 63
End: 2023-05-23
Payer: MEDICAID

## 2023-05-30 ENCOUNTER — TELEPHONE (OUTPATIENT)
Dept: CASE MANAGEMENT | Facility: OTHER | Age: 63
End: 2023-05-30

## 2023-06-01 DIAGNOSIS — F41.9 ANXIETY DISORDER, UNSPECIFIED TYPE: Chronic | ICD-10-CM

## 2023-06-01 DIAGNOSIS — F33.1 MAJOR DEPRESSIVE DISORDER, RECURRENT EPISODE, MODERATE: Chronic | ICD-10-CM

## 2023-06-01 RX ORDER — VILAZODONE HYDROCHLORIDE 20 MG/1
TABLET ORAL
Qty: 30 TABLET | Refills: 0 | Status: SHIPPED | OUTPATIENT
Start: 2023-06-01

## 2023-06-06 DIAGNOSIS — F41.9 ANXIETY: ICD-10-CM

## 2023-06-06 RX ORDER — PROPRANOLOL HYDROCHLORIDE 10 MG/1
TABLET ORAL
Qty: 60 TABLET | Refills: 0 | Status: SHIPPED | OUTPATIENT
Start: 2023-06-06

## 2023-06-16 ENCOUNTER — PRIOR AUTHORIZATION (OUTPATIENT)
Dept: INTERNAL MEDICINE | Facility: CLINIC | Age: 63
End: 2023-06-16
Payer: MEDICAID

## 2023-06-16 RX ORDER — ROSUVASTATIN CALCIUM 5 MG/1
TABLET, COATED ORAL
Qty: 90 TABLET | Refills: 0 | Status: SHIPPED | OUTPATIENT
Start: 2023-06-16

## 2023-07-03 ENCOUNTER — TELEPHONE (OUTPATIENT)
Dept: INTERNAL MEDICINE | Facility: CLINIC | Age: 63
End: 2023-07-03

## 2023-07-03 NOTE — TELEPHONE ENCOUNTER
Caller: Sandra Auguste    Relationship: Self    Best call back number: [phone]     What is the best time to reach you: ANY    Who are you requesting to speak with (clinical staff, provider,  specific staff member): UNKNOWN    Do you know the name of the person who called: NO    What was the call regarding: PATIENT JUST MISSED A CALL FROM THE OFFICE. SHE IS NOT SURE WHAT IT IS ABOUT BECAUSE SHE CANNOT ACCESS HER VOICEMAIL. PLEASE CALL PATIENT BACK OR MESSAGE HER THROUGH [employer] AND LET HER KNOW.    Is it okay if the provider responds through [employer]: YES

## 2023-08-05 DIAGNOSIS — E11.9 TYPE 2 DIABETES MELLITUS WITHOUT COMPLICATION, WITHOUT LONG-TERM CURRENT USE OF INSULIN: ICD-10-CM

## 2023-08-05 RX ORDER — METFORMIN HYDROCHLORIDE 500 MG/1
TABLET, EXTENDED RELEASE ORAL
Qty: 180 TABLET | Refills: 0 | Status: SHIPPED | OUTPATIENT
Start: 2023-08-05

## 2023-08-09 ENCOUNTER — TELEPHONE (OUTPATIENT)
Dept: BARIATRICS/WEIGHT MGMT | Facility: CLINIC | Age: 63
End: 2023-08-09

## 2023-08-09 NOTE — TELEPHONE ENCOUNTER
Simona from [employer] called and said patient is scheduled for her GES tomorrow but her insurance has termed and they have been unsuccessful at reaching her. They asked if it would be ok to cancel and reschedule the appointment since they have been unable to reach her?     Please advise, thanks!

## 2023-08-22 NOTE — TELEPHONE ENCOUNTER
CALLED PATIENT, NO ANSWER, LEFT MESSAGE FOR A CALL BACK.    Stable CKD stage 3 since admission at last hospitalization, was seen by DR. Aldana in the outpatient setting. Planned to undergo above procedure to improve function of left kidney.     - Renally dose medications.   - Care with nephrotoxic medications.

## 2023-11-09 ENCOUNTER — OFFICE VISIT (OUTPATIENT)
Dept: INTERNAL MEDICINE | Facility: CLINIC | Age: 63
End: 2023-11-09
Payer: COMMERCIAL

## 2023-11-09 ENCOUNTER — HOSPITAL ENCOUNTER (OUTPATIENT)
Dept: GENERAL RADIOLOGY | Facility: HOSPITAL | Age: 63
Discharge: HOME | End: 2023-11-09
Admitting: NURSE PRACTITIONER
Payer: COMMERCIAL

## 2023-11-09 VITALS
BODY MASS INDEX: 39.99 KG/M2 | HEART RATE: 72 BPM | OXYGEN SATURATION: 98 % | SYSTOLIC BLOOD PRESSURE: 124 MMHG | DIASTOLIC BLOOD PRESSURE: 80 MMHG | TEMPERATURE: 97.4 F | RESPIRATION RATE: 16 BRPM | WEIGHT: 240 LBS | HEIGHT: 65 IN

## 2023-11-09 DIAGNOSIS — S63.501A SPRAIN OF RIGHT WRIST, INITIAL ENCOUNTER: ICD-10-CM

## 2023-11-09 DIAGNOSIS — T78.2XXS ANAPHYLAXIS, SEQUELA: Primary | ICD-10-CM

## 2023-11-09 PROCEDURE — 73110 X-RAY EXAM OF WRIST: CPT

## 2023-11-09 RX ORDER — EPINEPHRINE 0.3 MG/.3ML
0.3 INJECTION SUBCUTANEOUS AS NEEDED
Qty: 1 EACH | Refills: 1 | Status: CANCELLED | OUTPATIENT
Start: 2023-11-09

## 2023-11-09 NOTE — PROGRESS NOTES
Follow Up Office Visit      Date: 2023   Patient Name: Sandra Auguste  : 1960   MRN: [id number]     Chief Complaint:    Chief Complaint   Patient presents with   • Wrist Pain     Pt states strained right wrist at work x 2 days ago, is having some tingling        History of Present Illness: Sandra Auguste is a 63 y.o. female who is here today for an acute visit. 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 Since this time she has had numbness and burning pain in the radial aspect of right wrist along with joint pain in this area. No pain/ numbness in the hand or fingers. She describes that this feels different than what she usually experiences with her previous carpal tunnel.     Also requesting refill of her epi pen. She is unsure what she has an anaphylactic or severe allergy to or why she was originally given this epi pen. She has seen allergy/ immunology specialist in past.     Subjective      Review of Systems:   Review of Systems   Musculoskeletal:  Positive for arthralgias.   Skin:  Negative for color change and wound.       I have reviewed the patients family history, social history, past medical history, past surgical history and have updated it as appropriate.     Medications:     Current Outpatient Medications:   •  acetaminophen (TYLENOL) 500 MG tablet, Take 1 tablet by mouth Every 6 (Six) Hours As Needed for Mild Pain., Disp: , Rfl:   •  albuterol sulfate  (90 Base) MCG/ACT inhaler, Inhale 2 puffs Every 6 (Six) Hours As Needed., Disp: , Rfl:   •  Blood Glucose Monitoring Suppl (OneTouch Verio Flex System) w/Device kit, 1 kit Daily., Disp: 1 kit, Rfl: 0  •  Blood Glucose Monitoring Suppl (OneTouch Verio) w/Device kit, 1 kit Daily., Disp: 1 kit, Rfl: 0  •  Breo Ellipta 200-25 MCG/ACT inhaler, INHALE ONE DOSE BY MOUTH DAILY, Disp: 60 each, Rfl: 3  •  Cranberry 1000 MG capsule, Take 1 capsule by mouth Daily.,  "Disp: , Rfl:   •  Diclofenac Sodium (VOLTAREN) 1 % gel gel, APPLY 4 GRAMS TOPICALLY TO THE APPROPRIATE AREA AS DIRECTED FOUR TIMES A DAYS AS NEEDED FOR PAIN IN SHOULDERS, Disp: 100 g, Rfl: 1  •  EPINEPHrine (EPIPEN) 0.3 MG/0.3ML solution auto-injector injection, Inject 0.3 mL into the appropriate muscle as directed by prescriber As Needed (FOR SEVERE ALLERGIC REACTION)., Disp: 1 each, Rfl: 1  •  glucose blood (OneTouch Verio) test strip, USE AS INSTRUCTED TO CHECK BLOOD GLUCOSE ONCE DAILY DX:E11.65, Disp: 100 each, Rfl: 3  •  glucose monitor monitoring kit, Use as directed to check blood glucose once daily., Disp: 1 each, Rfl: 0  •  Lancets (onetouch ultrasoft) lancets, Use one lancet daily to check blood sugars, Disp: 100 each, Rfl: 3  •  loratadine (CLARITIN) 10 MG tablet, Take 1 tablet by mouth Daily., Disp: 30 tablet, Rfl: 5  •  metFORMIN ER (GLUCOPHAGE-XR) 500 MG 24 hr tablet, TAKE TWO TABLETS BY MOUTH EVERY MORNING WITH BREAKFAST, Disp: 180 tablet, Rfl: 0  •  multivitamin with minerals tablet tablet, Take 1 tablet by mouth Daily., Disp: , Rfl:   •  sodium chloride (Ocean Nasal Spray) 0.65 % nasal spray, 1 spray into the nostril(s) as directed by provider As Needed for Congestion., Disp: 60 mL, Rfl: 0    Allergies:   Allergies   Allergen Reactions   • Mold Extract [Trichophyton] Shortness Of Breath, Anxiety, Palpitations and Irritability   • Lortab [Hydrocodone-Acetaminophen] Nausea And Vomiting   • Pollen Extract Other (See Comments)     SNEEZING AND CONGESTION        Objective     Physical Exam: Please see above  Vital Signs:   Vitals:    11/09/23 1211   BP: 124/80   Pulse: 72   Resp: 16   Temp: 97.4 °F (36.3 °C)   SpO2: 98%   Weight: 109 kg (240 lb)   Height: 165.1 cm (65\")   PainSc:   2     Body mass index is 39.94 kg/m².    Physical Exam  Vitals and nursing note reviewed.   Musculoskeletal:      Right forearm: Normal.      Right wrist: Swelling and tenderness present. No deformity. Normal range of motion. " Normal pulse.        Arms:       Comments: The pain is across red line              Results:   Imaging:     Labs:        Assessment / Plan      Assessment/Plan:   Diagnoses and all orders for this visit:    1. Anaphylaxis, sequela (Primary)  Specialist to manage epi pen  -     Ambulatory Referral to Allergy    2. Sprain of right wrist, initial encounter  RICE  Right wrist wrapped during office visit   -     XR Wrist 3+ View Right; Future         Follow Up:   Return if symptoms worsen or fail to improve, for Next scheduled follow up.    REBECA Hadley  [facility] [place]

## 2023-11-09 NOTE — LETTER
November 9, 2023     Patient: Sandra Auguste   YOB: 1960   Date of Visit: 11/9/2023       To Whom It May Concern:    It is my medical opinion that Sandra Auguste may return to work in two days.         Sincerely,        REBECA Torres    CC: No Recipients

## 2023-11-13 ENCOUNTER — TELEPHONE (OUTPATIENT)
Dept: INTERNAL MEDICINE | Facility: CLINIC | Age: 63
End: 2023-11-13
Payer: COMMERCIAL

## 2023-11-13 NOTE — TELEPHONE ENCOUNTER
----- Message from REBECA Beckwith sent at 11/11/2023 10:55 PM EST -----  Normal XR of the right wrist. Continue supportive care. If pain worsens before next apt with me on 12/8 let me know and I recommend we go ahead and refer to PT.

## 2023-11-13 NOTE — TELEPHONE ENCOUNTER
Notified patient of results, patient verbalized understanding. No follow up questions at this time. She would like to proceed with the referral for physical therapy.

## 2023-11-14 DIAGNOSIS — M25.531 RIGHT WRIST PAIN: Primary | ICD-10-CM

## 2023-11-29 DIAGNOSIS — J06.9 UPPER RESPIRATORY TRACT INFECTION, UNSPECIFIED TYPE: ICD-10-CM

## 2023-11-29 DIAGNOSIS — J30.2 SEASONAL ALLERGIES: ICD-10-CM

## 2023-11-29 DIAGNOSIS — R05.1 ACUTE COUGH: ICD-10-CM

## 2023-11-29 RX ORDER — FLUTICASONE PROPIONATE 50 MCG
2 SPRAY, SUSPENSION (ML) NASAL DAILY
Qty: 16 ML | OUTPATIENT
Start: 2023-11-29

## 2023-11-29 RX ORDER — GUAIFENESIN 600 MG/1
TABLET, EXTENDED RELEASE ORAL
Qty: 28 TABLET | Refills: 0 | OUTPATIENT
Start: 2023-11-29

## 2023-11-29 RX ORDER — BENZONATATE 200 MG/1
CAPSULE ORAL
Qty: 20 CAPSULE | Refills: 0 | OUTPATIENT
Start: 2023-11-29

## 2023-12-08 ENCOUNTER — OFFICE VISIT (OUTPATIENT)
Dept: INTERNAL MEDICINE | Facility: CLINIC | Age: 63
End: 2023-12-08
Payer: COMMERCIAL

## 2023-12-08 VITALS
SYSTOLIC BLOOD PRESSURE: 124 MMHG | DIASTOLIC BLOOD PRESSURE: 74 MMHG | OXYGEN SATURATION: 97 % | RESPIRATION RATE: 16 BRPM | HEIGHT: 65 IN | WEIGHT: 235 LBS | HEART RATE: 96 BPM | BODY MASS INDEX: 39.15 KG/M2

## 2023-12-08 DIAGNOSIS — Z23 NEED FOR INFLUENZA VACCINATION: ICD-10-CM

## 2023-12-08 DIAGNOSIS — S16.1XXD STRAIN OF NECK MUSCLE, SUBSEQUENT ENCOUNTER: ICD-10-CM

## 2023-12-08 DIAGNOSIS — K59.00 CONSTIPATION, UNSPECIFIED CONSTIPATION TYPE: ICD-10-CM

## 2023-12-08 DIAGNOSIS — J45.20 MILD INTERMITTENT ASTHMA WITHOUT COMPLICATION: ICD-10-CM

## 2023-12-08 DIAGNOSIS — R10.9 ABDOMINAL PAIN, UNSPECIFIED ABDOMINAL LOCATION: ICD-10-CM

## 2023-12-08 DIAGNOSIS — Z00.00 ENCOUNTER FOR MEDICAL EXAMINATION TO ESTABLISH CARE: Primary | ICD-10-CM

## 2023-12-08 DIAGNOSIS — E11.9 TYPE 2 DIABETES MELLITUS WITHOUT COMPLICATION, WITHOUT LONG-TERM CURRENT USE OF INSULIN: ICD-10-CM

## 2023-12-08 DIAGNOSIS — J30.2 SEASONAL ALLERGIES: ICD-10-CM

## 2023-12-08 LAB
EXPIRATION DATE: ABNORMAL
HBA1C MFR BLD: 6.3 % (ref 4.5–5.7)
Lab: ABNORMAL

## 2023-12-08 RX ORDER — ALBUTEROL SULFATE 90 UG/1
2 AEROSOL, METERED RESPIRATORY (INHALATION) EVERY 6 HOURS PRN
Qty: 18 G | Refills: 1 | Status: SHIPPED | OUTPATIENT
Start: 2023-12-08

## 2023-12-08 RX ORDER — METFORMIN HYDROCHLORIDE 500 MG/1
500 TABLET, EXTENDED RELEASE ORAL
Qty: 90 TABLET | Refills: 3 | Status: SHIPPED | OUTPATIENT
Start: 2023-12-08

## 2023-12-08 RX ORDER — POLYETHYLENE GLYCOL 3350 17 G/17G
17 POWDER, FOR SOLUTION ORAL DAILY
Qty: 578 G | Refills: 1 | Status: SHIPPED | OUTPATIENT
Start: 2023-12-08

## 2023-12-08 RX ORDER — FLUTICASONE FUROATE AND VILANTEROL 200; 25 UG/1; UG/1
1 POWDER RESPIRATORY (INHALATION)
Qty: 60 EACH | Refills: 1 | Status: SHIPPED | OUTPATIENT
Start: 2023-12-08

## 2023-12-08 NOTE — PROGRESS NOTES
"     New Patient Office Visit      Date: 2023   Patient Name: Sandra Auguste  : 1960   MRN: [id number]     Chief Complaint:    Chief Complaint   Patient presents with    Diabetes    Establish Care       History of Present Illness: Sandra Auguste is a 63 y.o. female who is here today to establish care.      Right wrist pain  Reports improvement with her right wrist.   Patient starts physical therapy on 12/15/2023.   History of bone spur in her right thumb and reports one forming in her left thumb.     Constipation  Endorses issues with constipation.   Reports nausea on 2023 after eating a banana and after waking up on 2023.    Patient did not go to work yesterday and 1-day last week due to her constipation.   Last colonoscopy was a couple of years ago with Dr. Chavarria which showed diverticulosis.   Patient was told she has a long colon.   She has always had constipation problems.   Following acute GI sxs which ultimately led to her missing work-- she called MD help line through her work. Reports being told that discontinuing metformin was causing her constipation.   Started taking Maalox 1 week ago.   Patient had a few bowel movements this week.   Prescribed a liquid enema that provided some relief, but she still has \"gurgling\".   Reports losing about 20 pounds.   Normally has 2 to 3 bowel movements a week.   Patient is not getting an adequate amount of water.   Consumes Powerade, Propel, Gatorade, or prune juice.   Patient was recently diagnosed with IBS.     Diabetes  She discontinued metformin for a while because she felt like her glucose levels were normal.   Patient has restarted metformin 500 mg once daily. Her prescribed dose is 2 tabs qAM  Her A1c is 6.3 percent today.   Previous A1c was 6.6 percent.     Allergies  Patient takes Claritin and tolerates it well.   She has not seen an allergist yet.     Asthma  She takes Breo Ellipta 200-25 mcg once daily and albuterol as needed. " "  Symptoms are well controlled.     Arthritis  Patient uses Voltaren Gel for her bilateral shoulders.   She also uses it for back pain.   Occasionally has leg cramps at night.   Epsom salt baths are effective.   Patient adds magnesium flakes to her baths.     Headaches  Endorses frequent headaches.   She takes Advil or Aleve for relief.     Abdominal tenderness  Endorses abdominal tenderness.   She was told that she has a hernia or a small tear in her diaphragm.     Maintenance  Consumes a protein shake every morning for breakfast.   She tries to follow a healthy diet and avoids bread as much as possible.  Up to date with her eye exam, and she has a new prescription for her glasses.       Subjective      Review of Systems:   Review of Systems   Gastrointestinal:  Positive for abdominal pain and constipation.   Musculoskeletal:  Positive for arthralgias.   Neurological:  Positive for headache.       Past Medical History:   Past Medical History:   Diagnosis Date    Anesthesia complication     HAS TROUBLE GETTING \"NUMB\"     Anxiety     Arthritis     on Celebrex + Robaxin, no steroids    Asthma     does not follow w/ pulmonary    Bladder spasms     Depression     w/ PTSD    Diverticulosis     Dyspepsia     Fatigue     GERD (gastroesophageal reflux disease)     controlled w/ nightly Pepcid, EGD 2020 w/ Dr. Chavarria, (+) [facility] ED maintenance 02/25/2020    Memory loss     FROM SKULL FRACTURE AND FALL-SHORT TERM MEMORY LOSS    Migraine     Morbid obesity     Seasonal allergies     Seizures     \"convulsions\" at age 5    Skull fracture 2012    Sleep apnea with use of continuous positive airway pressure (CPAP)     CPAP compliant    SOB (shortness of breath) on exertion     Tendonitis     Type 2 diabetes mellitus without complication, without long-term current use of insulin     dx 2021, no insulin, A1C <7    Wears contact lenses     Wears partial dentures     TOP ONLY     Wears prescription eyeglasses        Past " Surgical History:   Past Surgical History:   Procedure Laterality Date    CERVICAL POLYPECTOMY      TOTAL LAPAROSCOPIC HYSTERECTOMY N/A 9/18/2017    Procedure: TOTAL LAPAROSCOPIC HYSTERECTOMY, BILATERAL SALPINGO OOPHORECTOMY WITH DAVINCI ROBOT ;  Surgeon: Shannon Grimaldo DO;  Location: [facility];  Service:     WISDOM TOOTH EXTRACTION         Family History:   Family History   Problem Relation Age of Onset    Cancer Mother         cervical cancer    Dementia Mother     Hypertension Mother     Obesity Father     Hypertension Father     Heart attack Father     Sleep apnea Father     Sleep apnea Sister     Hypertension Brother     Sleep apnea Brother     Cancer Maternal Aunt         lymphoma    Cancer Paternal Aunt     Breast cancer Paternal Aunt 70    Cancer Maternal Grandmother     Obesity Paternal Grandmother     Diabetes Paternal Grandmother     Hypertension Paternal Grandmother     Stroke Paternal Grandmother     Heart attack Paternal Grandmother     Heart attack Paternal Grandfather     Alcohol abuse Other     Depression Other        Social History:   Social History     Socioeconomic History    Marital status: Single   Tobacco Use    Smoking status: Never    Smokeless tobacco: Never   Vaping Use    Vaping Use: Never used   Substance and Sexual Activity    Alcohol use: Not Currently    Drug use: Never    Sexual activity: Not Currently     Birth control/protection: Post-menopausal, Surgical       Medications:     Current Outpatient Medications:     acetaminophen (TYLENOL) 500 MG tablet, Take 1 tablet by mouth Every 6 (Six) Hours As Needed for Mild Pain., Disp: , Rfl:     albuterol sulfate  (90 Base) MCG/ACT inhaler, Inhale 2 puffs Every 6 (Six) Hours As Needed for Wheezing or Shortness of Air., Disp: 18 g, Rfl: 1    Blood Glucose Monitoring Suppl (OneTouch Verio Flex System) w/Device kit, 1 kit Daily., Disp: 1 kit, Rfl: 0    Blood Glucose Monitoring Suppl (OneTouch Verio) w/Device kit, 1 kit Daily., Disp: 1  "kit, Rfl: 0    Cranberry 1000 MG capsule, Take 1 capsule by mouth Daily., Disp: , Rfl:     Diclofenac Sodium (VOLTAREN) 1 % gel gel, Apply  topically to the appropriate area as directed 4 (Four) Times a Day As Needed (arthralgias, shoulders and back.)., Disp: 100 g, Rfl: 1    EPINEPHrine (EPIPEN) 0.3 MG/0.3ML solution auto-injector injection, Inject 0.3 mL into the appropriate muscle as directed by prescriber As Needed (FOR SEVERE ALLERGIC REACTION)., Disp: 1 each, Rfl: 1    Fluticasone Furoate-Vilanterol (Breo Ellipta) 200-25 MCG/ACT inhaler, Inhale 1 puff Daily., Disp: 60 each, Rfl: 1    glucose blood (OneTouch Verio) test strip, USE AS INSTRUCTED TO CHECK BLOOD GLUCOSE ONCE DAILY DX:E11.65, Disp: 100 each, Rfl: 3    glucose monitor monitoring kit, Use as directed to check blood glucose once daily., Disp: 1 each, Rfl: 0    Lancets (onetouch ultrasoft) lancets, Use one lancet daily to check blood sugars, Disp: 100 each, Rfl: 3    loratadine (CLARITIN) 10 MG tablet, Take 1 tablet by mouth Daily., Disp: 30 tablet, Rfl: 5    metFORMIN ER (GLUCOPHAGE-XR) 500 MG 24 hr tablet, Take 1 tablet by mouth Daily With Breakfast., Disp: 90 tablet, Rfl: 3    multivitamin with minerals tablet tablet, Take 1 tablet by mouth Daily., Disp: , Rfl:     sodium chloride ([place]cean Nasal Spray) 0.65 % nasal spray, 1 spray into the nostril(s) as directed by provider As Needed for Congestion., Disp: 60 mL, Rfl: 0    polyethylene glycol (MIRALAX) 17 GM/SCOOP powder, Take 17 g by mouth Daily., Disp: 578 g, Rfl: 1    Allergies:   Allergies   Allergen Reactions    Mold Extract [Trichophyton] Shortness Of Breath, Anxiety, Palpitations and Irritability    Lortab [Hydrocodone-Acetaminophen] Nausea And Vomiting    Pollen Extract Other (See Comments)     SNEEZING AND CONGESTION        Objective     Physical Exam:  Vital Signs:   Vitals:    12/08/23 1324   BP: 124/74   Pulse: 96   Resp: 16   SpO2: 97%   Weight: 107 kg (235 lb)   Height: 165.1 cm (65\") "   PainSc: 0-No pain     Body mass index is 39.11 kg/m².          Physical Exam  Vitals and nursing note reviewed.   Constitutional:       General: She is not in acute distress.     Appearance: Normal appearance. She is obese.   HENT:      Mouth/Throat:      Mouth: Mucous membranes are moist.      Pharynx: Oropharynx is clear.   Cardiovascular:      Rate and Rhythm: Normal rate and regular rhythm.   Pulmonary:      Effort: Pulmonary effort is normal. No respiratory distress.      Breath sounds: Normal breath sounds.   Abdominal:      General: Bowel sounds are normal. There is no distension.      Palpations: Abdomen is soft.      Tenderness: There is no abdominal tenderness. There is no guarding or rebound.   Skin:     General: Skin is warm and dry.   Neurological:      Mental Status: She is alert and oriented to person, place, and time.             Results:     Labs:      Imaging:      Assessment / Plan      Assessment/Plan:   Diagnoses and all orders for this visit:    1. Encounter for medical examination to establish care (Primary)    2. Need for influenza vaccination  -     Fluzone (or Fluarix & Flulaval for VFC) >6mos    3. Type 2 diabetes mellitus without complication, without long-term current use of insulin  -     POC Glycosylated Hemoglobin (Hb A1C)  -     metFORMIN ER (GLUCOPHAGE-XR) 500 MG 24 hr tablet; Take 1 tablet by mouth Daily With Breakfast.  Dispense: 90 tablet; Refill: 3    4. Constipation, unspecified constipation type  -     polyethylene glycol (MIRALAX) 17 GM/SCOOP powder; Take 17 g by mouth Daily.  Dispense: 578 g; Refill: 1  -     CT Abdomen Pelvis Without Contrast; Future    5. Seasonal allergies  -     albuterol sulfate  (90 Base) MCG/ACT inhaler; Inhale 2 puffs Every 6 (Six) Hours As Needed for Wheezing or Shortness of Air.  Dispense: 18 g; Refill: 1  -     Fluticasone Furoate-Vilanterol (Breo Ellipta) 200-25 MCG/ACT inhaler; Inhale 1 puff Daily.  Dispense: 60 each; Refill: 1    6.  Mild intermittent asthma without complication  -     albuterol sulfate  (90 Base) MCG/ACT inhaler; Inhale 2 puffs Every 6 (Six) Hours As Needed for Wheezing or Shortness of Air.  Dispense: 18 g; Refill: 1  -     Fluticasone Furoate-Vilanterol (Breo Ellipta) 200-25 MCG/ACT inhaler; Inhale 1 puff Daily.  Dispense: 60 each; Refill: 1    7. Strain of neck muscle, subsequent encounter  -     Diclofenac Sodium (VOLTAREN) 1 % gel gel; Apply  topically to the appropriate area as directed 4 (Four) Times a Day As Needed (arthralgias, shoulders and back.).  Dispense: 100 g; Refill: 1    8. Abdominal pain, unspecified abdominal location  -     CT Abdomen Pelvis Without Contrast; Future         Follow Up:   Return in about 3 months (around 3/8/2024) for Recheck.    REBECA Hadley  [facility] [place]    Transcribed from ambient dictation for REBECA Torres by Zeenat Herring.  12/08/23   14:53 EST    Patient or patient representative verbalized consent to the visit recording.  I have personally performed the services described in this document as transcribed by the above individual, and it is both accurate and complete.

## 2023-12-08 NOTE — LETTER
December 8, 2023     Patient: Sandra Auguste   YOB: 1960   Date of Visit: 12/8/2023       To Whom It May Concern:    It is my medical opinion that Sandra Auguste may return to work on Monday December, 11th 2023. Please excuse her today.          Sincerely,        REBECA Torres    CC: No Recipients

## 2023-12-15 ENCOUNTER — HOSPITAL ENCOUNTER (OUTPATIENT)
Dept: CT IMAGING | Facility: HOSPITAL | Age: 63
Discharge: HOME | End: 2023-12-15
Admitting: NURSE PRACTITIONER
Payer: COMMERCIAL

## 2023-12-15 ENCOUNTER — TREATMENT (OUTPATIENT)
Dept: PHYSICAL THERAPY | Facility: CLINIC | Age: 63
End: 2023-12-15
Payer: COMMERCIAL

## 2023-12-15 ENCOUNTER — TELEPHONE (OUTPATIENT)
Dept: INTERNAL MEDICINE | Facility: CLINIC | Age: 63
End: 2023-12-15
Payer: COMMERCIAL

## 2023-12-15 DIAGNOSIS — M25.631 DECREASED RANGE OF MOTION OF RIGHT WRIST: ICD-10-CM

## 2023-12-15 DIAGNOSIS — R29.898 DECREASED GRIP STRENGTH OF RIGHT HAND: ICD-10-CM

## 2023-12-15 DIAGNOSIS — M25.531 RIGHT WRIST PAIN: Primary | ICD-10-CM

## 2023-12-15 DIAGNOSIS — K59.00 CONSTIPATION, UNSPECIFIED CONSTIPATION TYPE: ICD-10-CM

## 2023-12-15 DIAGNOSIS — R10.9 ABDOMINAL PAIN, UNSPECIFIED ABDOMINAL LOCATION: ICD-10-CM

## 2023-12-15 PROCEDURE — 74176 CT ABD & PELVIS W/O CONTRAST: CPT

## 2023-12-15 PROCEDURE — 0 DIATRIZOATE MEGLUMINE & SODIUM PER 1 ML: Performed by: NURSE PRACTITIONER

## 2023-12-15 RX ADMIN — DIATRIZOATE MEGLUMINE AND DIATRIZOATE SODIUM 15 ML: 660; 100 LIQUID ORAL; RECTAL at 10:36

## 2023-12-15 NOTE — PROGRESS NOTES
"    Physical Therapy Initial Evaluation and Plan of Care    [facility]    [address] [place] [address]    Patient: Sandra Auguste   : 1960  Diagnosis/ICD-10 Code:  Right wrist pain [M25.531]  Referring practitioner: RAYNA Beckwith  Date of Initial Visit: 12/15/2023  Today's Date: 2023  Patient seen for 1 session         Visit Diagnoses:    ICD-10-CM ICD-9-CM   1. Right wrist pain  M25.531 719.43   2. Decreased range of motion of right wrist  M25.631 719.53   3. Decreased  strength of right hand  R29.898 729.89         Subjective Questionnaire: QuickDASH: 27%      Subjective Evaluation    History of Present Illness  Date of onset: 12/15/2022  Mechanism of injury: Sandra Auguste presents to the physical therapy clinic with complaints of right wrist/radial sided hand pain following unspecified onset; states that she previously had PT for the same issue in the same region. States that it was \"temporarily helpful\" from what she can recall. Reported mechanism of injury is unspecified. To date patient has experienced remained the same in pain levels and functional ability. Specifically, She reports difficulty with gripping, picking up objects some fine motor tasks such as writing. Other specific details include: some intermittent numbness and tingling throughout involved UE.    She endorses that her  strength is improved throughout the day when she wears her carpal tunnel brace overnight; however she states that when she does that it makes \"her wrist swell\" and it is still painful, so she has been intermittently compliant to wearing.     Additionally, she also does state that she did have a fall 2 days ago at work by stepping off of a ledge. Unspecified of direction. She states that she did have some lasting neck/head pain that she notices today.       Significant past medical history includes: DM, previous neurological deficits/symptoms on involved limb, bone spurs " on both thumbs, and headaches. Complicating factors to physical therapy course of care include: chronicity, fair response to therapy previously, and no work restrictions.      Currently, they are receiving assistance at home for ADLs. She lives with their daughter. They would like to return to working without pain, journaling, eb and art work pain-free. Further psychosocial details include: none.       Subjective comment: R hand/wrist/thumb  Patient Occupation:  for [employer]   Precautions and Work Restrictions: NoneQuality of life: good    Pain  Current pain ratin  At best pain ratin  At worst pain ratin  Location: R wrist at base  Quality: discomfort, tight, burning and cramping  Relieving factors: medications, heat, support and rest (states that she has some wrist braces)  Aggravating factors: lifting, outstretched reach, repetitive movement and overhead activity  Progression: no change    Social Support  Lives with: adult children    Hand dominance: right    Treatments  Previous treatment: physical therapy  Current treatment: physical therapy  Patient Goals  Patient goals for therapy: decreased edema, decreased pain, return to work, return to sport/leisure activities, independence with ADLs/IADLs, increased strength and increased motion  Patient goal: Would like to improve stregnth to reduce strain           Objective          Palpation     Right   No palpable tenderness to the extensor carpi radialis brevis, extensor carpi ulnaris, extensor pollicis longus, flexor carpi radialis, flexor carpi ulnaris, flexor digitorum profundus, flexor digitorum superficialis, flexor pollicis longus, intrinsics, lower trapezius, scalenes, sternocleidomastoid, suboccipitals and upper trapezius.   Hypertonic in the levator scapulae and upper trapezius.   Muscle spasm in the extensor digitorum profundus and levator scapulae. Tenderness of the extensor carpi radialis longus and levator scapulae.      Additional Palpation Details  Tenderness noted at CMC of 1st digit and extensor pollicis longus    Tenderness     Right Elbow   No tenderness in the distal biceps tendon, distal triceps tendon, lateral epicondyle, medial epicondyle, olecranon process and radial head.     Right Wrist/Hand   Tenderness in the first dorsal compartment and carpometacarpal joint. Tenderness in the distal biceps tendon, distal triceps tendon, lateral epicondyle, medial epicondyle and olecranon process.     Neurological Testing     Sensation   Cervical/Thoracic   Left   Intact: light touch and proprioception    Right   Intact: light touch and proprioception    Reflexes   Left   Hayward's reflex: negative    Right   Hayward's reflex: negative    Active Range of Motion   Left Shoulder   Normal active range of motion    Right Shoulder   Normal active range of motion    Left Elbow   Normal active range of motion    Right Elbow   Normal active range of motion    Left Wrist   Wrist flexion: 50 degrees   Wrist extension: 70 degrees   Radial deviation: 40 degrees   Ulnar deviation: 50 degrees     Right Wrist   Wrist flexion: 45 degrees   Wrist extension: 56 degrees   Radial deviation: 20 degrees   Ulnar deviation: 40 degrees     Strength/Myotome Testing     Left Shoulder     Planes of Motion   Flexion: 4+   Extension: 4+   Abduction: 4+   Adduction: 4+   External rotation at 0°: 4+   Internal rotation at 0°: 4+     Right Shoulder     Planes of Motion   Flexion: 4+   Extension: 4+   Abduction: 4+   Adduction: 4+   External rotation at 0°: 4+   Internal rotation at 0°: 4+     Left Wrist/Hand   Normal wrist strength     (2nd hand position)     Trial 1: 39 lbs    Trial 2: 45 lbs    Trial 3: 45 lbs    Average: 43 lbs    Right Wrist/Hand   Normal wrist strength     (2nd hand position)     Trial 1: 40 lbs    Trial 2: 41 lbs    Trial 3: 41 lbs    Average: 40.67 lbs    Tests   Cervical     Left   Negative active compression ([place]), cervical  distraction, Spurling's sign, ULTT1, ULTT3 and ULTT4.     Right   Negative active compression ([place]), cervical distraction, Spurling's sign, ULTT1, ULTT3 and ULTT4.     Right Elbow   Negative Cozen's.     Right Wrist/Hand   Positive CMC grind and Finkelstein's.   Negative resisted middle finger.           Assessment & Plan       Assessment  Impairments: abnormal or restricted ROM, activity intolerance, impaired physical strength, lacks appropriate home exercise program and pain with function   Functional limitations: carrying objects, lifting, pulling, uncomfortable because of pain and unable to perform repetitive tasks   Assessment details: Upon initial physical therapy evaluation, the patient presents with acute on chronic right hand and wrist pain without radiating or radicular symptoms; pain pattern is most consistent with musculotendinous origin secondary to repetitive work tasks with additional neuromotor deficits in right hand  strength compared to left. Primary deficits noted during visit today include decreased  strength, grossly decreased range of motion compared to left wrist, gross UE weakness and poor postural mechanics. These deficits are limiting her ability to perform work tasks, ADLs and recreational activity pain-free. Further significant findings include no numbness/tingling reported, no sensory deficits noted and no contribution of symptoms from cervical spine.    The patient will benefit from skilled physical therapy services to address the listed deficits for improved functional capacity and enhanced quality of life.  Barriers to therapy: work schedule, chronicity  Prognosis: fair    Goals  Plan Goals: SHORT TERM GOALS:   3 weeks    1. Pt to be I with HEP  2. left wrist AROM equal to that of the left wrist to show improvement in functional mobility   3. Pt to report 0/10 pain at rest in the left hand wrist  4. Pt able to perform box folding activity pain-free to simulate improved  tolerance to work task.    LONG TERM GOALS:   6 weeks  1. Pt to demonstrate left  strength equal to that of the contralateral limb to demonstrate improved ability to perform lifting activities  2. Pt to report being able to return to work full duty without limitation or exacerbation of symptoms in affected region  3. Pt to demonstrate ability to perform 15# box lift without pain in the right hand wrist  4. Pt to improve by MCID of score on DASH to demonstrate decreased disability and enhanced QOL       Plan  Therapy options: will be seen for skilled therapy services  Planned modality interventions: iontophoresis, TENS, thermotherapy (hydrocollator packs), traction, ultrasound, microcurrent electrical stimulation, dry needling, cryotherapy and electrical stimulation/Russian stimulation  Planned therapy interventions: neuromuscular re-education, postural training, prosthetic fitting/training, soft tissue mobilization, stretching, therapeutic activities, home exercise program, functional ROM exercises, fine motor coordination training, flexibility, compression, manual therapy and joint mobilization  Frequency: 1x week  Duration in weeks: 12  Treatment plan discussed with: patient      History # of Personal Factors and/or Comorbidities: LOW (0)  Examination of Body System(s): # of elements: LOW (1-2)  Clinical Presentation: STABLE   Clinical Decision Making: LOW       Timed:         Manual Therapy:         mins  37427;     Therapeutic Exercise:    15     mins  37831;     Neuromuscular Juaquin:    10    mins  33858;    Therapeutic Activity:          mins  66297;     Gait Training:           mins  65761;     Ultrasound:          mins  93622;    Ionto                                   mins   47930  Self Care                            mins   84329  Canalith Repos         mins 45232      Un-Timed:  Electrical Stimulation:         mins  48980 (MC );  Dry Needling          mins self-pay  Traction          mins  99687  Low Eval    35      Mins  83241  Mod Eval          Mins  74669  High Eval                            Mins  91729        Timed Treatment:   25   mins   Total Treatment:     60   mins    Next Visit:  Reassess work tasks  Tell her to bring in carpal tunnel brace  Postural mechanics      Visit and Documentation completed by Dmitriy Salgado PT, DPT   [place] License Number: [id number]    PT: Cristian Cortez, PT, DPT, OCS, Cert. DN   License Number: [id number]  Electronically signed by Dmitriy Salgado PT, 12/15/23, 2:51 PM EST    Certification Period: 12/18/2023 thru 3/16/2024  I certify that the therapy services are furnished while this patient is under my care.  The services outlined above are required by this patient, and will be reviewed every 90 days.         Physician Signature:__________________________________________________    PHYSICIAN: Maria M Ardon APRN  NPI: [id number]                                      DATE:      Please sign and return via fax to [email or web address] . Thank you, [facility].

## 2023-12-16 NOTE — TELEPHONE ENCOUNTER
Please call patient and notify of CT abd/ pelvis results and recommendations.  There was an area of diverticulitis identified.  Recommend liquid diet for at least 2 to 3 days then advanced as tolerated.  Pain control with OTC acetaminophen/ ibuprofen.  Please let me know how she is feeling and update on any symptoms still experiencing.  I may add additional antibiotic to supportive symptom management plan based on report back.  She should schedule a follow-up with me next week to recheck.  ER before that time for any acute worsening condition or symptom.

## 2024-01-01 NOTE — TELEPHONE ENCOUNTER
PATIENT IS CALLING STATING SHE IS HAVING PAIN IN THE RIB CAGE. SHE HAS SOME MEDICATION THAT MIGHT HELP. SHE WANTS TO SPEAK TO YOU ABOUT HER STOMACH BEING UPSET BEFORE SCHEDULING AN APPOINTMENT TO BE SEEN. PATIENTS NUMBER [phone]  
Patient come in for an appt  
Home

## 2024-01-12 ENCOUNTER — OFFICE VISIT (OUTPATIENT)
Dept: INTERNAL MEDICINE | Facility: CLINIC | Age: 64
End: 2024-01-12
Payer: COMMERCIAL

## 2024-01-12 VITALS
BODY MASS INDEX: 38.25 KG/M2 | HEIGHT: 66 IN | OXYGEN SATURATION: 98 % | DIASTOLIC BLOOD PRESSURE: 64 MMHG | HEART RATE: 84 BPM | WEIGHT: 238 LBS | SYSTOLIC BLOOD PRESSURE: 118 MMHG | RESPIRATION RATE: 18 BRPM

## 2024-01-12 DIAGNOSIS — F41.9 ANXIETY DISORDER, UNSPECIFIED TYPE: Chronic | ICD-10-CM

## 2024-01-12 DIAGNOSIS — F43.10 POST TRAUMATIC STRESS DISORDER (PTSD): Primary | ICD-10-CM

## 2024-01-12 DIAGNOSIS — G47.9 SLEEPING DIFFICULTIES: ICD-10-CM

## 2024-01-12 PROCEDURE — 99213 OFFICE O/P EST LOW 20 MIN: CPT | Performed by: NURSE PRACTITIONER

## 2024-01-12 RX ORDER — AMITRIPTYLINE HYDROCHLORIDE 25 MG/1
25 TABLET, FILM COATED ORAL NIGHTLY
Qty: 30 TABLET | Refills: 2 | Status: SHIPPED | OUTPATIENT
Start: 2024-01-12 | End: 2024-02-11

## 2024-01-12 RX ORDER — HYDROXYZINE HYDROCHLORIDE 25 MG/1
25 TABLET, FILM COATED ORAL EVERY 8 HOURS PRN
Qty: 90 TABLET | Refills: 2 | Status: SHIPPED | OUTPATIENT
Start: 2024-01-12

## 2024-01-12 NOTE — PROGRESS NOTES
"Chief Complaint  Insomnia (Pt states she has PTSD and stopped taking medication. Pt states recent trigger and is having constant trouble sleeping, melatonin is not working. ) and Hyperglycemia (Pt states after taking Prednisone her fasting glucose was above 150 and BP was increased. Pt is interested if this was caused from the medication. )    Subjective      History of Present Illness  Sandra is a 63 y.o. female who presents to the clinic today for complaints of insomnia  that started several  years  ago. Patient describes symptoms as non-restful sleep. Patient has found minimal relief with melatonin use. Associated symptoms include: anxiety, depression, fatigue, irritability, and stress. Patient denies frequent nighttime urination, leg cramps, and restless legs. Symptoms have gradually worsened.  She has PTSD and was in counseling with medication management for anxiety/depression. She had been taking care of mother until she passed. After her mother passed in April, she stopped taking the medication to see how she really felt. She had also been living with her daughter. They had an argument where her daughter told her she needed to move out. This week she had trauma going through her mind and has not been sleeping well, only 4-5 hours a night. She called telehealth MD and he suggested she see her PCP and be put on nortriptyline.    The following portions of the patient's history were reviewed and updated as appropriate: allergies, current medications, past family history, past medical history, past social history, past surgical history, and problem list.    Review of Systems  Pertinent items are noted in HPI.       Objective   Vital Signs:  /64 (BP Location: Left arm, Patient Position: Sitting, Cuff Size: Adult)   Pulse 84   Resp 18   Ht 167.6 cm (66\")   Wt 108 kg (238 lb)   SpO2 98%   BMI 38.41 kg/m²   Estimated body mass index is 38.41 kg/m² as calculated from the following:    Height as of this " "encounter: 167.6 cm (66\").    Weight as of this encounter: 108 kg (238 lb).            Physical Exam  Vitals reviewed.   Constitutional:       General: She is not in acute distress.  HENT:      Head: Normocephalic and atraumatic.   Neurological:      General: No focal deficit present.      Mental Status: She is alert.   Psychiatric:         Mood and Affect: Mood normal.         Behavior: Behavior normal.         Thought Content: Thought content normal.         Judgment: Judgment normal.          Result Review                    Assessment and Plan  Diagnoses and all orders for this visit:    1. Post traumatic stress disorder (PTSD) (Primary)  Assessment & Plan:  Psychological condition is worsening.  Medication changes per orders.  Referral to psychological counseling.  Psychological condition  will be reassessed in 4 weeks.    Orders:  -     Ambulatory Referral to Behavioral Health    2. Anxiety disorder, unspecified type  Assessment & Plan:  Psychological condition is worsening.  Medication changes per orders.  Referral to psychological counseling.  Psychological condition  will be reassessed in 4 weeks.    Orders:  -     hydrOXYzine (ATARAX) 25 MG tablet; Take 1 tablet by mouth Every 8 (Eight) Hours As Needed for Anxiety.  Dispense: 90 tablet; Refill: 2    3. Sleeping difficulties  Assessment & Plan:  Will start her back on amitriptyline with PRN hydroxyzine.  Discussed trazodone and minipress as other options. She has been on both amitriptyline and hydroxyzine in the past with no problems.   Referred to Behavioral health for counseling. Discussed including daughter in therapy as well.  Follow-up in 1 month or sooner if needed.    Orders:  -     hydrOXYzine (ATARAX) 25 MG tablet; Take 1 tablet by mouth Every 8 (Eight) Hours As Needed for Anxiety.  Dispense: 90 tablet; Refill: 2  -     amitriptyline (ELAVIL) 25 MG tablet; Take 1 tablet by mouth Every Night for 30 days.  Dispense: 30 tablet; Refill: 2         "       Follow Up  Return if symptoms worsen or fail to improve.  Patient was given instructions and counseling regarding her condition or for health maintenance advice. Please see specific information pulled into the AVS if appropriate.    Part of this note may be an electronic transcription/translation of spoken language to printed text using the Dragon Dictation System.

## 2024-01-12 NOTE — PATIENT INSTRUCTIONS
Sleep Hygiene Checklist   Sleep hygiene means having good sleep habits. Everyone's sleep is different.   Review the strategies here and pick those that seem like the best solutions for  you.         Stop pre-sleep electronic use. (30 minutes before bed) Electronics are cognitively engaging and can unexpectedly induce emotions (e.g., stress from an email, excitement from a story), preventing you from winding down. The bright light from electronics also disrupts a normal sleep-wake schedule by conflicting with nature's      daily light-dark cycle. Don't answer emails, peruse social media, or watch TV/movies.      Use bed only for S. Sleep, sex, and sickness. Spending less time in bed can promote more continuous and deeper sleep, because your body begins to associate the bed with rest. Don't eat, work or watch TV in  bed.      Remove naps. While naps can help after sleep deprivation, regular naps may deter nighttime sleep. Sleeping continuously at night is best, so skip the nap and sleep longer that night. If you really feel the need to nap, do it before 3pm and      keep it under an hour.      Keep fixed bedtime and wake-up time. Awakening around the same time every morning promotes a regular sleep schedule. In turn, your body learns when it is time to fall sleep. Even if it means waking up earlier on weekends or getting less sleep one night, your sleep will be better in the long term.         Avoid caffeine and alcohol. (4-6 hours before bed) Caffeine and other stimulants (e.g., medications, drinks) activate neurobiological systems that maintain wakefulness. Alcohol requires metabolization and this physiological arousal can fragment sleep, despite seeming to induce sleepiness initially.      Do relaxing activities pre-sleep. Relaxing activities can promote sleepiness by reducing physiological arousal and minimizing thinking. Conversely, work or planning activities at bedtime can delay sleep, so avoid them. Ideas:  stretching, calm music, slow breathing, shower.      Make a worry list. Falling asleep is harder if you are worrying or thinking about emotional things. So set aside a few minutes before bed and list everything that you want to remember for tomorrow, including worries themselves - you can worry about them tomorrow, just not right now.      Do boring activities pre-sleep. [place] activities slow down our cognitive processes, slowing our mind and allowing for sleepiness. Ideas: listen to a radio show or podcast in a language you don't understand, read an uninteresting document (terms and conditions, financial reports, random  textbooks)      Adapted from: KIT Tobar Sleep Hygiene Strategies. © [employer]  Original: https://s3.[employer]/documents.Edi.io/SleephygienechecklistStriveWeekly.pdf     Improve  your  sleeping  environment. Noises, light, and uncomfortable temperatures have been shown to disrupt continuous sleep. Select comfortable pillows & mattresses, remove distractions, use a sound machine, get darker curtains, wear socks.      Exercise at the right time. Exercise can facilitate or inhibit sleep. Do exercise during the day. Don't exercise too late in the day (~2 hours before bed) because it can increase physiological arousal and delay sleep.      Get up and try again. If you can't sleep after 20+ minutes, get up and do something relaxing or boring. Actively trying to fall asleep just frustrates you, preventing sleep. Ideas: read something boring (junk mail, owner's manuals), sit and mentally list category items (dog breeds). Don't do anything too interesting. Once you're sleepy, go      to bed and try to sleep.    Learn about sleep. Obsessing over sleep difficulties or not understanding the science of sleep can perpetuate sleep difficulties. Read scientific articles to learn more about sleep, and thus correct misconceptions or expectations that may be exacerbating sleeprelated  "frustration or anxiety. See References for examples.      Make an appointment with a professional. If you think your sleep difficulties might extend beyond habit change, consult with your doctor or a sleep specialist. The time you take for the appointment could save you invaluable time in the future.              References            Your Sleep Hygiene Checklist  KE Menjivar & NOELLE Segura (2003). Use of sleep hygiene in the     treatment of insomnia. Sleep Medicine Reviews, 7, 215-25.       Stop pre-sleep electronic use.  DIEGO Ashby (2011). Sleep/wake lifestyle modifications: Sleep      Use bed only for S.  hygiene. In Thea TR, Adeel FARLEYK, Fer R, Raul K,      Remove naps.  eds. Therapy in Sleep Medicine. Elsevier [place], [place],   [place]. pp. 151-60.      Keep fixed bedtime and wake-up   NAZ Tate & COURTNEY Shi (2007). \"Sleep and Circadian    time.  Rhythms in Humans\". [place] Symposia on      Avoid caffeine and alcohol.  Quantitative Biology 72, 579-97.       Do relaxing activities pre-sleep.  MYNOR Mayberry, LILIA Casper, & Aayush, G.H. (2009). \"Clinical review: The      Make a worry list.  impact of noise on patients' sleep and the effectiveness of noise      Do boring activities pre-sleep.  reduction strategies in intensive care units\". Crit Care 13 (2): 208.    YOMI Armstrong, GERALD Medina., Cheli, H.E., Matilde, LIZY., & Herring, M.H.   Improve your sleeping environment.  (2014). The role of sleep hygiene in promoting public health: A   Exercise at the right time.  review of empirical evidence. Sleep Medicine Reviews.          Get up and try again GIORGIO Preciado & Lee, G.F. (2004). What keeps us awake: the   Learn about sleep neuropharmacology of stimulants and wakefulness-promoting   Make an appointment with a  medications. Sleep, 27, 1181-94.  professional. MYNOR AbbottSMiguel & JATIN Castillo (2000). Exercise and sleep. Sleep   Medicine Reviews, 4, 387-402.   Adapted from: KIT Tobar Sleep " Hygiene Strategies. © [employer]  Original: https://s3.[employer]bsupport/documents.[employer]/SleephygienechecklistStriveWee

## 2024-01-12 NOTE — ASSESSMENT & PLAN NOTE
Will start her back on amitriptyline with PRN hydroxyzine.  Discussed trazodone and minipress as other options. She has been on both amitriptyline and hydroxyzine in the past with no problems.   Referred to Behavioral health for counseling. Discussed including daughter in therapy as well.  Follow-up in 1 month or sooner if needed.

## 2024-01-29 ENCOUNTER — OFFICE VISIT (OUTPATIENT)
Dept: INTERNAL MEDICINE | Facility: CLINIC | Age: 64
End: 2024-01-29
Payer: COMMERCIAL

## 2024-01-29 VITALS
HEART RATE: 84 BPM | DIASTOLIC BLOOD PRESSURE: 78 MMHG | TEMPERATURE: 97.6 F | SYSTOLIC BLOOD PRESSURE: 116 MMHG | WEIGHT: 246 LBS | HEIGHT: 66 IN | BODY MASS INDEX: 39.53 KG/M2 | OXYGEN SATURATION: 97 %

## 2024-01-29 DIAGNOSIS — R05.9 COUGH, UNSPECIFIED TYPE: ICD-10-CM

## 2024-01-29 DIAGNOSIS — J02.9 SORE THROAT: Primary | ICD-10-CM

## 2024-01-29 DIAGNOSIS — U07.1 COVID-19: ICD-10-CM

## 2024-01-29 LAB
EXPIRATION DATE: ABNORMAL
EXPIRATION DATE: NORMAL
FLUAV AG UPPER RESP QL IA.RAPID: NOT DETECTED
FLUBV AG UPPER RESP QL IA.RAPID: NOT DETECTED
INTERNAL CONTROL: ABNORMAL
INTERNAL CONTROL: NORMAL
Lab: ABNORMAL
Lab: NORMAL
S PYO AG THROAT QL: NEGATIVE
SARS-COV-2 AG UPPER RESP QL IA.RAPID: DETECTED

## 2024-01-29 PROCEDURE — 87880 STREP A ASSAY W/OPTIC: CPT | Performed by: NURSE PRACTITIONER

## 2024-01-29 PROCEDURE — 99213 OFFICE O/P EST LOW 20 MIN: CPT | Performed by: NURSE PRACTITIONER

## 2024-01-29 PROCEDURE — 87428 SARSCOV & INF VIR A&B AG IA: CPT | Performed by: NURSE PRACTITIONER

## 2024-01-29 NOTE — LETTER
January 29, 2024     Patient: Sandra Auguste   YOB: 1960   Date of Visit: 1/29/2024       To Whom It May Concern:    Sandra Auguste was seen in my office on 1/29/24.  She may return to work on 2/5/24.          Sincerely,        REBECA Burgos    CC: No Recipients

## 2024-01-29 NOTE — PROGRESS NOTES
Follow Up Office Visit      Patient Name: Sandra Auguste  : 1960   MRN: [id number]   Care Team: Patient Care Team:  Maria M Ardon APRN as PCP - General (Family Medicine)  Giana Rodriguez APRN as Nurse Practitioner (Internal Medicine)  Ivan Hilton III, MD as Cardiologist (Cardiology)    Chief Complaint:    Chief Complaint   Patient presents with    Fever    Cough    Sore Throat       History of Present Illness: Sandra Auguste is a 63 y.o. female with pertinent medical history significant for seasonal allergies, hyperlipidemia, type 2 diabetes, obesity, GERD, anemia, PTSD, anxiety and depression.  She presents today for symptoms of cough, generalized weakness and low-grade fever.    Notes that over the past couple of weeks, she has experienced some intermittent head congestion thought to be allergy related.  However, last Thursday she developed sudden generalized illness sensation with worsening nasal congestion that progressed to cough.  Last BM was Tmax of 99.  She does endorse some wheezing upon wakening this morning but cleared with coughing.  She denies any shortness of breath, chest pain, palpitations, nausea, vomiting, loss of taste or smell or diarrhea.  She does note productive cough.    She has taken OTC NyQuil and DayQuil formulas to treat symptoms that helped temporarily.  States that many individuals where she is employed have been sick but no known direct ill contacts.            I have reviewed and the following portions of the patient's history were updated as appropriate: past family history, past medical history, past social history, past surgical history and problem list.    Medications:     Current Outpatient Medications:     acetaminophen (TYLENOL) 500 MG tablet, Take 1 tablet by mouth Every 6 (Six) Hours As Needed for Mild Pain., Disp: , Rfl:     albuterol sulfate  (90 Base) MCG/ACT inhaler, Inhale 2 puffs Every 6 (Six) Hours As Needed for Wheezing or Shortness of  Air., Disp: 18 g, Rfl: 1    amitriptyline (ELAVIL) 25 MG tablet, Take 1 tablet by mouth Every Night for 30 days., Disp: 30 tablet, Rfl: 2    Blood Glucose Monitoring Suppl (OneTouch Verio Flex System) w/Device kit, 1 kit Daily., Disp: 1 kit, Rfl: 0    Blood Glucose Monitoring Suppl (OneTouch Verio) w/Device kit, 1 kit Daily., Disp: 1 kit, Rfl: 0    Diclofenac Sodium (VOLTAREN) 1 % gel gel, Apply  topically to the appropriate area as directed 4 (Four) Times a Day As Needed (arthralgias, shoulders and back.)., Disp: 100 g, Rfl: 1    EPINEPHrine (EPIPEN) 0.3 MG/0.3ML solution auto-injector injection, Inject 0.3 mL into the appropriate muscle as directed by prescriber As Needed (FOR SEVERE ALLERGIC REACTION)., Disp: 1 each, Rfl: 1    Fluticasone Furoate-Vilanterol (Breo Ellipta) 200-25 MCG/ACT inhaler, Inhale 1 puff Daily., Disp: 60 each, Rfl: 1    glucose blood (OneTouch Verio) test strip, USE AS INSTRUCTED TO CHECK BLOOD GLUCOSE ONCE DAILY DX:E11.65, Disp: 100 each, Rfl: 3    glucose monitor monitoring kit, Use as directed to check blood glucose once daily., Disp: 1 each, Rfl: 0    hydrOXYzine (ATARAX) 25 MG tablet, Take 1 tablet by mouth Every 8 (Eight) Hours As Needed for Anxiety., Disp: 90 tablet, Rfl: 2    Lancets (onetouch ultrasoft) lancets, Use one lancet daily to check blood sugars, Disp: 100 each, Rfl: 3    loratadine (CLARITIN) 10 MG tablet, Take 1 tablet by mouth Daily., Disp: 30 tablet, Rfl: 5    metFORMIN ER (GLUCOPHAGE-XR) 500 MG 24 hr tablet, Take 1 tablet by mouth Daily With Breakfast., Disp: 90 tablet, Rfl: 3    multivitamin with minerals tablet tablet, Take 1 tablet by mouth Daily., Disp: , Rfl:     polyethylene glycol (MIRALAX) 17 GM/SCOOP powder, Take 17 g by mouth Daily., Disp: 578 g, Rfl: 1    sodium chloride (Ocean Nasal Spray) 0.65 % nasal spray, 1 spray into the nostril(s) as directed by provider As Needed for Congestion., Disp: 60 mL, Rfl: 0    Nirmatrelvir & Ritonavir, 300mg/100mg,  "(PAXLOVID) 20 x 150 MG & 10 x 100MG tablet therapy pack tablet, Take 3 tablets by mouth 2 (Two) Times a Day for 5 days. Indications: COVID-19 Confirmed Infection, Disp: 30 tablet, Rfl: 0    Allergies:   Allergies   Allergen Reactions    Mold Extract [Trichophyton] Shortness Of Breath, Anxiety, Palpitations and Irritability    Lortab [Hydrocodone-Acetaminophen] Nausea And Vomiting    Pollen Extract Other (See Comments)     SNEEZING AND CONGESTION        Objective     Physical Exam:  Vital Signs:   Vitals:    01/29/24 0939   BP: 116/78   Pulse: 84   Temp: 97.6 °F (36.4 °C)   SpO2: 97%   Weight: 112 kg (246 lb)   Height: 167.6 cm (66\")     Body mass index is 39.71 kg/m².     Physical Exam  Vitals and nursing note reviewed.   Constitutional:       General: She is not in acute distress.  HENT:      Head: Normocephalic and atraumatic.      Right Ear: Tympanic membrane, ear canal and external ear normal.      Left Ear: Tympanic membrane, ear canal and external ear normal.      Mouth/Throat:      Mouth: Mucous membranes are moist.      Pharynx: Oropharynx is clear. No oropharyngeal exudate.   Eyes:      Conjunctiva/sclera: Conjunctivae normal.   Cardiovascular:      Rate and Rhythm: Normal rate and regular rhythm.      Pulses: Normal pulses.   Pulmonary:      Effort: Pulmonary effort is normal. No respiratory distress.      Breath sounds: No wheezing or rhonchi.   Musculoskeletal:      Cervical back: Neck supple. No tenderness.   Lymphadenopathy:      Cervical: No cervical adenopathy.   Neurological:      Mental Status: She is alert.      Gait: Gait normal.   Psychiatric:         Mood and Affect: Mood normal.         Assessment / Plan      Assessment/Plan:   Problems Addressed This Visit    ICD-10-CM ICD-9-CM   1. Sore throat  J02.9 462   2. Cough, unspecified type  R05.9 786.2   3. COVID-19  U07.1 079.89      Sore throat  Cough  COVID-19 infection  -Strep and flu negative today, positive for COVID-19  -Discussed quarantine " recommendations  -We have discussed Paxlovid, risks versus benefit.  Patient requests prescription for Paxlovid, sent to pharmacy  -Encouraged adequate hydration and rest  -May continue OTC therapies for symptom management (NyQuil and DayQuil)  -Follow-up if symptoms worsen or persist    Plan of care reviewed with patient at the conclusion of today's visit. Education was provided regarding diagnosis and management.  Patient verbalizes understanding of and agreement with management plan.      Follow Up:   Return if symptoms worsen or fail to improve, for Next scheduled follow up.        REBECA Lam  [facility] [address] [facility] [address] [facility]    Please note that portions of this note were completed with a voice recognition program.

## 2024-02-13 DIAGNOSIS — E11.9 TYPE 2 DIABETES MELLITUS WITHOUT COMPLICATION, WITHOUT LONG-TERM CURRENT USE OF INSULIN: ICD-10-CM

## 2024-02-13 RX ORDER — PEN NEEDLE, DIABETIC 32 GX 1/4"
NEEDLE, DISPOSABLE MISCELLANEOUS
Qty: 100 EACH | Refills: 3 | OUTPATIENT
Start: 2024-02-13

## 2024-02-17 ENCOUNTER — APPOINTMENT (OUTPATIENT)
Dept: GENERAL RADIOLOGY | Facility: HOSPITAL | Age: 64
End: 2024-02-17
Payer: COMMERCIAL

## 2024-02-17 ENCOUNTER — HOSPITAL ENCOUNTER (EMERGENCY)
Facility: HOSPITAL | Age: 64
Discharge: HOME | End: 2024-02-17
Attending: EMERGENCY MEDICINE
Payer: COMMERCIAL

## 2024-02-17 VITALS
BODY MASS INDEX: 38.57 KG/M2 | HEART RATE: 92 BPM | TEMPERATURE: 98.3 F | WEIGHT: 240 LBS | SYSTOLIC BLOOD PRESSURE: 152 MMHG | DIASTOLIC BLOOD PRESSURE: 102 MMHG | OXYGEN SATURATION: 97 % | HEIGHT: 66 IN | RESPIRATION RATE: 16 BRPM

## 2024-02-17 DIAGNOSIS — M79.674 TOE PAIN, RIGHT: Primary | ICD-10-CM

## 2024-02-17 PROCEDURE — 99283 EMERGENCY DEPT VISIT LOW MDM: CPT

## 2024-02-17 PROCEDURE — 73660 X-RAY EXAM OF TOE(S): CPT

## 2024-02-17 RX ORDER — IBUPROFEN 800 MG/1
800 TABLET ORAL ONCE
Status: COMPLETED | OUTPATIENT
Start: 2024-02-17 | End: 2024-02-17

## 2024-02-17 RX ADMIN — IBUPROFEN 800 MG: 800 TABLET, FILM COATED ORAL at 17:16

## 2024-02-17 NOTE — DISCHARGE INSTRUCTIONS
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  Also a custom orthotic from podiatry, or at the least a cushioned sole insert.

## 2024-02-17 NOTE — ED PROVIDER NOTES
"Subjective   History of Present Illness patient is a 63-year-old female presents unaccompanied, complaining of right second toe pain for, \"months its mid November.\"  She reports being stepped on accidentally by a coworker.  Patient reports she works 5 to 12-hour shifts a week, at the end of her workday, her right second toe is discolored bluish in appearance.  Patient has well-controlled diabetes, there is no obvious deformity of the toe, no wound or ulceration present.    Review of Systems   Constitutional: Negative.    HENT: Negative.     Respiratory: Negative.     Cardiovascular: Negative.    Gastrointestinal: Negative.    Genitourinary: Negative.    Musculoskeletal:  Positive for arthralgias.   Skin: Negative.    Neurological: Negative.    Hematological: Negative.        Past Medical History:   Diagnosis Date   • Anesthesia complication     HAS TROUBLE GETTING \"NUMB\"    • Anxiety    • Arthritis     on Celebrex + Robaxin, no steroids   • Asthma     does not follow w/ pulmonary   • Bladder spasms    • Depression     w/ PTSD   • Diverticulosis    • Dyspepsia    • Fatigue    • GERD (gastroesophageal reflux disease)     controlled w/ nightly Pepcid, EGD 2020 w/ Dr. Chavarria, (+)    • Health care maintenance 02/25/2020   • Memory loss     FROM SKULL FRACTURE AND FALL-SHORT TERM MEMORY LOSS   • Migraine    • Morbid obesity    • Seasonal allergies    • Seizures     \"convulsions\" at age 5   • Skull fracture 2012   • Sleep apnea with use of continuous positive airway pressure (CPAP)     CPAP compliant   • SOB (shortness of breath) on exertion    • Tendonitis    • Type 2 diabetes mellitus without complication, without long-term current use of insulin     dx 2021, no insulin, A1C <7   • Wears contact lenses    • Wears partial dentures     TOP ONLY    • Wears prescription eyeglasses        Allergies   Allergen Reactions   • Mold Extract [Trichophyton] Shortness Of Breath, Anxiety, Palpitations and Irritability   • Lortab " [Hydrocodone-Acetaminophen] Nausea And Vomiting   • Pollen Extract Other (See Comments)     SNEEZING AND CONGESTION        Past Surgical History:   Procedure Laterality Date   • CERVICAL POLYPECTOMY     • TOTAL LAPAROSCOPIC HYSTERECTOMY N/A 9/18/2017    Procedure: TOTAL LAPAROSCOPIC HYSTERECTOMY, BILATERAL SALPINGO OOPHORECTOMY WITH DAVINCI ROBOT ;  Surgeon: Shannon Grimaldo DO;  Location: [facility] OR;  Service:    • WISDOM TOOTH EXTRACTION         Family History   Problem Relation Age of Onset   • Cancer Mother         cervical cancer   • Dementia Mother    • Hypertension Mother    • Obesity Father    • Hypertension Father    • Heart attack Father    • Sleep apnea Father    • Sleep apnea Sister    • Hypertension Brother    • Sleep apnea Brother    • Cancer Maternal Aunt         lymphoma   • Cancer Paternal Aunt    • Breast cancer Paternal Aunt 70   • Cancer Maternal Grandmother    • Obesity Paternal Grandmother    • Diabetes Paternal Grandmother    • Hypertension Paternal Grandmother    • Stroke Paternal Grandmother    • Heart attack Paternal Grandmother    • Heart attack Paternal Grandfather    • Alcohol abuse Other    • Depression Other        Social History     Socioeconomic History   • Marital status: Single   Tobacco Use   • Smoking status: Never     Passive exposure: Never   • Smokeless tobacco: Never   Vaping Use   • Vaping Use: Never used   Substance and Sexual Activity   • Alcohol use: Not Currently   • Drug use: Never   • Sexual activity: Not Currently     Birth control/protection: Post-menopausal, Surgical           Objective   Physical Exam  Constitutional:       Appearance: Normal appearance. She is obese. She is not ill-appearing.   Musculoskeletal:         General: Tenderness and signs of injury present. No swelling or deformity.      Right lower leg: No edema.      Left lower leg: No edema.      Right foot: Normal capillary refill. Tenderness and bony tenderness present. Normal pulse.      Comments:  "Right distal lower extremity pulses palpable, capillary refill less than 2 seconds, no obvious perfusion deficiency, deformity, or skin ulceration over the right foot or toes.   Skin:     General: Skin is warm and dry.      Capillary Refill: Capillary refill takes less than 2 seconds.      Findings: No bruising, erythema, lesion or rash.   Neurological:      General: No focal deficit present.      Mental Status: She is alert.         Procedures           ED Course  ED Course as of 02/17/24 1755   Sat Feb 17, 2024   1645 Initially evaluated the patient in ED room 26.  After watching her ambulate back to the room without any difficulty or gait disturbance.  Patient related that a coworker who is about 6 3 and, \"stocky\", had stepped backwards with his heel accidentally landing on her right toe in November.  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 []   1743 Plain film toe imaging negative for acute abnormality []   1754 Communicated results to the patient.  Emphasized the importance of supportive and protective footwear that does not crowd her toebox.  Patient verbalized understanding [JH]      ED Course User Index  [JH] Joe Morales, APRN                                             Medical Decision Making  Given the patient's presenting complaint, chronicity, and low-energy traumatic mechanism, cannot exclude bony deformity including fracture, subluxation, avulsion, however low likelihood, but will have plain film imaging the right toe.  Results to be communicated patient disposition considered.  Is agreeable with this plan.    Amount and/or Complexity of Data Reviewed  Radiology: ordered.        Final diagnoses:   Toe pain, right       ED Disposition  ED Disposition       ED Disposition   Discharge    Condition   Stable    Comment   --               Maria M Ardon, REBECA  [address] [facility] " [id number]  [phone]      As needed    [facility]  [address] [facility] [facility] [address] [address] [phone]             Medication List      No changes were made to your prescriptions during this visit.            Joe Morales, APRN  02/17/24 190

## 2024-02-24 ENCOUNTER — APPOINTMENT (OUTPATIENT)
Dept: GENERAL RADIOLOGY | Facility: HOSPITAL | Age: 64
End: 2024-02-24
Payer: COMMERCIAL

## 2024-02-24 ENCOUNTER — HOSPITAL ENCOUNTER (EMERGENCY)
Facility: HOSPITAL | Age: 64
Discharge: HOME | End: 2024-02-24
Attending: STUDENT IN AN ORGANIZED HEALTH CARE EDUCATION/TRAINING PROGRAM
Payer: COMMERCIAL

## 2024-02-24 VITALS
OXYGEN SATURATION: 96 % | SYSTOLIC BLOOD PRESSURE: 135 MMHG | HEART RATE: 76 BPM | RESPIRATION RATE: 20 BRPM | WEIGHT: 235 LBS | DIASTOLIC BLOOD PRESSURE: 79 MMHG | BODY MASS INDEX: 37.77 KG/M2 | TEMPERATURE: 97.8 F | HEIGHT: 66 IN

## 2024-02-24 DIAGNOSIS — Z86.39 HISTORY OF DIABETES MELLITUS: ICD-10-CM

## 2024-02-24 DIAGNOSIS — U09.9 POST-COVID SYNDROME: Primary | ICD-10-CM

## 2024-02-24 DIAGNOSIS — R53.1 GENERALIZED WEAKNESS: ICD-10-CM

## 2024-02-24 LAB
ALBUMIN SERPL-MCNC: 4.3 G/DL (ref 3.5–5.2)
ALBUMIN/GLOB SERPL: 1.4 G/DL
ALP SERPL-CCNC: 111 U/L (ref 39–117)
ALT SERPL W P-5'-P-CCNC: 26 U/L (ref 1–33)
ANION GAP SERPL CALCULATED.3IONS-SCNC: 11 MMOL/L (ref 5–15)
AST SERPL-CCNC: 23 U/L (ref 1–32)
BASOPHILS # BLD AUTO: 0.05 10*3/MM3 (ref 0–0.2)
BASOPHILS NFR BLD AUTO: 0.6 % (ref 0–1.5)
BILIRUB SERPL-MCNC: 0.2 MG/DL (ref 0–1.2)
BILIRUB UR QL STRIP: NEGATIVE
BUN SERPL-MCNC: 15 MG/DL (ref 8–23)
BUN/CREAT SERPL: 18.1 (ref 7–25)
CALCIUM SPEC-SCNC: 9.3 MG/DL (ref 8.6–10.5)
CHLORIDE SERPL-SCNC: 103 MMOL/L (ref 98–107)
CLARITY UR: CLEAR
CO2 SERPL-SCNC: 25 MMOL/L (ref 22–29)
COLOR UR: YELLOW
CREAT SERPL-MCNC: 0.83 MG/DL (ref 0.57–1)
D-LACTATE SERPL-SCNC: 0.8 MMOL/L (ref 0.5–2)
DEPRECATED RDW RBC AUTO: 46.5 FL (ref 37–54)
EGFRCR SERPLBLD CKD-EPI 2021: 80.3 ML/MIN/1.73
EOSINOPHIL # BLD AUTO: 0.39 10*3/MM3 (ref 0–0.4)
EOSINOPHIL NFR BLD AUTO: 4.8 % (ref 0.3–6.2)
ERYTHROCYTE [DISTWIDTH] IN BLOOD BY AUTOMATED COUNT: 13.8 % (ref 12.3–15.4)
FLUAV RNA RESP QL NAA+PROBE: NOT DETECTED
FLUBV RNA RESP QL NAA+PROBE: NOT DETECTED
GLOBULIN UR ELPH-MCNC: 3.1 GM/DL
GLUCOSE SERPL-MCNC: 90 MG/DL (ref 65–99)
GLUCOSE UR STRIP-MCNC: NEGATIVE MG/DL
HCT VFR BLD AUTO: 41.9 % (ref 34–46.6)
HGB BLD-MCNC: 14 G/DL (ref 12–15.9)
HGB UR QL STRIP.AUTO: NEGATIVE
HOLD SPECIMEN: NORMAL
IMM GRANULOCYTES # BLD AUTO: 0.02 10*3/MM3 (ref 0–0.05)
IMM GRANULOCYTES NFR BLD AUTO: 0.2 % (ref 0–0.5)
KETONES UR QL STRIP: NEGATIVE
LEUKOCYTE ESTERASE UR QL STRIP.AUTO: NEGATIVE
LYMPHOCYTES # BLD AUTO: 1.98 10*3/MM3 (ref 0.7–3.1)
LYMPHOCYTES NFR BLD AUTO: 24.3 % (ref 19.6–45.3)
MCH RBC QN AUTO: 30.6 PG (ref 26.6–33)
MCHC RBC AUTO-ENTMCNC: 33.4 G/DL (ref 31.5–35.7)
MCV RBC AUTO: 91.5 FL (ref 79–97)
MONOCYTES # BLD AUTO: 0.68 10*3/MM3 (ref 0.1–0.9)
MONOCYTES NFR BLD AUTO: 8.3 % (ref 5–12)
NEUTROPHILS NFR BLD AUTO: 5.04 10*3/MM3 (ref 1.7–7)
NEUTROPHILS NFR BLD AUTO: 61.8 % (ref 42.7–76)
NITRITE UR QL STRIP: NEGATIVE
NRBC BLD AUTO-RTO: 0 /100 WBC (ref 0–0.2)
NT-PROBNP SERPL-MCNC: <36 PG/ML (ref 0–900)
PH UR STRIP.AUTO: 6.5 [PH] (ref 5–8)
PLATELET # BLD AUTO: 292 10*3/MM3 (ref 140–450)
PMV BLD AUTO: 10 FL (ref 6–12)
POTASSIUM SERPL-SCNC: 3.8 MMOL/L (ref 3.5–5.2)
PROCALCITONIN SERPL-MCNC: 0.04 NG/ML (ref 0–0.25)
PROT SERPL-MCNC: 7.4 G/DL (ref 6–8.5)
PROT UR QL STRIP: NEGATIVE
RBC # BLD AUTO: 4.58 10*6/MM3 (ref 3.77–5.28)
SARS-COV-2 RNA RESP QL NAA+PROBE: NOT DETECTED
SODIUM SERPL-SCNC: 139 MMOL/L (ref 136–145)
SP GR UR STRIP: 1.01 (ref 1–1.03)
TROPONIN T SERPL HS-MCNC: 15 NG/L
UROBILINOGEN UR QL STRIP: NORMAL
WBC NRBC COR # BLD AUTO: 8.16 10*3/MM3 (ref 3.4–10.8)
WHOLE BLOOD HOLD COAG: NORMAL
WHOLE BLOOD HOLD SPECIMEN: NORMAL

## 2024-02-24 PROCEDURE — 87636 SARSCOV2 & INF A&B AMP PRB: CPT | Performed by: PHYSICIAN ASSISTANT

## 2024-02-24 PROCEDURE — 81003 URINALYSIS AUTO W/O SCOPE: CPT | Performed by: PHYSICIAN ASSISTANT

## 2024-02-24 PROCEDURE — 84145 PROCALCITONIN (PCT): CPT | Performed by: PHYSICIAN ASSISTANT

## 2024-02-24 PROCEDURE — 93005 ELECTROCARDIOGRAM TRACING: CPT | Performed by: PHYSICIAN ASSISTANT

## 2024-02-24 PROCEDURE — 71045 X-RAY EXAM CHEST 1 VIEW: CPT

## 2024-02-24 PROCEDURE — 99284 EMERGENCY DEPT VISIT MOD MDM: CPT

## 2024-02-24 PROCEDURE — 83880 ASSAY OF NATRIURETIC PEPTIDE: CPT | Performed by: PHYSICIAN ASSISTANT

## 2024-02-24 PROCEDURE — 25810000003 SODIUM CHLORIDE 0.9 % SOLUTION: Performed by: PHYSICIAN ASSISTANT

## 2024-02-24 PROCEDURE — 84484 ASSAY OF TROPONIN QUANT: CPT | Performed by: PHYSICIAN ASSISTANT

## 2024-02-24 PROCEDURE — 85025 COMPLETE CBC W/AUTO DIFF WBC: CPT | Performed by: PHYSICIAN ASSISTANT

## 2024-02-24 PROCEDURE — 83605 ASSAY OF LACTIC ACID: CPT | Performed by: PHYSICIAN ASSISTANT

## 2024-02-24 PROCEDURE — 80053 COMPREHEN METABOLIC PANEL: CPT | Performed by: PHYSICIAN ASSISTANT

## 2024-02-24 RX ORDER — SODIUM CHLORIDE 0.9 % (FLUSH) 0.9 %
10 SYRINGE (ML) INJECTION AS NEEDED
Status: DISCONTINUED | OUTPATIENT
Start: 2024-02-24 | End: 2024-02-24 | Stop reason: HOSPADM

## 2024-02-24 RX ADMIN — SODIUM CHLORIDE 1000 ML: 9 INJECTION, SOLUTION INTRAVENOUS at 17:27

## 2024-02-24 NOTE — ED PROVIDER NOTES
"Subjective   History of Present Illness  Pt is a 64 yo female presenting to ED with complaints of weakness. PMHx significant HTN, HLD, DM, Asthma, KELLY, Migraines, PTSD, Anxiety and Depression. Pt explains diagnosed with Covid 1-29-24 and still having persistent generalized weakness. She took a course of Paxlovid. She denies new dizziness, fever, cough, CP, SOB, N/V/D or abdominal pain. She reports trying to drink plenty of fluids but feels dehydrated. She denies recent antibiotics. She denies tobacco, drug or ETOH use.     History provided by:  Patient and medical records      Review of Systems   Constitutional:  Positive for fatigue. Negative for fever.   HENT:  Negative for congestion.    Eyes:  Negative for visual disturbance.   Respiratory:  Negative for cough and shortness of breath.    Cardiovascular:  Negative for chest pain and leg swelling.   Gastrointestinal:  Negative for abdominal pain, diarrhea, nausea and vomiting.   Genitourinary:  Negative for difficulty urinating and dysuria.   Musculoskeletal:  Negative for back pain.   Neurological:  Positive for weakness (generalized). Negative for dizziness, syncope, speech difficulty, numbness and headaches.   Psychiatric/Behavioral:  Negative for confusion.        Past Medical History:   Diagnosis Date    Anesthesia complication     HAS TROUBLE GETTING \"NUMB\"     Anxiety     Arthritis     on Celebrex + Robaxin, no steroids    Asthma     does not follow w/ pulmonary    Bladder spasms     Depression     w/ PTSD    Diverticulosis     Dyspepsia     Fatigue     GERD (gastroesophageal reflux disease)     controlled w/ nightly Pepcid, EGD 2020 w/ Dr. Chavarria, (+)     Health care maintenance 02/25/2020    Memory loss     FROM SKULL FRACTURE AND FALL-SHORT TERM MEMORY LOSS    Migraine     Morbid obesity     Seasonal allergies     Seizures     \"convulsions\" at age 5    Skull fracture 2012    Sleep apnea with use of continuous positive airway pressure (CPAP)     CPAP " compliant    SOB (shortness of breath) on exertion     Tendonitis     Type 2 diabetes mellitus without complication, without long-term current use of insulin     dx 2021, no insulin, A1C <7    Wears contact lenses     Wears partial dentures     TOP ONLY     Wears prescription eyeglasses        Allergies   Allergen Reactions    Mold Extract [Trichophyton] Shortness Of Breath, Anxiety, Palpitations and Irritability    Lortab [Hydrocodone-Acetaminophen] Nausea And Vomiting    Pollen Extract Other (See Comments)     SNEEZING AND CONGESTION        Past Surgical History:   Procedure Laterality Date    CERVICAL POLYPECTOMY      TOTAL LAPAROSCOPIC HYSTERECTOMY N/A 9/18/2017    Procedure: TOTAL LAPAROSCOPIC HYSTERECTOMY, BILATERAL SALPINGO OOPHORECTOMY WITH DAVINCI ROBOT ;  Surgeon: Shannon Grimaldo DO;  Location: [facility] OR;  Service:     WISDOM TOOTH EXTRACTION         Family History   Problem Relation Age of Onset    Cancer Mother         cervical cancer    Dementia Mother     Hypertension Mother     Obesity Father     Hypertension Father     Heart attack Father     Sleep apnea Father     Sleep apnea Sister     Hypertension Brother     Sleep apnea Brother     Cancer Maternal Aunt         lymphoma    Cancer Paternal Aunt     Breast cancer Paternal Aunt 70    Cancer Maternal Grandmother     Obesity Paternal Grandmother     Diabetes Paternal Grandmother     Hypertension Paternal Grandmother     Stroke Paternal Grandmother     Heart attack Paternal Grandmother     Heart attack Paternal Grandfather     Alcohol abuse Other     Depression Other        Social History     Socioeconomic History    Marital status: Single   Tobacco Use    Smoking status: Never     Passive exposure: Never    Smokeless tobacco: Never   Vaping Use    Vaping Use: Never used   Substance and Sexual Activity    Alcohol use: Not Currently    Drug use: Never    Sexual activity: Not Currently     Birth control/protection: Post-menopausal, Surgical            Objective   Physical Exam  Vitals and nursing note reviewed.   Constitutional:       General: She is not in acute distress.  HENT:      Head: Atraumatic.   Eyes:      Extraocular Movements: Extraocular movements intact.      Conjunctiva/sclera: Conjunctivae normal.   Cardiovascular:      Rate and Rhythm: Normal rate.      Heart sounds: Normal heart sounds.   Pulmonary:      Effort: Pulmonary effort is normal. No respiratory distress.   Abdominal:      Palpations: Abdomen is soft.      Tenderness: There is no abdominal tenderness.   Musculoskeletal:         General: Normal range of motion.      Cervical back: Normal range of motion and neck supple.   Skin:     General: Skin is warm.   Neurological:      General: No focal deficit present.      Mental Status: She is alert.   Psychiatric:         Mood and Affect: Mood normal.         Behavior: Behavior normal.         Procedures           ED Course      Recent Results (from the past 24 hour(s))   Urinalysis With Microscopic If Indicated (No Culture) - Urine, Clean Catch    Collection Time: 02/24/24  5:14 PM    Specimen: Urine, Clean Catch   Result Value Ref Range    Color, UA Yellow Yellow, Straw    Appearance, UA Clear Clear    pH, UA 6.5 5.0 - 8.0    Specific Gravity, UA 1.012 1.001 - 1.030    Glucose, UA Negative Negative    Ketones, UA Negative Negative    Bilirubin, UA Negative Negative    Blood, UA Negative Negative    Protein, UA Negative Negative    Leuk Esterase, UA Negative Negative    Nitrite, UA Negative Negative    Urobilinogen, UA 0.2 E.U./dL 0.2 - 1.0 E.U./dL   Comprehensive Metabolic Panel    Collection Time: 02/24/24  5:24 PM    Specimen: Blood   Result Value Ref Range    Glucose 90 65 - 99 mg/dL    BUN 15 8 - 23 mg/dL    Creatinine 0.83 0.57 - 1.00 mg/dL    Sodium 139 136 - 145 mmol/L    Potassium 3.8 3.5 - 5.2 mmol/L    Chloride 103 98 - 107 mmol/L    CO2 25.0 22.0 - 29.0 mmol/L    Calcium 9.3 8.6 - 10.5 mg/dL    Total Protein 7.4 6.0 - 8.5  g/dL    Albumin 4.3 3.5 - 5.2 g/dL    ALT (SGPT) 26 1 - 33 U/L    AST (SGOT) 23 1 - 32 U/L    Alkaline Phosphatase 111 39 - 117 U/L    Total Bilirubin 0.2 0.0 - 1.2 mg/dL    Globulin 3.1 gm/dL    A/G Ratio 1.4 g/dL    BUN/Creatinine Ratio 18.1 7.0 - 25.0    Anion Gap 11.0 5.0 - 15.0 mmol/L    eGFR 80.3 >60.0 mL/min/1.73   Lactic Acid, Plasma    Collection Time: 02/24/24  5:24 PM    Specimen: Blood   Result Value Ref Range    Lactate 0.8 0.5 - 2.0 mmol/L   Single High Sensitivity Troponin T    Collection Time: 02/24/24  5:24 PM    Specimen: Blood   Result Value Ref Range    HS Troponin T 15 (H) <14 ng/L   BNP    Collection Time: 02/24/24  5:24 PM    Specimen: Blood   Result Value Ref Range    proBNP <36.0 0.0 - 900.0 pg/mL   Procalcitonin    Collection Time: 02/24/24  5:24 PM    Specimen: Blood   Result Value Ref Range    Procalcitonin 0.04 0.00 - 0.25 ng/mL   CBC Auto Differential    Collection Time: 02/24/24  5:24 PM    Specimen: Blood   Result Value Ref Range    WBC 8.16 3.40 - 10.80 10*3/mm3    RBC 4.58 3.77 - 5.28 10*6/mm3    Hemoglobin 14.0 12.0 - 15.9 g/dL    Hematocrit 41.9 34.0 - 46.6 %    MCV 91.5 79.0 - 97.0 fL    MCH 30.6 26.6 - 33.0 pg    MCHC 33.4 31.5 - 35.7 g/dL    RDW 13.8 12.3 - 15.4 %    RDW-SD 46.5 37.0 - 54.0 fl    MPV 10.0 6.0 - 12.0 fL    Platelets 292 140 - 450 10*3/mm3    Neutrophil % 61.8 42.7 - 76.0 %    Lymphocyte % 24.3 19.6 - 45.3 %    Monocyte % 8.3 5.0 - 12.0 %    Eosinophil % 4.8 0.3 - 6.2 %    Basophil % 0.6 0.0 - 1.5 %    Immature Grans % 0.2 0.0 - 0.5 %    Neutrophils, Absolute 5.04 1.70 - 7.00 10*3/mm3    Lymphocytes, Absolute 1.98 0.70 - 3.10 10*3/mm3    Monocytes, Absolute 0.68 0.10 - 0.90 10*3/mm3    Eosinophils, Absolute 0.39 0.00 - 0.40 10*3/mm3    Basophils, Absolute 0.05 0.00 - 0.20 10*3/mm3    Immature Grans, Absolute 0.02 0.00 - 0.05 10*3/mm3    nRBC 0.0 0.0 - 0.2 /100 WBC   Green Top (Gel)    Collection Time: 02/24/24  5:24 PM   Result Value Ref Range    Extra Tube Hold  "for add-ons.    Lavender Top    Collection Time: 02/24/24  5:24 PM   Result Value Ref Range    Extra Tube hold for add-on    Gold Top - SST    Collection Time: 02/24/24  5:24 PM   Result Value Ref Range    Extra Tube Hold for add-ons.    Light Blue Top    Collection Time: 02/24/24  5:24 PM   Result Value Ref Range    Extra Tube Hold for add-ons.    COVID-19 and FLU A/B PCR, 1 HR TAT - Swab, Nasopharynx    Collection Time: 02/24/24  5:25 PM    Specimen: Nasopharynx; Swab   Result Value Ref Range    COVID19 Not Detected Not Detected - Ref. Range    Influenza A PCR Not Detected Not Detected    Influenza B PCR Not Detected Not Detected   ECG 12 Lead Altered Mental Status    Collection Time: 02/24/24  5:35 PM   Result Value Ref Range    QT Interval 430 ms    QTC Interval 477 ms     Note: In addition to lab results from this visit, the labs listed above may include labs taken at another facility or during a different encounter within the last 24 hours. Please correlate lab times with ED admission and discharge times for further clarification of the services performed during this visit.    XR Chest 1 View   Final Result   Impression:   No evidence of acute cardiopulmonary disease.          Electronically Signed: Petros Matthews MD     2/24/2024 5:22 PM EST     Workstation ID: [id number]        Vitals:    02/24/24 1636 02/24/24 1724 02/24/24 1725 02/24/24 1745   BP: 135/80 135/79     BP Location: Left arm      Patient Position: Sitting      Pulse: 77  73 76   Resp: 20      Temp: 97.8 °F (36.6 °C)      TempSrc: Oral      SpO2: 97%  99% 96%   Weight: 107 kg (235 lb)      Height: 167.6 cm (66\")        Medications   sodium chloride 0.9 % bolus 1,000 mL (0 mL Intravenous Stopped 2/24/24 1816)     ECG/EMG Results (last 24 hours)       Procedure Component Value Units Date/Time    ECG 12 Lead Altered Mental Status [[id number]] Collected: 02/24/24 1735     Updated: 02/24/24 1736     QT Interval 430 ms      QTC Interval 477 ms     " Narrative:      Test Reason : Altered Mental Status  Blood Pressure :   */*   mmHG  Vent. Rate :  74 BPM     Atrial Rate :  74 BPM     P-R Int : 168 ms          QRS Dur : 158 ms      QT Int : 430 ms       P-R-T Axes :  44 -21 -12 degrees     QTc Int : 477 ms    Normal sinus rhythm  Right bundle branch block  Possible Lateral infarct , age undetermined  Abnormal ECG  When compared with ECG of 17-APR-2023 20:19,  Right bundle branch block is now present  Borderline criteria for Lateral infarct are now present    Referred By: EDMD           Confirmed By:           ECG 12 Lead Altered Mental Status   Preliminary Result   Test Reason : Altered Mental Status   Blood Pressure :   */*   mmHG   Vent. Rate :  74 BPM     Atrial Rate :  74 BPM      P-R Int : 168 ms          QRS Dur : 158 ms       QT Int : 430 ms       P-R-T Axes :  44 -21 -12 degrees      QTc Int : 477 ms      Normal sinus rhythm   Right bundle branch block   Possible Lateral infarct , age undetermined   Abnormal ECG   When compared with ECG of 17-APR-2023 20:19,   Right bundle branch block is now present   Borderline criteria for Lateral infarct are now present      Referred By: EDMD           Confirmed By:                                                  Medical Decision Making  Pt is a 64 yo female presenting to ED with complaints of generalized weakness. Labs in ED notable for WBC 8.16, Lactic 0.8, Cr 0.83, Glucose 96, K 3.8, HS Trop 15 and BNP 36. CXR no acute findings. Vitals in ED stable. PT given fluids. Discussed results and tx plan including discharge and close f/u with PCP.     DDx  Covid, Flu, Pneumonia, CHF, Resp failure, YAO, Dehydration     Problems Addressed:  Generalized weakness: complicated acute illness or injury  History of diabetes mellitus: chronic illness or injury  Post-COVID syndrome: complicated acute illness or injury    Amount and/or Complexity of Data Reviewed  External Data Reviewed: notes.     Details: Reviewed previous non ED  visits including prior labs, imaging, available notes, medications, allergies and surgical hx.     Labs: ordered. Decision-making details documented in ED Course.  Radiology: ordered. Decision-making details documented in ED Course.  ECG/medicine tests: ordered. Decision-making details documented in ED Course.        Final diagnoses:   Post-COVID syndrome   Generalized weakness   History of diabetes mellitus       ED Disposition  ED Disposition       ED Disposition   Discharge    Condition   Stable    Comment   --               Maria M Ardon, APRN  [address] [facility] [id number] [phone]    Schedule an appointment as soon as possible for a visit       [facility] EMERGENCY DEPARTMENT  [address] [facility] [address] [phone]    If symptoms worsen         Medication List      No changes were made to your prescriptions during this visit.            Jocelin Dang PA  02/24/24 5467

## 2024-02-26 LAB
QT INTERVAL: 430 MS
QTC INTERVAL: 477 MS

## 2024-03-08 ENCOUNTER — OFFICE VISIT (OUTPATIENT)
Dept: INTERNAL MEDICINE | Facility: CLINIC | Age: 64
End: 2024-03-08
Payer: COMMERCIAL

## 2024-03-08 ENCOUNTER — LAB (OUTPATIENT)
Dept: LAB | Facility: HOSPITAL | Age: 64
End: 2024-03-08
Payer: COMMERCIAL

## 2024-03-08 VITALS
WEIGHT: 240 LBS | SYSTOLIC BLOOD PRESSURE: 110 MMHG | RESPIRATION RATE: 14 BRPM | DIASTOLIC BLOOD PRESSURE: 76 MMHG | HEIGHT: 66 IN | HEART RATE: 95 BPM | BODY MASS INDEX: 38.57 KG/M2 | OXYGEN SATURATION: 98 %

## 2024-03-08 DIAGNOSIS — R10.9 ABDOMINAL CRAMPING: ICD-10-CM

## 2024-03-08 DIAGNOSIS — K57.32 DIVERTICULITIS OF COLON: ICD-10-CM

## 2024-03-08 DIAGNOSIS — E11.9 TYPE 2 DIABETES MELLITUS WITHOUT COMPLICATION, WITHOUT LONG-TERM CURRENT USE OF INSULIN: Primary | ICD-10-CM

## 2024-03-08 LAB
ANION GAP SERPL CALCULATED.3IONS-SCNC: 12.9 MMOL/L (ref 5–15)
BUN SERPL-MCNC: 18 MG/DL (ref 8–23)
BUN/CREAT SERPL: 20.9 (ref 7–25)
CALCIUM SPEC-SCNC: 10 MG/DL (ref 8.6–10.5)
CHLORIDE SERPL-SCNC: 100 MMOL/L (ref 98–107)
CO2 SERPL-SCNC: 25.1 MMOL/L (ref 22–29)
CREAT SERPL-MCNC: 0.86 MG/DL (ref 0.57–1)
EGFRCR SERPLBLD CKD-EPI 2021: 77 ML/MIN/1.73
EXPIRATION DATE: ABNORMAL
GLUCOSE SERPL-MCNC: 90 MG/DL (ref 65–99)
HBA1C MFR BLD: 6.6 % (ref 4.5–5.7)
Lab: ABNORMAL
MAGNESIUM SERPL-MCNC: 2.5 MG/DL (ref 1.6–2.4)
POTASSIUM SERPL-SCNC: 5.3 MMOL/L (ref 3.5–5.2)
SODIUM SERPL-SCNC: 138 MMOL/L (ref 136–145)

## 2024-03-08 PROCEDURE — 36415 COLL VENOUS BLD VENIPUNCTURE: CPT

## 2024-03-08 PROCEDURE — 83735 ASSAY OF MAGNESIUM: CPT

## 2024-03-08 PROCEDURE — 80048 BASIC METABOLIC PNL TOTAL CA: CPT

## 2024-03-08 RX ORDER — CIPROFLOXACIN 500 MG/1
500 TABLET, FILM COATED ORAL 2 TIMES DAILY
COMMUNITY

## 2024-03-08 RX ORDER — DICYCLOMINE HYDROCHLORIDE 10 MG/1
10 CAPSULE ORAL EVERY 6 HOURS PRN
Qty: 30 CAPSULE | Refills: 0 | Status: SHIPPED | OUTPATIENT
Start: 2024-03-08

## 2024-03-08 RX ORDER — METRONIDAZOLE 500 MG/1
500 TABLET ORAL 3 TIMES DAILY
COMMUNITY

## 2024-03-08 NOTE — PROGRESS NOTES
Follow Up Office Visit      Date: 2024   Patient Name: Sandra Auguste  : 1960   MRN: [id number]     Chief Complaint:    Chief Complaint   Patient presents with    Diabetes     Follow up. Pt states was seen in ED, dx w/ diverticulitis and given 2 abx's. Seen at [facility] in [place]   Answers submitted by the patient for this visit:  Primary Reason for Visit (Submitted on 3/8/2024)  What is the primary reason for your visit?: Abdominal Pain  Abdominal Pain Questionnaire (Submitted on 3/8/2024)  Chief Complaint: Abdominal pain  Chronicity: recurrent  Onset quality: gradual  Progression since onset: coming and going  Pain - numeric: 5/10  Radiates to: left flank  anorexia: Yes  belching: Yes  flatus: Yes  hematochezia: No  melena: No  Aggravated by: certain positions        History of Present Illness: Sandra Auguste is a 63 y.o. female who is here today for a previously scheduled 3m f/u apt for recheck of diabetes. She was last seen by me in December.  A CT scan at this visit revealed diverticulitis.  Unfortunately since that time she has had numerous acute visits for various problems and today again notes ongoing GI issues.    -UC on  for chemical pneumonitis  -here on  for PTSD, anxiety, and sleep problems  -here on  for +COVID infection  -ER on  for right toe pain  -ER on  for post-COVID syndrome and generalized weakness  -Missed 2 appointments with pulmonologist last month    Reports that she has felt very sick this past week with diverticulitis flare and was seen in the ER 2 days ago for evaluation. C/o abd cramping, nausea, and most recently diarrhea but stools alternate between constipation and loose. Previously had to regularly use laxatives. She has had ongoing GI sxs since December, and attributes recent exacerbation of symptoms was due to eating meal at [facility]. Donuts have also caused stomach upset. This evaluation was 3/6 at the hospital in [place].   States that they did a repeat CT scan of the abdomen and pelvis which showed worsening diverticulitis. She was prescribed 1 week of cipro and flagyl. No labs. Feels slightly improved since starting the abx. Reports compliance with both. They also gave her Zofran to use when needed for nausea or vomiting which is effective.  Also gave Tylenol 3 for pain when needed.  States she only uses this when she absolutely has to.  Still experiencing generalized abdominal discomfort, most notable on the left side.  Still feels bloated.  Also c/o leg and arm cramping.     Repeat A1c today is 6.6%. (prev 6.3).  She does occasionally forget to take her metformin.      Subjective      Review of Systems:   Review of Systems   Constitutional:  Positive for fever and unexpected weight loss.   Gastrointestinal:  Positive for abdominal pain, constipation, diarrhea and nausea. Negative for vomiting.   Genitourinary:  Positive for frequency. Negative for dysuria and hematuria.   Musculoskeletal:  Negative for arthralgias and myalgias.       I have reviewed the patients family history, social history, past medical history, past surgical history and have updated it as appropriate.     Medications:     Current Outpatient Medications:     acetaminophen (TYLENOL) 500 MG tablet, Take 1 tablet by mouth Every 6 (Six) Hours As Needed for Mild Pain., Disp: , Rfl:     albuterol sulfate  (90 Base) MCG/ACT inhaler, Inhale 2 puffs Every 6 (Six) Hours As Needed for Wheezing or Shortness of Air., Disp: 18 g, Rfl: 1    Blood Glucose Monitoring Suppl (OneTouch Verio Flex System) w/Device kit, 1 kit Daily., Disp: 1 kit, Rfl: 0    Blood Glucose Monitoring Suppl (OneTouch Verio) w/Device kit, 1 kit Daily., Disp: 1 kit, Rfl: 0    ciprofloxacin (CIPRO) 500 MG tablet, Take 1 tablet by mouth 2 (Two) Times a Day., Disp: , Rfl:     Diclofenac Sodium (VOLTAREN) 1 % gel gel, Apply  topically to the appropriate area as directed 4 (Four) Times a Day As Needed  (arthralgias, shoulders and back.)., Disp: 100 g, Rfl: 1    EPINEPHrine (EPIPEN) 0.3 MG/0.3ML solution auto-injector injection, Inject 0.3 mL into the appropriate muscle as directed by prescriber As Needed (FOR SEVERE ALLERGIC REACTION)., Disp: 1 each, Rfl: 1    Fluticasone Furoate-Vilanterol (Breo Ellipta) 200-25 MCG/ACT inhaler, Inhale 1 puff Daily., Disp: 60 each, Rfl: 1    glucose blood (OneTouch Verio) test strip, USE AS INSTRUCTED TO CHECK BLOOD GLUCOSE ONCE DAILY DX:E11.65, Disp: 100 each, Rfl: 3    glucose monitor monitoring kit, Use as directed to check blood glucose once daily., Disp: 1 each, Rfl: 0    hydrOXYzine (ATARAX) 25 MG tablet, Take 1 tablet by mouth Every 8 (Eight) Hours As Needed for Anxiety., Disp: 90 tablet, Rfl: 2    Lancets (onetouch ultrasoft) lancets, Use one lancet daily to check blood sugars, Disp: 100 each, Rfl: 3    loratadine (CLARITIN) 10 MG tablet, Take 1 tablet by mouth Daily., Disp: 30 tablet, Rfl: 5    metFORMIN ER (GLUCOPHAGE-XR) 500 MG 24 hr tablet, Take 1 tablet by mouth Daily With Breakfast., Disp: 90 tablet, Rfl: 3    metroNIDAZOLE (FLAGYL) 500 MG tablet, Take 1 tablet by mouth 3 (Three) Times a Day., Disp: , Rfl:     multivitamin with minerals tablet tablet, Take 1 tablet by mouth Daily., Disp: , Rfl:     polyethylene glycol (MIRALAX) 17 GM/SCOOP powder, Take 17 g by mouth Daily., Disp: 578 g, Rfl: 1    sodium chloride (Ocean Nasal Spray) 0.65 % nasal spray, 1 spray into the nostril(s) as directed by provider As Needed for Congestion., Disp: 60 mL, Rfl: 0    dicyclomine (BENTYL) 10 MG capsule, Take 1 capsule by mouth Every 6 (Six) Hours As Needed for Abdominal Cramping., Disp: 30 capsule, Rfl: 0    Allergies:   Allergies   Allergen Reactions    Mold Extract [Trichophyton] Shortness Of Breath, Anxiety, Palpitations and Irritability    Lortab [Hydrocodone-Acetaminophen] Nausea And Vomiting    Pollen Extract Other (See Comments)     SNEEZING AND CONGESTION        Objective  "    Physical Exam: Please see above  Vital Signs:   Vitals:    03/08/24 1159 03/08/24 1531   BP: 110/76    Pulse: 95    Resp: 14    SpO2: 98%    Weight: 107 kg (235 lb) 109 kg (240 lb)  Comment: weighed by    Height: 167.6 cm (66\")    PainSc:   4      Body mass index is 38.74 kg/m².    Physical Exam  Vitals and nursing note reviewed.   Constitutional:       General: She is not in acute distress.     Appearance: Normal appearance. She is obese. She is not ill-appearing or diaphoretic.   HENT:      Mouth/Throat:      Mouth: Mucous membranes are moist.      Pharynx: Oropharynx is clear.   Pulmonary:      Effort: Pulmonary effort is normal. No respiratory distress.   Abdominal:      General: Abdomen is protuberant. Bowel sounds are increased.      Palpations: Abdomen is soft.      Tenderness: There is no abdominal tenderness. There is no guarding or rebound.   Skin:     General: Skin is warm and dry.   Neurological:      Mental Status: She is alert and oriented to person, place, and time.             Results:   Imaging:     Labs:        Assessment / Plan      Assessment/Plan:   Diagnoses and all orders for this visit:    1. Type 2 diabetes mellitus without complication, without long-term current use of insulin (Primary)  -     POC Glycosylated Hemoglobin (Hb A1C)    2. Abdominal cramping  -     dicyclomine (BENTYL) 10 MG capsule; Take 1 capsule by mouth Every 6 (Six) Hours As Needed for Abdominal Cramping.  Dispense: 30 capsule; Refill: 0    3. Diverticulitis of colon  -     dicyclomine (BENTYL) 10 MG capsule; Take 1 capsule by mouth Every 6 (Six) Hours As Needed for Abdominal Cramping.  Dispense: 30 capsule; Refill: 0  -     Basic metabolic panel; Future  -     Magnesium; Future  -     Ambulatory Referral to Gastroenterology    -Referring to gastroenterology for ongoing GI symptoms.  Dr. Chavarria previously did colonoscopy.  -Complete entire course of antibiotic prescribed.  Continue Zofran and tylenol3 PRN. I will " give bentyl to also try PRN. Recommended bowel rest, then AAT to clear liquids.  Return in 1 week for recheck upon completion of abx.  -Patient does not like taking her metformin, and this could be a contributing factor at least 2 ongoing abdominal discomfort and diarrhea.  D/C metformin.  We do need alternative diabetes treatment as A1c is increased from previous visit.  Begin Jardiance 10 mg once daily.  I gave her 2 bottles of samples for a total of 2-week supply.  -BMP and magnesium today.  Muscle cramping likely secondary to dehydration from diarrhea.  Will plan to replace any electrolyte abnormalities if needed.    Follow Up:   Return in about 1 week (around 3/15/2024) for Recheck Dr Baum .    REBECA Hadley  [facility] [place]

## 2024-03-20 ENCOUNTER — TELEPHONE (OUTPATIENT)
Dept: PEDIATRICS | Facility: OTHER | Age: 64
End: 2024-03-20

## 2024-03-20 ENCOUNTER — OFFICE VISIT (OUTPATIENT)
Dept: INTERNAL MEDICINE | Facility: CLINIC | Age: 64
End: 2024-03-20

## 2024-03-20 VITALS
TEMPERATURE: 97.4 F | DIASTOLIC BLOOD PRESSURE: 80 MMHG | HEART RATE: 80 BPM | SYSTOLIC BLOOD PRESSURE: 134 MMHG | WEIGHT: 267.2 LBS | HEIGHT: 66 IN | RESPIRATION RATE: 14 BRPM | BODY MASS INDEX: 42.94 KG/M2 | OXYGEN SATURATION: 96 %

## 2024-03-20 DIAGNOSIS — N76.0 VULVOVAGINITIS: Primary | ICD-10-CM

## 2024-03-20 PROCEDURE — 99213 OFFICE O/P EST LOW 20 MIN: CPT | Performed by: NURSE PRACTITIONER

## 2024-03-20 RX ORDER — FLUCONAZOLE 150 MG/1
150 TABLET ORAL ONCE
Qty: 1 TABLET | Refills: 0 | Status: SHIPPED | OUTPATIENT
Start: 2024-03-20 | End: 2024-03-20

## 2024-03-20 NOTE — TELEPHONE ENCOUNTER
Caller: Sandra Auguste    Relationship: Self    Best call back number: [phone]    What medication are you requesting: WOULD LIKE THE SAME MEDICATION AS LAST TIME     What are your current symptoms: PATIENT IS TAKING TWO ANTIBIOTICS PRESCRIBED BY THE ED AND WILL NEED A PRESCRIPTION FOR YEAST INFECTION. PATIENT IS CURRENTLY EXPERIENCING ITCHING.     How long have you been experiencing symptoms: THIS PAST WEEKEND, AFTER COMPLETING ANTIBIOTICS     Have you had these symptoms before:    [x] Yes  [] No    Have you been treated for these symptoms before:   [x] Yes  [] No    If a prescription is needed, what is your preferred pharmacy and phone number: [facility] [id number] [address] [phone] [facility] [phone] FX     Additional notes:

## 2024-03-20 NOTE — TELEPHONE ENCOUNTER
Spoke with patient and let her know Maria M is no longer in the office and she would need to be seen in order to receive medication. She does not have insurance at this time. She is going to call Medicaid and check on her application status and give us a call back if she wants to schedule an appointment.

## 2024-03-25 DIAGNOSIS — E11.9 TYPE 2 DIABETES MELLITUS WITHOUT COMPLICATION, WITHOUT LONG-TERM CURRENT USE OF INSULIN: Primary | ICD-10-CM

## 2024-03-25 NOTE — TELEPHONE ENCOUNTER
Caller: Sandra Auguste    Relationship: Self    Best call back number: [phone]     Requested Prescriptions:   Requested Prescriptions     Pending Prescriptions Disp Refills    empagliflozin (Jardiance) 10 MG tablet tablet 30 tablet      Sig: Take 1 tablet by mouth Daily.        Pharmacy where request should be sent: [facility] [id number] [address] [phone] [facility] [phone] FX     Last office visit with prescribing clinician: 3/8/2024   Last telemedicine visit with prescribing clinician: Visit date not found   Next office visit with prescribing clinician: Visit date not found     Additional details provided by patient: PATIENT HAS CALLED REQUESTING A NEW 30 DAY PRESCRIPTION ON ABOVE MEDICATION.    Does the patient have less than a 3 day supply:  [x] Yes  [] No    Would you like a call back once the refill request has been completed: [] Yes [x] No    If the office needs to give you a call back, can they leave a voicemail: [] Yes [x] No    Tha Steiner   03/25/24 15:18 EDT

## 2024-03-27 ENCOUNTER — APPOINTMENT (OUTPATIENT)
Dept: GENERAL RADIOLOGY | Facility: HOSPITAL | Age: 64
End: 2024-03-27
Payer: MEDICAID

## 2024-03-27 ENCOUNTER — HOSPITAL ENCOUNTER (EMERGENCY)
Facility: HOSPITAL | Age: 64
Discharge: HOME | End: 2024-03-27
Attending: EMERGENCY MEDICINE | Admitting: EMERGENCY MEDICINE
Payer: MEDICAID

## 2024-03-27 VITALS
HEIGHT: 66 IN | DIASTOLIC BLOOD PRESSURE: 92 MMHG | WEIGHT: 266.76 LBS | SYSTOLIC BLOOD PRESSURE: 158 MMHG | TEMPERATURE: 98.5 F | OXYGEN SATURATION: 96 % | BODY MASS INDEX: 42.87 KG/M2 | HEART RATE: 85 BPM | RESPIRATION RATE: 12 BRPM

## 2024-03-27 DIAGNOSIS — M25.561 ACUTE PAIN OF RIGHT KNEE: ICD-10-CM

## 2024-03-27 DIAGNOSIS — S09.90XA INJURY OF HEAD, INITIAL ENCOUNTER: ICD-10-CM

## 2024-03-27 DIAGNOSIS — S20.211A CONTUSION OF RIGHT CHEST WALL, INITIAL ENCOUNTER: Primary | ICD-10-CM

## 2024-03-27 DIAGNOSIS — M25.531 RIGHT WRIST PAIN: ICD-10-CM

## 2024-03-27 PROCEDURE — 71101 X-RAY EXAM UNILAT RIBS/CHEST: CPT

## 2024-03-27 PROCEDURE — 73560 X-RAY EXAM OF KNEE 1 OR 2: CPT

## 2024-03-27 PROCEDURE — 73110 X-RAY EXAM OF WRIST: CPT

## 2024-03-27 PROCEDURE — 99283 EMERGENCY DEPT VISIT LOW MDM: CPT

## 2024-03-27 RX ORDER — DICLOFENAC SODIUM 75 MG/1
75 TABLET, DELAYED RELEASE ORAL 2 TIMES DAILY
Qty: 14 TABLET | Refills: 0 | Status: SHIPPED | OUTPATIENT
Start: 2024-03-27

## 2024-03-27 NOTE — ED PROVIDER NOTES
"Subjective   History of Present Illness    Pt presents with injury after a fall.  Four days ago she tripped over a cord and landed on a large metal water bottle, struck under her arm on the right side.  She has pain and bruising.  Pain worse with movement and supporting her weight.      She also think she hit her head, has some mild left temporal pain.  Also has right wrist and right knee pain. She has been able to walk since the fall. No trouble breathing.  She says she is having \"stress issues\" since falling.    Not on blood thinner.    She took some Tylenol with codeine the last few days.  Left over from an episode of diverticulitis.  Using ibuprofen as well.      History provided by:  Patient      Review of Systems   Cardiovascular:  Positive for chest pain.   Musculoskeletal:  Negative for back pain.   Skin:  Negative for wound.   All other systems reviewed and are negative.      Past Medical History:   Diagnosis Date    Anesthesia complication     HAS TROUBLE GETTING \"NUMB\"     Anxiety     Arthritis     on Celebrex + Robaxin, no steroids    Asthma     does not follow w/ pulmonary    Bladder spasms     Depression     w/ PTSD    Diverticulosis     Dyspepsia     Fatigue     GERD (gastroesophageal reflux disease)     controlled w/ nightly Pepcid, EGD 2020 w/ Dr. Chavarria, (+) [facility] maintenance 02/25/2020    Memory loss     FROM SKULL FRACTURE AND FALL-SHORT TERM MEMORY LOSS    Migraine     Morbid obesity     Seasonal allergies     Seizures     \"convulsions\" at age 5    Skull fracture 2012    Sleep apnea with use of continuous positive airway pressure (CPAP)     CPAP compliant    SOB (shortness of breath) on exertion     Tendonitis     Type 2 diabetes mellitus without complication, without long-term current use of insulin     dx 2021, no insulin, A1C <7    Wears contact lenses     Wears partial dentures     TOP ONLY     Wears prescription eyeglasses        Allergies   Allergen Reactions    Mold Extract " [Trichophyton] Shortness Of Breath, Anxiety, Palpitations and Irritability    Lortab [Hydrocodone-Acetaminophen] Nausea And Vomiting    Pollen Extract Other (See Comments)     SNEEZING AND CONGESTION        Past Surgical History:   Procedure Laterality Date    CERVICAL POLYPECTOMY      TOTAL LAPAROSCOPIC HYSTERECTOMY N/A 9/18/2017    Procedure: TOTAL LAPAROSCOPIC HYSTERECTOMY, BILATERAL SALPINGO OOPHORECTOMY WITH DAVINCI ROBOT ;  Surgeon: Shannon Grimaldo DO;  Location: [facility] OR;  Service:     WISDOM TOOTH EXTRACTION         Family History   Problem Relation Age of Onset    Cancer Mother         cervical cancer    Dementia Mother     Hypertension Mother     Obesity Father     Hypertension Father     Heart attack Father     Sleep apnea Father     Sleep apnea Sister     Hypertension Brother     Sleep apnea Brother     Cancer Maternal Aunt         lymphoma    Cancer Paternal Aunt     Breast cancer Paternal Aunt 70    Cancer Maternal Grandmother     Obesity Paternal Grandmother     Diabetes Paternal Grandmother     Hypertension Paternal Grandmother     Stroke Paternal Grandmother     Heart attack Paternal Grandmother     Heart attack Paternal Grandfather     Alcohol abuse Other     Depression Other        Social History     Socioeconomic History    Marital status: Single   Tobacco Use    Smoking status: Never     Passive exposure: Never    Smokeless tobacco: Never   Vaping Use    Vaping status: Never Used   Substance and Sexual Activity    Alcohol use: Not Currently    Drug use: Never    Sexual activity: Not Currently     Birth control/protection: Post-menopausal, Surgical           Objective   Physical Exam  Vitals and nursing note reviewed.   Constitutional:       General: She is not in acute distress.     Appearance: Normal appearance. She is not ill-appearing.   HENT:      Head: Normocephalic and atraumatic.      Comments: No bruising, swelling, deformity.  Eyes:      General: No scleral icterus.        Right eye: No  discharge.         Left eye: No discharge.      Conjunctiva/sclera: Conjunctivae normal.   Cardiovascular:      Rate and Rhythm: Normal rate and regular rhythm.   Pulmonary:      Effort: Pulmonary effort is normal. No respiratory distress.      Breath sounds: Normal breath sounds.   Abdominal:      Tenderness: There is no abdominal tenderness.   Musculoskeletal:         General: No swelling or deformity.      Cervical back: Normal range of motion and neck supple.      Comments: Tenderness to the chest wall under right axilla. No crepitus or deformity.    Right wrist is tender medially but no bruising or swelling. ROM intact. Otherwise examination of the injured hand reveals a strong radial pulse and normal cap refill.  Sensation and gross motor are intact in the median, ulnar and radial nerve distributions.  Thumb opposition is intact.  There are no nailbed injuries.    Right knee is tender laterally over distal tibia.  Normal ROM. Normal gait.  No deformity.    Except as documented there is no tenderness to gross palpation of the arms or legs, and intact painless ROM to the shoulders, elbows, wrists, hips, knees and ankles.   Skin:     General: Skin is dry.      Findings: No rash.   Neurological:      General: No focal deficit present.      Mental Status: She is alert and oriented to person, place, and time. Mental status is at baseline.   Psychiatric:         Mood and Affect: Mood normal.         Behavior: Behavior normal.         Thought Content: Thought content normal.         Procedures           ED Course    XRs negative.    Patient stable on serial rechecks.  Discussed findings, concerns, plan of care, expected course, reasons to return and followup.  Provided the opportunity to ask questions.                                   SHANTEL reviewed by Linus Carr MD       Medical Decision Making  Problems Addressed:  Acute pain of right knee: complicated acute illness or injury  Contusion of right chest  erin, initial encounter: complicated acute illness or injury  Injury of head, initial encounter: complicated acute illness or injury  Right wrist pain: complicated acute illness or injury    Amount and/or Complexity of Data Reviewed  Radiology: ordered. Decision-making details documented in ED Course.    Risk  Prescription drug management.        Final diagnoses:   Contusion of right chest wall, initial encounter   Acute pain of right knee   Right wrist pain   Injury of head, initial encounter       ED Disposition  ED Disposition       ED Disposition   Discharge    Condition   Stable    Comment   --               Your doctor    In 1 week           Medication List        New Prescriptions      diclofenac 75 MG EC tablet  Commonly known as: VOLTAREN  Take 1 tablet by mouth 2 (Two) Times a Day.               Where to Get Your Medications        [id number] - [place], [place] [facility] [address] [phone] [facility] [phone] FX  [address] [facility] [address] [address]      Phone: [phone]   diclofenac 75 MG EC tablet            Linus Carr MD  03/27/24 9951

## 2024-04-04 ENCOUNTER — OFFICE VISIT (OUTPATIENT)
Dept: INTERNAL MEDICINE | Facility: CLINIC | Age: 64
End: 2024-04-04
Payer: COMMERCIAL

## 2024-04-04 VITALS
HEART RATE: 80 BPM | DIASTOLIC BLOOD PRESSURE: 80 MMHG | TEMPERATURE: 97.7 F | HEIGHT: 66 IN | SYSTOLIC BLOOD PRESSURE: 130 MMHG | OXYGEN SATURATION: 98 % | WEIGHT: 265 LBS | BODY MASS INDEX: 42.59 KG/M2 | RESPIRATION RATE: 14 BRPM

## 2024-04-04 DIAGNOSIS — Z13.21 SCREENING FOR ENDOCRINE, NUTRITIONAL, METABOLIC AND IMMUNITY DISORDER: ICD-10-CM

## 2024-04-04 DIAGNOSIS — Z13.29 SCREENING FOR ENDOCRINE, NUTRITIONAL, METABOLIC AND IMMUNITY DISORDER: ICD-10-CM

## 2024-04-04 DIAGNOSIS — Z00.00 WELLNESS EXAMINATION: Primary | ICD-10-CM

## 2024-04-04 DIAGNOSIS — Z76.89 ENCOUNTER TO ESTABLISH CARE WITH NEW DOCTOR: ICD-10-CM

## 2024-04-04 DIAGNOSIS — Z13.220 SCREENING, LIPID: ICD-10-CM

## 2024-04-04 DIAGNOSIS — Z13.228 SCREENING FOR ENDOCRINE, NUTRITIONAL, METABOLIC AND IMMUNITY DISORDER: ICD-10-CM
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
## 2024-04-04 DIAGNOSIS — H53.8 BLURRED VISION: ICD-10-CM

## 2024-04-04 DIAGNOSIS — Z13.0 SCREENING FOR ENDOCRINE, NUTRITIONAL, METABOLIC AND IMMUNITY DISORDER: ICD-10-CM

## 2024-04-04 DIAGNOSIS — R92.8 ABNORMAL MAMMOGRAM: ICD-10-CM

## 2024-04-04 DIAGNOSIS — G44.319 ACUTE POST-TRAUMATIC HEADACHE, NOT INTRACTABLE: ICD-10-CM

## 2024-04-04 DIAGNOSIS — E11.9 TYPE 2 DIABETES MELLITUS WITHOUT COMPLICATION, WITHOUT LONG-TERM CURRENT USE OF INSULIN: ICD-10-CM

## 2024-04-04 DIAGNOSIS — Z12.31 SCREENING MAMMOGRAM FOR BREAST CANCER: ICD-10-CM

## 2024-04-04 DIAGNOSIS — E55.9 VITAMIN D DEFICIENCY: ICD-10-CM

## 2024-04-04 DIAGNOSIS — K59.00 CONSTIPATION, UNSPECIFIED CONSTIPATION TYPE: ICD-10-CM

## 2024-04-04 PROBLEM — H35.30 ARMD (AGE-RELATED MACULAR DEGENERATION), BILATERAL: Status: ACTIVE | Noted: 2022-09-29

## 2024-04-04 PROBLEM — G44.85 STABBING HEADACHE: Status: RESOLVED | Noted: 2017-04-13 | Resolved: 2024-04-04

## 2024-04-04 PROBLEM — L29.9 ITCHING: Status: RESOLVED | Noted: 2017-03-30 | Resolved: 2024-04-04

## 2024-04-04 PROBLEM — R10.13 DYSPEPSIA: Status: RESOLVED | Noted: 2023-02-16 | Resolved: 2024-04-04

## 2024-04-04 PROBLEM — R14.0 ABDOMINAL DISTENSION (GASEOUS): Status: RESOLVED | Noted: 2017-01-19 | Resolved: 2024-04-04

## 2024-04-04 RX ORDER — ACETAMINOPHEN 160 MG
2000 TABLET,DISINTEGRATING ORAL DAILY
Qty: 90 CAPSULE | Refills: 1
Start: 2024-04-04

## 2024-04-04 RX ORDER — POLYETHYLENE GLYCOL 3350 17 G/17G
17 POWDER, FOR SOLUTION ORAL DAILY
Qty: 578 G | Refills: 1 | Status: SHIPPED | OUTPATIENT
Start: 2024-04-04

## 2024-04-04 NOTE — PATIENT INSTRUCTIONS
Diagnosis Discussed   Continue to monitor   Plenty of fluids, monitor diet and exercise   Labs ordered will notify of results   Immunizations up to date   Follow up with GYN- PAP  Diagnostic Mammogram ordered- will schedule   Follow up with Dermatology as directed- skin check   Follow up with sleep medicine   Take medications as instructed- refills given   Head CT ordered for further evaluation - recent fall/headache   Follow up as directed   If symptoms worsen or persist please seek further evaluation

## 2024-04-04 NOTE — PROGRESS NOTES
Office Note     Name: Sandra Auguste    : 1960     MRN: [id number]     Chief Complaint  Establish Care (Pt states had fall x 5 days ago, states hit head, has been having headaches, blurry vision. Went to ED 3 days after fall), Fall, and Annual Exam    Subjective     History of Present Illness:  Sandra Auguste is a 63 y.o. female who presents today to establish care- patient fell 5 days ago- hit head- headaches, blurry vision evaluated in ER-   Fell on vacuum cord fell on metal water jug- feels like she hit her head on something   Left sided temple pain.  Right wrist pain     Blurry vision worse at night   Currently with constant headache   Xrays of ribs, wrist and knee- no acute fractures   No imaging of head was done in ER   Still some concerns for Headache- constant  And blurry vision off and on   Vision is worse in the evening   Blurry vision in morning   BS this morning 145  Does not check BS as often as she should     PMH-  Seasonal allergies   Asthma   HPL   HTN   TMJ   DM  Diverticulitis   GERD   Nausea- stress related   Fatty liver   Anemia   LUIS   Depression   PTSD   Osteoarthritis   Migraines   Hx of skull fracture   KELLY- CPAP   Macular degeneration     Meds-  Tylenol as needed   Albuterol as needed   Diclofenac 75mg daily as needed   Diclofenac gel as needed   Dicyclomine 10mg every 6 hours as needed   Jardiance 10mg daily   Epi pen as needed   Breo daily   Hydroxyzine 25mg every 8 hours as needed   Claritin 10mg daily   Multivitamin   Miralax   Nasal spray as needed     Alcohol - none   Smoking - none   Drug use - none   Job - [employer]- currently looking for a job   Stress - 7/10    Sun Exposure - sunscreen. Protects skin- will follow with dermatology       Dental/Eye exams - Vision up to date. Dental- will schedule   Diet/Exercise- Diet- Healthy- moderate. - working on more protein- does have a sweet tooth   Exercise- will start working on increasing activity    OBGYN hx of hysterectomy  "  PAP - will schedule   Mammo - ordered- will schedule   DEXA - will hold   BC/Hormone replacement - none   Vitamin D - multivitamin.      Colonoscopy/Cologuard - completed- follows with Dr. Chavarria     Immunizations  Tdap- up to date   Flu- completed   Shingles- completed   COVID- completed no boosters   PNA- will hold      Review of Systems:   Review of Systems   Constitutional:  Negative for chills and fever.   HENT:  Negative for dental problem, trouble swallowing and voice change.    Eyes:  Positive for blurred vision.   Respiratory:  Negative for cough, chest tightness, shortness of breath and wheezing.    Cardiovascular:  Negative for chest pain, palpitations and leg swelling.   Gastrointestinal:  Negative for abdominal pain, blood in stool, constipation, diarrhea, nausea, vomiting and GERD.   Genitourinary:  Negative for dysuria.   Musculoskeletal:  Negative for gait problem.   Skin:  Negative for rash.   Neurological:  Positive for headache. Negative for dizziness, seizures and speech difficulty.   Psychiatric/Behavioral:  Positive for dysphoric mood and depressed mood. Negative for self-injury and suicidal ideas. The patient is nervous/anxious.        Past Medical History:   Past Medical History:   Diagnosis Date   • Anesthesia complication     HAS TROUBLE GETTING \"NUMB\"    • Anxiety    • Arthritis     on Celebrex + Robaxin, no steroids   • Asthma     does not follow w/ pulmonary   • Bladder spasms    • Depression     w/ PTSD   • Diverticulosis    • Dyspepsia    • Fatigue    • GERD (gastroesophageal reflux disease)     controlled w/ nightly Pepcid, EGD 2020 w/ Dr. Chavarria, (+)    • Health care maintenance 02/25/2020   • Memory loss     FROM SKULL FRACTURE AND FALL-SHORT TERM MEMORY LOSS   • Migraine    • Morbid obesity    • Seasonal allergies    • Seizures     \"convulsions\" at age 5   • Skull fracture 2012   • Sleep apnea with use of continuous positive airway pressure (CPAP)     CPAP compliant   • SOB " (shortness of breath) on exertion    • Tendonitis    • Type 2 diabetes mellitus without complication, without long-term current use of insulin     dx 2021, no insulin, A1C <7   • Wears contact lenses    • Wears partial dentures     TOP ONLY    • Wears prescription eyeglasses        Past Surgical History:   Past Surgical History:   Procedure Laterality Date   • CERVICAL POLYPECTOMY     • TOTAL LAPAROSCOPIC HYSTERECTOMY N/A 9/18/2017    Procedure: TOTAL LAPAROSCOPIC HYSTERECTOMY, BILATERAL SALPINGO OOPHORECTOMY WITH DAVINCI ROBOT ;  Surgeon: Shannon Grimaldo DO;  Location: [facility];  Service:    • WISDOM TOOTH EXTRACTION         Immunizations:   Immunization History   Administered Date(s) Administered   • COVID-19 (PFIZER) Purple Cap Monovalent 03/17/2021, 04/07/2021   • Flu Vaccine Quad PF >36MO 09/19/2016, 10/18/2017   • Fluzone (or Fluarix & Flulaval for VFC) >6mos 09/24/2021, 10/26/2022, 12/08/2023   • Hepatitis B 07/15/2021   • Pneumococcal Polysaccharide (PPSV23) 02/24/2020   • Shingrix 11/16/2020, 01/23/2021, 01/31/2021   • Tdap 07/15/2021   • Tetanus 09/06/2012   • flucelvax quad pfs =>4 YRS 12/20/2019        Medications:     Current Outpatient Medications:   •  acetaminophen (TYLENOL) 500 MG tablet, Take 1 tablet by mouth Every 6 (Six) Hours As Needed for Mild Pain., Disp: , Rfl:   •  albuterol sulfate  (90 Base) MCG/ACT inhaler, Inhale 2 puffs Every 6 (Six) Hours As Needed for Wheezing or Shortness of Air., Disp: 18 g, Rfl: 1  •  Blood Glucose Monitoring Suppl (OneTouch Verio Flex System) w/Device kit, 1 kit Daily., Disp: 1 kit, Rfl: 0  •  Blood Glucose Monitoring Suppl (OneTouch Verio) w/Device kit, 1 kit Daily., Disp: 1 kit, Rfl: 0  •  diclofenac (VOLTAREN) 75 MG EC tablet, Take 1 tablet by mouth 2 (Two) Times a Day., Disp: 14 tablet, Rfl: 0  •  Diclofenac Sodium (VOLTAREN) 1 % gel gel, Apply  topically to the appropriate area as directed 4 (Four) Times a Day As Needed (arthralgias, shoulders and  back.)., Disp: 100 g, Rfl: 1  •  dicyclomine (BENTYL) 10 MG capsule, Take 1 capsule by mouth Every 6 (Six) Hours As Needed for Abdominal Cramping., Disp: 30 capsule, Rfl: 0  •  empagliflozin (Jardiance) 10 MG tablet tablet, Take 1 tablet by mouth Daily., Disp: 30 tablet, Rfl: 0  •  EPINEPHrine (EPIPEN) 0.3 MG/0.3ML solution auto-injector injection, Inject 0.3 mL into the appropriate muscle as directed by prescriber As Needed (FOR SEVERE ALLERGIC REACTION)., Disp: 1 each, Rfl: 1  •  Fluticasone Furoate-Vilanterol (Breo Ellipta) 200-25 MCG/ACT inhaler, Inhale 1 puff Daily., Disp: 60 each, Rfl: 1  •  glucose blood (OneTouch Verio) test strip, USE AS INSTRUCTED TO CHECK BLOOD GLUCOSE ONCE DAILY DX:E11.65, Disp: 100 each, Rfl: 3  •  glucose monitor monitoring kit, Use as directed to check blood glucose once daily., Disp: 1 each, Rfl: 0  •  hydrOXYzine (ATARAX) 25 MG tablet, Take 1 tablet by mouth Every 8 (Eight) Hours As Needed for Anxiety., Disp: 90 tablet, Rfl: 2  •  Lancets (onetouch ultrasoft) lancets, Use one lancet daily to check blood sugars, Disp: 100 each, Rfl: 3  •  loratadine (CLARITIN) 10 MG tablet, Take 1 tablet by mouth Daily., Disp: 30 tablet, Rfl: 5  •  multivitamin with minerals tablet tablet, Take 1 tablet by mouth Daily., Disp: , Rfl:   •  polyethylene glycol (MIRALAX) 17 GM/SCOOP powder, Take 17 g by mouth Daily., Disp: 578 g, Rfl: 1  •  sodium chloride (Ocean Nasal Spray) 0.65 % nasal spray, 1 spray into the nostril(s) as directed by provider As Needed for Congestion., Disp: 60 mL, Rfl: 0  •  Cholecalciferol (Vitamin D3) 50 MCG (2000 UT) capsule, Take 1 capsule by mouth Daily., Disp: 90 capsule, Rfl: 1    Allergies:   Allergies   Allergen Reactions   • Mold Extract [Trichophyton] Shortness Of Breath, Anxiety, Palpitations and Irritability   • Lortab [Hydrocodone-Acetaminophen] Nausea And Vomiting   • Pollen Extract Other (See Comments)     SNEEZING AND CONGESTION        Family History:   Family  "History   Problem Relation Age of Onset   • Cancer Mother         cervical cancer   • Dementia Mother    • Hypertension Mother    • Obesity Father    • Hypertension Father    • Heart attack Father    • Sleep apnea Father    • Sleep apnea Sister    • Hypertension Brother    • Sleep apnea Brother    • Cancer Maternal Aunt         lymphoma   • Cancer Paternal Aunt    • Breast cancer Paternal Aunt 70   • Cancer Maternal Grandmother    • Obesity Paternal Grandmother    • Diabetes Paternal Grandmother    • Hypertension Paternal Grandmother    • Stroke Paternal Grandmother    • Heart attack Paternal Grandmother    • Heart attack Paternal Grandfather    • Alcohol abuse Other    • Depression Other        Social History:   Social History     Socioeconomic History   • Marital status: Single   Tobacco Use   • Smoking status: Never     Passive exposure: Never   • Smokeless tobacco: Never   Vaping Use   • Vaping status: Never Used   Substance and Sexual Activity   • Alcohol use: Not Currently   • Drug use: Never   • Sexual activity: Not Currently     Birth control/protection: Post-menopausal, Surgical         Objective     Vital Signs  /80   Pulse 80   Temp 97.7 °F (36.5 °C)   Resp 14   Ht 166.4 cm (65.5\")   Wt 120 kg (265 lb)   SpO2 98%   BMI 43.43 kg/m²   Estimated body mass index is 43.43 kg/m² as calculated from the following:    Height as of this encounter: 166.4 cm (65.5\").    Weight as of this encounter: 120 kg (265 lb).            Physical Exam  Vitals and nursing note reviewed.   Constitutional:       General: She is not in acute distress.     Appearance: Normal appearance. She is obese.   HENT:      Head: Normocephalic and atraumatic.      Right Ear: Tympanic membrane normal.      Left Ear: Tympanic membrane normal.      Nose: Nose normal.      Mouth/Throat:      Mouth: Mucous membranes are moist.   Eyes:      Extraocular Movements: Extraocular movements intact.      Conjunctiva/sclera: Conjunctivae normal. "      Pupils: Pupils are equal, round, and reactive to light.   Cardiovascular:      Rate and Rhythm: Normal rate and regular rhythm.      Heart sounds: Normal heart sounds.   Pulmonary:      Effort: Pulmonary effort is normal. No respiratory distress.      Breath sounds: Normal breath sounds.   Abdominal:      General: Bowel sounds are normal.      Palpations: Abdomen is soft.   Musculoskeletal:         General: Normal range of motion.      Cervical back: Normal range of motion.      Comments: Moving all extremities    Skin:     General: Skin is warm and dry.   Neurological:      General: No focal deficit present.      Mental Status: She is alert and oriented to person, place, and time.   Psychiatric:         Mood and Affect: Mood normal.         Behavior: Behavior normal.         Thought Content: Thought content normal.         Judgment: Judgment normal.          Procedures     Assessment and Plan   Diagnosis Discussed   Continue to monitor   Plenty of fluids, monitor diet and exercise   Labs ordered will notify of results   Immunizations up to date   Follow up with GYN- PAP  Diagnostic Mammogram ordered- will schedule   Follow up with Dermatology as directed- skin check   Follow up with sleep medicine   Take medications as instructed- refills given   Head CT ordered for further evaluation - recent fall/headache   Follow up as directed   If symptoms worsen or persist please seek further evaluation     1. Wellness examination    2. Encounter to establish care with new doctor  Reviewed PMH, medications and specialists     3. Fall, subsequent encounter  Recent fall, evaluated in ER. Head injury- still with some headache and blurred vision- would like CT of head for further evaluation   Denies nausea, vomiting, sob, chest pain, dizziness, additional falls   Previous hx of head trauma with skull fracture  Will continue to monitor symptoms    4. Acute post-traumatic headache, not intractable  - CT Head Without Contrast;  Future  - further evaluation- still with complaints of blurred vision and headaches     5. Constipation, unspecified constipation type  - polyethylene glycol (MIRALAX) 17 GM/SCOOP powder; Take 17 g by mouth Daily.  Dispense: 578 g; Refill: 1    6. Blurred vision  - CT Head Without Contrast; Future    7. Type 2 diabetes mellitus without complication, without long-term current use of insulin  - Ambulatory Referral for Diabetic Eye Exam-Optometry  - Microalbumin / Creatinine Urine Ratio - Urine, Clean Catch; Future  - Hemoglobin A1c; Future    8. Abnormal mammogram  - Mammo diagnostic digital tomosynthesis bilateral w CAD; Future    9. Screening mammogram for breast cancer  - Mammo diagnostic digital tomosynthesis bilateral w CAD; Future    10. Screening for endocrine, nutritional, metabolic and immunity disorder  - CBC & Differential; Future  - Comprehensive Metabolic Panel; Future  - TSH Rfx On Abnormal To Free T4; Future  - Hemoglobin A1c; Future    11. Screening, lipid  - Lipid Panel; Future    12. Vitamin D deficiency  - Vitamin D,25-Hydroxy; Future  - Cholecalciferol (Vitamin D3) 50 MCG (2000 UT) capsule; Take 1 capsule by mouth Daily.  Dispense: 90 capsule; Refill: 1       Follow Up  Return in about 6 months (around 10/4/2024), or if symptoms worsen or fail to improve, for Recheck, DM .    Em Baum MD  [facility] INTERNAL MEDICINE  [address] [place] [id number] [phone]

## 2024-04-08 ENCOUNTER — LAB (OUTPATIENT)
Dept: LAB | Facility: HOSPITAL | Age: 64
End: 2024-04-08
Payer: COMMERCIAL

## 2024-04-08 DIAGNOSIS — Z13.228 SCREENING FOR ENDOCRINE, NUTRITIONAL, METABOLIC AND IMMUNITY DISORDER: ICD-10-CM

## 2024-04-08 DIAGNOSIS — E55.9 VITAMIN D DEFICIENCY: ICD-10-CM

## 2024-04-08 DIAGNOSIS — Z13.29 SCREENING FOR ENDOCRINE, NUTRITIONAL, METABOLIC AND IMMUNITY DISORDER: ICD-10-CM

## 2024-04-08 DIAGNOSIS — Z13.0 SCREENING FOR ENDOCRINE, NUTRITIONAL, METABOLIC AND IMMUNITY DISORDER: ICD-10-CM

## 2024-04-08 DIAGNOSIS — Z13.21 SCREENING FOR ENDOCRINE, NUTRITIONAL, METABOLIC AND IMMUNITY DISORDER: ICD-10-CM

## 2024-04-08 DIAGNOSIS — Z13.220 SCREENING, LIPID: ICD-10-CM

## 2024-04-08 DIAGNOSIS — E11.9 TYPE 2 DIABETES MELLITUS WITHOUT COMPLICATION, WITHOUT LONG-TERM CURRENT USE OF INSULIN: ICD-10-CM

## 2024-04-08 LAB
ALBUMIN UR-MCNC: <1.2 MG/DL
BASOPHILS # BLD AUTO: 0.05 10*3/MM3 (ref 0–0.2)
BASOPHILS NFR BLD AUTO: 0.6 % (ref 0–1.5)
CREAT UR-MCNC: 118.2 MG/DL
DEPRECATED RDW RBC AUTO: 42.2 FL (ref 37–54)
EOSINOPHIL # BLD AUTO: 0.31 10*3/MM3 (ref 0–0.4)
EOSINOPHIL NFR BLD AUTO: 4 % (ref 0.3–6.2)
ERYTHROCYTE [DISTWIDTH] IN BLOOD BY AUTOMATED COUNT: 12.9 % (ref 12.3–15.4)
HBA1C MFR BLD: 6.4 % (ref 4.8–5.6)
HCT VFR BLD AUTO: 42.6 % (ref 34–46.6)
HGB BLD-MCNC: 14 G/DL (ref 12–15.9)
IMM GRANULOCYTES # BLD AUTO: 0.03 10*3/MM3 (ref 0–0.05)
IMM GRANULOCYTES NFR BLD AUTO: 0.4 % (ref 0–0.5)
LYMPHOCYTES # BLD AUTO: 1.76 10*3/MM3 (ref 0.7–3.1)
LYMPHOCYTES NFR BLD AUTO: 22.5 % (ref 19.6–45.3)
MCH RBC QN AUTO: 29.5 PG (ref 26.6–33)
MCHC RBC AUTO-ENTMCNC: 32.9 G/DL (ref 31.5–35.7)
MCV RBC AUTO: 89.7 FL (ref 79–97)
MICROALBUMIN/CREAT UR: NORMAL MG/G{CREAT}
MONOCYTES # BLD AUTO: 0.59 10*3/MM3 (ref 0.1–0.9)
MONOCYTES NFR BLD AUTO: 7.6 % (ref 5–12)
NEUTROPHILS NFR BLD AUTO: 5.07 10*3/MM3 (ref 1.7–7)
NEUTROPHILS NFR BLD AUTO: 64.9 % (ref 42.7–76)
NRBC BLD AUTO-RTO: 0 /100 WBC (ref 0–0.2)
PLATELET # BLD AUTO: 297 10*3/MM3 (ref 140–450)
PMV BLD AUTO: 10.5 FL (ref 6–12)
RBC # BLD AUTO: 4.75 10*6/MM3 (ref 3.77–5.28)
WBC NRBC COR # BLD AUTO: 7.81 10*3/MM3 (ref 3.4–10.8)

## 2024-04-08 PROCEDURE — 80061 LIPID PANEL: CPT

## 2024-04-08 PROCEDURE — 82570 ASSAY OF URINE CREATININE: CPT

## 2024-04-08 PROCEDURE — 85025 COMPLETE CBC W/AUTO DIFF WBC: CPT

## 2024-04-08 PROCEDURE — 80053 COMPREHEN METABOLIC PANEL: CPT

## 2024-04-08 PROCEDURE — 82043 UR ALBUMIN QUANTITATIVE: CPT

## 2024-04-08 PROCEDURE — 84443 ASSAY THYROID STIM HORMONE: CPT

## 2024-04-08 PROCEDURE — 83036 HEMOGLOBIN GLYCOSYLATED A1C: CPT

## 2024-04-08 PROCEDURE — 82306 VITAMIN D 25 HYDROXY: CPT

## 2024-04-09 LAB
25(OH)D3 SERPL-MCNC: 28.6 NG/ML (ref 30–100)
ALBUMIN SERPL-MCNC: 4.3 G/DL (ref 3.5–5.2)
ALBUMIN/GLOB SERPL: 1.5 G/DL
ALP SERPL-CCNC: 110 U/L (ref 39–117)
ALT SERPL W P-5'-P-CCNC: 22 U/L (ref 1–33)
ANION GAP SERPL CALCULATED.3IONS-SCNC: 10 MMOL/L (ref 5–15)
AST SERPL-CCNC: 17 U/L (ref 1–32)
BILIRUB SERPL-MCNC: 0.4 MG/DL (ref 0–1.2)
BUN SERPL-MCNC: 14 MG/DL (ref 8–23)
BUN/CREAT SERPL: 17.1 (ref 7–25)
CALCIUM SPEC-SCNC: 9.8 MG/DL (ref 8.6–10.5)
CHLORIDE SERPL-SCNC: 105 MMOL/L (ref 98–107)
CHOLEST SERPL-MCNC: 212 MG/DL (ref 0–200)
CO2 SERPL-SCNC: 25 MMOL/L (ref 22–29)
CREAT SERPL-MCNC: 0.82 MG/DL (ref 0.57–1)
EGFRCR SERPLBLD CKD-EPI 2021: 81.5 ML/MIN/1.73
GLOBULIN UR ELPH-MCNC: 2.9 GM/DL
GLUCOSE SERPL-MCNC: 97 MG/DL (ref 65–99)
HDLC SERPL-MCNC: 47 MG/DL (ref 40–60)
LDLC SERPL CALC-MCNC: 138 MG/DL (ref 0–100)
LDLC/HDLC SERPL: 2.88 {RATIO}
POTASSIUM SERPL-SCNC: 4.9 MMOL/L (ref 3.5–5.2)
PROT SERPL-MCNC: 7.2 G/DL (ref 6–8.5)
SODIUM SERPL-SCNC: 140 MMOL/L (ref 136–145)
TRIGL SERPL-MCNC: 149 MG/DL (ref 0–150)
TSH SERPL DL<=0.05 MIU/L-ACNC: 1.58 UIU/ML (ref 0.27–4.2)
VLDLC SERPL-MCNC: 27 MG/DL (ref 5–40)

## 2024-04-25 ENCOUNTER — HOSPITAL ENCOUNTER (OUTPATIENT)
Dept: CT IMAGING | Facility: HOSPITAL | Age: 64
Discharge: HOME | End: 2024-04-25
Admitting: FAMILY MEDICINE
Payer: COMMERCIAL

## 2024-04-25 DIAGNOSIS — G44.319 ACUTE POST-TRAUMATIC HEADACHE, NOT INTRACTABLE: ICD-10-CM

## 2024-04-25 DIAGNOSIS — H53.8 BLURRED VISION: ICD-10-CM

## 2024-04-25 PROCEDURE — 70450 CT HEAD/BRAIN W/O DYE: CPT

## 2024-04-26 ENCOUNTER — TELEPHONE (OUTPATIENT)
Dept: INTERNAL MEDICINE | Facility: CLINIC | Age: 64
End: 2024-04-26
Payer: COMMERCIAL

## 2024-04-26 NOTE — TELEPHONE ENCOUNTER
Caller: Sandra Auguste    Relationship: Self    Best call back number: [phone]     Caller requesting test results: SANDRA    What test was performed: CT SCAN    When was the test performed: 4/25/24    Where was the test performed: [facility]    Additional notes: PLEASE FOLLOW UP TO DISCUSS RESULTS.

## 2024-05-02 ENCOUNTER — OFFICE VISIT (OUTPATIENT)
Dept: INTERNAL MEDICINE | Facility: CLINIC | Age: 64
End: 2024-05-02
Payer: COMMERCIAL

## 2024-05-02 VITALS
HEART RATE: 80 BPM | SYSTOLIC BLOOD PRESSURE: 124 MMHG | HEIGHT: 66 IN | RESPIRATION RATE: 16 BRPM | OXYGEN SATURATION: 97 % | BODY MASS INDEX: 38.57 KG/M2 | DIASTOLIC BLOOD PRESSURE: 80 MMHG | WEIGHT: 240 LBS

## 2024-05-02 DIAGNOSIS — R41.3 MEMORY IMPAIRMENT: ICD-10-CM

## 2024-05-02 DIAGNOSIS — F43.10 POST TRAUMATIC STRESS DISORDER (PTSD): ICD-10-CM

## 2024-05-02 DIAGNOSIS — G44.319 ACUTE POST-TRAUMATIC HEADACHE, NOT INTRACTABLE: Primary | ICD-10-CM

## 2024-05-02 DIAGNOSIS — G31.9 BRAIN ATROPHY: ICD-10-CM

## 2024-05-02 DIAGNOSIS — F41.1 GENERALIZED ANXIETY DISORDER: ICD-10-CM

## 2024-05-02 DIAGNOSIS — F33.1 MODERATE EPISODE OF RECURRENT MAJOR DEPRESSIVE DISORDER: ICD-10-CM

## 2024-05-02 DIAGNOSIS — R41.840 ATTENTION DEFICIT: ICD-10-CM

## 2024-05-02 PROCEDURE — 3079F DIAST BP 80-89 MM HG: CPT | Performed by: FAMILY MEDICINE

## 2024-05-02 PROCEDURE — 3074F SYST BP LT 130 MM HG: CPT | Performed by: FAMILY MEDICINE

## 2024-05-02 PROCEDURE — 3044F HG A1C LEVEL LT 7.0%: CPT | Performed by: FAMILY MEDICINE

## 2024-05-02 PROCEDURE — 1159F MED LIST DOCD IN RCRD: CPT | Performed by: FAMILY MEDICINE

## 2024-05-02 PROCEDURE — 1160F RVW MEDS BY RX/DR IN RCRD: CPT | Performed by: FAMILY MEDICINE

## 2024-05-02 PROCEDURE — 99214 OFFICE O/P EST MOD 30 MIN: CPT | Performed by: FAMILY MEDICINE

## 2024-05-02 NOTE — PATIENT INSTRUCTIONS
Diagnosis Discussed   Continue to monitor   Plenty of fluids, monitor diet and exercise   Follow up with Neurology as directed- further evaluation and guidance   Follow up with behavioral health/counseling   Take medications as instructed  Follow up as directed   If symptoms worsen or persist please seek further evaluation

## 2024-05-02 NOTE — PROGRESS NOTES
"    Office Note     Name: Sandra Auguste    : 1960     MRN: [id number]     Chief Complaint  ADD (Pt also in office to discuss recent results)    Subjective     History of Present Illness:  Sandra Auguste is a 63 y.o. female who presents today for concerns for ADD    Recent CT Head- 2024    Impression:  Brain atrophy with chronic microvascular ischemic changes     No acute intracranial abnormality    Hx of several head injuries   Hx of several falls  Has been experiencing attention issues x many many years   fractured skull- fell down stairs   Last month- fell and hit head  Memory concerns ongoing for some time now.   Difficulty focusing and concentrating     Still with headaches frequently   Did go through cognitive therapy with [facility]- [phone] with some positive outcome     Review of Systems:   Review of Systems   Constitutional:  Negative for chills and fever.   Respiratory:  Negative for cough and shortness of breath.    Neurological:  Positive for weakness and headache. Negative for dizziness, speech difficulty and light-headedness.   Psychiatric/Behavioral:  Positive for decreased concentration, dysphoric mood, sleep disturbance, depressed mood and stress. Negative for self-injury and suicidal ideas. The patient is nervous/anxious.        Past Medical History:   Past Medical History:   Diagnosis Date    Anesthesia complication     HAS TROUBLE GETTING \"NUMB\"     Anxiety     Arthritis     on Celebrex + Robaxin, no steroids    Asthma     does not follow w/ pulmonary    Bladder spasms     Depression     w/ PTSD    Diverticulosis     Dyspepsia     Fatigue     GERD (gastroesophageal reflux disease)     controlled w/ nightly Pepcid, EGD  w/ Dr. Chavarria, (+)     Health care maintenance 2020    Memory loss     FROM SKULL FRACTURE AND FALL-SHORT TERM MEMORY LOSS    Migraine     Morbid obesity     Seasonal allergies     Seizures     \"convulsions\" at age 5    Skull fracture 2012    Sleep " apnea with use of continuous positive airway pressure (CPAP)     CPAP compliant    SOB (shortness of breath) on exertion     Tendonitis     Type 2 diabetes mellitus without complication, without long-term current use of insulin     dx 2021, no insulin, A1C <7    Wears contact lenses     Wears partial dentures     TOP ONLY     Wears prescription eyeglasses        Past Surgical History:   Past Surgical History:   Procedure Laterality Date    CERVICAL POLYPECTOMY      TOTAL LAPAROSCOPIC HYSTERECTOMY N/A 9/18/2017    Procedure: TOTAL LAPAROSCOPIC HYSTERECTOMY, BILATERAL SALPINGO OOPHORECTOMY WITH DAVINCI ROBOT ;  Surgeon: Shannon Grimaldo DO;  Location: [facility];  Service:     WISDOM TOOTH EXTRACTION         Immunizations:   Immunization History   Administered Date(s) Administered    COVID-19 (PFIZER) Purple Cap Monovalent 03/17/2021, 04/07/2021    Flu Vaccine Quad PF >36MO 09/19/2016, 10/18/2017    Fluzone (or Fluarix & Flulaval for VFC) >6mos 09/24/2021, 10/26/2022, 12/08/2023    Hepatitis B 07/15/2021    Pneumococcal Polysaccharide (PPSV23) 02/24/2020    Shingrix 11/16/2020, 01/23/2021, 01/31/2021    Tdap 07/15/2021    Tetanus 09/06/2012    flucelvax quad pfs =>4 YRS 12/20/2019        Medications:     Current Outpatient Medications:     acetaminophen (TYLENOL) 500 MG tablet, Take 1 tablet by mouth Every 6 (Six) Hours As Needed for Mild Pain., Disp: , Rfl:     albuterol sulfate  (90 Base) MCG/ACT inhaler, Inhale 2 puffs Every 6 (Six) Hours As Needed for Wheezing or Shortness of Air., Disp: 18 g, Rfl: 1    Blood Glucose Monitoring Suppl (OneTouch Verio Flex System) w/Device kit, 1 kit Daily., Disp: 1 kit, Rfl: 0    Blood Glucose Monitoring Suppl (OneTouch Verio) w/Device kit, 1 kit Daily., Disp: 1 kit, Rfl: 0    Cholecalciferol (Vitamin D3) 50 MCG (2000 UT) capsule, Take 1 capsule by mouth Daily., Disp: 90 capsule, Rfl: 1    diclofenac (VOLTAREN) 75 MG EC tablet, Take 1 tablet by mouth 2 (Two) Times a Day., Disp:  14 tablet, Rfl: 0    Diclofenac Sodium (VOLTAREN) 1 % gel gel, Apply  topically to the appropriate area as directed 4 (Four) Times a Day As Needed (arthralgias, shoulders and back.)., Disp: 100 g, Rfl: 1    dicyclomine (BENTYL) 10 MG capsule, Take 1 capsule by mouth Every 6 (Six) Hours As Needed for Abdominal Cramping., Disp: 30 capsule, Rfl: 0    empagliflozin (Jardiance) 10 MG tablet tablet, Take 1 tablet by mouth Daily., Disp: 30 tablet, Rfl: 0    EPINEPHrine (EPIPEN) 0.3 MG/0.3ML solution auto-injector injection, Inject 0.3 mL into the appropriate muscle as directed by prescriber As Needed (FOR SEVERE ALLERGIC REACTION)., Disp: 1 each, Rfl: 1    Fluticasone Furoate-Vilanterol (Breo Ellipta) 200-25 MCG/ACT inhaler, Inhale 1 puff Daily., Disp: 60 each, Rfl: 1    glucose blood (OneTouch Verio) test strip, USE AS INSTRUCTED TO CHECK BLOOD GLUCOSE ONCE DAILY DX:E11.65, Disp: 100 each, Rfl: 3    glucose monitor monitoring kit, Use as directed to check blood glucose once daily., Disp: 1 each, Rfl: 0    hydrOXYzine (ATARAX) 25 MG tablet, Take 1 tablet by mouth Every 8 (Eight) Hours As Needed for Anxiety., Disp: 90 tablet, Rfl: 2    Lancets (onetouch ultrasoft) lancets, Use one lancet daily to check blood sugars, Disp: 100 each, Rfl: 3    loratadine (CLARITIN) 10 MG tablet, Take 1 tablet by mouth Daily., Disp: 30 tablet, Rfl: 5    multivitamin with minerals tablet tablet, Take 1 tablet by mouth Daily., Disp: , Rfl:     polyethylene glycol (MIRALAX) 17 GM/SCOOP powder, Take 17 g by mouth Daily., Disp: 578 g, Rfl: 1    sodium chloride (Ocean Nasal Spray) 0.65 % nasal spray, 1 spray into the nostril(s) as directed by provider As Needed for Congestion., Disp: 60 mL, Rfl: 0    Allergies:   Allergies   Allergen Reactions    Mold Extract [Trichophyton] Shortness Of Breath, Anxiety, Palpitations and Irritability    Lortab [Hydrocodone-Acetaminophen] Nausea And Vomiting    Pollen Extract Other (See Comments)     SNEEZING AND  "CONGESTION        Family History:   Family History   Problem Relation Age of Onset    Cancer Mother         cervical cancer    Dementia Mother     Hypertension Mother     Obesity Father     Hypertension Father     Heart attack Father     Sleep apnea Father     Sleep apnea Sister     Hypertension Brother     Sleep apnea Brother     Cancer Maternal Aunt         lymphoma    Cancer Paternal Aunt     Breast cancer Paternal Aunt 70    Cancer Maternal Grandmother     Obesity Paternal Grandmother     Diabetes Paternal Grandmother     Hypertension Paternal Grandmother     Stroke Paternal Grandmother     Heart attack Paternal Grandmother     Heart attack Paternal Grandfather     Alcohol abuse Other     Depression Other        Social History:   Social History     Socioeconomic History    Marital status: Single   Tobacco Use    Smoking status: Never     Passive exposure: Never    Smokeless tobacco: Never   Vaping Use    Vaping status: Never Used   Substance and Sexual Activity    Alcohol use: Not Currently    Drug use: Never    Sexual activity: Not Currently     Birth control/protection: Post-menopausal, Surgical         Objective     Vital Signs  /80   Pulse 80   Resp 16   Ht 166.4 cm (65.5\")   Wt 109 kg (240 lb)   SpO2 97%   BMI 39.33 kg/m²   Estimated body mass index is 39.33 kg/m² as calculated from the following:    Height as of this encounter: 166.4 cm (65.5\").    Weight as of this encounter: 109 kg (240 lb).            Physical Exam  Vitals and nursing note reviewed.   Constitutional:       General: She is not in acute distress.     Appearance: Normal appearance. She is obese.   HENT:      Head: Normocephalic and atraumatic.   Cardiovascular:      Rate and Rhythm: Normal rate and regular rhythm.      Heart sounds: Normal heart sounds.   Pulmonary:      Effort: Pulmonary effort is normal. No respiratory distress.      Breath sounds: Normal breath sounds.   Skin:     General: Skin is warm and dry. "   Neurological:      General: No focal deficit present.      Mental Status: She is alert and oriented to person, place, and time.   Psychiatric:         Attention and Perception: Attention normal.         Mood and Affect: Mood is anxious and depressed.         Speech: Speech normal.         Behavior: Behavior normal.         Thought Content: Thought content normal. Thought content is not paranoid or delusional. Thought content does not include homicidal or suicidal ideation. Thought content does not include homicidal or suicidal plan.         Cognition and Memory: Cognition normal. Memory is impaired.         Judgment: Judgment normal.          Procedures     Assessment and Plan   Diagnosis Discussed   Continue to monitor   Plenty of fluids, monitor diet and exercise   Follow up with Neurology as directed- further evaluation and guidance   Follow up with behavioral health/counseling   Take medications as instructed  Follow up as directed   If symptoms worsen or persist please seek further evaluation     1. Acute post-traumatic headache, not intractable  - Ambulatory Referral to Neurology    2. Memory impairment  - Ambulatory Referral to Neurology  - Ambulatory Referral to Behavioral Health    3. Attention deficit  - Ambulatory Referral to Neurology  - Ambulatory Referral to Behavioral Health    4. Brain atrophy  - Ambulatory Referral to Neurology    5. Post traumatic stress disorder (PTSD)  - Ambulatory Referral to Behavioral Health  - Ambulatory Referral to Behavioral Health    6. Generalized anxiety disorder  - Ambulatory Referral to Behavioral Health  - Ambulatory Referral to Behavioral Health    7. Moderate episode of recurrent major depressive disorder  - Ambulatory Referral to Behavioral Health  - Ambulatory Referral to Behavioral Health       Follow Up  Return if symptoms worsen or fail to improve, for Next scheduled follow up.    MD KARLA Bowen [facility] INTERNAL  [facility]  [address] [place] [id number] [phone]

## 2024-05-06 DIAGNOSIS — E11.9 TYPE 2 DIABETES MELLITUS WITHOUT COMPLICATION, WITHOUT LONG-TERM CURRENT USE OF INSULIN: ICD-10-CM

## 2024-05-06 DIAGNOSIS — J30.2 SEASONAL ALLERGIES: ICD-10-CM

## 2024-05-07 RX ORDER — LORATADINE 10 MG/1
10 TABLET ORAL DAILY
Qty: 30 TABLET | Refills: 5 | Status: SHIPPED | OUTPATIENT
Start: 2024-05-07

## 2024-05-08 RX ORDER — EMPAGLIFLOZIN 10 MG/1
10 TABLET, FILM COATED ORAL DAILY
Qty: 90 TABLET | Refills: 1 | Status: SHIPPED | OUTPATIENT
Start: 2024-05-08

## 2024-05-16 ENCOUNTER — TELEPHONE (OUTPATIENT)
Dept: INTERNAL MEDICINE | Facility: CLINIC | Age: 64
End: 2024-05-16
Payer: COMMERCIAL

## 2024-05-16 DIAGNOSIS — M19.91 PRIMARY OSTEOARTHRITIS, UNSPECIFIED SITE: Primary | ICD-10-CM

## 2024-05-16 NOTE — TELEPHONE ENCOUNTER
PT calling in wanting to know if she can get a refill on diclofenac (VOLTAREN) 75 MG EC tablet .   Please call [phone]

## 2024-05-17 RX ORDER — DICLOFENAC SODIUM 75 MG/1
75 TABLET, DELAYED RELEASE ORAL 2 TIMES DAILY
Qty: 14 TABLET | Refills: 0 | Status: SHIPPED | OUTPATIENT
Start: 2024-05-17

## 2024-05-20 ENCOUNTER — APPOINTMENT (OUTPATIENT)
Dept: CT IMAGING | Facility: HOSPITAL | Age: 64
End: 2024-05-20
Payer: COMMERCIAL

## 2024-05-20 ENCOUNTER — HOSPITAL ENCOUNTER (EMERGENCY)
Facility: HOSPITAL | Age: 64
Discharge: HOME | End: 2024-05-20
Attending: EMERGENCY MEDICINE | Admitting: EMERGENCY MEDICINE
Payer: COMMERCIAL

## 2024-05-20 ENCOUNTER — TELEPHONE (OUTPATIENT)
Dept: INTERNAL MEDICINE | Facility: CLINIC | Age: 64
End: 2024-05-20

## 2024-05-20 ENCOUNTER — OFFICE VISIT (OUTPATIENT)
Dept: INTERNAL MEDICINE | Facility: CLINIC | Age: 64
End: 2024-05-20
Payer: COMMERCIAL

## 2024-05-20 VITALS
HEIGHT: 66 IN | OXYGEN SATURATION: 94 % | HEART RATE: 81 BPM | BODY MASS INDEX: 39.05 KG/M2 | SYSTOLIC BLOOD PRESSURE: 132 MMHG | TEMPERATURE: 98.5 F | DIASTOLIC BLOOD PRESSURE: 80 MMHG | WEIGHT: 243 LBS | RESPIRATION RATE: 12 BRPM

## 2024-05-20 VITALS
SYSTOLIC BLOOD PRESSURE: 121 MMHG | BODY MASS INDEX: 39.05 KG/M2 | HEART RATE: 77 BPM | HEIGHT: 66 IN | WEIGHT: 243 LBS | OXYGEN SATURATION: 97 % | DIASTOLIC BLOOD PRESSURE: 82 MMHG

## 2024-05-20 DIAGNOSIS — K57.90 DIVERTICULOSIS: ICD-10-CM

## 2024-05-20 DIAGNOSIS — K21.9 GASTROESOPHAGEAL REFLUX DISEASE WITHOUT ESOPHAGITIS: ICD-10-CM

## 2024-05-20 DIAGNOSIS — R10.84 GENERALIZED ABDOMINAL PAIN: ICD-10-CM

## 2024-05-20 DIAGNOSIS — R11.0 NAUSEA: ICD-10-CM

## 2024-05-20 DIAGNOSIS — K57.92 DIVERTICULITIS: Primary | ICD-10-CM

## 2024-05-20 DIAGNOSIS — R10.84 GENERALIZED ABDOMINAL PAIN: Primary | ICD-10-CM

## 2024-05-20 LAB
ALBUMIN SERPL-MCNC: 3.9 G/DL (ref 3.5–5.2)
ALBUMIN/GLOB SERPL: 1.3 G/DL
ALP SERPL-CCNC: 113 U/L (ref 39–117)
ALT SERPL W P-5'-P-CCNC: 23 U/L (ref 1–33)
ANION GAP SERPL CALCULATED.3IONS-SCNC: 8 MMOL/L (ref 5–15)
AST SERPL-CCNC: 19 U/L (ref 1–32)
BASOPHILS # BLD AUTO: 0.04 10*3/MM3 (ref 0–0.2)
BASOPHILS NFR BLD AUTO: 0.5 % (ref 0–1.5)
BILIRUB SERPL-MCNC: <0.2 MG/DL (ref 0–1.2)
BILIRUB UR QL STRIP: NEGATIVE
BUN SERPL-MCNC: 11 MG/DL (ref 8–23)
BUN/CREAT SERPL: 12.9 (ref 7–25)
CALCIUM SPEC-SCNC: 9.1 MG/DL (ref 8.6–10.5)
CHLORIDE SERPL-SCNC: 105 MMOL/L (ref 98–107)
CLARITY UR: CLEAR
CLUMPED PLATELETS: PRESENT
CO2 SERPL-SCNC: 28 MMOL/L (ref 22–29)
COLOR UR: YELLOW
CREAT BLDA-MCNC: 0.8 MG/DL (ref 0.6–1.3)
CREAT BLDA-MCNC: 0.8 MG/DL (ref 0.6–1.3)
CREAT SERPL-MCNC: 0.85 MG/DL (ref 0.57–1)
D-LACTATE SERPL-SCNC: 1.3 MMOL/L (ref 0.5–2)
DEPRECATED RDW RBC AUTO: 44.4 FL (ref 37–54)
EGFRCR SERPLBLD CKD-EPI 2021: 78.1 ML/MIN/1.73
EOSINOPHIL # BLD AUTO: 0.34 10*3/MM3 (ref 0–0.4)
EOSINOPHIL NFR BLD AUTO: 4 % (ref 0.3–6.2)
ERYTHROCYTE [DISTWIDTH] IN BLOOD BY AUTOMATED COUNT: 13 % (ref 12.3–15.4)
GLOBULIN UR ELPH-MCNC: 3.1 GM/DL
GLUCOSE SERPL-MCNC: 76 MG/DL (ref 65–99)
GLUCOSE UR STRIP-MCNC: NEGATIVE MG/DL
HCT VFR BLD AUTO: 44.8 % (ref 34–46.6)
HGB BLD-MCNC: 14.3 G/DL (ref 12–15.9)
HGB UR QL STRIP.AUTO: NEGATIVE
HOLD SPECIMEN: NORMAL
IMM GRANULOCYTES # BLD AUTO: 0.02 10*3/MM3 (ref 0–0.05)
IMM GRANULOCYTES NFR BLD AUTO: 0.2 % (ref 0–0.5)
KETONES UR QL STRIP: NEGATIVE
LEUKOCYTE ESTERASE UR QL STRIP.AUTO: NEGATIVE
LIPASE SERPL-CCNC: 22 U/L (ref 13–60)
LYMPHOCYTES # BLD AUTO: 2.26 10*3/MM3 (ref 0.7–3.1)
LYMPHOCYTES NFR BLD AUTO: 26.3 % (ref 19.6–45.3)
MCH RBC QN AUTO: 29.4 PG (ref 26.6–33)
MCHC RBC AUTO-ENTMCNC: 31.9 G/DL (ref 31.5–35.7)
MCV RBC AUTO: 92.2 FL (ref 79–97)
MONOCYTES # BLD AUTO: 0.71 10*3/MM3 (ref 0.1–0.9)
MONOCYTES NFR BLD AUTO: 8.3 % (ref 5–12)
NEUTROPHILS NFR BLD AUTO: 5.21 10*3/MM3 (ref 1.7–7)
NEUTROPHILS NFR BLD AUTO: 60.7 % (ref 42.7–76)
NITRITE UR QL STRIP: NEGATIVE
NRBC BLD AUTO-RTO: 0 /100 WBC (ref 0–0.2)
PH UR STRIP.AUTO: 7.5 [PH] (ref 5–8)
PLATELET # BLD AUTO: 295 10*3/MM3 (ref 140–450)
PMV BLD AUTO: 11 FL (ref 6–12)
POTASSIUM SERPL-SCNC: 4.9 MMOL/L (ref 3.5–5.2)
PROT SERPL-MCNC: 7 G/DL (ref 6–8.5)
PROT UR QL STRIP: NEGATIVE
RBC # BLD AUTO: 4.86 10*6/MM3 (ref 3.77–5.28)
RBC MORPH BLD: NORMAL
SMALL PLATELETS BLD QL SMEAR: ADEQUATE
SODIUM SERPL-SCNC: 141 MMOL/L (ref 136–145)
SP GR UR STRIP: 1.01 (ref 1–1.03)
UROBILINOGEN UR QL STRIP: NORMAL
WBC MORPH BLD: NORMAL
WBC NRBC COR # BLD AUTO: 8.58 10*3/MM3 (ref 3.4–10.8)
WHOLE BLOOD HOLD COAG: NORMAL
WHOLE BLOOD HOLD SPECIMEN: NORMAL

## 2024-05-20 PROCEDURE — 80053 COMPREHEN METABOLIC PANEL: CPT | Performed by: EMERGENCY MEDICINE

## 2024-05-20 PROCEDURE — 99285 EMERGENCY DEPT VISIT HI MDM: CPT

## 2024-05-20 PROCEDURE — 81003 URINALYSIS AUTO W/O SCOPE: CPT | Performed by: EMERGENCY MEDICINE

## 2024-05-20 PROCEDURE — 25810000003 SODIUM CHLORIDE 0.9 % SOLUTION: Performed by: PHYSICIAN ASSISTANT

## 2024-05-20 PROCEDURE — 83605 ASSAY OF LACTIC ACID: CPT | Performed by: EMERGENCY MEDICINE

## 2024-05-20 PROCEDURE — 3044F HG A1C LEVEL LT 7.0%: CPT | Performed by: FAMILY MEDICINE

## 2024-05-20 PROCEDURE — 63710000001 ONDANSETRON ODT 4 MG TABLET DISPERSIBLE: Performed by: EMERGENCY MEDICINE

## 2024-05-20 PROCEDURE — 99214 OFFICE O/P EST MOD 30 MIN: CPT | Performed by: FAMILY MEDICINE

## 2024-05-20 PROCEDURE — 74177 CT ABD & PELVIS W/CONTRAST: CPT

## 2024-05-20 PROCEDURE — 1159F MED LIST DOCD IN RCRD: CPT | Performed by: FAMILY MEDICINE

## 2024-05-20 PROCEDURE — 85025 COMPLETE CBC W/AUTO DIFF WBC: CPT | Performed by: EMERGENCY MEDICINE

## 2024-05-20 PROCEDURE — 82565 ASSAY OF CREATININE: CPT

## 2024-05-20 PROCEDURE — 25510000001 IOPAMIDOL 61 % SOLUTION: Performed by: EMERGENCY MEDICINE

## 2024-05-20 PROCEDURE — 3074F SYST BP LT 130 MM HG: CPT | Performed by: FAMILY MEDICINE

## 2024-05-20 PROCEDURE — 96374 THER/PROPH/DIAG INJ IV PUSH: CPT

## 2024-05-20 PROCEDURE — 1125F AMNT PAIN NOTED PAIN PRSNT: CPT | Performed by: FAMILY MEDICINE

## 2024-05-20 PROCEDURE — 85007 BL SMEAR W/DIFF WBC COUNT: CPT | Performed by: EMERGENCY MEDICINE

## 2024-05-20 PROCEDURE — 1160F RVW MEDS BY RX/DR IN RCRD: CPT | Performed by: FAMILY MEDICINE

## 2024-05-20 PROCEDURE — 3079F DIAST BP 80-89 MM HG: CPT | Performed by: FAMILY MEDICINE

## 2024-05-20 PROCEDURE — 83690 ASSAY OF LIPASE: CPT | Performed by: EMERGENCY MEDICINE

## 2024-05-20 RX ORDER — SODIUM CHLORIDE 9 MG/ML
10 INJECTION, SOLUTION INTRAMUSCULAR; INTRAVENOUS; SUBCUTANEOUS AS NEEDED
Status: DISCONTINUED | OUTPATIENT
Start: 2024-05-20 | End: 2024-05-20 | Stop reason: HOSPADM

## 2024-05-20 RX ORDER — ONDANSETRON 4 MG/1
4 TABLET, ORALLY DISINTEGRATING ORAL EVERY 6 HOURS PRN
Status: DISCONTINUED | OUTPATIENT
Start: 2024-05-20 | End: 2024-05-20 | Stop reason: HOSPADM

## 2024-05-20 RX ORDER — NITROFURANTOIN 25; 75 MG/1; MG/1
100 CAPSULE ORAL 2 TIMES DAILY
COMMUNITY
Start: 2024-05-16

## 2024-05-20 RX ORDER — AMOXICILLIN AND CLAVULANATE POTASSIUM 875; 125 MG/1; MG/1
1 TABLET, FILM COATED ORAL 2 TIMES DAILY
Qty: 20 TABLET | Refills: 0 | Status: SHIPPED | OUTPATIENT
Start: 2024-05-20 | End: 2024-05-30

## 2024-05-20 RX ORDER — FAMOTIDINE 10 MG/ML
20 INJECTION, SOLUTION INTRAVENOUS ONCE
Status: COMPLETED | OUTPATIENT
Start: 2024-05-20 | End: 2024-05-20

## 2024-05-20 RX ORDER — AMOXICILLIN AND CLAVULANATE POTASSIUM 875; 125 MG/1; MG/1
1 TABLET, FILM COATED ORAL ONCE
Status: COMPLETED | OUTPATIENT
Start: 2024-05-20 | End: 2024-05-20

## 2024-05-20 RX ORDER — TRAMADOL HYDROCHLORIDE 50 MG/1
50 TABLET ORAL ONCE
Status: COMPLETED | OUTPATIENT
Start: 2024-05-20 | End: 2024-05-20

## 2024-05-20 RX ORDER — PANTOPRAZOLE SODIUM 40 MG/1
40 TABLET, DELAYED RELEASE ORAL DAILY
Qty: 90 TABLET | Refills: 1 | Status: SHIPPED | OUTPATIENT
Start: 2024-05-20

## 2024-05-20 RX ORDER — TRAMADOL HYDROCHLORIDE 50 MG/1
50 TABLET ORAL EVERY 6 HOURS PRN
Qty: 15 TABLET | Refills: 0 | Status: SHIPPED | OUTPATIENT
Start: 2024-05-20

## 2024-05-20 RX ORDER — ONDANSETRON 4 MG/1
4 TABLET, ORALLY DISINTEGRATING ORAL EVERY 6 HOURS PRN
Qty: 15 TABLET | Refills: 0 | Status: SHIPPED | OUTPATIENT
Start: 2024-05-20

## 2024-05-20 RX ADMIN — SODIUM CHLORIDE 1000 ML: 9 INJECTION, SOLUTION INTRAVENOUS at 16:12

## 2024-05-20 RX ADMIN — FAMOTIDINE 20 MG: 10 INJECTION, SOLUTION INTRAVENOUS at 16:13

## 2024-05-20 RX ADMIN — IOPAMIDOL 85 ML: 612 INJECTION, SOLUTION INTRAVENOUS at 18:12

## 2024-05-20 RX ADMIN — TRAMADOL HYDROCHLORIDE 50 MG: 50 TABLET ORAL at 19:22

## 2024-05-20 RX ADMIN — ONDANSETRON 4 MG: 4 TABLET, ORALLY DISINTEGRATING ORAL at 19:22

## 2024-05-20 RX ADMIN — AMOXICILLIN AND CLAVULANATE POTASSIUM 1 TABLET: 875; 125 TABLET, FILM COATED ORAL at 19:22

## 2024-05-20 NOTE — ED PROVIDER NOTES
"Subjective   History of Present Illness  Pt is a 62 yo female presenting to ED with complaints of abdominal pain. PMHx significant for DM (non insulin), Asthma, GERD, Seizures, KELLY,  Migraines, Anxiety and Depression. Pt reports generalized abdominal pain that is worse on the left for the past 5-6 days. She denies change in bowels and LBM yesterday. She denies N/V or urinary sx. She denies bloody stool, fever, cough, CP or SOB. She went to [facility] last week and was started on Macrobid for UTI. She has not had CT scan. She has been eating a bland diet. Her prior abdominal surgical hx includes hysterectomy. She denies tobacco, drug or ETOH use.     History provided by:  Patient and medical records      Review of Systems   Constitutional:  Positive for appetite change and chills. Negative for fever.   HENT:  Negative for congestion and trouble swallowing.    Eyes:  Negative for visual disturbance.   Respiratory:  Negative for cough and shortness of breath.    Cardiovascular:  Negative for chest pain and leg swelling.   Gastrointestinal:  Positive for abdominal pain. Negative for blood in stool, constipation, diarrhea, nausea and vomiting.   Genitourinary:  Negative for difficulty urinating, dysuria, flank pain and hematuria.   Musculoskeletal:  Negative for arthralgias and back pain.   Skin:  Negative for rash and wound.   Neurological:  Negative for dizziness, syncope, speech difficulty, weakness, numbness and headaches.   Psychiatric/Behavioral:  Negative for confusion.    All other systems reviewed and are negative.      Past Medical History:   Diagnosis Date    Anesthesia complication     HAS TROUBLE GETTING \"NUMB\"     Anxiety     Arthritis     on Celebrex + Robaxin, no steroids    Asthma     does not follow w/ pulmonary    Bladder spasms     Depression     w/ PTSD    Diverticulosis     Dyspepsia     Fatigue     GERD (gastroesophageal reflux disease)     controlled w/ nightly Pepcid, EGD 2020 w/ Dr. Chavarria, (+) HH    " "Health care maintenance 02/25/2020    Memory loss     FROM SKULL FRACTURE AND FALL-SHORT TERM MEMORY LOSS    Migraine     Morbid obesity     Seasonal allergies     Seizures     \"convulsions\" at age 5    Skull fracture 2012    Sleep apnea with use of continuous positive airway pressure (CPAP)     CPAP compliant    SOB (shortness of breath) on exertion     Tendonitis     Type 2 diabetes mellitus without complication, without long-term current use of insulin     dx 2021, no insulin, A1C <7    Wears contact lenses     Wears partial dentures     TOP ONLY     Wears prescription eyeglasses        Allergies   Allergen Reactions    Mold Extract [Trichophyton] Shortness Of Breath, Anxiety, Palpitations and Irritability    Lortab [Hydrocodone-Acetaminophen] Nausea And Vomiting    Pollen Extract Other (See Comments)     SNEEZING AND CONGESTION        Past Surgical History:   Procedure Laterality Date    CERVICAL POLYPECTOMY      TOTAL LAPAROSCOPIC HYSTERECTOMY N/A 9/18/2017    Procedure: TOTAL LAPAROSCOPIC HYSTERECTOMY, BILATERAL SALPINGO OOPHORECTOMY WITH DAVINCI ROBOT ;  Surgeon: Shannon Grimaldo DO;  Location: [facility];  Service:     WISDOM TOOTH EXTRACTION         Family History   Problem Relation Age of Onset    Cancer Mother         cervical cancer    Dementia Mother     Hypertension Mother     Obesity Father     Hypertension Father     Heart attack Father     Sleep apnea Father     Sleep apnea Sister     Hypertension Brother     Sleep apnea Brother     Cancer Maternal Aunt         lymphoma    Cancer Paternal Aunt     Breast cancer Paternal Aunt 70    Cancer Maternal Grandmother     Obesity Paternal Grandmother     Diabetes Paternal Grandmother     Hypertension Paternal Grandmother     Stroke Paternal Grandmother     Heart attack Paternal Grandmother     Heart attack Paternal Grandfather     Alcohol abuse Other     Depression Other        Social History     Socioeconomic History    Marital status: Single   Tobacco Use    " Smoking status: Never     Passive exposure: Never    Smokeless tobacco: Never   Vaping Use    Vaping status: Never Used   Substance and Sexual Activity    Alcohol use: Not Currently    Drug use: Never    Sexual activity: Not Currently     Birth control/protection: Post-menopausal, Surgical           Objective   Physical Exam  Vitals and nursing note reviewed.   Constitutional:       Appearance: She is well-developed.   HENT:      Head: Atraumatic.      Nose: Nose normal.   Eyes:      General: Lids are normal.      Conjunctiva/sclera: Conjunctivae normal.      Pupils: Pupils are equal, round, and reactive to light.   Cardiovascular:      Rate and Rhythm: Normal rate and regular rhythm.      Heart sounds: Normal heart sounds.   Pulmonary:      Effort: Pulmonary effort is normal.      Breath sounds: Normal breath sounds. No wheezing.   Abdominal:      General: There is no distension.      Palpations: Abdomen is soft.      Tenderness: There is generalized abdominal tenderness and tenderness in the epigastric area, periumbilical area, suprapubic area, left upper quadrant and left lower quadrant. There is no right CVA tenderness, left CVA tenderness, guarding or rebound.   Musculoskeletal:         General: No tenderness. Normal range of motion.      Cervical back: Normal range of motion and neck supple.   Skin:     General: Skin is warm and dry.      Findings: No erythema or rash.   Neurological:      Mental Status: She is alert and oriented to person, place, and time.      Sensory: No sensory deficit.   Psychiatric:         Speech: Speech normal.         Behavior: Behavior normal.         Procedures           ED Course      Recent Results (from the past 24 hour(s))   Urinalysis With Microscopic If Indicated (No Culture) - Urine, Clean Catch    Collection Time: 05/20/24  4:12 PM    Specimen: Urine, Clean Catch   Result Value Ref Range    Color, UA Yellow Yellow, Straw    Appearance, UA Clear Clear    pH, UA 7.5 5.0 - 8.0     Specific Gravity, UA 1.006 1.001 - 1.030    Glucose, UA Negative Negative    Ketones, UA Negative Negative    Bilirubin, UA Negative Negative    Blood, UA Negative Negative    Protein, UA Negative Negative    Leuk Esterase, UA Negative Negative    Nitrite, UA Negative Negative    Urobilinogen, UA 0.2 E.U./dL 0.2 - 1.0 E.U./dL   Lactic Acid, Plasma    Collection Time: 05/20/24  4:12 PM    Specimen: Blood   Result Value Ref Range    Lactate 1.3 0.5 - 2.0 mmol/L   Green Top (Gel)    Collection Time: 05/20/24  4:12 PM   Result Value Ref Range    Extra Tube Hold for add-ons.    Lavender Top    Collection Time: 05/20/24  4:12 PM   Result Value Ref Range    Extra Tube hold for add-on    Gold Top - SST    Collection Time: 05/20/24  4:12 PM   Result Value Ref Range    Extra Tube Hold for add-ons.    Gray Top    Collection Time: 05/20/24  4:12 PM   Result Value Ref Range    Extra Tube Hold for add-ons.    Light Blue Top    Collection Time: 05/20/24  4:12 PM   Result Value Ref Range    Extra Tube Hold for add-ons.    CBC Auto Differential    Collection Time: 05/20/24  4:12 PM    Specimen: Blood   Result Value Ref Range    WBC 8.58 3.40 - 10.80 10*3/mm3    RBC 4.86 3.77 - 5.28 10*6/mm3    Hemoglobin 14.3 12.0 - 15.9 g/dL    Hematocrit 44.8 34.0 - 46.6 %    MCV 92.2 79.0 - 97.0 fL    MCH 29.4 26.6 - 33.0 pg    MCHC 31.9 31.5 - 35.7 g/dL    RDW 13.0 12.3 - 15.4 %    RDW-SD 44.4 37.0 - 54.0 fl    MPV 11.0 6.0 - 12.0 fL    Platelets 295 140 - 450 10*3/mm3    Neutrophil % 60.7 42.7 - 76.0 %    Lymphocyte % 26.3 19.6 - 45.3 %    Monocyte % 8.3 5.0 - 12.0 %    Eosinophil % 4.0 0.3 - 6.2 %    Basophil % 0.5 0.0 - 1.5 %    Immature Grans % 0.2 0.0 - 0.5 %    Neutrophils, Absolute 5.21 1.70 - 7.00 10*3/mm3    Lymphocytes, Absolute 2.26 0.70 - 3.10 10*3/mm3    Monocytes, Absolute 0.71 0.10 - 0.90 10*3/mm3    Eosinophils, Absolute 0.34 0.00 - 0.40 10*3/mm3    Basophils, Absolute 0.04 0.00 - 0.20 10*3/mm3    Immature Grans, Absolute 0.02  0.00 - 0.05 10*3/mm3    nRBC 0.0 0.0 - 0.2 /100 WBC   Scan Slide    Collection Time: 05/20/24  4:12 PM    Specimen: Blood   Result Value Ref Range    RBC Morphology Normal Normal    WBC Morphology Normal Normal    Platelet Estimate Adequate Normal    Clumped Platelets Present None Seen   Comprehensive Metabolic Panel    Collection Time: 05/20/24  5:28 PM    Specimen: Blood   Result Value Ref Range    Glucose 76 65 - 99 mg/dL    BUN 11 8 - 23 mg/dL    Creatinine 0.85 0.57 - 1.00 mg/dL    Sodium 141 136 - 145 mmol/L    Potassium 4.9 3.5 - 5.2 mmol/L    Chloride 105 98 - 107 mmol/L    CO2 28.0 22.0 - 29.0 mmol/L    Calcium 9.1 8.6 - 10.5 mg/dL    Total Protein 7.0 6.0 - 8.5 g/dL    Albumin 3.9 3.5 - 5.2 g/dL    ALT (SGPT) 23 1 - 33 U/L    AST (SGOT) 19 1 - 32 U/L    Alkaline Phosphatase 113 39 - 117 U/L    Total Bilirubin <0.2 0.0 - 1.2 mg/dL    Globulin 3.1 gm/dL    A/G Ratio 1.3 g/dL    BUN/Creatinine Ratio 12.9 7.0 - 25.0    Anion Gap 8.0 5.0 - 15.0 mmol/L    eGFR 78.1 >60.0 mL/min/1.73   Lipase    Collection Time: 05/20/24  5:28 PM    Specimen: Blood   Result Value Ref Range    Lipase 22 13 - 60 U/L   POC Creatinine    Collection Time: 05/20/24  5:34 PM    Specimen: Blood   Result Value Ref Range    Creatinine 0.80 0.60 - 1.30 mg/dL   POC Creatinine    Collection Time: 05/20/24  5:37 PM    Specimen: Blood   Result Value Ref Range    Creatinine 0.80 0.60 - 1.30 mg/dL     Note: In addition to lab results from this visit, the labs listed above may include labs taken at another facility or during a different encounter within the last 24 hours. Please correlate lab times with ED admission and discharge times for further clarification of the services performed during this visit.    CT Abdomen Pelvis With Contrast   Final Result   Impression:   Relatively mild proximal sigmoid diverticulitis. No evidence of abscess or extraluminal air.            Electronically Signed: Giacomo Youngblood MD     5/20/2024 6:30 PM EDT      "Workstation ID: [id number]        Vitals:    05/20/24 1432   BP: 132/80   BP Location: Left arm   Patient Position: Sitting   Pulse: 81   Resp: 12   Temp: 98.5 °F (36.9 °C)   TempSrc: Oral   SpO2: 94%   Weight: 110 kg (243 lb)   Height: 167.6 cm (66\")     Medications   Sodium Chloride (PF) 0.9 % 10 mL (has no administration in time range)   ondansetron ODT (ZOFRAN-ODT) disintegrating tablet 4 mg (4 mg Translingual Given 5/20/24 1922)   sodium chloride 0.9 % bolus 1,000 mL (0 mL Intravenous Stopped 5/20/24 1642)   famotidine (PEPCID) injection 20 mg (20 mg Intravenous Given 5/20/24 1613)   iopamidol (ISOVUE-300) 61 % injection 85 mL (85 mL Intravenous Given 5/20/24 1812)   amoxicillin-clavulanate (AUGMENTIN) 875-125 MG per tablet 1 tablet (1 tablet Oral Given 5/20/24 1922)   traMADol (ULTRAM) tablet 50 mg (50 mg Oral Given 5/20/24 1922)     ECG/EMG Results (last 24 hours)       ** No results found for the last 24 hours. **          No orders to display                                              Medical Decision Making  Pt is a 64 yo female presenting to ED with complaints of abdominal pain. Labs in ED notable for WBC 8.58, Lactic 1.3, Cr 0.88, Glucose 76, K 4.9 and UA unremarkable. CT abd/pelvis with findings of acute uncomplicated sigmoid diverticulitis. Discussed results and tx plan with patient. Will give first dose of Tramadol, Augmentin and Zofran in ED since her pharmacy is closed. She will f/u with PCP and return to ED if new or worse sx.     DDx  SBO, Diverticulitis, UTI, Kidney stones, Sepsis, Dehydration, YAO, Appendicitis,     Problems Addressed:  Diverticulitis: complicated acute illness or injury  Generalized abdominal pain: complicated acute illness or injury    Amount and/or Complexity of Data Reviewed  External Data Reviewed: notes.     Details: Reviewed previous non ED visits including prior labs, imaging, available notes, medications, allergies and surgical hx.     Labs: ordered. Decision-making " details documented in ED Course.  Radiology: ordered. Decision-making details documented in ED Course.    Risk  Prescription drug management.        Final diagnoses:   Diverticulitis   Generalized abdominal pain       ED Disposition  ED Disposition       ED Disposition   Discharge    Condition   Stable    Comment   --               Em Baum MD  [address] [facility] [address] [phone]    Schedule an appointment as soon as possible for a visit       [facility] EMERGENCY DEPARTMENT  [address] [facility] [address] [phone]    If symptoms worsen         Medication List        New Prescriptions      amoxicillin-clavulanate 875-125 MG per tablet  Commonly known as: AUGMENTIN  Take 1 tablet by mouth 2 (Two) Times a Day for 10 days.     ondansetron ODT 4 MG disintegrating tablet  Commonly known as: ZOFRAN-ODT  Place 1 tablet on the tongue Every 6 (Six) Hours As Needed for Nausea or Vomiting for up to 15 doses.     traMADol 50 MG tablet  Commonly known as: ULTRAM  Take 1 tablet by mouth Every 6 (Six) Hours As Needed for Moderate Pain for up to 15 doses.               Where to Get Your Medications        These medications were sent to [facility] [id number] - [place], [place] - [address] - [phone]  - [phone] FX  [address] [facility] [address] [facility] [address]      Phone: [phone]   amoxicillin-clavulanate 875-125 MG per tablet  ondansetron ODT 4 MG disintegrating tablet  traMADol 50 MG tablet            Jocelin Dang PA  05/20/24 2018

## 2024-05-20 NOTE — PROGRESS NOTES
Office Note     Name: Sandra Auguste    : 1960     MRN: [id number]     Chief Complaint  Abdominal Pain (Pt states seen at [facility] in [place], treated for diverticulitis, no imaging available.//Abdominal pain since Wednesday (5/15) no changes to diet, drinking clear fluids. ) and Headache    Subjective     History of Present Illness:  Sandra Auguste is a 63 y.o. female who presents today for abdominal pain- was seen at [facility]- treated for diverticulitis- pain since 5/15- 5 days.   Abdominal pain-   Can't keep a job - because of continued on and off pain   Wearing her down emotionally and psychologically     Appetite has decreased- has been eating clear liquid diet.   [place] diet  Feels bloated and swollen   Feels pain all over abdomen- sharp pain on left side   Denies diarrhea or constipation   Headaches  Does feel nausea at times when eating   Per patient has had both upper and lower endoscopies    Currently on Macrobid for UTI- Denies current urinary symptoms  Urine culture was sent for further evaluation- no results  Currently on day        Review of Systems:   Review of Systems   Constitutional:  Negative for chills and fever.   Respiratory:  Negative for cough and shortness of breath.    Cardiovascular:  Negative for chest pain, palpitations and leg swelling.   Gastrointestinal:  Positive for abdominal pain, constipation, nausea and GERD. Negative for blood in stool, diarrhea and vomiting.   Genitourinary:  Negative for dysuria.        UTI symptoms improved - currently on Macrobid - stable    Musculoskeletal:  Negative for gait problem.   Skin:  Negative for rash.   Neurological:  Positive for headache. Negative for dizziness and light-headedness.   Psychiatric/Behavioral:  Positive for dysphoric mood and depressed mood. Negative for self-injury, sleep disturbance, suicidal ideas and stress.        Past Medical History:   Past Medical History:   Diagnosis Date    Anesthesia complication     HAS  "TROUBLE GETTING \"NUMB\"     Anxiety     Arthritis     on Celebrex + Robaxin, no steroids    Asthma     does not follow w/ pulmonary    Bladder spasms     Depression     w/ PTSD    Diverticulosis     Dyspepsia     Fatigue     GERD (gastroesophageal reflux disease)     controlled w/ nightly Pepcid, EGD 2020 w/ Dr. Chavarria, (+)     Health care maintenance 02/25/2020    Memory loss     FROM SKULL FRACTURE AND FALL-SHORT TERM MEMORY LOSS    Migraine     Morbid obesity     Seasonal allergies     Seizures     \"convulsions\" at age 5    Skull fracture 2012    Sleep apnea with use of continuous positive airway pressure (CPAP)     CPAP compliant    SOB (shortness of breath) on exertion     Tendonitis     Type 2 diabetes mellitus without complication, without long-term current use of insulin     dx 2021, no insulin, A1C <7    Wears contact lenses     Wears partial dentures     TOP ONLY     Wears prescription eyeglasses        Past Surgical History:   Past Surgical History:   Procedure Laterality Date    CERVICAL POLYPECTOMY      TOTAL LAPAROSCOPIC HYSTERECTOMY N/A 9/18/2017    Procedure: TOTAL LAPAROSCOPIC HYSTERECTOMY, BILATERAL SALPINGO OOPHORECTOMY WITH DAVINCI ROBOT ;  Surgeon: Shannon Grimaldo DO;  Location: [facility];  Service:     WISDOM TOOTH EXTRACTION         Immunizations:   Immunization History   Administered Date(s) Administered    COVID-19 (PFIZER) Purple Cap Monovalent 03/17/2021, 04/07/2021    Flu Vaccine Quad PF >36MO 09/19/2016, 10/18/2017    Fluzone (or Fluarix & Flulaval for VFC) >6mos 09/24/2021, 10/26/2022, 12/08/2023    Hepatitis B 07/15/2021    Pneumococcal Polysaccharide (PPSV23) 02/24/2020    Shingrix 11/16/2020, 01/23/2021, 01/31/2021    Tdap 07/15/2021    Tetanus 09/06/2012    flucelvax quad pfs =>4 YRS 12/20/2019        Medications:     Current Outpatient Medications:     acetaminophen (TYLENOL) 500 MG tablet, Take 1 tablet by mouth Every 6 (Six) Hours As Needed for Mild Pain., Disp: , Rfl:     " albuterol sulfate  (90 Base) MCG/ACT inhaler, Inhale 2 puffs Every 6 (Six) Hours As Needed for Wheezing or Shortness of Air., Disp: 18 g, Rfl: 1    Blood Glucose Monitoring Suppl (OneTouch Verio Flex System) w/Device kit, 1 kit Daily., Disp: 1 kit, Rfl: 0    Blood Glucose Monitoring Suppl (OneTouch Verio) w/Device kit, 1 kit Daily., Disp: 1 kit, Rfl: 0    Cholecalciferol (Vitamin D3) 50 MCG (2000 UT) capsule, Take 1 capsule by mouth Daily., Disp: 90 capsule, Rfl: 1    diclofenac (VOLTAREN) 75 MG EC tablet, Take 1 tablet by mouth 2 (Two) Times a Day., Disp: 14 tablet, Rfl: 0    Diclofenac Sodium (VOLTAREN) 1 % gel gel, Apply  topically to the appropriate area as directed 4 (Four) Times a Day As Needed (arthralgias, shoulders and back.)., Disp: 100 g, Rfl: 1    dicyclomine (BENTYL) 10 MG capsule, Take 1 capsule by mouth Every 6 (Six) Hours As Needed for Abdominal Cramping., Disp: 30 capsule, Rfl: 0    empagliflozin (Jardiance) 10 MG tablet tablet, TAKE 1 TABLET BY MOUTH DAILY, Disp: 90 tablet, Rfl: 1    EPINEPHrine (EPIPEN) 0.3 MG/0.3ML solution auto-injector injection, Inject 0.3 mL into the appropriate muscle as directed by prescriber As Needed (FOR SEVERE ALLERGIC REACTION)., Disp: 1 each, Rfl: 1    Fluticasone Furoate-Vilanterol (Breo Ellipta) 200-25 MCG/ACT inhaler, Inhale 1 puff Daily., Disp: 60 each, Rfl: 1    glucose blood (OneTouch Verio) test strip, USE AS INSTRUCTED TO CHECK BLOOD GLUCOSE ONCE DAILY DX:E11.65, Disp: 100 each, Rfl: 3    glucose monitor monitoring kit, Use as directed to check blood glucose once daily., Disp: 1 each, Rfl: 0    hydrOXYzine (ATARAX) 25 MG tablet, Take 1 tablet by mouth Every 8 (Eight) Hours As Needed for Anxiety., Disp: 90 tablet, Rfl: 2    Lancets (onetouch ultrasoft) lancets, Use one lancet daily to check blood sugars, Disp: 100 each, Rfl: 3    loratadine (CLARITIN) 10 MG tablet, TAKE 1 TABLET BY MOUTH DAILY, Disp: 30 tablet, Rfl: 5    multivitamin with minerals tablet  tablet, Take 1 tablet by mouth Daily., Disp: , Rfl:     nitrofurantoin, macrocrystal-monohydrate, (MACROBID) 100 MG capsule, Take 1 capsule by mouth 2 (Two) Times a Day., Disp: , Rfl:     polyethylene glycol (MIRALAX) 17 GM/SCOOP powder, Take 17 g by mouth Daily., Disp: 578 g, Rfl: 1    sodium chloride (Ocean Nasal Spray) 0.65 % nasal spray, 1 spray into the nostril(s) as directed by provider As Needed for Congestion., Disp: 60 mL, Rfl: 0    pantoprazole (PROTONIX) 40 MG EC tablet, Take 1 tablet by mouth Daily., Disp: 90 tablet, Rfl: 1    Allergies:   Allergies   Allergen Reactions    Mold Extract [Trichophyton] Shortness Of Breath, Anxiety, Palpitations and Irritability    Lortab [Hydrocodone-Acetaminophen] Nausea And Vomiting    Pollen Extract Other (See Comments)     SNEEZING AND CONGESTION        Family History:   Family History   Problem Relation Age of Onset    Cancer Mother         cervical cancer    Dementia Mother     Hypertension Mother     Obesity Father     Hypertension Father     Heart attack Father     Sleep apnea Father     Sleep apnea Sister     Hypertension Brother     Sleep apnea Brother     Cancer Maternal Aunt         lymphoma    Cancer Paternal Aunt     Breast cancer Paternal Aunt 70    Cancer Maternal Grandmother     Obesity Paternal Grandmother     Diabetes Paternal Grandmother     Hypertension Paternal Grandmother     Stroke Paternal Grandmother     Heart attack Paternal Grandmother     Heart attack Paternal Grandfather     Alcohol abuse Other     Depression Other        Social History:   Social History     Socioeconomic History    Marital status: Single   Tobacco Use    Smoking status: Never     Passive exposure: Never    Smokeless tobacco: Never   Vaping Use    Vaping status: Never Used   Substance and Sexual Activity    Alcohol use: Not Currently    Drug use: Never    Sexual activity: Not Currently     Birth control/protection: Post-menopausal, Surgical         Objective     Vital  "Signs  /82   Pulse 77   Ht 166.4 cm (65.5\")   Wt 110 kg (243 lb)   SpO2 97%   BMI 39.82 kg/m²   Estimated body mass index is 39.82 kg/m² as calculated from the following:    Height as of this encounter: 166.4 cm (65.5\").    Weight as of this encounter: 110 kg (243 lb).            Physical Exam  Vitals and nursing note reviewed.   Constitutional:       General: She is not in acute distress.     Appearance: Normal appearance.   HENT:      Head: Normocephalic and atraumatic.   Cardiovascular:      Rate and Rhythm: Normal rate and regular rhythm.      Heart sounds: Normal heart sounds.   Pulmonary:      Effort: Pulmonary effort is normal. No respiratory distress.      Breath sounds: Normal breath sounds.   Abdominal:      General: Bowel sounds are normal.      Palpations: Abdomen is soft.      Tenderness: There is abdominal tenderness.   Skin:     General: Skin is warm and dry.   Neurological:      General: No focal deficit present.      Mental Status: She is alert and oriented to person, place, and time.          Procedures     Assessment and Plan   Diagnosis Discussed   Continue to monitor   Plenty of fluids, monitor diet and exercise   Follow up with Gastroenterology as directed  CT abdomen ordered for immediate further evaluation- will notify of results when available  May need antibiotic if acute diverticulitis flare   Take medications as instructed- currently on antibiotic for UTI   Start Protonix daily   Follow up as directed   If symptoms worsen or persist please seek further evaluation     1. Generalized abdominal pain  - CT Abdomen Pelvis Without Contrast; Future  - Ambulatory Referral to Gastroenterology    2. Gastroesophageal reflux disease without esophagitis  - CT Abdomen Pelvis Without Contrast; Future  - Ambulatory Referral to Gastroenterology  - pantoprazole (PROTONIX) 40 MG EC tablet; Take 1 tablet by mouth Daily.  Dispense: 90 tablet; Refill: 1    3. Nausea  - CT Abdomen Pelvis Without " Contrast; Future  - Ambulatory Referral to Gastroenterology    4. Diverticulosis  - CT Abdomen Pelvis Without Contrast; Future  - Ambulatory Referral to Gastroenterology       Follow Up  Return if symptoms worsen or fail to improve, for Next scheduled follow up.    MD KARLA [facility]  [address] [place] [id number] [phone]

## 2024-05-20 NOTE — PATIENT INSTRUCTIONS
Diagnosis Discussed   Continue to monitor   Plenty of fluids, monitor diet and exercise   Follow up with Gastroenterology as directed  CT abdomen ordered for immediate further evaluation- will notify of results when available  May need antibiotic if acute diverticulitis flare   Take medications as instructed- currently on antibiotic for UTI   Start Protonix daily   Follow up as directed   If symptoms worsen or persist please seek further evaluation

## 2024-05-23 ENCOUNTER — TRANSCRIBE ORDERS (OUTPATIENT)
Dept: MAMMOGRAPHY | Facility: HOSPITAL | Age: 64
End: 2024-05-23
Payer: COMMERCIAL

## 2024-05-23 ENCOUNTER — HOSPITAL ENCOUNTER (OUTPATIENT)
Dept: ULTRASOUND IMAGING | Facility: HOSPITAL | Age: 64
Discharge: HOME | End: 2024-05-23
Payer: COMMERCIAL

## 2024-05-23 ENCOUNTER — HOSPITAL ENCOUNTER (OUTPATIENT)
Dept: MAMMOGRAPHY | Facility: HOSPITAL | Age: 64
Discharge: HOME | End: 2024-05-23
Payer: COMMERCIAL

## 2024-05-23 DIAGNOSIS — Z12.31 SCREENING MAMMOGRAM FOR BREAST CANCER: ICD-10-CM

## 2024-05-23 DIAGNOSIS — R92.8 ABNORMAL MAMMOGRAM: Primary | ICD-10-CM

## 2024-05-23 DIAGNOSIS — R92.8 ABNORMAL MAMMOGRAM: ICD-10-CM

## 2024-05-23 PROCEDURE — 77066 DX MAMMO INCL CAD BI: CPT

## 2024-05-23 PROCEDURE — 76642 ULTRASOUND BREAST LIMITED: CPT

## 2024-05-23 PROCEDURE — G0279 TOMOSYNTHESIS, MAMMO: HCPCS

## 2024-05-23 NOTE — TELEPHONE ENCOUNTER
Please request records from [facility]- I think at [facility]. Thanks   SUBJECTIVE:   73 y.o.  female here to discuss ovarian mass. PT states she was sent for CT scan for h/o lung cancer and pt was noted to have a \"non-specific\" fullness to ROV. Pt with h/o hysterectomy. Pt with MRI noting a 24mm ovarian lesion that appears solid in nature. No pelvic fluid or lymphadenopathy noted. PT denies any GI or abdominal complaints at this time.     Review of Systems:  General ROS: negative  Respiratory ROS: no cough, shortness of breath, or wheezing  Cardiovascular ROS: no chest pain or dyspnea on exertion  Gastrointestinal ROS: no abdominal pain, change in bowel habits, or black or bloody stools  Genito-Urinary ROS: no dysuria, trouble voiding, or hematuria    OBJECTIVE:   /78 (Site: Left Upper Arm, Position: Sitting, Cuff Size: Medium Adult)   Pulse 72   Ht 1.702 m (5' 7\")   Wt 82.1 kg (181 lb)   LMP  (LMP Unknown)   BMI 28.35 kg/m²     Physical Exam:  GEN: She appears well, afebrile.   HEENT: normal cephalic, atraumatic  CVS: regular rate and rhythm  LUNGS: CTAB  ABDOMEN: benign, soft, nontender, no masses.  MUSCULOSKELETAL: normal gait, no masses  SKIN: normal texture and tone, no lesions    ASSESSMENT:   Ovarian mass    PLAN:   Ca 125 pending  Pt referred to Gyn Onc for further evalatuation

## 2024-05-29 ENCOUNTER — OFFICE VISIT (OUTPATIENT)
Dept: BEHAVIORAL HEALTH | Facility: CLINIC | Age: 64
End: 2024-05-29
Payer: COMMERCIAL

## 2024-05-29 VITALS
HEART RATE: 81 BPM | OXYGEN SATURATION: 96 % | SYSTOLIC BLOOD PRESSURE: 124 MMHG | WEIGHT: 246 LBS | BODY MASS INDEX: 39.53 KG/M2 | DIASTOLIC BLOOD PRESSURE: 72 MMHG | HEIGHT: 66 IN

## 2024-05-29 DIAGNOSIS — F41.1 GENERALIZED ANXIETY DISORDER: ICD-10-CM

## 2024-05-29 DIAGNOSIS — F33.1 MODERATE EPISODE OF RECURRENT MAJOR DEPRESSIVE DISORDER: Primary | ICD-10-CM

## 2024-05-29 DIAGNOSIS — F43.10 POST TRAUMATIC STRESS DISORDER (PTSD): ICD-10-CM

## 2024-05-29 RX ORDER — VILAZODONE HYDROCHLORIDE 10 MG/1
10 TABLET ORAL DAILY
Qty: 30 TABLET | Refills: 0 | Status: SHIPPED | OUTPATIENT
Start: 2024-05-29
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
## 2024-05-30 ENCOUNTER — PRIOR AUTHORIZATION (OUTPATIENT)
Dept: INTERNAL MEDICINE | Facility: CLINIC | Age: 64
End: 2024-05-30
Payer: COMMERCIAL

## 2024-06-25 ENCOUNTER — OFFICE VISIT (OUTPATIENT)
Dept: BEHAVIORAL HEALTH | Facility: CLINIC | Age: 64
End: 2024-06-25
Payer: COMMERCIAL

## 2024-06-25 VITALS
BODY MASS INDEX: 39.63 KG/M2 | DIASTOLIC BLOOD PRESSURE: 76 MMHG | SYSTOLIC BLOOD PRESSURE: 124 MMHG | HEIGHT: 66 IN | WEIGHT: 246.6 LBS | OXYGEN SATURATION: 97 % | HEART RATE: 79 BPM

## 2024-06-25 DIAGNOSIS — F33.1 MODERATE EPISODE OF RECURRENT MAJOR DEPRESSIVE DISORDER: Primary | ICD-10-CM

## 2024-06-25 DIAGNOSIS — F43.10 POST TRAUMATIC STRESS DISORDER (PTSD): ICD-10-CM

## 2024-06-25 DIAGNOSIS — F41.1 GENERALIZED ANXIETY DISORDER: ICD-10-CM

## 2024-06-25 DIAGNOSIS — F33.1 MODERATE EPISODE OF RECURRENT MAJOR DEPRESSIVE DISORDER: ICD-10-CM

## 2024-06-25 PROCEDURE — 1159F MED LIST DOCD IN RCRD: CPT | Performed by: REGISTERED NURSE

## 2024-06-25 PROCEDURE — 3078F DIAST BP <80 MM HG: CPT | Performed by: REGISTERED NURSE

## 2024-06-25 PROCEDURE — 99214 OFFICE O/P EST MOD 30 MIN: CPT | Performed by: REGISTERED NURSE

## 2024-06-25 PROCEDURE — 1160F RVW MEDS BY RX/DR IN RCRD: CPT | Performed by: REGISTERED NURSE

## 2024-06-25 PROCEDURE — 3074F SYST BP LT 130 MM HG: CPT | Performed by: REGISTERED NURSE

## 2024-06-25 RX ORDER — VILAZODONE HYDROCHLORIDE 10 MG/1
10 TABLET ORAL DAILY
Qty: 30 TABLET | Refills: 0 | OUTPATIENT
Start: 2024-06-25

## 2024-06-25 RX ORDER — VILAZODONE HYDROCHLORIDE 20 MG/1
20 TABLET ORAL DAILY
Qty: 30 TABLET | Refills: 0 | Status: SHIPPED | OUTPATIENT
Start: 2024-06-25

## 2024-06-25 NOTE — PROGRESS NOTES
Follow Up Office Visit      Patient Name: Sandra Auguste  : 1960   MRN: [id number]     Referring Provider: Em Baum MD    Chief Complaint:      ICD-10-CM ICD-9-CM   1. Moderate episode of recurrent major depressive disorder  F33.1 296.32   2. Generalized anxiety disorder  F41.1 300.02        History of Present Illness:   Sandra Auguste is a 63 y.o. female who is here today for follow up and medication management    Subjective      Patient Reports: that she has noticed that the viibryd has helped her sleep better.  She reports that she takes it around 8 or 9 pm. She reports that she has been looking for a job as a , but has not received any job offers. 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 She reports that it has been depressing not being able to find a job. She reports that luckily she has some inheritance money to help with bills, but she needs to supplement that so she doesn't spend it all. She reports that she has applied for snf, but will still need a job. She reports that she has been struggling trying to find something to do while she is not working and she has started a container garden on her porch. She reports that she worked out 1 day last week with the intention of starting a workout regimen. She reports that she feels like her body is getting week just sitting around. She reports that she has started to feel good when she gets up and has plenty of things that she could be doing around the house, but can't get fully motivated. She reports that she saw a therapist 2 weeks ago and that went well. She reports that she also has a neurology appointment for in 2 days. She reports that overall she feels like she is getting a deeper sleep and waking up feeling better after starting the viibryd. She reports that sometimes she has a little pain in her chest  and has discussed this with her PCP, and she has been told she has a lower right bundle branch block, but has had a stress test, which came out fine and her symptoms have not worsened with the viibryd. Denies SI/HI/AVH    Review of Systems:   Review of Systems   Constitutional:  Negative for appetite change and unexpected weight change.   Eyes:  Negative for visual disturbance.   Respiratory:  Negative for chest tightness and shortness of breath.    Cardiovascular:  Negative for chest pain.   Musculoskeletal:  Negative for gait problem.   Skin:  Negative for rash and wound.   Neurological:  Negative for tremors, seizures, weakness and light-headedness.   Psychiatric/Behavioral:  Positive for dysphoric mood and sleep disturbance. Negative for agitation, behavioral problems, decreased concentration, hallucinations, self-injury and suicidal ideas. The patient is not nervous/anxious and is not hyperactive.    Reports history or right bundle branch block, no treatment required  Sleep pattern: 6-7 hours, feels mostly well rested, sometimes wakes up early, then sometimes gets sleepy around 10 am, but doesn't nap  Appetite: has gained a couple of pounds, had lost down to 236 now 246     PHQ-9 Depression Screening  Little interest or pleasure in doing things? 2-->more than half the days   Feeling down, depressed, or hopeless? 2-->more than half the days   Trouble falling or staying asleep, or sleeping too much? 1-->several days   Feeling tired or having little energy? 3-->nearly every day   Poor appetite or overeating? 3-->nearly every day   Feeling bad about yourself - or that you are a failure or have let yourself or your family down? 2-->more than half the days   Trouble concentrating on things, such as reading the newspaper or watching television? 3-->nearly every day   Moving or speaking so slowly that other people could have noticed? Or the opposite - being so fidgety or restless that you have been moving around a lot  more than usual? 0-->not at all   Thoughts that you would be better off dead, or of hurting yourself in some way? 0-->not at all   PHQ-9 Total Score 16   If you checked off any problems, how difficult have these problems made it for you to do your work, take care of things at home, or get along with other people? very difficult     LUIS-7 Anxiety Screening  Over the last two weeks, how often have you been bothered by the following problems?  Feeling nervous, anxious or on edge: Several days  Not being able to stop or control worrying: More than half the days  Worrying too much about different things: Several days  Trouble Relaxing: Several days  Being so restless that it is hard to sit still: Several days  Becoming easily annoyed or irritable: Several days  Feeling afraid as if something awful might happen: Not at all  LUIS 7 Total Score: 7  If you checked any problems, how difficult have these problems made it for you to do your work, take care of things at home, or get along with other people: Not difficult at all    RISK ASSESSMENT:  Patient denies any thoughts or intent of suicide today. Patient denies any impulsive behavior today.     Medications:     Current Outpatient Medications:     acetaminophen (TYLENOL) 500 MG tablet, Take 1 tablet by mouth Every 6 (Six) Hours As Needed for Mild Pain., Disp: , Rfl:     albuterol sulfate  (90 Base) MCG/ACT inhaler, Inhale 2 puffs Every 6 (Six) Hours As Needed for Wheezing or Shortness of Air., Disp: 18 g, Rfl: 1    Blood Glucose Monitoring Suppl (OneTouch Verio Flex System) w/Device kit, 1 kit Daily., Disp: 1 kit, Rfl: 0    Blood Glucose Monitoring Suppl (OneTouch Verio) w/Device kit, 1 kit Daily., Disp: 1 kit, Rfl: 0    Cholecalciferol (Vitamin D3) 50 MCG (2000 UT) capsule, Take 1 capsule by mouth Daily., Disp: 90 capsule, Rfl: 1    diclofenac (VOLTAREN) 75 MG EC tablet, Take 1 tablet by mouth 2 (Two) Times a Day., Disp: 14 tablet, Rfl: 0    Diclofenac Sodium  (VOLTAREN) 1 % gel gel, Apply  topically to the appropriate area as directed 4 (Four) Times a Day As Needed (arthralgias, shoulders and back.)., Disp: 100 g, Rfl: 1    dicyclomine (BENTYL) 10 MG capsule, Take 1 capsule by mouth Every 6 (Six) Hours As Needed for Abdominal Cramping., Disp: 30 capsule, Rfl: 0    empagliflozin (Jardiance) 10 MG tablet tablet, TAKE 1 TABLET BY MOUTH DAILY, Disp: 90 tablet, Rfl: 1    EPINEPHrine (EPIPEN) 0.3 MG/0.3ML solution auto-injector injection, Inject 0.3 mL into the appropriate muscle as directed by prescriber As Needed (FOR SEVERE ALLERGIC REACTION)., Disp: 1 each, Rfl: 1    Fluticasone Furoate-Vilanterol (Breo Ellipta) 200-25 MCG/ACT inhaler, Inhale 1 puff Daily., Disp: 60 each, Rfl: 1    glucose blood (OneTouch Verio) test strip, USE AS INSTRUCTED TO CHECK BLOOD GLUCOSE ONCE DAILY DX:E11.65, Disp: 100 each, Rfl: 3    glucose monitor monitoring kit, Use as directed to check blood glucose once daily., Disp: 1 each, Rfl: 0    hydrOXYzine (ATARAX) 25 MG tablet, Take 1 tablet by mouth Every 8 (Eight) Hours As Needed for Anxiety., Disp: 90 tablet, Rfl: 2    Lancets (onetouch ultrasoft) lancets, Use one lancet daily to check blood sugars, Disp: 100 each, Rfl: 3    loratadine (CLARITIN) 10 MG tablet, TAKE 1 TABLET BY MOUTH DAILY, Disp: 30 tablet, Rfl: 5    multivitamin with minerals tablet tablet, Take 1 tablet by mouth Daily., Disp: , Rfl:     nitrofurantoin, macrocrystal-monohydrate, (MACROBID) 100 MG capsule, Take 1 capsule by mouth 2 (Two) Times a Day., Disp: , Rfl:     ondansetron ODT (ZOFRAN-ODT) 4 MG disintegrating tablet, Place 1 tablet on the tongue Every 6 (Six) Hours As Needed for Nausea or Vomiting for up to 15 doses., Disp: 15 tablet, Rfl: 0    pantoprazole (PROTONIX) 40 MG EC tablet, Take 1 tablet by mouth Daily., Disp: 90 tablet, Rfl: 1    polyethylene glycol (MIRALAX) 17 GM/SCOOP powder, Take 17 g by mouth Daily., Disp: 578 g, Rfl: 1    sodium chloride (Ocean Nasal  "Spray) 0.65 % nasal spray, 1 spray into the nostril(s) as directed by provider As Needed for Congestion., Disp: 60 mL, Rfl: 0    traMADol (ULTRAM) 50 MG tablet, Take 1 tablet by mouth Every 6 (Six) Hours As Needed for Moderate Pain for up to 15 doses., Disp: 15 tablet, Rfl: 0    vilazodone (Viibryd) 20 MG tablet tablet, Take 1 tablet by mouth Daily., Disp: 30 tablet, Rfl: 0    Medication Considerations:  SHANTEL reviewed and appropriate.      Allergies:   Allergies   Allergen Reactions    Mold Extract [Trichophyton] Shortness Of Breath, Anxiety, Palpitations and Irritability    Lortab [Hydrocodone-Acetaminophen] Nausea And Vomiting    Pollen Extract Other (See Comments)     SNEEZING AND CONGESTION          Objective     Physical Exam:  Vital Signs:   Vitals:    06/25/24 1545 06/25/24 1546 06/25/24 1548 06/25/24 1551   BP:    124/76   Pulse:   79    SpO2:   97%    Weight:  112 kg (246 lb 9.6 oz)     Height: 167.6 cm (66\")        Body mass index is 39.8 kg/m².     Mental Status Exam:   Hygiene:   good  Cooperation:  Cooperative  Eye Contact:  Good  Psychomotor Behavior:  Appropriate  Affect:  Appropriate  Mood: normal  Speech:  Normal  Thought Process:  Goal directed and Linear  Thought Content:  Normal  Suicidal:  None  Homicidal:  None  Hallucinations:  None  Delusion:  None  Memory:  Intact  Orientation:  Person, Place, Time, and Situation  Reliability:  good  Insight:  Good  Judgement:  Good  Impulse Control:  Good  Physical/Medical Issues:   see problem list      Assessment / Plan      Visit Diagnosis/Orders Placed This Visit:  Diagnoses and all orders for this visit:    1. Moderate episode of recurrent major depressive disorder (Primary)  -     vilazodone (Viibryd) 20 MG tablet tablet; Take 1 tablet by mouth Daily.  Dispense: 30 tablet; Refill: 0    2. Generalized anxiety disorder  -     vilazodone (Viibryd) 20 MG tablet tablet; Take 1 tablet by mouth Daily.  Dispense: 30 tablet; Refill: 0         Functional " Status: Mild impairment     Prognosis: Good with Ongoing Treatment     Impression/Formulation:  Patient appeared alert and oriented.  Patient is voluntarily requesting to continue outpatient psychiatric treatment at [facility].  Patient is receptive to assistance with maintaining a stable lifestyle.  Patient presents with history of     ICD-10-CM ICD-9-CM   1. Moderate episode of recurrent major depressive disorder  F33.1 296.32   2. Generalized anxiety disorder  F41.1 300.02     Reviewed patient's previous provider notes. Reviewed most recent labs. Patient meets DSM V diagnostic criteria for diagnoses. Diagnoses may be updated as more information becomes available.       Treatment Plan:   Increase viibryd to 20 mg daily  Continue individual therapy  Follow up in 1 month or sooner if needed  Patient will continue supportive psychotherapy efforts and medications as indicated. Clinic will obtain release of information for current treatment team for continuity of care as needed. Patient will contact this office, call 911 or present to the nearest emergency room should suicidal or homicidal ideations occur.  Discussed medication options and treatment plan of prescribed medication(s) as well as the risks, benefits, and potential side effects. Patient acknowledged and verbally consented to continue with current treatment plan and was educated on the importance of compliance with treatment and follow-up appointments.     Patient instructions:  Medication risks and side effects discussed with patient including risk for worsening mood, changes in behavior, thoughts of suicide or homicide, induction of carey, serotonin syndrome.   If any thoughts of SI or HI, worsening mood or changes in behavior, call 911 or crisis line [phone], or go to nearest ER at once. Pt.verbalizes understanding and consents to treatment with this medication.     Follow Up:   Return in about 1 month (around 7/25/2024) for Med  Check.        REBECA Tao, P[facility]

## 2024-06-26 ENCOUNTER — PRIOR AUTHORIZATION (OUTPATIENT)
Dept: BEHAVIORAL HEALTH | Facility: CLINIC | Age: 64
End: 2024-06-26
Payer: COMMERCIAL

## 2024-06-27 ENCOUNTER — OFFICE VISIT (OUTPATIENT)
Dept: NEUROLOGY | Facility: CLINIC | Age: 64
End: 2024-06-27
Payer: COMMERCIAL

## 2024-06-27 VITALS
HEIGHT: 66 IN | WEIGHT: 243 LBS | BODY MASS INDEX: 39.05 KG/M2 | HEART RATE: 82 BPM | OXYGEN SATURATION: 97 % | SYSTOLIC BLOOD PRESSURE: 126 MMHG | DIASTOLIC BLOOD PRESSURE: 80 MMHG

## 2024-06-27 DIAGNOSIS — G44.85 PRIMARY STABBING HEADACHE: ICD-10-CM

## 2024-06-27 DIAGNOSIS — G47.33 OBSTRUCTIVE SLEEP APNEA: ICD-10-CM

## 2024-06-27 DIAGNOSIS — G31.84 MILD COGNITIVE IMPAIRMENT: Primary | ICD-10-CM

## 2024-06-27 RX ORDER — INDOMETHACIN 25 MG/1
CAPSULE ORAL
Qty: 84 CAPSULE | Refills: 5 | Status: SHIPPED | OUTPATIENT
Start: 2024-06-27

## 2024-06-27 NOTE — PROGRESS NOTES
Subjective:    CC: Sandra Auguste is seen today  for memory problems and a new complaint of stabbing headaches    Current visit-patient last saw me in 2022.  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  She states that her memory was improving up until she sustained a fall in early April hitting the side of her ribs/head.  After that she had a CT head that showed chronic ischemic changes as well as mild bifrontal atrophy but no acute abnormalities.  She has been using her CPAP but was told 2 years ago by Dr. Hill that she would need a new sleep study however was unable to get it.  She was having sleep disturbances but more recently was put on Viibryd for her mood that helps with her sleep some.  She has been waking up with sharp stabbing headaches that last for a few seconds in different parts of her head with occasional nausea, photophobia, phonophobia and mild blurred vision.  She tries not to take any medications for them.  Her diabetes is well-controlled and her last A1c was 6.4.  LDL was 138.  Blood pressure is well-controlled without medications.    Last visit-patient had to cancel her neuropsych evaluation in January due to snow.  It is rescheduled for July.  She still has some forgetfulness and short-term memory problems.  Continues to be stressed out.  Has also been having difficulties sleeping at night due to being unable to relax and being overly tired.  Does use her CPAP for her KELLY and takes hydroxyzine.  Her diabetes is fairly well controlled and her last A1c was 7.2.  Her PCP has started her on Trulicity which she will start taking from today.  She also went to  recently with neck pain, back pain and right arm/hand pain.  Had x-rays of the cervical and lumbar spine that showed multilevel degenerative changes.  She saw a physiatrist who has  ordered a MRI cervical and lumbar spine for her.  He told her that she may have tendinitis in her right wrist.    Last visit-this is a patient previously seen by [facility] for memory problems.  Patient started noticing the symptoms in 2012 following a concussion marked by forgetfulness and word finding problems.  Over time her symptoms have remained stable.  She denies any impairment of ADLs including driving.  She resides with her mother and helps take care of her.  She has also had symptoms of anxiety and depression.  At her last visit patient scored a MMSE of 29/30 and it was felt that her cognitive symptoms could be in part due to her underlying anxiety and depression as well as some of her medications including Xanax.  Her neuropsych evaluation at  is scheduled for January 22.  Patient also had an MRI brain that showed right frontal atrophy as well as chronic ischemic changes but no acute intracranial abnormalities.  She also had blood work including TSH and B12 level that were both within normal limits.  She has been getting speech therapy which seems to be helping.  Wants to continue getting it.  Of note-I personally reviewed her MRI brain and [facility]'s notes as follows-    Sandra Auguste is a 60 y.o. female who comes to clinic today for evaluation of memory loss . She has noted symptoms since at least 2012 following a concussion, marked initially by forgetfulness, repetitiveness, and word-finding difficulties. This has remained static over time. Additional symptoms have included impairments in both short and long term memory as well as executive function. There have been associated symptoms of significant anxiety and depression. She denies impairments in ADL's. She manages her medications and finances, though is having more difficulty doing so. She continues to drive.  She is currently residing with her mother in [place], for whom she is the primary caregiver.     She has also noted headaches for several years,  though the history is a bit unclear. She describes intermittent sharp shooting unilateral headachs with associated nausea as well as light and sound sensitivity. These typically last for several seconds. She also notes a history of long migraines. These headaches vary in location and character and typically occur 1-2 times a month, lasting up to several days. Her headaches are worse with increased stress.     Prior evaluation has included an MRI of the brain in 2017 which was unremarkable .        The following portions of the patient's history were reviewed today and updated as of 08/09/2021  : allergies, current medications, past family history, past medical history, past social history, past surgical history and problem list  These document will be scanned to patient's chart.      Current Outpatient Medications:     acetaminophen (TYLENOL) 500 MG tablet, Take 1 tablet by mouth Every 6 (Six) Hours As Needed for Mild Pain., Disp: , Rfl:     albuterol sulfate  (90 Base) MCG/ACT inhaler, Inhale 2 puffs Every 6 (Six) Hours As Needed for Wheezing or Shortness of Air., Disp: 18 g, Rfl: 1    Blood Glucose Monitoring Suppl (OneTouch Verio Flex System) w/Device kit, 1 kit Daily., Disp: 1 kit, Rfl: 0    Blood Glucose Monitoring Suppl (OneTouch Verio) w/Device kit, 1 kit Daily., Disp: 1 kit, Rfl: 0    Cholecalciferol (Vitamin D3) 50 MCG (2000 UT) capsule, Take 1 capsule by mouth Daily., Disp: 90 capsule, Rfl: 1    Diclofenac Sodium (VOLTAREN) 1 % gel gel, Apply  topically to the appropriate area as directed 4 (Four) Times a Day As Needed (arthralgias, shoulders and back.)., Disp: 100 g, Rfl: 1    dicyclomine (BENTYL) 10 MG capsule, Take 1 capsule by mouth Every 6 (Six) Hours As Needed for Abdominal Cramping., Disp: 30 capsule, Rfl: 0    empagliflozin (Jardiance) 10 MG tablet tablet, TAKE 1 TABLET BY MOUTH DAILY, Disp: 90 tablet, Rfl: 1    EPINEPHrine (EPIPEN) 0.3 MG/0.3ML solution auto-injector injection, Inject 0.3  mL into the appropriate muscle as directed by prescriber As Needed (FOR SEVERE ALLERGIC REACTION)., Disp: 1 each, Rfl: 1    Fluticasone Furoate-Vilanterol (Breo Ellipta) 200-25 MCG/ACT inhaler, Inhale 1 puff Daily., Disp: 60 each, Rfl: 1    glucose blood (OneTouch Verio) test strip, USE AS INSTRUCTED TO CHECK BLOOD GLUCOSE ONCE DAILY DX:E11.65, Disp: 100 each, Rfl: 3    glucose monitor monitoring kit, Use as directed to check blood glucose once daily., Disp: 1 each, Rfl: 0    hydrOXYzine (ATARAX) 25 MG tablet, Take 1 tablet by mouth Every 8 (Eight) Hours As Needed for Anxiety., Disp: 90 tablet, Rfl: 2    Lancets (onetouch ultrasoft) lancets, Use one lancet daily to check blood sugars, Disp: 100 each, Rfl: 3    loratadine (CLARITIN) 10 MG tablet, TAKE 1 TABLET BY MOUTH DAILY, Disp: 30 tablet, Rfl: 5    multivitamin with minerals tablet tablet, Take 1 tablet by mouth Daily., Disp: , Rfl:     nitrofurantoin, macrocrystal-monohydrate, (MACROBID) 100 MG capsule, Take 1 capsule by mouth 2 (Two) Times a Day., Disp: , Rfl:     ondansetron ODT (ZOFRAN-ODT) 4 MG disintegrating tablet, Place 1 tablet on the tongue Every 6 (Six) Hours As Needed for Nausea or Vomiting for up to 15 doses., Disp: 15 tablet, Rfl: 0    pantoprazole (PROTONIX) 40 MG EC tablet, Take 1 tablet by mouth Daily., Disp: 90 tablet, Rfl: 1    polyethylene glycol (MIRALAX) 17 GM/SCOOP powder, Take 17 g by mouth Daily., Disp: 578 g, Rfl: 1    sodium chloride (Ocean Nasal Spray) 0.65 % nasal spray, 1 spray into the nostril(s) as directed by provider As Needed for Congestion., Disp: 60 mL, Rfl: 0    traMADol (ULTRAM) 50 MG tablet, Take 1 tablet by mouth Every 6 (Six) Hours As Needed for Moderate Pain for up to 15 doses., Disp: 15 tablet, Rfl: 0    vilazodone (Viibryd) 20 MG tablet tablet, Take 1 tablet by mouth Daily., Disp: 30 tablet, Rfl: 0    indomethacin (INDOCIN) 25 MG capsule, Take 1 capsule 3 times daily for 1 week, then 2 capsules 3 times daily for 1  "week, then 1 capsule 3 times daily for 1 week then stop, Disp: 84 capsule, Rfl: 5   Past Medical History:   Diagnosis Date    Anesthesia complication     HAS TROUBLE GETTING \"NUMB\"     Anxiety     Arthritis     on Celebrex + Robaxin, no steroids    Asthma     does not follow w/ pulmonary    Bell palsy     not sure    Bladder spasms     Cluster headache ?    ?    CTS (carpal tunnel syndrome)     Depression     w/ PTSD    Diverticulosis     Dyspepsia     Fatigue     GERD (gastroesophageal reflux disease)     controlled w/ nightly Pepcid, EGD 2020 w/ Dr. Chavarria, (+) HH    Headache, tension-type     long hair that was pulled in desk    Health care maintenance 02/25/2020    Memory loss     FROM SKULL FRACTURE AND FALL-SHORT TERM MEMORY LOSS    Migraine     Morbid obesity     PTSD (post-traumatic stress disorder)     Seasonal allergies     Seizures     \"convulsions\" at age 5    Skull fracture 2012    Sleep apnea with use of continuous positive airway pressure (CPAP)     CPAP compliant    SOB (shortness of breath) on exertion     Tendonitis     Type 2 diabetes mellitus without complication, without long-term current use of insulin     dx 2021, no insulin, A1C <7    Vision loss     Wears contact lenses     Wears partial dentures     TOP ONLY     Wears prescription eyeglasses       Past Surgical History:   Procedure Laterality Date    CERVICAL POLYPECTOMY      COLONOSCOPY  2021    HYSTERECTOMY      OOPHORECTOMY      TOTAL LAPAROSCOPIC HYSTERECTOMY N/A 09/18/2017    Procedure: TOTAL LAPAROSCOPIC HYSTERECTOMY, BILATERAL SALPINGO OOPHORECTOMY WITH DAVINCI ROBOT ;  Surgeon: Shannon Grimaldo DO;  Location: [facility];  Service:     WISDOM TOOTH EXTRACTION        Family History   Problem Relation Age of Onset    Cancer Mother         cervical cancer    Dementia Mother     Hypertension Mother     Anxiety disorder Mother     Obesity Father     Hypertension Father     Heart attack Father     Sleep apnea Father     Depression Father  " "   Sleep apnea Sister     ADD / ADHD Sister     Hypertension Brother     Sleep apnea Brother     Cancer Maternal Aunt         lymphoma    Cancer Paternal Aunt     Breast cancer Paternal Aunt 70    Cancer Maternal Grandmother     Alcohol abuse Maternal Grandfather     Obesity Paternal Grandmother     Diabetes Paternal Grandmother     Hypertension Paternal Grandmother     Stroke Paternal Grandmother     Heart attack Paternal Grandmother     Heart attack Paternal Grandfather     Alcohol abuse Other     Depression Other     ADD / ADHD Brother     Alcohol abuse Brother     Seizures Brother         When he was young, not since he had tonsles removed.    Dementia Sister       Social History     Socioeconomic History    Marital status: Single   Tobacco Use    Smoking status: Never     Passive exposure: Never    Smokeless tobacco: Never   Vaping Use    Vaping status: Never Used   Substance and Sexual Activity    Alcohol use: Not Currently     Comment: once or twice a year    Drug use: Never    Sexual activity: Not Currently     Partners: Male     Birth control/protection: Abstinence, Post-menopausal, Hysterectomy, Surgical     Review of Systems   Musculoskeletal:  Positive for back pain and neck pain.   Neurological:  Positive for memory problem.   All other systems reviewed and are negative.      Objective:    /80   Pulse 82   Ht 167.6 cm (66\")   Wt 110 kg (243 lb)   LMP  (LMP Unknown)   SpO2 97%   BMI 39.22 kg/m²     Neurology Exam:    Mental Status   Morbidly obese.  No tenderness to palpation of her scalp or temporal regions with good temporal artery pulsations present.  Oriented to person, place, and time.   Memory-intact.  MoCA of 25/30 today with a delayed recall of 6/6.  MMSE 29/30 previously.  Delayed recall of 3/3 at this visit.  Attention: normal.   Speech: speech is normal   Level of consciousness: alert  Able to perform simple calculations.   Able to name object. Able to read. Able to repeat. Able to " write. Normal comprehension.     Cranial Nerves   Cranial nerves II through XII intact.     Motor Exam   Muscle bulk: normal  Overall muscle tone: normal    Strength   Strength 5/5 throughout.     Sensory Exam   Light touch normal.     Gait, Coordination, and Reflexes     Gait  Gait: normal    Coordination   Finger to nose coordination: normal  Heel to shin coordination: normal    Tremor   Resting tremor: absent    Reflexes   Right brachioradialis: 2+  Left brachioradialis: 2+  Right biceps: 2+  Left biceps: 2+  Right patellar: 2+  Left patellar: 2+    Romberg negative      Assessment and Plan:  1.  Mild cognitive impairment  Her mild cognitive problems could be due to underlying anxiety, depression and sleep apnea as confirmed by neuropsychological evaluation  -She is currently taking Viibryd/hydroxyzine for her mood which helps with her sleep  She should continue carrying out both physical and mental exercises such as reading, solving crossword puzzles etc.  I have also told her to start vitamin B12/magnesium supplements    2.  Stabbing headaches  Which could be due to sleep apnea  I will give her a trial for indomethacin (25 mg 3 times daily for 1 week then 50 mg 3 times daily for 1 week then 25 mg 3 times daily for 1 week)  If headaches recur she could be started on Topamax    3.  Obstructive sleep apnea  I will give her a sleep medicine referral       Return in about 2 months (around 8/27/2024).     I spent 40 minutes with the patient out of which over 30 minutes were spent face to face.  I also reviewed her test results from     Laura Atkins MD

## 2024-06-28 ENCOUNTER — TELEPHONE (OUTPATIENT)
Dept: NEUROLOGY | Facility: CLINIC | Age: 64
End: 2024-06-28
Payer: COMMERCIAL

## 2024-06-28 ENCOUNTER — TELEPHONE (OUTPATIENT)
Dept: SLEEP MEDICINE | Facility: CLINIC | Age: 64
End: 2024-06-28

## 2024-06-28 ENCOUNTER — TELEPHONE (OUTPATIENT)
Dept: INTERNAL MEDICINE | Facility: CLINIC | Age: 64
End: 2024-06-28

## 2024-06-28 ENCOUNTER — TELEMEDICINE (OUTPATIENT)
Dept: INTERNAL MEDICINE | Facility: CLINIC | Age: 64
End: 2024-06-28
Payer: COMMERCIAL

## 2024-06-28 DIAGNOSIS — I10 PRIMARY HYPERTENSION: ICD-10-CM

## 2024-06-28 DIAGNOSIS — R93.89 ABNORMAL FINDING ON IMAGING: ICD-10-CM

## 2024-06-28 DIAGNOSIS — E11.9 TYPE 2 DIABETES MELLITUS WITHOUT COMPLICATION, WITHOUT LONG-TERM CURRENT USE OF INSULIN: ICD-10-CM

## 2024-06-28 DIAGNOSIS — E78.2 MIXED HYPERLIPIDEMIA: ICD-10-CM

## 2024-06-28 DIAGNOSIS — R41.3 MEMORY IMPAIRMENT: Primary | ICD-10-CM

## 2024-06-28 DIAGNOSIS — R10.9 ABDOMINAL DISCOMFORT: ICD-10-CM

## 2024-06-28 DIAGNOSIS — R26.81 GAIT INSTABILITY: ICD-10-CM

## 2024-06-28 PROCEDURE — 99214 OFFICE O/P EST MOD 30 MIN: CPT | Performed by: FAMILY MEDICINE

## 2024-06-28 PROCEDURE — 1159F MED LIST DOCD IN RCRD: CPT | Performed by: FAMILY MEDICINE

## 2024-06-28 PROCEDURE — 1125F AMNT PAIN NOTED PAIN PRSNT: CPT | Performed by: FAMILY MEDICINE

## 2024-06-28 PROCEDURE — 3044F HG A1C LEVEL LT 7.0%: CPT | Performed by: FAMILY MEDICINE

## 2024-06-28 PROCEDURE — 1160F RVW MEDS BY RX/DR IN RCRD: CPT | Performed by: FAMILY MEDICINE

## 2024-06-28 NOTE — TELEPHONE ENCOUNTER
Pharmacy Name:  [id number] [place] [facility] [phone] [facility] [phone]      Reference Number (if applicable): NA    Pharmacy representative phone number:[phone]    What medication are you calling in regards to: INDOMETHACIN    What question does the pharmacy have: PHARMACY IS REQUESTING A CALL BACK FOR CLARIFICATION ON MEDICATION.    Who is the provider that prescribed the medication: CHIN    PLEASE REVIEW    THANK YOU

## 2024-06-28 NOTE — TELEPHONE ENCOUNTER
Pt seen Dr. Aktins yesterday, states they told her nothing concerning, but did put in referral to sleep med for sleep study, was told to start taking magnesium. Pt is concerned w/ the result of CT, and if ok to take the magnesium daily. Pt is wanting to know if should see another neurologist. States other providers seemed concerned w/ result of CT but Dr. Atkins didn't seem to be concerned, please advise.

## 2024-06-28 NOTE — TELEPHONE ENCOUNTER
Spoke with pharmacy and decided on zero refills.  Will go over with Dr. Atkins when she comes back.

## 2024-06-28 NOTE — TELEPHONE ENCOUNTER
Caller: MADIE EVANS     Relationship to Patient: SELF    Phone Number: [phone]     Reason for Call: THE PT WAS INFORMED BY HER NEURO DR TO GET A SLEEP STUDY AND TO SCHEDULE A SOONER APPT. PLEASE ADVISE     When was the patient last seen: 02/28/24

## 2024-06-28 NOTE — PROGRESS NOTES
"    Office Note     Name: Sandra Auguste    : 1960     MRN: [id number]     Chief Complaint  CT results  and Memory Loss    Subjective     History of Present Illness:  Sandra Auguste is a 63 y.o. female who presents today for telephone/video visit  You have chosen to receive care through a telehealth visit.  Do you consent to use a video/audio connection for your medical care today? Yes  [place]   Doctor in [place]    Concerns for CT imaging post Neurology consult  Impression:  Brain atrophy with chronic microvascular ischemic changes  No acute intracranial abnormality    Was told possibly from sleep apnea and is due for sleep study  Was put on medications due to sharp pains  - indomethacin   1000mg B12  and magnesium 400mg daily     Has been having stomach issues due to stress   Recommending hydroxyzine   Blood sugars at home stable   Will start checking BP at home   Would like to work with nutrition for better diet recommendations for diabetes    Seen by eye specialist as well - macular degeneration     Would like cognitive therapy - speech therapy   Concerns for memory and family hx of dementia     Review of Systems:   Review of Systems   Constitutional:  Negative for chills and fever.   Respiratory:  Negative for cough and shortness of breath.    Cardiovascular:  Negative for chest pain, palpitations and leg swelling.   Gastrointestinal:  Positive for abdominal pain. Negative for nausea and vomiting.        Abdominal discomfort due to stress and nerves    Neurological:  Positive for dizziness and headache. Negative for light-headedness.   Psychiatric/Behavioral:  Positive for dysphoric mood and stress. The patient is nervous/anxious.        Past Medical History:   Past Medical History:   Diagnosis Date    Anesthesia complication     HAS TROUBLE GETTING \"NUMB\"     Anxiety     Arthritis     on Celebrex + Robaxin, no steroids    Asthma     does not follow w/ pulmonary    Bell palsy     not sure " "   Bladder spasms     Cluster headache ?    ?    CTS (carpal tunnel syndrome)     Depression     w/ PTSD    Diverticulosis     Dyspepsia     Fatigue     GERD (gastroesophageal reflux disease)     controlled w/ nightly Pepcid, EGD 2020 w/ Dr. Chavarria, (+) HH    Headache, tension-type     long hair that was pulled in desk    Health care maintenance 02/25/2020    Memory loss     FROM SKULL FRACTURE AND FALL-SHORT TERM MEMORY LOSS    Migraine     Morbid obesity     PTSD (post-traumatic stress disorder)     Seasonal allergies     Seizures     \"convulsions\" at age 5    Skull fracture 2012    Sleep apnea with use of continuous positive airway pressure (CPAP)     CPAP compliant    SOB (shortness of breath) on exertion     Tendonitis     Type 2 diabetes mellitus without complication, without long-term current use of insulin     dx 2021, no insulin, A1C <7    Vision loss     Wears contact lenses     Wears partial dentures     TOP ONLY     Wears prescription eyeglasses        Past Surgical History:   Past Surgical History:   Procedure Laterality Date    CERVICAL POLYPECTOMY      COLONOSCOPY  2021    HYSTERECTOMY      OOPHORECTOMY      TOTAL LAPAROSCOPIC HYSTERECTOMY N/A 09/18/2017    Procedure: TOTAL LAPAROSCOPIC HYSTERECTOMY, BILATERAL SALPINGO OOPHORECTOMY WITH DAVINCI ROBOT ;  Surgeon: Shannon Grimaldo DO;  Location: [facility];  Service:     WISDOM TOOTH EXTRACTION         Immunizations:   Immunization History   Administered Date(s) Administered    COVID-19 (PFIZER) Purple Cap Monovalent 03/17/2021, 04/07/2021    Flu Vaccine Quad PF >36MO 09/19/2016, 10/18/2017    Fluzone (or Fluarix & Flulaval for VFC) >6mos 09/24/2021, 10/26/2022, 12/08/2023    Hepatitis B 07/15/2021    Pneumococcal Polysaccharide (PPSV23) 02/24/2020    Shingrix 11/16/2020, 01/23/2021, 01/31/2021    Tdap 07/15/2021    Tetanus 09/06/2012    flucelvax quad pfs =>4 YRS 12/20/2019        Medications:     Current Outpatient Medications:     acetaminophen " (TYLENOL) 500 MG tablet, Take 1 tablet by mouth Every 6 (Six) Hours As Needed for Mild Pain., Disp: , Rfl:     albuterol sulfate  (90 Base) MCG/ACT inhaler, Inhale 2 puffs Every 6 (Six) Hours As Needed for Wheezing or Shortness of Air., Disp: 18 g, Rfl: 1    Blood Glucose Monitoring Suppl (OneTouch Verio Flex System) w/Device kit, 1 kit Daily., Disp: 1 kit, Rfl: 0    Blood Glucose Monitoring Suppl (OneTouch Verio) w/Device kit, 1 kit Daily., Disp: 1 kit, Rfl: 0    Cholecalciferol (Vitamin D3) 50 MCG (2000 UT) capsule, Take 1 capsule by mouth Daily., Disp: 90 capsule, Rfl: 1    Diclofenac Sodium (VOLTAREN) 1 % gel gel, Apply  topically to the appropriate area as directed 4 (Four) Times a Day As Needed (arthralgias, shoulders and back.)., Disp: 100 g, Rfl: 1    dicyclomine (BENTYL) 10 MG capsule, Take 1 capsule by mouth Every 6 (Six) Hours As Needed for Abdominal Cramping., Disp: 30 capsule, Rfl: 0    empagliflozin (Jardiance) 10 MG tablet tablet, TAKE 1 TABLET BY MOUTH DAILY, Disp: 90 tablet, Rfl: 1    EPINEPHrine (EPIPEN) 0.3 MG/0.3ML solution auto-injector injection, Inject 0.3 mL into the appropriate muscle as directed by prescriber As Needed (FOR SEVERE ALLERGIC REACTION)., Disp: 1 each, Rfl: 1    Fluticasone Furoate-Vilanterol (Breo Ellipta) 200-25 MCG/ACT inhaler, Inhale 1 puff Daily., Disp: 60 each, Rfl: 1    glucose blood (OneTouch Verio) test strip, USE AS INSTRUCTED TO CHECK BLOOD GLUCOSE ONCE DAILY DX:E11.65, Disp: 100 each, Rfl: 3    glucose monitor monitoring kit, Use as directed to check blood glucose once daily., Disp: 1 each, Rfl: 0    hydrOXYzine (ATARAX) 25 MG tablet, Take 1 tablet by mouth Every 8 (Eight) Hours As Needed for Anxiety., Disp: 90 tablet, Rfl: 2    indomethacin (INDOCIN) 25 MG capsule, Take 1 capsule 3 times daily for 1 week, then 2 capsules 3 times daily for 1 week, then 1 capsule 3 times daily for 1 week then stop, Disp: 84 capsule, Rfl: 5    Lancets (onetouch ultrasoft)  lancets, Use one lancet daily to check blood sugars, Disp: 100 each, Rfl: 3    loratadine (CLARITIN) 10 MG tablet, TAKE 1 TABLET BY MOUTH DAILY, Disp: 30 tablet, Rfl: 5    multivitamin with minerals tablet tablet, Take 1 tablet by mouth Daily., Disp: , Rfl:     nitrofurantoin, macrocrystal-monohydrate, (MACROBID) 100 MG capsule, Take 1 capsule by mouth 2 (Two) Times a Day., Disp: , Rfl:     ondansetron ODT (ZOFRAN-ODT) 4 MG disintegrating tablet, Place 1 tablet on the tongue Every 6 (Six) Hours As Needed for Nausea or Vomiting for up to 15 doses., Disp: 15 tablet, Rfl: 0    pantoprazole (PROTONIX) 40 MG EC tablet, Take 1 tablet by mouth Daily., Disp: 90 tablet, Rfl: 1    polyethylene glycol (MIRALAX) 17 GM/SCOOP powder, Take 17 g by mouth Daily., Disp: 578 g, Rfl: 1    sodium chloride (Ocean Nasal Spray) 0.65 % nasal spray, 1 spray into the nostril(s) as directed by provider As Needed for Congestion., Disp: 60 mL, Rfl: 0    traMADol (ULTRAM) 50 MG tablet, Take 1 tablet by mouth Every 6 (Six) Hours As Needed for Moderate Pain for up to 15 doses., Disp: 15 tablet, Rfl: 0    vilazodone (Viibryd) 20 MG tablet tablet, Take 1 tablet by mouth Daily., Disp: 30 tablet, Rfl: 0    Allergies:   Allergies   Allergen Reactions    Mold Extract [Trichophyton] Shortness Of Breath, Anxiety, Palpitations and Irritability    Lortab [Hydrocodone-Acetaminophen] Nausea And Vomiting    Pollen Extract Other (See Comments)     SNEEZING AND CONGESTION        Family History:   Family History   Problem Relation Age of Onset    Cancer Mother         cervical cancer    Dementia Mother     Hypertension Mother     Anxiety disorder Mother     Obesity Father     Hypertension Father     Heart attack Father     Sleep apnea Father     Depression Father     Sleep apnea Sister     ADD / ADHD Sister     Hypertension Brother     Sleep apnea Brother     Cancer Maternal Aunt         lymphoma    Cancer Paternal Aunt     Breast cancer Paternal Aunt 70    Cancer  "Maternal Grandmother     Alcohol abuse Maternal Grandfather     Obesity Paternal Grandmother     Diabetes Paternal Grandmother     Hypertension Paternal Grandmother     Stroke Paternal Grandmother     Heart attack Paternal Grandmother     Heart attack Paternal Grandfather     Alcohol abuse Other     Depression Other     ADD / ADHD Brother     Alcohol abuse Brother     Seizures Brother         When he was young, not since he had tonsles removed.    Dementia Sister        Social History:   Social History     Socioeconomic History    Marital status: Single   Tobacco Use    Smoking status: Never     Passive exposure: Never    Smokeless tobacco: Never   Vaping Use    Vaping status: Never Used   Substance and Sexual Activity    Alcohol use: Not Currently     Comment: once or twice a year    Drug use: Never    Sexual activity: Not Currently     Partners: Male     Birth control/protection: Abstinence, Post-menopausal, Hysterectomy, Surgical         Objective     Vital Signs  There were no vitals taken for this visit.  Estimated body mass index is 39.22 kg/m² as calculated from the following:    Height as of 6/27/24: 167.6 cm (66\").    Weight as of 6/27/24: 110 kg (243 lb).            Physical Exam  Constitutional:       Comments: Video visit    Neurological:      Mental Status: She is alert.          Procedures     Assessment and Plan   Diagnosis Discussed   Continue to monitor   Plenty of fluids, monitor diet and exercise   Follow up with Nutrition as directed- DM/HPL   Follow up with PT    Follow up with Sleep medicine   Take medications as instructed  Follow up as directed   If symptoms worsen or persist please seek further evaluation     1. Memory impairment  Will look into cognitive therapy again as directed- will notify if referral needed     2. Abnormal finding on imaging  Discussed today- agree with neurology- follow up with Sleep medicine for further evaluation- CPAP     3. Abdominal discomfort  Stress related- " hydroxyzine as needed     4. Mixed hyperlipidemia  Continue current medication- work with nutrition for better diet and exercise     5. Primary hypertension  Continue current medications- work with nutrition for better diet and exercise     6. Type 2 diabetes mellitus without complication, without long-term current use of insulin  Continue current medications- work with nutrition for better diet and exercise     7. Gait instability  - Ambulatory Referral to Physical Therapy       Follow Up  No follow-ups on file.    Em Baum MD  [facility]  [address] [place] [id number] [phone]

## 2024-07-01 ENCOUNTER — TELEPHONE (OUTPATIENT)
Dept: INTERNAL MEDICINE | Facility: CLINIC | Age: 64
End: 2024-07-01
Payer: COMMERCIAL

## 2024-07-01 DIAGNOSIS — I10 PRIMARY HYPERTENSION: ICD-10-CM

## 2024-07-01 DIAGNOSIS — E11.9 TYPE 2 DIABETES MELLITUS WITHOUT COMPLICATION, WITHOUT LONG-TERM CURRENT USE OF INSULIN: Primary | ICD-10-CM

## 2024-07-01 DIAGNOSIS — E78.2 MIXED HYPERLIPIDEMIA: ICD-10-CM

## 2024-07-01 NOTE — TELEPHONE ENCOUNTER
PATIENT STATES THAT SHE HAS A REFERRAL FOR [facility] NUTRITION. SHE NEEDS THIS TO BE CLASSIFIED FOR DIABETES AND CHOLESTEROL SO HER INSURANCE WILL COVER IT.    IF THE CLASSIFICATION IS NOT CORRECT THERE IS A FEE.

## 2024-07-03 NOTE — PATIENT INSTRUCTIONS
Diagnosis Discussed   Continue to monitor   Plenty of fluids, monitor diet and exercise   Follow up with Nutrition as directed- DM/HPL   Follow up with PT    Follow up with Sleep medicine   Take medications as instructed  Follow up as directed   If symptoms worsen or persist please seek further evaluation

## 2024-07-04 PROBLEM — G31.84 MILD COGNITIVE IMPAIRMENT: Status: ACTIVE | Noted: 2024-05-02

## 2024-07-22 ENCOUNTER — OFFICE VISIT (OUTPATIENT)
Dept: SLEEP MEDICINE | Facility: CLINIC | Age: 64
End: 2024-07-22
Payer: COMMERCIAL

## 2024-07-22 VITALS
HEIGHT: 66 IN | SYSTOLIC BLOOD PRESSURE: 132 MMHG | OXYGEN SATURATION: 96 % | TEMPERATURE: 98.7 F | BODY MASS INDEX: 40.5 KG/M2 | DIASTOLIC BLOOD PRESSURE: 80 MMHG | HEART RATE: 82 BPM | WEIGHT: 252 LBS

## 2024-07-22 DIAGNOSIS — G47.33 OSA (OBSTRUCTIVE SLEEP APNEA): Primary | ICD-10-CM

## 2024-07-22 PROCEDURE — 99213 OFFICE O/P EST LOW 20 MIN: CPT | Performed by: NURSE PRACTITIONER

## 2024-07-22 PROCEDURE — 3075F SYST BP GE 130 - 139MM HG: CPT | Performed by: NURSE PRACTITIONER

## 2024-07-22 PROCEDURE — 3079F DIAST BP 80-89 MM HG: CPT | Performed by: NURSE PRACTITIONER

## 2024-07-22 NOTE — PROGRESS NOTES
Chief Complaint:   Chief Complaint   Patient presents with    Follow-up    Sleep Apnea       HPI:    Sandra Auguste is a 63 y.o. female here for follow-up of sleep apnea.  Patient was last seen 2/28/2023.  Patient has original AHI of 26.5.  Patient machine 5+ years old and does need an order today for a new 1.  We were unable to get a download today.  She is sleeping 7 to 8 hours nightly and goes to sleep easily.  She does toss and turn some during the night.  Patient Betzer Hayes score at 7/24.  Patient does understand insurance may require a new study and if this is the case we will get this ordered otherwise she will get a prescription and get a new machine.        Current medications are:   Current Outpatient Medications:     acetaminophen (TYLENOL) 500 MG tablet, Take 1 tablet by mouth Every 6 (Six) Hours As Needed for Mild Pain., Disp: , Rfl:     albuterol sulfate  (90 Base) MCG/ACT inhaler, Inhale 2 puffs Every 6 (Six) Hours As Needed for Wheezing or Shortness of Air., Disp: 18 g, Rfl: 1    Blood Glucose Monitoring Suppl (OneTouch Verio Flex System) w/Device kit, 1 kit Daily., Disp: 1 kit, Rfl: 0    Blood Glucose Monitoring Suppl (OneTouch Verio) w/Device kit, 1 kit Daily., Disp: 1 kit, Rfl: 0    Cholecalciferol (Vitamin D3) 50 MCG (2000 UT) capsule, Take 1 capsule by mouth Daily., Disp: 90 capsule, Rfl: 1    Diclofenac Sodium (VOLTAREN) 1 % gel gel, Apply  topically to the appropriate area as directed 4 (Four) Times a Day As Needed (arthralgias, shoulders and back.)., Disp: 100 g, Rfl: 1    dicyclomine (BENTYL) 10 MG capsule, Take 1 capsule by mouth Every 6 (Six) Hours As Needed for Abdominal Cramping., Disp: 30 capsule, Rfl: 0    empagliflozin (Jardiance) 10 MG tablet tablet, TAKE 1 TABLET BY MOUTH DAILY, Disp: 90 tablet, Rfl: 1    EPINEPHrine (EPIPEN) 0.3 MG/0.3ML solution auto-injector injection, Inject 0.3 mL into the appropriate muscle as directed by prescriber As Needed (FOR SEVERE ALLERGIC  REACTION)., Disp: 1 each, Rfl: 1    Fluticasone Furoate-Vilanterol (Breo Ellipta) 200-25 MCG/ACT inhaler, Inhale 1 puff Daily., Disp: 60 each, Rfl: 1    glucose blood (OneTouch Verio) test strip, USE AS INSTRUCTED TO CHECK BLOOD GLUCOSE ONCE DAILY DX:E11.65, Disp: 100 each, Rfl: 3    glucose monitor monitoring kit, Use as directed to check blood glucose once daily., Disp: 1 each, Rfl: 0    hydrOXYzine (ATARAX) 25 MG tablet, Take 1 tablet by mouth Every 8 (Eight) Hours As Needed for Anxiety., Disp: 90 tablet, Rfl: 2    indomethacin (INDOCIN) 25 MG capsule, Take 1 capsule 3 times daily for 1 week, then 2 capsules 3 times daily for 1 week, then 1 capsule 3 times daily for 1 week then stop, Disp: 84 capsule, Rfl: 5    Lancets (onetouch ultrasoft) lancets, Use one lancet daily to check blood sugars, Disp: 100 each, Rfl: 3    loratadine (CLARITIN) 10 MG tablet, TAKE 1 TABLET BY MOUTH DAILY, Disp: 30 tablet, Rfl: 5    multivitamin with minerals tablet tablet, Take 1 tablet by mouth Daily., Disp: , Rfl:     nitrofurantoin, macrocrystal-monohydrate, (MACROBID) 100 MG capsule, Take 1 capsule by mouth 2 (Two) Times a Day., Disp: , Rfl:     ondansetron ODT (ZOFRAN-ODT) 4 MG disintegrating tablet, Place 1 tablet on the tongue Every 6 (Six) Hours As Needed for Nausea or Vomiting for up to 15 doses., Disp: 15 tablet, Rfl: 0    pantoprazole (PROTONIX) 40 MG EC tablet, Take 1 tablet by mouth Daily., Disp: 90 tablet, Rfl: 1    polyethylene glycol (MIRALAX) 17 GM/SCOOP powder, Take 17 g by mouth Daily., Disp: 578 g, Rfl: 1    sodium chloride (Ocean Nasal Spray) 0.65 % nasal spray, 1 spray into the nostril(s) as directed by provider As Needed for Congestion., Disp: 60 mL, Rfl: 0    traMADol (ULTRAM) 50 MG tablet, Take 1 tablet by mouth Every 6 (Six) Hours As Needed for Moderate Pain for up to 15 doses., Disp: 15 tablet, Rfl: 0    vilazodone (Viibryd) 20 MG tablet tablet, Take 1 tablet by mouth Daily., Disp: 30 tablet, Rfl: 0.      The  patient's relevant past medical, surgical, family and social history were reviewed and updated in Epic as appropriate.       Review of Systems   Eyes:  Positive for visual disturbance.   Respiratory:  Positive for apnea.    Cardiovascular:  Positive for palpitations and leg swelling.   Gastrointestinal:         Heartburn   Musculoskeletal:  Positive for arthralgias.   Allergic/Immunologic: Positive for environmental allergies.   Neurological:  Positive for headaches.   Psychiatric/Behavioral:  Positive for dysphoric mood and sleep disturbance. The patient is nervous/anxious.    All other systems reviewed and are negative.        Objective:    Physical Exam  Constitutional:       Appearance: Normal appearance.   HENT:      Head: Normocephalic and atraumatic.   Cardiovascular:      Rate and Rhythm: Normal rate.   Pulmonary:      Effort: Pulmonary effort is normal. No respiratory distress.   Skin:     General: Skin is warm and dry.   Neurological:      Mental Status: She is alert and oriented to person, place, and time.   Psychiatric:         Mood and Affect: Mood normal.         Behavior: Behavior normal.         Thought Content: Thought content normal.         Judgment: Judgment normal.         CPAP Report  No download available today    The patient continues to use and benefit from CPAP therapy.    ASSESSMENT/PLAN    Diagnoses and all orders for this visit:    1. KELLY (obstructive sleep apnea) (Primary)  -     PAP Therapy        Counseled patient regarding multimodal approach with healthy nutrition, healthy sleep, regular physical activity, social activities, counseling, and medications. Encouraged to practice lateral sleep position. Avoid alcohol and sedatives close to bedtime.      Refill supplies x 1 year.  Order for new machine I will see her back in 31 to 90 days.    Signed by  REBECA Madden    July 22, 2024      CC: Em Baum MD Kohli, Supriya, MD

## 2024-07-24 DIAGNOSIS — F33.1 MODERATE EPISODE OF RECURRENT MAJOR DEPRESSIVE DISORDER: ICD-10-CM

## 2024-07-24 DIAGNOSIS — F41.1 GENERALIZED ANXIETY DISORDER: ICD-10-CM

## 2024-07-24 RX ORDER — VILAZODONE HYDROCHLORIDE 20 MG/1
20 TABLET ORAL DAILY
Qty: 30 TABLET | Refills: 0 | OUTPATIENT
Start: 2024-07-24

## 2024-07-30 ENCOUNTER — TELEPHONE (OUTPATIENT)
Dept: INTERNAL MEDICINE | Facility: CLINIC | Age: 64
End: 2024-07-30
Payer: COMMERCIAL

## 2024-07-30 NOTE — TELEPHONE ENCOUNTER
Pt called to inform Chloé that she had the ADHD assessment completed and will bring in a copy to her appointment on 8/1.

## 2024-08-01 ENCOUNTER — OFFICE VISIT (OUTPATIENT)
Dept: BEHAVIORAL HEALTH | Facility: CLINIC | Age: 64
End: 2024-08-01
Payer: COMMERCIAL

## 2024-08-01 VITALS
BODY MASS INDEX: 39.12 KG/M2 | DIASTOLIC BLOOD PRESSURE: 72 MMHG | OXYGEN SATURATION: 97 % | HEART RATE: 88 BPM | WEIGHT: 243.4 LBS | SYSTOLIC BLOOD PRESSURE: 124 MMHG | HEIGHT: 66 IN

## 2024-08-01 DIAGNOSIS — F90.2 ADHD (ATTENTION DEFICIT HYPERACTIVITY DISORDER), COMBINED TYPE: Primary | ICD-10-CM

## 2024-08-01 DIAGNOSIS — F33.1 MODERATE EPISODE OF RECURRENT MAJOR DEPRESSIVE DISORDER: ICD-10-CM

## 2024-08-01 DIAGNOSIS — F41.1 GENERALIZED ANXIETY DISORDER: ICD-10-CM

## 2024-08-01 RX ORDER — VILAZODONE HYDROCHLORIDE 20 MG/1
20 TABLET ORAL DAILY
Qty: 30 TABLET | Refills: 0 | Status: SHIPPED | OUTPATIENT
Start: 2024-08-01

## 2024-08-01 RX ORDER — DEXTROAMPHETAMINE SACCHARATE, AMPHETAMINE ASPARTATE, DEXTROAMPHETAMINE SULFATE AND AMPHETAMINE SULFATE 1.25; 1.25; 1.25; 1.25 MG/1; MG/1; MG/1; MG/1
5 TABLET ORAL DAILY
Qty: 30 TABLET | Refills: 0 | Status: SHIPPED | OUTPATIENT
Start: 2024-08-01

## 2024-08-01 NOTE — PROGRESS NOTES
Follow Up Office Visit      Patient Name: Sandra Auguste  : 1960   MRN: [id number]     Referring Provider: Em Baum MD    Chief Complaint:      ICD-10-CM ICD-9-CM   1. ADHD (attention deficit hyperactivity disorder), combined type  F90.2 314.01   2. Moderate episode of recurrent major depressive disorder  F33.1 296.32   3. Generalized anxiety disorder  F41.1 300.02        History of Present Illness:   Sandra Auguste is a 64 y.o. female who is here today for follow up and medication management    Subjective      Patient Reports: that she brought indicating ADHD diagnoses. She reports that she has been looking forward to this and she continues to be so , fidgety, and has so many things going on in her head, and can't make decisions. She reports that she did forget to take her viibryd several nights in a row while her grand kids were staying with her. She reports that she is concerned about what adhd meds could do to her heart but she is very excited about trying something. Denies SI/HI/AVH.    Review of Systems:   Review of Systems   Constitutional:  Negative for appetite change and unexpected weight change.   Eyes:  Negative for visual disturbance.   Respiratory:  Negative for chest tightness and shortness of breath.    Cardiovascular:  Negative for chest pain.   Musculoskeletal:  Negative for gait problem.   Skin:  Negative for rash and wound.   Neurological:  Negative for tremors, seizures, weakness and light-headedness.   Psychiatric/Behavioral:  Positive for decreased concentration and sleep disturbance. Negative for agitation, behavioral problems, hallucinations, self-injury and suicidal ideas. The patient is not nervous/anxious and is not hyperactive.      Sleep pattern: not great  Appetite: increased appetite     PHQ-9 Depression Screening  Little interest or pleasure in doing things? 2-->more than half the days   Feeling down, depressed, or hopeless? 1-->several days   Trouble falling or  staying asleep, or sleeping too much? 3-->nearly every day   Feeling tired or having little energy? 3-->nearly every day   Poor appetite or overeating? 3-->nearly every day   Feeling bad about yourself - or that you are a failure or have let yourself or your family down? 3-->nearly every day   Trouble concentrating on things, such as reading the newspaper or watching television? 3-->nearly every day   Moving or speaking so slowly that other people could have noticed? Or the opposite - being so fidgety or restless that you have been moving around a lot more than usual? 0-->not at all   Thoughts that you would be better off dead, or of hurting yourself in some way? 0-->not at all   PHQ-9 Total Score 18   If you checked off any problems, how difficult have these problems made it for you to do your work, take care of things at home, or get along with other people? somewhat difficult     LUIS-7 Anxiety Screening  Over the last two weeks, how often have you been bothered by the following problems?  Feeling nervous, anxious or on edge: Several days  Not being able to stop or control worrying: Several days  Worrying too much about different things: More than half the days  Trouble Relaxing: More than half the days  Being so restless that it is hard to sit still: More than half the days  Becoming easily annoyed or irritable: Several days  Feeling afraid as if something awful might happen: Several days  LUIS 7 Total Score: 10  If you checked any problems, how difficult have these problems made it for you to do your work, take care of things at home, or get along with other people: Somewhat difficult    RISK ASSESSMENT:  Patient denies any thoughts or intent of suicide today. Patient denies any impulsive behavior today.     Medications:     Current Outpatient Medications:     albuterol sulfate  (90 Base) MCG/ACT inhaler, Inhale 2 puffs Every 6 (Six) Hours As Needed for Wheezing or Shortness of Air., Disp: 18 g, Rfl: 1     Blood Glucose Monitoring Suppl (OneTouch Verio Flex System) w/Device kit, 1 kit Daily., Disp: 1 kit, Rfl: 0    Blood Glucose Monitoring Suppl (OneTouch Verio) w/Device kit, 1 kit Daily., Disp: 1 kit, Rfl: 0    Cholecalciferol (Vitamin D3) 50 MCG (2000 UT) capsule, Take 1 capsule by mouth Daily., Disp: 90 capsule, Rfl: 1    glucose monitor monitoring kit, Use as directed to check blood glucose once daily., Disp: 1 each, Rfl: 0    hydrOXYzine (ATARAX) 25 MG tablet, Take 1 tablet by mouth Every 8 (Eight) Hours As Needed for Anxiety., Disp: 90 tablet, Rfl: 2    Lancets (onetouch ultrasoft) lancets, Use one lancet daily to check blood sugars, Disp: 100 each, Rfl: 3    loratadine (CLARITIN) 10 MG tablet, TAKE 1 TABLET BY MOUTH DAILY, Disp: 30 tablet, Rfl: 5    pantoprazole (PROTONIX) 40 MG EC tablet, Take 1 tablet by mouth Daily., Disp: 90 tablet, Rfl: 1    traMADol (ULTRAM) 50 MG tablet, Take 1 tablet by mouth Every 6 (Six) Hours As Needed for Moderate Pain for up to 15 doses., Disp: 15 tablet, Rfl: 0    vilazodone (Viibryd) 20 MG tablet tablet, Take 1 tablet by mouth Daily., Disp: 30 tablet, Rfl: 0    acetaminophen (TYLENOL) 500 MG tablet, Take 1 tablet by mouth Every 6 (Six) Hours As Needed for Mild Pain. (Patient not taking: Reported on 8/1/2024), Disp: , Rfl:     amphetamine-dextroamphetamine (Adderall) 5 MG tablet, Take 1 tablet by mouth Daily., Disp: 30 tablet, Rfl: 0    Diclofenac Sodium (VOLTAREN) 1 % gel gel, Apply  topically to the appropriate area as directed 4 (Four) Times a Day As Needed (arthralgias, shoulders and back.). (Patient not taking: Reported on 8/1/2024), Disp: 100 g, Rfl: 1    dicyclomine (BENTYL) 10 MG capsule, Take 1 capsule by mouth Every 6 (Six) Hours As Needed for Abdominal Cramping. (Patient not taking: Reported on 8/1/2024), Disp: 30 capsule, Rfl: 0    empagliflozin (Jardiance) 10 MG tablet tablet, TAKE 1 TABLET BY MOUTH DAILY, Disp: 90 tablet, Rfl: 1    EPINEPHrine (EPIPEN) 0.3 MG/0.3ML  "solution auto-injector injection, Inject 0.3 mL into the appropriate muscle as directed by prescriber As Needed (FOR SEVERE ALLERGIC REACTION)., Disp: 1 each, Rfl: 1    Fluticasone Furoate-Vilanterol (Breo Ellipta) 200-25 MCG/ACT inhaler, Inhale 1 puff Daily., Disp: 60 each, Rfl: 1    glucose blood (OneTouch Verio) test strip, USE AS INSTRUCTED TO CHECK BLOOD GLUCOSE ONCE DAILY DX:E11.65, Disp: 100 each, Rfl: 3    indomethacin (INDOCIN) 25 MG capsule, Take 1 capsule 3 times daily for 1 week, then 2 capsules 3 times daily for 1 week, then 1 capsule 3 times daily for 1 week then stop, Disp: 84 capsule, Rfl: 5    multivitamin with minerals tablet tablet, Take 1 tablet by mouth Daily., Disp: , Rfl:     nitrofurantoin, macrocrystal-monohydrate, (MACROBID) 100 MG capsule, Take 1 capsule by mouth 2 (Two) Times a Day., Disp: , Rfl:     ondansetron ODT (ZOFRAN-ODT) 4 MG disintegrating tablet, Place 1 tablet on the tongue Every 6 (Six) Hours As Needed for Nausea or Vomiting for up to 15 doses., Disp: 15 tablet, Rfl: 0    polyethylene glycol (MIRALAX) 17 GM/SCOOP powder, Take 17 g by mouth Daily., Disp: 578 g, Rfl: 1    sodium chloride (Ocean Nasal Spray) 0.65 % nasal spray, 1 spray into the nostril(s) as directed by provider As Needed for Congestion., Disp: 60 mL, Rfl: 0    Medication Considerations:  SHANTEL reviewed and appropriate.      Allergies:   Allergies   Allergen Reactions    Mold Extract [Trichophyton] Shortness Of Breath, Anxiety, Palpitations and Irritability    Lortab [Hydrocodone-Acetaminophen] Nausea And Vomiting    Pollen Extract Other (See Comments)     SNEEZING AND CONGESTION        Results Reviewed:   Discussed ADHD testing results     Objective     Physical Exam:  Vital Signs:   Vitals:    08/01/24 1253   BP: 124/72   Pulse: 88   SpO2: 97%   Weight: 110 kg (243 lb 6.4 oz)   Height: 167.6 cm (65.98\")     Body mass index is 39.3 kg/m².     Mental Status Exam:   Hygiene:   good  Cooperation:  Cooperative  Eye " Contact:  Good  Psychomotor Behavior:  Appropriate  Affect:  Appropriate  Mood: normal  Speech:  Normal  Thought Process:  Circum  Thought Content:  Normal  Suicidal:  None  Homicidal:  None  Hallucinations:  None  Delusion:  None  Memory:  Intact  Orientation:  Person, Place, Time, and Situation  Reliability:  fair  Insight:  Fair  Judgement:  Fair  Impulse Control:  Fair  Physical/Medical Issues:   see problem list      Assessment / Plan      Visit Diagnosis/Orders Placed This Visit:  Diagnoses and all orders for this visit:    1. ADHD (attention deficit hyperactivity disorder), combined type (Primary)  -     amphetamine-dextroamphetamine (Adderall) 5 MG tablet; Take 1 tablet by mouth Daily.  Dispense: 30 tablet; Refill: 0    2. Moderate episode of recurrent major depressive disorder  -     vilazodone (Viibryd) 20 MG tablet tablet; Take 1 tablet by mouth Daily.  Dispense: 30 tablet; Refill: 0    3. Generalized anxiety disorder  -     vilazodone (Viibryd) 20 MG tablet tablet; Take 1 tablet by mouth Daily.  Dispense: 30 tablet; Refill: 0         Functional Status: Mild impairment     Prognosis: Good with Ongoing Treatment     Impression/Formulation:  Patient appeared alert and oriented.  Patient is voluntarily requesting to continue outpatient psychiatric treatment at [facility].  Patient is receptive to assistance with maintaining a stable lifestyle.  Patient presents with history of     ICD-10-CM ICD-9-CM   1. ADHD (attention deficit hyperactivity disorder), combined type  F90.2 314.01   2. Moderate episode of recurrent major depressive disorder  F33.1 296.32   3. Generalized anxiety disorder  F41.1 300.02     Reviewed patient's previous provider notes. Reviewed most recent labs. Patient meets DSM V diagnostic criteria for diagnoses. Diagnoses may be updated as more information becomes available.       Treatment Plan:   Continue viibryd 20 mg daily  Discussed importance of consistency with  medication  Begin adderall 5 mg daily  Collect controlled substance agreement  Patient will continue supportive psychotherapy efforts and medications as indicated. Clinic will obtain release of information for current treatment team for continuity of care as needed. Patient will contact this office, call 911 or present to the nearest emergency room should suicidal or homicidal ideations occur.  Discussed medication options and treatment plan of prescribed medication(s) as well as the risks, benefits, and potential side effects. Patient acknowledged and verbally consented to continue with current treatment plan and was educated on the importance of compliance with treatment and follow-up appointments.     Patient instructions:  Reviewed patient's SHANTEL. Discussed the risks including side effects, benefits and alternatives of stimulant medication use. We talked about the fact that these medications are schedule II controlled substances as well as the risks for potential addiction. We also discussed how this medication will not be refilled early and the medication should not be shared or taken in any manner outside of as prescribed. We discussed the potential for decreased appetite, weight loss and cardiac effects as well. Also, advised patient to watch for heart racing, palpitations, chest pain, high blood pressure, increased irritability or agitation related to stimulant use.     Follow Up:   Return in about 2 weeks (around 8/15/2024) for Med Check.        REBECA Tao, PMP-BC [facility]

## 2024-08-02 ENCOUNTER — TELEPHONE (OUTPATIENT)
Dept: INTERNAL MEDICINE | Facility: CLINIC | Age: 64
End: 2024-08-02
Payer: COMMERCIAL

## 2024-08-05 ENCOUNTER — OFFICE VISIT (OUTPATIENT)
Dept: INTERNAL MEDICINE | Facility: CLINIC | Age: 64
End: 2024-08-05
Payer: COMMERCIAL

## 2024-08-05 VITALS
DIASTOLIC BLOOD PRESSURE: 76 MMHG | HEART RATE: 76 BPM | OXYGEN SATURATION: 96 % | HEIGHT: 66 IN | BODY MASS INDEX: 40.02 KG/M2 | WEIGHT: 249 LBS | RESPIRATION RATE: 18 BRPM | SYSTOLIC BLOOD PRESSURE: 122 MMHG

## 2024-08-05 DIAGNOSIS — Z02.89 ENCOUNTER FOR PHYSICAL EXAMINATION RELATED TO EMPLOYMENT: Primary | ICD-10-CM

## 2024-08-05 DIAGNOSIS — Z00.00 HEALTHCARE MAINTENANCE: ICD-10-CM

## 2024-08-05 PROCEDURE — 99213 OFFICE O/P EST LOW 20 MIN: CPT | Performed by: FAMILY MEDICINE

## 2024-08-05 PROCEDURE — 1159F MED LIST DOCD IN RCRD: CPT | Performed by: FAMILY MEDICINE

## 2024-08-05 PROCEDURE — 1160F RVW MEDS BY RX/DR IN RCRD: CPT | Performed by: FAMILY MEDICINE

## 2024-08-05 PROCEDURE — 1126F AMNT PAIN NOTED NONE PRSNT: CPT | Performed by: FAMILY MEDICINE

## 2024-08-05 NOTE — PROGRESS NOTES
Office Note     Name: Sandra Auguste    : 1960     MRN: [id number]     Chief Complaint  Diabetes (Follow up, paperwork for job)    Subjective     History of Present Illness  Sandra Auguste is a 64 y.o. female who presents today for follow up- paperwork for new job. 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   Mainly after school -      PMH-  Seasonal allergies   Asthma   HPL   TMJ   DM  Diverticulitis   GERD   Nausea- stress related   Fatty liver   Anemia   LUIS   Depression   PTSD   Osteoarthritis   Migraines   Hx of skull fracture   KELLY- CPAP   Macular degeneration      Meds-  Tylenol as needed   Albuterol as needed   Diclofenac 75mg daily as needed   Diclofenac gel as needed   Dicyclomine 10mg every 6 hours as needed   Jardiance 10mg daily   Epi pen as needed   Breo daily   Hydroxyzine 25mg every 8 hours as needed   Claritin 10mg daily   Multivitamin   Miralax  Nasal spray as needed   Viibryd 20mg daily     Follows with Chloé Lamb NP- ADHD, LUIS  PTSD - currently on new medications     Is able lift at least 50lbs   Denies any serious physical limitations     Review of Systems:   Review of Systems   Constitutional:  Negative for chills and fever.   Respiratory:  Negative for cough and shortness of breath.    Cardiovascular:  Negative for chest pain, palpitations and leg swelling.   Gastrointestinal:  Negative for abdominal pain, blood in stool, constipation, diarrhea, nausea, vomiting and GERD.   Genitourinary:  Negative for dysuria.   Musculoskeletal:  Negative for gait problem.   Skin:  Negative for rash.   Neurological:  Negative for light-headedness and headache.   Psychiatric/Behavioral:  Positive for dysphoric mood. Negative for self-injury and suicidal ideas.        Past Medical History:   Past Medical History:   Diagnosis Date    ADHD (attention deficit hyperactivity disorder)  "07/30/2024    Just diagnosed    Allergic [id number]    Anemia 1984    Anesthesia complication     HAS TROUBLE GETTING \"NUMB\"     Anxiety     Arthritis     on Celebrex + Robaxin, no steroids    Asthma     does not follow w/ pulmonary    Bell palsy     not sure    Bladder spasms     Cluster headache ?    ?    CTS (carpal tunnel syndrome)     Depression     w/ PTSD    Diverticulosis     Dyspepsia     Fatigue     GERD (gastroesophageal reflux disease)     controlled w/ nightly Pepcid, EGD 2020 w/ Dr. Chavarria, (+) HH    Headache, tension-type     long hair that was pulled in desk    Health care maintenance 02/25/2020    Memory loss     FROM SKULL FRACTURE AND FALL-SHORT TERM MEMORY LOSS    Migraine     Morbid obesity     PTSD (post-traumatic stress disorder)     Seasonal allergies     Seizures     \"convulsions\" at age 5    Skull fracture 2012    Sleep apnea with use of continuous positive airway pressure (CPAP)     CPAP compliant    SOB (shortness of breath) on exertion     Tendonitis     Type 2 diabetes mellitus without complication, without long-term current use of insulin     dx 2021, no insulin, A1C <7    Vision loss     Wears contact lenses     Wears partial dentures     TOP ONLY     Wears prescription eyeglasses        Past Surgical History:   Past Surgical History:   Procedure Laterality Date    CERVICAL POLYPECTOMY      COLONOSCOPY  2021    HYSTERECTOMY      OOPHORECTOMY      TOTAL LAPAROSCOPIC HYSTERECTOMY N/A 09/18/2017    Procedure: TOTAL LAPAROSCOPIC HYSTERECTOMY, BILATERAL SALPINGO OOPHORECTOMY WITH DAVINCI ROBOT ;  Surgeon: Shannon Grimaldo DO;  [facility]: [facility];  Service:     WISDOM TOOTH EXTRACTION         Immunizations:   Immunization History   Administered Date(s) Administered    COVID-19 (PFIZER) Purple Cap Monovalent 03/17/2021, 04/07/2021    Flu Vaccine Quad PF >36MO 09/19/2016, 10/18/2017    Fluzone (or Fluarix & Flulaval for VFC) >6mos 09/24/2021, 10/26/2022, 12/08/2023    Hepatitis B 07/15/2021    " Pneumococcal Polysaccharide (PPSV23) 02/24/2020    Shingrix 11/16/2020, 01/23/2021, 01/31/2021    Tdap 07/15/2021    Tetanus 09/06/2012    flucelvax quad pfs =>4 YRS 12/20/2019        Medications:     Current Outpatient Medications:     acetaminophen (TYLENOL) 500 MG tablet, Take 1 tablet by mouth Every 6 (Six) Hours As Needed for Mild Pain., Disp: , Rfl:     albuterol sulfate  (90 Base) MCG/ACT inhaler, Inhale 2 puffs Every 6 (Six) Hours As Needed for Wheezing or Shortness of Air., Disp: 18 g, Rfl: 1    amphetamine-dextroamphetamine (Adderall) 5 MG tablet, Take 1 tablet by mouth Daily., Disp: 30 tablet, Rfl: 0    Blood Glucose Monitoring Suppl (OneTouch Verio) w/Device kit, 1 kit Daily., Disp: 1 kit, Rfl: 0    Cholecalciferol (Vitamin D3) 50 MCG (2000 UT) capsule, Take 1 capsule by mouth Daily., Disp: 90 capsule, Rfl: 1    Diclofenac Sodium (VOLTAREN) 1 % gel gel, Apply  topically to the appropriate area as directed 4 (Four) Times a Day As Needed (arthralgias, shoulders and back.)., Disp: 100 g, Rfl: 1    dicyclomine (BENTYL) 10 MG capsule, Take 1 capsule by mouth Every 6 (Six) Hours As Needed for Abdominal Cramping., Disp: 30 capsule, Rfl: 0    empagliflozin (Jardiance) 10 MG tablet tablet, TAKE 1 TABLET BY MOUTH DAILY, Disp: 90 tablet, Rfl: 1    EPINEPHrine (EPIPEN) 0.3 MG/0.3ML solution auto-injector injection, Inject 0.3 mL into the appropriate muscle as directed by prescriber As Needed (FOR SEVERE ALLERGIC REACTION)., Disp: 1 each, Rfl: 1    Fluticasone Furoate-Vilanterol (Breo Ellipta) 200-25 MCG/ACT inhaler, Inhale 1 puff Daily., Disp: 60 each, Rfl: 1    glucose blood (OneTouch Verio) test strip, USE AS INSTRUCTED TO CHECK BLOOD GLUCOSE ONCE DAILY DX:E11.65, Disp: 100 each, Rfl: 3    glucose monitor monitoring kit, Use as directed to check blood glucose once daily., Disp: 1 each, Rfl: 0    hydrOXYzine (ATARAX) 25 MG tablet, Take 1 tablet by mouth Every 8 (Eight) Hours As Needed for Anxiety., Disp: 90  tablet, Rfl: 2    indomethacin (INDOCIN) 25 MG capsule, Take 1 capsule 3 times daily for 1 week, then 2 capsules 3 times daily for 1 week, then 1 capsule 3 times daily for 1 week then stop, Disp: 84 capsule, Rfl: 5    Lancets (onetouch ultrasoft) lancets, Use one lancet daily to check blood sugars, Disp: 100 each, Rfl: 3    loratadine (CLARITIN) 10 MG tablet, TAKE 1 TABLET BY MOUTH DAILY, Disp: 30 tablet, Rfl: 5    multivitamin with minerals tablet tablet, Take 1 tablet by mouth Daily., Disp: , Rfl:     ondansetron ODT (ZOFRAN-ODT) 4 MG disintegrating tablet, Place 1 tablet on the tongue Every 6 (Six) Hours As Needed for Nausea or Vomiting for up to 15 doses., Disp: 15 tablet, Rfl: 0    pantoprazole (PROTONIX) 40 MG EC tablet, Take 1 tablet by mouth Daily., Disp: 90 tablet, Rfl: 1    polyethylene glycol (MIRALAX) 17 GM/SCOOP powder, Take 17 g by mouth Daily., Disp: 578 g, Rfl: 1    sodium chloride (Ocean Nasal Spray) 0.65 % nasal spray, 1 spray into the nostril(s) as directed by provider As Needed for Congestion., Disp: 60 mL, Rfl: 0    traMADol (ULTRAM) 50 MG tablet, Take 1 tablet by mouth Every 6 (Six) Hours As Needed for Moderate Pain for up to 15 doses., Disp: 15 tablet, Rfl: 0    vilazodone (Viibryd) 20 MG tablet tablet, Take 1 tablet by mouth Daily., Disp: 30 tablet, Rfl: 0    Allergies:   Allergies   Allergen Reactions    Mold Extract [Trichophyton] Shortness Of Breath, Anxiety, Palpitations and Irritability    Lortab [Hydrocodone-Acetaminophen] Nausea And Vomiting    Pollen Extract Other (See Comments)     SNEEZING AND CONGESTION        Family History:   Family History   Problem Relation Age of Onset    Cancer Mother         Cervical /over 10 years ago    Dementia Mother     Hypertension Mother         high energy/living on meds    Anxiety disorder Mother     Obesity Father     Hypertension Father     Heart attack Father     Sleep apnea Father     Depression Father     Sleep apnea Sister     ADD / ADHD  "Sister     Hypertension Brother     Sleep apnea Brother     Cancer Maternal Aunt         lymphoma    Cancer Paternal Aunt     Breast cancer Paternal Aunt 70    Cancer Maternal Grandmother     Alcohol abuse Maternal Grandfather     Obesity Paternal Grandmother     Diabetes Paternal Grandmother     Hypertension Paternal Grandmother     Stroke Paternal Grandmother     Heart attack Paternal Grandmother     Asthma Paternal Grandmother     Heart attack Paternal Grandfather     Alcohol abuse Other     Depression Other     ADD / ADHD Brother     Alcohol abuse Brother     Seizures Brother         When he was young, not since he had tonsles removed.    Dementia Sister        Social History:   Social History     Socioeconomic History    Marital status: Single   Tobacco Use    Smoking status: Never     Passive exposure: Never    Smokeless tobacco: Never   Vaping Use    Vaping status: Never Used   Substance and Sexual Activity    Alcohol use: Not Currently     Comment: once or twice a year    Drug use: Never    Sexual activity: Not Currently     Partners: Male     Birth control/protection: Abstinence, Post-menopausal, Hysterectomy, Surgical         Objective     Vital Signs  /76   Pulse 76   Resp 18   Ht 167.6 cm (65.98\")   Wt 113 kg (249 lb)   SpO2 96%   BMI 40.21 kg/m²   Estimated body mass index is 40.21 kg/m² as calculated from the following:    Height as of this encounter: 167.6 cm (65.98\").    Weight as of this encounter: 113 kg (249 lb).            Physical Exam  Vitals and nursing note reviewed.   Constitutional:       General: She is not in acute distress.     Appearance: Normal appearance.   HENT:      Head: Normocephalic and atraumatic.      Right Ear: Tympanic membrane normal.      Left Ear: Tympanic membrane normal.      Nose: Nose normal.      Mouth/Throat:      Mouth: Mucous membranes are moist.   Eyes:      Extraocular Movements: Extraocular movements intact.      Conjunctiva/sclera: Conjunctivae " normal.      Pupils: Pupils are equal, round, and reactive to light.   Cardiovascular:      Rate and Rhythm: Normal rate and regular rhythm.      Heart sounds: Normal heart sounds.   Pulmonary:      Effort: Pulmonary effort is normal. No respiratory distress.      Breath sounds: Normal breath sounds.   Abdominal:      General: Bowel sounds are normal.      Palpations: Abdomen is soft.   Musculoskeletal:         General: Normal range of motion.      Cervical back: Normal range of motion.      Comments: Moving all extremities    Skin:     General: Skin is warm and dry.   Neurological:      General: No focal deficit present.      Mental Status: She is alert and oriented to person, place, and time.   Psychiatric:         Mood and Affect: Mood normal.         Behavior: Behavior normal.         Thought Content: Thought content normal.         Judgment: Judgment normal.          Procedures     Assessment and Plan   Diagnosis Discussed   Continue to monitor   Plenty of fluids, monitor diet and exercise   Immunizations up to date   Paperwork completed for patient   Take medications as instructed  Follow up as directed   If symptoms worsen or persist please seek further evaluation     1. Healthcare maintenance    2. Encounter for physical examination related to employment       Follow Up  Return for Next scheduled follow up- 10/4/2024.    MD KARLA Bowne [facility] INTERNAL MEDICINE  [facility] [facility] [address] [phone]

## 2024-08-05 NOTE — PATIENT INSTRUCTIONS
Diagnosis Discussed   Continue to monitor   Plenty of fluids, monitor diet and exercise   Immunizations up to date   Paperwork completed for patient   Take medications as instructed  Follow up as directed   If symptoms worsen or persist please seek further evaluation

## 2024-08-09 ENCOUNTER — HOSPITAL ENCOUNTER (OUTPATIENT)
Facility: HOSPITAL | Age: 64
Discharge: HOME | End: 2024-08-09
Payer: COMMERCIAL

## 2024-08-09 DIAGNOSIS — R92.8 ABNORMAL MAMMOGRAM: ICD-10-CM

## 2024-08-09 PROCEDURE — 25010000002 LIDOCAINE 1 % SOLUTION: Performed by: FAMILY MEDICINE

## 2024-08-09 PROCEDURE — A4648 IMPLANTABLE TISSUE MARKER: HCPCS

## 2024-08-09 RX ORDER — LIDOCAINE HYDROCHLORIDE AND EPINEPHRINE 10; 10 MG/ML; UG/ML
10 INJECTION, SOLUTION INFILTRATION; PERINEURAL ONCE
Status: COMPLETED | OUTPATIENT
Start: 2024-08-09 | End: 2024-08-09

## 2024-08-09 RX ORDER — LIDOCAINE HYDROCHLORIDE 10 MG/ML
5 INJECTION, SOLUTION INFILTRATION; PERINEURAL ONCE
Status: COMPLETED | OUTPATIENT
Start: 2024-08-09 | End: 2024-08-09

## 2024-08-09 RX ADMIN — LIDOCAINE HYDROCHLORIDE,EPINEPHRINE BITARTRATE 6 ML: 10; .01 INJECTION, SOLUTION INFILTRATION; PERINEURAL at 15:43

## 2024-08-09 RX ADMIN — LIDOCAINE HYDROCHLORIDE 5 ML: 10 INJECTION, SOLUTION INFILTRATION; PERINEURAL at 15:43

## 2024-08-09 NOTE — PROGRESS NOTES
Alert and oriented.Denies discomfort, no active bleeding, steri-strips not visualized, gauze dressing intact. Cold packs given. Questions answered, support given. Verbalizes and demonstrates understanding of post-care instructions, written copy given.

## 2024-08-13 ENCOUNTER — OFFICE VISIT (OUTPATIENT)
Dept: BEHAVIORAL HEALTH | Facility: CLINIC | Age: 64
End: 2024-08-13
Payer: COMMERCIAL

## 2024-08-13 ENCOUNTER — TELEPHONE (OUTPATIENT)
Facility: HOSPITAL | Age: 64
End: 2024-08-13
Payer: COMMERCIAL

## 2024-08-13 VITALS
HEIGHT: 66 IN | DIASTOLIC BLOOD PRESSURE: 86 MMHG | SYSTOLIC BLOOD PRESSURE: 118 MMHG | BODY MASS INDEX: 40.18 KG/M2 | HEART RATE: 88 BPM | WEIGHT: 250 LBS | OXYGEN SATURATION: 97 %

## 2024-08-13 DIAGNOSIS — F43.10 POST TRAUMATIC STRESS DISORDER (PTSD): ICD-10-CM

## 2024-08-13 DIAGNOSIS — F41.1 GENERALIZED ANXIETY DISORDER: Primary | ICD-10-CM

## 2024-08-13 DIAGNOSIS — F33.1 MODERATE EPISODE OF RECURRENT MAJOR DEPRESSIVE DISORDER: ICD-10-CM

## 2024-08-13 DIAGNOSIS — F90.2 ATTENTION DEFICIT HYPERACTIVITY DISORDER, COMBINED TYPE: ICD-10-CM

## 2024-08-13 LAB
CYTO UR: NORMAL
LAB AP CASE REPORT: NORMAL
LAB AP CLINICAL INFORMATION: NORMAL
LAB AP DIAGNOSIS COMMENT: NORMAL
PATH REPORT.FINAL DX SPEC: NORMAL
PATH REPORT.GROSS SPEC: NORMAL

## 2024-08-13 PROCEDURE — 99214 OFFICE O/P EST MOD 30 MIN: CPT | Performed by: REGISTERED NURSE

## 2024-08-13 RX ORDER — DEXTROAMPHETAMINE SACCHARATE, AMPHETAMINE ASPARTATE MONOHYDRATE, DEXTROAMPHETAMINE SULFATE AND AMPHETAMINE SULFATE 2.5; 2.5; 2.5; 2.5 MG/1; MG/1; MG/1; MG/1
10 CAPSULE, EXTENDED RELEASE ORAL DAILY
Qty: 30 CAPSULE | Refills: 0 | Status: SHIPPED | OUTPATIENT
Start: 2024-08-13

## 2024-08-13 NOTE — PROGRESS NOTES
Follow Up Office Visit      Patient Name: Sandra Auguste  : 1960   MRN: [id number]     Referring Provider: Em Baum MD    Chief Complaint:      ICD-10-CM ICD-9-CM   1. Generalized anxiety disorder  F41.1 300.02   2. Moderate episode of recurrent major depressive disorder  F33.1 296.32   3. Post traumatic stress disorder (PTSD)  F43.10 309.81   4. Attention deficit hyperactivity disorder, combined type  F90.2 314.01        History of Present Illness:   Sandra Auguste is a 64 y.o. female who is here today for follow up and medication management    Subjective      Patient Reports: that she loves the adderall, and has not noticed any bad side effects. She reports that she takes it at 9 am and around 3 or 330 pm she gets very tired. 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 She reports that she feels like it has helped her, and she can tell that she has felt more motivated, and it has been easier for her to make decisions. She reports that it has also helped her with being on time, because she does not get distracted by all the stuff going on in her mind that used to delay her. She reports that she has been taking care of business things and has not felt quite so overwhelmed. She reports that she enjoys being able to get things done in the morning before work, but would like the medication to last longer while she is working. Denies side effects. Denies SI/HI/AVH    Review of Systems:   Review of Systems   Constitutional:  Negative for appetite change and unexpected weight change.   Eyes:  Negative for visual disturbance.   Respiratory:  Negative for chest tightness and shortness of breath.    Cardiovascular:  Negative for chest pain.   Musculoskeletal:  Negative for gait problem.   Skin:  Negative for rash and wound.   Neurological:  Negative for tremors, seizures, weakness and light-headedness.    Psychiatric/Behavioral:  Positive for decreased concentration and sleep disturbance. Negative for agitation, behavioral problems, hallucinations, self-injury and suicidal ideas. The patient is not nervous/anxious and is not hyperactive.      Sleep pattern: new cpap machine, trying to get used to it, still adjusting , still falling asleep ok  Appetite: decreased appetite, trying to eat healthy     PHQ-9 Depression Screening  Little interest or pleasure in doing things? 1-->several days   Feeling down, depressed, or hopeless? 0-->not at all   Trouble falling or staying asleep, or sleeping too much? 2-->more than half the days   Feeling tired or having little energy? 2-->more than half the days   Poor appetite or overeating? 2-->more than half the days   Feeling bad about yourself - or that you are a failure or have let yourself or your family down?     Trouble concentrating on things, such as reading the newspaper or watching television? 2-->more than half the days   Moving or speaking so slowly that other people could have noticed? Or the opposite - being so fidgety or restless that you have been moving around a lot more than usual?     Thoughts that you would be better off dead, or of hurting yourself in some way?     PHQ-9 Total Score 9   If you checked off any problems, how difficult have these problems made it for you to do your work, take care of things at home, or get along with other people?       LUIS-7 Anxiety Screening   Patient did not complete    RISK ASSESSMENT:  Patient denies any thoughts or intent of suicide today. Patient denies any impulsive behavior today.     Medications:     Current Outpatient Medications:     acetaminophen (TYLENOL) 500 MG tablet, Take 1 tablet by mouth Every 6 (Six) Hours As Needed for Mild Pain., Disp: , Rfl:     albuterol sulfate  (90 Base) MCG/ACT inhaler, Inhale 2 puffs Every 6 (Six) Hours As Needed for Wheezing or Shortness of Air., Disp: 18 g, Rfl: 1     amphetamine-dextroamphetamine XR (Adderall XR) 10 MG 24 hr capsule, Take 1 capsule by mouth Daily, Disp: 30 capsule, Rfl: 0    Blood Glucose Monitoring Suppl (OneTouch Verio) w/Device kit, 1 kit Daily., Disp: 1 kit, Rfl: 0    Cholecalciferol (Vitamin D3) 50 MCG (2000 UT) capsule, Take 1 capsule by mouth Daily., Disp: 90 capsule, Rfl: 1    Diclofenac Sodium (VOLTAREN) 1 % gel gel, Apply  topically to the appropriate area as directed 4 (Four) Times a Day As Needed (arthralgias, shoulders and back.)., Disp: 100 g, Rfl: 1    dicyclomine (BENTYL) 10 MG capsule, Take 1 capsule by mouth Every 6 (Six) Hours As Needed for Abdominal Cramping., Disp: 30 capsule, Rfl: 0    empagliflozin (Jardiance) 10 MG tablet tablet, TAKE 1 TABLET BY MOUTH DAILY, Disp: 90 tablet, Rfl: 1    EPINEPHrine (EPIPEN) 0.3 MG/0.3ML solution auto-injector injection, Inject 0.3 mL into the appropriate muscle as directed by prescriber As Needed (FOR SEVERE ALLERGIC REACTION)., Disp: 1 each, Rfl: 1    Fluticasone Furoate-Vilanterol (Breo Ellipta) 200-25 MCG/ACT inhaler, Inhale 1 puff Daily., Disp: 60 each, Rfl: 1    glucose blood (OneTouch Verio) test strip, USE AS INSTRUCTED TO CHECK BLOOD GLUCOSE ONCE DAILY DX:E11.65, Disp: 100 each, Rfl: 3    glucose monitor monitoring kit, Use as directed to check blood glucose once daily., Disp: 1 each, Rfl: 0    hydrOXYzine (ATARAX) 25 MG tablet, Take 1 tablet by mouth Every 8 (Eight) Hours As Needed for Anxiety., Disp: 90 tablet, Rfl: 2    indomethacin (INDOCIN) 25 MG capsule, Take 1 capsule 3 times daily for 1 week, then 2 capsules 3 times daily for 1 week, then 1 capsule 3 times daily for 1 week then stop, Disp: 84 capsule, Rfl: 5    Lancets (onetouch ultrasoft) lancets, Use one lancet daily to check blood sugars, Disp: 100 each, Rfl: 3    loratadine (CLARITIN) 10 MG tablet, TAKE 1 TABLET BY MOUTH DAILY, Disp: 30 tablet, Rfl: 5    multivitamin with minerals tablet tablet, Take 1 tablet by mouth Daily., Disp: ,  "Rfl:     ondansetron ODT (ZOFRAN-ODT) 4 MG disintegrating tablet, Place 1 tablet on the tongue Every 6 (Six) Hours As Needed for Nausea or Vomiting for up to 15 doses., Disp: 15 tablet, Rfl: 0    pantoprazole (PROTONIX) 40 MG EC tablet, Take 1 tablet by mouth Daily., Disp: 90 tablet, Rfl: 1    polyethylene glycol (MIRALAX) 17 GM/SCOOP powder, Take 17 g by mouth Daily., Disp: 578 g, Rfl: 1    sodium chloride (Ocean Nasal Spray) 0.65 % nasal spray, 1 spray into the nostril(s) as directed by provider As Needed for Congestion., Disp: 60 mL, Rfl: 0    traMADol (ULTRAM) 50 MG tablet, Take 1 tablet by mouth Every 6 (Six) Hours As Needed for Moderate Pain for up to 15 doses., Disp: 15 tablet, Rfl: 0    vilazodone (Viibryd) 20 MG tablet tablet, Take 1 tablet by mouth Daily., Disp: 30 tablet, Rfl: 0    Medication Considerations:  SHANTEL reviewed and appropriate.      Allergies:   Allergies   Allergen Reactions    Mold Extract [Trichophyton] Shortness Of Breath, Anxiety, Palpitations and Irritability    Lortab [Hydrocodone-Acetaminophen] Nausea And Vomiting    Pollen Extract Other (See Comments)     SNEEZING AND CONGESTION        Results Reviewed:   Results      Objective     Physical Exam:  Vital Signs:   Vitals:    08/13/24 1207   BP: 118/86   Pulse: 88   SpO2: 97%   Weight: 113 kg (250 lb)   Height: 167.6 cm (65.98\")     Body mass index is 40.37 kg/m².     Mental Status Exam:   Hygiene:   good  Cooperation:  Cooperative  Eye Contact:  Good  Psychomotor Behavior:  Appropriate  Affect:  Appropriate  Mood: normal  Speech:  Normal  Thought Process:  Goal directed and Linear  Thought Content:  Normal  Suicidal:  None  Homicidal:  None  Hallucinations:  None  Delusion:  None  Memory:  Intact  Orientation:  Person, Place, Time, and Situation  Reliability:  good  Insight:  Good  Judgement:  Good  Impulse Control:  Good  Physical/Medical Issues:   see problem list      Assessment / Plan      Visit Diagnosis/Orders Placed This " Visit:  Diagnoses and all orders for this visit:    1. Generalized anxiety disorder (Primary)    2. Moderate episode of recurrent major depressive disorder    3. Post traumatic stress disorder (PTSD)    4. Attention deficit hyperactivity disorder, combined type  -     amphetamine-dextroamphetamine XR (Adderall XR) 10 MG 24 hr capsule; Take 1 capsule by mouth Daily  Dispense: 30 capsule; Refill: 0       Functional Status: No impairment    Prognosis: Good with Ongoing Treatment     Impression/Formulation:  Patient appeared alert and oriented.  Patient is voluntarily requesting to continue outpatient psychiatric treatment at [facility].  Patient is receptive to assistance with maintaining a stable lifestyle.  Patient presents with history of     ICD-10-CM ICD-9-CM   1. Generalized anxiety disorder  F41.1 300.02   2. Moderate episode of recurrent major depressive disorder  F33.1 296.32   3. Post traumatic stress disorder (PTSD)  F43.10 309.81   4. Attention deficit hyperactivity disorder, combined type  F90.2 314.01     Reviewed patient's previous provider notes. Reviewed most recent labs. Patient meets DSM V diagnostic criteria for diagnoses. Diagnoses may be updated as more information becomes available.       Treatment Plan:   Continue viibryd 20 mg daily  Increase adderall to xr 10 mg daily  Follow up in 1 month or sooner if needed  Patient will continue supportive psychotherapy efforts and medications as indicated. Clinic will obtain release of information for current treatment team for continuity of care as needed. Patient will contact this office, call 911 or present to the nearest emergency room should suicidal or homicidal ideations occur.  Discussed medication options and treatment plan of prescribed medication(s) as well as the risks, benefits, and potential side effects. Patient acknowledged and verbally consented to continue with current treatment plan and was educated on the  importance of compliance with treatment and follow-up appointments.     Patient instructions:  Reviewed patient's SHANTEL. Discussed the risks including side effects, benefits and alternatives of stimulant medication use. We talked about the fact that these medications are schedule II controlled substances as well as the risks for potential addiction. We also discussed how this medication will not be refilled early and the medication should not be shared or taken in any manner outside of as prescribed. We discussed the potential for decreased appetite, weight loss and cardiac effects as well. Also, advised patient to watch for heart racing, palpitations, chest pain, high blood pressure, increased irritability or agitation related to stimulant use.     Follow Up:   Return in about 1 month (around 9/13/2024) for Med Check.        REBECA Tao, PMHNP-BC [facility]

## 2024-08-14 ENCOUNTER — TELEPHONE (OUTPATIENT)
Facility: HOSPITAL | Age: 64
End: 2024-08-14
Payer: COMMERCIAL

## 2024-08-14 NOTE — TELEPHONE ENCOUNTER
Patient returned call, notified of pathology results and recommendation. Verbalizes understanding. States having some discomfort, denies needing analgesics. Denies signs and symptoms of infection. Questions answered, support given, verbalized understanding. Patient transferred to BIS scheduling per request to schedule recommended follow-up.

## 2024-08-19 ENCOUNTER — TELEMEDICINE (OUTPATIENT)
Dept: FAMILY MEDICINE CLINIC | Facility: TELEHEALTH | Age: 64
End: 2024-08-19
Payer: COMMERCIAL

## 2024-08-19 DIAGNOSIS — J06.9 UPPER RESPIRATORY TRACT INFECTION, UNSPECIFIED TYPE: Primary | ICD-10-CM

## 2024-08-19 DIAGNOSIS — J45.20 MILD INTERMITTENT ASTHMA WITHOUT COMPLICATION: ICD-10-CM

## 2024-08-19 RX ORDER — GUAIFENESIN 600 MG/1
1200 TABLET, EXTENDED RELEASE ORAL 2 TIMES DAILY
Qty: 20 TABLET | Refills: 0 | Status: SHIPPED | OUTPATIENT
Start: 2024-08-19

## 2024-08-19 RX ORDER — BENZONATATE 200 MG/1
200 CAPSULE ORAL 3 TIMES DAILY PRN
Qty: 30 CAPSULE | Refills: 0 | Status: SHIPPED | OUTPATIENT
Start: 2024-08-19

## 2024-08-19 RX ORDER — PREDNISONE 20 MG/1
20 TABLET ORAL DAILY
Qty: 7 TABLET | Refills: 0 | Status: SHIPPED | OUTPATIENT
Start: 2024-08-19

## 2024-08-19 NOTE — PROGRESS NOTES
"You have chosen to receive care through a telehealth visit.  Do you consent to use a video/audio connection for your medical care today? Yes     HPI  Sandra Auguste is a 64 y.o. female  presents with complaint of  7 day h/o sore throat, nasal congestion, sinus pressure and tightness in chest, with mild shortness of berath without wheezing.  She has been to the [facility] a few days ago and was negative for Covid 19. She is Asthmatic and states her chest is tight and she has mild SOB. She has not been using her BREO and just began using her Albuterol yesterday which did help. She reports no fever.     Review of Systems   Constitutional:  Positive for fatigue.   HENT:  Positive for sore throat.    Respiratory:  Positive for cough, shortness of breath and wheezing.    Cardiovascular: Negative.    Gastrointestinal: Negative.    Musculoskeletal: Negative.    Neurological: Negative.    Hematological: Negative.    Psychiatric/Behavioral: Negative.         Past Medical History:   Diagnosis Date    ADHD (attention deficit hyperactivity disorder) 07/30/2024    Just diagnosed    Allergic 1960    Anemia 1984    Anesthesia complication     HAS TROUBLE GETTING \"NUMB\"     Anxiety     Arthritis     on Celebrex + Robaxin, no steroids    Asthma     does not follow w/ pulmonary    Bell palsy     not sure    Bladder spasms     Cluster headache ?    ?    CTS (carpal tunnel syndrome)     Depression     w/ PTSD    Diverticulosis     Dyspepsia     Fatigue     GERD (gastroesophageal reflux disease)     controlled w/ nightly Pepcid, EGD 2020 w/ Dr. Chavarria, (+) HH    Headache, tension-type     long hair that was pulled in desk    Health care maintenance 02/25/2020    Memory loss     FROM SKULL FRACTURE AND FALL-SHORT TERM MEMORY LOSS    Migraine     Morbid obesity     PTSD (post-traumatic stress disorder)     Seasonal allergies     Seizures     \"convulsions\" at age 5    Skull fracture 2012    Sleep apnea with use of continuous positive airway " pressure (CPAP)     CPAP compliant    SOB (shortness of breath) on exertion     Tendonitis     Type 2 diabetes mellitus without complication, without long-term current use of insulin     dx 2021, no insulin, A1C <7    Vision loss     Wears contact lenses     Wears partial dentures     TOP ONLY     Wears prescription eyeglasses        Family History   Problem Relation Age of Onset    Cancer Mother         Cervical /over 10 years ago    Dementia Mother     Hypertension Mother         high energy/living on meds    Anxiety disorder Mother     Obesity Father     Hypertension Father     Heart attack Father     Sleep apnea Father     Depression Father     Sleep apnea Sister     ADD / ADHD Sister     Hypertension Brother     Sleep apnea Brother     Cancer Maternal Aunt         lymphoma    Cancer Paternal Aunt     Breast cancer Paternal Aunt 70    Cancer Maternal Grandmother     Alcohol abuse Maternal Grandfather     Obesity Paternal Grandmother     Diabetes Paternal Grandmother     Hypertension Paternal Grandmother     Stroke Paternal Grandmother     Heart attack Paternal Grandmother     Asthma Paternal Grandmother     Heart attack Paternal Grandfather     Alcohol abuse Other     Depression Other     ADD / ADHD Brother     Alcohol abuse Brother     Seizures Brother         When he was young, not since he had tonsles removed.    Dementia Sister        Social History     Socioeconomic History    Marital status: Single   Tobacco Use    Smoking status: Never     Passive exposure: Never    Smokeless tobacco: Never   Vaping Use    Vaping status: Never Used   Substance and Sexual Activity    Alcohol use: Not Currently     Comment: once or twice a year    Drug use: Never    Sexual activity: Not Currently     Partners: Male     Birth control/protection: Abstinence, Post-menopausal, Hysterectomy, Surgical         LMP  (LMP Unknown)     PHYSICAL EXAM  Physical Exam   Constitutional: She is oriented to person, place, and time. She  appears well-developed and well-nourished. She does not have a sickly appearance. She does not appear ill. No distress.   HENT:   Head: Normocephalic and atraumatic.   Pulmonary/Chest: Effort normal. No accessory muscle usage or stridor.  No respiratory distress.  Neurological: She is alert and oriented to person, place, and time.   Psychiatric: She has a normal mood and affect.       Diagnoses and all orders for this visit:    1. Upper respiratory tract infection, unspecified type (Primary)  -     benzonatate (TESSALON) 200 MG capsule; Take 1 capsule by mouth 3 (Three) Times a Day As Needed for Cough.  Dispense: 30 capsule; Refill: 0  -     guaiFENesin (Mucinex) 600 MG 12 hr tablet; Take 2 tablets by mouth 2 (Two) Times a Day.  Dispense: 20 tablet; Refill: 0    2. Mild intermittent asthma without complication  -     predniSONE (DELTASONE) 20 MG tablet; Take 1 tablet by mouth Daily.  Dispense: 7 tablet; Refill: 0    Continue Albuterol HFA 2 puffs tid and prn.   Restart BREO as directed.   Increase fluids      FOLLOW-UP  As discussed during visit with [facility], if symptoms worsen or fail to improve, follow-up with PCP/Urgent Care/Emergency Department.    Patient verbalizes understanding of medications, instructions for treatment and follow-up.    Brinda Ervin, REBECA  08/19/2024  07:32 EDT    The use of a video visit has been reviewed with the patient and verbal informed consent has been obtained. Myself and Sandra Auguste participated in this visit. The patient is located in [place], [place] , and I am located in [place], [place]. [employer] and [employer]  were utilized.

## 2024-08-19 NOTE — LETTER
August 19, 2024     Patient: Sandra Auguste   YOB: 1960   Date of Visit: 8/19/2024       To Whom it May Concern:    Sandra Auguste was seen in my clinic on 8/19/2024. She  may return to workl in one day.           Sincerely,          REBECA Cifuentes        CC: No Recipients

## 2024-08-19 NOTE — LETTER
August 19, 2024     Patient: Sandra Auguste   YOB: 1960   Date of Visit: 8/19/2024       To Whom it May Concern:    Sandra Auguste was seen in my clinic on 8/19/2024. She  may return to work  in one day.           Sincerely,          REBECA Cifuentes        CC: No Recipients

## 2024-08-20 ENCOUNTER — TELEPHONE (OUTPATIENT)
Dept: INTERNAL MEDICINE | Facility: CLINIC | Age: 64
End: 2024-08-20
Payer: COMMERCIAL

## 2024-08-20 NOTE — TELEPHONE ENCOUNTER
DIANA WAS SEEN FOR A VIRTUAL VISIT YESTERDAY FOR A COLD.  SHE SAID THE PROVIDER SHE SAW PUT HER ON SOME COUGH MEDICATION AND A STERIOD. THEY ADVISED HER TO GO OFF OF HER ADDERALL UNTIL SHE FELT BETTER, BUT SHE IS CONCERNED ABOUT DOING THAT.   She said she does still have the old mg of 5 mg if Chloé thinks that would be better to take.   She said she had a bad night of sleep because of the coughing and she was jittery.     PATIENT REQUESTED SHE BE CALLED BACK BEFORE NOON TODAY BECAUSE SHE HAS TO GO TO WORK.

## 2024-08-29 DIAGNOSIS — F41.1 GENERALIZED ANXIETY DISORDER: ICD-10-CM

## 2024-08-29 DIAGNOSIS — F33.1 MODERATE EPISODE OF RECURRENT MAJOR DEPRESSIVE DISORDER: ICD-10-CM

## 2024-09-04 DIAGNOSIS — F41.1 GENERALIZED ANXIETY DISORDER: ICD-10-CM

## 2024-09-04 DIAGNOSIS — F33.1 MODERATE EPISODE OF RECURRENT MAJOR DEPRESSIVE DISORDER: ICD-10-CM

## 2024-09-04 RX ORDER — VILAZODONE HYDROCHLORIDE 20 MG/1
20 TABLET ORAL DAILY
Qty: 30 TABLET | Refills: 0 | Status: SHIPPED | OUTPATIENT
Start: 2024-09-04

## 2024-09-12 ENCOUNTER — OFFICE VISIT (OUTPATIENT)
Dept: BEHAVIORAL HEALTH | Facility: CLINIC | Age: 64
End: 2024-09-12
Payer: COMMERCIAL

## 2024-09-12 VITALS
WEIGHT: 251.6 LBS | DIASTOLIC BLOOD PRESSURE: 76 MMHG | BODY MASS INDEX: 40.43 KG/M2 | SYSTOLIC BLOOD PRESSURE: 124 MMHG | HEIGHT: 66 IN | HEART RATE: 84 BPM | OXYGEN SATURATION: 96 %

## 2024-09-12 DIAGNOSIS — J45.20 MILD INTERMITTENT ASTHMA WITHOUT COMPLICATION: ICD-10-CM

## 2024-09-12 DIAGNOSIS — F33.1 MODERATE EPISODE OF RECURRENT MAJOR DEPRESSIVE DISORDER: Primary | ICD-10-CM

## 2024-09-12 DIAGNOSIS — G47.9 SLEEPING DIFFICULTIES: ICD-10-CM

## 2024-09-12 DIAGNOSIS — J30.2 SEASONAL ALLERGIES: ICD-10-CM

## 2024-09-12 DIAGNOSIS — F43.10 POST TRAUMATIC STRESS DISORDER (PTSD): ICD-10-CM

## 2024-09-12 DIAGNOSIS — F90.2 ATTENTION DEFICIT HYPERACTIVITY DISORDER, COMBINED TYPE: ICD-10-CM

## 2024-09-12 DIAGNOSIS — F41.1 GENERALIZED ANXIETY DISORDER: ICD-10-CM

## 2024-09-12 PROCEDURE — 1160F RVW MEDS BY RX/DR IN RCRD: CPT | Performed by: REGISTERED NURSE

## 2024-09-12 PROCEDURE — 99214 OFFICE O/P EST MOD 30 MIN: CPT | Performed by: REGISTERED NURSE

## 2024-09-12 PROCEDURE — 1159F MED LIST DOCD IN RCRD: CPT | Performed by: REGISTERED NURSE

## 2024-09-12 RX ORDER — VILAZODONE HYDROCHLORIDE 20 MG/1
20 TABLET ORAL DAILY
Qty: 30 TABLET | Refills: 0 | OUTPATIENT
Start: 2024-09-12

## 2024-09-12 RX ORDER — FLUTICASONE FUROATE AND VILANTEROL 200; 25 UG/1; UG/1
1 POWDER RESPIRATORY (INHALATION)
Qty: 60 EACH | Refills: 1 | Status: SHIPPED | OUTPATIENT
Start: 2024-09-12

## 2024-09-12 RX ORDER — MIRTAZAPINE 7.5 MG/1
7.5 TABLET, FILM COATED ORAL NIGHTLY
Qty: 30 TABLET | Refills: 0 | Status: SHIPPED | OUTPATIENT
Start: 2024-09-12

## 2024-09-12 RX ORDER — DEXTROAMPHETAMINE SACCHARATE, AMPHETAMINE ASPARTATE MONOHYDRATE, DEXTROAMPHETAMINE SULFATE AND AMPHETAMINE SULFATE 2.5; 2.5; 2.5; 2.5 MG/1; MG/1; MG/1; MG/1
10 CAPSULE, EXTENDED RELEASE ORAL DAILY
Qty: 30 CAPSULE | Refills: 0 | Status: SHIPPED | OUTPATIENT
Start: 2024-09-12

## 2024-09-12 RX ORDER — VILAZODONE HYDROCHLORIDE 20 MG/1
20 TABLET ORAL DAILY
Qty: 30 TABLET | Refills: 0 | Status: SHIPPED | OUTPATIENT
Start: 2024-09-12

## 2024-09-12 NOTE — TELEPHONE ENCOUNTER
Patient in office today and is requesting a refill of Breo inhaler, previously filled by [facility] M Ardon. Please advise

## 2024-09-12 NOTE — PROGRESS NOTES
Follow Up Office Visit      Patient Name: Sandra Auguste  : 1960   MRN: [id number]     Referring Provider: Em Baum MD    Chief Complaint:      ICD-10-CM ICD-9-CM   1. Moderate episode of recurrent major depressive disorder  F33.1 296.32   2. Attention deficit hyperactivity disorder, combined type  F90.2 314.01   3. Generalized anxiety disorder  F41.1 300.02   4. Post traumatic stress disorder (PTSD)  F43.10 309.81        History of Present Illness:   Sandra Auguste is a 64 y.o. female who is here today for follow up and medication management    Subjective      Patient Reports:   History of Present Illness  The patient presents for evaluation of multiple medical concerns.    She is currently on Adderall 10 mg extended release, which initially seemed beneficial but now appears to be causing anxiety. She is uncertain about the need for her antidepressant and is considering a sleep aid due to persistent sleep disturbances. Her sleep is often disrupted, leading to feelings of restlessness and an average of only 5 hours of sleep per night. She has previously tried amitriptyline, and hydroxyzine for sleep, but not mirtazapine. She is no longer taking amitriptyline, which had helped her sleep.    She has noticed an increase in appetite, particularly for bread, and has gained weight. 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 She has been self-treating with a heating pad and diclofenac gel, which have provided some relief.    She reports no suicidal ideation, thoughts of harm towards others, auditory or visual hallucinations, or delusions.    She had an upper respiratory infection that led to an asthma flare, so she was put on a steroid. She was advised by her provider to stop taking the Adderall at that point because her blood pressure increased. She gained weight being on the prednisone and felt hungry all the time, but has completed that  treatment.    She was previously prescribed propranolol for anxiety which helped, but it was discontinued due to interactions with other medications.    Review of Systems:   Review of Systems   Constitutional:  Negative for appetite change and unexpected weight change.   Eyes:  Negative for visual disturbance.   Respiratory:  Negative for chest tightness and shortness of breath.    Cardiovascular:  Negative for chest pain.   Musculoskeletal:  Negative for gait problem.   Skin:  Negative for rash and wound.   Neurological:  Negative for tremors, seizures, weakness and light-headedness.   Psychiatric/Behavioral:  Positive for decreased concentration and sleep disturbance. Negative for agitation, behavioral problems, hallucinations, self-injury and suicidal ideas. The patient is nervous/anxious. The patient is not hyperactive.      Sleep pattern: anxious, listening to music to put her to sleep, tosses and turns, not well rested, 5 hours  Appetite: curbs appetite     PHQ-9 Depression Screening  Little interest or pleasure in doing things? 0-->not at all   Feeling down, depressed, or hopeless? 0-->not at all   Trouble falling or staying asleep, or sleeping too much? 3-->nearly every day   Feeling tired or having little energy? 0-->not at all   Poor appetite or overeating? 0-->not at all   Feeling bad about yourself - or that you are a failure or have let yourself or your family down? 3-->nearly every day   Trouble concentrating on things, such as reading the newspaper or watching television? 3-->nearly every day   Moving or speaking so slowly that other people could have noticed? Or the opposite - being so fidgety or restless that you have been moving around a lot more than usual? 1-->several days   Thoughts that you would be better off dead, or of hurting yourself in some way? 0-->not at all   PHQ-9 Total Score 10   If you checked off any problems, how difficult have these problems made it for you to do your work, take  care of things at home, or get along with other people? somewhat difficult     LUIS-7 Anxiety Screening  Over the last two weeks, how often have you been bothered by the following problems?  Feeling nervous, anxious or on edge: Several days  Not being able to stop or control worrying: Several days  Worrying too much about different things: Several days  Trouble Relaxing: Several days  Being so restless that it is hard to sit still: Nearly every day  Becoming easily annoyed or irritable: Several days  Feeling afraid as if something awful might happen: Not at all  LUIS 7 Total Score: 8  If you checked any problems, how difficult have these problems made it for you to do your work, take care of things at home, or get along with other people: Very difficult    RISK ASSESSMENT:  Patient denies any thoughts or intent of suicide today. Patient denies any impulsive behavior today.     Medications:     Current Outpatient Medications:     acetaminophen (TYLENOL) 500 MG tablet, Take 1 tablet by mouth Every 6 (Six) Hours As Needed for Mild Pain., Disp: , Rfl:     albuterol sulfate  (90 Base) MCG/ACT inhaler, Inhale 2 puffs Every 6 (Six) Hours As Needed for Wheezing or Shortness of Air., Disp: 18 g, Rfl: 1    amphetamine-dextroamphetamine XR (Adderall XR) 10 MG 24 hr capsule, Take 1 capsule by mouth Daily, Disp: 30 capsule, Rfl: 0    Blood Glucose Monitoring Suppl (OneTouch Verio) w/Device kit, 1 kit Daily., Disp: 1 kit, Rfl: 0    Cholecalciferol (Vitamin D3) 50 MCG (2000 UT) capsule, Take 1 capsule by mouth Daily., Disp: 90 capsule, Rfl: 1    Diclofenac Sodium (VOLTAREN) 1 % gel gel, Apply  topically to the appropriate area as directed 4 (Four) Times a Day As Needed (arthralgias, shoulders and back.)., Disp: 100 g, Rfl: 1    EPINEPHrine (EPIPEN) 0.3 MG/0.3ML solution auto-injector injection, Inject 0.3 mL into the appropriate muscle as directed by prescriber As Needed (FOR SEVERE ALLERGIC REACTION)., Disp: 1 each, Rfl: 1     glucose blood (OneTouch Verio) test strip, USE AS INSTRUCTED TO CHECK BLOOD GLUCOSE ONCE DAILY DX:E11.65, Disp: 100 each, Rfl: 3    glucose monitor monitoring kit, Use as directed to check blood glucose once daily., Disp: 1 each, Rfl: 0    guaiFENesin (Mucinex) 600 MG 12 hr tablet, Take 2 tablets by mouth 2 (Two) Times a Day., Disp: 20 tablet, Rfl: 0    indomethacin (INDOCIN) 25 MG capsule, Take 1 capsule 3 times daily for 1 week, then 2 capsules 3 times daily for 1 week, then 1 capsule 3 times daily for 1 week then stop, Disp: 84 capsule, Rfl: 5    Lancets (onetouch ultrasoft) lancets, Use one lancet daily to check blood sugars, Disp: 100 each, Rfl: 3    loratadine (CLARITIN) 10 MG tablet, TAKE 1 TABLET BY MOUTH DAILY, Disp: 30 tablet, Rfl: 5    multivitamin with minerals tablet tablet, Take 1 tablet by mouth Daily., Disp: , Rfl:     ondansetron ODT (ZOFRAN-ODT) 4 MG disintegrating tablet, Place 1 tablet on the tongue Every 6 (Six) Hours As Needed for Nausea or Vomiting for up to 15 doses., Disp: 15 tablet, Rfl: 0    pantoprazole (PROTONIX) 40 MG EC tablet, Take 1 tablet by mouth Daily., Disp: 90 tablet, Rfl: 1    polyethylene glycol (MIRALAX) 17 GM/SCOOP powder, Take 17 g by mouth Daily., Disp: 578 g, Rfl: 1    predniSONE (DELTASONE) 20 MG tablet, Take 1 tablet by mouth Daily., Disp: 7 tablet, Rfl: 0    sodium chloride (Ocean Nasal Spray) 0.65 % nasal spray, 1 spray into the nostril(s) as directed by provider As Needed for Congestion., Disp: 60 mL, Rfl: 0    traMADol (ULTRAM) 50 MG tablet, Take 1 tablet by mouth Every 6 (Six) Hours As Needed for Moderate Pain for up to 15 doses., Disp: 15 tablet, Rfl: 0    vilazodone (VIIBRYD) 20 MG tablet tablet, TAKE 1 TABLET BY MOUTH DAILY, Disp: 30 tablet, Rfl: 0    benzonatate (TESSALON) 200 MG capsule, Take 1 capsule by mouth 3 (Three) Times a Day As Needed for Cough. (Patient not taking: Reported on 9/12/2024), Disp: 30 capsule, Rfl: 0    dicyclomine (BENTYL) 10 MG  "capsule, Take 1 capsule by mouth Every 6 (Six) Hours As Needed for Abdominal Cramping. (Patient not taking: Reported on 8/19/2024), Disp: 30 capsule, Rfl: 0    empagliflozin (Jardiance) 10 MG tablet tablet, TAKE 1 TABLET BY MOUTH DAILY (Patient not taking: Reported on 8/19/2024), Disp: 90 tablet, Rfl: 1    Fluticasone Furoate-Vilanterol (Breo Ellipta) 200-25 MCG/ACT inhaler, Inhale 1 puff Daily. (Patient not taking: Reported on 8/19/2024), Disp: 60 each, Rfl: 1    hydrOXYzine (ATARAX) 25 MG tablet, Take 1 tablet by mouth Every 8 (Eight) Hours As Needed for Anxiety. (Patient not taking: Reported on 9/12/2024), Disp: 90 tablet, Rfl: 2    Medication Considerations:  SHANTEL reviewed and appropriate.      Allergies:   Allergies   Allergen Reactions    Mold Extract [Trichophyton] Shortness Of Breath, Anxiety, Palpitations and Irritability    Lortab [Hydrocodone-Acetaminophen] Nausea And Vomiting    Pollen Extract Other (See Comments)     SNEEZING AND CONGESTION        Results Reviewed:   Results  Reviewed most recent lab results    Objective     Physical Exam:  Vital Signs:   Vitals:    09/12/24 0842 09/12/24 0849   BP:  124/76   Pulse:  84   SpO2:  96%   Weight: 114 kg (251 lb 9.6 oz)    Height: 167.6 cm (65.98\")      Body mass index is 40.63 kg/m².     Mental Status Exam:   Hygiene:   good   Cooperation:  Cooperative  Eye Contact:  Good  Psychomotor Behavior:  Appropriate  Affect:  Appropriate  Mood: anxious  Speech:  Normal  Thought Process:  Goal directed and Linear  Thought Content:  Normal  Suicidal:  None  Homicidal:  None  Hallucinations:  None  Delusion:  None  Memory:  Intact  Orientation:  Person, Place, Time, and Situation  Reliability:  good  Insight:  Good  Judgement:  Good  Impulse Control:  Good  Physical/Medical Issues:   see problem list      Assessment / Plan      Visit Diagnosis/Orders Placed This Visit:  Diagnoses and all orders for this visit:    1. Moderate episode of recurrent major depressive " disorder (Primary)    2. Attention deficit hyperactivity disorder, combined type    3. Generalized anxiety disorder    4. Post traumatic stress disorder (PTSD)          Functional Status: No impairment    Prognosis: Good with Ongoing Treatment     Impression/Formulation:  Patient appeared alert and oriented.  Patient is voluntarily requesting to continue outpatient psychiatric treatment at [facility].  Patient is receptive to assistance with maintaining a stable lifestyle.  Patient presents with history of     ICD-10-CM ICD-9-CM   1. Moderate episode of recurrent major depressive disorder  F33.1 296.32   2. Attention deficit hyperactivity disorder, combined type  F90.2 314.01   3. Generalized anxiety disorder  F41.1 300.02   4. Post traumatic stress disorder (PTSD)  F43.10 309.81     Reviewed patient's previous provider notes. Reviewed most recent labs. Patient meets DSM V diagnostic criteria for diagnoses. Diagnoses may be updated as more information becomes available.     Assessment & Plan    1. Anxiety and insomnia.  She reports feeling anxious and having trouble sleeping, which may be exacerbated by her current medications. A prescription for mirtazapine has been provided, to be taken 30 to 45 minutes before bedtime. The current regimen of Adderall 10 mg extended-release will be maintained. She is advised to continue with Viibryd. Hydroxyzine can be used as needed for anxiety, but she should not take it concurrently with mirtazapine. If mirtazapine is not effective, she can switch to hydroxyzine.    2. Attention Deficit Hyperactivity Disorder (ADHD).  She is currently on Adderall 10 mg extended-release, which initially helped but now seems less effective. The dose will remain unchanged for now to assess the impact of improved sleep on her symptoms. If attention problems persist, a switch to Strattera may be considered.    3. Medication Management.  She inquired about the compatibility of  her medications. She can take Tylenol and ibuprofen with her current medications but should avoid the combination of certain cough medicines or steroids with adderall. She is advised to contact the office if she has any concerns about medication interactions.    Follow-up  A follow-up appointment is scheduled for 1 month from now or sooner if needed.    Treatment Plan:   Continue adderall xr 10 mg at this time  Continue viibryd 20 mg at this time  Begin mirtazipine 7.5 mg at bedtime  Patient will continue supportive psychotherapy efforts and medications as indicated. Clinic will obtain release of information for current treatment team for continuity of care as needed. Patient will contact this office, call 911 or present to the nearest emergency room should suicidal or homicidal ideations occur.  Discussed medication options and treatment plan of prescribed medication(s) as well as the risks, benefits, and potential side effects. Patient acknowledged and verbally consented to continue with current treatment plan and was educated on the importance of compliance with treatment and follow-up appointments.     Patient instructions:  Medication risks and side effects discussed with patient including risk for worsening mood, changes in behavior, thoughts of suicide or homicide, induction of carey, serotonin syndrome.   If any thoughts of SI or HI, worsening mood or changes in behavior, call 911 or crisis line [phone], or go to nearest ER at once.  Pt.verbalizes understanding and consents to treatment with this medication.   Reviewed patient's SHANTEL. Discussed the risks including side effects, benefits and alternatives of stimulant medication use. We talked about the fact that these medications are schedule II controlled substances as well as the risks for potential addiction. We also discussed how this medication will not be refilled early and the medication should not be shared or taken in any manner outside of as  prescribed. We discussed the potential for decreased appetite, weight loss and cardiac effects as well. Also, advised patient to watch for heart racing, palpitations, chest pain, high blood pressure, increased irritability or agitation related to stimulant use.     Follow Up:   Return in about 1 month (around 10/12/2024) for Med Check.    Patient or patient representative verbalized consent for the use of Ambient Listening during the visit with  REBECA Tao for chart documentation. 9/12/2024  09:43 EDT    REBECA Tao, PMHNP-BC [facility]

## 2024-09-30 DIAGNOSIS — F41.1 GENERALIZED ANXIETY DISORDER: ICD-10-CM

## 2024-09-30 DIAGNOSIS — F33.1 MODERATE EPISODE OF RECURRENT MAJOR DEPRESSIVE DISORDER: ICD-10-CM

## 2024-09-30 RX ORDER — VILAZODONE HYDROCHLORIDE 20 MG/1
20 TABLET ORAL DAILY
Qty: 30 TABLET | Refills: 0 | Status: SHIPPED | OUTPATIENT
Start: 2024-09-30

## 2024-10-11 ENCOUNTER — OFFICE VISIT (OUTPATIENT)
Dept: INTERNAL MEDICINE | Facility: CLINIC | Age: 64
End: 2024-10-11
Payer: COMMERCIAL

## 2024-10-11 VITALS
DIASTOLIC BLOOD PRESSURE: 74 MMHG | HEIGHT: 66 IN | BODY MASS INDEX: 40.4 KG/M2 | SYSTOLIC BLOOD PRESSURE: 130 MMHG | OXYGEN SATURATION: 97 % | WEIGHT: 251.4 LBS | TEMPERATURE: 98.2 F

## 2024-10-11 DIAGNOSIS — T78.40XS ALLERGIC REACTION, SEQUELA: ICD-10-CM

## 2024-10-11 DIAGNOSIS — J45.20 MILD INTERMITTENT ASTHMA WITHOUT COMPLICATION: ICD-10-CM

## 2024-10-11 DIAGNOSIS — E11.9 TYPE 2 DIABETES MELLITUS WITHOUT COMPLICATION, WITHOUT LONG-TERM CURRENT USE OF INSULIN: Primary | ICD-10-CM

## 2024-10-11 DIAGNOSIS — J30.2 SEASONAL ALLERGIES: ICD-10-CM

## 2024-10-11 LAB
EXPIRATION DATE: ABNORMAL
HBA1C MFR BLD: 6.8 % (ref 4.5–5.7)
Lab: ABNORMAL

## 2024-10-11 PROCEDURE — 83036 HEMOGLOBIN GLYCOSYLATED A1C: CPT | Performed by: FAMILY MEDICINE

## 2024-10-11 PROCEDURE — 99214 OFFICE O/P EST MOD 30 MIN: CPT | Performed by: FAMILY MEDICINE

## 2024-10-11 PROCEDURE — 90656 IIV3 VACC NO PRSV 0.5 ML IM: CPT | Performed by: FAMILY MEDICINE

## 2024-10-11 PROCEDURE — 1159F MED LIST DOCD IN RCRD: CPT | Performed by: FAMILY MEDICINE

## 2024-10-11 PROCEDURE — 1160F RVW MEDS BY RX/DR IN RCRD: CPT | Performed by: FAMILY MEDICINE

## 2024-10-11 PROCEDURE — 90471 IMMUNIZATION ADMIN: CPT | Performed by: FAMILY MEDICINE

## 2024-10-11 PROCEDURE — 1125F AMNT PAIN NOTED PAIN PRSNT: CPT | Performed by: FAMILY MEDICINE

## 2024-10-11 PROCEDURE — 3044F HG A1C LEVEL LT 7.0%: CPT | Performed by: FAMILY MEDICINE

## 2024-10-11 RX ORDER — EPINEPHRINE 0.3 MG/.3ML
0.3 INJECTION SUBCUTANEOUS AS NEEDED
Qty: 1 EACH | Refills: 1 | Status: SHIPPED | OUTPATIENT
Start: 2024-10-11

## 2024-10-11 RX ORDER — ALBUTEROL SULFATE 90 UG/1
2 INHALANT RESPIRATORY (INHALATION) EVERY 6 HOURS PRN
Qty: 18 G | Refills: 1 | Status: SHIPPED | OUTPATIENT
Start: 2024-10-11

## 2024-10-11 NOTE — PROGRESS NOTES
Office Note     Name: Sandra Auguste    : 1960     MRN: [id number]     Chief Complaint  Diabetes    Subjective     History of Present Illness:  Sandra Auguste is a 64 y.o. female who presents today for follow up DM  Last A1c 6.4  Current - Today- A1c 6.8    Has jardiance 10mg  but is not taking it   Has working on diet and exercise   Would like to avoid medication  Does not like Metformin- GI issues  Checking Blood Sugar at home-  has been doing well.   Checking BP occasionally- has been slightly elevated since adderall     Diet - Healthy- moderate. Some frozen dinners and ensure shakes   Monitoring sugar intake   Exercise- rebounder- 3 time per week approx 45-60min   Goal is to do this daily   Has been working with Chloé Labm NP - for mood/anxiety  Is doing better   Took herself off of the Mirtazpine     I spent 25 minutes on the separately reported service. This time is not included in the time used to support the E/M service also reported today.  This time includes activities preparing for the visit, reviewing test, reviewing history and performing an appropriate examination. Discussing with the patient and reviewing and independently interpreting the diagnostic studies, communicating that information to the patient along with discussing care coordination and referrals if needed and documenting information in the medical records.       Review of Systems:   Review of Systems   Constitutional:  Negative for chills and fever.   HENT:  Negative for trouble swallowing and voice change.    Respiratory:  Negative for cough and shortness of breath.    Cardiovascular:  Negative for chest pain, palpitations and leg swelling.   Gastrointestinal:  Negative for abdominal pain, constipation, diarrhea, nausea and vomiting.   Genitourinary:  Negative for dysuria.   Musculoskeletal:  Negative for gait problem.   Skin:  Negative for rash.   Neurological:  Negative for light-headedness and headache.  "  Psychiatric/Behavioral:  Positive for dysphoric mood, sleep disturbance and stress. Negative for self-injury and suicidal ideas. The patient is nervous/anxious.        Past Medical History:   Past Medical History:   Diagnosis Date    ADHD (attention deficit hyperactivity disorder) 07/30/2024    Just diagnosed    Allergic [id number]    Anemia 1984    Anesthesia complication     HAS TROUBLE GETTING \"NUMB\"     Anxiety     Arthritis     on Celebrex + Robaxin, no steroids    Asthma     does not follow w/ pulmonary    Bell palsy     not sure    Bladder spasms     Cluster headache ?    ?    CTS (carpal tunnel syndrome)     Depression     w/ PTSD    Diverticulosis     Dyspepsia     Fatigue     GERD (gastroesophageal reflux disease)     controlled w/ nightly Pepcid, EGD 2020 w/ Dr. Chavarria, (+) HH    Headache, tension-type     long hair that was pulled in desk    Health care maintenance 02/25/2020    Memory loss     FROM SKULL FRACTURE AND FALL-SHORT TERM MEMORY LOSS    Migraine     Morbid obesity     PTSD (post-traumatic stress disorder)     Seasonal allergies     Seizures     \"convulsions\" at age 5    Skull fracture 2012    Sleep apnea with use of continuous positive airway pressure (CPAP)     CPAP compliant    SOB (shortness of breath) on exertion     Tendonitis     Type 2 diabetes mellitus without complication, without long-term current use of insulin     dx 2021, no insulin, A1C <7    Vision loss     Wears contact lenses     Wears partial dentures     TOP ONLY     Wears prescription eyeglasses        Past Surgical History:   Past Surgical History:   Procedure Laterality Date    CERVICAL POLYPECTOMY      COLONOSCOPY  2021    HYSTERECTOMY      OOPHORECTOMY      TOTAL LAPAROSCOPIC HYSTERECTOMY N/A 09/18/2017    Procedure: TOTAL LAPAROSCOPIC HYSTERECTOMY, BILATERAL SALPINGO OOPHORECTOMY WITH DAVINCI ROBOT ;  Surgeon: Shannon Grimaldo DO;  Location: [facility];  Service:     WISDOM TOOTH EXTRACTION         Immunizations: "   Immunization History   Administered Date(s) Administered    COVID-19 (PFIZER) Purple Cap Monovalent 03/17/2021, 04/07/2021    Flu Vaccine Quad PF >36MO 09/19/2016, 10/18/2017    Fluzone (or Fluarix & Flulaval for VFC) >6mos 09/24/2021, 10/26/2022, 12/08/2023    Hepatitis B 07/15/2021    Pneumococcal Polysaccharide (PPSV23) 02/24/2020    Shingrix 11/16/2020, 01/23/2021, 01/31/2021    Tdap 07/15/2021    Tetanus 09/06/2012    flucelvax quad pfs =>4 YRS 12/20/2019        Medications:     Current Outpatient Medications:     acetaminophen (TYLENOL) 500 MG tablet, Take 1 tablet by mouth Every 6 (Six) Hours As Needed for Mild Pain., Disp: , Rfl:     albuterol sulfate  (90 Base) MCG/ACT inhaler, Inhale 2 puffs Every 6 (Six) Hours As Needed for Wheezing or Shortness of Air., Disp: 18 g, Rfl: 1    amphetamine-dextroamphetamine XR (Adderall XR) 10 MG 24 hr capsule, Take 1 capsule by mouth Daily, Disp: 30 capsule, Rfl: 0    Blood Glucose Monitoring Suppl (OneTouch Verio) w/Device kit, 1 kit Daily., Disp: 1 kit, Rfl: 0    Cholecalciferol (Vitamin D3) 50 MCG (2000 UT) capsule, Take 1 capsule by mouth Daily., Disp: 90 capsule, Rfl: 1    Diclofenac Sodium (VOLTAREN) 1 % gel gel, Apply  topically to the appropriate area as directed 4 (Four) Times a Day As Needed (arthralgias, shoulders and back.)., Disp: 100 g, Rfl: 1    empagliflozin (Jardiance) 10 MG tablet tablet, TAKE 1 TABLET BY MOUTH DAILY, Disp: 90 tablet, Rfl: 1    EPINEPHrine (EPIPEN) 0.3 MG/0.3ML solution auto-injector injection, Inject 0.3 mL into the appropriate muscle as directed by prescriber As Needed (FOR SEVERE ALLERGIC REACTION)., Disp: 1 each, Rfl: 1    Fluticasone Furoate-Vilanterol (Breo Ellipta) 200-25 MCG/ACT inhaler, Inhale 1 puff Daily., Disp: 60 each, Rfl: 1    glucose blood (OneTouch Verio) test strip, USE AS INSTRUCTED TO CHECK BLOOD GLUCOSE ONCE DAILY DX:E11.65, Disp: 100 each, Rfl: 3    glucose monitor monitoring kit, Use as directed to check  blood glucose once daily., Disp: 1 each, Rfl: 0    hydrOXYzine (ATARAX) 25 MG tablet, Take 1 tablet by mouth Every 8 (Eight) Hours As Needed for Anxiety., Disp: 90 tablet, Rfl: 2    Lancets (onetouch ultrasoft) lancets, Use one lancet daily to check blood sugars, Disp: 100 each, Rfl: 3    loratadine (CLARITIN) 10 MG tablet, TAKE 1 TABLET BY MOUTH DAILY, Disp: 30 tablet, Rfl: 5    multivitamin with minerals tablet tablet, Take 1 tablet by mouth Daily., Disp: , Rfl:     ondansetron ODT (ZOFRAN-ODT) 4 MG disintegrating tablet, Place 1 tablet on the tongue Every 6 (Six) Hours As Needed for Nausea or Vomiting for up to 15 doses., Disp: 15 tablet, Rfl: 0    pantoprazole (PROTONIX) 40 MG EC tablet, Take 1 tablet by mouth Daily. (Patient taking differently: Take 1 tablet by mouth As Needed.), Disp: 90 tablet, Rfl: 1    polyethylene glycol (MIRALAX) 17 GM/SCOOP powder, Take 17 g by mouth Daily., Disp: 578 g, Rfl: 1    sodium chloride (Ocean Nasal Spray) 0.65 % nasal spray, 1 spray into the nostril(s) as directed by provider As Needed for Congestion., Disp: 60 mL, Rfl: 0    vilazodone (VIIBRYD) 20 MG tablet tablet, TAKE 1 TABLET BY MOUTH DAILY, Disp: 30 tablet, Rfl: 0    Allergies:   Allergies   Allergen Reactions    Mold Extract [Trichophyton] Shortness Of Breath, Anxiety, Palpitations and Irritability    Metformin GI Intolerance    Lortab [Hydrocodone-Acetaminophen] Nausea And Vomiting    Pollen Extract Other (See Comments)     SNEEZING AND CONGESTION        Family History:   Family History   Problem Relation Age of Onset    Cancer Mother         Cervical /over 10 years ago    Dementia Mother     Hypertension Mother         high energy/living on meds    Anxiety disorder Mother     Obesity Father     Hypertension Father     Heart attack Father     Sleep apnea Father     Depression Father     Sleep apnea Sister     ADD / ADHD Sister     Hypertension Brother     Sleep apnea Brother     Cancer Maternal Aunt         lymphoma     "Cancer Paternal Aunt     Breast cancer Paternal Aunt 70    Cancer Maternal Grandmother     Alcohol abuse Maternal Grandfather     Obesity Paternal Grandmother     Diabetes Paternal Grandmother     Hypertension Paternal Grandmother     Stroke Paternal Grandmother     Heart attack Paternal Grandmother     Asthma Paternal Grandmother     Heart attack Paternal Grandfather     Alcohol abuse Other     Depression Other     ADD / ADHD Brother     Alcohol abuse Brother     Seizures Brother         When he was young, not since he had tonsles removed.    Dementia Sister        Social History:   Social History     Socioeconomic History    Marital status: Single   Tobacco Use    Smoking status: Never     Passive exposure: Never    Smokeless tobacco: Never   Vaping Use    Vaping status: Never Used   Substance and Sexual Activity    Alcohol use: Not Currently     Comment: once or twice a year    Drug use: Never    Sexual activity: Not Currently     Partners: Male     Birth control/protection: Abstinence, Post-menopausal, Hysterectomy, Surgical         Objective     Vital Signs  /74 (BP Location: Left arm, Patient Position: Sitting, Cuff Size: Adult)   Temp 98.2 °F (36.8 °C) (Temporal)   Ht 167.6 cm (65.98\")   Wt 114 kg (251 lb 6.4 oz)   SpO2 97%   BMI 40.60 kg/m²   Estimated body mass index is 40.6 kg/m² as calculated from the following:    Height as of this encounter: 167.6 cm (65.98\").    Weight as of this encounter: 114 kg (251 lb 6.4 oz).            Physical Exam  Vitals and nursing note reviewed.   Constitutional:       General: She is not in acute distress.     Appearance: Normal appearance. She is obese.   HENT:      Head: Normocephalic and atraumatic.   Cardiovascular:      Rate and Rhythm: Normal rate and regular rhythm.      Heart sounds: Normal heart sounds.   Pulmonary:      Effort: Pulmonary effort is normal. No respiratory distress.      Breath sounds: Normal breath sounds.   Skin:     General: Skin is " warm and dry.   Neurological:      General: No focal deficit present.      Mental Status: She is alert and oriented to person, place, and time.          Procedures     Assessment and Plan   Diagnosis Discussed   Continue to monitor   Plenty of fluids  Follow up with Chloé Lamb NP as scheduled   Continue to work with diet and exercise- Jardiance on standby   Continue to work on diet and exercise- improving   Flu vaccine today   If symptoms worsen or persist please seek further evaluation     1. Type 2 diabetes mellitus without complication, without long-term current use of insulin  - POC Glycosylated Hemoglobin (Hb A1C)  A1c today 6.8 elevated from 6.4  Not taking Jardiance currently   Would like to hold on medication and work on Diet and Exercise     2. Allergic reaction, sequela  - EPINEPHrine (EPIPEN) 0.3 MG/0.3ML solution auto-injector injection; Inject 0.3 mL into the appropriate muscle as directed by prescriber As Needed (FOR SEVERE ALLERGIC REACTION).  Dispense: 1 each; Refill: 1    3. Seasonal allergies  - albuterol sulfate  (90 Base) MCG/ACT inhaler; Inhale 2 puffs Every 6 (Six) Hours As Needed for Wheezing or Shortness of Air.  Dispense: 18 g; Refill: 1    4. Mild intermittent asthma without complication  - albuterol sulfate  (90 Base) MCG/ACT inhaler; Inhale 2 puffs Every 6 (Six) Hours As Needed for Wheezing or Shortness of Air.  Dispense: 18 g; Refill: 1       Follow Up  Return in about 6 months (around 4/11/2025), or if symptoms worsen or fail to improve, for Annual, fasting labs.    Em Baum MD  [facility]  [address] [place] [id number] [phone]

## 2024-10-11 NOTE — PATIENT INSTRUCTIONS
Diagnosis Discussed   Continue to monitor   Plenty of fluids  Follow up with Chloé Lamb NP as scheduled   Continue to work with diet and exercise- Jardiance on standby   Continue to work on diet and exercise- improving   Flu vaccine today   If symptoms worsen or persist please seek further evaluation

## 2024-10-13 DIAGNOSIS — G47.9 SLEEPING DIFFICULTIES: ICD-10-CM

## 2024-10-13 RX ORDER — MIRTAZAPINE 7.5 MG/1
7.5 TABLET, FILM COATED ORAL NIGHTLY
Qty: 30 TABLET | Refills: 0 | OUTPATIENT
Start: 2024-10-13

## 2024-10-14 ENCOUNTER — OFFICE VISIT (OUTPATIENT)
Dept: BEHAVIORAL HEALTH | Facility: CLINIC | Age: 64
End: 2024-10-14
Payer: COMMERCIAL

## 2024-10-14 VITALS
HEIGHT: 66 IN | OXYGEN SATURATION: 97 % | DIASTOLIC BLOOD PRESSURE: 86 MMHG | WEIGHT: 250 LBS | BODY MASS INDEX: 40.18 KG/M2 | HEART RATE: 92 BPM | SYSTOLIC BLOOD PRESSURE: 128 MMHG

## 2024-10-14 DIAGNOSIS — F33.1 MODERATE EPISODE OF RECURRENT MAJOR DEPRESSIVE DISORDER: ICD-10-CM

## 2024-10-14 DIAGNOSIS — F90.2 ATTENTION DEFICIT HYPERACTIVITY DISORDER, COMBINED TYPE: Primary | ICD-10-CM

## 2024-10-14 DIAGNOSIS — F41.1 GENERALIZED ANXIETY DISORDER: ICD-10-CM

## 2024-10-14 RX ORDER — VILAZODONE HYDROCHLORIDE 20 MG/1
20 TABLET ORAL DAILY
Qty: 30 TABLET | Refills: 0 | Status: SHIPPED | OUTPATIENT
Start: 2024-10-14

## 2024-10-14 RX ORDER — ATOMOXETINE 25 MG/1
25 CAPSULE ORAL DAILY
Qty: 30 CAPSULE | Refills: 0 | Status: SHIPPED | OUTPATIENT
Start: 2024-10-14

## 2024-10-14 NOTE — PROGRESS NOTES
Follow Up Office Visit      Patient Name: Sandra Auguste  : 1960   MRN: [id number]     Referring Provider: Em Baum MD    Chief Complaint:      ICD-10-CM ICD-9-CM   1. Attention deficit hyperactivity disorder, combined type  F90.2 314.01   2. Moderate episode of recurrent major depressive disorder  F33.1 296.32   3. Generalized anxiety disorder  F41.1 300.02        History of Present Illness:   Sandra Auguste is a 64 y.o. female who is here today for follow up and medication management    Subjective      Patient Reports:   History of Present Illness  The patient presents for evaluation of her anxiety and depression.    She has been experiencing sleep disturbances, which have been somewhat alleviated by hydroxyzine 25 mg. However, she reports feeling lightheaded and dizzy upon waking and is considering breaking the tablet in half. She sleeps for 8 hours with hydroxyzine and 5 hours without it. She has previously taken mirtazapine for sleep but discontinued it due to gastrointestinal issues and muscle weakness. She believes her sleep issues may be related to past trauma and a head injury. She occasionally experiences nightmares and intrusive thoughts.    She reports a lack of motivation, even while on Adderall, and is contemplating increasing the dosage or switching to a different medication. She has noticed an increase in talking out loud to herself since starting Adderall. She is currently taking Viibryd 20 mg and reports no changes in appetite, suicidal thoughts, or hallucinations.    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 She has a busy schedule this week, including a diabetic nutrition appointment and a counseling session. She was diagnosed with PTSD following her divorce and has undergone counseling and REM therapy. She is currently undergoing therapy with Karolina Parkinson.    She has also  experienced brain trauma from a skull fracture in 2012. She has noticed occasional chest discomfort, which she attributes to either angina or anxiety.    Review of Systems:   Review of Systems   Constitutional:  Negative for appetite change and unexpected weight change.   Eyes:  Negative for visual disturbance.   Respiratory:  Negative for chest tightness and shortness of breath.    Cardiovascular:  Negative for chest pain.   Musculoskeletal:  Negative for gait problem.   Skin:  Negative for rash and wound.   Neurological:  Negative for tremors, seizures, weakness and light-headedness.   Psychiatric/Behavioral:  Positive for decreased concentration and sleep disturbance. Negative for agitation, behavioral problems, hallucinations, self-injury and suicidal ideas. The patient is nervous/anxious. The patient is not hyperactive.      Sleep pattern: hydroxyzine helps her sleep, but makes her have light headedness and dizziness, only sleeps 5 hours without it  Appetite: no changes     PHQ-9 Depression Screening  Little interest or pleasure in doing things? Over half   Feeling down, depressed, or hopeless? Not at all   Trouble falling or staying asleep, or sleeping too much? Almost all   Feeling tired or having little energy? Over half   Poor appetite or overeating? Several days   Feeling bad about yourself - or that you are a failure or have let yourself or your family down? Almost all   Trouble concentrating on things, such as reading the newspaper or watching television? Over half   Moving or speaking so slowly that other people could have noticed? Or the opposite - being so fidgety or restless that you have been moving around a lot more than usual? Not at all   Thoughts that you would be better off dead, or of hurting yourself in some way? Not at all   PHQ-9 Total Score 13   If you checked off any problems, how difficult have these problems made it for you to do your work, take care of things at home, or get along with  other people? Somewhat difficult       LUIS-7 Anxiety Screening  Over the last two weeks, how often have you been bothered by the following problems?  Feeling nervous, anxious or on edge: Several days  Not being able to stop or control worrying: Several days  Worrying too much about different things: Several days  Trouble Relaxing: More than half the days  Being so restless that it is hard to sit still: More than half the days  Becoming easily annoyed or irritable: Several days  Feeling afraid as if something awful might happen: Several days  LUIS 7 Total Score: 9    RISK ASSESSMENT:  Patient denies any thoughts or intent of suicide today. Patient denies any impulsive behavior today.     Medications:     Current Outpatient Medications:     acetaminophen (TYLENOL) 500 MG tablet, Take 1 tablet by mouth Every 6 (Six) Hours As Needed for Mild Pain., Disp: , Rfl:     albuterol sulfate  (90 Base) MCG/ACT inhaler, Inhale 2 puffs Every 6 (Six) Hours As Needed for Wheezing or Shortness of Air., Disp: 18 g, Rfl: 1    amphetamine-dextroamphetamine XR (Adderall XR) 10 MG 24 hr capsule, Take 1 capsule by mouth Daily, Disp: 30 capsule, Rfl: 0    Blood Glucose Monitoring Suppl (OneTouch Verio) w/Device kit, 1 kit Daily., Disp: 1 kit, Rfl: 0    Cholecalciferol (Vitamin D3) 50 MCG (2000 UT) capsule, Take 1 capsule by mouth Daily., Disp: 90 capsule, Rfl: 1    Diclofenac Sodium (VOLTAREN) 1 % gel gel, Apply  topically to the appropriate area as directed 4 (Four) Times a Day As Needed (arthralgias, shoulders and back.)., Disp: 100 g, Rfl: 1    empagliflozin (Jardiance) 10 MG tablet tablet, TAKE 1 TABLET BY MOUTH DAILY, Disp: 90 tablet, Rfl: 1    EPINEPHrine (EPIPEN) 0.3 MG/0.3ML solution auto-injector injection, Inject 0.3 mL into the appropriate muscle as directed by prescriber As Needed (FOR SEVERE ALLERGIC REACTION)., Disp: 1 each, Rfl: 1    Fluticasone Furoate-Vilanterol (Breo Ellipta) 200-25 MCG/ACT inhaler, Inhale 1 puff  Daily., Disp: 60 each, Rfl: 1    glucose blood (OneTouch Verio) test strip, USE AS INSTRUCTED TO CHECK BLOOD GLUCOSE ONCE DAILY DX:E11.65, Disp: 100 each, Rfl: 3    glucose monitor monitoring kit, Use as directed to check blood glucose once daily., Disp: 1 each, Rfl: 0    hydrOXYzine (ATARAX) 25 MG tablet, Take 1 tablet by mouth Every 8 (Eight) Hours As Needed for Anxiety., Disp: 90 tablet, Rfl: 2    Lancets (onetouch ultrasoft) lancets, Use one lancet daily to check blood sugars, Disp: 100 each, Rfl: 3    loratadine (CLARITIN) 10 MG tablet, TAKE 1 TABLET BY MOUTH DAILY, Disp: 30 tablet, Rfl: 5    multivitamin with minerals tablet tablet, Take 1 tablet by mouth Daily., Disp: , Rfl:     ondansetron ODT (ZOFRAN-ODT) 4 MG disintegrating tablet, Place 1 tablet on the tongue Every 6 (Six) Hours As Needed for Nausea or Vomiting for up to 15 doses., Disp: 15 tablet, Rfl: 0    pantoprazole (PROTONIX) 40 MG EC tablet, Take 1 tablet by mouth Daily. (Patient taking differently: Take 1 tablet by mouth As Needed.), Disp: 90 tablet, Rfl: 1    polyethylene glycol (MIRALAX) 17 GM/SCOOP powder, Take 17 g by mouth Daily., Disp: 578 g, Rfl: 1    sodium chloride (Ocean Nasal Spray) 0.65 % nasal spray, 1 spray into the nostril(s) as directed by provider As Needed for Congestion., Disp: 60 mL, Rfl: 0    vilazodone (VIIBRYD) 20 MG tablet tablet, Take 1 tablet by mouth Daily., Disp: 30 tablet, Rfl: 0    atomoxetine (Strattera) 25 MG capsule, Take 1 capsule by mouth Daily., Disp: 30 capsule, Rfl: 0    Medication Considerations:  SHANTEL reviewed and appropriate.      Allergies:   Allergies   Allergen Reactions    Mold Extract [Trichophyton] Shortness Of Breath, Anxiety, Palpitations and Irritability    Metformin GI Intolerance    Lortab [Hydrocodone-Acetaminophen] Nausea And Vomiting    Pollen Extract Other (See Comments)     SNEEZING AND CONGESTION        Objective     Physical Exam:  Vital Signs:   Vitals:    10/14/24 1053   BP: 128/86  "  Pulse: 92   SpO2: 97%   Weight: 113 kg (250 lb)   Height: 167.6 cm (65.98\")     Body mass index is 40.38 kg/m².     Mental Status Exam:   Hygiene:   good   Cooperation:  Cooperative  Eye Contact:  Good  Psychomotor Behavior:  Appropriate  Affect:  Appropriate  Mood: normal  Speech:  Normal  Thought Process:  Goal directed and Linear  Thought Content:  Normal  Suicidal:  None  Homicidal:  None  Hallucinations:  None  Delusion:  None  Memory:  Intact  Orientation:  Person, Place, Time, and Situation  Reliability:  good  Insight:  Good  Judgement:  Good  Impulse Control:  Good  Physical/Medical Issues:   see problem list      Assessment / Plan      Visit Diagnosis/Orders Placed This Visit:  Diagnoses and all orders for this visit:    1. Attention deficit hyperactivity disorder, combined type (Primary)  -     atomoxetine (Strattera) 25 MG capsule; Take 1 capsule by mouth Daily.  Dispense: 30 capsule; Refill: 0    2. Moderate episode of recurrent major depressive disorder  -     vilazodone (VIIBRYD) 20 MG tablet tablet; Take 1 tablet by mouth Daily.  Dispense: 30 tablet; Refill: 0    3. Generalized anxiety disorder  -     vilazodone (VIIBRYD) 20 MG tablet tablet; Take 1 tablet by mouth Daily.  Dispense: 30 tablet; Refill: 0             Functional Status: No impairment    Prognosis: Good with Ongoing Treatment     Impression/Formulation:  Patient appeared alert and oriented.  Patient is voluntarily requesting to continue outpatient psychiatric treatment at [facility].  Patient is receptive to assistance with maintaining a stable lifestyle.  Patient presents with history of     ICD-10-CM ICD-9-CM   1. Attention deficit hyperactivity disorder, combined type  F90.2 314.01   2. Moderate episode of recurrent major depressive disorder  F33.1 296.32   3. Generalized anxiety disorder  F41.1 300.02   .    Reviewed patient's previous provider notes. Reviewed most recent labs. Patient meets DSM V diagnostic " criteria for diagnoses. Diagnoses may be updated as more information becomes available.     Assessment & Plan  1. Anxiety.  The patient reports that Adderall may be contributing to her anxiety. She was advised to switch from Adderall to Strattera, starting with 25 mg. Strattera is prescribed for ADHD but can help with mood and anxiety and is less likely to cause anxiety compared to Adderall. She can start the medication after her busy week and will take it for 3 weeks before a follow-up. Potential side effects such as headaches and stomachaches were discussed. If well-tolerated, the dosage may be increased to 40 mg after 6 weeks. She was also advised to halve her hydroxyzine dosage to reduce morning dizziness.    2. Depression.  The patient continues to struggle with motivation, which may be related to her depression. She is currently on Viibryd 20 mg, and a refill was provided. The potential for increasing the Viibryd dosage was considered but not implemented at this time. She will continue her current dose and monitor her symptoms.    3. Insomnia.  The patient has been using hydroxyzine 25 mg to help with sleep but experiences lightheadedness and dizziness in the morning. She was advised to cut the hydroxyzine in half or switch to a 10 mg dose to mitigate these side effects. She reports that hydroxyzine helps her sleep for almost 8 hours, whereas without it, she sleeps around 5 hours.    4. Post-Traumatic Stress Disorder (PTSD).  The patient has a history of PTSD and experiences nightmares. Prazosin, a blood pressure medication used off-label for nightmares, was discussed as a potential treatment option. She does not currently take any blood pressure medication, and her blood pressure is stable.    5. Medication Management.  A refill for Viibryd was provided. The patient does not need more hydroxyzine at this time.    Follow-up  Return in 3 weeks for follow up or sooner if needed.    Treatment Plan:   Continue  viibryd 20 mg daily  Continue hydroxyzine may take half a tablet  Discontinue adderall  Begin strattera 25 mg daily  Patient will continue supportive psychotherapy efforts and medications as indicated. Clinic will obtain release of information for current treatment team for continuity of care as needed. Patient will contact this office, call 911 or present to the nearest emergency room should suicidal or homicidal ideations occur.  Discussed medication options and treatment plan of prescribed medication(s) as well as the risks, benefits, and potential side effects. Patient acknowledged and verbally consented to continue with current treatment plan and was educated on the importance of compliance with treatment and follow-up appointments.     Patient instructions:  Instructed will take 4-6 weeks for full therapeutic effect. Medication risks and side effects discussed with patient including risk for worsening mood, changes in behavior, thoughts of suicide or homicide, induction of carey, serotonin syndrome.   If any thoughts of SI or HI, worsening mood or changes in behavior, call 911 or crisis line [phone], or go to nearest ER at once.  Pt.verbalizes understanding and consents to treatment with this medication.     Follow Up:   Return in about 3 weeks (around 11/4/2024) for Med Check.    Patient or patient representative verbalized consent for the use of Ambient Listening during the visit with  REBECA Tao for chart documentation. 10/14/2024  12:02 EDT    REBECA Tao, [facility]

## 2024-10-15 ENCOUNTER — HOSPITAL ENCOUNTER (OUTPATIENT)
Dept: DIABETES SERVICES | Facility: HOSPITAL | Age: 64
Setting detail: RECURRING SERIES
Discharge: HOME | End: 2024-10-15
Payer: COMMERCIAL

## 2024-10-15 PROCEDURE — G0109 DIAB MANAGE TRN IND/GROUP: HCPCS

## 2024-10-15 NOTE — PROGRESS NOTES
Patient attended an in person diabetes nutrition only class for 60 minutes.  EPIC users, please see media tab for assessments and notes.  Non-EPIC users, assessments and notes will be sent per protocol.

## 2024-11-01 DIAGNOSIS — F33.1 MODERATE EPISODE OF RECURRENT MAJOR DEPRESSIVE DISORDER: ICD-10-CM

## 2024-11-01 DIAGNOSIS — F41.1 GENERALIZED ANXIETY DISORDER: ICD-10-CM

## 2024-11-01 RX ORDER — VILAZODONE HYDROCHLORIDE 20 MG/1
20 TABLET ORAL DAILY
Qty: 30 TABLET | Refills: 0 | OUTPATIENT
Start: 2024-11-01

## 2024-11-10 DIAGNOSIS — F90.2 ATTENTION DEFICIT HYPERACTIVITY DISORDER, COMBINED TYPE: ICD-10-CM

## 2024-11-11 RX ORDER — ATOMOXETINE 25 MG/1
25 CAPSULE ORAL DAILY
Qty: 30 CAPSULE | Refills: 0 | Status: SHIPPED | OUTPATIENT
Start: 2024-11-11

## 2024-11-19 DIAGNOSIS — F90.2 ATTENTION DEFICIT HYPERACTIVITY DISORDER, COMBINED TYPE: ICD-10-CM

## 2024-11-19 RX ORDER — ATOMOXETINE 25 MG/1
25 CAPSULE ORAL DAILY
Qty: 30 CAPSULE | Refills: 0 | OUTPATIENT
Start: 2024-11-19

## 2024-11-19 NOTE — TELEPHONE ENCOUNTER
ELPIDIOM for patient to return call advising she can call pharmacy and have it switched to a different [facility] if needed. Also advised that she needs to schedule a follow up appointment with Chloé to continue any refills.

## 2024-11-25 ENCOUNTER — OFFICE VISIT (OUTPATIENT)
Dept: INTERNAL MEDICINE | Facility: CLINIC | Age: 64
End: 2024-11-25
Payer: COMMERCIAL

## 2024-11-25 VITALS
DIASTOLIC BLOOD PRESSURE: 70 MMHG | OXYGEN SATURATION: 96 % | HEIGHT: 66 IN | SYSTOLIC BLOOD PRESSURE: 128 MMHG | BODY MASS INDEX: 40.56 KG/M2 | TEMPERATURE: 97.4 F | WEIGHT: 252.4 LBS | RESPIRATION RATE: 18 BRPM | HEART RATE: 90 BPM

## 2024-11-25 DIAGNOSIS — R09.81 NASAL CONGESTION: ICD-10-CM

## 2024-11-25 DIAGNOSIS — J02.9 SORE THROAT: ICD-10-CM

## 2024-11-25 DIAGNOSIS — R19.7 DIARRHEA IN ADULT PATIENT: ICD-10-CM

## 2024-11-25 DIAGNOSIS — J06.9 UPPER RESPIRATORY TRACT INFECTION, UNSPECIFIED TYPE: Primary | ICD-10-CM

## 2024-11-25 PROCEDURE — 87428 SARSCOV & INF VIR A&B AG IA: CPT

## 2024-11-25 PROCEDURE — 1160F RVW MEDS BY RX/DR IN RCRD: CPT

## 2024-11-25 PROCEDURE — 1125F AMNT PAIN NOTED PAIN PRSNT: CPT

## 2024-11-25 PROCEDURE — 3044F HG A1C LEVEL LT 7.0%: CPT

## 2024-11-25 PROCEDURE — 87880 STREP A ASSAY W/OPTIC: CPT

## 2024-11-25 PROCEDURE — 1159F MED LIST DOCD IN RCRD: CPT

## 2024-11-25 PROCEDURE — 99213 OFFICE O/P EST LOW 20 MIN: CPT

## 2024-11-25 RX ORDER — AZITHROMYCIN 250 MG/1
TABLET, FILM COATED ORAL
Qty: 6 TABLET | Refills: 0 | Status: SHIPPED | OUTPATIENT
Start: 2024-11-25 | End: 2024-11-30

## 2024-11-25 NOTE — LETTER
November 25, 2024     Patient: Sandra Auguste   YOB: 1960   Date of Visit: 11/25/2024       To Whom It May Concern:    It is my medical opinion that Sandra Auguste may return to work tomorrow.            Sincerely,        Brianna Yoder PA-C    CC: No Recipients

## 2024-11-25 NOTE — PROGRESS NOTES
Follow Up Office Visit      Date: 2024  Patient Name: Sandra Auguste  : 1960   MRN: [id number]     Chief Complaint:    Chief Complaint   Patient presents with    Cough     Sx since 26    Nasal Congestion    Hoarse    Sore Throat    URI    Fatigue    Diarrhea    Chills    Generalized Body Aches    Shortness of Breath       History of Present Illness: Sandra Auguste is a 64 y.o. female who is here today for follow up with nonproductive cough, sore throat, congestion, fatigue that onset 4-days ago after she had a tooth pulled. Has been using Tylenol and ibuprofen at home. Started Mucinex DM yesterday. Has been taking Breo every morning, albuterol once every other day.     C/o nausea- took Zofran this morning.     Has been seen by  twice for similar sx in the last 1-month. Has been provided steroid each time.     Subjective      Review of Systems:   Review of Systems   Constitutional:  Positive for fatigue. Negative for chills and fever.   HENT:  Positive for congestion, rhinorrhea and sore throat.    Respiratory:  Positive for cough and shortness of breath.    Cardiovascular:  Negative for chest pain.   Gastrointestinal:  Positive for nausea. Negative for abdominal pain, constipation, diarrhea and vomiting.       Medications:     Current Outpatient Medications:     acetaminophen (TYLENOL) 500 MG tablet, Take 1 tablet by mouth Every 6 (Six) Hours As Needed for Mild Pain., Disp: , Rfl:     albuterol sulfate  (90 Base) MCG/ACT inhaler, Inhale 2 puffs Every 6 (Six) Hours As Needed for Wheezing or Shortness of Air., Disp: 18 g, Rfl: 1    atomoxetine (STRATTERA) 25 MG capsule, TAKE 1 CAPSULE BY MOUTH DAILY, Disp: 30 capsule, Rfl: 0    Blood Glucose Monitoring Suppl (OneTouch Verio) w/Device kit, 1 kit Daily., Disp: 1 kit, Rfl: 0    Cholecalciferol (Vitamin D3) 50 MCG (2000 UT) capsule, Take 1 capsule by mouth Daily., Disp: 90 capsule, Rfl: 1    Diclofenac Sodium (VOLTAREN) 1 % gel gel, Apply   topically to the appropriate area as directed 4 (Four) Times a Day As Needed (arthralgias, shoulders and back.)., Disp: 100 g, Rfl: 1    EPINEPHrine (EPIPEN) 0.3 MG/0.3ML solution auto-injector injection, Inject 0.3 mL into the appropriate muscle as directed by prescriber As Needed (FOR SEVERE ALLERGIC REACTION)., Disp: 1 each, Rfl: 1    Fluticasone Furoate-Vilanterol (Breo Ellipta) 200-25 MCG/ACT inhaler, Inhale 1 puff Daily., Disp: 60 each, Rfl: 1    glucose blood (OneTouch Verio) test strip, USE AS INSTRUCTED TO CHECK BLOOD GLUCOSE ONCE DAILY DX:E11.65, Disp: 100 each, Rfl: 3    glucose monitor monitoring kit, Use as directed to check blood glucose once daily., Disp: 1 each, Rfl: 0    hydrOXYzine (ATARAX) 25 MG tablet, Take 1 tablet by mouth Every 8 (Eight) Hours As Needed for Anxiety., Disp: 90 tablet, Rfl: 2    Lancets (onetouch ultrasoft) lancets, Use one lancet daily to check blood sugars, Disp: 100 each, Rfl: 3    loratadine (CLARITIN) 10 MG tablet, TAKE 1 TABLET BY MOUTH DAILY, Disp: 30 tablet, Rfl: 5    multivitamin with minerals tablet tablet, Take 1 tablet by mouth Daily., Disp: , Rfl:     ondansetron ODT (ZOFRAN-ODT) 4 MG disintegrating tablet, Place 1 tablet on the tongue Every 6 (Six) Hours As Needed for Nausea or Vomiting for up to 15 doses., Disp: 15 tablet, Rfl: 0    pantoprazole (PROTONIX) 40 MG EC tablet, Take 1 tablet by mouth Daily. (Patient taking differently: Take 1 tablet by mouth As Needed.), Disp: 90 tablet, Rfl: 1    polyethylene glycol (MIRALAX) 17 GM/SCOOP powder, Take 17 g by mouth Daily., Disp: 578 g, Rfl: 1    sodium chloride (Ocean Nasal Spray) 0.65 % nasal spray, 1 spray into the nostril(s) as directed by provider As Needed for Congestion., Disp: 60 mL, Rfl: 0    vilazodone (VIIBRYD) 20 MG tablet tablet, Take 1 tablet by mouth Daily., Disp: 30 tablet, Rfl: 0    azithromycin (ZITHROMAX) 250 MG tablet, Take 2 tablets the first day, then 1 tablet daily for 4 days., Disp: 6 tablet,  "Rfl: 0    empagliflozin (Jardiance) 10 MG tablet tablet, TAKE 1 TABLET BY MOUTH DAILY (Patient not taking: Reported on 11/25/2024), Disp: 90 tablet, Rfl: 1    Allergies:   Allergies   Allergen Reactions    Mold Extract [Trichophyton] Shortness Of Breath, Anxiety, Palpitations and Irritability    Metformin GI Intolerance    Lortab [Hydrocodone-Acetaminophen] Nausea And Vomiting    Pollen Extract Other (See Comments)     SNEEZING AND CONGESTION        Objective     Physical Exam:  Vital Signs:   Vitals:    11/25/24 1409   BP: 128/70   Pulse: 90   Resp: 18   Temp: 97.4 °F (36.3 °C)   TempSrc: Temporal   SpO2: 96%   Weight: 114 kg (252 lb 6.4 oz)   Height: 167.6 cm (65.98\")   PainSc:   5   PainLoc: Generalized     Body mass index is 40.76 kg/m².   Facility age limit for growth %josé miguel is 20 years.          Physical Exam  Vitals and nursing note reviewed.   Constitutional:       General: She is not in acute distress.     Appearance: Normal appearance.   HENT:      Right Ear: Tympanic membrane, ear canal and external ear normal.      Left Ear: Tympanic membrane, ear canal and external ear normal.      Nose: Nose normal.      Mouth/Throat:      Pharynx: Posterior oropharyngeal erythema present. No oropharyngeal exudate.   Eyes:      Extraocular Movements: Extraocular movements intact.      Conjunctiva/sclera: Conjunctivae normal.   Cardiovascular:      Rate and Rhythm: Normal rate and regular rhythm.      Pulses: Normal pulses.      Heart sounds: Normal heart sounds.   Pulmonary:      Effort: Pulmonary effort is normal. No respiratory distress.      Breath sounds: Normal breath sounds. No wheezing, rhonchi or rales.   Neurological:      General: No focal deficit present.      Mental Status: She is alert and oriented to person, place, and time. Mental status is at baseline.   Psychiatric:         Mood and Affect: Mood normal.         Behavior: Behavior normal.         POCT Results (if applicable):   Results for orders placed " or performed in visit on 11/25/24   POCT rapid strep A    Collection Time: 11/25/24  2:46 PM    Specimen: Swab   Result Value Ref Range    Rapid Strep A Screen Negative Negative, VALID, INVALID, Not Performed    Internal Control Passed Passed    Lot Number [id number]     Expiration Date 01/03/27    POCT SARS-CoV-2 Antigen JONATHON + Flu    Collection Time: 11/25/24  2:47 PM    Specimen: Swab   Result Value Ref Range    SARS Antigen Not Detected Not Detected, Presumptive Negative    Influenza A Antigen JONATHON Not Detected Not Detected    Influenza B Antigen JONATHON Not Detected Not Detected    Internal Control Passed Passed    Lot Number [id number]     Expiration Date 09/04/25      *Note: Due to a large number of results and/or encounters for the requested time period, some results have not been displayed. A complete set of results can be found in Results Review.       Assessment / Plan      Assessment/Plan:   Diagnoses and all orders for this visit:    1. Upper respiratory tract infection, unspecified type (Primary)  -     azithromycin (ZITHROMAX) 250 MG tablet; Take 2 tablets the first day, then 1 tablet daily for 4 days.  Dispense: 6 tablet; Refill: 0    2. Sore throat  -     POCT rapid strep A  -     POCT SARS-CoV-2 Antigen JONATHON + Flu    3. Nasal congestion  -     POCT SARS-CoV-2 Antigen JONATHON + Flu    4. Diarrhea in adult patient  -     POCT SARS-CoV-2 Antigen JONATHON + Flu       PLAN:  - POC Covid/flu/strep negative  - Likely viral however patient requesting abx despite recommendation  - It is important to treat your symptoms aggressively; this includes antihistamine (claritin, zyrtec, or allegra) for runny nose, flonase and nasal saline rinses for nasal congestion, mucinex DM for cough and chest congestion, tylenol as needed for fever or headaches, lots of rest, and lots of water.    - Return to the office if symptoms worsen or fail to improve with current plan     Follow Up:   Return for Next scheduled follow up, Follow up with  Dr. Baum.      Brianna Yoder PA-C    [facility] [place]

## 2024-11-25 NOTE — LETTER
November 25, 2024     Patient: Sandra Auguste   YOB: 1960   Date of Visit: 11/25/2024       To Whom It May Concern:    It is my medical opinion that Sandra Auguste may return to work in two days.         Sincerely,        Brianna Yoder PA-C    CC: No Recipients

## 2024-11-27 ENCOUNTER — HOSPITAL ENCOUNTER (OUTPATIENT)
Dept: DIABETES SERVICES | Facility: HOSPITAL | Age: 64
Discharge: HOME | End: 2024-11-27
Payer: COMMERCIAL

## 2024-11-27 NOTE — PROGRESS NOTES
Patient seen for diabetes nutrition follow-up. RD spent 45 minutes with patient on phone for continued education and goal review. Epic users--notes will appear under media tab. Non Epic users--notes will be forwarded per protocol.

## 2024-12-06 DIAGNOSIS — F41.1 GENERALIZED ANXIETY DISORDER: ICD-10-CM

## 2024-12-06 DIAGNOSIS — F33.1 MODERATE EPISODE OF RECURRENT MAJOR DEPRESSIVE DISORDER: ICD-10-CM

## 2024-12-06 RX ORDER — VILAZODONE HYDROCHLORIDE 20 MG/1
20 TABLET ORAL DAILY
Qty: 30 TABLET | Refills: 0 | OUTPATIENT
Start: 2024-12-06

## 2024-12-10 ENCOUNTER — OFFICE VISIT (OUTPATIENT)
Dept: SLEEP MEDICINE | Facility: CLINIC | Age: 64
End: 2024-12-10
Payer: COMMERCIAL

## 2024-12-10 VITALS
OXYGEN SATURATION: 92 % | TEMPERATURE: 97.8 F | WEIGHT: 250 LBS | HEIGHT: 66 IN | SYSTOLIC BLOOD PRESSURE: 118 MMHG | HEART RATE: 86 BPM | BODY MASS INDEX: 40.18 KG/M2 | DIASTOLIC BLOOD PRESSURE: 68 MMHG

## 2024-12-10 DIAGNOSIS — G47.33 OSA (OBSTRUCTIVE SLEEP APNEA): Primary | ICD-10-CM

## 2024-12-10 PROCEDURE — 1159F MED LIST DOCD IN RCRD: CPT | Performed by: NURSE PRACTITIONER

## 2024-12-10 PROCEDURE — 99213 OFFICE O/P EST LOW 20 MIN: CPT | Performed by: NURSE PRACTITIONER

## 2024-12-10 PROCEDURE — 1160F RVW MEDS BY RX/DR IN RCRD: CPT | Performed by: NURSE PRACTITIONER

## 2024-12-10 NOTE — PROGRESS NOTES
Chief Complaint:   Chief Complaint   Patient presents with    Follow-up    Sleep Apnea       HPI:    Sandra Auguste is a 64 y.o. female here for follow-up of sleep apnea.  Patient was last seen 7/22/2024.  Patient machine was 5+ years old and we did an order for a new 1.  Patient states she is doing well with her new machine.  She is sleeping 7 to 8 hours nightly.  She goes to sleep easily and has an [place] score of 6/24.  She is doing well with fullface mask and heated tubing.  Patient will continue CPAP therapy.        Current medications are:   Current Outpatient Medications:     acetaminophen (TYLENOL) 500 MG tablet, Take 1 tablet by mouth Every 6 (Six) Hours As Needed for Mild Pain., Disp: , Rfl:     albuterol sulfate  (90 Base) MCG/ACT inhaler, Inhale 2 puffs Every 6 (Six) Hours As Needed for Wheezing or Shortness of Air., Disp: 18 g, Rfl: 1    atomoxetine (STRATTERA) 25 MG capsule, TAKE 1 CAPSULE BY MOUTH DAILY, Disp: 30 capsule, Rfl: 0    Blood Glucose Monitoring Suppl (OneTouch Verio) w/Device kit, 1 kit Daily., Disp: 1 kit, Rfl: 0    Cholecalciferol (Vitamin D3) 50 MCG (2000 UT) capsule, Take 1 capsule by mouth Daily., Disp: 90 capsule, Rfl: 1    Diclofenac Sodium (VOLTAREN) 1 % gel gel, Apply  topically to the appropriate area as directed 4 (Four) Times a Day As Needed (arthralgias, shoulders and back.)., Disp: 100 g, Rfl: 1    EPINEPHrine (EPIPEN) 0.3 MG/0.3ML solution auto-injector injection, Inject 0.3 mL into the appropriate muscle as directed by prescriber As Needed (FOR SEVERE ALLERGIC REACTION)., Disp: 1 each, Rfl: 1    Fluticasone Furoate-Vilanterol (Breo Ellipta) 200-25 MCG/ACT inhaler, Inhale 1 puff Daily., Disp: 60 each, Rfl: 1    glucose blood (OneTouch Verio) test strip, USE AS INSTRUCTED TO CHECK BLOOD GLUCOSE ONCE DAILY DX:E11.65, Disp: 100 each, Rfl: 3    glucose monitor monitoring kit, Use as directed to check blood glucose once daily., Disp: 1 each, Rfl: 0    hydrOXYzine (ATARAX)  "25 MG tablet, Take 1 tablet by mouth Every 8 (Eight) Hours As Needed for Anxiety., Disp: 90 tablet, Rfl: 2    Lancets (onetouch ultrasoft) lancets, Use one lancet daily to check blood sugars, Disp: 100 each, Rfl: 3    loratadine (CLARITIN) 10 MG tablet, TAKE 1 TABLET BY MOUTH DAILY, Disp: 30 tablet, Rfl: 5    multivitamin with minerals tablet tablet, Take 1 tablet by mouth Daily., Disp: , Rfl:     ondansetron ODT (ZOFRAN-ODT) 4 MG disintegrating tablet, Place 1 tablet on the tongue Every 6 (Six) Hours As Needed for Nausea or Vomiting for up to 15 doses., Disp: 15 tablet, Rfl: 0    pantoprazole (PROTONIX) 40 MG EC tablet, Take 1 tablet by mouth Daily. (Patient taking differently: Take 1 tablet by mouth As Needed.), Disp: 90 tablet, Rfl: 1    polyethylene glycol (MIRALAX) 17 GM/SCOOP powder, Take 17 g by mouth Daily., Disp: 578 g, Rfl: 1    sodium chloride (Ocean Nasal Spray) 0.65 % nasal spray, 1 spray into the nostril(s) as directed by provider As Needed for Congestion., Disp: 60 mL, Rfl: 0    vilazodone (VIIBRYD) 20 MG tablet tablet, Take 1 tablet by mouth Daily., Disp: 30 tablet, Rfl: 0.      The patient's relevant past medical, surgical, family and social history were reviewed and updated in Epic as appropriate.       Review of Systems   Eyes:  Positive for visual disturbance.   Respiratory:  Positive for apnea.    Cardiovascular:  Positive for palpitations and leg swelling.   Gastrointestinal:         Heartburn   Musculoskeletal:  Positive for arthralgias.   Allergic/Immunologic: Positive for environmental allergies.   Neurological:  Positive for headaches.   Psychiatric/Behavioral:  Positive for dysphoric mood and sleep disturbance. The patient is nervous/anxious.    All other systems reviewed and are negative.      /68   Pulse 86   Temp 97.8 °F (36.6 °C)   Ht 167.6 cm (65.98\")   Wt 113 kg (250 lb)   LMP  (LMP Unknown)   SpO2 92%   BMI 40.37 kg/m²     Objective:    Physical Exam  Constitutional:  " "     Appearance: Normal appearance.   HENT:      Head: Normocephalic and atraumatic.      Mouth/Throat:      Comments: Mallampati 3 anatomy  Cardiovascular:      Rate and Rhythm: Normal rate and regular rhythm.   Pulmonary:      Effort: Pulmonary effort is normal.      Breath sounds: Normal breath sounds.   Skin:     General: Skin is warm and dry.   Neurological:      Mental Status: She is alert and oriented to person, place, and time.   Psychiatric:         Mood and Affect: Mood normal.         Behavior: Behavior normal.         Thought Content: Thought content normal.         Judgment: Judgment normal.       /68   Pulse 86   Temp 97.8 °F (36.6 °C)   Ht 167.6 cm (65.98\")   Wt 113 kg (250 lb)   LMP  (LMP Unknown)   SpO2 92%   BMI 40.37 kg/m²     CPAP Report  89/90 days of use  Greater than 4-hour use 90%  Setting 8-18  95th percentile pressure 11.5  AHI of 0.5    The patient continues to use and benefit from CPAP therapy.    ASSESSMENT/PLAN    Diagnoses and all orders for this visit:    1. KELLY (obstructive sleep apnea) (Primary)  -     PAP Therapy        Counseled patient regarding multimodal approach with healthy nutrition, healthy sleep, regular physical activity, social activities, counseling, and medications. Encouraged to practice lateral sleep position. Avoid alcohol and sedatives close to bedtime.      Refill supplies x 1 year.  Return to clinic 1 year or sooner as symptoms warrant.  Signed by  REBECA Madden    December 10, 2024      CC: Em Baum MD         No ref. provider found      "

## 2024-12-21 DIAGNOSIS — F90.2 ATTENTION DEFICIT HYPERACTIVITY DISORDER, COMBINED TYPE: ICD-10-CM

## 2024-12-23 RX ORDER — ATOMOXETINE 25 MG/1
25 CAPSULE ORAL DAILY
Qty: 30 CAPSULE | Refills: 0 | OUTPATIENT
Start: 2024-12-23

## 2024-12-26 ENCOUNTER — TELEPHONE (OUTPATIENT)
Dept: INTERNAL MEDICINE | Facility: CLINIC | Age: 64
End: 2024-12-26
Payer: COMMERCIAL

## 2024-12-26 NOTE — TELEPHONE ENCOUNTER
PATIENT IS REQUESTING A REFILL OF atomoxetine (STRATTERA) 25 MG capsule     PATIENT STATES THAT SHE IS OUT AND TOOK HER LAST ONE TODAY.      PHARM: SHRUTI LAZCANO [phone]      CALL BACK: [phone]

## 2024-12-27 ENCOUNTER — TELEPHONE (OUTPATIENT)
Dept: INTERNAL MEDICINE | Facility: CLINIC | Age: 64
End: 2024-12-27
Payer: COMMERCIAL

## 2024-12-27 NOTE — TELEPHONE ENCOUNTER
"NOV 1/16/24  Pt called about issues with medication, went to get call but pt had hung up,   Made 3 return calls but no answer. VM left.     Pt returned call.   Requesting refill on Strattera or Asking if she should take Adderall?     Pt is wondering if the current dose Viibryd is too high of a dose as pt states she is \"numb\" all the time - can't feel anything - no everardo, no excitement,   Has brain fog,   Can't make decisions.   Has no desire to do anything.     Pt states that being numb all the time is furthering her depression, is looking for direction. Pt voiced no suicidal ideation. Just looking for assistance in med management.     Please advise.       "

## 2024-12-27 NOTE — TELEPHONE ENCOUNTER
Patient called into the office this afternoon requesting to talk to a nurse regarding Vilazodone and Strattera. Patient was requesting a refill on Straterra but advised her Vilazodone is causing her to have side effects. Patient advised her depression is really concerning her. I attempted to warm transfer the patient to a nurse but was unable to get anyone after putting in a message in the office chat after multiple MA's looked at it. An MA attempted to call the patient back twice then tried again and got a hold of her the next time. Patient was able to speak to a nurse.

## 2024-12-30 DIAGNOSIS — F90.2 ATTENTION DEFICIT HYPERACTIVITY DISORDER, COMBINED TYPE: ICD-10-CM

## 2024-12-30 RX ORDER — ATOMOXETINE 25 MG/1
25 CAPSULE ORAL DAILY
Qty: 18 CAPSULE | Refills: 0 | Status: SHIPPED | OUTPATIENT
Start: 2024-12-30

## 2024-12-30 NOTE — TELEPHONE ENCOUNTER
PATIENT IS REQUESTING A REFILL OF atomoxetine (STRATTERA) 25 MG capsule       LAST THURSDAY PATIENT TOOK THE LAST ONE.    PATIENT IS REQUESTING TO SPEAK WITH SOMEONE IN THE OFFICE.      PHARM: SHRUTI LAZCANO [phone]      PATIENT CALL BACK: [phone]

## 2025-01-14 DIAGNOSIS — F90.2 ATTENTION DEFICIT HYPERACTIVITY DISORDER, COMBINED TYPE: ICD-10-CM

## 2025-01-14 RX ORDER — ATOMOXETINE 25 MG/1
25 CAPSULE ORAL DAILY
Qty: 18 CAPSULE | Refills: 0 | OUTPATIENT
Start: 2025-01-14

## 2025-01-16 ENCOUNTER — TELEPHONE (OUTPATIENT)
Dept: INTERNAL MEDICINE | Facility: CLINIC | Age: 65
End: 2025-01-16
Payer: COMMERCIAL

## 2025-01-16 ENCOUNTER — TELEPHONE (OUTPATIENT)
Dept: INTERNAL MEDICINE | Facility: CLINIC | Age: 65
End: 2025-01-16

## 2025-01-16 DIAGNOSIS — J30.2 SEASONAL ALLERGIES: Primary | ICD-10-CM

## 2025-01-16 RX ORDER — FLUTICASONE PROPIONATE AND SALMETEROL 250; 50 UG/1; UG/1
1 POWDER RESPIRATORY (INHALATION)
Qty: 60 EACH | Refills: 1 | Status: SHIPPED | OUTPATIENT
Start: 2025-01-16

## 2025-01-16 NOTE — TELEPHONE ENCOUNTER
Fluticasone Furoate Vilanterol not covered by patients insurance.    Alternatives would be advair or dulera. Okay to send in substitute?

## 2025-01-16 NOTE — TELEPHONE ENCOUNTER
PATIENT WAS LATE TO HER APPT WITH [facility], I DID LET THE PATIENT KNOW ABOUT THE 15 MIN POLICY WHEN I SPOKE TO HER ON THE PHONE.  WHEN TRYING TO RESCHEDULE THE PATIENT, THE DATES AND TIMES THAT WORKED FOR THE PATIENT WERE NOT AVAILABLE.  PATIENT STATES THAT SHE IS ALMOST OUT OF MEDS, I DID LET HER KNOW THAT I COULD SCHEDULE HER AND SEND A NOTE BACK REQUESTING A REFILL. PATIENT DECLINED MAKING AN APPT AS WELL AS A REFILL REQUEST.     PATIENT STATES THAT SHE IS GOING TO SWITCH TO ANOTHER PROVIDER FOR HER MEDICATION REFILLS.

## 2025-01-23 ENCOUNTER — TELEPHONE (OUTPATIENT)
Dept: INTERNAL MEDICINE | Facility: CLINIC | Age: 65
End: 2025-01-23
Payer: COMMERCIAL

## 2025-01-23 RX ORDER — FLUCONAZOLE 150 MG/1
150 TABLET ORAL
Qty: 2 TABLET | Refills: 0 | Status: SHIPPED | OUTPATIENT
Start: 2025-01-23

## 2025-01-23 NOTE — TELEPHONE ENCOUNTER
Caller: Sandra Auguste    Relationship: Self    Best call back number: [phone]    What medication are you requesting: YEAST INFECTION MEDICATION  PATIENT THINKS ITS CALLED TETRACYCLINE    What are your current symptoms: VAGINAL ITCHING SWELLING AND DISCHARGE     How long have you been experiencing symptoms: ABOUT THREE OR FOUR DAYS    Have you had these symptoms before:    [] Yes  [x] No    Have you been treated for these symptoms before:   [] Yes  [x] No    If a prescription is needed, what is your preferred pharmacy and phone number: [facility] [id number] [address] [phone] [facility] [phone] FX     Additional notes: THE PATIENT WAS GIVEN ANTIBIOTICS BY URGENT CARE IN [place]  THE PATIENT STATES THAT SHE HAS DEVELOPED A YEAST INFECTION SHE WOULD LIKE TO GET A MEDICATION CALLED IN FOR THAT

## 2025-01-24 ENCOUNTER — TELEPHONE (OUTPATIENT)
Dept: INTERNAL MEDICINE | Facility: CLINIC | Age: 65
End: 2025-01-24
Payer: COMMERCIAL

## 2025-01-24 NOTE — TELEPHONE ENCOUNTER
Caller: Sandra Auguste    Relationship: Self    Best call back number: [phone]     What is the best time to reach you: BEFORE 1 PM      What was the call regarding: PATIENT WAS GIVEN MEDICATION FOR A YEAST INFECTION. SHE IS UNSURE IF SHE IS SUPPOSED TO START THIS MEDICATION NOW OR IF SHE IS TO WAIT UNTIL SHE FINISHES THE ANTIBIOTIC.  SHE WOULD LIKE A CALL BACK TO GET CLARIFICATION ON THIS ISSUE.

## 2025-01-28 ENCOUNTER — TELEPHONE (OUTPATIENT)
Dept: INTERNAL MEDICINE | Facility: CLINIC | Age: 65
End: 2025-01-28

## 2025-01-28 NOTE — TELEPHONE ENCOUNTER
Caller: Sandra Auguste    Relationship: Self    Best call back number: [phone]     What medication are you requesting: MUCUS RELEASE ER 600MG TAKE TWO TABLETS BY MOUTH TWICE PER DAY    What are your current symptoms: CONGESTION    How long have you been experiencing symptoms: COUPLE OF DAYS    Have you had these symptoms before:    [x] Yes  [] No    Have you been treated for these symptoms before:   [x] Yes  [] No    If a prescription is needed, what is your preferred pharmacy and phone number: [facility] [id number] [address] [phone] [facility] [phone] FX     Additional notes: PATIENT WAS PRESCRIBED THIS MEDICATION FROM [facility] VISIT. WOULD LIKE TO GET A REFILL.

## 2025-01-30 ENCOUNTER — OFFICE VISIT (OUTPATIENT)
Dept: INTERNAL MEDICINE | Facility: CLINIC | Age: 65
End: 2025-01-30
Payer: COMMERCIAL

## 2025-01-30 ENCOUNTER — HOSPITAL ENCOUNTER (OUTPATIENT)
Dept: GENERAL RADIOLOGY | Facility: HOSPITAL | Age: 65
Discharge: HOME | End: 2025-01-30
Admitting: FAMILY MEDICINE
Payer: COMMERCIAL

## 2025-01-30 VITALS
HEART RATE: 93 BPM | DIASTOLIC BLOOD PRESSURE: 74 MMHG | OXYGEN SATURATION: 96 % | BODY MASS INDEX: 40.98 KG/M2 | RESPIRATION RATE: 18 BRPM | SYSTOLIC BLOOD PRESSURE: 122 MMHG | TEMPERATURE: 97.2 F | HEIGHT: 66 IN | WEIGHT: 255 LBS

## 2025-01-30 DIAGNOSIS — R05.3 CHRONIC COUGH: ICD-10-CM

## 2025-01-30 DIAGNOSIS — J20.9 ACUTE BRONCHITIS, UNSPECIFIED ORGANISM: Primary | ICD-10-CM

## 2025-01-30 DIAGNOSIS — J20.9 ACUTE BRONCHITIS, UNSPECIFIED ORGANISM: ICD-10-CM

## 2025-01-30 PROCEDURE — 71046 X-RAY EXAM CHEST 2 VIEWS: CPT

## 2025-01-30 PROCEDURE — 1159F MED LIST DOCD IN RCRD: CPT | Performed by: FAMILY MEDICINE

## 2025-01-30 PROCEDURE — 1160F RVW MEDS BY RX/DR IN RCRD: CPT | Performed by: FAMILY MEDICINE

## 2025-01-30 PROCEDURE — 1126F AMNT PAIN NOTED NONE PRSNT: CPT | Performed by: FAMILY MEDICINE

## 2025-01-30 PROCEDURE — 99213 OFFICE O/P EST LOW 20 MIN: CPT | Performed by: FAMILY MEDICINE

## 2025-01-30 RX ORDER — AZITHROMYCIN 250 MG/1
TABLET, FILM COATED ORAL
Qty: 6 TABLET | Refills: 0 | Status: SHIPPED | OUTPATIENT
Start: 2025-01-30

## 2025-01-30 RX ORDER — PREDNISONE 10 MG/1
TABLET ORAL
Qty: 21 TABLET | Refills: 0 | Status: SHIPPED | OUTPATIENT
Start: 2025-01-30

## 2025-01-30 NOTE — LETTER
January 30, 2025     Patient: Sandra Auguste   YOB: 1960   Date of Visit: 1/30/2025       To Whom It May Concern:    It is my medical opinion that Sandra Auguste may return to work on 2/3/2025.         Sincerely,        Em Baum MD

## 2025-01-30 NOTE — PROGRESS NOTES
"    Office Note     Name: Sandra Auguste    : 1960     MRN: [id number]     Chief Complaint  Cough (Pt states sxs x 3 weeks, seen at [facility] and prescribed abx) and Headache    Subjective     History of Present Illness:  Sandra Auguste is a 64 y.o. female who presents today for cough x 3 weeks- seen at urgent care was prescribed abx. Still having headache and cough   Completed 10 days course of Augmentin   [facility]- [place]- not tested for COVID or Flu     Denies fevers, chills, chest pain   Does have some sob and chest tightness   Fatigued   Cough is mildly productive   Clear drainage   More of dry hacking cough     OTC- mucinex DM     Seemed to improve with abx but once completed felt symptoms started right back up   Symptoms have worsened since this past Monday     Start using Advair twice a day-   Breo not covered by insurance     Review of Systems:   Review of Systems   Constitutional:  Negative for chills and fever.   HENT:  Positive for congestion, postnasal drip and sore throat. Negative for trouble swallowing and voice change.    Respiratory:  Positive for cough. Negative for chest tightness, shortness of breath and wheezing.    Cardiovascular:  Negative for chest pain, palpitations and leg swelling.   Gastrointestinal:  Negative for abdominal pain, nausea and vomiting.   Neurological:  Negative for light-headedness and headache.       Past Medical History:   Past Medical History:   Diagnosis Date    ADHD (attention deficit hyperactivity disorder) 2024    Just diagnosed    Allergic     Anemia 1984    Anesthesia complication     HAS TROUBLE GETTING \"NUMB\"     Anxiety     Arthritis     on Celebrex + Robaxin, no steroids    Asthma     does not follow w/ pulmonary    Bell palsy     not sure    Bladder spasms     Cluster headache ?    ?    CTS (carpal tunnel syndrome)     Depression     w/ PTSD    Diverticulosis     Dyspepsia     Fatigue     GERD (gastroesophageal reflux disease)     controlled w/ " "nightly Pepcid, EGD 2020 w/ Dr. Chavarria, (+) HH    Headache, tension-type     long hair that was pulled in desk    Health care maintenance 02/25/2020    Memory loss     FROM SKULL FRACTURE AND FALL-SHORT TERM MEMORY LOSS    Migraine     Morbid obesity     PTSD (post-traumatic stress disorder)     Seasonal allergies     Seizures     \"convulsions\" at age 5    Skull fracture 2012    Sleep apnea with use of continuous positive airway pressure (CPAP)     CPAP compliant    SOB (shortness of breath) on exertion     Tendonitis     Type 2 diabetes mellitus without complication, without long-term current use of insulin     dx 2021, no insulin, A1C <7    Vision loss     Wears contact lenses     Wears partial dentures     TOP ONLY     Wears prescription eyeglasses        Past Surgical History:   Past Surgical History:   Procedure Laterality Date    CERVICAL POLYPECTOMY      COLONOSCOPY  2021    HYSTERECTOMY      OOPHORECTOMY      TOTAL LAPAROSCOPIC HYSTERECTOMY N/A 09/18/2017    Procedure: TOTAL LAPAROSCOPIC HYSTERECTOMY, BILATERAL SALPINGO OOPHORECTOMY WITH DAVINCI ROBOT ;  Surgeon: Shannon Grimaldo DO;  Location: [facility];  Service:     WISDOM TOOTH EXTRACTION         Immunizations:   Immunization History   Administered Date(s) Administered    COVID-19 (PFIZER) Purple Cap Monovalent 03/17/2021, 04/07/2021    Flu Vaccine Quad PF >36MO 09/19/2016, 10/18/2017    Fluzone  >6mos 10/11/2024    Fluzone (or Fluarix & Flulaval for VFC) >6mos 09/24/2021, 10/26/2022, 12/08/2023    Hepatitis B 07/15/2021    Pneumococcal Polysaccharide (PPSV23) 02/24/2020    Shingrix 11/16/2020, 01/23/2021, 01/31/2021    Tdap 07/15/2021    Tetanus 09/06/2012    flucelvax quad pfs =>4 YRS 12/20/2019        Medications:     Current Outpatient Medications:     acetaminophen (TYLENOL) 500 MG tablet, Take 1 tablet by mouth Every 6 (Six) Hours As Needed for Mild Pain., Disp: , Rfl:     albuterol sulfate  (90 Base) MCG/ACT inhaler, Inhale 2 puffs Every 6 " (Six) Hours As Needed for Wheezing or Shortness of Air., Disp: 18 g, Rfl: 1    atomoxetine (STRATTERA) 25 MG capsule, Take 1 capsule by mouth Daily., Disp: 18 capsule, Rfl: 0    Blood Glucose Monitoring Suppl (OneTouch Verio) w/Device kit, 1 kit Daily., Disp: 1 kit, Rfl: 0    Cholecalciferol (Vitamin D3) 50 MCG (2000 UT) capsule, Take 1 capsule by mouth Daily., Disp: 90 capsule, Rfl: 1    Diclofenac Sodium (VOLTAREN) 1 % gel gel, Apply  topically to the appropriate area as directed 4 (Four) Times a Day As Needed (arthralgias, shoulders and back.)., Disp: 100 g, Rfl: 1    EPINEPHrine (EPIPEN) 0.3 MG/0.3ML solution auto-injector injection, Inject 0.3 mL into the appropriate muscle as directed by prescriber As Needed (FOR SEVERE ALLERGIC REACTION)., Disp: 1 each, Rfl: 1    Fluticasone-Salmeterol (ADVAIR/WIXELA) 250-50 MCG/ACT DISKUS, Inhale 1 puff 2 (Two) Times a Day., Disp: 60 each, Rfl: 1    glucose blood (OneTouch Verio) test strip, USE AS INSTRUCTED TO CHECK BLOOD GLUCOSE ONCE DAILY DX:E11.65, Disp: 100 each, Rfl: 3    glucose monitor monitoring kit, Use as directed to check blood glucose once daily., Disp: 1 each, Rfl: 0    hydrOXYzine (ATARAX) 25 MG tablet, Take 1 tablet by mouth Every 8 (Eight) Hours As Needed for Anxiety., Disp: 90 tablet, Rfl: 2    Lancets (onetouch ultrasoft) lancets, Use one lancet daily to check blood sugars, Disp: 100 each, Rfl: 3    loratadine (CLARITIN) 10 MG tablet, TAKE 1 TABLET BY MOUTH DAILY, Disp: 30 tablet, Rfl: 5    multivitamin with minerals tablet tablet, Take 1 tablet by mouth Daily., Disp: , Rfl:     ondansetron ODT (ZOFRAN-ODT) 4 MG disintegrating tablet, Place 1 tablet on the tongue Every 6 (Six) Hours As Needed for Nausea or Vomiting for up to 15 doses., Disp: 15 tablet, Rfl: 0    pantoprazole (PROTONIX) 40 MG EC tablet, Take 1 tablet by mouth Daily. (Patient taking differently: Take 1 tablet by mouth As Needed.), Disp: 90 tablet, Rfl: 1    polyethylene glycol (MIRALAX) 17  GM/SCOOP powder, Take 17 g by mouth Daily., Disp: 578 g, Rfl: 1    sodium chloride (Ocean Nasal Spray) 0.65 % nasal spray, 1 spray into the nostril(s) as directed by provider As Needed for Congestion., Disp: 60 mL, Rfl: 0    vilazodone (VIIBRYD) 20 MG tablet tablet, Take 1 tablet by mouth Daily., Disp: 30 tablet, Rfl: 0    azithromycin (Zithromax Z-Michael) 250 MG tablet, Take 2 tablets by mouth on day 1, then 1 tablet daily on days 2-5, Disp: 6 tablet, Rfl: 0    predniSONE (DELTASONE) 10 MG tablet, Take 6 tablets on day 1, then decrease by 1 tablet daily until completed, Disp: 21 tablet, Rfl: 0    Allergies:   Allergies   Allergen Reactions    Mold Extract [Trichophyton] Shortness Of Breath, Anxiety, Palpitations and Irritability    Lortab [Hydrocodone-Acetaminophen] Nausea And Vomiting    Metformin GI Intolerance    Pollen Extract Other (See Comments)     SNEEZING AND CONGESTION        Family History:   Family History   Problem Relation Age of Onset    Cancer Mother         Cervical /over 10 years ago    Dementia Mother     Hypertension Mother         high energy/living on meds    Anxiety disorder Mother     Obesity Father     Hypertension Father     Heart attack Father     Sleep apnea Father     Depression Father     Sleep apnea Sister     ADD / ADHD Sister     Hypertension Brother     Sleep apnea Brother     Cancer Maternal Aunt         lymphoma    Cancer Paternal Aunt     Breast cancer Paternal Aunt 70    Cancer Maternal Grandmother     Alcohol abuse Maternal Grandfather     Obesity Paternal Grandmother     Diabetes Paternal Grandmother     Hypertension Paternal Grandmother     Stroke Paternal Grandmother     Heart attack Paternal Grandmother     Asthma Paternal Grandmother     Heart attack Paternal Grandfather     Alcohol abuse Other     Depression Other     ADD / ADHD Brother     Alcohol abuse Brother     Seizures Brother         When he was young, not since he had tonsles removed.    Dementia Sister   "      Social History:   Social History     Socioeconomic History    Marital status: Single   Tobacco Use    Smoking status: Never     Passive exposure: Never    Smokeless tobacco: Never   Vaping Use    Vaping status: Never Used   Substance and Sexual Activity    Alcohol use: Not Currently     Comment: once or twice a year    Drug use: Never    Sexual activity: Not Currently     Partners: Male     Birth control/protection: Abstinence, Post-menopausal, Hysterectomy, Surgical         Objective     Vital Signs  /74   Pulse 93   Temp 97.2 °F (36.2 °C)   Resp 18   Ht 167.6 cm (65.98\")   Wt 116 kg (255 lb)   SpO2 96%   BMI 41.18 kg/m²   Estimated body mass index is 41.18 kg/m² as calculated from the following:    Height as of this encounter: 167.6 cm (65.98\").    Weight as of this encounter: 116 kg (255 lb).            Physical Exam  Vitals and nursing note reviewed.   Constitutional:       General: She is not in acute distress.     Appearance: Normal appearance. She is obese.   HENT:      Head: Normocephalic and atraumatic.      Right Ear: Tympanic membrane normal.      Left Ear: Tympanic membrane normal.      Nose: Congestion and rhinorrhea present.      Comments: Post nasal drip      Mouth/Throat:      Mouth: Mucous membranes are moist.   Cardiovascular:      Rate and Rhythm: Normal rate and regular rhythm.      Heart sounds: Normal heart sounds.   Pulmonary:      Effort: Pulmonary effort is normal. No respiratory distress.      Breath sounds: Normal breath sounds.      Comments: Tight breath sounds- moving good air - no wheezing   Skin:     General: Skin is warm and dry.   Neurological:      Mental Status: She is alert.          Procedures     Assessment and Plan   Diagnosis Discussed   Continue to monitor   Plenty of fluids  Mucinex 1200mg twice a day x 7-10 days   Please complete full course of steroids   Please complete full course of antibiotics   Chest xray ordered for further evaluation   Albuterol " inhaler as needed   If symptoms worsen or persist please seek further evaluation     1. Acute bronchitis, unspecified organism  - predniSONE (DELTASONE) 10 MG tablet; Take 6 tablets on day 1, then decrease by 1 tablet daily until completed  Dispense: 21 tablet; Refill: 0  - azithromycin (Zithromax Z-Michael) 250 MG tablet; Take 2 tablets by mouth on day 1, then 1 tablet daily on days 2-5  Dispense: 6 tablet; Refill: 0  - XR Chest PA & Lateral; Future    2. Chronic cough  - XR Chest PA & Lateral; Future       Follow Up  Return if symptoms worsen or fail to improve, for Next scheduled follow up.    Em Baum MD  [facility] INTERNAL MEDICINE  [facility] [address] [phone]

## 2025-01-30 NOTE — PATIENT INSTRUCTIONS
Diagnosis Discussed   Continue to monitor   Plenty of fluids  Mucinex 1200mg twice a day x 7-10 days   Please complete full course of steroids   Please complete full course of antibiotics   Chest xray ordered for further evaluation   Albuterol inhaler as needed   If symptoms worsen or persist please seek further evaluation

## 2025-01-31 ENCOUNTER — TELEPHONE (OUTPATIENT)
Dept: INTERNAL MEDICINE | Facility: CLINIC | Age: 65
End: 2025-01-31
Payer: COMMERCIAL

## 2025-01-31 NOTE — TELEPHONE ENCOUNTER
Caller: Sandra Auguste    Relationship: Self    Best call back number: [phone]     Caller requesting test results: PATIENT    What test was performed: CHEST X-RAY    When was the test performed: YESTERDAY    Where was the test performed: [facility]    Additional notes:

## 2025-02-03 NOTE — TELEPHONE ENCOUNTER
Notified patient that the results have not been released to dr. Baum yet and one they are we will call her to discuss these results. Patient verbalized understanding and has no further questions or concerns at this time.

## 2025-02-27 ENCOUNTER — OFFICE VISIT (OUTPATIENT)
Dept: INTERNAL MEDICINE | Facility: CLINIC | Age: 65
End: 2025-02-27
Payer: COMMERCIAL

## 2025-02-27 VITALS
HEIGHT: 66 IN | WEIGHT: 254.8 LBS | SYSTOLIC BLOOD PRESSURE: 126 MMHG | HEART RATE: 94 BPM | BODY MASS INDEX: 40.95 KG/M2 | TEMPERATURE: 95.6 F | DIASTOLIC BLOOD PRESSURE: 80 MMHG | OXYGEN SATURATION: 94 %

## 2025-02-27 DIAGNOSIS — R05.2 SUBACUTE COUGH: Primary | ICD-10-CM

## 2025-02-27 DIAGNOSIS — R09.81 NASAL CONGESTION: ICD-10-CM

## 2025-02-27 DIAGNOSIS — J45.40 UNCONTROLLED MODERATE PERSISTENT ALLERGIC ASTHMA: ICD-10-CM

## 2025-02-27 DIAGNOSIS — R00.2 FLUTTERING SENSATION OF HEART: ICD-10-CM

## 2025-02-27 LAB
EXPIRATION DATE: NORMAL
FLUAV AG UPPER RESP QL IA.RAPID: NOT DETECTED
FLUBV AG UPPER RESP QL IA.RAPID: NOT DETECTED
INTERNAL CONTROL: NORMAL
Lab: NORMAL
SARS-COV-2 AG UPPER RESP QL IA.RAPID: NOT DETECTED

## 2025-02-27 PROCEDURE — 87428 SARSCOV & INF VIR A&B AG IA: CPT | Performed by: STUDENT IN AN ORGANIZED HEALTH CARE EDUCATION/TRAINING PROGRAM

## 2025-02-27 PROCEDURE — 99214 OFFICE O/P EST MOD 30 MIN: CPT | Performed by: STUDENT IN AN ORGANIZED HEALTH CARE EDUCATION/TRAINING PROGRAM

## 2025-02-27 RX ORDER — DEXTROMETHORPHAN HYDROBROMIDE AND PROMETHAZINE HYDROCHLORIDE 15; 6.25 MG/5ML; MG/5ML
5 SYRUP ORAL NIGHTLY
Qty: 150 ML | Refills: 0 | Status: SHIPPED | OUTPATIENT
Start: 2025-02-27

## 2025-02-27 RX ORDER — IPRATROPIUM BROMIDE AND ALBUTEROL SULFATE 2.5; .5 MG/3ML; MG/3ML
3 SOLUTION RESPIRATORY (INHALATION) EVERY 4 HOURS PRN
Qty: 360 ML | Refills: 1 | Status: SHIPPED | OUTPATIENT
Start: 2025-02-27

## 2025-02-27 RX ORDER — MONTELUKAST SODIUM 10 MG/1
10 TABLET ORAL NIGHTLY
Qty: 90 TABLET | Refills: 1 | Status: SHIPPED | OUTPATIENT
Start: 2025-02-27

## 2025-02-27 NOTE — PROGRESS NOTES
Follow Up Office Visit      Date: 2025   Patient Name: Sandra Auguste  : 1960   MRN: [id number]     Chief Complaint:    Chief Complaint   Patient presents with    Nasal Congestion     Lost sense of taste. Symptoms started a few days ago    Cough       History of Present Illness: Sandra Auguste is a 64 y.o. female who is here today for follow-up.   Reports residual symptoms of cough, wheezing. Patient also brought her cough medications and wants to know which to take since she is on Strattera and meds with anti cholinegric properties such as hydroxyzine.  Also reports intermittent feeling of  fluttering in her chest.  Review of system as below.    Subjective      Review of Systems:   Review of Systems   Constitutional:  Negative for chills and fever.   HENT:  Positive for congestion, rhinorrhea and sneezing. Negative for sinus pressure.    Respiratory:  Positive for cough, shortness of breath and wheezing. Negative for chest tightness.    Cardiovascular:  Negative for chest pain.       I have reviewed the patients family history, social history, past medical history, past surgical history and have updated it as appropriate.     Medications:     Current Outpatient Medications:     acetaminophen (TYLENOL) 500 MG tablet, Take 1 tablet by mouth Every 6 (Six) Hours As Needed for Mild Pain., Disp: , Rfl:     albuterol sulfate  (90 Base) MCG/ACT inhaler, Inhale 2 puffs Every 6 (Six) Hours As Needed for Wheezing or Shortness of Air., Disp: 18 g, Rfl: 1    atomoxetine (STRATTERA) 25 MG capsule, Take 1 capsule by mouth Daily., Disp: 18 capsule, Rfl: 0    Blood Glucose Monitoring Suppl (OneTouch Verio) w/Device kit, 1 kit Daily., Disp: 1 kit, Rfl: 0    Cholecalciferol (Vitamin D3) 50 MCG ( UT) capsule, Take 1 capsule by mouth Daily., Disp: 90 capsule, Rfl: 1    Diclofenac Sodium (VOLTAREN) 1 % gel gel, Apply  topically to the appropriate area as directed 4 (Four) Times a Day As Needed (arthralgias,  shoulders and back.)., Disp: 100 g, Rfl: 1    EPINEPHrine (EPIPEN) 0.3 MG/0.3ML solution auto-injector injection, Inject 0.3 mL into the appropriate muscle as directed by prescriber As Needed (FOR SEVERE ALLERGIC REACTION)., Disp: 1 each, Rfl: 1    Fluticasone-Salmeterol (ADVAIR/WIXELA) 250-50 MCG/ACT DISKUS, Inhale 1 puff 2 (Two) Times a Day., Disp: 60 each, Rfl: 1    glucose blood (OneTouch Verio) test strip, USE AS INSTRUCTED TO CHECK BLOOD GLUCOSE ONCE DAILY DX:E11.65, Disp: 100 each, Rfl: 3    glucose monitor monitoring kit, Use as directed to check blood glucose once daily., Disp: 1 each, Rfl: 0    hydrOXYzine (ATARAX) 25 MG tablet, Take 1 tablet by mouth Every 8 (Eight) Hours As Needed for Anxiety., Disp: 90 tablet, Rfl: 2    Lancets (onetouch ultrasoft) lancets, Use one lancet daily to check blood sugars, Disp: 100 each, Rfl: 3    loratadine (CLARITIN) 10 MG tablet, TAKE 1 TABLET BY MOUTH DAILY, Disp: 30 tablet, Rfl: 5    multivitamin with minerals tablet tablet, Take 1 tablet by mouth Daily., Disp: , Rfl:     ondansetron ODT (ZOFRAN-ODT) 4 MG disintegrating tablet, Place 1 tablet on the tongue Every 6 (Six) Hours As Needed for Nausea or Vomiting for up to 15 doses., Disp: 15 tablet, Rfl: 0    polyethylene glycol (MIRALAX) 17 GM/SCOOP powder, Take 17 g by mouth Daily., Disp: 578 g, Rfl: 1    sodium chloride (Ocean Nasal Spray) 0.65 % nasal spray, 1 spray into the nostril(s) as directed by provider As Needed for Congestion., Disp: 60 mL, Rfl: 0    azithromycin (Zithromax Z-Michael) 250 MG tablet, Take 2 tablets by mouth on day 1, then 1 tablet daily on days 2-5 (Patient not taking: Reported on 2/27/2025), Disp: 6 tablet, Rfl: 0    ipratropium-albuterol (DUO-NEB) 0.5-2.5 mg/3 ml nebulizer, Take 3 mL by nebulization Every 4 (Four) Hours As Needed for Wheezing., Disp: 360 mL, Rfl: 1    montelukast (SINGULAIR) 10 MG tablet, Take 1 tablet by mouth Every Night., Disp: 90 tablet, Rfl: 1    pantoprazole (PROTONIX) 40 MG  "EC tablet, Take 1 tablet by mouth Daily. (Patient not taking: Reported on 2/27/2025), Disp: 90 tablet, Rfl: 1    predniSONE (DELTASONE) 10 MG tablet, Take 6 tablets on day 1, then decrease by 1 tablet daily until completed (Patient not taking: Reported on 2/27/2025), Disp: 21 tablet, Rfl: 0    promethazine-dextromethorphan (PROMETHAZINE-DM) 6.25-15 MG/5ML syrup, Take 5 mL by mouth Every Night., Disp: 150 mL, Rfl: 0    vilazodone (VIIBRYD) 20 MG tablet tablet, Take 1 tablet by mouth Daily. (Patient not taking: Reported on 2/27/2025), Disp: 30 tablet, Rfl: 0    Allergies:   Allergies   Allergen Reactions    Mold Extract [Trichophyton] Shortness Of Breath, Anxiety, Palpitations and Irritability    Lortab [Hydrocodone-Acetaminophen] Nausea And Vomiting    Metformin GI Intolerance    Pollen Extract Other (See Comments)     SNEEZING AND CONGESTION        Objective     Physical Exam: Please see above  Vital Signs:   Vitals:    02/27/25 1339   BP: 126/80   Pulse: 94   Temp: 95.6 °F (35.3 °C)   TempSrc: Temporal   SpO2: 94%   Weight: 116 kg (254 lb 12.8 oz)   Height: 167.6 cm (65.98\")   PainSc: 5    PainLoc: Generalized     Body mass index is 41.15 kg/m².          Physical Exam  Vitals and nursing note reviewed.   Constitutional:       Appearance: Normal appearance.   HENT:      Head: Normocephalic and atraumatic.      Mouth/Throat:      Mouth: Mucous membranes are moist.      Pharynx: Oropharynx is clear.   Eyes:      Extraocular Movements: Extraocular movements intact.      Pupils: Pupils are equal, round, and reactive to light.   Cardiovascular:      Rate and Rhythm: Normal rate and regular rhythm.      Pulses: Normal pulses.      Heart sounds: Normal heart sounds.   Pulmonary:      Effort: Pulmonary effort is normal.      Breath sounds: Normal breath sounds.   Musculoskeletal:         General: Normal range of motion.      Cervical back: Normal range of motion.   Lymphadenopathy:      Cervical: No cervical adenopathy. "   Skin:     General: Skin is warm.   Neurological:      Mental Status: She is alert and oriented to person, place, and time.   Psychiatric:         Mood and Affect: Mood normal.         Behavior: Behavior normal.         Procedures    Results:   Labs:   Hemoglobin A1C   Date Value Ref Range Status   10/11/2024 6.8 (A) 4.5 - 5.7 % Final   04/08/2024 6.40 (H) 4.80 - 5.60 % Final     TSH   Date Value Ref Range Status   04/08/2024 1.580 0.270 - 4.200 uIU/mL Final        POCT Results (if applicable):   Results for orders placed or performed in visit on 02/27/25   POCT SARS-CoV-2 Antigen JONATHON + Flu    Collection Time: 02/27/25  1:59 PM    Specimen: Swab   Result Value Ref Range    SARS Antigen Not Detected Not Detected, Presumptive Negative    Influenza A Antigen JONATHON Not Detected Not Detected    Influenza B Antigen JONATHON Not Detected Not Detected    Internal Control Passed Passed    Lot Number [id number]     Expiration Date 2025-11-27      *Note: Due to a large number of results and/or encounters for the requested time period, some results have not been displayed. A complete set of results can be found in Results Review.       Imaging:   No valid procedures specified.       Assessment / Plan      Assessment/Plan:   Diagnoses and all orders for this visit:    1. Subacute cough (Primary)  -     promethazine-dextromethorphan (PROMETHAZINE-DM) 6.25-15 MG/5ML syrup; Take 5 mL by mouth Every Night.  Dispense: 150 mL; Refill: 0    2. Uncontrolled moderate persistent allergic asthma  - symptoms more likely related to uncontrolled asthma/allergies rather than bacterial URTI.   - Patient states she called her insurance to inform them that she would prefer to be put back on Breo which was more effective than her current Advair therapy, and is awaiting their response.  - Paperwork filled for nebulizer  -     montelukast (SINGULAIR) 10 MG tablet; Take 1 tablet by mouth Every Night.  Dispense: 90 tablet; Refill: 1  -      ipratropium-albuterol (DUO-NEB) 0.5-2.5 mg/3 ml nebulizer; Take 3 mL by nebulization Every 4 (Four) Hours As Needed for Wheezing.  Dispense: 360 mL; Refill: 1    3. Nasal congestion  -     POCT SARS-CoV-2 Antigen JONATHON + Flu: all negative    4. Fluttering sensation of heart  - Patient declined EKG, stating its related to her respiratory symptoms.        Vaccine Counseling:      Follow Up:   No follow-ups on file.      Omar Souza MD  [facility]

## 2025-02-28 ENCOUNTER — TELEPHONE (OUTPATIENT)
Dept: INTERNAL MEDICINE | Facility: CLINIC | Age: 65
End: 2025-02-28
Payer: COMMERCIAL

## 2025-02-28 NOTE — TELEPHONE ENCOUNTER
HAS QUESTIONS ABOUT HER NEBULIZER.     1.SHOULD SHE TAKE HER INHALER BEFORE OR AFTER USING THE NEBULIZER?    2.IS THERE A LAGER MASK? THE ONE IN THE KIT IS TO SMALL    SHE ALSO DID NOT RECEIVE A RECEIPT FOR HER PAYMENT AT HER APPOINTMENT.       WOULD LIKE TO SPEAK TO CLINICAL.      PLEASE ADVISE.

## 2025-02-28 NOTE — TELEPHONE ENCOUNTER
Caller: Sandra Auguste    Relationship: Self    Best call back number: [phone]     What form or medical record are you requesting: DOCTORS NOTE    Who is requesting this form or medical record from you: EMPLOYER    How would you like to receive the form or medical records (pick-up, mail, fax): FAX  If fax, what is the fax number: [phone]  If mail, what is the address:   If pick-up, provide patient with address and location details    Timeframe paperwork needed: AS SOON AS POSSIBLE    Additional notes:

## 2025-03-03 NOTE — TELEPHONE ENCOUNTER
Spoke w/ pt, let know letter will be done stating pt was seen on 2/27/25 for work. Clarified instruction on inhaler and nebulizer, let know we have additional nebulizer mouthpiece, states she already got one. Pt will ask for receipt from 2/27/25 visit at next visit in April.

## 2025-03-06 ENCOUNTER — TELEPHONE (OUTPATIENT)
Dept: GASTROENTEROLOGY | Facility: CLINIC | Age: 65
End: 2025-03-06
Payer: COMMERCIAL

## 2025-03-10 ENCOUNTER — PRIOR AUTHORIZATION (OUTPATIENT)
Dept: INTERNAL MEDICINE | Facility: CLINIC | Age: 65
End: 2025-03-10

## 2025-03-10 ENCOUNTER — HOSPITAL ENCOUNTER (OUTPATIENT)
Facility: HOSPITAL | Age: 65
Discharge: HOME | End: 2025-03-10
Admitting: FAMILY MEDICINE
Payer: COMMERCIAL

## 2025-03-10 ENCOUNTER — TELEPHONE (OUTPATIENT)
Dept: INTERNAL MEDICINE | Facility: CLINIC | Age: 65
End: 2025-03-10

## 2025-03-10 ENCOUNTER — OFFICE VISIT (OUTPATIENT)
Dept: INTERNAL MEDICINE | Facility: CLINIC | Age: 65
End: 2025-03-10
Payer: COMMERCIAL

## 2025-03-10 VITALS
DIASTOLIC BLOOD PRESSURE: 84 MMHG | BODY MASS INDEX: 41.3 KG/M2 | OXYGEN SATURATION: 97 % | SYSTOLIC BLOOD PRESSURE: 138 MMHG | WEIGHT: 257 LBS | HEIGHT: 66 IN | HEART RATE: 91 BPM

## 2025-03-10 DIAGNOSIS — T36.95XA ANTIBIOTIC-INDUCED YEAST INFECTION: ICD-10-CM

## 2025-03-10 DIAGNOSIS — R39.9 UTI SYMPTOMS: ICD-10-CM

## 2025-03-10 DIAGNOSIS — M54.50 ACUTE RIGHT-SIDED LOW BACK PAIN WITHOUT SCIATICA: ICD-10-CM

## 2025-03-10 DIAGNOSIS — R10.9 RIGHT FLANK PAIN: ICD-10-CM

## 2025-03-10 DIAGNOSIS — R14.0 ABDOMINAL BLOATING: Primary | ICD-10-CM

## 2025-03-10 DIAGNOSIS — J30.2 SEASONAL ALLERGIES: ICD-10-CM

## 2025-03-10 DIAGNOSIS — B37.9 ANTIBIOTIC-INDUCED YEAST INFECTION: ICD-10-CM

## 2025-03-10 DIAGNOSIS — J45.40 MODERATE PERSISTENT ASTHMA WITHOUT COMPLICATION: ICD-10-CM

## 2025-03-10 DIAGNOSIS — R14.0 ABDOMINAL BLOATING: ICD-10-CM

## 2025-03-10 LAB
BILIRUB BLD-MCNC: NEGATIVE MG/DL
CLARITY, POC: CLEAR
COLOR UR: YELLOW
EXPIRATION DATE: ABNORMAL
GLUCOSE UR STRIP-MCNC: ABNORMAL MG/DL
KETONES UR QL: NEGATIVE
LEUKOCYTE EST, POC: NEGATIVE
Lab: ABNORMAL
NITRITE UR-MCNC: NEGATIVE MG/ML
PH UR: 6 [PH] (ref 5–8)
PROT UR STRIP-MCNC: NEGATIVE MG/DL
RBC # UR STRIP: NEGATIVE /UL
SP GR UR: 1.01 (ref 1–1.03)
UROBILINOGEN UR QL: NORMAL

## 2025-03-10 PROCEDURE — 81003 URINALYSIS AUTO W/O SCOPE: CPT | Performed by: FAMILY MEDICINE

## 2025-03-10 PROCEDURE — 99214 OFFICE O/P EST MOD 30 MIN: CPT | Performed by: FAMILY MEDICINE

## 2025-03-10 PROCEDURE — 87086 URINE CULTURE/COLONY COUNT: CPT | Performed by: FAMILY MEDICINE

## 2025-03-10 PROCEDURE — 74176 CT ABD & PELVIS W/O CONTRAST: CPT

## 2025-03-10 RX ORDER — LORATADINE 10 MG/1
10 TABLET ORAL DAILY
Qty: 30 TABLET | Refills: 5 | Status: SHIPPED | OUTPATIENT
Start: 2025-03-10

## 2025-03-10 RX ORDER — FLUTICASONE FUROATE AND VILANTEROL 200; 25 UG/1; UG/1
1 POWDER RESPIRATORY (INHALATION)
Qty: 60 EACH | Refills: 3 | Status: SHIPPED | OUTPATIENT
Start: 2025-03-10

## 2025-03-10 RX ORDER — CIPROFLOXACIN 500 MG/1
500 TABLET, FILM COATED ORAL 2 TIMES DAILY
Qty: 14 TABLET | Refills: 0 | Status: SHIPPED | OUTPATIENT
Start: 2025-03-10 | End: 2025-03-17

## 2025-03-10 RX ORDER — FLUCONAZOLE 150 MG/1
150 TABLET ORAL ONCE
Qty: 1 TABLET | Refills: 1 | Status: SHIPPED | OUTPATIENT
Start: 2025-03-10 | End: 2025-03-10

## 2025-03-10 NOTE — PROGRESS NOTES
"    Office Note     Name: Sandra Auguste    : 1960     MRN: [id number]     Chief Complaint  Back Pain and Dysuria    Subjective     History of Present Illness:  Sandra Auguste is a 64 y.o. female who presents today for back pain - right sided   X 1 week  Thought it was muscle pull   Denies hx of kidney stone   Difficulty urinating, frequency and some burning   Recent yeast infection as well from abx     Denies fevers, chills  Some back pain- worse with movement     Does do some lifting- moving tables around     Advair prescribed in place of Breo due to coverage-   Advair not effective   Would like to go back to Breo   Has had asthma flares requiring nebulizer treatment and frequent rounds of antibiotics and steroids    Review of Systems:   Review of Systems   Constitutional:  Negative for chills and fever.   HENT:  Negative for trouble swallowing and voice change.    Respiratory:  Positive for cough and chest tightness. Negative for shortness of breath.    Cardiovascular:  Negative for chest pain, palpitations and leg swelling.   Gastrointestinal:  Positive for abdominal pain. Negative for constipation, diarrhea, nausea and vomiting.        Abdominal bloating   Genitourinary:  Positive for dysuria, flank pain and frequency.   Musculoskeletal:  Positive for back pain and myalgias.   Skin:  Negative for rash.   Allergic/Immunologic: Positive for environmental allergies.   Neurological:  Negative for light-headedness and headache.       Past Medical History:   Past Medical History:   Diagnosis Date    ADHD (attention deficit hyperactivity disorder) 2024    Just diagnosed    Allergic [id number]    Anemia 1984    Anesthesia complication     HAS TROUBLE GETTING \"NUMB\"     Anxiety     Arthritis     on Celebrex + Robaxin, no steroids    Asthma     does not follow w/ pulmonary    Bell palsy     not sure    Bladder spasms     Cluster headache ?    ?    CTS (carpal tunnel syndrome)     Depression     w/ PTSD    " "Diverticulitis of colon 2021    Not sure about dates    Diverticulosis     Dyspepsia     Endometriosis     undiagnosed, maybe>?    Failure to thrive (child)     Fatigue     Fibroid     GERD (gastroesophageal reflux disease)     controlled w/ nightly Pepcid, EGD 2020 w/ Dr. Chavarria, (+) HH    Gestational hypertension not sure    30 years ago    Headache, tension-type     long hair that was pulled in desk    Health care maintenance 02/25/2020    Hernia 2021    Dr Chavarria    Lactose intolerance Baby    Memory loss     FROM SKULL FRACTURE AND FALL-SHORT TERM MEMORY LOSS    Migraine     Morbid obesity     Multiple gestation 11/22/84_09/29/1986    2 BABIES    Osteoarthritis     Ovarian cyst     ?    Peripheral neuropathy     PMS (premenstrual syndrome)     PONV (postoperative nausea and vomiting) 1986    SWELLING /NAUSEA    PTSD (post-traumatic stress disorder)     Seasonal allergies     Seizures     \"convulsions\" at age 5    Skull fracture 2012    Sleep apnea with use of continuous positive airway pressure (CPAP)     CPAP compliant    Sleep apnea, obstructive 02/2016J    Dr Kirby    SOB (shortness of breath) on exertion     Spinal headache     not sure    Tendonitis     Trauma     Type 2 diabetes mellitus without complication, without long-term current use of insulin     dx 2021, no insulin, A1C <7    Varicella     Vision loss     Wears contact lenses     Wears partial dentures     TOP ONLY     Wears prescription eyeglasses        Past Surgical History:   Past Surgical History:   Procedure Laterality Date    CERVICAL POLYPECTOMY      COLONOSCOPY  2021    HYSTERECTOMY      MYOMECTOMY  5 years     Dr packer/    OOPHORECTOMY      TOTAL LAPAROSCOPIC HYSTERECTOMY N/A 09/18/2017    Procedure: TOTAL LAPAROSCOPIC HYSTERECTOMY, BILATERAL SALPINGO OOPHORECTOMY WITH DAVINCI ROBOT ;  Surgeon: Shannon Grimaldo DO;  Location: [facility];  Service:     WISDOM TOOTH EXTRACTION         Immunizations:   Immunization History   Administered " Date(s) Administered    COVID-19 (PFIZER) Purple Cap Monovalent 03/17/2021, 04/07/2021    Flu Vaccine Quad PF >36MO 09/19/2016, 10/18/2017    Fluzone  >6mos 10/11/2024    Fluzone (or Fluarix & Flulaval for VFC) >6mos 09/24/2021, 10/26/2022, 12/08/2023    Hepatitis B 07/15/2021    Pneumococcal Polysaccharide (PPSV23) 02/24/2020    Shingrix 11/16/2020, 01/23/2021, 01/31/2021    Tdap 07/15/2021    Tetanus 09/06/2012    flucelvax quad pfs =>4 YRS 12/20/2019        Medications:     Current Outpatient Medications:     acetaminophen (TYLENOL) 500 MG tablet, Take 1 tablet by mouth Every 6 (Six) Hours As Needed for Mild Pain., Disp: , Rfl:     albuterol sulfate  (90 Base) MCG/ACT inhaler, Inhale 2 puffs Every 6 (Six) Hours As Needed for Wheezing or Shortness of Air., Disp: 18 g, Rfl: 1    atomoxetine (STRATTERA) 25 MG capsule, Take 1 capsule by mouth Daily., Disp: 18 capsule, Rfl: 0    Blood Glucose Monitoring Suppl (OneTouch Verio) w/Device kit, 1 kit Daily., Disp: 1 kit, Rfl: 0    Cholecalciferol (Vitamin D3) 50 MCG (2000 UT) capsule, Take 1 capsule by mouth Daily., Disp: 90 capsule, Rfl: 1    Diclofenac Sodium (VOLTAREN) 1 % gel gel, Apply  topically to the appropriate area as directed 4 (Four) Times a Day As Needed (arthralgias, shoulders and back.)., Disp: 100 g, Rfl: 1    EPINEPHrine (EPIPEN) 0.3 MG/0.3ML solution auto-injector injection, Inject 0.3 mL into the appropriate muscle as directed by prescriber As Needed (FOR SEVERE ALLERGIC REACTION)., Disp: 1 each, Rfl: 1    glucose blood (OneTouch Verio) test strip, USE AS INSTRUCTED TO CHECK BLOOD GLUCOSE ONCE DAILY DX:E11.65, Disp: 100 each, Rfl: 3    glucose monitor monitoring kit, Use as directed to check blood glucose once daily., Disp: 1 each, Rfl: 0    hydrOXYzine (ATARAX) 25 MG tablet, Take 1 tablet by mouth Every 8 (Eight) Hours As Needed for Anxiety., Disp: 90 tablet, Rfl: 2    ipratropium-albuterol (DUO-NEB) 0.5-2.5 mg/3 ml nebulizer, Take 3 mL by  nebulization Every 4 (Four) Hours As Needed for Wheezing., Disp: 360 mL, Rfl: 1    Lancets (onetouch ultrasoft) lancets, Use one lancet daily to check blood sugars, Disp: 100 each, Rfl: 3    loratadine (CLARITIN) 10 MG tablet, Take 1 tablet by mouth Daily., Disp: 30 tablet, Rfl: 5    montelukast (SINGULAIR) 10 MG tablet, Take 1 tablet by mouth Every Night., Disp: 90 tablet, Rfl: 1    multivitamin with minerals tablet tablet, Take 1 tablet by mouth Daily., Disp: , Rfl:     ondansetron ODT (ZOFRAN-ODT) 4 MG disintegrating tablet, Place 1 tablet on the tongue Every 6 (Six) Hours As Needed for Nausea or Vomiting for up to 15 doses., Disp: 15 tablet, Rfl: 0    promethazine-dextromethorphan (PROMETHAZINE-DM) 6.25-15 MG/5ML syrup, Take 5 mL by mouth Every Night., Disp: 150 mL, Rfl: 0    sodium chloride (Ocean Nasal Spray) 0.65 % nasal spray, 1 spray into the nostril(s) as directed by provider As Needed for Congestion., Disp: 60 mL, Rfl: 0    ciprofloxacin (Cipro) 500 MG tablet, Take 1 tablet by mouth 2 (Two) Times a Day for 7 days., Disp: 14 tablet, Rfl: 0    fluconazole (Diflucan) 150 MG tablet, Take 1 tablet by mouth 1 (One) Time for 1 dose., Disp: 1 tablet, Rfl: 1    Fluticasone Furoate-Vilanterol (BREO ELLIPTA) 200-25 MCG/ACT inhaler, Inhale 1 puff Daily., Disp: 60 each, Rfl: 3    pantoprazole (PROTONIX) 40 MG EC tablet, Take 1 tablet by mouth Daily. (Patient not taking: Reported on 3/10/2025), Disp: 90 tablet, Rfl: 1    polyethylene glycol (MIRALAX) 17 GM/SCOOP powder, Take 17 g by mouth Daily. (Patient not taking: Reported on 3/10/2025), Disp: 578 g, Rfl: 1    Allergies:   Allergies   Allergen Reactions    Mold Extract [Trichophyton] Shortness Of Breath, Anxiety, Palpitations and Irritability    Lortab [Hydrocodone-Acetaminophen] Nausea And Vomiting    Metformin GI Intolerance    Pollen Extract Other (See Comments)     SNEEZING AND CONGESTION        Family History:   Family History   Problem Relation Age of Onset     Cancer Mother         Cervical /over 10 years ago    Dementia Mother     Hypertension Mother         high energy/living on meds    Anxiety disorder Mother     Seizures Mother     Miscarriages / Stillbirths Mother         2 miscarriages    Obesity Father     Hypertension Father     Heart attack Father     Sleep apnea Father     Depression Father     Heart disease Father     Hyperlipidemia Father     Early death Father         57    Heart failure Father         M heart attack    Sleep apnea Sister     ADD / ADHD Sister     Hypertension Brother     Sleep apnea Brother     ADD / ADHD Brother     Cancer Maternal Aunt         lymphoma    Cancer Paternal Aunt     Breast cancer Paternal Aunt 70    Cancer Maternal Grandmother     Early death Maternal Grandmother         44    Alcohol abuse Maternal Grandfather     Early death Maternal Grandfather         leg amputated ..shock?    Emphysema Maternal Grandfather         Gr-Grandfather    Obesity Paternal Grandmother     Diabetes Paternal Grandmother     Hypertension Paternal Grandmother     Stroke Paternal Grandmother     Heart attack Paternal Grandmother     Asthma Paternal Grandmother     Vision loss Paternal Grandmother         weak    Osteoporosis Paternal Grandmother         Father's mom had arthritis in her knees?    Arthritis Paternal Grandmother     Hyperlipidemia Paternal Grandmother         heavy    Heart attack Paternal Grandfather     Vision loss Paternal Grandfather         weak    Alcohol abuse Other     Depression Other     ADD / ADHD Brother     Alcohol abuse Brother     Seizures Brother         When he was young, not since he had tonsles removed.    Dementia Sister     Cancer Maternal Aunt         Lymphoma    Alcohol abuse Maternal Aunt     Cancer Paternal Aunt         double mastectomy    Heart disease Paternal Uncle         several heart surgeries    Heart failure Paternal Uncle     Heart disease Paternal Uncle         stroke    Mental illness Paternal Uncle  "        unknoown    Heart failure Paternal Uncle     Heart disease Paternal Uncle         pacemaker    Heart failure Paternal Uncle         Pacemaker    Mental illness Brother         unknown    Alcohol abuse Brother     Alcohol abuse Maternal Uncle     Depression Son     Vision loss Son         weak sight    Vision loss Daughter         lazy eye, weak sight    Alcohol abuse Maternal Aunt     Alcohol abuse Maternal Uncle             Depression Maternal Uncle         war vet    Mental illness Maternal Uncle         vet/ptsd ?    Alcohol abuse Maternal Uncle         /purple heart    Cancer Maternal Aunt         stage 4 Breast with spreading to the liver    Depression Son         unknown diagnosis also ADHD    Thyroid disease Paternal Aunt     Vision loss Daughter         lazy eye, weak sight/glasses    Vision loss Son         weak sight/glasses    Cancer Maternal Aunt     Cancer Paternal Aunt     Sleep apnea Brother     Sleep apnea Sister     Sleep apnea Brother     Thyroid disease Paternal Aunt        Social History:   Social History     Socioeconomic History    Marital status: Single   Tobacco Use    Smoking status: Never     Passive exposure: Never    Smokeless tobacco: Never   Vaping Use    Vaping status: Never Used   Substance and Sexual Activity    Alcohol use: Not Currently     Comment: once or twice a year    Drug use: Never    Sexual activity: Not Currently     Partners: Male     Birth control/protection: Abstinence, Post-menopausal, Hysterectomy, Surgical         Objective     Vital Signs  /84   Pulse 91   Ht 167.6 cm (66\")   Wt 117 kg (257 lb)   SpO2 97%   BMI 41.48 kg/m²   Estimated body mass index is 41.48 kg/m² as calculated from the following:    Height as of this encounter: 167.6 cm (66\").    Weight as of this encounter: 117 kg (257 lb).            Physical Exam  Vitals and nursing note reviewed.   Constitutional:       General: She is not in acute distress.     Appearance: " Normal appearance. She is obese.   HENT:      Head: Normocephalic and atraumatic.      Mouth/Throat:      Mouth: Mucous membranes are moist.   Cardiovascular:      Rate and Rhythm: Normal rate and regular rhythm.      Heart sounds: Normal heart sounds.   Pulmonary:      Effort: Pulmonary effort is normal. No respiratory distress.      Breath sounds: Normal breath sounds.   Abdominal:      General: Bowel sounds are normal.      Palpations: Abdomen is soft.      Tenderness: There is abdominal tenderness. There is right CVA tenderness. There is no left CVA tenderness.   Skin:     General: Skin is warm and dry.   Neurological:      General: No focal deficit present.      Mental Status: She is alert and oriented to person, place, and time.          Procedures     Assessment and Plan   Diagnosis Discussed   Continue to monitor   Urine studies for further evaluation   Please complete full course of antibiotics   CT abdomen/pelvis ordered for further evaluation   Follow up with Allergy/Asthma specialist as directed   Will try for Breo inhaler daily again for better control- more effective for patient   If symptoms worsen or persist please seek further evaluation     1. Abdominal bloating  - CT Abdomen Pelvis Without Contrast; Future    2. Acute right-sided low back pain without sciatica  - CT Abdomen Pelvis Without Contrast; Future    3. Right flank pain  - CT Abdomen Pelvis Without Contrast; Future    4. UTI symptoms  - POC Urinalysis Dipstick, Automated  - Urine Culture - Urine, Urine, Clean Catch  - CT Abdomen Pelvis Without Contrast; Future  - ciprofloxacin (Cipro) 500 MG tablet; Take 1 tablet by mouth 2 (Two) Times a Day for 7 days.  Dispense: 14 tablet; Refill: 0    5. Seasonal allergies  - loratadine (CLARITIN) 10 MG tablet; Take 1 tablet by mouth Daily.  Dispense: 30 tablet; Refill: 5    6. Antibiotic-induced yeast infection  - fluconazole (Diflucan) 150 MG tablet; Take 1 tablet by mouth 1 (One) Time for 1 dose.   Dispense: 1 tablet; Refill: 1    7. Moderate persistent asthma without complication  - Fluticasone Furoate-Vilanterol (BREO ELLIPTA) 200-25 MCG/ACT inhaler; Inhale 1 puff Daily.  Dispense: 60 each; Refill: 3       Follow Up  Return if symptoms worsen or fail to improve, for Next scheduled follow up.    MD COURTNEY [facility]  [address] [place] [id number] [phone]

## 2025-03-10 NOTE — TELEPHONE ENCOUNTER
HUB TO RELAY    CALLED PATIENT TO ADVISE OF STAT CT APPT BUT THERE WAS NO ANSWER. LEFT DETAILED VOICEMAIL WITH ALL APPOINTMENT INFORMATION AND PHONE NUMBER FOR CALL BACK [phone]    PATIENT IS SCHEDULED FOR CT AT [facility]. [address] [facility] [address] USE ENTRANCE A. ARRIVE AT 2:45PM AND APPT IS SCHEDULED TO BEGIN AT 3:00PM. NOTHING TO EAT OR DRINK FOR 2 HOURS BEFORE THE APPT (12:45PM). IF PATIENT NEEDS TO CANCEL/RESCHEDULE SHE CAN CALL [phone].

## 2025-03-10 NOTE — PATIENT INSTRUCTIONS
Diagnosis Discussed   Continue to monitor   Urine studies for further evaluation   Please complete full course of antibiotics   CT abdomen/pelvis ordered for further evaluation   Follow up with Allergy/Asthma specialist as directed   Will try for Breo inhaler daily again for better control- more effective for patient   If symptoms worsen or persist please seek further evaluation

## 2025-03-10 NOTE — TELEPHONE ENCOUNTER
PA denied. Covered alternatives fluticasone propionate-salmeterol diskus, wixela inhub, budesonide-formoterol    Ok to change?     Hub to relay    Lvm for patient to return phone call to notify her that breo was denied.

## 2025-03-10 NOTE — TELEPHONE ENCOUNTER
PATIENT RETURNED PHONE CALL REFERRING TO HER STAT CT APPT.  I READ HER THE NOTE VERBATIM AND SHE ACKNOWLEDGED UNDERSTANDING OF INSTRUCTIONS BEFORE APPOINTMENT.  SHE WILL ARRIVE AT 2:45 PM AND NOT EAT OR DRINK AFTER 12:45 PM

## 2025-03-11 ENCOUNTER — TELEPHONE (OUTPATIENT)
Dept: INTERNAL MEDICINE | Facility: CLINIC | Age: 65
End: 2025-03-11

## 2025-03-11 LAB — BACTERIA SPEC AEROBE CULT: NO GROWTH

## 2025-03-11 NOTE — TELEPHONE ENCOUNTER
Caller: Sandra Auguste    Relationship: Self    Best call back number: [phone]     What is the best time to reach you: ANYTIME     Who are you requesting to speak with (clinical staff, provider,  specific staff member): CLINICAL STAFF    What was the call regarding: PATIENT STATES THAT SHE FORGOT TO GET A WORK EXCUSE NO FOR HER APPOINTMENT YESTERDAY. SHE IS ASKING IF SHE CAN HAVE ONE PROVIDED FOR HER AND UPLOADED TO [employer].     Is it okay if the provider responds through [facility]: YES

## 2025-03-11 NOTE — TELEPHONE ENCOUNTER
Caller: Sandra Auguste    Relationship: Self    Best call back number: [phone]     What is the best time to reach you: ANYTIME     Who are you requesting to speak with (clinical staff, provider,  specific staff member): CLINICAL STAFF    What was the call regarding: PATIENT WOULD LIKE TO HAVE A CALL BACK TO HAVE SOMEONE GO OVER THE CT RESULTS FROM YESTERDAY WITH HER.     Is it okay if the provider responds through [employer]: YES

## 2025-03-14 ENCOUNTER — LAB (OUTPATIENT)
Dept: LAB | Facility: HOSPITAL | Age: 65
End: 2025-03-14
Payer: COMMERCIAL

## 2025-03-14 ENCOUNTER — OFFICE VISIT (OUTPATIENT)
Dept: GASTROENTEROLOGY | Facility: CLINIC | Age: 65
End: 2025-03-14
Payer: COMMERCIAL

## 2025-03-14 VITALS
OXYGEN SATURATION: 96 % | TEMPERATURE: 96.6 F | HEART RATE: 97 BPM | HEIGHT: 66 IN | WEIGHT: 255.8 LBS | BODY MASS INDEX: 41.11 KG/M2 | DIASTOLIC BLOOD PRESSURE: 82 MMHG | SYSTOLIC BLOOD PRESSURE: 138 MMHG

## 2025-03-14 DIAGNOSIS — R14.0 ABDOMINAL BLOATING: ICD-10-CM

## 2025-03-14 DIAGNOSIS — K57.90 DIVERTICULOSIS: ICD-10-CM

## 2025-03-14 DIAGNOSIS — R10.9 RIGHT FLANK PAIN: ICD-10-CM

## 2025-03-14 DIAGNOSIS — R11.0 NAUSEA: ICD-10-CM

## 2025-03-14 DIAGNOSIS — R10.9 RIGHT FLANK PAIN: Primary | ICD-10-CM

## 2025-03-14 LAB
ALBUMIN SERPL-MCNC: 3.4 G/DL (ref 3.5–5.2)
ALBUMIN/GLOB SERPL: 1 G/DL
ALP SERPL-CCNC: 108 U/L (ref 39–117)
ALT SERPL W P-5'-P-CCNC: 24 U/L (ref 1–33)
ANION GAP SERPL CALCULATED.3IONS-SCNC: 10.6 MMOL/L (ref 5–15)
AST SERPL-CCNC: 20 U/L (ref 1–32)
BILIRUB SERPL-MCNC: 0.3 MG/DL (ref 0–1.2)
BUN SERPL-MCNC: 12 MG/DL (ref 8–23)
BUN/CREAT SERPL: 13.8 (ref 7–25)
CALCIUM SPEC-SCNC: 9.5 MG/DL (ref 8.6–10.5)
CHLORIDE SERPL-SCNC: 105 MMOL/L (ref 98–107)
CO2 SERPL-SCNC: 22.4 MMOL/L (ref 22–29)
CREAT SERPL-MCNC: 0.87 MG/DL (ref 0.57–1)
CRP SERPL-MCNC: 3.67 MG/DL (ref 0–0.5)
DEPRECATED RDW RBC AUTO: 40 FL (ref 37–54)
EGFRCR SERPLBLD CKD-EPI 2021: 75.4 ML/MIN/1.73
ERYTHROCYTE [DISTWIDTH] IN BLOOD BY AUTOMATED COUNT: 12.3 % (ref 12.3–15.4)
GGT SERPL-CCNC: 24 U/L (ref 5–36)
GLOBULIN UR ELPH-MCNC: 3.5 GM/DL
GLUCOSE SERPL-MCNC: 162 MG/DL (ref 65–99)
HCT VFR BLD AUTO: 40.8 % (ref 34–46.6)
HGB BLD-MCNC: 13.6 G/DL (ref 12–15.9)
LIPASE SERPL-CCNC: 20 U/L (ref 13–60)
MCH RBC QN AUTO: 29.6 PG (ref 26.6–33)
MCHC RBC AUTO-ENTMCNC: 33.3 G/DL (ref 31.5–35.7)
MCV RBC AUTO: 88.7 FL (ref 79–97)
PLATELET # BLD AUTO: 324 10*3/MM3 (ref 140–450)
PMV BLD AUTO: 10.6 FL (ref 6–12)
POTASSIUM SERPL-SCNC: 4.1 MMOL/L (ref 3.5–5.2)
PROT SERPL-MCNC: 6.9 G/DL (ref 6–8.5)
RBC # BLD AUTO: 4.6 10*6/MM3 (ref 3.77–5.28)
SODIUM SERPL-SCNC: 138 MMOL/L (ref 136–145)
WBC NRBC COR # BLD AUTO: 8.78 10*3/MM3 (ref 3.4–10.8)

## 2025-03-14 PROCEDURE — 86140 C-REACTIVE PROTEIN: CPT

## 2025-03-14 PROCEDURE — 80053 COMPREHEN METABOLIC PANEL: CPT | Performed by: PHYSICIAN ASSISTANT

## 2025-03-14 PROCEDURE — 82977 ASSAY OF GGT: CPT | Performed by: PHYSICIAN ASSISTANT

## 2025-03-14 PROCEDURE — 85027 COMPLETE CBC AUTOMATED: CPT | Performed by: PHYSICIAN ASSISTANT

## 2025-03-14 PROCEDURE — 83690 ASSAY OF LIPASE: CPT | Performed by: PHYSICIAN ASSISTANT

## 2025-03-14 PROCEDURE — 36415 COLL VENOUS BLD VENIPUNCTURE: CPT

## 2025-03-14 NOTE — PROGRESS NOTES
Follow Up      Patient Name: Sandra Auguste  : 1960   MRN: [id number]     Chief Complaint:    Chief Complaint   Patient presents with    Abdominal Pain     Has improved, intermiten reflux       History of Present Illness: Sandra Auguste is a 64 y.o. female, PMH includes ADHD, anxiety, GERD, T2DM, migraines, PTSD, KELLY on CPAP, who is here today for follow up on abdominal pain.    Pt notes right flank / back pain ongoing for the last few weeks. She was seen by PCP and had unremarkable CT kidney stone protocol, below, as well as UA. She states that the pain is constant, worse in the morning. She reports associated nausea. She denies specific aggravating foods or factors otherwise.     Pt reports h/o chronic GERD, previously on pantoprazole 40mg daily. She has been able to discontinue use of daily PPI with diet and lifestyle modification.     Pt reports long h/o lower abdominal cramping and bloating, improved with bowel evacuation. She generally has a BM every 1-2 days that varies in completeness. She recently started taking a magnesium supplement. She reports good water intake daily, does not take a probiotic or fiber supplement.     Patient denies associated fever, chills, indigestion, vomiting, diarrhea, hematemesis, dysphagia, hematochezia, melena, bloating, weight loss or gain, dysuria, jaundice or bruising.    Patient denies personal or FHx of PUD, H Pylori, pancreatitis, colitis, Celiac disease, UC, Crohn's disease, colon or gastric cancers. Pt denies EtOH, tobacco, illicit substance use. S/p TIMO. Occasional NSAID use.     GES : Normal gastric emptying    CT A/P wo contrast 3/2025: No evidence of nephrolithiasis or obstructive uropathy. Mild hepatic steatosis.     CSY 2022 with Dr. Chavarria. Good bowel prep conditions. Diverticulosis in sigmoid / descending / transverse colon. Nonbleeding internal hemorrhoids. Recommend repeat CSY in 10 years.     EGD 10/2020 with Dr. Chavarria. LA Grade A  esophagitis. 2cm hiatal hernia. Normal appearing stomach and duodenum. Bx negative for EoE, H Pylori, celiac disease, intestinal metaplasia or dysplasia.     Subjective      Review of Systems:   Review of Systems   Constitutional:  Negative for appetite change, chills, diaphoresis, fever, unexpected weight gain and unexpected weight loss.   HENT:  Negative for drooling, facial swelling, mouth sores, rhinorrhea, sore throat, tinnitus, trouble swallowing and voice change.    Eyes: Negative.    Respiratory:  Negative for cough, chest tightness and shortness of breath.    Cardiovascular:  Negative for chest pain.   Gastrointestinal:  Positive for abdominal distention (lower), abdominal pain (lower cramping), constipation (chronic) and nausea. Negative for blood in stool, diarrhea, vomiting, GERD and indigestion.   Genitourinary:  Positive for flank pain (right). Negative for dysuria, hematuria and pelvic pain.   Skin:  Negative for color change, pallor and rash.   All other systems reviewed and are negative.      Medications:     Current Outpatient Medications:     acetaminophen (TYLENOL) 500 MG tablet, Take 1 tablet by mouth Every 6 (Six) Hours As Needed for Mild Pain., Disp: , Rfl:     albuterol sulfate  (90 Base) MCG/ACT inhaler, Inhale 2 puffs Every 6 (Six) Hours As Needed for Wheezing or Shortness of Air., Disp: 18 g, Rfl: 1    atomoxetine (STRATTERA) 25 MG capsule, Take 1 capsule by mouth Daily., Disp: 18 capsule, Rfl: 0    Blood Glucose Monitoring Suppl (OneTouch Verio) w/Device kit, 1 kit Daily., Disp: 1 kit, Rfl: 0    Cholecalciferol (Vitamin D3) 50 MCG (2000 UT) capsule, Take 1 capsule by mouth Daily., Disp: 90 capsule, Rfl: 1    ciprofloxacin (Cipro) 500 MG tablet, Take 1 tablet by mouth 2 (Two) Times a Day for 7 days., Disp: 14 tablet, Rfl: 0    Diclofenac Sodium (VOLTAREN) 1 % gel gel, Apply  topically to the appropriate area as directed 4 (Four) Times a Day As Needed (arthralgias, shoulders and  back.)., Disp: 100 g, Rfl: 1    EPINEPHrine (EPIPEN) 0.3 MG/0.3ML solution auto-injector injection, Inject 0.3 mL into the appropriate muscle as directed by prescriber As Needed (FOR SEVERE ALLERGIC REACTION)., Disp: 1 each, Rfl: 1    Fluticasone Furoate-Vilanterol (BREO ELLIPTA) 200-25 MCG/ACT inhaler, Inhale 1 puff Daily., Disp: 60 each, Rfl: 3    glucose blood (OneTouch Verio) test strip, USE AS INSTRUCTED TO CHECK BLOOD GLUCOSE ONCE DAILY DX:E11.65, Disp: 100 each, Rfl: 3    glucose monitor monitoring kit, Use as directed to check blood glucose once daily., Disp: 1 each, Rfl: 0    hydrOXYzine (ATARAX) 25 MG tablet, Take 1 tablet by mouth Every 8 (Eight) Hours As Needed for Anxiety., Disp: 90 tablet, Rfl: 2    ipratropium-albuterol (DUO-NEB) 0.5-2.5 mg/3 ml nebulizer, Take 3 mL by nebulization Every 4 (Four) Hours As Needed for Wheezing., Disp: 360 mL, Rfl: 1    Lancets (onetouch ultrasoft) lancets, Use one lancet daily to check blood sugars, Disp: 100 each, Rfl: 3    loratadine (CLARITIN) 10 MG tablet, Take 1 tablet by mouth Daily., Disp: 30 tablet, Rfl: 5    montelukast (SINGULAIR) 10 MG tablet, Take 1 tablet by mouth Every Night., Disp: 90 tablet, Rfl: 1    multivitamin with minerals tablet tablet, Take 1 tablet by mouth Daily., Disp: , Rfl:     ondansetron ODT (ZOFRAN-ODT) 4 MG disintegrating tablet, Place 1 tablet on the tongue Every 6 (Six) Hours As Needed for Nausea or Vomiting for up to 15 doses., Disp: 15 tablet, Rfl: 0    pantoprazole (PROTONIX) 40 MG EC tablet, Take 1 tablet by mouth Daily. (Patient not taking: Reported on 3/10/2025), Disp: 90 tablet, Rfl: 1    polyethylene glycol (MIRALAX) 17 GM/SCOOP powder, Take 17 g by mouth Daily. (Patient not taking: Reported on 3/10/2025), Disp: 578 g, Rfl: 1    promethazine-dextromethorphan (PROMETHAZINE-DM) 6.25-15 MG/5ML syrup, Take 5 mL by mouth Every Night., Disp: 150 mL, Rfl: 0    sodium chloride (Ocean Nasal Spray) 0.65 % nasal spray, 1 spray into the  "nostril(s) as directed by provider As Needed for Congestion., Disp: 60 mL, Rfl: 0    Allergies:   Allergies   Allergen Reactions    Mold Extract [Trichophyton] Shortness Of Breath, Anxiety, Palpitations and Irritability    Lortab [Hydrocodone-Acetaminophen] Nausea And Vomiting    Metformin GI Intolerance    Pollen Extract Other (See Comments)     SNEEZING AND CONGESTION        Social History:   Social History     Socioeconomic History    Marital status: Single   Tobacco Use    Smoking status: Never     Passive exposure: Never    Smokeless tobacco: Never   Vaping Use    Vaping status: Never Used   Substance and Sexual Activity    Alcohol use: Not Currently     Comment: once or twice a year    Drug use: Never    Sexual activity: Not Currently     Partners: Male     Birth control/protection: Abstinence, Post-menopausal, Hysterectomy, Surgical        Surgical History:   Past Surgical History:   Procedure Laterality Date    CERVICAL POLYPECTOMY      COLONOSCOPY  2021    HYSTERECTOMY      MYOMECTOMY  5 years      obgyn/    OOPHORECTOMY      TOTAL LAPAROSCOPIC HYSTERECTOMY N/A 09/18/2017    Procedure: TOTAL LAPAROSCOPIC HYSTERECTOMY, BILATERAL SALPINGO OOPHORECTOMY WITH DAVINCI ROBOT ;  Surgeon: Shannon Grimaldo DO;  Location: [facility] [place];  Service:     WISDOM TOOTH EXTRACTION          Medical History:   Past Medical History:   Diagnosis Date    ADHD (attention deficit hyperactivity disorder) 07/30/2024    Just diagnosed    Allergic [id number]    Anemia 1984    Anesthesia complication     HAS TROUBLE GETTING \"NUMB\"     Anxiety     Arthritis     on Celebrex + Robaxin, no steroids    Asthma     does not follow w/ pulmonary    Bell palsy     not sure    Bladder spasms     Cluster headache ?    ?    CTS (carpal tunnel syndrome)     Depression     w/ PTSD    Diverticulitis of colon 2021    Not sure about dates    Diverticulosis     Dyspepsia     Endometriosis     undiagnosed, maybe>?    Failure to thrive (child)     Fatigue     " "Fibroid     GERD (gastroesophageal reflux disease)     controlled w/ nightly Pepcid, EGD 2020 w/ Dr. Chavarria, (+) HH    Gestational hypertension not sure    30 years ago    Headache, tension-type     long hair that was pulled in desk    Health care maintenance 02/25/2020    Hernia 2021    Dr Chavarria    Lactose intolerance Baby    Memory loss     FROM SKULL FRACTURE AND FALL-SHORT TERM MEMORY LOSS    Migraine     Morbid obesity     Multiple gestation 11/22/84_09/29/1986    2 BABIES    Osteoarthritis     Ovarian cyst     ?    Peripheral neuropathy     PMS (premenstrual syndrome)     PONV (postoperative nausea and vomiting) 1986    SWELLING /NAUSEA    PTSD (post-traumatic stress disorder)     Seasonal allergies     Seizures     \"convulsions\" at age 5    Skull fracture 2012    Sleep apnea with use of continuous positive airway pressure (CPAP)     CPAP compliant    Sleep apnea, obstructive 02/2016J    Dr Kirby    SOB (shortness of breath) on exertion     Spinal headache     not sure    Tendonitis     Trauma     Type 2 diabetes mellitus without complication, without long-term current use of insulin     dx 2021, no insulin, A1C <7    Varicella     Vision loss     Wears contact lenses     Wears partial dentures     TOP ONLY     Wears prescription eyeglasses         Objective     Physical Exam:  Vital Signs: There were no vitals filed for this visit.  There is no height or weight on file to calculate BMI.     Physical Exam  Vitals and nursing note reviewed.   Constitutional:       Appearance: Normal appearance. She is normal weight. She is not ill-appearing or diaphoretic.      Comments: BMI 41.31   HENT:      Head: Normocephalic and atraumatic.      Right Ear: External ear normal.      Left Ear: External ear normal.      Nose: Nose normal.      Mouth/Throat:      Mouth: Mucous membranes are moist.      Pharynx: Oropharynx is clear.   Eyes:      Conjunctiva/sclera: Conjunctivae normal.      Pupils: Pupils are equal, round, " and reactive to light.   Neck:      Thyroid: No thyromegaly.   Cardiovascular:      Rate and Rhythm: Normal rate and regular rhythm.      Pulses: Normal pulses.      Heart sounds: Normal heart sounds.   Pulmonary:      Effort: Pulmonary effort is normal.      Breath sounds: Normal breath sounds.   Abdominal:      General: Abdomen is flat. Bowel sounds are normal. There is no distension.      Tenderness: There is abdominal tenderness (mild, bilateral lower quadrants).      Comments: Body habitus limits physical exam.    Musculoskeletal:         General: Normal range of motion.      Cervical back: Normal range of motion and neck supple.   Skin:     General: Skin is warm and dry.   Neurological:      General: No focal deficit present.      Mental Status: She is oriented to person, place, and time.   Psychiatric:         Mood and Affect: Mood normal.         Assessment / Plan      Assessment/Plan:   There are no diagnoses linked to this encounter.     Right flank pain  Nausea  Diverticulosis  Bloating   - continue all medications as prescribed   - pt advised to continue adequate oral hydration, fiber intake, daily activity as tolerated    - pt advised to start daily fiber supplement    - previous office notes, hospital records, labs, imaging, endoscopy and pathology reports reviewed with patient    - obtain CBC, CMP, CRP, lipase, GGT   - obtain HIDA scan   - schedule for EGD / CSY if sx worsen or persist   - follow up in clinic after completion of above studies   - call clinic at any time for questions or new / worsened sx      Follow Up:   Return for Next scheduled follow up.    Plan of care reviewed with the patient at the conclusion of today's visit.  Education was provided regarding diagnosis, management, and any prescribed or recommended OTC medications.  Patient verbalized understanding of and agreement with management plan.     NOTE TO PATIENT: The 21st Century Cures Act makes medical notes like these available  to patients in the interest of transparency. However, be advised this is a medical document. It is intended as peer to peer communication. It is written in medical language and may contain abbreviations or verbiage that are unfamiliar. It may appear blunt or direct. Medical documents are intended to carry relevant information, facts as evident, and the clinical opinion of the practitioner.     Time Statement:   Discussed plan of care in detail with patient today. Patient verbally understands and agrees. I have spent 45 minutes reviewing available diagnostics, obtaining history, examining the patient, developing a treatment plan, and educating the patient on disease process and plan of care.     Marlen Blanca PA-C   [facility]

## 2025-03-17 ENCOUNTER — RESULTS FOLLOW-UP (OUTPATIENT)
Dept: GASTROENTEROLOGY | Facility: CLINIC | Age: 65
End: 2025-03-17

## 2025-03-17 NOTE — LETTER
Cheryle Cox  [address]  [place] [address]    July 2, 2025     Dear Ms. Auguste:    Below are the results from your recent visit:    Resulted Orders   CBC (No Diff)   Result Value Ref Range    WBC 8.78 3.40 - 10.80 10*3/mm3    RBC 4.60 3.77 - 5.28 10*6/mm3    Hemoglobin 13.6 12.0 - 15.9 g/dL    Hematocrit 40.8 34.0 - 46.6 %    MCV 88.7 79.0 - 97.0 fL    MCH 29.6 26.6 - 33.0 pg    MCHC 33.3 31.5 - 35.7 g/dL    RDW 12.3 12.3 - 15.4 %    RDW-SD 40.0 37.0 - 54.0 fl    MPV 10.6 6.0 - 12.0 fL    Platelets 324 140 - 450 10*3/mm3   Comprehensive Metabolic Panel   Result Value Ref Range    Glucose 162 (H) 65 - 99 mg/dL    BUN 12 8 - 23 mg/dL    Creatinine 0.87 0.57 - 1.00 mg/dL    Sodium 138 136 - 145 mmol/L    Potassium 4.1 3.5 - 5.2 mmol/L    Chloride 105 98 - 107 mmol/L    CO2 22.4 22.0 - 29.0 mmol/L    Calcium 9.5 8.6 - 10.5 mg/dL    Total Protein 6.9 6.0 - 8.5 g/dL    Albumin 3.4 (L) 3.5 - 5.2 g/dL    ALT (SGPT) 24 1 - 33 U/L    AST (SGOT) 20 1 - 32 U/L    Alkaline Phosphatase 108 39 - 117 U/L    Total Bilirubin 0.3 0.0 - 1.2 mg/dL    Globulin 3.5 gm/dL    A/G Ratio 1.0 g/dL    BUN/Creatinine Ratio 13.8 7.0 - 25.0    Anion Gap 10.6 5.0 - 15.0 mmol/L    eGFR 75.4 >60.0 mL/min/1.73   Lipase   Result Value Ref Range    Lipase 20 13 - 60 U/L   Gamma GT   Result Value Ref Range    GGT 24 5 - 36 U/L   C-reactive Protein   Result Value Ref Range    C-Reactive Protein 3.67 (H) 0.00 - 0.50 mg/dL   NM HIDA SCAN WITH PHARMACOLOGICAL INTERVENTION    Impression    Abnormal gallbladder ejection fraction which could reflect sequelae of chronic cholecystitis or gallbladder dyskinesia.      Electronically Signed: Adrián Fu MD    6/27/2025 2:00 PM EDT    Workstation ID: [id number]       Please let Sandra know that her HIDA reveals low gallbladder ejection fraction of 29%. I have placed a referral to general surgery to discuss cholecystectomy.          NM HIDA SCAN WITH PHARMACOLOGICAL INTERVENTION    If you have any  questions or concerns, please don't hesitate to call.         Sincerely,        Marlen Blanca PA-C

## 2025-03-17 NOTE — PROGRESS NOTES
Please let Sandra know that her labs reveal elevated glucose, 162, and CRP (nonspecific inflammatory marker), consistent with her baseline via chart review. CMP, CBC, lipase, GGT otherwise normal.

## 2025-03-28 ENCOUNTER — PRIOR AUTHORIZATION (OUTPATIENT)
Dept: INTERNAL MEDICINE | Facility: CLINIC | Age: 65
End: 2025-03-28

## 2025-03-28 ENCOUNTER — OFFICE VISIT (OUTPATIENT)
Dept: INTERNAL MEDICINE | Facility: CLINIC | Age: 65
End: 2025-03-28
Payer: COMMERCIAL

## 2025-03-28 VITALS
DIASTOLIC BLOOD PRESSURE: 78 MMHG | BODY MASS INDEX: 41.14 KG/M2 | HEIGHT: 66 IN | OXYGEN SATURATION: 97 % | WEIGHT: 256 LBS | TEMPERATURE: 96.2 F | HEART RATE: 96 BPM | SYSTOLIC BLOOD PRESSURE: 130 MMHG

## 2025-03-28 DIAGNOSIS — J45.40 MODERATE PERSISTENT ASTHMA WITHOUT COMPLICATION: ICD-10-CM

## 2025-03-28 DIAGNOSIS — R11.0 NAUSEA: Primary | ICD-10-CM

## 2025-03-28 DIAGNOSIS — R50.9 FEVER, UNSPECIFIED: ICD-10-CM

## 2025-03-28 PROCEDURE — 87428 SARSCOV & INF VIR A&B AG IA: CPT | Performed by: NURSE PRACTITIONER

## 2025-03-28 PROCEDURE — 99214 OFFICE O/P EST MOD 30 MIN: CPT | Performed by: NURSE PRACTITIONER

## 2025-03-28 RX ORDER — ONDANSETRON 4 MG/1
4 TABLET, FILM COATED ORAL EVERY 8 HOURS PRN
Qty: 90 TABLET | Refills: 0 | Status: SHIPPED | OUTPATIENT
Start: 2025-03-28

## 2025-03-28 NOTE — LETTER
March 28, 2025     Patient: Sandra Auguste   YOB: 1960   Date of Visit: 3/28/2025       To Whom It May Concern:    It is my medical opinion that Sandra Auguste may return to work in one day.            Sincerely,        REBECA Alfredo    CC: No Recipients

## 2025-03-28 NOTE — PROGRESS NOTES
"    Office Note     Name: Sandra Auguste    : 1960     MRN: [id number]     Chief Complaint  Nausea (Patient is having nausea and not feeling well. Patient stated her temp this morning was 100.7 she then took some tylenol and it's gone down. )    Subjective     History of Present Illness:  Sandra Auguste is a 64 y.o. female who presents today for acute symptoms    Patient regularly follows with Dr. Baum with a neck scheduled visit in April    Patient states only recent change was there is likely going to be an adjustment to her Strattera due to side effects of brain fog    Patient states symptoms started last night.  She has been nauseated with fever.  Tmax has been 100.7.  She has had some congestion.  No diarrhea or vomiting.  No recent dietary changes.  Coworker recently had a similar virus.  Patient has done over-the-counter medication management to assist with her fevers and symptoms      Patient also has noted history of asthma with use of inhalers, nebulizers, Claritin, Singulair.  She states she recently got over a asthma exacerbation.        Past Medical History:   Diagnosis Date    ADHD (attention deficit hyperactivity disorder) 2024    Just diagnosed    Allergic     Anemia     Anesthesia complication     HAS TROUBLE GETTING \"NUMB\"     Anxiety     Arthritis     on Celebrex + Robaxin, no steroids    Asthma     does not follow w/ pulmonary    Bell palsy     not sure    Bladder spasms     Cluster headache ?    ?    CTS (carpal tunnel syndrome)     Depression     w/ PTSD    Diverticulitis of colon     Not sure about dates    Diverticulosis     Dyspepsia     Endometriosis     undiagnosed, maybe>?    Failure to thrive (child)     Fatigue     Fibroid     GERD (gastroesophageal reflux disease)     controlled w/ nightly Pepcid, EGD  w/ Dr. Chavarria, (+) HH    Gestational hypertension not sure    30 years ago    Headache, tension-type     long hair that was pulled in desk    Health " "care maintenance 02/25/2020    Hernia 2021    Dr Chavarria    Lactose intolerance Baby    Memory loss     FROM SKULL FRACTURE AND FALL-SHORT TERM MEMORY LOSS    Migraine     Morbid obesity     Multiple gestation 11/22/84_09/29/1986    2 BABIES    Osteoarthritis     Ovarian cyst     ?    Peripheral neuropathy     PMS (premenstrual syndrome)     PONV (postoperative nausea and vomiting) 1986    SWELLING /NAUSEA    PTSD (post-traumatic stress disorder)     Seasonal allergies     Seizures     \"convulsions\" at age 5    Skull fracture 2012    Sleep apnea with use of continuous positive airway pressure (CPAP)     CPAP compliant    Sleep apnea, obstructive 02/2016J    Dr Kirby    SOB (shortness of breath) on exertion     Spinal headache     not sure    Tendonitis     Trauma     Type 2 diabetes mellitus without complication, without long-term current use of insulin     dx 2021, no insulin, A1C <7    Varicella     Vision loss     Wears contact lenses     Wears partial dentures     TOP ONLY     Wears prescription eyeglasses        Past Surgical History:   Procedure Laterality Date    CERVICAL POLYPECTOMY      COLONOSCOPY  2021    HYSTERECTOMY      MYOMECTOMY  5 years     Dr packer/    OOPHORECTOMY      TOTAL LAPAROSCOPIC HYSTERECTOMY N/A 09/18/2017    Procedure: TOTAL LAPAROSCOPIC HYSTERECTOMY, BILATERAL SALPINGO OOPHORECTOMY WITH DAVINCI ROBOT ;  Surgeon: Shannon Grimaldo DO;  Location: [facility];  Service:     UPPER GASTROINTESTINAL ENDOSCOPY      WISDOM TOOTH EXTRACTION         Social History     Socioeconomic History    Marital status: Single   Tobacco Use    Smoking status: Never     Passive exposure: Never    Smokeless tobacco: Never   Vaping Use    Vaping status: Never Used   Substance and Sexual Activity    Alcohol use: Not Currently     Comment: once or twice a year    Drug use: Never    Sexual activity: Not Currently     Partners: Male     Birth control/protection: Abstinence, Post-menopausal, Hysterectomy, Surgical "         Current Outpatient Medications:     acetaminophen (TYLENOL) 500 MG tablet, Take 1 tablet by mouth Every 6 (Six) Hours As Needed for Mild Pain., Disp: , Rfl:     albuterol sulfate  (90 Base) MCG/ACT inhaler, Inhale 2 puffs Every 6 (Six) Hours As Needed for Wheezing or Shortness of Air., Disp: 18 g, Rfl: 1    atomoxetine (STRATTERA) 25 MG capsule, Take 1 capsule by mouth Daily., Disp: 18 capsule, Rfl: 0    Blood Glucose Monitoring Suppl (OneTouch Verio) w/Device kit, 1 kit Daily., Disp: 1 kit, Rfl: 0    Cholecalciferol (Vitamin D3) 50 MCG (2000 UT) capsule, Take 1 capsule by mouth Daily., Disp: 90 capsule, Rfl: 1    EPINEPHrine (EPIPEN) 0.3 MG/0.3ML solution auto-injector injection, Inject 0.3 mL into the appropriate muscle as directed by prescriber As Needed (FOR SEVERE ALLERGIC REACTION)., Disp: 1 each, Rfl: 1    Fluticasone Furoate-Vilanterol (BREO ELLIPTA) 200-25 MCG/ACT inhaler, Inhale 1 puff Daily., Disp: 60 each, Rfl: 3    glucose blood (OneTouch Verio) test strip, USE AS INSTRUCTED TO CHECK BLOOD GLUCOSE ONCE DAILY DX:E11.65, Disp: 100 each, Rfl: 3    glucose monitor monitoring kit, Use as directed to check blood glucose once daily., Disp: 1 each, Rfl: 0    ipratropium-albuterol (DUO-NEB) 0.5-2.5 mg/3 ml nebulizer, Take 3 mL by nebulization Every 4 (Four) Hours As Needed for Wheezing., Disp: 360 mL, Rfl: 1    Lancets (onetouch ultrasoft) lancets, Use one lancet daily to check blood sugars, Disp: 100 each, Rfl: 3    loratadine (CLARITIN) 10 MG tablet, Take 1 tablet by mouth Daily., Disp: 30 tablet, Rfl: 5    montelukast (SINGULAIR) 10 MG tablet, Take 1 tablet by mouth Every Night., Disp: 90 tablet, Rfl: 1    multivitamin with minerals tablet tablet, Take 1 tablet by mouth Daily., Disp: , Rfl:     ondansetron (Zofran) 4 MG tablet, Take 1 tablet by mouth Every 8 (Eight) Hours As Needed for Nausea., Disp: 90 tablet, Rfl: 0    Objective     Vital Signs  /78 (BP Location: Left arm, Patient  "Position: Sitting, Cuff Size: Adult)   Pulse 96   Temp 96.2 °F (35.7 °C)   Ht 166.4 cm (65.5\")   Wt 116 kg (256 lb)   SpO2 97%   BMI 41.95 kg/m²   Estimated body mass index is 41.95 kg/m² as calculated from the following:    Height as of this encounter: 166.4 cm (65.5\").    Weight as of this encounter: 116 kg (256 lb).               Physical Exam  Vitals and nursing note reviewed.   Constitutional:       Appearance: Normal appearance.   HENT:      Head: Normocephalic and atraumatic.   Eyes:      Extraocular Movements: Extraocular movements intact.      Pupils: Pupils are equal, round, and reactive to light.   Cardiovascular:      Rate and Rhythm: Normal rate and regular rhythm.      Heart sounds: Normal heart sounds.   Pulmonary:      Effort: Pulmonary effort is normal.      Breath sounds: Normal breath sounds.      Comments: Lungs clear throughtou  Abdominal:      General: Bowel sounds are normal.      Palpations: Abdomen is soft.      Tenderness: There is no abdominal tenderness.   Musculoskeletal:         General: Normal range of motion.   Skin:     General: Skin is warm and dry.   Neurological:      Mental Status: She is alert and oriented to person, place, and time.   Psychiatric:         Mood and Affect: Mood normal.         Behavior: Behavior normal.          Lab Review:   Latest Reference Range & Units 03/28/25 12:24   SARS Antigen Not Detected, Presumptive Negative  Not Detected   Expiration Date  11/27/25   Lot Number  [id number]   Influenza A Antigen JONATHON Not Detected  Not Detected   Influenza B Antigen JONATHON Not Detected  Not Detected            Assessment and Plan     Diagnoses and all orders for this visit:    1. Nausea (Primary)  -     ondansetron (Zofran) 4 MG tablet; Take 1 tablet by mouth Every 8 (Eight) Hours As Needed for Nausea.  Dispense: 90 tablet; Refill: 0  -     POCT SARS-CoV-2 Antigen JONATHON + Flu    2. Fever, unspecified  -     POCT SARS-CoV-2 Antigen JONATHON + Flu    3. Moderate persistent " asthma without complication    Plan  Discussed results of in office testing with patient today  Zofran sent to the pharmacy to assist with symptoms.  Continue to stay well-hydrated and follow a bland diet  Continue to alternate with Tylenol and Motrin  Go to ER if any condition worsens or severe  Plan to follow-up as scheduled Dr. Baum  Continue with current plan of care regarding her asthma.  Lungs were clear throughout today    Follow Up  Return for Next scheduled follow up.    REBECA Alfredo    Part of this note may be an electronic transcription/translation of spoken language to printed text using the Dragon Dictation System.

## 2025-03-31 DIAGNOSIS — E11.9 TYPE 2 DIABETES MELLITUS WITHOUT COMPLICATION, WITHOUT LONG-TERM CURRENT USE OF INSULIN: Primary | ICD-10-CM

## 2025-04-01 RX ORDER — LANCETS
EACH MISCELLANEOUS
Qty: 100 EACH | Refills: 3 | Status: SHIPPED | OUTPATIENT
Start: 2025-04-01 | End: 2025-04-01

## 2025-04-01 RX ORDER — BLOOD SUGAR DIAGNOSTIC
STRIP MISCELLANEOUS
Qty: 100 EACH | Refills: 3 | Status: SHIPPED | OUTPATIENT
Start: 2025-04-01

## 2025-04-01 RX ORDER — LANCETS 33 GAUGE
1 EACH MISCELLANEOUS DAILY
Qty: 100 EACH | Refills: 3 | Status: SHIPPED | OUTPATIENT
Start: 2025-04-01

## 2025-04-03 ENCOUNTER — OFFICE VISIT (OUTPATIENT)
Dept: NEUROLOGY | Facility: CLINIC | Age: 65
End: 2025-04-03
Payer: COMMERCIAL

## 2025-04-03 ENCOUNTER — TELEPHONE (OUTPATIENT)
Dept: NEUROLOGY | Facility: CLINIC | Age: 65
End: 2025-04-03

## 2025-04-03 VITALS
HEART RATE: 96 BPM | OXYGEN SATURATION: 98 % | DIASTOLIC BLOOD PRESSURE: 78 MMHG | BODY MASS INDEX: 40.85 KG/M2 | SYSTOLIC BLOOD PRESSURE: 122 MMHG | WEIGHT: 254.2 LBS | HEIGHT: 66 IN

## 2025-04-03 DIAGNOSIS — G47.33 OBSTRUCTIVE SLEEP APNEA: ICD-10-CM

## 2025-04-03 DIAGNOSIS — G31.84 MILD COGNITIVE IMPAIRMENT: Primary | ICD-10-CM

## 2025-04-03 DIAGNOSIS — G44.85 PRIMARY STABBING HEADACHE: ICD-10-CM

## 2025-04-03 PROCEDURE — 99214 OFFICE O/P EST MOD 30 MIN: CPT | Performed by: PSYCHIATRY & NEUROLOGY

## 2025-04-03 RX ORDER — ATOMOXETINE 60 MG/1
CAPSULE ORAL
COMMUNITY
Start: 2025-03-28

## 2025-04-03 RX ORDER — FLUTICASONE FUROATE, UMECLIDINIUM BROMIDE AND VILANTEROL TRIFENATATE 100; 62.5; 25 UG/1; UG/1; UG/1
POWDER RESPIRATORY (INHALATION)
COMMUNITY
Start: 2025-03-15

## 2025-04-03 RX ORDER — IPRATROPIUM BROMIDE 42 UG/1
SPRAY, METERED NASAL
COMMUNITY
Start: 2025-03-17

## 2025-04-03 NOTE — PROGRESS NOTES
Subjective:    CC: Sandra Auguste is seen today  for memory problems and a new complaint of stabbing headaches    Current visit-patient states that her stabbing headaches have improved with indomethacin which she has now stopped using.  With regards to her sleep apnea she got a new CPAP and has been wearing it for a few hours but still wakes up with brain fog.  She was diagnosed with complex ADHD since she last saw me and was started on Strattera which has helped with her focus and concentration but once again not so much with her brain fog.  She denies having any significant memory problems.  She is considering weight loss surgery.  Her last A1c was 6.8.  For her sleep she continues to use hydroxyzine at night.  Has stopped taking Viibryd as it was causing worsening depression.      Last visit-patient last saw me in 2022.  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  She states that her memory was improving up until she sustained a fall in early April hitting the side of her ribs/head.  After that she had a CT head that showed chronic ischemic changes as well as mild bifrontal atrophy but no acute abnormalities.  She has been using her CPAP but was told 2 years ago by Dr. Hill that she would need a new sleep study however was unable to get it.  She was having sleep disturbances but more recently was put on Viibryd for her mood that helps with her sleep some.  She has been waking up with sharp stabbing headaches that last for a few seconds in different parts of her head with occasional nausea, photophobia, phonophobia and mild blurred vision.  She tries not to take any medications for them.  Her diabetes is well-controlled and her last A1c was 6.4.  LDL was 138.  Blood pressure is well-controlled without medications.    Last visit-patient had to cancel her  neuropsych evaluation in January due to snow.  It is rescheduled for July.  She still has some forgetfulness and short-term memory problems.  Continues to be stressed out.  Has also been having difficulties sleeping at night due to being unable to relax and being overly tired.  Does use her CPAP for her KELLY and takes hydroxyzine.  Her diabetes is fairly well controlled and her last A1c was 7.2.  Her PCP has started her on Trulicity which she will start taking from today.  She also went to  recently with neck pain, back pain and right arm/hand pain.  Had x-rays of the cervical and lumbar spine that showed multilevel degenerative changes.  She saw a physiatrist who has ordered a MRI cervical and lumbar spine for her.  He told her that she may have tendinitis in her right wrist.    Last visit-this is a patient previously seen by Zen[facility] for memory problems.  Patient started noticing the symptoms in 2012 following a concussion marked by forgetfulness and word finding problems.  Over time her symptoms have remained stable.  She denies any impairment of ADLs including driving.  She resides with her mother and helps take care of her.  She has also had symptoms of anxiety and depression.  At her last visit patient scored a MMSE of 29/30 and it was felt that her cognitive symptoms could be in part due to her underlying anxiety and depression as well as some of her medications including Xanax.  Her neuropsych evaluation at  is scheduled for January 22.  Patient also had an MRI brain that showed right frontal atrophy as well as chronic ischemic changes but no acute intracranial abnormalities.  She also had blood work including TSH and B12 level that were both within normal limits.  She has been getting speech therapy which seems to be helping.  Wants to continue getting it.  Of note-I personally reviewed her MRI brain and Zeny's notes as follows-    Sandra Auguste is a 60 y.o. female who comes to clinic today for evaluation  of memory loss . She has noted symptoms since at least 2012 following a concussion, marked initially by forgetfulness, repetitiveness, and word-finding difficulties. This has remained static over time. Additional symptoms have included impairments in both short and long term memory as well as executive function. There have been associated symptoms of significant anxiety and depression. She denies impairments in ADL's. She manages her medications and finances, though is having more difficulty doing so. She continues to drive.  She is currently residing with her mother in [place], for whom she is the primary caregiver.     She has also noted headaches for several years, though the history is a bit unclear. She describes intermittent sharp shooting unilateral headachs with associated nausea as well as light and sound sensitivity. These typically last for several seconds. She also notes a history of long migraines. These headaches vary in location and character and typically occur 1-2 times a month, lasting up to several days. Her headaches are worse with increased stress.     Prior evaluation has included an MRI of the brain in 2017 which was unremarkable .        The following portions of the patient's history were reviewed today and updated as of 08/09/2021  : allergies, current medications, past family history, past medical history, past social history, past surgical history and problem list  These document will be scanned to patient's chart.      Current Outpatient Medications:     acetaminophen (TYLENOL) 500 MG tablet, Take 1 tablet by mouth Every 6 (Six) Hours As Needed for Mild Pain., Disp: , Rfl:     albuterol sulfate  (90 Base) MCG/ACT inhaler, Inhale 2 puffs Every 6 (Six) Hours As Needed for Wheezing or Shortness of Air., Disp: 18 g, Rfl: 1    atomoxetine (STRATTERA) 60 MG capsule, , Disp: , Rfl:     Blood Glucose Monitoring Suppl (OneTouch Verio) w/Device kit, 1 kit Daily., Disp: 1 kit, Rfl: 0     "Cholecalciferol (Vitamin D3) 50 MCG (2000 UT) capsule, Take 1 capsule by mouth Daily., Disp: 90 capsule, Rfl: 1    EPINEPHrine (EPIPEN) 0.3 MG/0.3ML solution auto-injector injection, Inject 0.3 mL into the appropriate muscle as directed by prescriber As Needed (FOR SEVERE ALLERGIC REACTION)., Disp: 1 each, Rfl: 1    glucose blood (OneTouch Verio) test strip, USE AS INSTRUCTED TO CHECK BLOOD GLUCOSE ONCE DAILY DX:E11.65, Disp: 100 each, Rfl: 3    glucose monitor monitoring kit, Use as directed to check blood glucose once daily., Disp: 1 each, Rfl: 0    ipratropium (ATROVENT) 0.06 % nasal spray, , Disp: , Rfl:     ipratropium-albuterol (DUO-NEB) 0.5-2.5 mg/3 ml nebulizer, Take 3 mL by nebulization Every 4 (Four) Hours As Needed for Wheezing., Disp: 360 mL, Rfl: 1    Lancets (OneTouch Delica Safety Lancing) misc, Use 1 each Daily., Disp: 100 each, Rfl: 3    loratadine (CLARITIN) 10 MG tablet, Take 1 tablet by mouth Daily., Disp: 30 tablet, Rfl: 5    montelukast (SINGULAIR) 10 MG tablet, Take 1 tablet by mouth Every Night., Disp: 90 tablet, Rfl: 1    multivitamin with minerals tablet tablet, Take 1 tablet by mouth Daily., Disp: , Rfl:     ondansetron (Zofran) 4 MG tablet, Take 1 tablet by mouth Every 8 (Eight) Hours As Needed for Nausea., Disp: 90 tablet, Rfl: 0    Trelegy Ellipta 100-62.5-25 MCG/ACT inhaler, , Disp: , Rfl:     Fluticasone Furoate-Vilanterol (BREO ELLIPTA) 200-25 MCG/ACT inhaler, Inhale 1 puff Daily. (Patient not taking: Reported on 4/3/2025), Disp: 60 each, Rfl: 3   Past Medical History:   Diagnosis Date    ADHD (attention deficit hyperactivity disorder) 07/30/2024    Just diagnosed    Allergic [id number]    Anemia 1984    Anesthesia complication     HAS TROUBLE GETTING \"NUMB\"     Anxiety     Arthritis     on Celebrex + Robaxin, no steroids    Asthma     does not follow w/ pulmonary    Bell palsy     not sure    Bladder spasms     Cluster headache ?    ?    CTS (carpal tunnel syndrome)     Depression     " "w/ PTSD    Diverticulitis of colon 2021    Not sure about dates    Diverticulosis     Dyspepsia     Endometriosis     undiagnosed, maybe>?    Failure to thrive (child)     Fatigue     Fibroid     GERD (gastroesophageal reflux disease)     controlled w/ nightly Pepcid, EGD 2020 w/ Dr. Chavarria, (+) HH    Gestational hypertension not sure    30 years ago    Headache, tension-type     long hair that was pulled in desk    Health care maintenance 02/25/2020    Hernia 2021    Dr Chavarria    Lactose intolerance Baby    Memory loss     FROM SKULL FRACTURE AND FALL-SHORT TERM MEMORY LOSS    Migraine     Morbid obesity     Multiple gestation 11/22/84_09/29/1986    2 BABIES    Osteoarthritis     Ovarian cyst     ?    Peripheral neuropathy     PMS (premenstrual syndrome)     PONV (postoperative nausea and vomiting) 1986    SWELLING /NAUSEA    PTSD (post-traumatic stress disorder)     Seasonal allergies     Seizures     \"convulsions\" at age 5    Skull fracture 2012    Sleep apnea with use of continuous positive airway pressure (CPAP)     CPAP compliant    Sleep apnea, obstructive 02/2016J    Dr Kirby    SOB (shortness of breath) on exertion     Spinal headache     not sure    Tendonitis     Trauma     Type 2 diabetes mellitus without complication, without long-term current use of insulin     dx 2021, no insulin, A1C <7    Varicella     Vision loss     Wears contact lenses     Wears partial dentures     TOP ONLY     Wears prescription eyeglasses       Past Surgical History:   Procedure Laterality Date    CERVICAL POLYPECTOMY      COLONOSCOPY  2021    HYSTERECTOMY      MYOMECTOMY  5 years     Dr packer/    OOPHORECTOMY      TOTAL LAPAROSCOPIC HYSTERECTOMY N/A 09/18/2017    Procedure: TOTAL LAPAROSCOPIC HYSTERECTOMY, BILATERAL SALPINGO OOPHORECTOMY WITH DAVINCI ROBOT ;  Surgeon: Shannon Grimaldo DO;  Location: [facility];  Service:     UPPER GASTROINTESTINAL ENDOSCOPY      WISDOM TOOTH EXTRACTION        Family History   Problem " Relation Age of Onset    Cancer Mother         Cervical /over 10 years ago    Dementia Mother     Hypertension Mother         high energy/living on meds    Anxiety disorder Mother     Seizures Mother     Miscarriages / Stillbirths Mother         2 miscarriages    Obesity Father     Hypertension Father     Heart attack Father     Sleep apnea Father     Depression Father     Heart disease Father     Hyperlipidemia Father     Early death Father         57    Heart failure Father         M heart attack    Sleep apnea Sister     ADD / ADHD Sister     Hypertension Brother     Sleep apnea Brother     ADD / ADHD Brother     Cancer Maternal Aunt         lymphoma    Cancer Paternal Aunt     Breast cancer Paternal Aunt 70    Cancer Maternal Grandmother     Early death Maternal Grandmother         44    Alcohol abuse Maternal Grandfather     Early death Maternal Grandfather         leg amputated ..shock?    Emphysema Maternal Grandfather         Gr-Grandfather    Obesity Paternal Grandmother     Diabetes Paternal Grandmother     Hypertension Paternal Grandmother     Stroke Paternal Grandmother     Heart attack Paternal Grandmother     Asthma Paternal Grandmother     Vision loss Paternal Grandmother         weak    Osteoporosis Paternal Grandmother         Father's mom had arthritis in her knees?    Arthritis Paternal Grandmother     Hyperlipidemia Paternal Grandmother         heavy    Heart attack Paternal Grandfather     Vision loss Paternal Grandfather         weak    Alcohol abuse Other     Depression Other     ADD / ADHD Brother     Alcohol abuse Brother     Seizures Brother         When he was young, not since he had tonsles removed.    Dementia Sister     Cancer Maternal Aunt         Lymphoma    Alcohol abuse Maternal Aunt     Cancer Paternal Aunt         double mastectomy    Heart disease Paternal Uncle         several heart surgeries    Heart failure Paternal Uncle     Heart disease Paternal Uncle         stroke     "Mental illness Paternal Uncle         unknoown    Heart failure Paternal Uncle     Heart disease Paternal Uncle         pacemaker    Heart failure Paternal Uncle         Pacemaker    Mental illness Brother         unknown    Alcohol abuse Brother     Alcohol abuse Maternal Uncle     Depression Son     Vision loss Son         weak sight    Vision loss Daughter         lazy eye, weak sight    Alcohol abuse Maternal Aunt     Alcohol abuse Maternal Uncle             Depression Maternal Uncle         war vet    Mental illness Maternal Uncle         vet/ptsd ?    Alcohol abuse Maternal Uncle         [place]/purple heart    Cancer Maternal Aunt         stage 4 Breast with spreading to the liver    Depression Son         unknown diagnosis also ADHD    Thyroid disease Paternal Aunt     Vision loss Daughter         lazy eye, weak sight/glasses    Vision loss Son         weak sight/glasses    Cancer Maternal Aunt     Cancer Paternal Aunt     Sleep apnea Brother     Sleep apnea Sister     Sleep apnea Brother     Thyroid disease Paternal Aunt       Social History     Socioeconomic History    Marital status: Single   Tobacco Use    Smoking status: Never     Passive exposure: Never    Smokeless tobacco: Never   Vaping Use    Vaping status: Never Used   Substance and Sexual Activity    Alcohol use: Not Currently     Comment: once or twice a year    Drug use: Never    Sexual activity: Not Currently     Partners: Male     Birth control/protection: Abstinence, Post-menopausal, Hysterectomy, Surgical     Review of Systems   Musculoskeletal:  Positive for back pain and neck pain.   Neurological:  Positive for memory problem.   All other systems reviewed and are negative.      Objective:    /78   Pulse 96   Ht 166.4 cm (65.51\")   Wt 115 kg (254 lb 3.2 oz)   LMP  (LMP Unknown)   SpO2 98%   Breastfeeding No   BMI 41.64 kg/m²     Neurology Exam:    Mental Status   Morbidly obese.  No tenderness to palpation of her scalp " or temporal regions with good temporal artery pulsations present.  Oriented to person, place, and time.   Memory-intact.  MoCA of 28/30 today with a delayed recall of 5/5.  Last visit-MoCA of 25/30 today with a delayed recall of 5/5.  MMSE 29/30 previously.  Delayed recall of 3/3 at this visit.  Attention: normal.   Speech: speech is normal   Level of consciousness: alert  Able to perform simple calculations.   Able to name object. Able to read. Able to repeat. Able to write. Normal comprehension.     Cranial Nerves   Cranial nerves II through XII intact.     Motor Exam   Muscle bulk: normal  Overall muscle tone: normal    Strength   Strength 5/5 throughout.     Sensory Exam   Light touch normal.     Gait, Coordination, and Reflexes     Gait  Gait: normal    Coordination   Finger to nose coordination: normal  Heel to shin coordination: normal    Tremor   Resting tremor: absent    Reflexes   Right brachioradialis: 2+  Left brachioradialis: 2+  Right biceps: 2+  Left biceps: 2+  Right patellar: 2+  Left patellar: 2+    Romberg negative      Assessment and Plan:  1.  Mild cognitive impairment  Her mild cognitive problems could be due to underlying anxiety, depression and sleep apnea as confirmed by neuropsychological evaluation  -Her symptoms have improved since starting Strattera for ADHD  She should continue carrying out both physical and mental exercises such as reading, solving crossword puzzles etc.  I have also told her to start vitamin B12/vitamin D supplements    2.  Stabbing headaches  Which could be due to sleep apnea  Her stabbing headaches have subsided.  I have told her to take indomethacin 25 to 50 mg only as needed    3.  Obstructive sleep apnea  I have counseled her to be compliant with her CPAP  I have also advised her on dietary modifications and to walk regularly to lose weight.  Prior to considering weight loss surgery she should consider GLP-1 agonists if she is unable to lose weight by herself        Return in about 6 months (around 10/3/2025).     I spent 30 minutes with the patient out of which over 25 minutes were spent face to face.     Laura Atkins MD

## 2025-04-03 NOTE — TELEPHONE ENCOUNTER
As patient was checking out of today's appt, she asked to speak with me.  She stated that she is having issues with her supervisor at work.  She has told everyone that she has a mental disorder/problem and doesn't understand instructions.  Sandra stated that she has never been diagnosed with a mental disorder and that her supervisor doesn't explain things well so she asks questions since she is detail oriented and wants to make sure things are done right but when she asks questions the supervisor gets upset and won't tell her anything.  Sandra is asking if there is anything Dr. Atkins would advise or can do for this situation.  I advised of her going to her supervisor's supervisor and speaking with them for resolving the situation.  She says that a coworker came to her and told her that the supervisor is talking bad about her and telling her to keep eye on her as she has mental issues.      She meant to speak with Milly during visit but didn't remember until she went to checkout.

## 2025-04-14 ENCOUNTER — LAB (OUTPATIENT)
Dept: LAB | Facility: HOSPITAL | Age: 65
End: 2025-04-14
Payer: COMMERCIAL

## 2025-04-14 ENCOUNTER — OFFICE VISIT (OUTPATIENT)
Dept: INTERNAL MEDICINE | Facility: CLINIC | Age: 65
End: 2025-04-14
Payer: COMMERCIAL

## 2025-04-14 VITALS
SYSTOLIC BLOOD PRESSURE: 110 MMHG | HEIGHT: 66 IN | BODY MASS INDEX: 40.5 KG/M2 | DIASTOLIC BLOOD PRESSURE: 80 MMHG | HEART RATE: 89 BPM | OXYGEN SATURATION: 96 % | WEIGHT: 252 LBS | RESPIRATION RATE: 16 BRPM

## 2025-04-14 DIAGNOSIS — Z13.29 SCREENING FOR ENDOCRINE, NUTRITIONAL, METABOLIC AND IMMUNITY DISORDER: ICD-10-CM

## 2025-04-14 DIAGNOSIS — Z13.21 SCREENING FOR ENDOCRINE, NUTRITIONAL, METABOLIC AND IMMUNITY DISORDER: ICD-10-CM

## 2025-04-14 DIAGNOSIS — Z12.31 VISIT FOR SCREENING MAMMOGRAM: ICD-10-CM

## 2025-04-14 DIAGNOSIS — Z13.0 SCREENING FOR ENDOCRINE, NUTRITIONAL, METABOLIC AND IMMUNITY DISORDER: ICD-10-CM

## 2025-04-14 DIAGNOSIS — Z13.228 SCREENING FOR ENDOCRINE, NUTRITIONAL, METABOLIC AND IMMUNITY DISORDER: ICD-10-CM

## 2025-04-14 DIAGNOSIS — E55.9 VITAMIN D DEFICIENCY: ICD-10-CM

## 2025-04-14 DIAGNOSIS — K21.9 GASTROESOPHAGEAL REFLUX DISEASE WITHOUT ESOPHAGITIS: ICD-10-CM

## 2025-04-14 DIAGNOSIS — Z87.898 HX OF ABNORMAL MAMMOGRAM: ICD-10-CM

## 2025-04-14 DIAGNOSIS — E11.9 TYPE 2 DIABETES MELLITUS WITHOUT COMPLICATION, WITHOUT LONG-TERM CURRENT USE OF INSULIN: ICD-10-CM

## 2025-04-14 DIAGNOSIS — R53.82 CHRONIC FATIGUE: ICD-10-CM

## 2025-04-14 DIAGNOSIS — Z13.220 SCREENING, LIPID: ICD-10-CM

## 2025-04-14 DIAGNOSIS — E11.9 TYPE 2 DIABETES MELLITUS WITHOUT COMPLICATION, WITHOUT LONG-TERM CURRENT USE OF INSULIN: Primary | ICD-10-CM

## 2025-04-14 LAB
DEPRECATED RDW RBC AUTO: 39.5 FL (ref 37–54)
ERYTHROCYTE [DISTWIDTH] IN BLOOD BY AUTOMATED COUNT: 12.3 % (ref 12.3–15.4)
EXPIRATION DATE: ABNORMAL
HBA1C MFR BLD: 7.3 % (ref 4.5–5.7)
HCT VFR BLD AUTO: 44.1 % (ref 34–46.6)
HGB BLD-MCNC: 14.3 G/DL (ref 12–15.9)
Lab: ABNORMAL
MCH RBC QN AUTO: 28.6 PG (ref 26.6–33)
MCHC RBC AUTO-ENTMCNC: 32.4 G/DL (ref 31.5–35.7)
MCV RBC AUTO: 88.2 FL (ref 79–97)
PLATELET # BLD AUTO: 357 10*3/MM3 (ref 140–450)
PMV BLD AUTO: 10.4 FL (ref 6–12)
RBC # BLD AUTO: 5 10*6/MM3 (ref 3.77–5.28)
WBC NRBC COR # BLD AUTO: 9.1 10*3/MM3 (ref 3.4–10.8)

## 2025-04-14 PROCEDURE — 83540 ASSAY OF IRON: CPT

## 2025-04-14 PROCEDURE — 82746 ASSAY OF FOLIC ACID SERUM: CPT

## 2025-04-14 PROCEDURE — 82043 UR ALBUMIN QUANTITATIVE: CPT

## 2025-04-14 PROCEDURE — 83036 HEMOGLOBIN GLYCOSYLATED A1C: CPT | Performed by: FAMILY MEDICINE

## 2025-04-14 PROCEDURE — 80050 GENERAL HEALTH PANEL: CPT

## 2025-04-14 PROCEDURE — 82607 VITAMIN B-12: CPT

## 2025-04-14 PROCEDURE — 82306 VITAMIN D 25 HYDROXY: CPT

## 2025-04-14 PROCEDURE — 82570 ASSAY OF URINE CREATININE: CPT

## 2025-04-14 PROCEDURE — 99214 OFFICE O/P EST MOD 30 MIN: CPT | Performed by: FAMILY MEDICINE

## 2025-04-14 PROCEDURE — 80061 LIPID PANEL: CPT

## 2025-04-14 PROCEDURE — 84466 ASSAY OF TRANSFERRIN: CPT

## 2025-04-14 PROCEDURE — 82728 ASSAY OF FERRITIN: CPT

## 2025-04-14 RX ORDER — PANTOPRAZOLE SODIUM 20 MG/1
20 TABLET, DELAYED RELEASE ORAL DAILY
Qty: 90 TABLET | Refills: 1 | Status: SHIPPED | OUTPATIENT
Start: 2025-04-14

## 2025-04-14 NOTE — PATIENT INSTRUCTIONS
Diagnosis Discussed   Continue to monitor   Plenty of fluids, monitor diet and exercise  Monitor diet- diet controlled DM    Labs ordered will notify of results   Immunizations up to date   Follow up with gastroenterology   Follow up with cardiology   Take medications as instructed  Start pantoprazole 20mg daily as directed- continue nausea- follow up with Gastroenterology- possible need for endoscopy.   Follow up as directed   If symptoms worsen or persist please seek further evaluation

## 2025-04-14 NOTE — PROGRESS NOTES
Office Note     Name: Sandra Auguste    : 1960     MRN: [id number]     Chief Complaint  Diabetes (Follow up)    Subjective     History of Present Illness:  Sandra Auguste is a 64 y.o. female who presents today for DM follow up-   Stable on current medications-   Last A1c 2024- 6.8  Is currently fasting HgA1c- 7.3  Has some rounds of steroids and antibiotics   Checking BS at home approx 120's-130's   Working on diet and exercise  Difficulty losing weight  Higher stress     Has been nauseated after eating   HIDA scan - working with Gastroenterology  Nausea comes and goes more so with eating     PMH-  Seasonal allergies   Asthma   HPL   HTN   TMJ   DM  Diverticulitis   GERD   Nausea- stress related   Fatty liver   Anemia   LUIS   Depression   PTSD   Osteoarthritis   Migraines   Hx of skull fracture   KELLY- CPAP   Macular degeneration      Meds-  Tylenol as needed   Albuterol as needed   Diclofenac 75mg daily as needed   Diclofenac gel as needed   Dicyclomine 10mg every 6 hours as needed   Jardiance 10mg daily   Epi pen as needed   Breo daily   Hydroxyzine 25mg every 8 hours as needed   Claritin 10mg daily   Multivitamin   Vitamin D supplement   Miralax   Nasal spray as needed      Alcohol - none   Smoking - none   Drug use - none   Job - [employer]- currently looking for a job   Stress - 7/10    Sun Exposure - sunscreen. Protects skin- will follow with dermatology         Dental/Eye exams - [facility] up to date. Dental- will schedule   Diet/Exercise- Diet- Healthy- moderate. - working on more protein- does have a sweet tooth   Exercise- will start working on increasing activity     OBGYN hx of hysterectomy   PAP - will schedule   Mammo - hx of scar tissues   DEXA - will hold   BC/Hormone replacement - none   Vitamin D - multivitamin.       Colonoscopy/Cologuard - completed- follows with Dr. Chavarria - - per patient thinks 5 years- callback not on documents      Immunizations  Tdap- up to date   Flu-  "completed   Shingles- completed   COVID- completed no boosters   PNA- will hold          Review of Systems:   Review of Systems   Constitutional:  Positive for fatigue. Negative for chills and fever.   HENT:  Negative for dental problem, tinnitus, trouble swallowing and voice change.    Respiratory:  Negative for cough, chest tightness, shortness of breath and wheezing.    Cardiovascular:  Negative for chest pain, palpitations and leg swelling.   Gastrointestinal:  Positive for nausea and GERD. Negative for abdominal pain, blood in stool, constipation, diarrhea, vomiting and indigestion.   Genitourinary:  Negative for dysuria.   Musculoskeletal:  Negative for gait problem.   Skin:  Negative for rash.   Neurological:  Negative for light-headedness and headache.   Psychiatric/Behavioral:  Positive for dysphoric mood, depressed mood and stress. Negative for self-injury and suicidal ideas. The patient is nervous/anxious.        Past Medical History:   Past Medical History:   Diagnosis Date    ADHD (attention deficit hyperactivity disorder) 07/30/2024    Just diagnosed    Allergic 1960    Anemia 1984    Anesthesia complication     HAS TROUBLE GETTING \"NUMB\"     Anxiety     Arthritis     on Celebrex + Robaxin, no steroids    Asthma     does not follow w/ pulmonary    Bell palsy     not sure    Bladder spasms     Cluster headache ?    ?    CTS (carpal tunnel syndrome)     Depression     w/ PTSD    Diverticulitis of colon 2021    Not sure about dates    Diverticulosis     Dyspepsia     Endometriosis     undiagnosed, maybe>?    Failure to thrive (child)     Fatigue     Fibroid     GERD (gastroesophageal reflux disease)     controlled w/ nightly Pepcid, EGD 2020 w/ Dr. Chavarria, (+) HH    Gestational hypertension not sure    30 years ago    Headache, tension-type     long hair that was pulled in desk    Health care maintenance 02/25/2020    Hernia 2021    Dr Chavarria    Lactose intolerance Baby    Memory loss     FROM " "SKULL FRACTURE AND FALL-SHORT TERM MEMORY LOSS    Migraine     Morbid obesity     Movement disorder     Multiple gestation 11/22/84_09/29/1986    2 BABIES    Osteoarthritis     Ovarian cyst     ?    Peripheral neuropathy     PMS (premenstrual syndrome)     PONV (postoperative nausea and vomiting) 1986    SWELLING /NAUSEA    PTSD (post-traumatic stress disorder)     Seasonal allergies     Seizures     \"convulsions\" at age 5    Skull fracture 2012    Sleep apnea with use of continuous positive airway pressure (CPAP)     CPAP compliant    Sleep apnea, obstructive 02/2016J    Dr Kirby    SOB (shortness of breath) on exertion     Spinal headache     not sure    Tendonitis     Trauma     Type 2 diabetes mellitus without complication, without long-term current use of insulin     dx 2021, no insulin, A1C <7    Varicella     Vision loss     Wears contact lenses     Wears partial dentures     TOP ONLY     Wears prescription eyeglasses        Past Surgical History:   Past Surgical History:   Procedure Laterality Date    CERVICAL POLYPECTOMY      COLONOSCOPY  2021    HYSTERECTOMY      MYOMECTOMY  5 years     Dr packer/    OOPHORECTOMY      TOTAL LAPAROSCOPIC HYSTERECTOMY N/A 09/18/2017    Procedure: TOTAL LAPAROSCOPIC HYSTERECTOMY, BILATERAL SALPINGO OOPHORECTOMY WITH DAVINCI ROBOT ;  Surgeon: Shannon Grimaldo DO;  [facility];  Service:     UPPER GASTROINTESTINAL ENDOSCOPY      WISDOM TOOTH EXTRACTION         Immunizations:   Immunization History   Administered Date(s) Administered    COVID-19 (PFIZER) Purple Cap Monovalent 03/17/2021, 04/07/2021    Flu Vaccine Quad PF >36MO 09/19/2016, 10/18/2017    Fluzone  >6mos 10/11/2024    Fluzone (or Fluarix & Flulaval for VFC) >6mos 09/24/2021, 10/26/2022, 12/08/2023    Hepatitis B 07/15/2021    Pneumococcal Polysaccharide (PPSV23) 02/24/2020    Shingrix 11/16/2020, 01/23/2021, 01/31/2021    Tdap 07/15/2021    Tetanus 09/06/2012    flucelvax quad pfs =>4 YRS 12/20/2019    "     Medications:     Current Outpatient Medications:     acetaminophen (TYLENOL) 500 MG tablet, Take 1 tablet by mouth Every 6 (Six) Hours As Needed for Mild Pain., Disp: , Rfl:     albuterol sulfate  (90 Base) MCG/ACT inhaler, Inhale 2 puffs Every 6 (Six) Hours As Needed for Wheezing or Shortness of Air., Disp: 18 g, Rfl: 1    atomoxetine (STRATTERA) 60 MG capsule, , Disp: , Rfl:     Blood Glucose Monitoring Suppl (OneTouch Verio) w/Device kit, 1 kit Daily., Disp: 1 kit, Rfl: 0    Cholecalciferol (Vitamin D3) 50 MCG (2000 UT) capsule, Take 1 capsule by mouth Daily., Disp: 90 capsule, Rfl: 1    EPINEPHrine (EPIPEN) 0.3 MG/0.3ML solution auto-injector injection, Inject 0.3 mL into the appropriate muscle as directed by prescriber As Needed (FOR SEVERE ALLERGIC REACTION)., Disp: 1 each, Rfl: 1    Fluticasone Furoate-Vilanterol (BREO ELLIPTA) 200-25 MCG/ACT inhaler, Inhale 1 puff Daily., Disp: 60 each, Rfl: 3    glucose blood (OneTouch Verio) test strip, USE AS INSTRUCTED TO CHECK BLOOD GLUCOSE ONCE DAILY DX:E11.65, Disp: 100 each, Rfl: 3    glucose monitor monitoring kit, Use as directed to check blood glucose once daily., Disp: 1 each, Rfl: 0    ipratropium (ATROVENT) 0.06 % nasal spray, , Disp: , Rfl:     ipratropium-albuterol (DUO-NEB) 0.5-2.5 mg/3 ml nebulizer, Take 3 mL by nebulization Every 4 (Four) Hours As Needed for Wheezing., Disp: 360 mL, Rfl: 1    Lancets (OneTouch Delica Safety Lancing) misc, Use 1 each Daily., Disp: 100 each, Rfl: 3    loratadine (CLARITIN) 10 MG tablet, Take 1 tablet by mouth Daily., Disp: 30 tablet, Rfl: 5    montelukast (SINGULAIR) 10 MG tablet, Take 1 tablet by mouth Every Night., Disp: 90 tablet, Rfl: 1    multivitamin with minerals tablet tablet, Take 1 tablet by mouth Daily., Disp: , Rfl:     ondansetron (Zofran) 4 MG tablet, Take 1 tablet by mouth Every 8 (Eight) Hours As Needed for Nausea., Disp: 90 tablet, Rfl: 0    Trelegy Ellipta 100-62.5-25 MCG/ACT inhaler, , Disp: ,  Rfl:     Zinc Sulfate (ZINC 15 PO), Take  by mouth., Disp: , Rfl:     pantoprazole (Protonix) 20 MG EC tablet, Take 1 tablet by mouth Daily., Disp: 90 tablet, Rfl: 1    Allergies:   Allergies   Allergen Reactions    Mold Extract [Trichophyton] Shortness Of Breath, Anxiety, Palpitations and Irritability    Lortab [Hydrocodone-Acetaminophen] Nausea And Vomiting    Metformin GI Intolerance    Pollen Extract Other (See Comments)     SNEEZING AND CONGESTION        Family History:   Family History   Problem Relation Age of Onset    Cancer Mother         Cervical /over 10 years ago    Dementia Mother     Hypertension Mother         high energy/living on meds    Anxiety disorder Mother     Seizures Mother     Miscarriages / Stillbirths Mother         2 miscarriages    Obesity Father     Hypertension Father     Heart attack Father     Sleep apnea Father     Depression Father     Heart disease Father     Hyperlipidemia Father     Early death Father         57    Heart failure Father         M heart attack    Sleep apnea Sister     ADD / ADHD Sister     Hypertension Brother     Sleep apnea Brother     ADD / ADHD Brother     Cancer Maternal Aunt         lymphoma    Cancer Paternal Aunt         Breast    Breast cancer Paternal Aunt 70    Cancer Maternal Grandmother     Early death Maternal Grandmother         Unknown    Alcohol abuse Maternal Grandfather     Early death Maternal Grandfather         leg amputated ..shock?    Emphysema Maternal Grandfather         Gr-Grandfather    Obesity Paternal Grandmother     Diabetes Paternal Grandmother     Hypertension Paternal Grandmother     Stroke Paternal Grandmother     Heart attack Paternal Grandmother     Asthma Paternal Grandmother     Vision loss Paternal Grandmother         weak    Osteoporosis Paternal Grandmother         Father's mom had arthritis in her knees?    Arthritis Paternal Grandmother     Hyperlipidemia Paternal Grandmother         heavy    Heart attack Paternal  Grandfather     Vision loss Paternal Grandfather         weak    Alcohol abuse Other     Depression Other     ADD / ADHD Brother     Alcohol abuse Brother     Seizures Brother         When he was young, not since he had tonsles removed.    Dementia Sister     Cancer Maternal Aunt         Lymphoma    Alcohol abuse Maternal Aunt     Cancer Paternal Aunt         double mastectomy    Heart disease Paternal Uncle         several heart surgeries    Heart failure Paternal Uncle     Heart disease Paternal Uncle         stroke    Mental illness Paternal Uncle         unknoown    Heart failure Paternal Uncle     Heart disease Paternal Uncle         pacemaker    Heart failure Paternal Uncle         Pacemaker    Mental illness Brother         unknown    Alcohol abuse Brother     Alcohol abuse Maternal Uncle     Depression Son     Vision loss Son         weak sight    Vision loss Daughter         lazy eye, weak sight    Alcohol abuse Maternal Aunt     Alcohol abuse Maternal Uncle             Depression Maternal Uncle         war vet    Mental illness Maternal Uncle         vet/ptsd ?    Alcohol abuse Maternal Uncle         /purple heart    Cancer Maternal Aunt         stage 4 Breast with spreading to the liver    Depression Son         unknown diagnosis also ADHD    Thyroid disease Paternal Aunt     Vision loss Daughter         lazy eye, weak sight/glasses    Vision loss Son         weak sight/glasses    Cancer Maternal Aunt     Cancer Paternal Aunt     Sleep apnea Brother     Sleep apnea Sister     Sleep apnea Brother     Thyroid disease Paternal Aunt        Social History:   Social History     Socioeconomic History    Marital status: Single   Tobacco Use    Smoking status: Never     Passive exposure: Never    Smokeless tobacco: Never   Vaping Use    Vaping status: Never Used   Substance and Sexual Activity    Alcohol use: Not Currently     Comment: once or twice a year    Drug use: Never    Sexual activity: Not  "Currently     Partners: Male     Birth control/protection: Abstinence, Post-menopausal, Hysterectomy, Surgical         Objective     Vital Signs  /80   Pulse 89   Resp 16   Ht 166.4 cm (65.51\")   Wt 114 kg (252 lb)   SpO2 96%   BMI 41.28 kg/m²   Estimated body mass index is 41.28 kg/m² as calculated from the following:    Height as of this encounter: 166.4 cm (65.51\").    Weight as of this encounter: 114 kg (252 lb).            Physical Exam  Vitals and nursing note reviewed.   Constitutional:       General: She is not in acute distress.     Appearance: Normal appearance. She is obese.   HENT:      Head: Normocephalic and atraumatic.   Cardiovascular:      Rate and Rhythm: Normal rate and regular rhythm.      Heart sounds: Normal heart sounds.   Pulmonary:      Effort: Pulmonary effort is normal. No respiratory distress.      Breath sounds: Normal breath sounds.   Abdominal:      General: Bowel sounds are normal.      Palpations: Abdomen is soft.   Musculoskeletal:         General: Normal range of motion.   Skin:     General: Skin is warm and dry.   Neurological:      General: No focal deficit present.      Mental Status: She is alert and oriented to person, place, and time.          Procedures     Assessment and Plan   Diagnosis Discussed   Continue to monitor   Plenty of fluids, monitor diet and exercise  Monitor diet- diet controlled DM    Labs ordered will notify of results   Immunizations up to date   Follow up with gastroenterology   Follow up with cardiology   Take medications as instructed  Start pantoprazole 20mg daily as directed- continue nausea- follow up with Gastroenterology- possible need for endoscopy.   Follow up as directed   If symptoms worsen or persist please seek further evaluation     1. Type 2 diabetes mellitus without complication, without long-term current use of insulin  - POC Glycosylated Hemoglobin (Hb A1C)  - Microalbumin / Creatinine Urine Ratio - Urine, Clean Catch; " Future    2. Screening for endocrine, nutritional, metabolic and immunity disorder  - CBC (No Diff); Future  - Comprehensive Metabolic Panel; Future  - TSH Rfx On Abnormal To Free T4; Future    3. Screening, lipid  - Lipid Panel; Future    4. Visit for screening mammogram  - Mammo diagnostic digital tomosynthesis bilateral w CAD; Future    5. Hx of abnormal mammogram  - Mammo diagnostic digital tomosynthesis bilateral w CAD; Future    6. Vitamin D deficiency  - Vitamin D,25-Hydroxy; Future    7. Chronic fatigue  - Vitamin B12; Future  - Folate; Future  - Iron Profile; Future  - Ferritin; Future    8. Gastroesophageal reflux disease without esophagitis  - pantoprazole (Protonix) 20 MG EC tablet; Take 1 tablet by mouth Daily.  Dispense: 90 tablet; Refill: 1       Follow Up  Return in about 4 months (around 8/14/2025) for Recheck- DM..    Em Baum MD  [facility]  [address] [place] [id number] [phone]

## 2025-04-15 LAB
25(OH)D3 SERPL-MCNC: 30.8 NG/ML (ref 30–100)
ALBUMIN SERPL-MCNC: 4.1 G/DL (ref 3.5–5.2)
ALBUMIN UR-MCNC: <1.2 MG/DL
ALBUMIN/GLOB SERPL: 1.3 G/DL
ALP SERPL-CCNC: 110 U/L (ref 39–117)
ALT SERPL W P-5'-P-CCNC: 31 U/L (ref 1–33)
ANION GAP SERPL CALCULATED.3IONS-SCNC: 12.4 MMOL/L (ref 5–15)
AST SERPL-CCNC: 24 U/L (ref 1–32)
BILIRUB SERPL-MCNC: 0.5 MG/DL (ref 0–1.2)
BUN SERPL-MCNC: 12 MG/DL (ref 8–23)
BUN/CREAT SERPL: 15.4 (ref 7–25)
CALCIUM SPEC-SCNC: 9.9 MG/DL (ref 8.6–10.5)
CHLORIDE SERPL-SCNC: 105 MMOL/L (ref 98–107)
CHOLEST SERPL-MCNC: 196 MG/DL (ref 0–200)
CO2 SERPL-SCNC: 23.6 MMOL/L (ref 22–29)
CREAT SERPL-MCNC: 0.78 MG/DL (ref 0.57–1)
CREAT UR-MCNC: 182.1 MG/DL
EGFRCR SERPLBLD CKD-EPI 2021: 86 ML/MIN/1.73
FERRITIN SERPL-MCNC: 173 NG/ML (ref 13–150)
FOLATE SERPL-MCNC: 15.5 NG/ML (ref 4.78–24.2)
GLOBULIN UR ELPH-MCNC: 3.1 GM/DL
GLUCOSE SERPL-MCNC: 119 MG/DL (ref 65–99)
HDLC SERPL-MCNC: 44 MG/DL (ref 40–60)
IRON 24H UR-MRATE: 94 MCG/DL (ref 37–145)
IRON SATN MFR SERPL: 30 % (ref 20–50)
LDLC SERPL CALC-MCNC: 128 MG/DL (ref 0–100)
LDLC/HDLC SERPL: 2.85 {RATIO}
MICROALBUMIN/CREAT UR: NORMAL MG/G{CREAT}
POTASSIUM SERPL-SCNC: 5 MMOL/L (ref 3.5–5.2)
PROT SERPL-MCNC: 7.2 G/DL (ref 6–8.5)
SODIUM SERPL-SCNC: 141 MMOL/L (ref 136–145)
TIBC SERPL-MCNC: 311 MCG/DL (ref 298–536)
TRANSFERRIN SERPL-MCNC: 209 MG/DL (ref 200–360)
TRIGL SERPL-MCNC: 134 MG/DL (ref 0–150)
TSH SERPL DL<=0.05 MIU/L-ACNC: 1.02 UIU/ML (ref 0.27–4.2)
VIT B12 BLD-MCNC: 767 PG/ML (ref 211–946)
VLDLC SERPL-MCNC: 24 MG/DL (ref 5–40)

## 2025-05-16 ENCOUNTER — TELEPHONE (OUTPATIENT)
Dept: INTERNAL MEDICINE | Facility: CLINIC | Age: 65
End: 2025-05-16

## 2025-05-16 ENCOUNTER — TELEMEDICINE (OUTPATIENT)
Dept: INTERNAL MEDICINE | Facility: CLINIC | Age: 65
End: 2025-05-16
Payer: COMMERCIAL

## 2025-05-16 DIAGNOSIS — R19.7 DIARRHEA, UNSPECIFIED TYPE: ICD-10-CM

## 2025-05-16 DIAGNOSIS — R11.0 NAUSEA: ICD-10-CM

## 2025-05-16 DIAGNOSIS — J06.9 UPPER RESPIRATORY TRACT INFECTION, UNSPECIFIED TYPE: Primary | ICD-10-CM

## 2025-05-16 DIAGNOSIS — J02.9 SORE THROAT: ICD-10-CM

## 2025-05-16 PROCEDURE — 99214 OFFICE O/P EST MOD 30 MIN: CPT | Performed by: FAMILY MEDICINE

## 2025-05-16 RX ORDER — AMOXICILLIN 875 MG/1
875 TABLET, COATED ORAL 2 TIMES DAILY
Qty: 14 TABLET | Refills: 0 | Status: SHIPPED | OUTPATIENT
Start: 2025-05-16 | End: 2025-05-23

## 2025-05-16 RX ORDER — ONDANSETRON 8 MG/1
8 TABLET, FILM COATED ORAL EVERY 8 HOURS PRN
Qty: 30 TABLET | Refills: 0 | Status: SHIPPED | OUTPATIENT
Start: 2025-05-16

## 2025-05-16 NOTE — TELEPHONE ENCOUNTER
Caller: Sandra Auguste    Relationship: Self    Best call back number: [phone]    What form or medical record are you requesting: WORK EXCUSE    Who is requesting this form or medical record from you: WORK    How would you like to receive the form or medical records (pick-up, mail, fax): [place]    Timeframe paperwork needed: ASAP    Additional notes: FOR TODAY'S VISIT 05/16/25

## 2025-05-16 NOTE — PATIENT INSTRUCTIONS
Diagnosis Discussed   Continue to monitor   Plenty of fluids  Tylenol as needed for fevers   Please complete full course of antibiotics   Nausea medication as needed   If symptoms worsen or persist please seek further evaluation

## 2025-05-16 NOTE — PROGRESS NOTES
"    Office Note     Name: Sandra Auguste    : 1960     MRN: [id number]     Chief Complaint  Nausea (Video visit /)    Subjective     History of Present Illness:  Sandra Auguste is a 64 y.o. female who presents today for video visit- concerns for nausea and GI symptoms   X 2-3 days   Lots of people out of work with Flu B     This morning sore throat and abdominal discomfort   Sore throat is constant   Diarrhea  Fatigued and sick feeling in stomach   Denies fevers,   Some chills, difficulty getting warm, fatigue   Nausea- tried to drink coffee this AM- unable to do it today   And bloated   Denies blood or mucous in stool   Started with runny nose- thick and clear     OTC- tylenol and chloraseptic spray     Review of Systems:   Review of Systems   Constitutional:  Positive for chills and fatigue. Negative for fever.   HENT:  Positive for congestion and postnasal drip. Negative for trouble swallowing and voice change.    Respiratory:  Positive for cough. Negative for chest tightness, shortness of breath and wheezing.    Gastrointestinal:  Positive for abdominal pain, diarrhea and nausea. Negative for blood in stool, constipation and vomiting.   Genitourinary:  Negative for dysuria.   Neurological:  Positive for headache. Negative for light-headedness.       Past Medical History:   Past Medical History:   Diagnosis Date    ADHD (attention deficit hyperactivity disorder) 2024    Just diagnosed    Allergic [id number]    Anemia     Anesthesia complication     HAS TROUBLE GETTING \"NUMB\"     Anxiety     Arthritis     on Celebrex + Robaxin, no steroids    Asthma     does not follow w/ pulmonary    Bell palsy     not sure    Bladder spasms     Cluster headache ?    ?    CTS (carpal tunnel syndrome)     Depression     w/ PTSD    Diverticulitis of colon     Not sure about dates    Diverticulosis     Dyspepsia     Endometriosis     undiagnosed, maybe>?    Failure to thrive (child)     Fatigue     Fibroid     " "GERD (gastroesophageal reflux disease)     controlled w/ nightly Pepcid, EGD 2020 w/ Dr. Chavarria, (+) HH    Gestational hypertension not sure    30 years ago    Headache, tension-type     long hair that was pulled in desk    Health care maintenance 02/25/2020    Hernia 2021    Dr Chavarria    Lactose intolerance Baby    Memory loss     FROM SKULL FRACTURE AND FALL-SHORT TERM MEMORY LOSS    Migraine     Morbid obesity     Movement disorder     Multiple gestation 11/22/84_09/29/1986    2 BABIES    Osteoarthritis     Ovarian cyst     ?    Peripheral neuropathy     PMS (premenstrual syndrome)     PONV (postoperative nausea and vomiting) 1986    SWELLING /NAUSEA    PTSD (post-traumatic stress disorder)     Seasonal allergies     Seizures     \"convulsions\" at age 5    Skull fracture 2012    Sleep apnea with use of continuous positive airway pressure (CPAP)     CPAP compliant    Sleep apnea, obstructive 02/2016J    Dr Kirby    SOB (shortness of breath) on exertion     Spinal headache     not sure    Tendonitis     Trauma     Type 2 diabetes mellitus without complication, without long-term current use of insulin     dx 2021, no insulin, A1C <7    Varicella     Vision loss     Wears contact lenses     Wears partial dentures     TOP ONLY     Wears prescription eyeglasses        Past Surgical History:   Past Surgical History:   Procedure Laterality Date    CERVICAL POLYPECTOMY      COLONOSCOPY  2021    HYSTERECTOMY      MYOMECTOMY  5 years     Dr packer/    OOPHORECTOMY      TOTAL LAPAROSCOPIC HYSTERECTOMY N/A 09/18/2017    Procedure: TOTAL LAPAROSCOPIC HYSTERECTOMY, BILATERAL SALPINGO OOPHORECTOMY WITH DAVINCI ROBOT ;  Surgeon: Shannon Grimaldo DO;  Location: [facility];  Service:     UPPER GASTROINTESTINAL ENDOSCOPY      WISDOM TOOTH EXTRACTION         Immunizations:   Immunization History   Administered Date(s) Administered    COVID-19 (PFIZER) Purple Cap Monovalent 03/17/2021, 04/07/2021    Flu Vaccine Quad PF >36MO " 09/19/2016, 10/18/2017    Fluzone  >6mos 10/11/2024    Fluzone (or Fluarix & Flulaval for VFC) >6mos 09/24/2021, 10/26/2022, 12/08/2023    Hepatitis B 07/15/2021    Pneumococcal Polysaccharide (PPSV23) 02/24/2020    Shingrix 11/16/2020, 01/23/2021, 01/31/2021    Tdap 07/15/2021    Tetanus 09/06/2012    flucelvax quad pfs =>4 YRS 12/20/2019        Medications:     Current Outpatient Medications:     acetaminophen (TYLENOL) 500 MG tablet, Take 1 tablet by mouth Every 6 (Six) Hours As Needed for Mild Pain., Disp: , Rfl:     albuterol sulfate  (90 Base) MCG/ACT inhaler, Inhale 2 puffs Every 6 (Six) Hours As Needed for Wheezing or Shortness of Air., Disp: 18 g, Rfl: 1    amoxicillin (AMOXIL) 875 MG tablet, Take 1 tablet by mouth 2 (Two) Times a Day for 7 days. For infection, Disp: 14 tablet, Rfl: 0    atomoxetine (STRATTERA) 60 MG capsule, , Disp: , Rfl:     Blood Glucose Monitoring Suppl (OneTouch Verio) w/Device kit, 1 kit Daily., Disp: 1 kit, Rfl: 0    Cholecalciferol (Vitamin D3) 50 MCG (2000 UT) capsule, Take 1 capsule by mouth Daily., Disp: 90 capsule, Rfl: 1    EPINEPHrine (EPIPEN) 0.3 MG/0.3ML solution auto-injector injection, Inject 0.3 mL into the appropriate muscle as directed by prescriber As Needed (FOR SEVERE ALLERGIC REACTION)., Disp: 1 each, Rfl: 1    Fluticasone Furoate-Vilanterol (BREO ELLIPTA) 200-25 MCG/ACT inhaler, Inhale 1 puff Daily., Disp: 60 each, Rfl: 3    glucose blood (OneTouch Verio) test strip, USE AS INSTRUCTED TO CHECK BLOOD GLUCOSE ONCE DAILY DX:E11.65, Disp: 100 each, Rfl: 3    glucose monitor monitoring kit, Use as directed to check blood glucose once daily., Disp: 1 each, Rfl: 0    ipratropium (ATROVENT) 0.06 % nasal spray, , Disp: , Rfl:     ipratropium-albuterol (DUO-NEB) 0.5-2.5 mg/3 ml nebulizer, Take 3 mL by nebulization Every 4 (Four) Hours As Needed for Wheezing., Disp: 360 mL, Rfl: 1    Lancets (OneTouch Delica Safety Lancing) misc, Use 1 each Daily., Disp: 100 each,  Rfl: 3    loratadine (CLARITIN) 10 MG tablet, Take 1 tablet by mouth Daily., Disp: 30 tablet, Rfl: 5    montelukast (SINGULAIR) 10 MG tablet, Take 1 tablet by mouth Every Night., Disp: 90 tablet, Rfl: 1    multivitamin with minerals tablet tablet, Take 1 tablet by mouth Daily., Disp: , Rfl:     ondansetron (ZOFRAN) 8 MG tablet, Take 1 tablet by mouth Every 8 (Eight) Hours As Needed for Nausea or Vomiting., Disp: 30 tablet, Rfl: 0    pantoprazole (Protonix) 20 MG EC tablet, Take 1 tablet by mouth Daily., Disp: 90 tablet, Rfl: 1    Trelegy Ellipta 100-62.5-25 MCG/ACT inhaler, , Disp: , Rfl:     Zinc Sulfate (ZINC 15 PO), Take  by mouth., Disp: , Rfl:     Allergies:   Allergies   Allergen Reactions    Mold Extract [Trichophyton] Shortness Of Breath, Anxiety, Palpitations and Irritability    Lortab [Hydrocodone-Acetaminophen] Nausea And Vomiting    Metformin GI Intolerance    Pollen Extract Other (See Comments)     SNEEZING AND CONGESTION        Family History:   Family History   Problem Relation Age of Onset    Cancer Mother         Cervical /over 10 years ago    Dementia Mother     Hypertension Mother         high energy/living on meds    Anxiety disorder Mother     Seizures Mother     Miscarriages / Stillbirths Mother         2 miscarriages    Obesity Father     Hypertension Father     Heart attack Father     Sleep apnea Father     Depression Father     Heart disease Father     Hyperlipidemia Father     Early death Father         57    Heart failure Father         M heart attack    Sleep apnea Sister     ADD / ADHD Sister     Hypertension Brother     Sleep apnea Brother     ADD / ADHD Brother     Cancer Maternal Aunt         lymphoma    Cancer Paternal Aunt         Breast    Breast cancer Paternal Aunt 70    Cancer Maternal Grandmother     Early death Maternal Grandmother         Unknown    Alcohol abuse Maternal Grandfather     Early death Maternal Grandfather         leg amputated ..shock?    Emphysema Maternal  Grandfather         Gr-Grandfather    Obesity Paternal Grandmother     Diabetes Paternal Grandmother     Hypertension Paternal Grandmother     Stroke Paternal Grandmother     Heart attack Paternal Grandmother     Asthma Paternal Grandmother     Vision loss Paternal Grandmother         weak    Osteoporosis Paternal Grandmother         Father's mom had arthritis in her knees?    Arthritis Paternal Grandmother     Hyperlipidemia Paternal Grandmother         heavy    Heart attack Paternal Grandfather     Vision loss Paternal Grandfather         weak    Alcohol abuse Other     Depression Other     ADD / ADHD Brother     Alcohol abuse Brother     Seizures Brother         When he was young, not since he had tonsles removed.    Dementia Sister     Cancer Maternal Aunt         Lymphoma    Alcohol abuse Maternal Aunt     Cancer Paternal Aunt         double mastectomy    Heart disease Paternal Uncle         several heart surgeries    Heart failure Paternal Uncle     Heart disease Paternal Uncle         stroke    Mental illness Paternal Uncle         unknoown    Heart failure Paternal Uncle     Heart disease Paternal Uncle         pacemaker    Heart failure Paternal Uncle         Pacemaker    Mental illness Brother         unknown    Alcohol abuse Brother     Alcohol abuse Maternal Uncle     Depression Son     Vision loss Son         weak sight    Vision loss Daughter         lazy eye, weak sight    Alcohol abuse Maternal Aunt     Alcohol abuse Maternal Uncle             Depression Maternal Uncle         war vet    Mental illness Maternal Uncle         vet/ptsd ?    Alcohol abuse Maternal Uncle         /purple heart    Cancer Maternal Aunt         stage 4 Breast with spreading to the liver    Depression Son         unknown diagnosis also ADHD    Thyroid disease Paternal Aunt     Vision loss Daughter         lazy eye, weak sight/glasses    Vision loss Son         weak sight/glasses    Cancer Maternal Aunt      "Cancer Paternal Aunt     Sleep apnea Brother     Sleep apnea Sister     Sleep apnea Brother     Thyroid disease Paternal Aunt        Social History:   Social History     Socioeconomic History    Marital status: Single   Tobacco Use    Smoking status: Never     Passive exposure: Never    Smokeless tobacco: Never   Vaping Use    Vaping status: Never Used   Substance and Sexual Activity    Alcohol use: Not Currently     Comment: once or twice a year    Drug use: Never    Sexual activity: Not Currently     Partners: Male     Birth control/protection: Abstinence, Post-menopausal, Hysterectomy, Surgical         Objective     Vital Signs  There were no vitals taken for this visit.  Estimated body mass index is 41.28 kg/m² as calculated from the following:    Height as of 4/14/25: 166.4 cm (65.51\").    Weight as of 4/14/25: 114 kg (252 lb).            Physical Exam  Vitals reviewed: video visit.   Constitutional:       Appearance: Normal appearance.   Neurological:      Mental Status: She is alert.          Procedures     Assessment and Plan   Diagnosis Discussed   Continue to monitor   Plenty of fluids  Tylenol as needed for fevers   Please complete full course of antibiotics   If symptoms worsening or persisting will need office visit for testing.   Nausea medication as needed   If symptoms worsen or persist please seek further evaluation     1. Sore throat  - amoxicillin (AMOXIL) 875 MG tablet; Take 1 tablet by mouth 2 (Two) Times a Day for 7 days. For infection  Dispense: 14 tablet; Refill: 0    2. Diarrhea, unspecified type  Plenty of fluids, advance diet as tolerated     3. Nausea  - ondansetron (ZOFRAN) 8 MG tablet; Take 1 tablet by mouth Every 8 (Eight) Hours As Needed for Nausea or Vomiting.  Dispense: 30 tablet; Refill: 0    4. Upper respiratory tract infection, unspecified type  - amoxicillin (AMOXIL) 875 MG tablet; Take 1 tablet by mouth 2 (Two) Times a Day for 7 days. For infection  Dispense: 14 tablet; Refill: " 0       Follow Up  Return for Next scheduled follow up.    MD COURTNEY [facility]en[facility] INTERNAL MEDICINE  [facility] [facility] [address] [phone]

## 2025-05-16 NOTE — LETTER
May 16, 2025     Patient: Sandra Auguste   YOB: 1960   Date of Visit: 5/16/2025       To Whom It May Concern:    It is my medical opinion that Sandra Auguste may return to work 5/17/25.         Sincerely,        Em Baum MD    CC: No Recipients

## 2025-06-03 ENCOUNTER — OFFICE VISIT (OUTPATIENT)
Dept: INTERNAL MEDICINE | Facility: CLINIC | Age: 65
End: 2025-06-03
Payer: OTHER MISCELLANEOUS

## 2025-06-03 ENCOUNTER — APPOINTMENT (OUTPATIENT)
Dept: CT IMAGING | Facility: HOSPITAL | Age: 65
End: 2025-06-03
Payer: OTHER MISCELLANEOUS

## 2025-06-03 ENCOUNTER — HOSPITAL ENCOUNTER (EMERGENCY)
Facility: HOSPITAL | Age: 65
Discharge: HOME | End: 2025-06-03
Attending: EMERGENCY MEDICINE | Admitting: EMERGENCY MEDICINE
Payer: OTHER MISCELLANEOUS

## 2025-06-03 VITALS
TEMPERATURE: 98.4 F | HEART RATE: 92 BPM | RESPIRATION RATE: 18 BRPM | WEIGHT: 250 LBS | HEIGHT: 66 IN | OXYGEN SATURATION: 98 % | DIASTOLIC BLOOD PRESSURE: 86 MMHG | SYSTOLIC BLOOD PRESSURE: 140 MMHG | BODY MASS INDEX: 40.18 KG/M2

## 2025-06-03 VITALS
WEIGHT: 254.2 LBS | DIASTOLIC BLOOD PRESSURE: 84 MMHG | HEART RATE: 93 BPM | HEIGHT: 66 IN | OXYGEN SATURATION: 98 % | TEMPERATURE: 97.9 F | SYSTOLIC BLOOD PRESSURE: 124 MMHG | BODY MASS INDEX: 40.85 KG/M2

## 2025-06-03 DIAGNOSIS — G44.319 ACUTE POST-TRAUMATIC HEADACHE, NOT INTRACTABLE: ICD-10-CM

## 2025-06-03 DIAGNOSIS — Z91.81 HISTORY OF RECENT FALL: ICD-10-CM

## 2025-06-03 DIAGNOSIS — R42 DIZZINESS: ICD-10-CM

## 2025-06-03 DIAGNOSIS — Z09 HOSPITAL DISCHARGE FOLLOW-UP: Primary | ICD-10-CM

## 2025-06-03 DIAGNOSIS — R11.0 NAUSEA: ICD-10-CM

## 2025-06-03 DIAGNOSIS — F07.81 POST CONCUSSIVE SYNDROME: Primary | ICD-10-CM

## 2025-06-03 DIAGNOSIS — H53.8 BLURRED VISION: ICD-10-CM

## 2025-06-03 PROCEDURE — 99213 OFFICE O/P EST LOW 20 MIN: CPT | Performed by: NURSE PRACTITIONER

## 2025-06-03 PROCEDURE — 99284 EMERGENCY DEPT VISIT MOD MDM: CPT

## 2025-06-03 PROCEDURE — 70450 CT HEAD/BRAIN W/O DYE: CPT

## 2025-06-03 RX ORDER — ONDANSETRON 4 MG/1
4 TABLET, ORALLY DISINTEGRATING ORAL EVERY 8 HOURS PRN
Qty: 12 TABLET | Refills: 0 | Status: SHIPPED | OUTPATIENT
Start: 2025-06-03

## 2025-06-03 NOTE — ED PROVIDER NOTES
Subjective   History of Present Illness  64-year-old female presents emergency department today with a head injury.  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  The child was sitting in front of her with his feet up on the table when he abruptly pushed hard she fell back and struck her head on the ground.  She had no loss of consciousness but was dazed.  She is continue to have nausea headache, mild photophobia.  She had no blood from ears or nose.  She was actually seen in another emergency department yesterday they did x-rays of her neck and of her shoulder which were negative but apparently her CT scan was down.  She contacted her primary care doctor today they were unable to see her were going to schedule her for an outpatient CT scan and she decided to come onto the emergency department.    History provided by:  Patient   used: No    Head Injury  Location:  Occipital  Time since incident:  1 day  Mechanism of injury: fall    Fall:     Fall occurred: Fell backwards off a stool striking her head on a hard ground.    Impact surface:  Hard floor    Point of impact:  Head    Entrapped after fall: no    Pain details:     Quality:  Aching and dull    Severity:  Moderate    Timing:  Constant    Progression:  Unchanged  Chronicity:  New  Relieved by:  Nothing  Worsened by:  Nothing  Ineffective treatments:  None tried  Associated symptoms: nausea    Associated symptoms: no blurred vision, no disorientation, no double vision, no focal weakness, no hearing loss, no numbness, no tinnitus and no vomiting    Risk factors: no alcohol use, no aspirin use, no obesity and no occupational exposure        Review of Systems   HENT:  Negative for hearing loss and tinnitus.    Eyes:  Negative for blurred vision and double vision.   Respiratory:  Negative for chest tightness, shortness  "of breath and wheezing.    Cardiovascular:  Negative for chest pain and palpitations.   Gastrointestinal:  Positive for nausea. Negative for vomiting.   Neurological:  Negative for focal weakness and numbness.       Past Medical History:   Diagnosis Date    ADHD (attention deficit hyperactivity disorder) 07/30/2024    Just diagnosed    Allergic [id number]    Anemia 1984    Anesthesia complication     HAS TROUBLE GETTING \"NUMB\"     Anxiety     Arthritis     on Celebrex + Robaxin, no steroids    Asthma     does not follow w/ pulmonary    Bell palsy     not sure    Bladder spasms     Cluster headache ?    ?    CTS (carpal tunnel syndrome)     Depression     w/ PTSD    Diverticulitis of colon 2021    Not sure about dates    Diverticulosis     Dyspepsia     Endometriosis     undiagnosed, maybe>?    Failure to thrive (child)     Fatigue     Fibroid     GERD (gastroesophageal reflux disease)     controlled w/ nightly Pepcid, EGD 2020 w/ Dr. Chavarria, (+) HH    Gestational hypertension not sure    30 years ago    Headache, tension-type     long hair that was pulled in desk    Health care maintenance 02/25/2020    Hernia 2021    Dr Chavarria    Lactose intolerance Baby    Memory loss     FROM SKULL FRACTURE AND FALL-SHORT TERM MEMORY LOSS    Migraine     Morbid obesity     Movement disorder     Multiple gestation 11/22/84_09/29/1986    2 BABIES    Osteoarthritis     Ovarian cyst     ?    Peripheral neuropathy     PMS (premenstrual syndrome)     PONV (postoperative nausea and vomiting) 1986    SWELLING /NAUSEA    PTSD (post-traumatic stress disorder)     Seasonal allergies     Seizures     \"convulsions\" at age 5    Skull fracture 2012    Sleep apnea with use of continuous positive airway pressure (CPAP)     CPAP compliant    Sleep apnea, obstructive 02/2016J    Dr Kirby    SOB (shortness of breath) on exertion     Spinal headache     not sure    Tendonitis     Trauma     Type 2 diabetes mellitus without complication, without " long-term current use of insulin     dx 2021, no insulin, A1C <7    Varicella     Vision loss     Wears contact lenses     Wears partial dentures     TOP ONLY     Wears prescription eyeglasses        Allergies   Allergen Reactions    Mold Extract [Trichophyton] Shortness Of Breath, Anxiety, Palpitations and Irritability    Lortab [Hydrocodone-Acetaminophen] Nausea And Vomiting    Metformin GI Intolerance    Pollen Extract Other (See Comments)     SNEEZING AND CONGESTION        Past Surgical History:   Procedure Laterality Date    CERVICAL POLYPECTOMY      COLONOSCOPY  2021    HYSTERECTOMY      MYOMECTOMY  5 years      obgyaparna/    OOPHORECTOMY      TOTAL LAPAROSCOPIC HYSTERECTOMY N/A 09/18/2017    Procedure: TOTAL LAPAROSCOPIC HYSTERECTOMY, BILATERAL SALPINGO OOPHORECTOMY WITH DAVINCI ROBOT ;  Surgeon: Shannon Grimaldo DO;  Location: Counts include 234 beds at the [facility];  Service:     UPPER GASTROINTESTINAL ENDOSCOPY      WISDOM TOOTH EXTRACTION         Family History   Problem Relation Age of Onset    Cancer Mother         Cervical /over 10 years ago    Dementia Mother     Hypertension Mother         high energy/living on meds    Anxiety disorder Mother     Seizures Mother     Miscarriages / Stillbirths Mother         2 miscarriages    Obesity Father     Hypertension Father     Heart attack Father     Sleep apnea Father     Depression Father     Heart disease Father     Hyperlipidemia Father     Early death Father         57    Heart failure Father         M heart attack    Sleep apnea Sister     ADD / ADHD Sister     Hypertension Brother     Sleep apnea Brother     ADD / ADHD Brother     Cancer Maternal Aunt         lymphoma    Cancer Paternal Aunt         Breast    Breast cancer Paternal Aunt 70    Cancer Maternal Grandmother     Early death Maternal Grandmother         Unknown    Alcohol abuse Maternal Grandfather     Early death Maternal Grandfather         leg amputated ..shock?    Emphysema Maternal Grandfather         Gr-Grandfather     Obesity Paternal Grandmother     Diabetes Paternal Grandmother     Hypertension Paternal Grandmother     Stroke Paternal Grandmother     Heart attack Paternal Grandmother     Asthma Paternal Grandmother     Vision loss Paternal Grandmother         weak    Osteoporosis Paternal Grandmother         Father's mom had arthritis in her knees?    Arthritis Paternal Grandmother     Hyperlipidemia Paternal Grandmother         heavy    Heart attack Paternal Grandfather     Vision loss Paternal Grandfather         weak    Alcohol abuse Other     Depression Other     ADD / ADHD Brother     Alcohol abuse Brother     Seizures Brother         When he was young, not since he had tonsles removed.    Dementia Sister     Cancer Maternal Aunt         Lymphoma    Alcohol abuse Maternal Aunt     Cancer Paternal Aunt         double mastectomy    Heart disease Paternal Uncle         several heart surgeries    Heart failure Paternal Uncle     Heart disease Paternal Uncle         stroke    Mental illness Paternal Uncle         unknoown    Heart failure Paternal Uncle     Heart disease Paternal Uncle         pacemaker    Heart failure Paternal Uncle         Pacemaker    Mental illness Brother         unknown    Alcohol abuse Brother     Alcohol abuse Maternal Uncle     Depression Son     Vision loss Son         weak sight    Vision loss Daughter         lazy eye, weak sight    Alcohol abuse Maternal Aunt     Alcohol abuse Maternal Uncle         [place]    Depression Maternal Uncle         war vet    Mental illness Maternal Uncle         vet/ptsd ?    Alcohol abuse Maternal Uncle         [place]/purple heart    Cancer Maternal Aunt         stage 4 Breast with spreading to the liver    Depression Son         unknown diagnosis also ADHD    Thyroid disease Paternal Aunt     Vision loss Daughter         lazy eye, weak sight/glasses    Vision loss Son         weak sight/glasses    Cancer Maternal Aunt     Cancer Paternal Aunt     Sleep apnea  Brother     Sleep apnea Sister     Sleep apnea Brother     Thyroid disease Paternal Aunt        Social History     Socioeconomic History    Marital status: Single   Tobacco Use    Smoking status: Never     Passive exposure: Never    Smokeless tobacco: Never   Vaping Use    Vaping status: Never Used   Substance and Sexual Activity    Alcohol use: Not Currently     Comment: once or twice a year    Drug use: Never    Sexual activity: Not Currently     Partners: Male     Birth control/protection: Abstinence, Post-menopausal, Hysterectomy, Surgical           Objective   Physical Exam  Vitals and nursing note reviewed.   Constitutional:       General: She is not in acute distress.     Appearance: She is well-developed. She is not diaphoretic.   HENT:      Head: Normocephalic and atraumatic.      Right Ear: Tympanic membrane and external ear normal.      Left Ear: Tympanic membrane and external ear normal.      Nose: Nose normal.      Mouth/Throat:      Mouth: Mucous membranes are moist.   Eyes:      General: No scleral icterus.     Conjunctiva/sclera: Conjunctivae normal.      Pupils: Pupils are equal, round, and reactive to light.   Cardiovascular:      Rate and Rhythm: Normal rate and regular rhythm.      Heart sounds: Normal heart sounds. No murmur heard.  Pulmonary:      Effort: Pulmonary effort is normal. No respiratory distress.      Breath sounds: Normal breath sounds.   Abdominal:      General: Bowel sounds are normal.      Palpations: Abdomen is soft.      Tenderness: There is no abdominal tenderness.   Musculoskeletal:         General: Normal range of motion.      Cervical back: Normal range of motion and neck supple.   Skin:     General: Skin is warm and dry.   Neurological:      Mental Status: She is alert and oriented to person, place, and time.   Psychiatric:         Behavior: Behavior normal.         Procedures           ED Course  ED Course as of 06/03/25 1718   Tue Jun 03, 2025 1718 CT of the head no  "acute findings. [HELIO]      ED Course User Index  [HELIO] Ivan Keita PA                                           No results found for this or any previous visit (from the past 24 hours).  Note: In addition to lab results from this visit, the labs listed above may include labs taken at another facility or during a different encounter within the last 24 hours. Please correlate lab times with ED admission and discharge times for further clarification of the services performed during this visit.    CT Head Without Contrast   Final Result   Impression:   No acute intracranial findings.            Electronically Signed: Raulito Richardson MD     6/3/2025 4:57 PM EDT     Workstation ID: [id number]        Vitals:    06/03/25 1543   BP: 140/86   BP Location: Left arm   Patient Position: Sitting   Pulse: 92   Resp: 18   Temp: 98.4 °F (36.9 °C)   TempSrc: Oral   SpO2: 98%   Weight: 113 kg (250 lb)   Height: 167.6 cm (66\")     Medications - No data to display  ECG/EMG Results (last 24 hours)       ** No results found for the last 24 hours. **          No orders to display                   Medical Decision Making  Problems Addressed:  Post concussive syndrome: complicated acute illness or injury    Amount and/or Complexity of Data Reviewed  Radiology: ordered.    Risk  Prescription drug management.        Final diagnoses:   Post concussive syndrome       ED Disposition  ED Disposition       ED Disposition   Discharge    Condition   Stable    Comment   --               [facility]  [address] [facility] [id number] [phone]    tomorrow         Medication List        New Prescriptions      diclofenac 50 MG EC tablet  Commonly known as: VOLTAREN  Take 1 tablet by mouth 3 (Three) Times a Day.     ondansetron ODT 4 MG disintegrating tablet  Commonly known as: ZOFRAN-ODT  Place 1 tablet on the tongue Every 8 (Eight) Hours As Needed for Nausea.            Stop      ondansetron 8 MG " tablet  Commonly known as: ZOFRAN               Where to Get Your Medications        These medications were sent to [facility] [id number] - [place] LAZCANO - [address] - [phone]  - [phone]   [address] [place] [address]      Phone: [phone]   diclofenac 50 MG EC tablet  ondansetron ODT 4 MG disintegrating tablet            Ivan Keita PA  06/03/25 2126

## 2025-06-03 NOTE — PROGRESS NOTES
"    Office Note     Name: Sandra Auguste    : 1960     MRN: [id number]     Chief Complaint  Hospital Follow Up Visit    Subjective     History of Present Illness:  Sandra Auguste is a 64 y.o. female who presents today for a hospital discharge follow-up.  Patient reports going to [facility] in [place] for evaluation after a fall at work.  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  She reports the child over the years has gotten bigger and he was sitting in the chair with her.  He somehow kicked his legs out and they both fell backwards resulting in her hitting her head.  She is unsure if she hit the table or not but feels certain that her head hit the concrete.  She was experiencing dizziness and shakiness after the event.  She reports her supervisor did witness the event.  She has since been experiencing a headache as well as nausea.  Tried to make it through her entire shift that day but ended up leaving early.  She reports having a shoulder x-ray done at the ED.  Also noted a dark purple area under her right eye.  She was told at the ED that she probably had a mild concussion.  She was unable to get a head CT at that time as their CT machine was not working.  She denies any known loss of consciousness.  She has noted some blurry vision and nausea when trying to read.  She also experienced a sharp pain to the right side of the top of her head that seem to last for several minutes since the fall.  She has been using Tylenol and Zofran at home to manage symptoms.  She presents today for further evaluation of the above complaints.      Past Medical History:   Diagnosis Date    ADHD (attention deficit hyperactivity disorder) 2024    Just diagnosed    Allergic [id number]    Anemia 1984    Anesthesia complication     HAS TROUBLE GETTING \"NUMB\"     Anxiety     Arthritis     on Celebrex + Robaxin, no " "steroids    Asthma     does not follow w/ pulmonary    Bell palsy     not sure    Bladder spasms     Cluster headache ?    ?    CTS (carpal tunnel syndrome)     Depression     w/ PTSD    Diverticulitis of colon 2021    Not sure about dates    Diverticulosis     Dyspepsia     Endometriosis     undiagnosed, maybe>?    Failure to thrive (child)     Fatigue     Fibroid     GERD (gastroesophageal reflux disease)     controlled w/ nightly Pepcid, EGD 2020 w/ Dr. Chavarria, (+) HH    Gestational hypertension not sure    30 years ago    Headache, tension-type     long hair that was pulled in desk    Health care maintenance 02/25/2020    Hernia 2021    Dr Chavarria    Lactose intolerance Baby    Memory loss     FROM SKULL FRACTURE AND FALL-SHORT TERM MEMORY LOSS    Migraine     Morbid obesity     Movement disorder     Multiple gestation 11/22/84_09/29/1986    2 BABIES    Osteoarthritis     Ovarian cyst     ?    Peripheral neuropathy     PMS (premenstrual syndrome)     PONV (postoperative nausea and vomiting) 1986    SWELLING /NAUSEA    PTSD (post-traumatic stress disorder)     Seasonal allergies     Seizures     \"convulsions\" at age 5    Skull fracture 2012    Sleep apnea with use of continuous positive airway pressure (CPAP)     CPAP compliant    Sleep apnea, obstructive 02/2016J    Dr Kirby    SOB (shortness of breath) on exertion     Spinal headache     not sure    Tendonitis     Trauma     Type 2 diabetes mellitus without complication, without long-term current use of insulin     dx 2021, no insulin, A1C <7    Varicella     Vision loss     Wears contact lenses     Wears partial dentures     TOP ONLY     Wears prescription eyeglasses        Past Surgical History:   Procedure Laterality Date    CERVICAL POLYPECTOMY      COLONOSCOPY  2021    HYSTERECTOMY      MYOMECTOMY  5 years     Dr packer/    OOPHORECTOMY      TOTAL LAPAROSCOPIC HYSTERECTOMY N/A 09/18/2017    Procedure: TOTAL LAPAROSCOPIC HYSTERECTOMY, BILATERAL SALPINGO " OOPHORECTOMY WITH DAVINCI ROBOT ;  Surgeon: Shannon Grimaldo DO;  Location: [facility] OR;  Service:     UPPER GASTROINTESTINAL ENDOSCOPY      WISDOM TOOTH EXTRACTION         Social History     Socioeconomic History    Marital status: Single   Tobacco Use    Smoking status: Never     Passive exposure: Never    Smokeless tobacco: Never   Vaping Use    Vaping status: Never Used   Substance and Sexual Activity    Alcohol use: Not Currently     Comment: once or twice a year    Drug use: Never    Sexual activity: Not Currently     Partners: Male     Birth control/protection: Abstinence, Post-menopausal, Hysterectomy, Surgical         Current Outpatient Medications:     acetaminophen (TYLENOL) 500 MG tablet, Take 1 tablet by mouth Every 6 (Six) Hours As Needed for Mild Pain., Disp: , Rfl:     albuterol sulfate  (90 Base) MCG/ACT inhaler, Inhale 2 puffs Every 6 (Six) Hours As Needed for Wheezing or Shortness of Air., Disp: 18 g, Rfl: 1    atomoxetine (STRATTERA) 60 MG capsule, , Disp: , Rfl:     Blood Glucose Monitoring Suppl (OneTouch Verio) w/Device kit, 1 kit Daily., Disp: 1 kit, Rfl: 0    Cholecalciferol (Vitamin D3) 50 MCG (2000 UT) capsule, Take 1 capsule by mouth Daily., Disp: 90 capsule, Rfl: 1    EPINEPHrine (EPIPEN) 0.3 MG/0.3ML solution auto-injector injection, Inject 0.3 mL into the appropriate muscle as directed by prescriber As Needed (FOR SEVERE ALLERGIC REACTION)., Disp: 1 each, Rfl: 1    Fluticasone Furoate-Vilanterol (BREO ELLIPTA) 200-25 MCG/ACT inhaler, Inhale 1 puff Daily., Disp: 60 each, Rfl: 3    glucose blood (OneTouch Verio) test strip, USE AS INSTRUCTED TO CHECK BLOOD GLUCOSE ONCE DAILY DX:E11.65, Disp: 100 each, Rfl: 3    glucose monitor monitoring kit, Use as directed to check blood glucose once daily., Disp: 1 each, Rfl: 0    ipratropium (ATROVENT) 0.06 % nasal spray, , Disp: , Rfl:     ipratropium-albuterol (DUO-NEB) 0.5-2.5 mg/3 ml nebulizer, Take 3 mL by nebulization Every 4 (Four) Hours As  "Needed for Wheezing., Disp: 360 mL, Rfl: 1    Lancets (OneTouch Delica Safety Lancing) misc, Use 1 each Daily., Disp: 100 each, Rfl: 3    loratadine (CLARITIN) 10 MG tablet, Take 1 tablet by mouth Daily., Disp: 30 tablet, Rfl: 5    montelukast (SINGULAIR) 10 MG tablet, Take 1 tablet by mouth Every Night., Disp: 90 tablet, Rfl: 1    multivitamin with minerals tablet tablet, Take 1 tablet by mouth Daily., Disp: , Rfl:     pantoprazole (Protonix) 20 MG EC tablet, Take 1 tablet by mouth Daily., Disp: 90 tablet, Rfl: 1    Trelegy Ellipta 100-62.5-25 MCG/ACT inhaler, , Disp: , Rfl:     Zinc Sulfate (ZINC 15 PO), Take  by mouth., Disp: , Rfl:     diclofenac (VOLTAREN) 50 MG EC tablet, Take 1 tablet by mouth 3 (Three) Times a Day., Disp: 15 tablet, Rfl: 0    ondansetron ODT (ZOFRAN-ODT) 4 MG disintegrating tablet, Place 1 tablet on the tongue Every 8 (Eight) Hours As Needed for Nausea., Disp: 12 tablet, Rfl: 0    Objective     Vital Signs  /84 (BP Location: Left arm, Patient Position: Sitting, Cuff Size: Adult)   Pulse 93   Temp 97.9 °F (36.6 °C)   Ht 166.4 cm (65.5\")   Wt 115 kg (254 lb 3.2 oz)   SpO2 98%   BMI 41.66 kg/m²   Estimated body mass index is 41.66 kg/m² as calculated from the following:    Height as of this encounter: 166.4 cm (65.5\").    Weight as of this encounter: 115 kg (254 lb 3.2 oz).           Physical Exam  Constitutional:       General: She is not in acute distress.     Appearance: Normal appearance. She is not ill-appearing.   HENT:      Head: Normocephalic and atraumatic.      Nose: Nose normal.   Eyes:      Extraocular Movements: Extraocular movements intact.      Conjunctiva/sclera: Conjunctivae normal.      Pupils: Pupils are equal, round, and reactive to light.   Cardiovascular:      Rate and Rhythm: Normal rate.   Pulmonary:      Effort: Pulmonary effort is normal. No respiratory distress.   Musculoskeletal:         General: Normal range of motion.      Cervical back: Neck supple. "   Skin:     General: Skin is warm and dry.   Neurological:      General: No focal deficit present.      Mental Status: She is alert and oriented to person, place, and time. Mental status is at baseline.   Psychiatric:         Mood and Affect: Mood normal.         Behavior: Behavior normal.         Thought Content: Thought content normal.         Judgment: Judgment normal.          Assessment and Plan     Diagnoses and all orders for this visit:    1. Hospital discharge follow-up (Primary)    2. History of recent fall  -     CT Head Without Contrast; Future    3. Acute post-traumatic headache, not intractable  -     CT Head Without Contrast; Future    4. Nausea  -     CT Head Without Contrast; Future    5. Dizziness  -     CT Head Without Contrast; Future    6. Blurred vision  -     CT Head Without Contrast; Future        Follow Up  Return if symptoms worsen or fail to improve, for Next scheduled follow up.    Patient or patient representative verbalized consent for the use of Ambient Listening during the visit with  REBECA Muñoz for chart documentation. 6/9/2025  13:28 EDT      REBECA Muñoz    Part of this note may be an electronic transcription/translation of spoken language to printed text using the Dragon Dictation System.

## 2025-06-05 ENCOUNTER — TELEPHONE (OUTPATIENT)
Dept: INTERNAL MEDICINE | Facility: CLINIC | Age: 65
End: 2025-06-05
Payer: COMMERCIAL

## 2025-06-05 ENCOUNTER — OFFICE VISIT (OUTPATIENT)
Dept: INTERNAL MEDICINE | Facility: CLINIC | Age: 65
End: 2025-06-05
Payer: COMMERCIAL

## 2025-06-05 VITALS
HEIGHT: 66 IN | WEIGHT: 250 LBS | OXYGEN SATURATION: 96 % | SYSTOLIC BLOOD PRESSURE: 132 MMHG | RESPIRATION RATE: 16 BRPM | BODY MASS INDEX: 40.18 KG/M2 | DIASTOLIC BLOOD PRESSURE: 80 MMHG | HEART RATE: 90 BPM

## 2025-06-05 DIAGNOSIS — R45.86 EMOTIONAL LABILITY: ICD-10-CM

## 2025-06-05 DIAGNOSIS — M25.511 ACUTE PAIN OF RIGHT SHOULDER: ICD-10-CM

## 2025-06-05 DIAGNOSIS — R11.0 NAUSEA: ICD-10-CM

## 2025-06-05 DIAGNOSIS — G44.319 ACUTE POST-TRAUMATIC HEADACHE, NOT INTRACTABLE: ICD-10-CM

## 2025-06-05 DIAGNOSIS — M54.2 NECK PAIN: ICD-10-CM

## 2025-06-05 DIAGNOSIS — F07.81 POST CONCUSSIVE SYNDROME: Primary | ICD-10-CM

## 2025-06-05 PROCEDURE — 99214 OFFICE O/P EST MOD 30 MIN: CPT | Performed by: FAMILY MEDICINE

## 2025-06-05 NOTE — TELEPHONE ENCOUNTER
"Patient calling asking to be worked in today by Dr Baum. She said she was told if she ever needs to be seen, she just needs to call and she can be worked in.    I offered an appointment tomorrow, even though it is a \"same day\" appointment slot, but she asked to be worked in today.     Patient was seen at an er in [place] on June 2, but left AMA when they requested to transfer her to [facility] in [facility] because their CT machine was down.   Patient then came in on 06/03 and saw Linda Gasca. Linda did order a STAT CT but when she was advised because this is a [employer] Workers Comp claim, it may take a few days to receive approval and get scheduled.      She then went to [facility] ER on 06/03 where they did perform a CT of the head with no acute findings.     She is still having pain and she said since Dr Baum knows her and her history, she needs in today to see her to be treated as she has not received good care in the last 2 days.     Please call patient back at [phone] to schedule with Dr Baum  "

## 2025-06-05 NOTE — LETTER
June 5, 2025     Patient: Sandra Auguste   YOB: 1960   Date of Visit: 6/5/2025       To Whom It May Concern:    It is my medical opinion that Sandra Auguste  will need to be off work for a least one week or until further evaluation due to post concussive syndrome. MRI has been ordered for further evaluation. Will hold on return to work date until further work up and symptoms improved.          Sincerely,        Em Baum MD    CC: No Recipients

## 2025-06-05 NOTE — TELEPHONE ENCOUNTER
Called pt, let know we are full today and can get her in tomorrow. Pt stated that tomorrow is fine, still having pain and headaches from the fall, upset of recent care.

## 2025-06-05 NOTE — PATIENT INSTRUCTIONS
Diagnosis Discussed   Continue to monitor   Plenty of fluids  Post concussion syndrome- avoid loud noises, bright lights, limit screens   Avoid stressful activity and situations   MRI with without contrast ordered for further evaluation   Right shoulder pain- referral to PT   Nausea medication and diclofenac as prescribed as needed   If symptoms worsen or persist please seek further evaluation   If symptoms worsening and not tolerable please report to ER for immediate further evaluation

## 2025-06-05 NOTE — PROGRESS NOTES
Office Note     Name: Sandra Auguste    : 1960     MRN: [id number]     Chief Complaint  Headache (Pt states still having headaches and pain since fall. Pt states fall was on 25 and hit head, concussion, nausea, dizziness) and Pain    Subjective     History of Present Illness:  Sandra Auguste is a 64 y.o. 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   Light sensitivity and noise sensitivity   Reading making her nausea   Difficulty word finding     2025- knot on back of head, nausea, headaches- coming and going- [facility]   Denies LOC- evan she almost blacked out     6/3/2025-   CT scan   Findings:  There is no evidence of acute territorial infarction.  There is no acute intracranial hemorrhage. There are no extra-axial collections.  Ventricles and CSF spaces are symmetric. No mass effect nor hydrocephalus.  Brain parenchyma appears normal for age.  Paranasal sinuses and mastoid air cells are adequately aerated.  Osseous structures and orbits appear intact.     IMPRESSION:  Impression:  No acute intracranial findings.    Per patient now with knot on head that wasn't present at time of evaluation in ER     Work comp claim- MRI   Emotional liability   Laughing, crying, word finding difficulty    In ER was given diclofenac and zofran   Still with symptoms   Work requesting a work status slip     Review of Systems:   Review of Systems   Constitutional:  Positive for fatigue. Negative for chills and fever.   HENT:  Negative for trouble swallowing and voice change.    Eyes:  Positive for photophobia and visual disturbance. Negative for pain.   Respiratory:  Negative for cough, chest tightness, shortness of breath and wheezing.    Cardiovascular:  Negative for chest pain, palpitations and leg swelling.  "  Gastrointestinal:  Positive for nausea. Negative for abdominal pain, constipation, diarrhea and vomiting.   Genitourinary:  Negative for dysuria.   Musculoskeletal:  Positive for myalgias. Negative for gait problem.   Skin:  Negative for rash.   Neurological:  Positive for dizziness, weakness, light-headedness and headache. Negative for seizures, syncope, speech difficulty and numbness.   Psychiatric/Behavioral:  Positive for decreased concentration, dysphoric mood and sleep disturbance. Negative for self-injury and suicidal ideas. The patient is nervous/anxious.        Past Medical History:   Past Medical History:   Diagnosis Date    ADHD (attention deficit hyperactivity disorder) 07/30/2024    Just diagnosed    Allergic [id number]    Anemia 1984    Anesthesia complication     HAS TROUBLE GETTING \"NUMB\"     Anxiety     Arthritis     on Celebrex + Robaxin, no steroids    Asthma     does not follow w/ pulmonary    Bell palsy     not sure    Bladder spasms     Cluster headache ?    ?    CTS (carpal tunnel syndrome)     Depression     w/ PTSD    Diverticulitis of colon 2021    Not sure about dates    Diverticulosis     Dyspepsia     Endometriosis     undiagnosed, maybe>?    Failure to thrive (child)     Fatigue     Fibroid     GERD (gastroesophageal reflux disease)     controlled w/ nightly Pepcid, EGD 2020 w/ Dr. Chavarria, (+) HH    Gestational hypertension not sure    30 years ago    Headache, tension-type     long hair that was pulled in desk    Health care maintenance 02/25/2020    Hernia 2021    Dr Chavarria    Lactose intolerance Baby    Memory loss     FROM SKULL FRACTURE AND FALL-SHORT TERM MEMORY LOSS    Migraine     Morbid obesity     Movement disorder     Multiple gestation 11/22/84_09/29/1986    2 BABIES    Osteoarthritis     Ovarian cyst     ?    Peripheral neuropathy     PMS (premenstrual syndrome)     PONV (postoperative nausea and vomiting) 1986    SWELLING /NAUSEA    PTSD (post-traumatic stress " "disorder)     Seasonal allergies     Seizures     \"convulsions\" at age 5    Skull fracture 2012    Sleep apnea with use of continuous positive airway pressure (CPAP)     CPAP compliant    Sleep apnea, obstructive 02/2016J    Dr Kirby    SOB (shortness of breath) on exertion     Spinal headache     not sure    Tendonitis     Trauma     Type 2 diabetes mellitus without complication, without long-term current use of insulin     dx 2021, no insulin, A1C <7    Varicella     Vision loss     Wears contact lenses     Wears partial dentures     TOP ONLY     Wears prescription eyeglasses        Past Surgical History:   Past Surgical History:   Procedure Laterality Date    CERVICAL POLYPECTOMY      COLONOSCOPY  2021    HYSTERECTOMY      MYOMECTOMY  5 years      obgyn/    OOPHORECTOMY      TOTAL LAPAROSCOPIC HYSTERECTOMY N/A 09/18/2017    Procedure: TOTAL LAPAROSCOPIC HYSTERECTOMY, BILATERAL SALPINGO OOPHORECTOMY WITH DAVINCI ROBOT ;  Surgeon: Shannon Grimaldo DO;  Location: [facility];  Service:     UPPER GASTROINTESTINAL ENDOSCOPY      WISDOM TOOTH EXTRACTION         Immunizations:   Immunization History   Administered Date(s) Administered    COVID-19 (PFIZER) Purple Cap Monovalent 03/17/2021, 04/07/2021    Flu Vaccine Quad PF >36MO 09/19/2016, 10/18/2017    Fluzone  >6mos 10/11/2024    Fluzone (or Fluarix & Flulaval for VFC) >6mos 09/24/2021, 10/26/2022, 12/08/2023    Hepatitis B 07/15/2021    Pneumococcal Polysaccharide (PPSV23) 02/24/2020    Shingrix 11/16/2020, 01/23/2021, 01/31/2021    Tdap 07/15/2021    Tetanus 09/06/2012    flucelvax quad pfs =>4 YRS 12/20/2019        Medications:     Current Outpatient Medications:     acetaminophen (TYLENOL) 500 MG tablet, Take 1 tablet by mouth Every 6 (Six) Hours As Needed for Mild Pain., Disp: , Rfl:     albuterol sulfate  (90 Base) MCG/ACT inhaler, Inhale 2 puffs Every 6 (Six) Hours As Needed for Wheezing or Shortness of Air., Disp: 18 g, Rfl: 1    atomoxetine (STRATTERA) " 60 MG capsule, , Disp: , Rfl:     Blood Glucose Monitoring Suppl (OneTouch Verio) w/Device kit, 1 kit Daily., Disp: 1 kit, Rfl: 0    Cholecalciferol (Vitamin D3) 50 MCG (2000 UT) capsule, Take 1 capsule by mouth Daily., Disp: 90 capsule, Rfl: 1    diclofenac (VOLTAREN) 50 MG EC tablet, Take 1 tablet by mouth 3 (Three) Times a Day., Disp: 15 tablet, Rfl: 0    EPINEPHrine (EPIPEN) 0.3 MG/0.3ML solution auto-injector injection, Inject 0.3 mL into the appropriate muscle as directed by prescriber As Needed (FOR SEVERE ALLERGIC REACTION)., Disp: 1 each, Rfl: 1    Fluticasone Furoate-Vilanterol (BREO ELLIPTA) 200-25 MCG/ACT inhaler, Inhale 1 puff Daily., Disp: 60 each, Rfl: 3    glucose blood (OneTouch Verio) test strip, USE AS INSTRUCTED TO CHECK BLOOD GLUCOSE ONCE DAILY DX:E11.65, Disp: 100 each, Rfl: 3    glucose monitor monitoring kit, Use as directed to check blood glucose once daily., Disp: 1 each, Rfl: 0    ipratropium (ATROVENT) 0.06 % nasal spray, , Disp: , Rfl:     ipratropium-albuterol (DUO-NEB) 0.5-2.5 mg/3 ml nebulizer, Take 3 mL by nebulization Every 4 (Four) Hours As Needed for Wheezing., Disp: 360 mL, Rfl: 1    Lancets (OneTouch Delica Safety Lancing) misc, Use 1 each Daily., Disp: 100 each, Rfl: 3    loratadine (CLARITIN) 10 MG tablet, Take 1 tablet by mouth Daily., Disp: 30 tablet, Rfl: 5    montelukast (SINGULAIR) 10 MG tablet, Take 1 tablet by mouth Every Night., Disp: 90 tablet, Rfl: 1    multivitamin with minerals tablet tablet, Take 1 tablet by mouth Daily., Disp: , Rfl:     ondansetron ODT (ZOFRAN-ODT) 4 MG disintegrating tablet, Place 1 tablet on the tongue Every 8 (Eight) Hours As Needed for Nausea., Disp: 12 tablet, Rfl: 0    pantoprazole (Protonix) 20 MG EC tablet, Take 1 tablet by mouth Daily., Disp: 90 tablet, Rfl: 1    Trelegy Ellipta 100-62.5-25 MCG/ACT inhaler, , Disp: , Rfl:     Zinc Sulfate (ZINC 15 PO), Take  by mouth., Disp: , Rfl:     Allergies:   Allergies   Allergen Reactions    Mold  Extract [Trichophyton] Shortness Of Breath, Anxiety, Palpitations and Irritability    Lortab [Hydrocodone-Acetaminophen] Nausea And Vomiting    Metformin GI Intolerance    Pollen Extract Other (See Comments)     SNEEZING AND CONGESTION        Family History:   Family History   Problem Relation Age of Onset    Cancer Mother         Cervical /over 10 years ago    Dementia Mother     Hypertension Mother         high energy/living on meds    Anxiety disorder Mother     Seizures Mother     Miscarriages / Stillbirths Mother         2 miscarriages    Obesity Father     Hypertension Father     Heart attack Father     Sleep apnea Father     Depression Father     Heart disease Father     Hyperlipidemia Father     Early death Father         57    Heart failure Father         M heart attack    Sleep apnea Sister     ADD / ADHD Sister     Hypertension Brother     Sleep apnea Brother     ADD / ADHD Brother     Cancer Maternal Aunt         lymphoma    Cancer Paternal Aunt         Breast    Breast cancer Paternal Aunt 70    Cancer Maternal Grandmother     Early death Maternal Grandmother         Unknown    Alcohol abuse Maternal Grandfather     Early death Maternal Grandfather         leg amputated ..shock?    Emphysema Maternal Grandfather         Gr-Grandfather    Obesity Paternal Grandmother     Diabetes Paternal Grandmother     Hypertension Paternal Grandmother     Stroke Paternal Grandmother     Heart attack Paternal Grandmother     Asthma Paternal Grandmother     Vision loss Paternal Grandmother         weak    Osteoporosis Paternal Grandmother         Father's mom had arthritis in her knees?    Arthritis Paternal Grandmother     Hyperlipidemia Paternal Grandmother         heavy    Heart attack Paternal Grandfather     Vision loss Paternal Grandfather         weak    Alcohol abuse Other     Depression Other     ADD / ADHD Brother     Alcohol abuse Brother     Seizures Brother         When he was young, not since he had  "tonsles removed.    Dementia Sister     Cancer Maternal Aunt         Lymphoma    Alcohol abuse Maternal Aunt     Cancer Paternal Aunt         double mastectomy    Heart disease Paternal Uncle         several heart surgeries    Heart failure Paternal Uncle     Heart disease Paternal Uncle         stroke    Mental illness Paternal Uncle         unknoown    Heart failure Paternal Uncle     Heart disease Paternal Uncle         pacemaker    Heart failure Paternal Uncle         Pacemaker    Mental illness Brother         unknown    Alcohol abuse Brother     Alcohol abuse Maternal Uncle     Depression Son     Vision loss Son         weak sight    Vision loss Daughter         lazy eye, weak sight    Alcohol abuse Maternal Aunt     Alcohol abuse Maternal Uncle             Depression Maternal Uncle         war vet    Mental illness Maternal Uncle         vet/ptsd ?    Alcohol abuse Maternal Uncle         /purple heart    Cancer Maternal Aunt         stage 4 Breast with spreading to the liver    Depression Son         unknown diagnosis also ADHD    Thyroid disease Paternal Aunt     Vision loss Daughter         lazy eye, weak sight/glasses    Vision loss Son         weak sight/glasses    Cancer Maternal Aunt     Cancer Paternal Aunt     Sleep apnea Brother     Sleep apnea Sister     Sleep apnea Brother     Thyroid disease Paternal Aunt        Social History:   Social History     Socioeconomic History    Marital status: Single   Tobacco Use    Smoking status: Never     Passive exposure: Never    Smokeless tobacco: Never   Vaping Use    Vaping status: Never Used   Substance and Sexual Activity    Alcohol use: Not Currently     Comment: once or twice a year    Drug use: Never    Sexual activity: Not Currently     Partners: Male     Birth control/protection: Abstinence, Post-menopausal, Hysterectomy, Surgical         Objective     Vital Signs  /80   Pulse 90   Resp 16   Ht 167.6 cm (66\")   Wt 113 kg (250 lb) " "  SpO2 96%   BMI 40.35 kg/m²   Estimated body mass index is 40.35 kg/m² as calculated from the following:    Height as of this encounter: 167.6 cm (66\").    Weight as of this encounter: 113 kg (250 lb).            Physical Exam  Vitals and nursing note reviewed.   Constitutional:       General: She is not in acute distress.     Appearance: Normal appearance. She is obese.   HENT:      Head: Normocephalic and atraumatic.      Comments: Mild tenderness to palpation over lower right side of scalp/head- no bruising.      Right Ear: Tympanic membrane normal.      Left Ear: Tympanic membrane normal.      Nose: Nose normal.      Mouth/Throat:      Mouth: Mucous membranes are moist.   Eyes:      Extraocular Movements: Extraocular movements intact.      Conjunctiva/sclera: Conjunctivae normal.      Pupils: Pupils are equal, round, and reactive to light.   Cardiovascular:      Rate and Rhythm: Normal rate and regular rhythm.      Heart sounds: Normal heart sounds.   Pulmonary:      Effort: Pulmonary effort is normal. No respiratory distress.      Breath sounds: Normal breath sounds.   Musculoskeletal:         General: Normal range of motion.   Skin:     General: Skin is warm and dry.   Neurological:      General: No focal deficit present.      Mental Status: She is alert and oriented to person, place, and time.   Psychiatric:         Attention and Perception: Attention normal.         Mood and Affect: Mood is anxious and depressed. Affect is labile.         Speech: Speech normal.         Behavior: Behavior is slowed.         Thought Content: Thought content normal.         Cognition and Memory: Cognition normal.         Judgment: Judgment normal.          Procedures     Assessment and Plan   Diagnosis Discussed   Continue to monitor   Plenty of fluids  Post concussion syndrome- avoid loud noises, bright lights, limit screens   Avoid stressful activity and situations   MRI with without contrast ordered for further evaluation "   Right shoulder pain- referral to PT   Nausea medication and diclofenac as prescribed as needed   If symptoms worsen or persist please seek further evaluation   If symptoms worsening and not tolerable please report to ER for immediate further evaluation     1. Post concussive syndrome  - MRI Brain With & Without Contrast; Future    2. Acute post-traumatic headache, not intractable  - MRI Brain With & Without Contrast; Future    3. Nausea  - MRI Brain With & Without Contrast; Future    4. Emotional lability  - MRI Brain With & Without Contrast; Future    5. Acute pain of right shoulder  - Ambulatory Referral to Physical Therapy for Evaluation & Treatment    6. Neck pain  - Ambulatory Referral to Physical Therapy for Evaluation & Treatment       Follow Up  Return in about 1 week (around 6/12/2025), or if symptoms worsen or fail to improve, for Recheck- post concussion .    Em Baum MD  [facility] INTERNAL [facility]IC[facility]  [address] [place] [id number] [phone]

## 2025-06-10 ENCOUNTER — TELEPHONE (OUTPATIENT)
Dept: INTERNAL MEDICINE | Facility: CLINIC | Age: 65
End: 2025-06-10
Payer: COMMERCIAL

## 2025-06-10 NOTE — TELEPHONE ENCOUNTER
PATIENT HAS CALLED REQUESTING A CALL BACK WITH [facility] THAT IS TO BE SCHEDULED.    CALL BACK NUMBER [phone]

## 2025-06-11 ENCOUNTER — TELEPHONE (OUTPATIENT)
Dept: INTERNAL MEDICINE | Facility: CLINIC | Age: 65
End: 2025-06-11
Payer: COMMERCIAL

## 2025-06-11 NOTE — TELEPHONE ENCOUNTER
Provider: DR NAVARRO    Caller: Sandra Auguste    Relationship to Patient: Self    Phone Number:[phone]     Reason for Call: PATIENT IS REQUESTING TO HAVE HER MRI MOVED TO [facility] FAX NUMBER [phone].

## 2025-06-12 ENCOUNTER — OFFICE VISIT (OUTPATIENT)
Dept: INTERNAL MEDICINE | Facility: CLINIC | Age: 65
End: 2025-06-12
Payer: OTHER MISCELLANEOUS

## 2025-06-12 VITALS
WEIGHT: 250 LBS | HEIGHT: 66 IN | OXYGEN SATURATION: 98 % | SYSTOLIC BLOOD PRESSURE: 130 MMHG | DIASTOLIC BLOOD PRESSURE: 82 MMHG | RESPIRATION RATE: 16 BRPM | HEART RATE: 82 BPM | BODY MASS INDEX: 40.18 KG/M2

## 2025-06-12 DIAGNOSIS — R11.0 NAUSEA: ICD-10-CM

## 2025-06-12 DIAGNOSIS — G43.909 MIGRAINE WITHOUT STATUS MIGRAINOSUS, NOT INTRACTABLE, UNSPECIFIED MIGRAINE TYPE: ICD-10-CM

## 2025-06-12 DIAGNOSIS — H53.9 VISION CHANGES: ICD-10-CM

## 2025-06-12 DIAGNOSIS — F07.81 POST CONCUSSIVE SYNDROME: Primary | ICD-10-CM

## 2025-06-12 RX ORDER — PROMETHAZINE HYDROCHLORIDE 12.5 MG/1
12.5 TABLET ORAL NIGHTLY PRN
Qty: 20 TABLET | Refills: 0 | Status: SHIPPED | OUTPATIENT
Start: 2025-06-12

## 2025-06-12 RX ORDER — ACETAMINOPHEN 500 MG
1000 TABLET ORAL EVERY 6 HOURS PRN
Qty: 100 TABLET | Refills: 1 | Status: SHIPPED | OUTPATIENT
Start: 2025-06-12 | End: 2025-06-25

## 2025-06-12 NOTE — PATIENT INSTRUCTIONS
Diagnosis Discussed   Continue to monitor   Plenty of fluids  Continue to avoid bright lights and loud noises   Continue to rest as directed   MRI Brain ordered for evaluation- waiting on approval   Take medications as directed   Extra Strength Tylenol for headaches   Follow up with Eye specialist as directed   Nausea medication as needed   If symptoms worsen or persist please seek further evaluation   If worsening symptoms- ER for immediate further evaluation

## 2025-06-12 NOTE — PROGRESS NOTES
Office Note     Name: Sandra Auguste    : 1960     MRN: [id number]     Chief Complaint  Concussion (Follow up)    Subjective     History of Present Illness:  Sandra Auguste is a 64 y.o. female who presents today for concussion follow up   MRI Brain with and without contrast as not completed- needs approval     Has been worried and stressed  A few days was getting nausea and headache- did a lot more that day. Was Hot that day   Thinks may have over did it that day   Needed to take zofran multiple times that day   Anxious     Has been sleepy and lethargic, exhausted.   Almost worse than before   Extreme sensitivity to light and heat     Will need symptoms to continue to improve and MRI results before clearance to work     Some constipation thought to be due to zofran   Will trial promethazine     Should pain has been improving   Will trial extra strength tylenol for headache   Trouble with reading- eye pain       Review of Systems:   Review of Systems   Constitutional:  Positive for fatigue. Negative for chills and fever.   Eyes:  Positive for blurred vision and visual disturbance.   Respiratory:  Negative for cough, chest tightness, shortness of breath and wheezing.    Cardiovascular:  Negative for chest pain, palpitations and leg swelling.   Gastrointestinal:  Positive for nausea. Negative for abdominal pain, constipation, diarrhea and vomiting.   Genitourinary:  Negative for dysuria.   Musculoskeletal:  Positive for myalgias. Negative for gait problem.   Neurological:  Positive for weakness, light-headedness and headache.   Psychiatric/Behavioral:  Positive for decreased concentration and stress. Negative for self-injury and suicidal ideas. The patient is nervous/anxious.        Past Medical History:   Past Medical History:   Diagnosis Date    ADHD (attention deficit hyperactivity disorder) 2024    Just diagnosed    Allergic [id number]    Anemia     Anesthesia complication     HAS TROUBLE  "GETTING \"NUMB\"     Anxiety     Arthritis     on Celebrex + Robaxin, no steroids    Asthma     does not follow w/ pulmonary    Bell palsy     not sure    Bladder spasms     Cluster headache ?    ?    CTS (carpal tunnel syndrome)     Depression     w/ PTSD    Diverticulitis of colon 2021    Not sure about dates    Diverticulosis     Dyspepsia     Endometriosis     undiagnosed, maybe>?    Failure to thrive (child)     Fatigue     Fibroid     GERD (gastroesophageal reflux disease)     controlled w/ nightly Pepcid, EGD 2020 w/ Dr. Chavarria, (+) HH    Gestational hypertension not sure    30 years ago    Headache, tension-type     long hair that was pulled in [employer]    Health care maintenance 02/25/2020    Hernia 2021    Dr Chavarria    Lactose intolerance Baby    Memory loss     FROM SKULL FRACTURE AND FALL-SHORT TERM MEMORY LOSS    Migraine     Morbid obesity     Movement disorder     Multiple gestation 11/22/84_09/29/1986    2 BABIES    Osteoarthritis     Ovarian cyst     ?    Peripheral neuropathy     PMS (premenstrual syndrome)     PONV (postoperative nausea and vomiting) 1986    SWELLING /NAUSEA    PTSD (post-traumatic stress disorder)     Seasonal allergies     Seizures     \"convulsions\" at age 5    Skull fracture 2012    Sleep apnea with use of continuous positive airway pressure (CPAP)     CPAP compliant    Sleep apnea, obstructive 02/2016J    Dr Kirby    SOB (shortness of breath) on exertion     Spinal headache     not sure    Tendonitis     Trauma     Type 2 diabetes mellitus without complication, without long-term current use of insulin     dx 2021, no insulin, A1C <7    Varicella     Vision loss     Wears contact lenses     Wears partial dentures     TOP ONLY     Wears prescription eyeglasses        Past Surgical History:   Past Surgical History:   Procedure Laterality Date    CERVICAL POLYPECTOMY      COLONOSCOPY  2021    HYSTERECTOMY      MYOMECTOMY  5 years     Dr packer/    OOPHORECTOMY      TOTAL " LAPAROSCOPIC HYSTERECTOMY N/A 09/18/2017    Procedure: TOTAL LAPAROSCOPIC HYSTERECTOMY, BILATERAL SALPINGO OOPHORECTOMY WITH DAVINCI ROBOT ;  Surgeon: Shannon Grimaldo DO;  Location: [facility];  Service:     UPPER GASTROINTESTINAL ENDOSCOPY      WISDOM TOOTH EXTRACTION         Immunizations:   Immunization History   Administered Date(s) Administered    COVID-19 (PFIZER) Purple Cap Monovalent 03/17/2021, 04/07/2021    Flu Vaccine Quad PF >36MO 09/19/2016, 10/18/2017    Fluzone  >6mos 10/11/2024    Fluzone (or Fluarix & Flulaval for VFC) >6mos 09/24/2021, 10/26/2022, 12/08/2023    Hepatitis B 07/15/2021    Pneumococcal Polysaccharide (PPSV23) 02/24/2020    Shingrix 11/16/2020, 01/23/2021, 01/31/2021    Tdap 07/15/2021    Tetanus 09/06/2012    flucelvax quad pfs =>4 YRS 12/20/2019        Medications:     Current Outpatient Medications:     albuterol sulfate  (90 Base) MCG/ACT inhaler, Inhale 2 puffs Every 6 (Six) Hours As Needed for Wheezing or Shortness of Air., Disp: 18 g, Rfl: 1    atomoxetine (STRATTERA) 60 MG capsule, , Disp: , Rfl:     Blood Glucose Monitoring Suppl (OneTouch Verio) w/Device kit, 1 kit Daily., Disp: 1 kit, Rfl: 0    Cholecalciferol (Vitamin D3) 50 MCG (2000 UT) capsule, Take 1 capsule by mouth Daily., Disp: 90 capsule, Rfl: 1    diclofenac (VOLTAREN) 50 MG EC tablet, Take 1 tablet by mouth 3 (Three) Times a Day., Disp: 15 tablet, Rfl: 0    EPINEPHrine (EPIPEN) 0.3 MG/0.3ML solution auto-injector injection, Inject 0.3 mL into the appropriate muscle as directed by prescriber As Needed (FOR SEVERE ALLERGIC REACTION)., Disp: 1 each, Rfl: 1    Fluticasone Furoate-Vilanterol (BREO ELLIPTA) 200-25 MCG/ACT inhaler, Inhale 1 puff Daily., Disp: 60 each, Rfl: 3    glucose blood (OneTouch Verio) test strip, USE AS INSTRUCTED TO CHECK BLOOD GLUCOSE ONCE DAILY DX:E11.65, Disp: 100 each, Rfl: 3    glucose monitor monitoring kit, Use as directed to check blood glucose once daily., Disp: 1 each, Rfl: 0     ipratropium (ATROVENT) 0.06 % nasal spray, , Disp: , Rfl:     ipratropium-albuterol (DUO-NEB) 0.5-2.5 mg/3 ml nebulizer, Take 3 mL by nebulization Every 4 (Four) Hours As Needed for Wheezing., Disp: 360 mL, Rfl: 1    Lancets (OneTouch Delica Safety Lancing) misc, Use 1 each Daily., Disp: 100 each, Rfl: 3    loratadine (CLARITIN) 10 MG tablet, Take 1 tablet by mouth Daily., Disp: 30 tablet, Rfl: 5    montelukast (SINGULAIR) 10 MG tablet, Take 1 tablet by mouth Every Night., Disp: 90 tablet, Rfl: 1    multivitamin with minerals tablet tablet, Take 1 tablet by mouth Daily., Disp: , Rfl:     ondansetron ODT (ZOFRAN-ODT) 4 MG disintegrating tablet, Place 1 tablet on the tongue Every 8 (Eight) Hours As Needed for Nausea., Disp: 12 tablet, Rfl: 0    pantoprazole (Protonix) 20 MG EC tablet, Take 1 tablet by mouth Daily., Disp: 90 tablet, Rfl: 1    Trelegy Ellipta 100-62.5-25 MCG/ACT inhaler, , Disp: , Rfl:     Zinc Sulfate (ZINC 15 PO), Take  by mouth., Disp: , Rfl:     promethazine (PHENERGAN) 12.5 MG tablet, Take 1 tablet by mouth At Night As Needed for Nausea or Vomiting., Disp: 20 tablet, Rfl: 0    Allergies:   Allergies   Allergen Reactions    Mold Extract [Trichophyton] Shortness Of Breath, Anxiety, Palpitations and Irritability    Lortab [Hydrocodone-Acetaminophen] Nausea And Vomiting    Metformin GI Intolerance    Pollen Extract Other (See Comments)     SNEEZING AND CONGESTION        Family History:   Family History   Problem Relation Age of Onset    Cancer Mother         Cervical /over 10 years ago    Dementia Mother     Hypertension Mother         high energy/living on meds    Anxiety disorder Mother     Seizures Mother     Miscarriages / Stillbirths Mother         2 miscarriages    Obesity Father     Hypertension Father     Heart attack Father     Sleep apnea Father     Depression Father     Heart disease Father     Hyperlipidemia Father     Early death Father         57    Heart failure Father         M heart  attack    Sleep apnea Sister     ADD / ADHD Sister     Hypertension Brother     Sleep apnea Brother     ADD / ADHD Brother     Cancer Maternal Aunt         lymphoma    Cancer Paternal Aunt         Breast    Breast cancer Paternal Aunt 70    Cancer Maternal Grandmother     Early death Maternal Grandmother         Unknown    Alcohol abuse Maternal Grandfather     Early death Maternal Grandfather         leg amputated ..shock?    Emphysema Maternal Grandfather         Gr-Grandfather    Obesity Paternal Grandmother     Diabetes Paternal Grandmother     Hypertension Paternal Grandmother     Stroke Paternal Grandmother     Heart attack Paternal Grandmother     Asthma Paternal Grandmother     Vision loss Paternal Grandmother         weak    Osteoporosis Paternal Grandmother         Father's mom had arthritis in her knees?    Arthritis Paternal Grandmother     Hyperlipidemia Paternal Grandmother         heavy    Heart attack Paternal Grandfather     Vision loss Paternal Grandfather         weak    Alcohol abuse Other     Depression Other     ADD / ADHD Brother     Alcohol abuse Brother     Seizures Brother         When he was young, not since he had tonsles removed.    Dementia Sister     Cancer Maternal Aunt         Lymphoma    Alcohol abuse Maternal Aunt     Cancer Paternal Aunt         double mastectomy    Heart disease Paternal Uncle         several heart surgeries    Heart failure Paternal Uncle     Heart disease Paternal Uncle         stroke    Mental illness Paternal Uncle         unknoown    Heart failure Paternal Uncle     Heart disease Paternal Uncle         pacemaker    Heart failure Paternal Uncle         Pacemaker    Mental illness Brother         unknown    Alcohol abuse Brother     Alcohol abuse Maternal Uncle     Depression Son     Vision loss Son         weak sight    Vision loss Daughter         lazy eye, weak sight    Alcohol abuse Maternal Aunt     Alcohol abuse Maternal Uncle              "Depression Maternal Uncle         war vet    Mental illness Maternal Uncle         vet/ptsd ?    Alcohol abuse Maternal Uncle         Flushing/purple heart    Cancer Maternal Aunt         stage 4 Breast with spreading to the liver    Depression Son         unknown diagnosis also ADHD    Thyroid disease Paternal Aunt     Vision loss Daughter         lazy eye, weak sight/glasses    Vision loss Son         weak sight/glasses    Cancer Maternal Aunt     Cancer Paternal Aunt     Sleep apnea Brother     Sleep apnea Sister     Sleep apnea Brother     Thyroid disease Paternal Aunt        Social History:   Social History     Socioeconomic History    Marital status: Single   Tobacco Use    Smoking status: Never     Passive exposure: Never    Smokeless tobacco: Never   Vaping Use    Vaping status: Never Used   Substance and Sexual Activity    Alcohol use: Not Currently     Comment: once or twice a year    Drug use: Never    Sexual activity: Not Currently     Partners: Male     Birth control/protection: Abstinence, Post-menopausal, Hysterectomy, Surgical         Objective     Vital Signs  /82   Pulse 82   Resp 16   Ht 167.6 cm (66\")   Wt 113 kg (250 lb)   SpO2 98%   BMI 40.35 kg/m²   Estimated body mass index is 40.35 kg/m² as calculated from the following:    Height as of this encounter: 167.6 cm (66\").    Weight as of this encounter: 113 kg (250 lb).            Physical Exam  Vitals and nursing note reviewed.   Constitutional:       General: She is not in acute distress.     Appearance: Normal appearance. She is obese.   HENT:      Head: Normocephalic and atraumatic.      Right Ear: Tympanic membrane normal.      Left Ear: Tympanic membrane normal.      Nose: Nose normal.      Mouth/Throat:      Mouth: Mucous membranes are moist.   Eyes:      Extraocular Movements: Extraocular movements intact.      Conjunctiva/sclera: Conjunctivae normal.      Pupils: Pupils are equal, round, and reactive to light. "   Cardiovascular:      Rate and Rhythm: Normal rate and regular rhythm.      Heart sounds: Normal heart sounds.   Pulmonary:      Effort: Pulmonary effort is normal. No respiratory distress.      Breath sounds: Normal breath sounds.   Musculoskeletal:         General: Normal range of motion.   Skin:     General: Skin is warm and dry.   Neurological:      General: No focal deficit present.      Mental Status: She is alert and oriented to person, place, and time.   Psychiatric:         Mood and Affect: Mood normal.          Procedures     Assessment and Plan   Diagnosis Discussed   Continue to monitor   Plenty of fluids  Continue to avoid bright lights and loud noises   Continue to rest as directed   MRI Brain ordered for evaluation- waiting on approval   Take medications as directed   Extra Strength Tylenol for headaches   Follow up with Eye specialist as directed   Nausea medication as needed   If symptoms worsen or persist please seek further evaluation   If worsening symptoms- ER for immediate further evaluation     1. Post concussive syndrome  - Ambulatory Referral to Ophthalmology    2. Nausea  - promethazine (PHENERGAN) 12.5 MG tablet; Take 1 tablet by mouth At Night As Needed for Nausea or Vomiting.  Dispense: 20 tablet; Refill: 0    3. Migraine without status migrainosus, not intractable, unspecified migraine type    4. Vision changes  - Ambulatory Referral to Ophthalmology       Follow Up  No follow-ups on file.    Em Baum MD  [facility] INTERNAL MEDICINE  [facility] [facility] [address] [phone]

## 2025-06-12 NOTE — LETTER
June 12, 2025     Patient: Sandra Auguste   YOB: 1960   Date of Visit: 6/12/2025       To Whom It May Concern:    It is my medical opinion that Sandra Auguste will continue to need to be off work for extended period of time for symptom resolution. She does have further evaluation, MRI in process of being scheduled awaiting results. Will continue to monitor and will update progress as results are collected.          Sincerely,        Em Baum MD    CC: No Recipients

## 2025-06-13 ENCOUNTER — TELEPHONE (OUTPATIENT)
Dept: INTERNAL MEDICINE | Facility: CLINIC | Age: 65
End: 2025-06-13

## 2025-06-13 NOTE — TELEPHONE ENCOUNTER
Caller: BASIL HERNANDES    Relationship: Other    Best call back number: [phone]     What was the call regarding: BASIL HERNANDES IS CALLING AND STATES THE PATIENT MADE THEM AWARE THAT SHE WAS SEEN IN OFFICE YESTERDAY 6/12/25 AND WAS GIVEN A REFERRAL TO AN EYE DOCTOR. THEY WOULD LIKE TO KNOW IF THEY ARE ABLE TO GET THE OFFICE VISIT NOTES AND THE REFERRAL FAXED TO THEM -[phone].

## 2025-06-13 NOTE — TELEPHONE ENCOUNTER
Caller: Sandra Auguste    Relationship: Self    Best call back number: [phone]     What is the best time to reach you: ANYTIME     Who are you requesting to speak with (clinical staff, provider,  specific staff member): CLINICAL STAFF    What was the call regarding: PATIENT IS CALLING IN REGARDS TO HER WORKER'S COMP. SHE SAYS THAT THEY ARE NEEDING TO GET THE REFERRAL FOR HER EYE DOCTOR'S APPOINTMENT. SHE IS ASKING TO HAVE THIS FAXED TO [facility] [phone]    Is it okay if the provider responds through [employer]: YES

## 2025-06-16 NOTE — TELEPHONE ENCOUNTER
Caller: Sandra Auguste     Relationship: Self     Best call back number:       What form or medical record are you requesting: REFERRAL ORDER FOR EYE APPT     Who is requesting this form or medical record from you: GRETA BARKER     How would you like to receive the form or medical records (pick-up, mail, fax):   If fax, what is the fax number:  CHIRAG GARCIA      Timeframe paperwork needed: ASAP     Additional notes:  PATIENT ADVISED THEY ARE NEEDING THE NOTES AND REFERRAL  FORM  IN ORDER FOR W/C TO PAY FOR THE VISIT  CASE NUMBER [id number]' ; THIS IS FOR DR HILL

## 2025-06-20 ENCOUNTER — TELEPHONE (OUTPATIENT)
Dept: INTERNAL MEDICINE | Facility: CLINIC | Age: 65
End: 2025-06-20

## 2025-06-20 NOTE — TELEPHONE ENCOUNTER
Caller: Sandra Auguste    Relationship to patient: Self    Best call back number:      Chief complaint: NECK AND ANKLE PAIN    Type of visit: OFFICE VISIT W/C GO OVER MRI RESULTS AS WELL    Requested date: AS SOON AS POSSIBLE     If rescheduling, when is the original appointment: NA     Additional notes:DR NAVARRO DIDN'T HAVE ANYTHING UNTIL [id number] AND PATIENT WOULD LIKE SOONER       PLEASE CALL TO ADVISE

## 2025-06-25 ENCOUNTER — OFFICE VISIT (OUTPATIENT)
Dept: INTERNAL MEDICINE | Facility: CLINIC | Age: 65
End: 2025-06-25
Payer: OTHER MISCELLANEOUS

## 2025-06-25 VITALS
BODY MASS INDEX: 41.46 KG/M2 | HEART RATE: 98 BPM | OXYGEN SATURATION: 97 % | HEIGHT: 66 IN | SYSTOLIC BLOOD PRESSURE: 124 MMHG | WEIGHT: 258 LBS | RESPIRATION RATE: 16 BRPM | DIASTOLIC BLOOD PRESSURE: 76 MMHG

## 2025-06-25 DIAGNOSIS — F07.81 POST CONCUSSIVE SYNDROME: Primary | ICD-10-CM

## 2025-06-25 DIAGNOSIS — S93.402A SPRAIN OF LEFT ANKLE, UNSPECIFIED LIGAMENT, INITIAL ENCOUNTER: ICD-10-CM

## 2025-06-25 DIAGNOSIS — R47.89 WORD FINDING DIFFICULTY: ICD-10-CM

## 2025-06-25 DIAGNOSIS — M54.2 CERVICALGIA: ICD-10-CM

## 2025-06-25 DIAGNOSIS — G44.319 ACUTE POST-TRAUMATIC HEADACHE, NOT INTRACTABLE: ICD-10-CM

## 2025-06-25 DIAGNOSIS — M77.8 RIGHT SHOULDER TENDONITIS: ICD-10-CM

## 2025-06-25 NOTE — PATIENT INSTRUCTIONS
Diagnosis Discussed   Continue to monitor   Plenty of fluids, monitor diet and exercise   Imaging reviewed - recent MRI stable from previous   Follow up with PT as directed   Follow up with Speech Therapy- cognitive therapy   Follow up with Neurology as directed   Take medications as instructed  Follow up as directed   If symptoms worsen or persist please seek further evaluation

## 2025-06-25 NOTE — PROGRESS NOTES
Office Note     Name: Sandra Auguste    : 1960     MRN: [id number]     Chief Complaint  Neck Pain (Follow up, discuss recent MRI results, pain in neck and ankle from recent fall) and Ankle Pain    Subjective     History of Present Illness:  Sandra Auguste is a 64 y.o. female who presents today for Neck pain   Would like to discuss recent MRI results   Concerns for ankle pain from fall     MRI- No acute findings- 2025  Moderate T2 flare- hyperintensities- non specific commonly related to small vessel ischemic changes- and/or chronic migraines     MRI   IMPRESSION:  No acute intracranial findings with mild to moderate  generalized atrophy and/or age-related volume loss without focal atrophy  separate including dedicated mesial temporal regions evaluation without  mesial temporal sclerosis or asymmetry evident. Mild to moderate chronic  small vessel ischemic disease additionally noted.     Still with head pains- feels worse with stress   Concerns for ongoing head pain   Imaging all stable   Will follow up with Neurology   Difficulty with word finding feeling speech is sometimes off   Has been through cognitive therapy previously with good results     Some left ankle discomfort   Able to walk on it and rotate but feels unstable since injury   Sometimes with loss of balance- has not tried compression   Will try to stability   ROM intact     Review of Systems:   Review of Systems   Constitutional:  Negative for chills and fever.   HENT:  Negative for trouble swallowing and voice change.    Respiratory:  Negative for cough and shortness of breath.    Cardiovascular:  Negative for chest pain, palpitations and leg swelling.   Gastrointestinal:  Negative for abdominal pain, blood in stool, constipation, nausea and vomiting.   Musculoskeletal:  Positive for arthralgias, joint swelling and myalgias.   Skin:  Negative for rash.   Neurological:  Positive for light-headedness and headache. Negative for speech  "difficulty.        Word finding difficulty    Psychiatric/Behavioral:  Positive for stress.        Past Medical History:   Past Medical History:   Diagnosis Date    ADHD (attention deficit hyperactivity disorder) 07/30/2024    Just diagnosed    Allergic 1960    Anemia 1984    Anesthesia complication     HAS TROUBLE GETTING \"NUMB\"     Anxiety     Arthritis     on Celebrex + Robaxin, no steroids    Asthma     does not follow w/ pulmonary    Bell palsy     not sure    Bladder spasms     Cluster headache ?    ?    CTS (carpal tunnel syndrome)     Depression     w/ PTSD    Diverticulitis of colon 2021    Not sure about dates    Diverticulosis     Dyspepsia     Endometriosis     undiagnosed, maybe>?    Failure to thrive (child)     Fatigue     Fibroid     GERD (gastroesophageal reflux disease)     controlled w/ nightly Pepcid, EGD 2020 w/ Dr. Chavarria, (+) HH    Gestational hypertension not sure    30 years ago    Headache, tension-type     long hair that was pulled in desk    Health care maintenance 02/25/2020    Hernia 2021    Dr Chavarria    Lactose intolerance Baby    Memory loss     FROM SKULL FRACTURE AND FALL-SHORT TERM MEMORY LOSS    Migraine     Morbid obesity     Movement disorder     Multiple gestation 11/22/84_09/29/1986    2 BABIES    Osteoarthritis     Ovarian cyst     ?    Peripheral neuropathy     PMS (premenstrual syndrome)     PONV (postoperative nausea and vomiting) 1986    SWELLING /NAUSEA    PTSD (post-traumatic stress disorder)     Seasonal allergies     Seizures     \"convulsions\" at age 5    Skull fracture 2012    Sleep apnea with use of continuous positive airway pressure (CPAP)     CPAP compliant    Sleep apnea, obstructive 02/2016J    Dr Kirby    SOB (shortness of breath) on exertion     Spinal headache     not sure    Tendonitis     Trauma     Type 2 diabetes mellitus without complication, without long-term current use of insulin     dx 2021, no insulin, A1C <7    Varicella     Vision loss  "    Wears contact lenses     Wears partial dentures     TOP ONLY     Wears prescription eyeglasses        Past Surgical History:   Past Surgical History:   Procedure Laterality Date    CERVICAL POLYPECTOMY      COLONOSCOPY  2021    HYSTERECTOMY      MYOMECTOMY  5 years     Dr packer/    OOPHORECTOMY      TOTAL LAPAROSCOPIC HYSTERECTOMY N/A 09/18/2017    Procedure: TOTAL LAPAROSCOPIC HYSTERECTOMY, BILATERAL SALPINGO OOPHORECTOMY WITH DAVINCI ROBOT ;  Surgeon: Shannon Grimaldo DO;  Location: [facility];  Service:     UPPER GASTROINTESTINAL ENDOSCOPY      WISDOM TOOTH EXTRACTION         Immunizations:   Immunization History   Administered Date(s) Administered    COVID-19 (PFIZER) Purple Cap Monovalent 03/17/2021, 04/07/2021    Flu Vaccine Quad PF >36MO 09/19/2016, 10/18/2017    Fluzone  >6mos 10/11/2024    Fluzone (or Fluarix & Flulaval for VFC) >6mos 09/24/2021, 10/26/2022, 12/08/2023    Hepatitis B 07/15/2021    Pneumococcal Polysaccharide (PPSV23) 02/24/2020    Shingrix 11/16/2020, 01/23/2021, 01/31/2021    Tdap 07/15/2021    Tetanus 09/06/2012    flucelvax quad pfs =>4 YRS 12/20/2019        Medications:     Current Outpatient Medications:     albuterol sulfate  (90 Base) MCG/ACT inhaler, Inhale 2 puffs Every 6 (Six) Hours As Needed for Wheezing or Shortness of Air., Disp: 18 g, Rfl: 1    atomoxetine (STRATTERA) 60 MG capsule, , Disp: , Rfl:     Blood Glucose Monitoring Suppl (OneTouch Verio) w/Device kit, 1 kit Daily., Disp: 1 kit, Rfl: 0    Cholecalciferol (Vitamin D3) 50 MCG (2000 UT) capsule, Take 1 capsule by mouth Daily., Disp: 90 capsule, Rfl: 1    diclofenac (VOLTAREN) 50 MG EC tablet, Take 1 tablet by mouth 3 (Three) Times a Day., Disp: 15 tablet, Rfl: 0    EPINEPHrine (EPIPEN) 0.3 MG/0.3ML solution auto-injector injection, Inject 0.3 mL into the appropriate muscle as directed by prescriber As Needed (FOR SEVERE ALLERGIC REACTION)., Disp: 1 each, Rfl: 1    Fluticasone Furoate-Vilanterol (BREO ELLIPTA)  200-25 MCG/ACT inhaler, Inhale 1 puff Daily., Disp: 60 each, Rfl: 3    glucose blood (OneTouch Verio) test strip, USE AS INSTRUCTED TO CHECK BLOOD GLUCOSE ONCE DAILY DX:E11.65, Disp: 100 each, Rfl: 3    glucose monitor monitoring kit, Use as directed to check blood glucose once daily., Disp: 1 each, Rfl: 0    ipratropium (ATROVENT) 0.06 % nasal spray, , Disp: , Rfl:     ipratropium-albuterol (DUO-NEB) 0.5-2.5 mg/3 ml nebulizer, Take 3 mL by nebulization Every 4 (Four) Hours As Needed for Wheezing., Disp: 360 mL, Rfl: 1    Lancets (OneTouch Delica Safety Lancing) misc, Use 1 each Daily., Disp: 100 each, Rfl: 3    loratadine (CLARITIN) 10 MG tablet, Take 1 tablet by mouth Daily., Disp: 30 tablet, Rfl: 5    montelukast (SINGULAIR) 10 MG tablet, Take 1 tablet by mouth Every Night., Disp: 90 tablet, Rfl: 1    multivitamin with minerals tablet tablet, Take 1 tablet by mouth Daily., Disp: , Rfl:     ondansetron ODT (ZOFRAN-ODT) 4 MG disintegrating tablet, Place 1 tablet on the tongue Every 8 (Eight) Hours As Needed for Nausea., Disp: 12 tablet, Rfl: 0    pantoprazole (Protonix) 20 MG EC tablet, Take 1 tablet by mouth Daily., Disp: 90 tablet, Rfl: 1    promethazine (PHENERGAN) 12.5 MG tablet, Take 1 tablet by mouth At Night As Needed for Nausea or Vomiting., Disp: 20 tablet, Rfl: 0    Trelegy Ellipta 100-62.5-25 MCG/ACT inhaler, , Disp: , Rfl:     Zinc Sulfate (ZINC 15 PO), Take  by mouth., Disp: , Rfl:     Allergies:   Allergies   Allergen Reactions    Mold Extract [Trichophyton] Shortness Of Breath, Anxiety, Palpitations and Irritability    Lortab [Hydrocodone-Acetaminophen] Nausea And Vomiting    Metformin GI Intolerance    Pollen Extract Other (See Comments)     SNEEZING AND CONGESTION        Family History:   Family History   Problem Relation Age of Onset    Cancer Mother         Cervical /over 10 years ago    Dementia Mother     Hypertension Mother         high energy/living on meds    Anxiety disorder Mother      Seizures Mother     Miscarriages / Stillbirths Mother         2 miscarriages    Obesity Father     Hypertension Father     Heart attack Father     Sleep apnea Father     Depression Father     Heart disease Father     Hyperlipidemia Father     Early death Father         57    Heart failure Father         M heart attack    Sleep apnea Sister     ADD / ADHD Sister     Hypertension Brother     Sleep apnea Brother     ADD / ADHD Brother     Cancer Maternal Aunt         lymphoma    Cancer Paternal Aunt         Breast    Breast cancer Paternal Aunt 70    Cancer Maternal Grandmother     Early death Maternal Grandmother         Unknown    Alcohol abuse Maternal Grandfather     Early death Maternal Grandfather         leg amputated ..shock?    Emphysema Maternal Grandfather         Gr-Grandfather    Obesity Paternal Grandmother     Diabetes Paternal Grandmother     Hypertension Paternal Grandmother     Stroke Paternal Grandmother     Heart attack Paternal Grandmother     Asthma Paternal Grandmother     Vision loss Paternal Grandmother         weak    Osteoporosis Paternal Grandmother         Father's mom had arthritis in her knees?    Arthritis Paternal Grandmother     Hyperlipidemia Paternal Grandmother         heavy    Heart attack Paternal Grandfather     Vision loss Paternal Grandfather         weak    Alcohol abuse Other     Depression Other     ADD / ADHD Brother     Alcohol abuse Brother     Seizures Brother         When he was young, not since he had tonsles removed.    Dementia Sister     Cancer Maternal Aunt         Lymphoma    Alcohol abuse Maternal Aunt     Cancer Paternal Aunt         double mastectomy    Heart disease Paternal Uncle         several heart surgeries    Heart failure Paternal Uncle     Heart disease Paternal Uncle         stroke    Mental illness Paternal Uncle         unknoown    Heart failure Paternal Uncle     Heart disease Paternal Uncle         pacemaker    Heart failure Paternal Uncle          "Pacemaker    Mental illness Brother         unknown    Alcohol abuse Brother     Alcohol abuse Maternal Uncle     Depression Son     Vision loss Son         weak sight    Vision loss Daughter         lazy eye, weak sight    Alcohol abuse Maternal Aunt     Alcohol abuse Maternal Uncle             Depression Maternal Uncle         war vet    Mental illness Maternal Uncle         vet/ptsd ?    Alcohol abuse Maternal Uncle         /purple heart    Cancer Maternal Aunt         stage 4 Breast with spreading to the liver    Depression Son         unknown diagnosis also ADHD    Thyroid disease Paternal Aunt     Vision loss Daughter         lazy eye, weak sight/glasses    Vision loss Son         weak sight/glasses    Cancer Maternal Aunt     Cancer Paternal Aunt     Sleep apnea Brother     Sleep apnea Sister     Sleep apnea Brother     Thyroid disease Paternal Aunt        Social History:   Social History     Socioeconomic History    Marital status: Single   Tobacco Use    Smoking status: Never     Passive exposure: Never    Smokeless tobacco: Never   Vaping Use    Vaping status: Never Used   Substance and Sexual Activity    Alcohol use: Not Currently     Comment: once or twice a year    Drug use: Never    Sexual activity: Not Currently     Partners: Male     Birth control/protection: Abstinence, Post-menopausal, Hysterectomy, Surgical         Objective     Vital Signs  /76   Pulse 98   Resp 16   Ht 167.6 cm (66\")   Wt 117 kg (258 lb)   SpO2 97%   BMI 41.64 kg/m²   Estimated body mass index is 41.64 kg/m² as calculated from the following:    Height as of this encounter: 167.6 cm (66\").    Weight as of this encounter: 117 kg (258 lb).            Physical Exam  Vitals and nursing note reviewed.   Constitutional:       General: She is not in acute distress.     Appearance: Normal appearance. She is obese.   HENT:      Head: Normocephalic and atraumatic.   Cardiovascular:      Rate and Rhythm: Normal " rate and regular rhythm.      Heart sounds: Normal heart sounds.   Pulmonary:      Effort: Pulmonary effort is normal. No respiratory distress.      Breath sounds: Normal breath sounds.   Abdominal:      General: Bowel sounds are normal.      Palpations: Abdomen is soft.   Musculoskeletal:         General: Tenderness present. Normal range of motion.      Comments: Generalized aching  Left ankle- ROM intact   Ambulating without difficulty      Skin:     General: Skin is warm and dry.   Neurological:      General: No focal deficit present.      Mental Status: She is alert and oriented to person, place, and time.          Procedures     Assessment and Plan   Diagnosis Discussed   Continue to monitor   Plenty of fluids, monitor diet and exercise   Imaging reviewed - recent MRI stable from previous   Follow up with PT as directed   Follow up with Speech Therapy- cognitive therapy   Follow up with Neurology as directed   Take medications as instructed  Follow up as directed   If symptoms worsen or persist please seek further evaluation     1. Post concussive syndrome  - Ambulatory Referral to Neurology  - Ambulatory Referral to Speech Therapy for Evaluation & Treatment    2. Acute post-traumatic headache, not intractable  - Ambulatory Referral to Neurology  - Ambulatory Referral to Speech Therapy for Evaluation & Treatment    3. Word finding difficulty  - Ambulatory Referral to Speech Therapy for Evaluation & Treatment    4. Right shoulder tendonitis  - Ambulatory Referral to Physical Therapy for Evaluation & Treatment    5. Cervicalgia  - Ambulatory Referral to Physical Therapy for Evaluation & Treatment    6. Sprain of left ankle, unspecified ligament, initial encounter  RICE- rest, ice, compression and elevation as directed.        Follow Up  No follow-ups on file.    Em Baum MD  [facility]  [address] [facility] [id number] [phone]

## 2025-06-25 NOTE — LETTER
June 25, 2025     Patient: Sandra Auguste   YOB: 1960   Date of Visit: 6/25/2025       To Whom It May Concern:    It is my medical opinion that Sandra Auguste will need to remain off of work due to ongoing workup from work injury- will need to follow up with Neurology as directed as well and physical therapy for continued assessment of symptoms.          Sincerely,        Em Baum MD    CC: No Recipients

## 2025-06-27 ENCOUNTER — HOSPITAL ENCOUNTER (OUTPATIENT)
Dept: NUCLEAR MEDICINE | Facility: HOSPITAL | Age: 65
Discharge: HOME | End: 2025-06-27
Admitting: PHYSICIAN ASSISTANT
Payer: COMMERCIAL

## 2025-06-27 DIAGNOSIS — R11.0 NAUSEA: ICD-10-CM

## 2025-06-27 DIAGNOSIS — R10.9 RIGHT FLANK PAIN: ICD-10-CM

## 2025-06-27 DIAGNOSIS — R14.0 ABDOMINAL BLOATING: ICD-10-CM

## 2025-06-27 PROCEDURE — A9537 TC99M MEBROFENIN: HCPCS | Performed by: PHYSICIAN ASSISTANT

## 2025-06-27 PROCEDURE — 78227 HEPATOBIL SYST IMAGE W/DRUG: CPT

## 2025-06-27 PROCEDURE — 34310000005 TECHNETIUM TC 99M MEBROFENIN KIT: Performed by: PHYSICIAN ASSISTANT

## 2025-06-27 PROCEDURE — 25010000002 SINCALIDE PER 5 MCG: Performed by: PHYSICIAN ASSISTANT

## 2025-06-27 RX ORDER — KIT FOR THE PREPARATION OF TECHNETIUM TC 99M MEBROFENIN 45 MG/10ML
1 INJECTION, POWDER, LYOPHILIZED, FOR SOLUTION INTRAVENOUS
Status: COMPLETED | OUTPATIENT
Start: 2025-06-27 | End: 2025-06-27

## 2025-06-27 RX ORDER — SINCALIDE 5 UG/5ML
0.02 INJECTION, POWDER, LYOPHILIZED, FOR SOLUTION INTRAVENOUS ONCE
Status: COMPLETED | OUTPATIENT
Start: 2025-06-27 | End: 2025-06-27

## 2025-06-27 RX ADMIN — SINCALIDE 2.3 MCG: 5 INJECTION, POWDER, LYOPHILIZED, FOR SOLUTION INTRAVENOUS at 10:44

## 2025-06-27 RX ADMIN — MEBROFENIN 1 DOSE: 45 INJECTION, POWDER, LYOPHILIZED, FOR SOLUTION INTRAVENOUS at 09:30

## 2025-06-30 ENCOUNTER — TELEPHONE (OUTPATIENT)
Dept: PHYSICAL THERAPY | Facility: CLINIC | Age: 65
End: 2025-06-30

## 2025-06-30 NOTE — TELEPHONE ENCOUNTER
Caller: Sandra Auguste    Relationship: Self    What was the call regarding: CANCELLED PATIENTS APPT FOR TODAY BUT WANTS TO RESCHEDULE AND I COULDN'T PULL UP A SCHEDULE.  PLEASE CALL PATIENT BACK. HAS HER GRAND DAUGHTERS TODAY AND CAN'T MAKE APPT

## 2025-07-01 ENCOUNTER — HOSPITAL ENCOUNTER (OUTPATIENT)
Dept: SPEECH THERAPY | Facility: HOSPITAL | Age: 65
Setting detail: THERAPIES SERIES
Discharge: HOME | End: 2025-07-01
Payer: COMMERCIAL

## 2025-07-01 DIAGNOSIS — R41.841 COGNITIVE COMMUNICATION DEFICIT: Primary | ICD-10-CM

## 2025-07-01 DIAGNOSIS — R47.89 FLUENCY DISORDER: ICD-10-CM

## 2025-07-01 PROCEDURE — 96125 COGNITIVE TEST BY HC PRO: CPT | Performed by: SPEECH-LANGUAGE PATHOLOGIST

## 2025-07-01 NOTE — THERAPY EVALUATION
"Outpatient Speech Language Pathology   Adult Speech Language Cognitive Initial Evaluation  [facility]     Patient Name: Sandra Auguste  : 1960  MRN: [id number]  Today's Date: 2025        Visit Date: 2025   Patient Active Problem List   Diagnosis    Anemia    Post traumatic stress disorder (PTSD)    Diverticulitis of colon    Steatosis of liver    Lateral epicondylitis    Knee pain    Osteoarthritis    Nausea    Gastroesophageal reflux disease    Acute pain of left shoulder    Sleep apnea with use of continuous positive airway pressure (CPAP)    Seasonal allergies    Migraine    Diverticulosis    Temple tenderness    TMJ click    Skull fracture    Moderate episode of recurrent major depressive disorder    Generalized anxiety disorder    High risk medications (not anticoagulants) long-term use    Hyperlipidemia    Asthma    Type 2 diabetes mellitus without complication, without long-term current use of insulin    Sleeping difficulties    ARMD (age-related macular degeneration), bilateral    Brain atrophy    Mild cognitive impairment    Attention deficit hyperactivity disorder, combined type    Vitamin D deficiency        Past Medical History:   Diagnosis Date    ADHD (attention deficit hyperactivity disorder) 2024    Just diagnosed    Allergic     Anemia 1984    Anesthesia complication     HAS TROUBLE GETTING \"NUMB\"     Anxiety     Arthritis     on Celebrex + Robaxin, no steroids    Asthma     does not follow w/ pulmonary    Bell palsy     not sure    Bladder spasms     Cluster headache ?    ?    CTS (carpal tunnel syndrome)     Depression     w/ PTSD    Diverticulitis of colon     Not sure about dates    Diverticulosis     Dyspepsia     Endometriosis     undiagnosed, maybe>?    Failure to thrive (child)     Fatigue     Fibroid     GERD (gastroesophageal reflux disease)     controlled w/ nightly Pepcid, EGD  w/ Dr. Chavarria, (+) HH    Gestational hypertension not sure    30 " "years ago    Headache, tension-type     long hair that was pulled [place]    Health care maintenance 02/25/2020    Hernia 2021    Dr Chavarria    Lactose intolerance Baby    Memory loss     FROM SKULL FRACTURE AND FALL-SHORT TERM MEMORY LOSS    Migraine     Morbid obesity     Movement disorder     Multiple gestation 11/22/84_09/29/1986    2 BABIES    Osteoarthritis     Ovarian cyst     ?    Peripheral neuropathy     PMS (premenstrual syndrome)     PONV (postoperative nausea and vomiting) 1986    SWELLING /NAUSEA    PTSD (post-traumatic stress disorder)     Seasonal allergies     Seizures     \"convulsions\" at age 5    Skull fracture 2012    Sleep apnea with use of continuous positive airway pressure (CPAP)     CPAP compliant    Sleep apnea, obstructive 02/2016J    Dr Kirby    SOB (shortness of breath) on exertion     Spinal headache     not sure    Tendonitis     Trauma     Type 2 diabetes mellitus without complication, without long-term current use of insulin     dx 2021, no insulin, A1C <7    Varicella     Vision loss     Wears contact lenses     Wears partial dentures     TOP ONLY     Wears prescription eyeglasses         Past Surgical History:   Procedure Laterality Date    CERVICAL POLYPECTOMY      COLONOSCOPY  2021    HYSTERECTOMY      MYOMECTOMY  5 years     Dr packer/    OOPHORECTOMY      TOTAL LAPAROSCOPIC HYSTERECTOMY N/A 09/18/2017    Procedure: TOTAL LAPAROSCOPIC HYSTERECTOMY, BILATERAL SALPINGO OOPHORECTOMY WITH DAVINCI ROBOT ;  Surgeon: Shannon Grimaldo DO;  Location: [facility] OR;  Service:     UPPER GASTROINTESTINAL ENDOSCOPY      WISDOM TOOTH EXTRACTION           Visit Dx:    ICD-10-CM ICD-9-CM   1. Cognitive communication deficit  R41.841 799.52   2. Fluency disorder  R47.89 784.59            OP SLP Assessment/Plan - 07/01/25 1600          SLP Assessment    Functional Problems Speech Language- Adult/Cognition;Fluency  -RB    Impact on Function: Adult Speech Language/Cognition Poor attention to " task;Trouble learning or remembering new information;Restrictions in personal and social life  -RB    Clinical Impression: Speech Language-Adult/Congnition Mild:;Cognitive Communication Impairment  -RB    Clinical Impression- Fluency fluent speech periods predominate;one or more major dysfluency types are present  -RB    Functional Problems Comment impacts daily tasks and communication  -RB    Clinical Impression Comments would benefit from skilled ST  -RB    Please refer to paper survey for additional self-reported information Yes  -RB    Please refer to items scanned into chart for additional diagnostic informaiton and handouts as provided by clinician Yes  -RB    SLP Diagnosis cog/comm deficit, fluency  -RB    Prognosis Good (comment)  -RB    Patient/caregiver participated in establishment of treatment plan and goals Yes  -RB    Patient would benefit from skilled therapy intervention Yes  -RB       SLP Plan    Frequency 1x/weekly  -RB    Duration 8 weeks  -RB    Planned CPT's? SLP FLUENCY EVAL: 45443;SLP COG TEST (PER HOUR): 10929;SLP INDIVIDUAL SPEECH THERAPY: 14200  -RB    Expected Duration of Therapy Session (SLP Eval) 45  -RB    Plan Comments fluency assessment, POC  -RB              User Key  (r) = Recorded By, (t) = Taken By, (c) = Cosigned By      Initials Name Provider Type    RB Jocelin Jones MA,CCC-SLP Speech and Language Pathologist                     SLP SLC Evaluation - 07/01/25 1300          Communication Assessment/Intervention    Document Type evaluation  -RB    Total Evaluation Minutes, SLP 45   15 mins interpretation -RB    Subjective Information no complaints  -RB    Patient Observations alert;cooperative;agree to therapy  -RB    Patient Effort good  -RB    Symptoms Noted During/After Treatment none  -RB       General Information    Patient Profile Reviewed yes  -RB    Pertinent History Of Current Problem PMH: hx of concussions, PTSAD, KELLY, LUIS, MCI, ADHD, DM. Pt sustained a concussion on  "6/2. 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  The child was sitting in front of her with his feet up on the table when he abruptly pushed hard she fell back and struck her head on the ground.  She had no loss of consciousness but was dazed.\" PT reports increased brain fog, stuttering, trouble with attention, recall, short term memory and word finding. She notes she is getting better, but not back to baseline.  -RB    Precautions/Limitations, Vision for purposes of eval;WFL with corrective lenses  -RB    Precautions/Limitations, Hearing WFL;for purposes of eval  -RB    Prior Level of Function-Communication WFL  -RB    Plans/Goals Discussed with patient;agreed upon  -RB    Barriers to Rehab none identified  -RB    Patient's Goals for Discharge return to all previous roles/activities  -RB    Standardized Assessment Used RBANS  -RB       Pain    Pretreatment Pain Rating 0/10 - no pain  -RB    Posttreatment Pain Rating 0/10 - no pain  -RB       Oral Motor Structure and Function    Oral Motor Structure and Function WFL  -RB       Oral Musculature and Cranial Nerve Assessment    Oral Motor General Assessment WFL  -RB       Cognitive Assessment Intervention- SLP    Cognitive Function (Cognition) mild impairment  -RB    Orientation Status (Cognition) WFL  -RB    Memory (Cognitive) WFL;visual;mild impairment;new learning;auditory;delayed;short-term;immediate;functional  -RB    Attention (Cognitive) WFL;sustained;mild impairment;distracting environment;attention to detail;divided;alternating;selective  -RB    Thought Organization (Cognitive) WFL;concrete divergent;mild impairment;high level  -RB    Reasoning (Cognitive) WFL;simple  -RB    Problem Solving (Cognitive) WFL;simple  -RB    Executive Function (Cognition) WFL;deficit awareness;initiation;judgement;realistic goal setting  " -RB    Pragmatics (Communication) WFL  -RB    Right Hemisphere Function WFL;pragmatics;visuo-spatial;visuo-perceptual  -RB    Cognition, Comment deficits with recall, high level word finding and attention. would benefit from skilled ST  -RB       Standardized Tests    Cognitive/Memory Tests RBANS: Repeatable Battery for the Assessment of Neuropsychological Status  -RB       RBANS- Repeatable Battery for the Assessment of Neuropsychological Status    Immediate Memory Index Score 94  -RB    Immediate Memory Qualitative Description average  -RB    Visuospatial Index Score 121  -RB    Visuospatial Qualitative Description superior  -RB    Language Index Score 101  -RB    Language Qualitative Description average  -RB    Attention Index Score 109  -RB    Attention Qualitative Description average  -RB    Delayed Memory Index Score 112  -RB    Delayed Memory Qualitative Description high average  -RB    Total Index Score 537  -RB              User Key  (r) = Recorded By, (t) = Taken By, (c) = Cosigned By      Initials Name Provider Type    Jocelin Nj MA,CCC-SLP Speech and Language Pathologist                          Fluency Eval - 07/01/25 1600          Caregiver Interview    Types of Dysfluencies part word repetitions;whole word repetitions;hesitations  -RB       Informal Assessment    Observation of Fluency Sample activity  -RB    Type of Activity Observed conversation  -RB    Observed Phonation Characteristics phonation is WFL  -RB    Observed Prosodic Characteristics normal prosody  -RB    Rate of Speech WFL  -RB    Articulation/Phonology Screening speech is intelligible  -RB              User Key  (r) = Recorded By, (t) = Taken By, (c) = Cosigned By      Initials Name Provider Type    Jocelin Nj MA,CCC-SLP Speech and Language Pathologist                       ACTIVITY  ACCURACY ASSISTANCE NEEDED   LTGs:   Pt will be able to remember information needed to function on at avocational and vocational  tasks  95% of the time no cues           Pt will implement compensatory strategies to maximize patient’s memory function so patient can continue to participate in daily activities 95% of the time no cues                 STG:    Pt will utilize word finding strategies in conversation at 95% with no cues               STG:       Pt’s memory skills will be enhanced as reported by patient by utilizing internal memory strategies to recall up to 5 pieces of information after a 5 minute delay no cues                    STG:  Pt will demonstrate improved ability to recall and summarize information by listening/reading information and  answering questions/ summarzing 95% of the time min cues       STG:   Pt will complete attention to detail tasks at 100% no cues            PT will utilize metacognitive strategies at 90-95% no cues             PT will utilize fluency strategies in conversation at 100% no cues        Pt will improve attention skills by sustaining focus to selective target/task when presented with competing stimuli or in a distracting environment in order to complete a task 90% no cues             Certification: 7/1/25-9/30/25       OP SLP Education       Row Name 07/01/25 1600       Education    Barriers to Learning No barriers identified  -RB    Education Provided Patient participated in establishing goals and treatment plan;Patient demonstrated recommended strategies;Patient requires further education on strategies, risks  -RB    Assessed Learning needs;Learning motivation;Learning readiness;Learning preferences  -RB    Learning Motivation Strong  -RB    Learning Method Explanation;Demonstration;Teach back;Written materials  -RB    Teaching Response Verbalized understanding;Demonstrated understanding;Reinforcement needed  -RB    Education Comments HEP: neurogenic stuttering, PCS info, IM and EM strategies  -RB              User Key  (r) = Recorded By, (t) = Taken By, (c) = Cosigned By      Initials Name Effective  Dates    RB Jocelin Jones MA,CCC-SLP 03/28/25 -                              Time Calculation:        Therapy Charges for Today       Code Description Service Date Service Provider Modifiers Qty    [id number] HC ST STD COG PERF TEST PER HOUR 7/1/2025 Jocelin Jones MA,CCC-SLP GN 1             [facility] Speech Language Pathology   [address]  [place] [address]  Jocelin Jones MA CCC-SLP [facility] license: [id number]        PHYSICIAN: Em Baum MD    NPI: [id number]         I certify that the therapy services are furnished while this patient is under my care.  The services outlined above are required by this patient, and will be reviewed every 90 days.                PHYSICIAN:                                         DATE:      Please sign and return via fax to [phone].   Thank you,   [facility]rapy.          Jocelin Jones MA,CCC-SLP  7/1/2025

## 2025-07-02 ENCOUNTER — TELEPHONE (OUTPATIENT)
Dept: NEUROLOGY | Facility: CLINIC | Age: 65
End: 2025-07-02
Payer: COMMERCIAL

## 2025-07-02 ENCOUNTER — TELEPHONE (OUTPATIENT)
Dept: GASTROENTEROLOGY | Facility: CLINIC | Age: 65
End: 2025-07-02

## 2025-07-02 NOTE — PROGRESS NOTES
Please let Sandra know that her HIDA reveals low gallbladder ejection fraction of 29%. I have placed a referral to general surgery to discuss cholecystectomy.

## 2025-07-02 NOTE — TELEPHONE ENCOUNTER
Provider: DR NEELY    Caller: Sandra Auguste    Relationship to Patient: Self    Pharmacy:     Phone Number:   Telephone Information:   Mobile [phone]     Reason for Call:   WORK INJURY -  INSURANCE ON FILE    When was the patient last seen:   4-3-25    When did it start: 6-2-25    Where is it located:   SHARP PAIN IN HEAD, HEADACHES, NECK PAIN AND BALANCE ISSUES. PT HAD AN MRI DONE WITHIN THE LAST TWO WEEKS AT [facility]. PT HAS THE DISC.    Characteristics of symptom/severity: 3 RIGHT NOW BUT GETS AS HIGH AS A 7 OR 8    Timing- Is it constant or intermittent: SOME OF THE PAIN IS CONSTANT BUT SHOOTING PAIN COMES AND GOES    What makes it worse: STRESS    What makes it better: ICE PACKS, QUIETNESS AND DARKNESS    What therapies/medications have you tried:   TYLENOL AND IT DOES HELP SOMETIMES    PT IS ASKING FOR A SOONER APPT BUT FIRST AVAILABLE WAS NOT UNTIL 9-11-25. PT PREFERRED TO DISCUSS INJURY DETAILS WITH DR NEELY BUT NOT WITH ME.

## 2025-07-02 NOTE — TELEPHONE ENCOUNTER
Hub staff attempted to follow warm transfer process and was unsuccessful     Caller: Sandra Auguste    Relationship to patient: Self    Best call back number:  [phone]    Patient is needing: PT IS CALLING FOR HIDA SCAN TEST RESULTS, PLEASE REACH BACK OUT TO ADVISE.

## 2025-07-03 DIAGNOSIS — K82.8 BILIARY DYSKINESIA: Primary | ICD-10-CM

## 2025-07-03 DIAGNOSIS — R11.0 NAUSEA: ICD-10-CM

## 2025-07-03 DIAGNOSIS — R14.0 ABDOMINAL BLOATING: ICD-10-CM

## 2025-07-03 NOTE — TELEPHONE ENCOUNTER
A referral was placed as well for patient. I went ahead and attached the referral to the July 23 appt at 2:15 pm

## 2025-07-05 ENCOUNTER — TELEMEDICINE (OUTPATIENT)
Dept: FAMILY MEDICINE CLINIC | Facility: TELEHEALTH | Age: 65
End: 2025-07-05
Payer: COMMERCIAL

## 2025-07-05 DIAGNOSIS — B37.31 VAGINAL CANDIDIASIS: ICD-10-CM

## 2025-07-05 DIAGNOSIS — R51.9 ACUTE NONINTRACTABLE HEADACHE, UNSPECIFIED HEADACHE TYPE: ICD-10-CM

## 2025-07-05 DIAGNOSIS — R73.9 HYPERGLYCEMIA: Primary | ICD-10-CM

## 2025-07-05 DIAGNOSIS — M54.2 NECK PAIN: ICD-10-CM

## 2025-07-05 RX ORDER — FLUCONAZOLE 150 MG/1
150 TABLET ORAL ONCE
Qty: 1 TABLET | Refills: 0 | Status: SHIPPED | OUTPATIENT
Start: 2025-07-05 | End: 2025-07-05

## 2025-07-05 RX ORDER — IBUPROFEN 600 MG/1
600 TABLET, FILM COATED ORAL EVERY 6 HOURS PRN
Qty: 60 TABLET | Refills: 0 | Status: SHIPPED | OUTPATIENT
Start: 2025-07-05

## 2025-07-06 NOTE — PROGRESS NOTES
You have chosen to receive care through a telehealth visit.  Do you consent to use a video/audio connection for your medical care today? Yes     HPI  History of Present Illness  The patient presents via virtual visit for evaluation of elevated blood sugar levels, neck pain, headache pain and yeast infection.    She reports an unusual increase in her blood sugar levels, with readings of 328 this morning and 329 tonight. Her blood sugar typically ranges from 130 to 140, but has recently been around 200 after meals. She does not consume sugary drinks, opting for diet soda and water instead. She attributes this to recent consumption of ice cream and pastries, which is not typical of her diet. She has discontinued her diabetes medication due to suspected metabolic syndrome, believing that her elevated blood sugar levels are a result of illness, infection, or injury rather than pancreatic dysfunction. She has some Jardiance left but stopped taking it due to constipation issues. She was advised by her gastroenterologist to take magnesium and Benefiber. She has been unable to exercise due to a head injury, which she believes is contributing to her elevated blood sugar levels. She has been more sedentary and notes that using a rebounder for 10 minutes can lower her blood sugar by 50 points, but she is currently unable to use it due to her head concussion. She attempted to walk for 30 minutes today but had to stop after 16 minutes due to shakiness. She is trying to drink a lot of water.    She is experiencing severe neck pain, which she believes may be causing her elevated blood sugar levels. The pain is bilateral but has recently radiated down the left side of her neck into her left shoulder. She did not have a neck x-ray following her fall. She continues to experience soreness in the back of her head and has noticed knots there. She is having difficulty sleeping due to the pain and woke up at 5:30 this morning with neck and  "head pain. She believes her neck pain may be contributing to her headaches. She has been using a heating pad for relief. She has run out of ibuprofen and has been managing the pain with Tylenol. She was prescribed Voltaren, which she found effective, but she has run out of it.    She suspects a recurrence of a yeast infection that she self-treated with Diflucan three weeks ago. She is experiencing itching and a burning sensation during urination.    She had a HIDA scan and was told that her gallbladder needs to be taken out. She is waiting to hear back from the surgeon about getting a consultation on that. She had an accident on 06/02/2025 and has post-concussion syndrome. She has an appointment with the neurologist on 07/23/2025. She had an MRI a couple of weeks ago, but she does not think they did anything, and it would have showed up anything on her neck. The MRI showed that she had something pertaining probably to chronic migraines.     Review of Systems: See HPI    Past Medical History:   Diagnosis Date    ADHD (attention deficit hyperactivity disorder) 07/30/2024    Just diagnosed    Allergic 1962    Anemia 1984    Anesthesia complication     HAS TROUBLE GETTING \"NUMB\"     Anxiety     Arthritis     on Celebrex + Robaxin, no steroids    Asthma     does not follow w/ pulmonary    Bell palsy     not sure    Bladder spasms     Cluster headache ?    ?    CTS (carpal tunnel syndrome)     Depression     w/ PTSD    Diverticulitis of colon 2021    Not sure about dates    Diverticulosis     Dyspepsia     Endometriosis     undiagnosed, maybe>?    Failure to thrive (child)     Fatigue     Fibroid     GERD (gastroesophageal reflux disease)     controlled w/ nightly Pepcid, EGD 2020 w/ Dr. Chavarria, (+) HH    Gestational hypertension not sure    30 years ago    Headache, tension-type     long hair that was pulled in [employer]    Health care maintenance 02/25/2020    Hernia 2021    Dr Chavarria    Lactose intolerance Baby    " "Memory loss     FROM SKULL FRACTURE AND FALL-SHORT TERM MEMORY LOSS    Migraine     Morbid obesity     Movement disorder     Multiple gestation 11/22/84_09/29/1986    2 BABIES    Osteoarthritis     Ovarian cyst     ?    Peripheral neuropathy     PMS (premenstrual syndrome)     PONV (postoperative nausea and vomiting) 1986    SWELLING /NAUSEA    PTSD (post-traumatic stress disorder)     Seasonal allergies     Seizures     \"convulsions\" at age 5    Skull fracture 2012    Sleep apnea with use of continuous positive airway pressure (CPAP)     CPAP compliant    Sleep apnea, obstructive 02/2016J    Dr Kirby    SOB (shortness of breath) on exertion     Spinal headache     not sure    Tendonitis     Trauma     Type 2 diabetes mellitus without complication, without long-term current use of insulin     dx 2021, no insulin, A1C <7    Varicella     Vision loss     Wears contact lenses     Wears partial dentures     TOP ONLY     Wears prescription eyeglasses        Family History   Problem Relation Age of Onset    Cancer Mother         Cervical /over 10 years ago    Dementia Mother     Hypertension Mother         high energy/living on meds    Anxiety disorder Mother     Seizures Mother     Miscarriages / Stillbirths Mother         2 miscarriages    Obesity Father     Hypertension Father     Heart attack Father     Sleep apnea Father     Depression Father     Heart disease Father     Hyperlipidemia Father     Early death Father         57    Heart failure Father         M heart attack    Sleep apnea Sister     ADD / ADHD Sister     Hypertension Brother     Sleep apnea Brother     ADD / ADHD Brother     Cancer Maternal Aunt         lymphoma    Cancer Paternal Aunt         Breast    Breast cancer Paternal Aunt 70    Cancer Maternal Grandmother     Early death Maternal Grandmother         Unknown    Alcohol abuse Maternal Grandfather     Early death Maternal Grandfather         leg amputated ..shock?    Emphysema Maternal " Grandfather         Gr-Grandfather    Obesity Paternal Grandmother     Diabetes Paternal Grandmother     Hypertension Paternal Grandmother     Stroke Paternal Grandmother     Heart attack Paternal Grandmother     Asthma Paternal Grandmother     Vision loss Paternal Grandmother         weak    Osteoporosis Paternal Grandmother         Father's mom had arthritis in her knees?    Arthritis Paternal Grandmother     Hyperlipidemia Paternal Grandmother         heavy    Heart attack Paternal Grandfather     Vision loss Paternal Grandfather         weak    Alcohol abuse Other     Depression Other     ADD / ADHD Brother     Alcohol abuse Brother     Seizures Brother         When he was young, not since he had tonsles removed.    Dementia Sister     Cancer Maternal Aunt         Lymphoma    Alcohol abuse Maternal Aunt     Cancer Paternal Aunt         double mastectomy    Heart disease Paternal Uncle         several heart surgeries    Heart failure Paternal Uncle     Heart disease Paternal Uncle         stroke    Mental illness Paternal Uncle         unknoown    Heart failure Paternal Uncle     Heart disease Paternal Uncle         pacemaker    Heart failure Paternal Uncle         Pacemaker    Mental illness Brother         unknown    Alcohol abuse Brother     Alcohol abuse Maternal Uncle     Depression Son     Vision loss Son         weak sight    Vision loss Daughter         lazy eye, weak sight    Alcohol abuse Maternal Aunt     Alcohol abuse Maternal Uncle             Depression Maternal Uncle         war vet    Mental illness Maternal Uncle         vet/ptsd ?    Alcohol abuse Maternal Uncle         /purple heart    Cancer Maternal Aunt         stage 4 Breast with spreading to the liver    Depression Son         unknown diagnosis also ADHD    Thyroid disease Paternal Aunt     Vision loss Daughter         lazy eye, weak sight/glasses    Vision loss Son         weak sight/glasses    Cancer Maternal Aunt      Cancer Paternal Aunt     Sleep apnea Brother     Sleep apnea Sister     Sleep apnea Brother     Thyroid disease Paternal Aunt        Social History     Socioeconomic History    Marital status: Single   Tobacco Use    Smoking status: Never     Passive exposure: Never    Smokeless tobacco: Never   Vaping Use    Vaping status: Never Used   Substance and Sexual Activity    Alcohol use: Not Currently     Comment: once or twice a year    Drug use: Never    Sexual activity: Not Currently     Partners: Male     Birth control/protection: Abstinence, Post-menopausal, Hysterectomy, Surgical         LMP  (LMP Unknown)     PHYSICAL EXAM  Physical Exam   Constitutional: She is oriented to person, place, and time. She appears well-developed and well-nourished. She does not have a sickly appearance. She does not appear ill. No distress.   HENT:   Head: Normocephalic and atraumatic.   Neck: Tracheal tenderness present.   Pulmonary/Chest: Effort normal.  No respiratory distress.  Neurological: She is alert and oriented to person, place, and time.   Psychiatric: She has a normal mood and affect.   Vitals reviewed.    Physical Exam  Neck: Tenderness noted on both sides of the neck, radiating down to the left shoulder.    Diagnoses and all orders for this visit:    1. Hyperglycemia (Primary)    2. Neck pain  -     ibuprofen (ADVIL,MOTRIN) 600 MG tablet; Take 1 tablet by mouth Every 6 (Six) Hours As Needed for Mild Pain. Take with food.  Dispense: 60 tablet; Refill: 0    3. Acute nonintractable headache, unspecified headache type  -     ibuprofen (ADVIL,MOTRIN) 600 MG tablet; Take 1 tablet by mouth Every 6 (Six) Hours As Needed for Mild Pain. Take with food.  Dispense: 60 tablet; Refill: 0    4. Vaginal candidiasis  -     fluconazole (Diflucan) 150 MG tablet; Take 1 tablet by mouth 1 (One) Time for 1 dose.  Dispense: 1 tablet; Refill: 0      Assessment & Plan  1. Elevated blood glucose.  - Hemoglobin A1c level was slightly elevated at  7.3 in 04/2025.  - Blood glucose levels have been unusually high, with readings of 328 and 329 recently.  - Advised to resume Jardiance medication and inform primary care physician.  - Recommended to increase water intake, monitor sugar content and carbohydrate intake, and check blood glucose levels before bedtime and approximately four times tomorrow.    2. Neck pain.  - Reports significant neck pain radiating to the left shoulder, affecting sleep and daily activities.  - Advised to apply alternating ice and heat for relief.  - Recommended to visit the ER for further evaluation in the morning.   - Ibuprofen 600 mg refill will be provided.    3. Yeast infection.  - Reports symptoms of itching and burning during urination, suggesting a possible recurrence.  - Previous use of Diflucan was noted, and symptoms may be returning.    4. Postconcussion syndrome.  - Experiencing ongoing headaches and dizziness since the fall on 06/02/2025.  - Follow-up appointment with a neurologist scheduled for 07/23/2025.  - Advised to continue monitoring symptoms and seek further evaluation if necessary.      FOLLOW-UP  As discussed during visit with [facility], if symptoms worsen or fail to improve, follow-up with PCP/Urgent Care/Emergency Department.    Patient verbalizes understanding of medications, instructions for treatment and follow-up.    Patient or patient representative verbalized consent for the use of Ambient Listening during the visit with  REBECA Cifuentes for chart documentation. 7/5/2025  22:31 EDT    REBECA Cifuentes  07/05/2025  22:31 EDT    The use of a video visit has been reviewed with the patient and verbal informed consent has been obtained. Myself and Sandra Auguste participated in this visit. The patient is located in [place], [place], and I am located in [place], [place]. [employer] and [employer]  were utilized.

## 2025-07-08 ENCOUNTER — TELEPHONE (OUTPATIENT)
Dept: PHYSICAL THERAPY | Facility: CLINIC | Age: 65
End: 2025-07-08
Payer: COMMERCIAL

## 2025-07-08 NOTE — TELEPHONE ENCOUNTER
Caller: Sandra Auguste    Relationship: Self    Best call back number:   Telephone Information:   Mobile [phone]          What was the call regarding: WAS CALLING TO GET WORK COMP APPT SET UP, NO ANSWER WHEN I TRIED OFFICE

## 2025-07-09 ENCOUNTER — TREATMENT (OUTPATIENT)
Dept: PHYSICAL THERAPY | Facility: CLINIC | Age: 65
End: 2025-07-09
Payer: OTHER MISCELLANEOUS

## 2025-07-09 DIAGNOSIS — M25.511 ACUTE PAIN OF BOTH SHOULDERS: ICD-10-CM

## 2025-07-09 DIAGNOSIS — M25.512 ACUTE PAIN OF BOTH SHOULDERS: ICD-10-CM

## 2025-07-09 DIAGNOSIS — M54.2 CERVICAL PAIN: Primary | ICD-10-CM

## 2025-07-09 NOTE — PROGRESS NOTES
Physical Therapy Initial Evaluation and Plan of Care  [facility] PHYSICAL THERAPY  [address]  [facility] [address] [phone]      Patient: Sandra Auguste   : 1960  Diagnosis/ICD-10 Code:  Cervical pain [M54.2]  Referring practitioner: Em Baum MD  Date of Initial Visit: 2025  Today's Date: 2025  Patient seen for 1 session         Visit Diagnoses:    ICD-10-CM ICD-9-CM   1. Cervical pain  M54.2 723.1   2. Acute pain of both shoulders  M25.511 719.41    M25.512        Patient Active Problem List    Diagnosis Date Noted   • Vitamin D deficiency 2025   • Attention deficit hyperactivity disorder, combined type 2024   • Brain atrophy 2024   • Mild cognitive impairment 2024   • Sleeping difficulties 2024   • Asthma 2023     Note Last Updated: 2023     does not follow w/ pulmonary     • Type 2 diabetes mellitus without complication, without long-term current use of insulin 2023     Note Last Updated: 2023     dx , no insulin, A1C <7     • ARMD (age-related macular degeneration), bilateral 2022   • High risk medications (not anticoagulants) long-term use 2021   • Hyperlipidemia 2021   • Generalized anxiety disorder 2021   • Moderate episode of recurrent major depressive disorder 2021   • Temple tenderness 2017   • TMJ click 2017   • Sleep apnea with use of continuous positive airway pressure (CPAP)    • Seasonal allergies    • Migraine    • Diverticulosis    • Acute pain of left shoulder 2016   • Anemia 2016   • Post traumatic stress disorder (PTSD) 2016   • Diverticulitis of colon 2016   • Steatosis of liver 2016   • Lateral epicondylitis 2016   • Knee pain 2016     Note Last Updated: 2016     Impression: 2015 - Take naproxen as directed to help with intermittent swelling and pain. Gave pt order for PT,  "she will call to schedule eval and treatment. Check xray. Pt will let us know if symptoms are not improving with PT over the next 4-6 weeks.;      • Osteoarthritis 06/17/2016   • Nausea 06/17/2016   • Gastroesophageal reflux disease 06/17/2016   • Skull fracture 01/01/2012     Note Last Updated: 10/9/2018     2012--fall down steps, hit head on metal corner       Past Medical History:   Diagnosis Date   • ADHD (attention deficit hyperactivity disorder) 07/30/2024    Just diagnosed   • Allergic 1960   • Anemia 1984   • Anesthesia complication     HAS TROUBLE GETTING \"NUMB\"    • Anxiety    • Arthritis     on Celebrex + Robaxin, no steroids   • Asthma     does not follow w/ pulmonary   • Bell palsy     not sure   • Bladder spasms    • Cluster headache ?    ?   • CTS (carpal tunnel syndrome)    • Depression     w/ PTSD   • Diverticulitis of colon 2021    Not sure about dates   • Diverticulosis    • Dyspepsia    • Endometriosis     undiagnosed, maybe>?   • Failure to thrive (child)    • Fatigue    • Fibroid    • GERD (gastroesophageal reflux disease)     controlled w/ nightly Pepcid, EGD 2020 w/ Dr. Chavarria, (+) HH   • Gestational hypertension not sure    30 years ago   • Headache, tension-type     long hair that was pulled in desk   • Health care maintenance 02/25/2020   • Hernia 2021    Dr Chavarria   • Lactose intolerance Baby   • Memory loss     FROM SKULL FRACTURE AND FALL-SHORT TERM MEMORY LOSS   • Migraine    • Morbid obesity    • Movement disorder    • Multiple gestation 11/22/84_09/29/1986    2 BABIES   • Osteoarthritis    • Ovarian cyst     ?   • Peripheral neuropathy    • PMS (premenstrual syndrome)    • PONV (postoperative nausea and vomiting) 1986    SWELLING /NAUSEA   • PTSD (post-traumatic stress disorder)    • Seasonal allergies    • Seizures     \"convulsions\" at age 5   • Skull fracture 2012   • Sleep apnea with use of continuous positive airway pressure (CPAP)     CPAP compliant   • Sleep apnea, " "obstructive 02/2016J    Dr Kirby   • SOB (shortness of breath) on exertion    • Spinal headache     not sure   • Tendonitis    • Trauma    • Type 2 diabetes mellitus without complication, without long-term current use of insulin     dx 2021, no insulin, A1C <7   • Varicella    • Vision loss    • Wears contact lenses    • Wears partial dentures     TOP ONLY    • Wears prescription eyeglasses      Past Surgical History:   Procedure Laterality Date   • CERVICAL POLYPECTOMY     • COLONOSCOPY  2021   • HYSTERECTOMY     • MYOMECTOMY  5 years     Dr packer/   • OOPHORECTOMY     • TOTAL LAPAROSCOPIC HYSTERECTOMY N/A 09/18/2017    Procedure: TOTAL LAPAROSCOPIC HYSTERECTOMY, BILATERAL SALPINGO OOPHORECTOMY WITH DAVINCI ROBOT ;  Surgeon: Shannon Grimaldo DO;  Location: [facility];  Service:    • UPPER GASTROINTESTINAL ENDOSCOPY     • WISDOM TOOTH EXTRACTION          Subjective Questionnaire: NDI:62%, Quick Dash 68.18      Subjective Evaluation    History of Present Illness  Date of onset: 6/2/2025  Mechanism of injury: Patient presents to PT for c/o shoulder / neck pain due to a fall at work    \"Really bad pain in neck and shoulder\"     Fell on right side, but getting shooting pains on left side of neck and down into the left shoulder; \"was pushed out of a chair\"     Has a headache currently; having daily headaches; describes pain as occipital in nature     Chief complaint is neck and right shoulder pain  Hard to carry things, pulls on neck and shoulder  Can't get neck comfortable to sleep , toss and turn    Has seen neuro in the past for migraines  \"Getting cognitive therapy in [facility]\"    Using heat and ice; heat for neck and shoulders; ice for headaches/eyes  Has tried Voltaren cream  Describes some sensitivity to light; feels more emotionally fragile since the concussion     Wants to make sure she is completely better before going back to work ; has been told it might be easier to get a second concussion since she has " already had one    Has appt with neurologist on the      Used to be a ; some CTS hx  Mostly RHD    Subjective comment: pain is worsening since the injuryQuality of life: fair    Pain  Current pain rating: 3  At worst pain ratin  Location: headache  Quality: discomfort, dull ache, radiating and tight  Relieving factors: change in position, heat, ice, medications and rest  Aggravating factors: movement, lifting, sleeping, prolonged positioning, outstretched reach and overhead activity  Progression: no change    Social Support  Lives in: apartment    Hand dominance: right    Diagnostic Tests  Abnormal CT scan: see imaging section.    Patient Goals  Patient goals for therapy: decreased pain, increased motion, increased strength, independence with ADLs/IADLs, return to work and return to sport/leisure activities           Objective        Special Questions      Additional Special Questions  Denies red flags      Postural Observations  Seated posture: poor  Standing posture: poor    Additional Postural Observation Details  Forward head, rounded shouldes    Palpation   Left   Tenderness of the cervical paraspinals, sternocleidomastoid, suboccipitals and upper trapezius.     Right Tenderness of the cervical paraspinals, sternocleidomastoid, suboccipitals and upper trapezius.     Tenderness     Right Shoulder  Tenderness in the AC joint.     Neurological Testing     Sensation   Cervical/Thoracic   Left   Intact: light touch    Right   Intact: light touch    Shoulder   Left Shoulder   Intact: light touch    Right Shoulder   Intact: Light touch    Reflexes   Left   Biceps (C5/C6): normal (2+)  Brachioradialis (C6): normal (2+)  Triceps (C7): normal (2+)    Right   Biceps (C5/C6): normal (2+)  Brachioradialis (C6): normal (2+)  Triceps (C7): normal (2+)    Active Range of Motion   Cervical/Thoracic Spine   Cervical    Flexion: WFL and with pain  Extension: 30 degrees WFL and with pain  Left lateral flexion:  25 degrees   Right lateral flexion: 25 degrees   Left rotation: 55 degrees   Right rotation: 50 degrees   Left Shoulder   Flexion: 153 degrees   Internal rotation BTB: WFL    Right Shoulder   Flexion: 160 degrees   Internal rotation BTB: WFL    Strength/Myotome Testing     Left Shoulder   Normal muscle strength    Planes of Motion   Flexion: 4+   Abduction: 4+   External rotation at 0°: 4+   Internal rotation at 0°: 5     Right Shoulder   Normal muscle strength    Planes of Motion   Flexion: 4+   Abduction: 4+   External rotation at 0°: 4+   Internal rotation at 0°: 5     Left Elbow   Flexion: 4+  Extension: 4+    Right Elbow   Flexion: 4+  Extension: 4+    Left Wrist/Hand   Wrist extension: 4+  Wrist flexion: 4+    Right Wrist/Hand   Wrist extension: 4+  Wrist flexion: 4+    Tests   Cervical     Left   Negative active compression ([place]), cervical distraction and Spurling's sign.     Right   Negative active compression ([place]), cervical distraction and Spurling's sign.     Right Shoulder   Negative belly press, empty can, lift-off, Neer's and painful arc.     Additional Tests Details  Attempted SO release with tennis balls, patient did not get relief  No relief with attempts at light SO release by therapist, or with light PA mobs, traction  Pain with attempts at supine cervical rotation actively     No exercises appropriate today          Assessment & Plan       Assessment  Impairments: abnormal muscle firing, abnormal muscle tone, abnormal or restricted ROM, activity intolerance, impaired physical strength, lacks appropriate home exercise program and pain with function   Functional limitations: carrying objects, lifting, sleeping, pulling, pushing, uncomfortable because of pain, moving in bed, sitting, standing, stooping and reaching overhead   Assessment details: Patient presents with chronic cervical pain with limitations in mobility, poor tolerance to prolonged positions, and decreased functional load  tolerance; patient has tenderness to palpate cervical and periscapular muscles, and displays forward head and rounded shoulders in seated position; they should respond well to PT for decreasing soft tissue tenderness, improving postural strength and awareness as well as tolerance to prolonged positions and load tolerance     Barriers to therapy: co-morbidities, PMH, chronic migraines  Prognosis: poor  Prognosis details: Guarded prognosis    Goals  Plan Goals: 4 weeks:  1. Patient to display independence in initial home exercise program  2. Patient to display 50% decreased tenderness to palpation of cervical and paraspinal musculature   3. Patient to display improved seated/standing posture without verbal cueing  4. Patient to display improved mobility of cervical spine in all planes for ADL's    8 weeks:  1. Patient to display improved NDI or other functional measure by 1 MCID  2. Patient to display UE strength > or equal to 5/5 for ADL's  3. Patient to display cervical/UE ROM to WFL without a significant increase in pain  4. Patient to participate in up to 60 minutes of functional mobility and strength physical therapy exercise without a significant increase in pain for improved ADL's     Plan  Therapy options: will be seen for skilled therapy services  Planned modality interventions: iontophoresis, TENS, thermotherapy (hydrocollator packs), traction, ultrasound, high voltage pulsed current (pain management), electrical stimulation/Russian stimulation, cryotherapy and dry needling  Planned therapy interventions: manual therapy, neuromuscular re-education, postural training, orthotic fitting/training, soft tissue mobilization, spinal/joint mobilization, strengthening, stretching, therapeutic activities, joint mobilization, home exercise program, gait training, flexibility, functional ROM exercises, body mechanics training, balance/weight-bearing training and fine motor coordination training  Frequency: 2x  week  Duration in weeks: 8  Treatment plan discussed with: patient  Plan details: Discussed ergonomics for office work, sit / stand option    Patient to be seen 1-3 x per week for up to 12 weeks as needed to achieve goals               Timed:         Manual Therapy:         mins  30811;     Therapeutic Exercise:         mins  11122;     Neuromuscular Juaquin:        mins  90675;    Therapeutic Activity:          mins  33191;     Gait Training:           mins  05149;     Ultrasound:          mins  95600;    Ionto                                   mins   20728  Self Care                            mins   03085  Canalith Repos         mins 57161      Un-Timed:  Electrical Stimulation:         mins  47826 ( );  Dry Needling          mins self-pay  Traction          mins 87090    Low Eval          Mins  63879  Mod Eval     50     Mins  59596  High Eval                            Mins  21181        Timed Treatment:   0   mins   Total Treatment:     50   mins          PT: MIGUELITO Valdez PT     License Number: [id number]  Electronically signed by MIGUELITO Valdez PT, 07/09/25, 11:23 AM EDT    Certification Period: 7/9/2025 thru 10/6/2025  I certify that the therapy services are furnished while this patient is under my care.  The services outlined above are required by this patient, and will be reviewed every 90 days.         Physician Signature:__________________________________________________    PHYSICIAN: Em Baum MD  NPI: [id number]                                      DATE:      Please sign and return via fax to [email or web address] . Thank you, [facility].

## 2025-07-10 ENCOUNTER — APPOINTMENT (OUTPATIENT)
Dept: CT IMAGING | Facility: HOSPITAL | Age: 65
End: 2025-07-10
Payer: COMMERCIAL

## 2025-07-10 ENCOUNTER — APPOINTMENT (OUTPATIENT)
Dept: SPEECH THERAPY | Facility: HOSPITAL | Age: 65
End: 2025-07-10
Payer: OTHER MISCELLANEOUS

## 2025-07-10 ENCOUNTER — TELEPHONE (OUTPATIENT)
Dept: INTERNAL MEDICINE | Facility: CLINIC | Age: 65
End: 2025-07-10
Payer: COMMERCIAL

## 2025-07-10 ENCOUNTER — HOSPITAL ENCOUNTER (EMERGENCY)
Facility: HOSPITAL | Age: 65
Discharge: HOME | End: 2025-07-10
Attending: EMERGENCY MEDICINE
Payer: COMMERCIAL

## 2025-07-10 VITALS
BODY MASS INDEX: 41.14 KG/M2 | SYSTOLIC BLOOD PRESSURE: 142 MMHG | WEIGHT: 256 LBS | TEMPERATURE: 98.6 F | HEART RATE: 87 BPM | HEIGHT: 66 IN | OXYGEN SATURATION: 91 % | RESPIRATION RATE: 18 BRPM | DIASTOLIC BLOOD PRESSURE: 62 MMHG

## 2025-07-10 DIAGNOSIS — N39.0 ACUTE URINARY TRACT INFECTION: ICD-10-CM

## 2025-07-10 DIAGNOSIS — R11.2 NAUSEA AND VOMITING IN ADULT: Primary | ICD-10-CM

## 2025-07-10 DIAGNOSIS — R73.9 ACUTE HYPERGLYCEMIA: ICD-10-CM

## 2025-07-10 LAB
ALBUMIN SERPL-MCNC: 4.5 G/DL (ref 3.5–5.2)
ALBUMIN/GLOB SERPL: 1.5 G/DL
ALP SERPL-CCNC: 119 U/L (ref 39–117)
ALT SERPL W P-5'-P-CCNC: 47 U/L (ref 1–33)
AMPHET+METHAMPHET UR QL: NEGATIVE
AMPHETAMINES UR QL: NEGATIVE
ANION GAP SERPL CALCULATED.3IONS-SCNC: 11.5 MMOL/L (ref 5–15)
AST SERPL-CCNC: 35 U/L (ref 1–32)
BACTERIA UR QL AUTO: ABNORMAL /HPF
BARBITURATES UR QL SCN: NEGATIVE
BASOPHILS # BLD AUTO: 0.06 10*3/MM3 (ref 0–0.2)
BASOPHILS NFR BLD AUTO: 0.6 % (ref 0–1.5)
BENZODIAZ UR QL SCN: NEGATIVE
BILIRUB SERPL-MCNC: 0.2 MG/DL (ref 0–1.2)
BILIRUB UR QL STRIP: NEGATIVE
BUN SERPL-MCNC: 11.8 MG/DL (ref 8–23)
BUN/CREAT SERPL: 18.2 (ref 7–25)
BUPRENORPHINE SERPL-MCNC: NEGATIVE NG/ML
CALCIUM SPEC-SCNC: 9.9 MG/DL (ref 8.6–10.5)
CANNABINOIDS SERPL QL: NEGATIVE
CHLORIDE SERPL-SCNC: 101 MMOL/L (ref 98–107)
CLARITY UR: CLEAR
CO2 SERPL-SCNC: 25.5 MMOL/L (ref 22–29)
COCAINE UR QL: NEGATIVE
COLOR UR: YELLOW
CREAT SERPL-MCNC: 0.65 MG/DL (ref 0.57–1)
D-LACTATE SERPL-SCNC: 1.8 MMOL/L (ref 0.5–2)
DEPRECATED RDW RBC AUTO: 43.8 FL (ref 37–54)
EGFRCR SERPLBLD CKD-EPI 2021: 99.7 ML/MIN/1.73
EOSINOPHIL # BLD AUTO: 0.31 10*3/MM3 (ref 0–0.4)
EOSINOPHIL NFR BLD AUTO: 3 % (ref 0.3–6.2)
ERYTHROCYTE [DISTWIDTH] IN BLOOD BY AUTOMATED COUNT: 13.4 % (ref 12.3–15.4)
FENTANYL UR-MCNC: NEGATIVE NG/ML
GLOBULIN UR ELPH-MCNC: 3 GM/DL
GLUCOSE SERPL-MCNC: 144 MG/DL (ref 65–99)
GLUCOSE UR STRIP-MCNC: ABNORMAL MG/DL
HCT VFR BLD AUTO: 43.2 % (ref 34–46.6)
HGB BLD-MCNC: 13.9 G/DL (ref 12–15.9)
HGB UR QL STRIP.AUTO: NEGATIVE
HOLD SPECIMEN: NORMAL
HYALINE CASTS UR QL AUTO: ABNORMAL /LPF
IMM GRANULOCYTES # BLD AUTO: 0.03 10*3/MM3 (ref 0–0.05)
IMM GRANULOCYTES NFR BLD AUTO: 0.3 % (ref 0–0.5)
KETONES UR QL STRIP: ABNORMAL
LEUKOCYTE ESTERASE UR QL STRIP.AUTO: ABNORMAL
LIPASE SERPL-CCNC: 24 U/L (ref 13–60)
LYMPHOCYTES # BLD AUTO: 2.6 10*3/MM3 (ref 0.7–3.1)
LYMPHOCYTES NFR BLD AUTO: 25.5 % (ref 19.6–45.3)
MCH RBC QN AUTO: 28.7 PG (ref 26.6–33)
MCHC RBC AUTO-ENTMCNC: 32.2 G/DL (ref 31.5–35.7)
MCV RBC AUTO: 89.1 FL (ref 79–97)
METHADONE UR QL SCN: NEGATIVE
MONOCYTES # BLD AUTO: 0.75 10*3/MM3 (ref 0.1–0.9)
MONOCYTES NFR BLD AUTO: 7.3 % (ref 5–12)
NEUTROPHILS NFR BLD AUTO: 6.46 10*3/MM3 (ref 1.7–7)
NEUTROPHILS NFR BLD AUTO: 63.3 % (ref 42.7–76)
NITRITE UR QL STRIP: NEGATIVE
NRBC BLD AUTO-RTO: 0 /100 WBC (ref 0–0.2)
OPIATES UR QL: NEGATIVE
OXYCODONE UR QL SCN: NEGATIVE
PCP UR QL SCN: NEGATIVE
PH UR STRIP.AUTO: 7 [PH] (ref 5–8)
PLATELET # BLD AUTO: 301 10*3/MM3 (ref 140–450)
PMV BLD AUTO: 10.5 FL (ref 6–12)
POTASSIUM SERPL-SCNC: 4.4 MMOL/L (ref 3.5–5.2)
PROT SERPL-MCNC: 7.5 G/DL (ref 6–8.5)
PROT UR QL STRIP: NEGATIVE
RBC # BLD AUTO: 4.85 10*6/MM3 (ref 3.77–5.28)
RBC # UR STRIP: ABNORMAL /HPF
REF LAB TEST METHOD: ABNORMAL
SODIUM SERPL-SCNC: 138 MMOL/L (ref 136–145)
SP GR UR STRIP: 1.02 (ref 1–1.03)
SQUAMOUS #/AREA URNS HPF: ABNORMAL /HPF
TRICYCLICS UR QL SCN: NEGATIVE
UROBILINOGEN UR QL STRIP: ABNORMAL
WBC # UR STRIP: ABNORMAL /HPF
WBC NRBC COR # BLD AUTO: 10.21 10*3/MM3 (ref 3.4–10.8)
WHOLE BLOOD HOLD COAG: NORMAL
WHOLE BLOOD HOLD SPECIMEN: NORMAL

## 2025-07-10 PROCEDURE — 83605 ASSAY OF LACTIC ACID: CPT | Performed by: EMERGENCY MEDICINE

## 2025-07-10 PROCEDURE — 74176 CT ABD & PELVIS W/O CONTRAST: CPT

## 2025-07-10 PROCEDURE — 81001 URINALYSIS AUTO W/SCOPE: CPT | Performed by: EMERGENCY MEDICINE

## 2025-07-10 PROCEDURE — 83690 ASSAY OF LIPASE: CPT | Performed by: EMERGENCY MEDICINE

## 2025-07-10 PROCEDURE — 63710000001 ONDANSETRON ODT 4 MG TABLET DISPERSIBLE: Performed by: EMERGENCY MEDICINE

## 2025-07-10 PROCEDURE — 80307 DRUG TEST PRSMV CHEM ANLYZR: CPT | Performed by: EMERGENCY MEDICINE

## 2025-07-10 PROCEDURE — 85025 COMPLETE CBC W/AUTO DIFF WBC: CPT | Performed by: EMERGENCY MEDICINE

## 2025-07-10 PROCEDURE — 99284 EMERGENCY DEPT VISIT MOD MDM: CPT

## 2025-07-10 PROCEDURE — 80053 COMPREHEN METABOLIC PANEL: CPT | Performed by: EMERGENCY MEDICINE

## 2025-07-10 PROCEDURE — 36415 COLL VENOUS BLD VENIPUNCTURE: CPT

## 2025-07-10 RX ORDER — ONDANSETRON 4 MG/1
4 TABLET, ORALLY DISINTEGRATING ORAL EVERY 6 HOURS PRN
Qty: 20 TABLET | Refills: 0 | Status: SHIPPED | OUTPATIENT
Start: 2025-07-10 | End: 2025-07-15

## 2025-07-10 RX ORDER — ONDANSETRON 4 MG/1
4 TABLET, ORALLY DISINTEGRATING ORAL ONCE
Status: COMPLETED | OUTPATIENT
Start: 2025-07-10 | End: 2025-07-10

## 2025-07-10 RX ORDER — CEPHALEXIN 500 MG/1
500 CAPSULE ORAL 4 TIMES DAILY
Qty: 28 CAPSULE | Refills: 0 | Status: SHIPPED | OUTPATIENT
Start: 2025-07-10 | End: 2025-07-17

## 2025-07-10 RX ORDER — SODIUM CHLORIDE 9 MG/ML
10 INJECTION, SOLUTION INTRAMUSCULAR; INTRAVENOUS; SUBCUTANEOUS AS NEEDED
Status: DISCONTINUED | OUTPATIENT
Start: 2025-07-10 | End: 2025-07-10 | Stop reason: HOSPADM

## 2025-07-10 RX ADMIN — CEPHALEXIN 500 MG: 250 CAPSULE ORAL at 19:19

## 2025-07-10 RX ADMIN — ONDANSETRON 4 MG: 4 TABLET, ORALLY DISINTEGRATING ORAL at 19:19

## 2025-07-10 NOTE — Clinical Note
[facility] EMERGENCY DEPARTMENT  [address]  [facility] [address]  Phone: [phone]    Sandra Auguste was seen and treated in our emergency department on 7/10/2025.  She may return to work on 07/14/2025.         Thank you for choosing [facility].    Charline Baez MD

## 2025-07-10 NOTE — Clinical Note
[facility] EMERGENCY DEPARTMENT  [address]  [facility] [address]  Phone: [phone]    Sandra Auguste was seen and treated in our emergency department on 7/10/2025.  She may return to work on 07/14/2025.         Thank you for choosing [facility].    Charline Baez MD

## 2025-07-10 NOTE — TELEPHONE ENCOUNTER
Patient calling because she missed [place]'s call, she is in the ER at the moment   
Patient is returning call to [place]. There was no encounter as to what but she did want me to let you know she did end up going to the ER and is there now.   She would like a call back.     Call back: [phone]  
Pt reports she randomly felt nauseous today an began vomiting. Reports she is unsure if it is due to the concussion she had 6/2/25 or due to the current yeast infection she has or because she started PT yesterday.    Advised pt to go [facility] or ED to be evaluated d/t us having no more available appts today.    Pt verbalized understanding       
none

## 2025-07-10 NOTE — Clinical Note
[facility] EMERGENCY DEPARTMENT  [address]  [facility] [address]  Phone: [phone]    Sandra Auguste was seen and treated in our emergency department on 7/10/2025.  She may return to work on 07/14/2025.         Thank you for choosing [facility].    Charline Baez MD

## 2025-07-10 NOTE — Clinical Note
[facility] EMERGENCY DEPARTMENT  [address]  [facility] [address]  Phone: [phone]    Sandra Auguste was seen and treated in our emergency department on 7/10/2025.  She may return to work on 07/14/2025.         Thank you for choosing [facility].    Charline Baez MD

## 2025-07-11 NOTE — ED PROVIDER NOTES
Subjective   History of Present Illness  64-year-old female presents for evaluation of nausea vomiting.  The patient reports that within the last month she struck her head and has had a concussion related to that.  However she denies any recent injuries.  She reports that she began to develop nausea and vomiting earlier today.  She denies pain.  No chest pain or abdominal pain.  She recently had a gallbladder HIDA scan that reportedly showed 29% unction.  She denies the nausea vomiting that occurred the day being associated with food or associated with pain in the upper abdomen over the right upper quadrant.  No dysuria.  No diarrhea or blood in the stool.  No other acute complaints.      Review of Systems   Constitutional:  Negative for chills, fatigue and fever.   HENT:  Negative for congestion, ear pain, postnasal drip, sinus pressure and sore throat.    Eyes:  Negative for pain, redness and visual disturbance.   Respiratory:  Negative for cough, chest tightness and shortness of breath.    Cardiovascular:  Negative for chest pain, palpitations and leg swelling.   Gastrointestinal:  Positive for nausea and vomiting. Negative for abdominal pain, anal bleeding, blood in stool and diarrhea.   Endocrine: Negative for polydipsia and polyuria.   Genitourinary:  Negative for difficulty urinating, dysuria, frequency and urgency.   Musculoskeletal:  Negative for arthralgias, back pain and neck pain.   Skin:  Negative for pallor and rash.   Allergic/Immunologic: Negative for environmental allergies and immunocompromised state.   Neurological:  Negative for dizziness, weakness and headaches.   Hematological:  Negative for adenopathy.   Psychiatric/Behavioral:  Negative for confusion, self-injury and suicidal ideas. The patient is not nervous/anxious.    All other systems reviewed and are negative.      Past Medical History:   Diagnosis Date    ADHD (attention deficit hyperactivity disorder) 07/30/2024    Just diagnosed     "Allergic [id number]    Anemia 1984    Anesthesia complication     HAS TROUBLE GETTING \"NUMB\"     Anxiety     Arthritis     on Celebrex + Robaxin, no steroids    Asthma     does not follow w/ pulmonary    Bell palsy     not sure    Bladder spasms     Cluster headache ?    ?    CTS (carpal tunnel syndrome)     Depression     w/ PTSD    Diverticulitis of colon 2021    Not sure about dates    Diverticulosis     Dyspepsia     Endometriosis     undiagnosed, maybe>?    Failure to thrive (child)     Fatigue     Fibroid     GERD (gastroesophageal reflux disease)     controlled w/ nightly Pepcid, EGD 2020 w/ Dr. Chavarria, (+) HH    Gestational hypertension not sure    30 years ago    Headache, tension-type     long hair that was pulled in [employer]    Health care maintenance 02/25/2020    Hernia 2021    Dr Chavarria    Lactose intolerance Baby    Memory loss     FROM SKULL FRACTURE AND FALL-SHORT TERM MEMORY LOSS    Migraine     Morbid obesity     Movement disorder     Multiple gestation 11/22/84_09/29/1986    2 BABIES    Osteoarthritis     Ovarian cyst     ?    Peripheral neuropathy     PMS (premenstrual syndrome)     PONV (postoperative nausea and vomiting) 1986    SWELLING /NAUSEA    PTSD (post-traumatic stress disorder)     Seasonal allergies     Seizures     \"convulsions\" at age 5    Skull fracture 2012    Sleep apnea with use of continuous positive airway pressure (CPAP)     CPAP compliant    Sleep apnea, obstructive 02/2016J    Dr Kirby    SOB (shortness of breath) on exertion     Spinal headache     not sure    Tendonitis     Trauma     Type 2 diabetes mellitus without complication, without long-term current use of insulin     dx 2021, no insulin, A1C <7    Varicella     Vision loss     Wears contact lenses     Wears partial dentures     TOP ONLY     Wears prescription eyeglasses        Allergies   Allergen Reactions    Mold Extract [Trichophyton] Shortness Of Breath, Anxiety, Palpitations and Irritability    Lortab " [Hydrocodone-Acetaminophen] Nausea And Vomiting    Metformin GI Intolerance    Pollen Extract Other (See Comments)     SNEEZING AND CONGESTION        Past Surgical History:   Procedure Laterality Date    CERVICAL POLYPECTOMY      COLONOSCOPY  2021    HYSTERECTOMY      MYOMECTOMY  5 years     Dr packer/    OOPHORECTOMY      TOTAL LAPAROSCOPIC HYSTERECTOMY N/A 09/18/2017    Procedure: TOTAL LAPAROSCOPIC HYSTERECTOMY, BILATERAL SALPINGO OOPHORECTOMY WITH DAVINCI ROBOT ;  Surgeon: Shannon Grimaldo DO;  Location: [facility] OR;  Service:     UPPER GASTROINTESTINAL ENDOSCOPY      WISDOM TOOTH EXTRACTION         Family History   Problem Relation Age of Onset    Cancer Mother         Cervical /over 10 years ago    Dementia Mother     Hypertension Mother         high energy/living on meds    Anxiety disorder Mother     Seizures Mother     Miscarriages / Stillbirths Mother         2 miscarriages    Obesity Father     Hypertension Father     Heart attack Father     Sleep apnea Father     Depression Father     Heart disease Father     Hyperlipidemia Father     Early death Father         57    Heart failure Father         M heart attack    Sleep apnea Sister     ADD / ADHD Sister     Hypertension Brother     Sleep apnea Brother     ADD / ADHD Brother     Cancer Maternal Aunt         lymphoma    Cancer Paternal Aunt         Breast    Breast cancer Paternal Aunt 70    Cancer Maternal Grandmother     Early death Maternal Grandmother         Unknown    Alcohol abuse Maternal Grandfather     Early death Maternal Grandfather         leg amputated ..shock?    Emphysema Maternal Grandfather         Gr-Grandfather    Obesity Paternal Grandmother     Diabetes Paternal Grandmother     Hypertension Paternal Grandmother     Stroke Paternal Grandmother     Heart attack Paternal Grandmother     Asthma Paternal Grandmother     Vision loss Paternal Grandmother         weak    Osteoporosis Paternal Grandmother         Father's mom had arthritis in her  knees?    Arthritis Paternal Grandmother     Hyperlipidemia Paternal Grandmother         heavy    Heart attack Paternal Grandfather     Vision loss Paternal Grandfather         weak    Alcohol abuse Other     Depression Other     ADD / ADHD Brother     Alcohol abuse Brother     Seizures Brother         When he was young, not since he had tonsles removed.    Dementia Sister     Cancer Maternal Aunt         Lymphoma    Alcohol abuse Maternal Aunt     Cancer Paternal Aunt         double mastectomy    Heart disease Paternal Uncle         several heart surgeries    Heart failure Paternal Uncle     Heart disease Paternal Uncle         stroke    Mental illness Paternal Uncle         unknoown    Heart failure Paternal Uncle     Heart disease Paternal Uncle         pacemaker    Heart failure Paternal Uncle         Pacemaker    Mental illness Brother         unknown    Alcohol abuse Brother     Alcohol abuse Maternal Uncle     Depression Son     Vision loss Son         weak sight    Vision loss Daughter         lazy eye, weak sight    Alcohol abuse Maternal Aunt     Alcohol abuse Maternal Uncle         Gunlock    Depression Maternal Uncle         war vet    Mental illness Maternal Uncle         vet/ptsd ?    Alcohol abuse Maternal Uncle         /purple heart    Cancer Maternal Aunt         stage 4 Breast with spreading to the liver    Depression Son         unknown diagnosis also ADHD    Thyroid disease Paternal Aunt     Vision loss Daughter         lazy eye, weak sight/glasses    Vision loss Son         weak sight/glasses    Cancer Maternal Aunt     Cancer Paternal Aunt     Sleep apnea Brother     Sleep apnea Sister     Sleep apnea Brother     Thyroid disease Paternal Aunt        Social History     Socioeconomic History    Marital status: Single   Tobacco Use    Smoking status: Never     Passive exposure: Never    Smokeless tobacco: Never   Vaping Use    Vaping status: Never Used   Substance and Sexual Activity     Alcohol use: Not Currently     Comment: once or twice a year    Drug use: Never    Sexual activity: Not Currently     Partners: Male     Birth control/protection: Abstinence, Post-menopausal, Hysterectomy, Surgical           Objective   Physical Exam  Vitals and nursing note reviewed.   Constitutional:       General: She is not in acute distress.     Appearance: Normal appearance. She is well-developed. She is not toxic-appearing or diaphoretic.   HENT:      Head: Normocephalic and atraumatic.      Right Ear: External ear normal.      Left Ear: External ear normal.      Nose: Nose normal.   Eyes:      General: Lids are normal.      Pupils: Pupils are equal, round, and reactive to light.   Neck:      Trachea: No tracheal deviation.   Cardiovascular:      Rate and Rhythm: Normal rate and regular rhythm.      Pulses: No decreased pulses.      Heart sounds: Normal heart sounds. No murmur heard.     No friction rub. No gallop.   Pulmonary:      Effort: Pulmonary effort is normal. No respiratory distress.      Breath sounds: Normal breath sounds. No decreased breath sounds, wheezing, rhonchi or rales.   Abdominal:      General: Bowel sounds are normal.      Palpations: Abdomen is soft.      Tenderness: There is no abdominal tenderness. There is no guarding or rebound.   Musculoskeletal:         General: No deformity. Normal range of motion.      Cervical back: Normal range of motion and neck supple.   Lymphadenopathy:      Cervical: No cervical adenopathy.   Skin:     General: Skin is warm and dry.      Findings: No rash.   Neurological:      Mental Status: She is alert and oriented to person, place, and time.      GCS: GCS eye subscore is 4. GCS verbal subscore is 5. GCS motor subscore is 6.      Cranial Nerves: No cranial nerve deficit.      Sensory: No sensory deficit.      Comments: Normal mentation, GCS 15.   Psychiatric:         Speech: Speech normal.         Behavior: Behavior normal.         Thought Content:  Thought content normal.         Judgment: Judgment normal.         Procedures           ED Course                                                       Medical Decision Making  Differential includes dehydration viral illness, adverse medication effect, urinary tract infection, intra-abdominal abnormality.    Labs show normal white count and H&H, normal kidney function electrolytes.    The patient's AST, ALT, and alkaline phosphatase are elevated consistent with her previous known baseline given fatty liver disease.    Urine shows moderate leukocytes, 11-20 white blood cells, no bacteria.  Given the presentation this will be treated with a course of antibiotics for urinary tract infection.    CT scan abdomen pelvis without contrast interpreted by me as negative for acute intra-abdominal abnormality.    The patient received nausea medication and initial oral antibiotics here in the ER.    Discharged with nausea medication, oral antibiotics, and the advised to follow-up with primary care physician for recheck in 1 week.    Problems Addressed:  Acute hyperglycemia: complicated acute illness or injury with systemic symptoms  Acute urinary tract infection: complicated acute illness or injury with systemic symptoms that poses a threat to life or bodily functions  Nausea and vomiting in adult: complicated acute illness or injury with systemic symptoms    Amount and/or Complexity of Data Reviewed  External Data Reviewed: labs and radiology.  Labs: ordered. Decision-making details documented in ED Course.  Radiology: ordered and independent interpretation performed. Decision-making details documented in ED Course.    Risk  Prescription drug management.        Final diagnoses:   Nausea and vomiting in adult   Acute urinary tract infection   Acute hyperglycemia       ED Disposition  ED Disposition       ED Disposition   Discharge    Condition   Stable    Comment   --               Em Baum MD  [facility]  [address]  [phone]    In 1 week           Medication List        New Prescriptions      cephalexin 500 MG capsule  Commonly known as: KEFLEX  Take 1 capsule by mouth 4 (Four) Times a Day for 7 days.     ondansetron ODT 4 MG disintegrating tablet  Commonly known as: ZOFRAN-ODT  Place 1 tablet on the tongue Every 6 (Six) Hours As Needed for Nausea for up to 5 days.               Where to Get Your Medications        These medications were sent to [facility] [id number] - [place] - [address] [facility]  [phone]  - [phone]   [address] [facility] [place] [address]      Phone: [phone]   cephalexin 500 MG capsule  ondansetron ODT 4 MG disintegrating tablet            Charline Baez MD  07/11/25 0907

## 2025-07-14 ENCOUNTER — TREATMENT (OUTPATIENT)
Dept: PHYSICAL THERAPY | Facility: CLINIC | Age: 65
End: 2025-07-14
Payer: OTHER MISCELLANEOUS

## 2025-07-14 DIAGNOSIS — M25.511 ACUTE PAIN OF BOTH SHOULDERS: ICD-10-CM

## 2025-07-14 DIAGNOSIS — M25.512 ACUTE PAIN OF BOTH SHOULDERS: ICD-10-CM

## 2025-07-14 DIAGNOSIS — M54.2 CERVICAL PAIN: Primary | ICD-10-CM

## 2025-07-14 NOTE — PROGRESS NOTES
"Physical Therapy Daily Treatment Note  [facility] PHYSICAL THERAPY  [address] [address] [phone]    Patient: Sandra Auguste   : 1960  Referring practitioner: Em Baum MD  Date of Initial Visit: Type: THERAPY  Noted: 2025  Today's Date: 2025  Patient seen for 2 sessions       Visit Diagnoses:    ICD-10-CM ICD-9-CM   1. Cervical pain  M54.2 723.1   2. Acute pain of both shoulders  M25.511 719.41    M25.512      *patient arrived 30 minutes late for appointment    Subjective   Patient reports got really sick the day after I was here; they said I had a UTI; put me on really strong antibiotics 4 x a day (has only been able to take 3 x day)  Has been using heating pad and Tylenol   Feels like \"neck and shoulder symptoms are a little better in a way\"  2/10 pain currently  Sharp pain in right shoulder when driving - shoulder aggravated now     Objective   See Exercise, Manual, and Modality Logs for complete treatment    Crepitus/grating right scapula with shoulder rolls     Seated scapula retraction  Seated shoulder shrugs  Seated shoulder circles  Seated cervical spine rotation bilaterally, using hand to assist  Seated cervical extension/flexion stretch  Shoulder pulleys      Assessment/Plan  Sandra Auguste needs continued skilled Physical Therapy services for decreasing pain while improving mobility, strength, balance and endurance in order to return to work and/or activities of daily living without pain or dysfunction      Treatment considerations for next visit:  Advance as tolerated towards stated goals    Timed:   Manual Therapy:         mins  60828;     Therapeutic Exercise:    10     mins  16023;     Neuromuscular Juaquin:    10    mins  28132;    Therapeutic Activity:     10     mins  23671;     Gait Training:           mins  24507;     Ultrasound:          mins  61224;    Ionto                                   mins   30084  Self Care "                            mins   62014  [facility]         mins   38122    Un-Timed:  Electrical Stimulation:         mins  37879 ( );  Dry Needling          mins self-pay  Traction          mins 50824      Timed Treatment:   30   mins   Total Treatment:     30   mins    MIGUELITO Valdez, PT  [place] License: [id number]

## 2025-07-16 ENCOUNTER — TREATMENT (OUTPATIENT)
Dept: PHYSICAL THERAPY | Facility: CLINIC | Age: 65
End: 2025-07-16
Payer: OTHER MISCELLANEOUS

## 2025-07-16 DIAGNOSIS — M25.512 ACUTE PAIN OF BOTH SHOULDERS: ICD-10-CM

## 2025-07-16 DIAGNOSIS — M25.511 ACUTE PAIN OF BOTH SHOULDERS: ICD-10-CM

## 2025-07-16 DIAGNOSIS — M54.2 CERVICAL PAIN: Primary | ICD-10-CM

## 2025-07-16 NOTE — PROGRESS NOTES
"Physical Therapy Daily Treatment Note  [facility] PHYSICAL THERAPY  [address] [address] [phone]    Patient: Sandra Auguste   : 1960  Referring practitioner: Em Baum MD  Date of Initial Visit: Type: THERAPY  Noted: 2025  Today's Date: 2025  Patient seen for 3 sessions       Visit Diagnoses:    ICD-10-CM ICD-9-CM   1. Cervical pain  M54.2 723.1   2. Acute pain of both shoulders  M25.511 719.41    M25.512      *Patient arrived 25 minutes late- had conversation with patient re: late policy    Subjective   Patient reports sore after last visit   \"Still recovering from brain fog, UTI and concussion\"   Use heat and ibuprofen for shoulder; use ice for headache    Objective   See Exercise, Manual, and Modality Logs for complete treatment    Limited cervical mobility, limited tolerance to movement ; c/o increased soreness after just a couple of repetitions of light active motion     [employer] Exercises:  Seated scapula retraction  Seated shoulder shrugs  Seated shoulder circles  Seated cervical spine rotation bilaterally, using hand to assist; supine cervical rotations  Seated cervical extension/flexion stretch  Shoulder pulleys  Supine chin tucks x 5    UBE x 5 minutes at 1.0 (later complains of pain down right arm while on ube)  Pulleys    Assessment/Plan  Sandra Auguste needs continued skilled Physical Therapy services for decreasing pain while improving mobility, strength, balance and endurance in order to return to work and/or activities of daily living without pain or dysfunction; improved tolerance to manual therapy today, able to accept more pressure with patient reporting improvement in symptoms in neck and shoulder      Treatment considerations for next visit:  Advance as tolerated towards stated goals    Timed:   Manual Therapy:    10     mins  80877;     Therapeutic Exercise:    15     mins  34533;     Neuromuscular Juaquin:    10  "   mins  56879;    Therapeutic Activity:     10     mins  26659;     Gait Training:           mins  08829;     Ultrasound:          mins  11435;    Ionto                                   mins   75414  Self Care                            mins   06730  Canalith Repos         mins   12923    Un-Timed:  Electrical Stimulation:         mins  51136 ( );  Dry Needling          mins self-pay  Traction          mins 86147      Timed Treatment:   45   mins   Total Treatment:     45   mins    MIGUELITO Valdez, PT  [place] License: [id number]

## 2025-07-17 ENCOUNTER — APPOINTMENT (OUTPATIENT)
Dept: SPEECH THERAPY | Facility: HOSPITAL | Age: 65
End: 2025-07-17
Payer: OTHER MISCELLANEOUS

## 2025-07-18 ENCOUNTER — HOSPITAL ENCOUNTER (OUTPATIENT)
Dept: SPEECH THERAPY | Facility: HOSPITAL | Age: 65
Setting detail: THERAPIES SERIES
Discharge: HOME | End: 2025-07-18
Payer: OTHER MISCELLANEOUS

## 2025-07-18 DIAGNOSIS — R41.841 COGNITIVE COMMUNICATION DEFICIT: Primary | ICD-10-CM

## 2025-07-18 PROCEDURE — 92507 TX SP LANG VOICE COMM INDIV: CPT | Performed by: SPEECH-LANGUAGE PATHOLOGIST

## 2025-07-18 NOTE — THERAPY TREATMENT NOTE
Outpatient Speech Language Pathology   Adult Speech Language Cognitive Treatment Note   [place]     Patient Name: Sandra Auguste  : 1960  MRN: [id number]  Today's Date: 2025         Visit Date: 2025   Patient Active Problem List   Diagnosis    Anemia    Post traumatic stress disorder (PTSD)    Diverticulitis of colon    Steatosis of liver    Lateral epicondylitis    Knee pain    Osteoarthritis    Nausea    Gastroesophageal reflux disease    Acute pain of left shoulder    Sleep apnea with use of continuous positive airway pressure (CPAP)    Seasonal allergies    Migraine    Diverticulosis    Temple tenderness    TMJ click    Skull fracture    Moderate episode of recurrent major depressive disorder    Generalized anxiety disorder    High risk medications (not anticoagulants) long-term use    Hyperlipidemia    Asthma    Type 2 diabetes mellitus without complication, without long-term current use of insulin    Sleeping difficulties    ARMD (age-related macular degeneration), bilateral    Brain atrophy    Mild cognitive impairment    Attention deficit hyperactivity disorder, combined type    Vitamin D deficiency          Visit Dx:    ICD-10-CM ICD-9-CM   1. Cognitive communication deficit  R41.841 799.52        OP SLP Assessment/Plan - 25 1400          SLP Assessment    Functional Problems Speech Language- Adult/Cognition  -RB    Impact on Function: Adult Speech Language/Cognition Poor attention to task;Trouble learning or remembering new information;Restrictions in personal and social life  -RB    Clinical Impression: Speech Language-Adult/Congnition Mild:;Cognitive Communication Impairment  -RB    Functional Problems Comment imapcts daily tasks, communication, participation  -RB    Clinical Impression Comments Pt receptive to HEP. Pt being treated for UTI, had to go to ED. CPAP was also not working properly one day this week. Educated on pacing strategies  -RB    Please refer to paper  survey for additional self-reported information Yes  -RB    Please refer to items scanned into chart for additional diagnostic informaiton and handouts as provided by clinician Yes  -RB    SLP Diagnosis cog/comm deficit, fluency  -RB    Prognosis Good (comment)  -RB    Patient/caregiver participated in establishment of treatment plan and goals Yes  -RB    Patient would benefit from skilled therapy intervention Yes  -RB       SLP Plan    Frequency 1x/weekly  -RB    Duration 7weeks  -RB    Planned CPT's? SLP INDIVIDUAL SPEECH THERAPY: 45604;SLP COG TEST (PER HOUR): 04978  -RB    Expected Duration of Therapy Session (SLP Eval) 45  -RB    Plan Comments cog and speech  -RB              User Key  (r) = Recorded By, (t) = Taken By, (c) = Cosigned By      Initials Name Provider Type    Jocelin Nj MA,CCC-SLP Speech and Language Pathologist                              ACTIVITY  ACCURACY ASSISTANCE NEEDED   LTGs:   Pt will be able to remember information needed to function on at avocational and vocational  tasks 95% of the time no cues           Pt will implement compensatory strategies to maximize patient’s memory function so patient can continue to participate in daily activities 95% of the time no cues                 targeted functional recall                Targeted compensatory strategies       STG:     Pt will utilize word finding strategies in conversation at 95% with no cues               not targeted       STG:        Pt’s memory skills will be enhanced as reported by patient by utilizing internal memory strategies to recall up to 5 pieces of information after a 5 minute delay no cues                     modeled association  names: 4/4 5 min delay   no cues   STG:  Pt will demonstrate improved ability to recall and summarize information by listening/reading information and  answering questions/ summarzing 95% of the time min cues   targeted visual recall tasks   article: 85%  no cues   STG:   Pt will  complete attention to detail tasks at 100% no cues            PT will utilize metacognitive strategies at 90-95% no cues             PT will utilize fluency strategies in conversation at 100% no cues        Pt will improve attention skills by sustaining focus to selective target/task when presented with competing stimuli or in a distracting environment in order to complete a task 90% no cues     not targeted            Modeled pacing stoplight      Not targeted              Targeted selective attention                                         80%                                          No cues         Certification: 7/1/25-9/30/25             SLP OP Goals       Row Name 07/18/25 1400          Subjective Comments    Subjective Comments PT being treated for UTI  -RB        Subjective Pain    Able to rate subjective pain? yes  -RB     Pre-Treatment Pain Level 0  -RB     Post-Treatment Pain Level 0  -RB               User Key  (r) = Recorded By, (t) = Taken By, (c) = Cosigned By      Initials Name Provider Type    Jocelin Nj MA,CCC-SLP Speech and Language Pathologist                   OP SLP Education       Row Name 07/18/25 1400       Education    Barriers to Learning No barriers identified  -RB    Education Provided Patient participated in establishing goals and treatment plan;Patient demonstrated recommended strategies;Patient requires further education on strategies, risks  -RB    Assessed Learning needs;Learning motivation;Learning readiness;Learning preferences  -RB    Learning Motivation Strong  -RB    Learning Method Explanation;Demonstration;Teach back;Written materials  -RB    Teaching Response Verbalized understanding;Demonstrated understanding;Reinforcement needed  -RB    Education Comments HEP: association, pacing stoplight  -RB              User Key  (r) = Recorded By, (t) = Taken By, (c) = Cosigned By      Initials Name Effective Dates    Jocelin Nj MA, CCC-SLP 03/28/25 -                           Time Calculation:        Therapy Charges for Today       Code Description Service Date Service Provider Modifiers Qty    [id number]  ST TREATMENT SPEECH 3 7/18/2025 Jocelin Jones MA,PATTY-SLP GN 1               Jocelin Jones MA, CCC-[facility]  [place] license: [id number]        Jocelin Jones MA,MERCEDESSLP  7/18/2025

## 2025-07-21 ENCOUNTER — TREATMENT (OUTPATIENT)
Dept: PHYSICAL THERAPY | Facility: CLINIC | Age: 65
End: 2025-07-21
Payer: OTHER MISCELLANEOUS

## 2025-07-21 DIAGNOSIS — M54.2 CERVICAL PAIN: Primary | ICD-10-CM

## 2025-07-21 DIAGNOSIS — M25.511 ACUTE PAIN OF BOTH SHOULDERS: ICD-10-CM

## 2025-07-21 DIAGNOSIS — M25.512 ACUTE PAIN OF BOTH SHOULDERS: ICD-10-CM

## 2025-07-21 PROCEDURE — 97112 NEUROMUSCULAR REEDUCATION: CPT | Performed by: PHYSICAL THERAPIST

## 2025-07-21 PROCEDURE — 97110 THERAPEUTIC EXERCISES: CPT | Performed by: PHYSICAL THERAPIST

## 2025-07-21 PROCEDURE — 97140 MANUAL THERAPY 1/> REGIONS: CPT | Performed by: PHYSICAL THERAPIST

## 2025-07-21 NOTE — PROGRESS NOTES
Physical Therapy Daily Treatment Note    [facility]   [address], [address] [place] [address]      Patient: Sandra Auguste   : 1960  Referring practitioner: Em Baum MD  Date of Initial Visit: Type: THERAPY  Noted: 2025  Today's Date: 2025  Patient seen for 4 sessions    Certification Period 2025 thru 10/18/2025       Visit Diagnoses:    ICD-10-CM ICD-9-CM   1. Cervical pain  M54.2 723.1   2. Acute pain of both shoulders  M25.511 719.41    M25.512        Subjective     Pt reports she did some arm exercises in the heated pool over the weekend and is feeling more soreness in her neck today. She feels like she is improving, but she still gets headaches a lot.    Objective   See Exercise, Manual, and Modality Logs for complete treatment.       Assessment/Plan     Pt tolerated treatment well. She required cues to push her head back with chin tucks and not just look down. She was educated on an updated HEP for shoulder mobility and scapular stability. She is progressing well and would benefit from continued skilled PT.     Progress per plan of care          Timed:         Manual Therapy:    16     mins  51245;     Therapeutic Exercise:    24     mins  47926;     Neuromuscular Juaquin:    10    mins  49919;    Therapeutic Activity:          mins  15847;     Gait Training:           mins  71204;     Ultrasound:          mins  95786;    Ionto                                   mins   25110  Self Care                            mins   69323  Canalith Repos         mins 41693  Electrical Stimulation:         mins  84693    Un-Timed:  Electrical Stimulation:         mins  01758 ( );  Dry Needling          mins self-pay  Traction          mins 97079      Timed Treatment:   50   mins   Total Treatment:     50   mins    Kateryna Frazier, PT, DPT  Physical Therapist

## 2025-07-22 ENCOUNTER — HOSPITAL ENCOUNTER (OUTPATIENT)
Dept: SPEECH THERAPY | Facility: HOSPITAL | Age: 65
Setting detail: THERAPIES SERIES
Discharge: HOME | End: 2025-07-22

## 2025-07-22 DIAGNOSIS — R41.841 COGNITIVE COMMUNICATION DEFICIT: Primary | ICD-10-CM

## 2025-07-22 PROCEDURE — 92507 TX SP LANG VOICE COMM INDIV: CPT | Performed by: SPEECH-LANGUAGE PATHOLOGIST

## 2025-07-22 NOTE — THERAPY TREATMENT NOTE
Outpatient Speech Language Pathology   Adult Speech Language Cognitive Treatment Note  [facility]     Patient Name: Sandra Auguste  : 1960  MRN: [id number]  Today's Date: 2025         Visit Date: 2025   Patient Active Problem List   Diagnosis    Anemia    Post traumatic stress disorder (PTSD)    Diverticulitis of colon    Steatosis of liver    Lateral epicondylitis    Knee pain    Osteoarthritis    Nausea    Gastroesophageal reflux disease    Acute pain of left shoulder    Sleep apnea with use of continuous positive airway pressure (CPAP)    Seasonal allergies    Migraine    Diverticulosis    Temple tenderness    TMJ click    Skull fracture    Moderate episode of recurrent major depressive disorder    Generalized anxiety disorder    High risk medications (not anticoagulants) long-term use    Hyperlipidemia    Asthma    Type 2 diabetes mellitus without complication, without long-term current use of insulin    Sleeping difficulties    ARMD (age-related macular degeneration), bilateral    Brain atrophy    Mild cognitive impairment    Attention deficit hyperactivity disorder, combined type    Vitamin D deficiency          Visit Dx:    ICD-10-CM ICD-9-CM   1. Cognitive communication deficit  R41.841 799.52        OP SLP Assessment/Plan - 25 1000          SLP Assessment    Functional Problems Speech Language- Adult/Cognition  -RB    Impact on Function: Adult Speech Language/Cognition Poor attention to task;Trouble learning or remembering new information;Restrictions in personal and social life  -RB    Clinical Impression: Speech Language-Adult/Congnition Mild:;Cognitive Communication Impairment  -RB    Functional Problems Comment imapcts daily tasks, communication, participation  -RB    Clinical Impression Comments Pt receptive to HEP. Reviewed pacing strategies. Discussed sx tracking, ID of triggers and generating solutions. Targeted recall and attention.  -RB    Please refer to paper survey  for additional self-reported information Yes  -RB    Please refer to items scanned into chart for additional diagnostic informaiton and handouts as provided by clinician Yes  -RB    SLP Diagnosis cog/comm deficit, fluency  -RB    Prognosis Good (comment)  -RB    Patient/caregiver participated in establishment of treatment plan and goals Yes  -RB    Patient would benefit from skilled therapy intervention Yes  -RB       SLP Plan    Frequency 1x/weekly  -RB    Duration 6 weeks  -RB    Planned CPT's? SLP INDIVIDUAL SPEECH THERAPY: 31030;SLP COG TEST (PER HOUR): 49796  -RB    Expected Duration of Therapy Session (SLP Eval) 45  -RB    Plan Comments cog and speech  -RB              User Key  (r) = Recorded By, (t) = Taken By, (c) = Cosigned By      Initials Name Provider Type    Jocelin Nj MA,CCC-SLP Speech and Language Pathologist                          ACTIVITY  ACCURACY ASSISTANCE NEEDED   LTGs:   Pt will be able to remember information needed to function on at avocational and vocational  tasks 95% of the time no cues           Pt will implement compensatory strategies to maximize patient’s memory function so patient can continue to participate in daily activities 95% of the time no cues                 targeted functional recall                       Targeted compensatory strategies       STG:     Pt will utilize word finding strategies in conversation at 95% with no cues               not targeted       STG:        Pt’s memory skills will be enhanced as reported by patient by utilizing internal memory strategies to recall up to 5 pieces of information after a 5 minute delay no cues                     modeled association, grouping   names: 4/4 5 min delay   Grocery: 5/5 5 min delay  no cues   STG:  Pt will demonstrate improved ability to recall and summarize information by listening/reading information and  answering questions/ summarzing 95% of the time min cues   targeted visual and auditory recall tasks    article: 80%  Podcast: 80%  no cues   STG:   Pt will complete attention to detail tasks at 100% no cues            PT will utilize metacognitive strategies at 90-95% no cues                 PT will utilize fluency strategies in conversation at 100% no cues        Pt will improve attention skills by sustaining focus to selective target/task when presented with competing stimuli or in a distracting environment in order to complete a task 90% no cues     not targeted                 Modeled pacing stoplight, sx tracking          Not targeted                    Targeted selective attention                                                               80%                                                               No cues         Certification: 7/1/25-9/30/25                             SLP OP Goals       Row Name 07/22/25 1000          Subjective Comments    Subjective Comments Pt reports she is doing okay  -RB        Subjective Pain    Able to rate subjective pain? yes  -RB     Pre-Treatment Pain Level 0  -RB     Post-Treatment Pain Level 0  -RB               User Key  (r) = Recorded By, (t) = Taken By, (c) = Cosigned By      Initials Name Provider Type    RB Jocelin Jones MA,CCC-SLP Speech and Language Pathologist                   OP SLP Education       Row Name 07/22/25 1000       Education    Barriers to Learning No barriers identified  -RB    Education Provided Patient participated in establishing goals and treatment plan;Patient demonstrated recommended strategies;Patient requires further education on strategies, risks  -RB    Assessed Learning needs;Learning motivation;Learning readiness;Learning preferences  -RB    Learning Motivation Strong  -RB    Learning Method Explanation;Demonstration;Teach back;Written materials  -RB    Teaching Response Verbalized understanding;Demonstrated understanding;Reinforcement needed  -RB    Education Comments HEP: sx tracking, grouping  -RB              User Key  (r) =  Recorded By, (t) = Taken By, (c) = Cosigned By      Initials Name Effective Dates    RB Jocelin Jones MA,CCC-SLP 03/28/25 -                          Time Calculation:        Therapy Charges for Today       Code Description Service Date Service Provider Modifiers Qty    [id number]  ST TREATMENT SPEECH 3 7/22/2025 Jocelin Jones MA,MARY GN 1             Jocelni HERNANDEZ CBIS  [place] license: [id number]          Jocelin Jones MA, CCC-SLP  7/22/2025

## 2025-07-23 ENCOUNTER — OFFICE VISIT (OUTPATIENT)
Dept: NEUROLOGY | Facility: CLINIC | Age: 65
End: 2025-07-23
Payer: OTHER MISCELLANEOUS

## 2025-07-23 VITALS
OXYGEN SATURATION: 96 % | DIASTOLIC BLOOD PRESSURE: 76 MMHG | WEIGHT: 255 LBS | HEART RATE: 104 BPM | SYSTOLIC BLOOD PRESSURE: 114 MMHG | HEIGHT: 66 IN | BODY MASS INDEX: 40.98 KG/M2

## 2025-07-23 DIAGNOSIS — G47.33 OBSTRUCTIVE SLEEP APNEA: ICD-10-CM

## 2025-07-23 DIAGNOSIS — G31.84 MILD COGNITIVE IMPAIRMENT: ICD-10-CM

## 2025-07-23 DIAGNOSIS — G44.209 TENSION HEADACHE: Primary | ICD-10-CM

## 2025-07-23 RX ORDER — RIZATRIPTAN BENZOATE 10 MG/1
TABLET ORAL
Qty: 8 TABLET | Refills: 3 | Status: SHIPPED | OUTPATIENT
Start: 2025-07-23

## 2025-07-23 RX ORDER — ATORVASTATIN CALCIUM 20 MG/1
20 TABLET, FILM COATED ORAL DAILY
Qty: 30 TABLET | Refills: 11 | Status: SHIPPED | OUTPATIENT
Start: 2025-07-23 | End: 2026-07-23

## 2025-07-23 RX ORDER — NORTRIPTYLINE HYDROCHLORIDE 10 MG/1
CAPSULE ORAL
Qty: 60 CAPSULE | Refills: 5 | Status: SHIPPED | OUTPATIENT
Start: 2025-07-23

## 2025-07-23 NOTE — PROGRESS NOTES
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  Since then she has been having near daily headaches either on the right side or all over her head along with nausea and photophobia.  Has been taking Tylenol round-the-clock.  She had a MRI brain that showed moderate chronic ischemic changes but no acute intracranial abnormalities.  She has also been having difficulty sleeping at night and increased emotional lability since the accident of issues.  She has started speech therapy at [facility].  Remains compliant with her CPAP for her sleep apnea.  She had blood work that showed an LDL of 128, normal B12 and elevated liver enzymes as a result of fatty liver.  Of note-I personally reviewed her MRI brain    Last visit-patient states that her stabbing headaches have improved with indomethacin which she has now stopped using.  With regards to her sleep apnea she got a new CPAP and has been wearing it for a few hours but still wakes up with brain fog.  She was diagnosed with complex ADHD since she last saw me and was started on Strattera which has helped with her focus and concentration but once again not so much with her brain fog.  She denies having any significant memory problems.  She is considering weight loss surgery.  Her last A1c was 6.8.  For her sleep she continues to use hydroxyzine at night.  Has stopped taking Viibryd as it was causing worsening depression.      Last visit-patient last saw me in 2022.  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  She states that  her memory was improving up until she sustained a fall in early April hitting the side of her ribs/head.  After that she had a CT head that showed chronic ischemic changes as well as mild bifrontal atrophy but no acute abnormalities.  She has been using her CPAP but was told 2 years ago by Dr. Hill that she would need a new sleep study however was unable to get it.  She was having sleep disturbances but more recently was put on Viibryd for her mood that helps with her sleep some.  She has been waking up with sharp stabbing headaches that last for a few seconds in different parts of her head with occasional nausea, photophobia, phonophobia and mild blurred vision.  She tries not to take any medications for them.  Her diabetes is well-controlled and her last A1c was 6.4.  LDL was 138.  Blood pressure is well-controlled without medications.    Last visit-patient had to cancel her neuropsych evaluation in January due to snow.  It is rescheduled for July.  She still has some forgetfulness and short-term memory problems.  Continues to be stressed out.  Has also been having difficulties sleeping at night due to being unable to relax and being overly tired.  Does use her CPAP for her KELLY and takes hydroxyzine.  Her diabetes is fairly well controlled and her last A1c was 7.2.  Her PCP has started her on Trulicity which she will start taking from today.  She also went to [facility] recently with neck pain, back pain and right arm/hand pain.  Had x-rays of the cervical and lumbar spine that showed multilevel degenerative changes.  She saw a physiatrist who has ordered a MRI cervical and lumbar spine for her.  He told her that she may have tendinitis in her right wrist.    Last visit-this is a patient previously seen by Zen[facility] for memory problems.  Patient started noticing the symptoms in 2012 following a concussion marked by forgetfulness and word finding problems.  Over time her symptoms have remained stable.  She denies any  impairment of ADLs including driving.  She resides with her mother and helps take care of her.  She has also had symptoms of anxiety and depression.  At her last visit patient scored a MMSE of 29/30 and it was felt that her cognitive symptoms could be in part due to her underlying anxiety and depression as well as some of her medications including Xanax.  Her neuropsych evaluation at  is scheduled for January 22.  Patient also had an MRI brain that showed right frontal atrophy as well as chronic ischemic changes but no acute intracranial abnormalities.  She also had blood work including TSH and B12 level that were both within normal limits.  She has been getting speech therapy which seems to be helping.  Wants to continue getting it.  Of note-I personally reviewed her MRI brain and eZny's notes as follows-    Sandra Auguste is a 60 y.o. female who comes to clinic today for evaluation of memory loss . She has noted symptoms since at least 2012 following a concussion, marked initially by forgetfulness, repetitiveness, and word-finding difficulties. This has remained static over time. Additional symptoms have included impairments in both short and long term memory as well as executive function. There have been associated symptoms of significant anxiety and depression. She denies impairments in ADL's. She manages her medications and finances, though is having more difficulty doing so. She continues to drive.  She is currently residing with her mother in [place], for whom she is the primary caregiver.     She has also noted headaches for several years, though the history is a bit unclear. She describes intermittent sharp shooting unilateral headachs with associated nausea as well as light and sound sensitivity. These typically last for several seconds. She also notes a history of long migraines. These headaches vary in location and character and typically occur 1-2 times a month, lasting up to several days. Her headaches  are worse with increased stress.     Prior evaluation has included an MRI of the brain in 2017 which was unremarkable .        The following portions of the patient's history were reviewed today and updated as of 08/09/2021  : allergies, current medications, past family history, past medical history, past social history, past surgical history and problem list  These document will be scanned to patient's chart.      Current Outpatient Medications:     albuterol sulfate  (90 Base) MCG/ACT inhaler, Inhale 2 puffs Every 6 (Six) Hours As Needed for Wheezing or Shortness of Air., Disp: 18 g, Rfl: 1    atomoxetine (STRATTERA) 60 MG capsule, , Disp: , Rfl:     Blood Glucose Monitoring Suppl (OneTouch Verio) w/Device kit, 1 kit Daily., Disp: 1 kit, Rfl: 0    Cholecalciferol (Vitamin D3) 50 MCG (2000 UT) capsule, Take 1 capsule by mouth Daily., Disp: 90 capsule, Rfl: 1    EPINEPHrine (EPIPEN) 0.3 MG/0.3ML solution auto-injector injection, Inject 0.3 mL into the appropriate muscle as directed by prescriber As Needed (FOR SEVERE ALLERGIC REACTION)., Disp: 1 each, Rfl: 1    glucose blood (OneTouch Verio) test strip, USE AS INSTRUCTED TO CHECK BLOOD GLUCOSE ONCE DAILY DX:E11.65, Disp: 100 each, Rfl: 3    glucose monitor monitoring kit, Use as directed to check blood glucose once daily., Disp: 1 each, Rfl: 0    ibuprofen (ADVIL,MOTRIN) 600 MG tablet, Take 1 tablet by mouth Every 6 (Six) Hours As Needed for Mild Pain. Take with food., Disp: 60 tablet, Rfl: 0    ipratropium (ATROVENT) 0.06 % nasal spray, , Disp: , Rfl:     ipratropium-albuterol (DUO-NEB) 0.5-2.5 mg/3 ml nebulizer, Take 3 mL by nebulization Every 4 (Four) Hours As Needed for Wheezing., Disp: 360 mL, Rfl: 1    Lancets (OneTouch Delica Safety Lancing) misc, Use 1 each Daily., Disp: 100 each, Rfl: 3    loratadine (CLARITIN) 10 MG tablet, Take 1 tablet by mouth Daily., Disp: 30 tablet, Rfl: 5    MAGNESIUM PO, Take  by mouth., Disp: , Rfl:     montelukast  "(SINGULAIR) 10 MG tablet, Take 1 tablet by mouth Every Night., Disp: 90 tablet, Rfl: 1    multivitamin with minerals tablet tablet, Take 1 tablet by mouth Daily., Disp: , Rfl:     NON FORMULARY, NAD+ advanced, Disp: , Rfl:     pantoprazole (Protonix) 20 MG EC tablet, Take 1 tablet by mouth Daily., Disp: 90 tablet, Rfl: 1    promethazine (PHENERGAN) 12.5 MG tablet, Take 1 tablet by mouth At Night As Needed for Nausea or Vomiting., Disp: 20 tablet, Rfl: 0    Trelegy Ellipta 100-62.5-25 MCG/ACT inhaler, , Disp: , Rfl:     Zinc Sulfate (ZINC 15 PO), Take  by mouth., Disp: , Rfl:     atorvastatin (Lipitor) 20 MG tablet, Take 1 tablet by mouth Daily., Disp: 30 tablet, Rfl: 11    diclofenac (VOLTAREN) 50 MG EC tablet, Take 1 tablet by mouth 3 (Three) Times a Day. (Patient not taking: Reported on 7/23/2025), Disp: 15 tablet, Rfl: 0    Fluticasone Furoate-Vilanterol (BREO ELLIPTA) 200-25 MCG/ACT inhaler, Inhale 1 puff Daily. (Patient not taking: Reported on 7/23/2025), Disp: 60 each, Rfl: 3    nortriptyline (Pamelor) 10 MG capsule, Take 1 capsule nightly for 2 weeks but if no improvement in headaches increase to 2 capsules nightly, Disp: 60 capsule, Rfl: 5    rizatriptan (Maxalt) 10 MG tablet, Take 1 tablet at onset of headache.  May repeat once in 2 hours.  Do not take more than 2 tabs a day or 8 tabs a month, Disp: 8 tablet, Rfl: 3   Past Medical History:   Diagnosis Date    ADHD (attention deficit hyperactivity disorder) 07/30/2024    Just diagnosed    Allergic 1962    Anemia 1984    Anesthesia complication     HAS TROUBLE GETTING \"NUMB\"     Anxiety     Arthritis     on Celebrex + Robaxin, no steroids    Asthma     does not follow w/ pulmonary    Bell palsy     not sure    Bladder spasms     Cluster headache ?    ?    CTS (carpal tunnel syndrome)     Depression     w/ PTSD    Diverticulitis of colon 2021    Not sure about dates    Diverticulosis     Dyspepsia     Endometriosis     undiagnosed, maybe>?    Failure to " "thrive (child)     Fatigue     Fibroid     GERD (gastroesophageal reflux disease)     controlled w/ nightly Pepcid, EGD 2020 w/ Dr. Chavarria, (+) HH    Gestational hypertension not sure    30 years ago    Headache, tension-type     long hair that was pulled in desk    Health care maintenance 02/25/2020    Hernia 2021    Dr Chavarria    Lactose intolerance Baby    Memory loss     FROM SKULL FRACTURE AND FALL-SHORT TERM MEMORY LOSS    Migraine     Morbid obesity     Movement disorder     Multiple gestation 11/22/84_09/29/1986    2 BABIES    Osteoarthritis     Ovarian cyst     ?    Peripheral neuropathy     PMS (premenstrual syndrome)     PONV (postoperative nausea and vomiting) 1986    SWELLING /NAUSEA    PTSD (post-traumatic stress disorder)     Seasonal allergies     Seizures     \"convulsions\" at age 5    Skull fracture 2012    Sleep apnea with use of continuous positive airway pressure (CPAP)     CPAP compliant    Sleep apnea, obstructive 02/2016J    Dr Kirby    SOB (shortness of breath) on exertion     Spinal headache     not sure    Tendonitis     Trauma     Type 2 diabetes mellitus without complication, without long-term current use of insulin     dx 2021, no insulin, A1C <7    Varicella     Vision loss     Wears contact lenses     Wears partial dentures     TOP ONLY     Wears prescription eyeglasses       Past Surgical History:   Procedure Laterality Date    CERVICAL POLYPECTOMY      COLONOSCOPY  2021    HYSTERECTOMY      MYOMECTOMY  5 years     Dr packer/    OOPHORECTOMY      TOTAL LAPAROSCOPIC HYSTERECTOMY N/A 09/18/2017    Procedure: TOTAL LAPAROSCOPIC HYSTERECTOMY, BILATERAL SALPINGO OOPHORECTOMY WITH DAVINCI ROBOT ;  Surgeon: Shannon Grimaldo DO;  Location: [facility];  Service:     UPPER GASTROINTESTINAL ENDOSCOPY      WISDOM TOOTH EXTRACTION        Family History   Problem Relation Age of Onset    Cancer Mother         Cervical /over 10 years ago    Dementia Mother     Hypertension Mother         high " energy/living on meds    Anxiety disorder Mother     Seizures Mother     Miscarriages / Stillbirths Mother         2 miscarriages    Obesity Father     Hypertension Father     Heart attack Father     Sleep apnea Father     Depression Father     Heart disease Father     Hyperlipidemia Father     Early death Father         57    Heart failure Father         M heart attack    Sleep apnea Sister     ADD / ADHD Sister     Hypertension Brother     Sleep apnea Brother     ADD / ADHD Brother     Cancer Maternal Aunt         lymphoma    Cancer Paternal Aunt         Breast    Breast cancer Paternal Aunt 70    Cancer Maternal Grandmother     Early death Maternal Grandmother         Unknown    Alcohol abuse Maternal Grandfather     Early death Maternal Grandfather         leg amputated ..shock?    Emphysema Maternal Grandfather         Gr-Grandfather    Obesity Paternal Grandmother     Diabetes Paternal Grandmother     Hypertension Paternal Grandmother     Stroke Paternal Grandmother     Heart attack Paternal Grandmother     Asthma Paternal Grandmother     Vision loss Paternal Grandmother         weak    Osteoporosis Paternal Grandmother         Father's mom had arthritis in her knees?    Arthritis Paternal Grandmother     Hyperlipidemia Paternal Grandmother         heavy    Heart attack Paternal Grandfather     Vision loss Paternal Grandfather         weak    Alcohol abuse Other     Depression Other     ADD / ADHD Brother     Alcohol abuse Brother     Seizures Brother         When he was young, not since he had tonsles removed.    Dementia Sister     Cancer Maternal Aunt         Lymphoma    Alcohol abuse Maternal Aunt     Cancer Paternal Aunt         double mastectomy    Heart disease Paternal Uncle         several heart surgeries    Heart failure Paternal Uncle     Heart disease Paternal Uncle         stroke    Mental illness Paternal Uncle         unknoown    Heart failure Paternal Uncle     Heart disease Paternal Uncle       "   pacemaker    Heart failure Paternal Uncle         Pacemaker    Mental illness Brother         unknown    Alcohol abuse Brother     Alcohol abuse Maternal Uncle     Depression Son     Vision loss Son         weak sight    Vision loss Daughter         lazy eye, weak sight    Alcohol abuse Maternal Aunt     Alcohol abuse Maternal Uncle             Depression Maternal Uncle         war vet    Mental illness Maternal Uncle         vet/ptsd ?    Alcohol abuse Maternal Uncle         [place]/purple heart    Cancer Maternal Aunt         stage 4 Breast with spreading to the liver    Depression Son         unknown diagnosis also ADHD    Thyroid disease Paternal Aunt     Vision loss Daughter         lazy eye, weak sight/glasses    Vision loss Son         weak sight/glasses    Cancer Maternal Aunt     Cancer Paternal Aunt     Sleep apnea Brother     Sleep apnea Sister     Sleep apnea Brother     Thyroid disease Paternal Aunt       Social History     Socioeconomic History    Marital status: Single   Tobacco Use    Smoking status: Never     Passive exposure: Never    Smokeless tobacco: Never   Vaping Use    Vaping status: Never Used   Substance and Sexual Activity    Alcohol use: Not Currently     Comment: once or twice a year    Drug use: Never    Sexual activity: Not Currently     Partners: Male     Birth control/protection: Abstinence, Post-menopausal, Hysterectomy, Surgical     Review of Systems   Musculoskeletal:  Positive for back pain and neck pain.   Neurological:  Positive for memory problem.   All other systems reviewed and are negative.      Objective:    /76   Pulse 104   Ht 167.6 cm (66\")   Wt 116 kg (255 lb)   LMP  (LMP Unknown)   SpO2 96%   Breastfeeding No   BMI 41.16 kg/m²     Neurology Exam:    Mental Status   Morbidly obese.  No tenderness to palpation of her scalp or temporal regions with good temporal artery pulsations present.  Oriented to person, place, and time.   Memory-intact.  MoCA " of 28/30 today with a delayed recall of 5/5.  Last visit-MoCA of 25/30 today with a delayed recall of 5/5.  MMSE 29/30 previously.  Delayed recall of 3/3 at this visit.  Attention: normal.   Speech: speech is normal   Level of consciousness: alert  Able to perform simple calculations.   Able to name object. Able to read. Able to repeat. Able to write. Normal comprehension.     Cranial Nerves   Cranial nerves II through XII intact.     Motor Exam   Muscle bulk: normal  Overall muscle tone: normal    Strength   Strength 5/5 throughout.     Sensory Exam   Light touch normal.     Gait, Coordination, and Reflexes     Gait  Gait: normal    Coordination   Finger to nose coordination: normal  Heel to shin coordination: normal    Tremor   Resting tremor: absent    Reflexes   Right brachioradialis: 2+  Left brachioradialis: 2+  Right biceps: 2+  Left biceps: 2+  Right patellar: 2+  Left patellar: 2+    Romberg negative      Assessment and Plan:  1.  Mild cognitive impairment  Her mild cognitive problems could be due to underlying anxiety, depression and sleep apnea as confirmed by neuropsychological evaluation  -Her symptoms have improved since starting Strattera for ADHD  She is currently undergoing speech therapy  Of note-her MRI brain showed moderate chronic ischemic changes.  I have told her to start aspirin 81 mg daily and Lipitor 20 mg daily.  Last LDL was 128.  She should also make dietary modifications    2.  Tension headaches  She previously had stabbing headaches for which she was on indomethacin.  Is now having postconcussive headaches  I will start her on nortriptyline gradually increasing to 20 mg nightly.  For abortive treatment of her headaches she can take Maxalt 10 mg as needed.  Should avoid taking over-the-counter medications such as Tylenol    3.  Obstructive sleep apnea  Pliant with his CPAP    Return in about 3 months (around 10/23/2025).     I spent 30 minutes with the patient out of which over 25  minutes were spent face to face.     Laura Atkins MD

## 2025-07-24 ENCOUNTER — TELEPHONE (OUTPATIENT)
Dept: NEUROLOGY | Facility: CLINIC | Age: 65
End: 2025-07-24
Payer: COMMERCIAL

## 2025-07-24 NOTE — TELEPHONE ENCOUNTER
Provider: DR NEELY    Caller: Sandra Auguste    Relationship to Patient: Self    Phone Number: [phone]    Reason for Call: WOULD LIKE TO SEE IF SHE CAN GET A NOTE IN HER CHART STATING DR NEELY'S OPINION ON THE IMAGING SHE BROUGHT IN YESTERDAY. STATES SHE HAS A HARD TIME REMEMBERING WHAT DR NEELY SAID AND [facility]      ALSO HAS MED QUESTIONS. WANTS TO KNOW WHEN SHOULD SHE TAKE THE BABY ASPIRIN, NIGHT OR MORNING. ALSO STATES SHE IS ON JARDIANCE FOR DIABETES & WANTED TO SEE IF THERE WERE ANY ISSUES WITH THAT AND THE NEW MEDICATIONS SHE WAS PRESCRIBED YESTERDAY. PLEASE REVIEW & ADVISE, THANK YOU.

## 2025-07-25 ENCOUNTER — OFFICE VISIT (OUTPATIENT)
Dept: INTERNAL MEDICINE | Facility: CLINIC | Age: 65
End: 2025-07-25
Payer: COMMERCIAL

## 2025-07-25 VITALS
WEIGHT: 255.6 LBS | OXYGEN SATURATION: 97 % | HEART RATE: 90 BPM | HEIGHT: 66 IN | DIASTOLIC BLOOD PRESSURE: 90 MMHG | SYSTOLIC BLOOD PRESSURE: 138 MMHG | BODY MASS INDEX: 41.08 KG/M2

## 2025-07-25 DIAGNOSIS — B37.31 YEAST VAGINITIS: ICD-10-CM

## 2025-07-25 DIAGNOSIS — R10.84 GENERALIZED ABDOMINAL PAIN: Primary | ICD-10-CM

## 2025-07-25 DIAGNOSIS — R19.7 DIARRHEA OF PRESUMED INFECTIOUS ORIGIN: ICD-10-CM

## 2025-07-25 DIAGNOSIS — L29.9 PRURITUS: ICD-10-CM

## 2025-07-25 DIAGNOSIS — R30.0 DYSURIA: ICD-10-CM

## 2025-07-25 LAB
BILIRUB BLD-MCNC: NEGATIVE MG/DL
CLARITY, POC: CLEAR
COLOR UR: YELLOW
EXPIRATION DATE: NORMAL
GLUCOSE UR STRIP-MCNC: NORMAL MG/DL
KETONES UR QL: NEGATIVE
LEUKOCYTE EST, POC: NEGATIVE
Lab: NORMAL
NITRITE UR-MCNC: NEGATIVE MG/ML
PH UR: 6 [PH] (ref 5–8)
PROT UR STRIP-MCNC: NEGATIVE MG/DL
RBC # UR STRIP: NEGATIVE /UL
SP GR UR: 1.01 (ref 1–1.03)
UROBILINOGEN UR QL: NORMAL

## 2025-07-25 PROCEDURE — 99214 OFFICE O/P EST MOD 30 MIN: CPT | Performed by: FAMILY MEDICINE

## 2025-07-25 PROCEDURE — 87086 URINE CULTURE/COLONY COUNT: CPT | Performed by: FAMILY MEDICINE

## 2025-07-25 PROCEDURE — 81003 URINALYSIS AUTO W/O SCOPE: CPT | Performed by: FAMILY MEDICINE

## 2025-07-25 RX ORDER — FLUCONAZOLE 150 MG/1
150 TABLET ORAL DAILY
Qty: 5 TABLET | Refills: 0 | Status: SHIPPED | OUTPATIENT
Start: 2025-07-25 | End: 2025-07-30

## 2025-07-25 NOTE — PATIENT INSTRUCTIONS
Diagnosis Discussed   Continue to monitor   Plenty of fluids  Urine studies for further evaluation   Stool studies for further evaluation   Take diflucan as prescribed   If symptoms worsen or persist please seek further evaluation

## 2025-07-25 NOTE — PROGRESS NOTES
Office Note     Name: Sandra Auguste    : 1960     MRN: [id number]     Chief Complaint  Itching (Sx all over, eyes, ears, vaginal area.) and Abdominal Pain (Pt thinks she has a parasite. Cramping after eating. Sx started 6 weeks ago.)    Subjective     History of Present Illness:  Sandra Auguste is a 64 y.o. female who presents today for concerns for itching- all over- eyes, ears and vaginal area- no rash    Concerns for abdominal pain- she feels she my have a parasite. Cramping after eating   Ongoing x 6 weeks.     Seen by neuro- Dr. Atkins   Started new medication for headaches   Started statin and low dose aspirin     Did start back with speech pathology   Cognitive communication deficit   Has been working with PT     Has been eating more pork and sushi- feels this something she ate  Was seen in ER 7/10- treated for UTI   No real relief   Was prescribed Keflex 4 times a day - no changes   Repeat urine testing today       Did take a diflucan the other day- still with itching   Does feel swelling and itching- in vaginal area   Some thick white discharge   Denies fevers, chills.   Abdominal discomfort- some watery loose stools   Some frequency of stools- comes and goes   Cramping worsens with food         Review of Systems:   Review of Systems   Constitutional:  Negative for chills and fever.   HENT:  Negative for trouble swallowing and voice change.    Respiratory:  Negative for cough, chest tightness, shortness of breath and wheezing.    Cardiovascular:  Negative for chest pain, palpitations and leg swelling.   Gastrointestinal:  Positive for abdominal pain and diarrhea. Negative for constipation, nausea and vomiting.   Genitourinary:  Positive for vaginal discharge. Negative for dysuria.        Vaginal itching    Skin:  Negative for skin lesions.        Urticaria     Neurological:  Positive for headache. Negative for light-headedness.       Past Medical History:   Past Medical History:   Diagnosis Date  "   ADHD (attention deficit hyperactivity disorder) 07/30/2024    Just diagnosed    Allergic [id number]    Anemia 1984    Anesthesia complication     HAS TROUBLE GETTING \"NUMB\"     Anxiety     Arthritis     on Celebrex + Robaxin, no steroids    Asthma     does not follow w/ pulmonary    Bell palsy     not sure    Bladder spasms     Cluster headache ?    ?    CTS (carpal tunnel syndrome)     Depression     w/ PTSD    Diverticulitis of colon 2021    Not sure about dates    Diverticulosis     Dyspepsia     Endometriosis     undiagnosed, maybe>?    Failure to thrive (child)     Fatigue     Fibroid     GERD (gastroesophageal reflux disease)     controlled w/ nightly Pepcid, EGD 2020 w/ Dr. Chavarria, (+) HH    Gestational hypertension not sure    30 years ago    Headache, tension-type     long hair that was pulled in desk    Health care maintenance 02/25/2020    Hernia 2021    Dr Chavarria    Lactose intolerance Baby    Memory loss     FROM SKULL FRACTURE AND FALL-SHORT TERM MEMORY LOSS    Migraine     Morbid obesity     Movement disorder     Multiple gestation 11/22/84_09/29/1986    2 BABIES    Osteoarthritis     Ovarian cyst     ?    Peripheral neuropathy     PMS (premenstrual syndrome)     PONV (postoperative nausea and vomiting) 1986    SWELLING /NAUSEA    PTSD (post-traumatic stress disorder)     Seasonal allergies     Seizures     \"convulsions\" at age 5    Skull fracture 2012    Sleep apnea with use of continuous positive airway pressure (CPAP)     CPAP compliant    Sleep apnea, obstructive 02/2016J    Dr Kirby    SOB (shortness of breath) on exertion     Spinal headache     not sure    Tendonitis     Trauma     Type 2 diabetes mellitus without complication, without long-term current use of insulin     dx 2021, no insulin, A1C <7    Varicella     Vision loss     Wears contact lenses     Wears partial dentures     TOP ONLY     Wears prescription eyeglasses        Past Surgical History:   Past Surgical History: "   Procedure Laterality Date    CERVICAL POLYPECTOMY      COLONOSCOPY  2021    HYSTERECTOMY      MYOMECTOMY  5 years     Dr packer/    OOPHORECTOMY      TOTAL LAPAROSCOPIC HYSTERECTOMY N/A 09/18/2017    Procedure: TOTAL LAPAROSCOPIC HYSTERECTOMY, BILATERAL SALPINGO OOPHORECTOMY WITH DAVINCI ROBOT ;  Surgeon: Shannon Grimaldo DO;  Location: [facility] OR;  Service:     UPPER GASTROINTESTINAL ENDOSCOPY      WISDOM TOOTH EXTRACTION         Immunizations:   Immunization History   Administered Date(s) Administered    COVID-19 (PFIZER) Purple Cap Monovalent 03/17/2021, 04/07/2021    Flu Vaccine Quad PF >36MO 09/19/2016, 10/18/2017    Fluzone  >6mos 10/11/2024    Fluzone (or Fluarix & Flulaval for VFC) >6mos 09/24/2021, 10/26/2022, 12/08/2023    Hepatitis B 07/15/2021    Pneumococcal Polysaccharide (PPSV23) 02/24/2020    Shingrix 11/16/2020, 01/23/2021, 01/31/2021    Tdap 07/15/2021    Tetanus 09/06/2012    flucelvax quad pfs =>4 YRS 12/20/2019        Medications:     Current Outpatient Medications:     albuterol sulfate  (90 Base) MCG/ACT inhaler, Inhale 2 puffs Every 6 (Six) Hours As Needed for Wheezing or Shortness of Air., Disp: 18 g, Rfl: 1    atomoxetine (STRATTERA) 60 MG capsule, , Disp: , Rfl:     atorvastatin (Lipitor) 20 MG tablet, Take 1 tablet by mouth Daily., Disp: 30 tablet, Rfl: 11    Blood Glucose Monitoring Suppl (OneTouch Verio) w/Device kit, 1 kit Daily., Disp: 1 kit, Rfl: 0    Cholecalciferol (Vitamin D3) 50 MCG (2000 UT) capsule, Take 1 capsule by mouth Daily., Disp: 90 capsule, Rfl: 1    EPINEPHrine (EPIPEN) 0.3 MG/0.3ML solution auto-injector injection, Inject 0.3 mL into the appropriate muscle as directed by prescriber As Needed (FOR SEVERE ALLERGIC REACTION)., Disp: 1 each, Rfl: 1    glucose blood (OneTouch Verio) test strip, USE AS INSTRUCTED TO CHECK BLOOD GLUCOSE ONCE DAILY DX:E11.65, Disp: 100 each, Rfl: 3    glucose monitor monitoring kit, Use as directed to check blood glucose once daily.,  Disp: 1 each, Rfl: 0    ibuprofen (ADVIL,MOTRIN) 600 MG tablet, Take 1 tablet by mouth Every 6 (Six) Hours As Needed for Mild Pain. Take with food., Disp: 60 tablet, Rfl: 0    ipratropium (ATROVENT) 0.06 % nasal spray, , Disp: , Rfl:     ipratropium-albuterol (DUO-NEB) 0.5-2.5 mg/3 ml nebulizer, Take 3 mL by nebulization Every 4 (Four) Hours As Needed for Wheezing., Disp: 360 mL, Rfl: 1    Lancets (OneTouch Delica Safety Lancing) misc, Use 1 each Daily., Disp: 100 each, Rfl: 3    loratadine (CLARITIN) 10 MG tablet, Take 1 tablet by mouth Daily., Disp: 30 tablet, Rfl: 5    MAGNESIUM PO, Take  by mouth., Disp: , Rfl:     montelukast (SINGULAIR) 10 MG tablet, Take 1 tablet by mouth Every Night., Disp: 90 tablet, Rfl: 1    multivitamin with minerals tablet tablet, Take 1 tablet by mouth Daily., Disp: , Rfl:     NON FORMULARY, NAD+ advanced, Disp: , Rfl:     nortriptyline (Pamelor) 10 MG capsule, Take 1 capsule nightly for 2 weeks but if no improvement in headaches increase to 2 capsules nightly, Disp: 60 capsule, Rfl: 5    pantoprazole (Protonix) 20 MG EC tablet, Take 1 tablet by mouth Daily., Disp: 90 tablet, Rfl: 1    promethazine (PHENERGAN) 12.5 MG tablet, Take 1 tablet by mouth At Night As Needed for Nausea or Vomiting., Disp: 20 tablet, Rfl: 0    rizatriptan (Maxalt) 10 MG tablet, Take 1 tablet at onset of headache.  May repeat once in 2 hours.  Do not take more than 2 tabs a day or 8 tabs a month, Disp: 8 tablet, Rfl: 3    Trelegy Ellipta 100-62.5-25 MCG/ACT inhaler, , Disp: , Rfl:     Zinc Sulfate (ZINC 15 PO), Take  by mouth., Disp: , Rfl:     diclofenac (VOLTAREN) 50 MG EC tablet, Take 1 tablet by mouth 3 (Three) Times a Day. (Patient not taking: Reported on 7/25/2025), Disp: 15 tablet, Rfl: 0    fluconazole (Diflucan) 150 MG tablet, Take 1 tablet by mouth Daily for 5 doses., Disp: 5 tablet, Rfl: 0    Fluticasone Furoate-Vilanterol (BREO ELLIPTA) 200-25 MCG/ACT inhaler, Inhale 1 puff Daily. (Patient not  taking: Reported on 7/25/2025), Disp: 60 each, Rfl: 3    Allergies:   Allergies   Allergen Reactions    Mold Extract [Trichophyton] Shortness Of Breath, Anxiety, Palpitations and Irritability    Lortab [Hydrocodone-Acetaminophen] Nausea And Vomiting    Metformin GI Intolerance    Pollen Extract Other (See Comments)     SNEEZING AND CONGESTION        Family History:   Family History   Problem Relation Age of Onset    Cancer Mother         Cervical /over 10 years ago    Dementia Mother     Hypertension Mother         high energy/living on meds    Anxiety disorder Mother     Seizures Mother     Miscarriages / Stillbirths Mother         2 miscarriages    Obesity Father     Hypertension Father     Heart attack Father     Sleep apnea Father     Depression Father     Heart disease Father     Hyperlipidemia Father     Early death Father         57    Heart failure Father         M heart attack    Sleep apnea Sister     ADD / ADHD Sister     Hypertension Brother     Sleep apnea Brother     ADD / ADHD Brother     Cancer Maternal Aunt         lymphoma    Cancer Paternal Aunt         Breast    Breast cancer Paternal Aunt 70    Cancer Maternal Grandmother     Early death Maternal Grandmother         Unknown    Alcohol abuse Maternal Grandfather     Early death Maternal Grandfather         leg amputated ..shock?    Emphysema Maternal Grandfather         Gr-Grandfather    Obesity Paternal Grandmother     Diabetes Paternal Grandmother     Hypertension Paternal Grandmother     Stroke Paternal Grandmother     Heart attack Paternal Grandmother     Asthma Paternal Grandmother     Vision loss Paternal Grandmother         weak    Osteoporosis Paternal Grandmother         Father's mom had arthritis in her knees?    Arthritis Paternal Grandmother     Hyperlipidemia Paternal Grandmother         heavy    Heart attack Paternal Grandfather     Vision loss Paternal Grandfather         weak    Alcohol abuse Other     Depression Other     ADD /  ADHD Brother     Alcohol abuse Brother     Seizures Brother         When he was young, not since he had tonsles removed.    Dementia Sister     Cancer Maternal Aunt         Lymphoma    Alcohol abuse Maternal Aunt     Cancer Paternal Aunt         double mastectomy    Heart disease Paternal Uncle         several heart surgeries    Heart failure Paternal Uncle     Heart disease Paternal Uncle         stroke    Mental illness Paternal Uncle         unknoown    Heart failure Paternal Uncle     Heart disease Paternal Uncle         pacemaker    Heart failure Paternal Uncle         Pacemaker    Mental illness Brother         unknown    Alcohol abuse Brother     Alcohol abuse Maternal Uncle     Depression Son     Vision loss Son         weak sight    Vision loss Daughter         lazy eye, weak sight    Alcohol abuse Maternal Aunt     Alcohol abuse Maternal Uncle             Depression Maternal Uncle         war vet    Mental illness Maternal Uncle         vet/ptsd ?    Alcohol abuse Maternal Uncle         D[place]oll/purple heart    Cancer Maternal Aunt         stage 4 Breast with spreading to the liver    Depression Son         unknown diagnosis also ADHD    Thyroid disease Paternal Aunt     Vision loss Daughter         lazy eye, weak sight/glasses    Vision loss Son         weak sight/glasses    Cancer Maternal Aunt     Cancer Paternal Aunt     Sleep apnea Brother     Sleep apnea Sister     Sleep apnea Brother     Thyroid disease Paternal Aunt        Social History:   Social History     Socioeconomic History    Marital status: Single   Tobacco Use    Smoking status: Never     Passive exposure: Never    Smokeless tobacco: Never   Vaping Use    Vaping status: Never Used   Substance and Sexual Activity    Alcohol use: Not Currently     Comment: once or twice a year    Drug use: Never    Sexual activity: Not Currently     Partners: Male     Birth control/protection: Abstinence, Post-menopausal, Hysterectomy, Surgical  "        Objective     Vital Signs  /90   Pulse 90   Ht 167.6 cm (66\")   Wt 116 kg (255 lb 9.6 oz)   SpO2 97%   BMI 41.25 kg/m²   Estimated body mass index is 41.25 kg/m² as calculated from the following:    Height as of this encounter: 167.6 cm (66\").    Weight as of this encounter: 116 kg (255 lb 9.6 oz).            Physical Exam  Vitals and nursing note reviewed.   Constitutional:       General: She is not in acute distress.     Appearance: Normal appearance. She is obese.   HENT:      Head: Normocephalic and atraumatic.   Cardiovascular:      Rate and Rhythm: Normal rate and regular rhythm.      Heart sounds: Normal heart sounds.   Pulmonary:      Effort: Pulmonary effort is normal. No respiratory distress.      Breath sounds: Normal breath sounds.   Abdominal:      General: Bowel sounds are normal.      Palpations: Abdomen is soft.   Skin:     General: Skin is warm and dry.   Neurological:      General: No focal deficit present.      Mental Status: She is alert and oriented to person, place, and time.          Procedures     Assessment and Plan   Diagnosis Discussed   Continue to monitor   Plenty of fluids  Urine studies for further evaluation   Stool studies for further evaluation   Take diflucan as prescribed   If symptoms worsen or persist please seek further evaluation    1. Generalized abdominal pain  - Stool Culture (Reference Lab) - Stool, Per Rectum; Future  - Ova & Parasite Examination - Stool, Per Rectum; Future    2. Diarrhea of presumed infectious origin  - Stool Culture (Reference Lab) - Stool, Per Rectum; Future  - Ova & Parasite Examination - Stool, Per Rectum; Future    3. Pruritus  Will continue to monitor   Testing today  Antihistamines as needed as directed     4. Yeast vaginitis  - fluconazole (Diflucan) 150 MG tablet; Take 1 tablet by mouth Daily for 5 doses.  Dispense: 5 tablet; Refill: 0    5. Dysuria  - POC Urinalysis Dipstick, Automated  - Urine Culture - Urine, Urine, Clean " Catch; Future  - Urine Culture - Urine, Urine, Clean Catch       Follow Up  Return if symptoms worsen or fail to improve, for Next scheduled follow up.    MD COURTNEY BowenE [facility] INTERNAL MEDICINE  [address] [place] [id number] [phone]

## 2025-07-27 LAB — BACTERIA SPEC AEROBE CULT: NORMAL

## 2025-07-28 ENCOUNTER — APPOINTMENT (OUTPATIENT)
Dept: SPEECH THERAPY | Facility: HOSPITAL | Age: 65
End: 2025-07-28
Payer: OTHER MISCELLANEOUS

## 2025-07-30 ENCOUNTER — TREATMENT (OUTPATIENT)
Dept: PHYSICAL THERAPY | Facility: CLINIC | Age: 65
End: 2025-07-30
Payer: OTHER MISCELLANEOUS

## 2025-07-30 ENCOUNTER — HOSPITAL ENCOUNTER (OUTPATIENT)
Dept: SPEECH THERAPY | Facility: HOSPITAL | Age: 65
Setting detail: THERAPIES SERIES
Discharge: HOME | End: 2025-07-30
Payer: OTHER MISCELLANEOUS

## 2025-07-30 DIAGNOSIS — M54.2 CERVICAL PAIN: Primary | ICD-10-CM

## 2025-07-30 DIAGNOSIS — M25.512 ACUTE PAIN OF BOTH SHOULDERS: ICD-10-CM

## 2025-07-30 DIAGNOSIS — M25.511 ACUTE PAIN OF BOTH SHOULDERS: ICD-10-CM

## 2025-07-30 DIAGNOSIS — R41.841 COGNITIVE COMMUNICATION DEFICIT: Primary | ICD-10-CM

## 2025-07-30 PROCEDURE — 92507 TX SP LANG VOICE COMM INDIV: CPT | Performed by: SPEECH-LANGUAGE PATHOLOGIST

## 2025-07-30 NOTE — THERAPY TREATMENT NOTE
Outpatient Speech Language Pathology   Adult Speech Language Cognitive Treatment Note   [place]     Patient Name: Sandra Auguste  : 1960  MRN: [id number]  Today's Date: 2025         Visit Date: 2025   Patient Active Problem List   Diagnosis    Anemia    Post traumatic stress disorder (PTSD)    Diverticulitis of colon    Steatosis of liver    Lateral epicondylitis    Knee pain    Osteoarthritis    Nausea    Gastroesophageal reflux disease    Acute pain of left shoulder    Sleep apnea with use of continuous positive airway pressure (CPAP)    Seasonal allergies    Migraine    Diverticulosis    Temple tenderness    TMJ click    Skull fracture    Moderate episode of recurrent major depressive disorder    Generalized anxiety disorder    High risk medications (not anticoagulants) long-term use    Hyperlipidemia    Asthma    Type 2 diabetes mellitus without complication, without long-term current use of insulin    Sleeping difficulties    ARMD (age-related macular degeneration), bilateral    Brain atrophy    Mild cognitive impairment    Attention deficit hyperactivity disorder, combined type    Vitamin D deficiency          Visit Dx:    ICD-10-CM ICD-9-CM   1. Cognitive communication deficit  R41.841 799.52        OP SLP Assessment/Plan - 25 1400          SLP Assessment    Functional Problems Speech Language- Adult/Cognition  -RB    Impact on Function: Adult Speech Language/Cognition Poor attention to task;Trouble learning or remembering new information;Restrictions in personal and social life  -RB    Clinical Impression: Speech Language-Adult/Congnition Mild:;Cognitive Communication Impairment  -RB    Functional Problems Comment imapcts daily tasks, communication, participation  -RB    Clinical Impression Comments Pt receptive to HEP and strategies. Targeted recall, attention, metacognition. Discussed multifactoral causes of brain fog, cognitive deficits.  -RB    Please refer to paper survey  for additional self-reported information Yes  -RB    Please refer to items scanned into chart for additional diagnostic informaiton and handouts as provided by clinician Yes  -RB    SLP Diagnosis cog/comm deficit, fluency  -RB    Prognosis Good (comment)  -RB    Patient/caregiver participated in establishment of treatment plan and goals Yes  -RB    Patient would benefit from skilled therapy intervention Yes  -RB       SLP Plan    Frequency 1x/weekly  -RB    Duration 5 weeks  -RB    Planned CPT's? SLP INDIVIDUAL SPEECH THERAPY: 44122;SLP COG TEST (PER HOUR): 45319  -RB    Expected Duration of Therapy Session (SLP Eval) 45  -RB    Plan Comments cog and speech  -RB              User Key  (r) = Recorded By, (t) = Taken By, (c) = Cosigned By      Initials Name Provider Type    Jocelin Nj MA,CCC-SLP Speech and Language Pathologist                         ACTIVITY  ACCURACY ASSISTANCE NEEDED   LTGs:   Pt will be able to remember information needed to function on at avocational and vocational  tasks 95% of the time no cues           Pt will implement compensatory strategies to maximize patient’s memory function so patient can continue to participate in daily activities 95% of the time no cues                 targeted functional recall                       Targeted compensatory strategies  80%                      80%  no cues                      No cues   STG:     Pt will utilize word finding strategies in conversation at 95% with no cues               not targeted       STG:        Pt’s memory skills will be enhanced as reported by patient by utilizing internal memory strategies to recall up to 5 pieces of information after a 5 minute delay no cues                     modeled association, grouping, visualization    names: 4/4 5 min delay   To brin/5 5 min delay  no cues   STG:  Pt will demonstrate improved ability to recall and summarize information by listening/reading information and  answering questions/  summarzing 95% of the time min cues   targeted visual and auditory recall tasks   article: 80%  Podcast: 80%  no cues   STG:   Pt will complete attention to detail tasks at 100% no cues            PT will utilize metacognitive strategies at 90-95% no cues                      PT will utilize fluency strategies in conversation at 100% no cues        Pt will improve attention skills by sustaining focus to selective target/task when presented with competing stimuli or in a distracting environment in order to complete a task 90% no cues     not targeted                 Modeled pacing stoplight, sx tracking, self monitoring           Not targeted                    Targeted selective attention                                                                 80%                                                                No cues         Certification: 7/1/25-9/30/25                               SLP OP Goals       Row Name 07/30/25 1400          Subjective Comments    Subjective Comments Pt reports a good birthday  -RB        Subjective Pain    Able to rate subjective pain? yes  -RB     Pre-Treatment Pain Level 0  -RB     Post-Treatment Pain Level 0  -RB               User Key  (r) = Recorded By, (t) = Taken By, (c) = Cosigned By      Initials Name Provider Type    RB Jocelin Jones MA,CCC-SLP Speech and Language Pathologist                   OP SLP Education       Row Name 07/30/25 1400       Education    Barriers to Learning No barriers identified  -RB    Education Provided Patient participated in establishing goals and treatment plan;Patient demonstrated recommended strategies;Patient requires further education on strategies, risks  -RB    Assessed Learning needs;Learning motivation;Learning readiness;Learning preferences  -RB    Learning Motivation Strong  -RB    Learning Method Explanation;Demonstration;Teach back;Written materials  -RB    Teaching Response Verbalized understanding;Demonstrated  understanding;Reinforcement needed  -RB    Education Comments HEP: visualization, self monitoring  -RB              User Key  (r) = Recorded By, (t) = Taken By, (c) = Cosigned By      Initials Name Effective Dates    RB Jocelin Jones MA,CCC-SLP 03/28/25 -                          Time Calculation:        Therapy Charges for Today       Code Description Service Date Service Provider Modifiers Qty    [id number]  ST TREATMENT SPEECH 3 7/30/2025 Jocelin Jones MA,PATTY-SLP GN 1               Jocelin HERNANDEZ CBIS  [place] license: [id number]        Jocelin Jones MA,MARY  7/30/2025

## 2025-07-30 NOTE — PROGRESS NOTES
Physical Therapy Daily Treatment Note  [facility] PHYSICAL THERAPY  [address] [address] [phone]    Patient: Sandra Auguste   : 1960  Referring practitioner: Em Baum MD  Date of Initial Visit: Type: THERAPY  Noted: 2025  Today's Date: 2025  Patient seen for 5 sessions       Visit Diagnoses:    ICD-10-CM ICD-9-CM   1. Cervical pain  M54.2 723.1   2. Acute pain of both shoulders  M25.511 719.41    M25.512      Patient arrived 14 minutes late to therapy; encouraged to be on time for appointments     Subjective   Patient reports getting a little better; has been going to pool with grandkids  Hot water in shower loosens it up   Trying to stretch it, ROM seems better  Neurologist put me on a couple of medications for headaches, haven't really taken them   Wants to go back to work when school starts back     Objective   See Exercise, Manual, and Modality Logs for complete treatment    ROM most limited with cervical rotation to the left     [employer] Exercises:  Seated scapula retraction  Seated shoulder shrugs  Seated shoulder circles  Seated cervical spine rotation bilaterally, using hand to assist; supine cervical rotations  Seated cervical extension/flexion stretch  Shoulder pulleys  Supine chin tucks x 5     Cervical snags to the left with strap  Pulleys (defer)    Assessment/Plan  Sandra Auguste needs continued skilled Physical Therapy services for decreasing pain while improving mobility, strength, balance and endurance in order to return to work and/or activities of daily living without pain or dysfunction; improved tolerance to mobility and manual therapy       Treatment considerations for next visit:  Advance as tolerated towards stated goals    Timed:   Manual Therapy:    10     mins  00731;     Therapeutic Exercise:    8     mins  43918;     Neuromuscular Juaquin:    8    mins  05959;    Therapeutic Activity:     8     mins   82328;     Gait Training:           mins  37658;     Ultrasound:          mins  63571;    Ionto                                   mins   80876  Self Care                            mins   37665  Canalith Repos         mins   26741    Un-Timed:  Electrical Stimulation:         mins  04827 ( );  Dry Needling          mins self-pay  Traction          mins 87024      Timed Treatment:   34   mins   Total Treatment:     34   mins    MIGUELITO Valdez, PT  [place] License: [id number]

## 2025-08-07 ENCOUNTER — TREATMENT (OUTPATIENT)
Dept: PHYSICAL THERAPY | Facility: CLINIC | Age: 65
End: 2025-08-07
Payer: OTHER MISCELLANEOUS

## 2025-08-07 DIAGNOSIS — M25.512 ACUTE PAIN OF BOTH SHOULDERS: ICD-10-CM

## 2025-08-07 DIAGNOSIS — M25.511 ACUTE PAIN OF BOTH SHOULDERS: ICD-10-CM

## 2025-08-07 DIAGNOSIS — M54.2 CERVICAL PAIN: Primary | ICD-10-CM

## 2025-08-08 ENCOUNTER — HOSPITAL ENCOUNTER (OUTPATIENT)
Facility: HOSPITAL | Age: 65
Setting detail: THERAPIES SERIES
Discharge: HOME | End: 2025-08-08
Payer: COMMERCIAL

## 2025-08-08 DIAGNOSIS — R41.841 COGNITIVE COMMUNICATION DEFICIT: Primary | ICD-10-CM

## 2025-08-08 PROCEDURE — 92507 TX SP LANG VOICE COMM INDIV: CPT | Performed by: SPEECH-LANGUAGE PATHOLOGIST

## 2025-08-11 ENCOUNTER — TREATMENT (OUTPATIENT)
Dept: PHYSICAL THERAPY | Facility: CLINIC | Age: 65
End: 2025-08-11
Payer: OTHER MISCELLANEOUS

## 2025-08-11 DIAGNOSIS — M25.511 ACUTE PAIN OF BOTH SHOULDERS: ICD-10-CM

## 2025-08-11 DIAGNOSIS — M54.2 CERVICAL PAIN: Primary | ICD-10-CM

## 2025-08-11 DIAGNOSIS — M25.512 ACUTE PAIN OF BOTH SHOULDERS: ICD-10-CM

## 2025-08-13 ENCOUNTER — TREATMENT (OUTPATIENT)
Dept: PHYSICAL THERAPY | Facility: CLINIC | Age: 65
End: 2025-08-13
Payer: OTHER MISCELLANEOUS

## 2025-08-13 DIAGNOSIS — M54.2 CERVICAL PAIN: Primary | ICD-10-CM

## 2025-08-13 DIAGNOSIS — M25.512 ACUTE PAIN OF BOTH SHOULDERS: ICD-10-CM

## 2025-08-13 DIAGNOSIS — M25.511 ACUTE PAIN OF BOTH SHOULDERS: ICD-10-CM

## 2025-08-19 DIAGNOSIS — J30.2 SEASONAL ALLERGIES: ICD-10-CM

## 2025-08-19 RX ORDER — LORATADINE 10 MG/1
10 TABLET ORAL DAILY
Qty: 30 TABLET | Refills: 0 | Status: SHIPPED | OUTPATIENT
Start: 2025-08-19 | End: 2025-08-20 | Stop reason: SDUPTHER

## 2025-08-20 ENCOUNTER — OFFICE VISIT (OUTPATIENT)
Dept: INTERNAL MEDICINE | Facility: CLINIC | Age: 65
End: 2025-08-20
Payer: COMMERCIAL

## 2025-08-20 VITALS
HEART RATE: 88 BPM | HEIGHT: 66 IN | DIASTOLIC BLOOD PRESSURE: 72 MMHG | BODY MASS INDEX: 40.82 KG/M2 | WEIGHT: 254 LBS | RESPIRATION RATE: 16 BRPM | OXYGEN SATURATION: 98 % | SYSTOLIC BLOOD PRESSURE: 112 MMHG

## 2025-08-20 DIAGNOSIS — E11.9 TYPE 2 DIABETES MELLITUS WITHOUT COMPLICATION, WITHOUT LONG-TERM CURRENT USE OF INSULIN: Primary | ICD-10-CM

## 2025-08-20 DIAGNOSIS — E55.9 VITAMIN D DEFICIENCY: ICD-10-CM

## 2025-08-20 DIAGNOSIS — J30.2 SEASONAL ALLERGIES: ICD-10-CM

## 2025-08-20 LAB
EXPIRATION DATE: ABNORMAL
HBA1C MFR BLD: 8.3 % (ref 4.5–5.7)
Lab: ABNORMAL

## 2025-08-20 RX ORDER — ATOMOXETINE 40 MG/1
40 CAPSULE ORAL DAILY
COMMUNITY
Start: 2025-08-02

## 2025-08-20 RX ORDER — LORATADINE 10 MG/1
10 TABLET ORAL DAILY
Qty: 90 TABLET | Refills: 1 | Status: SHIPPED | OUTPATIENT
Start: 2025-08-20

## 2025-08-20 RX ORDER — ACETAMINOPHEN 160 MG
2000 TABLET,DISINTEGRATING ORAL DAILY
Qty: 90 CAPSULE | Refills: 1 | Status: SHIPPED | OUTPATIENT
Start: 2025-08-20

## 2025-08-21 ENCOUNTER — PRIOR AUTHORIZATION (OUTPATIENT)
Dept: INTERNAL MEDICINE | Facility: CLINIC | Age: 65
End: 2025-08-21

## 2025-08-26 ENCOUNTER — TREATMENT (OUTPATIENT)
Dept: PHYSICAL THERAPY | Facility: CLINIC | Age: 65
End: 2025-08-26
Payer: OTHER MISCELLANEOUS

## 2025-08-26 DIAGNOSIS — M54.2 CERVICAL PAIN: Primary | ICD-10-CM

## 2025-08-26 DIAGNOSIS — M25.512 ACUTE PAIN OF BOTH SHOULDERS: ICD-10-CM

## 2025-08-26 DIAGNOSIS — M25.511 ACUTE PAIN OF BOTH SHOULDERS: ICD-10-CM

## 2025-08-28 ENCOUNTER — HOSPITAL ENCOUNTER (OUTPATIENT)
Dept: GENERAL RADIOLOGY | Facility: HOSPITAL | Age: 65
Discharge: HOME | End: 2025-08-28
Admitting: FAMILY MEDICINE
Payer: OTHER MISCELLANEOUS

## 2025-08-28 ENCOUNTER — TELEPHONE (OUTPATIENT)
Dept: INTERNAL MEDICINE | Facility: CLINIC | Age: 65
End: 2025-08-28
Payer: COMMERCIAL

## 2025-08-28 ENCOUNTER — OFFICE VISIT (OUTPATIENT)
Dept: INTERNAL MEDICINE | Facility: CLINIC | Age: 65
End: 2025-08-28
Payer: OTHER MISCELLANEOUS

## 2025-08-28 VITALS
OXYGEN SATURATION: 97 % | HEART RATE: 88 BPM | BODY MASS INDEX: 40.66 KG/M2 | RESPIRATION RATE: 16 BRPM | WEIGHT: 253 LBS | HEIGHT: 66 IN | SYSTOLIC BLOOD PRESSURE: 120 MMHG | DIASTOLIC BLOOD PRESSURE: 78 MMHG

## 2025-08-28 DIAGNOSIS — M79.671 RIGHT FOOT PAIN: ICD-10-CM
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
## 2025-08-28 DIAGNOSIS — M79.645 PAIN OF LEFT THUMB: ICD-10-CM

## 2025-08-28 DIAGNOSIS — M79.10 MUSCLE TENSION PAIN: ICD-10-CM

## 2025-08-28 DIAGNOSIS — M79.641 RIGHT HAND PAIN: ICD-10-CM

## 2025-08-28 DIAGNOSIS — R51.9 LEFT FACIAL PAIN: ICD-10-CM

## 2025-08-28 DIAGNOSIS — M79.641 RIGHT HAND PAIN: Primary | ICD-10-CM

## 2025-08-28 PROCEDURE — 73130 X-RAY EXAM OF HAND: CPT

## 2025-08-28 PROCEDURE — 73630 X-RAY EXAM OF FOOT: CPT

## 2025-08-28 RX ORDER — ATOMOXETINE 40 MG/1
40 CAPSULE ORAL DAILY
COMMUNITY

## 2025-08-28 RX ORDER — METHOCARBAMOL 500 MG/1
500 TABLET, FILM COATED ORAL 3 TIMES DAILY
Qty: 30 TABLET | Refills: 0 | Status: SHIPPED | OUTPATIENT
Start: 2025-08-28

## (undated) DEVICE — SOL LR 1000ML

## (undated) DEVICE — ADAPT ST INFUS ADMIN SYR 70IN

## (undated) DEVICE — SUT PDS 2/0 CT2 27IN VIL PDP333H

## (undated) DEVICE — TISSUE RETRIEVAL SYSTEM: Brand: INZII RETRIEVAL SYSTEM

## (undated) DEVICE — FLTR PLUMEPORT LAP W/CONN STRL

## (undated) DEVICE — OCCL COLPO PNEUMO  STRL

## (undated) DEVICE — GLV SURG SENSICARE MICRO PF LF 6.5 STRL

## (undated) DEVICE — [HIGH FLOW INSUFFLATOR,  DO NOT USE IF PACKAGE IS DAMAGED,  KEEP DRY,  KEEP AWAY FROM SUNLIGHT,  PROTECT FROM HEAT AND RADIOACTIVE SOURCES.]: Brand: PNEUMOSURE

## (undated) DEVICE — PK MAJ GYN DAVINCI 10

## (undated) DEVICE — 3M™ STERI-STRIP™ REINFORCED ADHESIVE SKIN CLOSURES, R1547, 1/2 IN X 4 IN (12 MM X 100 MM), 6 STRIPS/ENVELOPE: Brand: 3M™ STERI-STRIP™

## (undated) DEVICE — LAPAROSCOPY WOUND CLOSURE SYSTEM KIT CONSISTS OF:05391529640002; NEOCLOSE ANCHORGUIDE 5/12 & 8/15 US; MODEL NUMBER NCA515-U; QTY 10 AND05391529640019; NEOCLOSE AUTOANCHOR X2 PACK US; MODEL NUMBER NCA2-U;  QTY 10: Brand: NEOCLOSE ANCHORGUIDE REGULAR PORT CLOSURE KIT US

## (undated) DEVICE — AIRWY 90MM NO9

## (undated) DEVICE — FLTR HME STR UNIV W/SMPL PORT

## (undated) DEVICE — DRAPE,TOP,102X53,STERILE: Brand: MEDLINE

## (undated) DEVICE — SUT MNCRYL PLS ANTIB UD 3/0 PS2 27IN

## (undated) DEVICE — VCARE MEDIUM, UTERINE MANIPULATOR, VAGINAL-CERVICAL-AHLUWALIA'S-RETRACTOR-ELEVATOR: Brand: VCARE

## (undated) DEVICE — PAD STEEP TRENDELENBURG W/RAIL STRAP INTEGR ARM PROTECT WING

## (undated) DEVICE — TIP COVER ACCESSORY

## (undated) DEVICE — CANNULA,ADULT,SOFT-TOUCH,7TUBE,SC: Brand: MEDLINE

## (undated) DEVICE — SYR LL TP 10ML STRL

## (undated) DEVICE — OBT BLADLES ENDOWRIST DAVINCI/S 8MM

## (undated) DEVICE — CANNULA SEAL

## (undated) DEVICE — ENDOPATH PNEUMONEEDLE INSUFFLATION NEEDLES WITH LUER LOCK CONNECTORS 150MM: Brand: ENDOPATH

## (undated) DEVICE — DUAL LUMEN STOMACH TUBE,ANTI-REFLUX VALVE: Brand: SALEM SUMP

## (undated) DEVICE — DRSNG WND BORDR/ADHS NONADHR/GZ LF 2X2IN STRL

## (undated) DEVICE — APPL CHLORAPREP W/TINT 26ML BLU

## (undated) DEVICE — Device

## (undated) DEVICE — SYR TB SFTY 1CC W 27G 1/2IN NDL BX/100

## (undated) DEVICE — MEDI-VAC NON-CONDUCTIVE SUCTION TUBING: Brand: CARDINAL HEALTH

## (undated) DEVICE — MEDI-VAC YANKAUER SUCTION HANDLE W/BULBOUS TIP: Brand: CARDINAL HEALTH